# Patient Record
Sex: FEMALE | Race: WHITE | NOT HISPANIC OR LATINO | Employment: OTHER | URBAN - METROPOLITAN AREA
[De-identification: names, ages, dates, MRNs, and addresses within clinical notes are randomized per-mention and may not be internally consistent; named-entity substitution may affect disease eponyms.]

---

## 2017-01-02 ENCOUNTER — PREP FOR SURGERY (OUTPATIENT)
Dept: ORTHOPEDIC SURGERY | Facility: CLINIC | Age: 82
End: 2017-01-02

## 2017-01-02 ENCOUNTER — HOSPITAL ENCOUNTER (INPATIENT)
Facility: HOSPITAL | Age: 82
LOS: 4 days | Discharge: NURSING, REHAB OR LONG-TERM CARE | End: 2017-01-06
Attending: INTERNAL MEDICINE | Admitting: INTERNAL MEDICINE

## 2017-01-02 ENCOUNTER — APPOINTMENT (OUTPATIENT)
Dept: GENERAL RADIOLOGY | Facility: HOSPITAL | Age: 82
End: 2017-01-02

## 2017-01-02 ENCOUNTER — APPOINTMENT (OUTPATIENT)
Dept: GENERAL RADIOLOGY | Facility: HOSPITAL | Age: 82
End: 2017-01-02
Attending: EMERGENCY MEDICINE

## 2017-01-02 DIAGNOSIS — E11.9 TYPE 2 DIABETES MELLITUS WITHOUT COMPLICATION, WITHOUT LONG-TERM CURRENT USE OF INSULIN (HCC): ICD-10-CM

## 2017-01-02 DIAGNOSIS — S72.001A CLOSED FRACTURE OF NECK OF RIGHT FEMUR, INITIAL ENCOUNTER (HCC): ICD-10-CM

## 2017-01-02 DIAGNOSIS — S72.001A CLOSED FRACTURE OF NECK OF RIGHT FEMUR, INITIAL ENCOUNTER (HCC): Primary | ICD-10-CM

## 2017-01-02 DIAGNOSIS — S72.001A CLOSED RIGHT HIP FRACTURE, INITIAL ENCOUNTER (HCC): Primary | ICD-10-CM

## 2017-01-02 LAB
ABO GROUP BLD: NORMAL
ALBUMIN SERPL-MCNC: 3.9 G/DL (ref 3.5–5.2)
ALBUMIN/GLOB SERPL: 1.2 G/DL
ALP SERPL-CCNC: 60 U/L (ref 40–129)
ALT SERPL W P-5'-P-CCNC: 8 U/L (ref 5–33)
ANION GAP SERPL CALCULATED.3IONS-SCNC: 14.4 MMOL/L
AST SERPL-CCNC: 11 U/L (ref 5–32)
BASOPHILS # BLD AUTO: 0.02 10*3/MM3 (ref 0–0.2)
BASOPHILS NFR BLD AUTO: 0.3 % (ref 0–2)
BILIRUB SERPL-MCNC: 1 MG/DL (ref 0.2–1.2)
BILIRUB UR QL STRIP: NEGATIVE
BLD GP AB SCN SERPL QL: NEGATIVE
BUN BLD-MCNC: 14 MG/DL (ref 8–23)
BUN/CREAT SERPL: 20.3 (ref 7–25)
CALCIUM SPEC-SCNC: 9.8 MG/DL (ref 8.8–10.5)
CHLORIDE SERPL-SCNC: 92 MMOL/L (ref 98–107)
CLARITY UR: CLEAR
CO2 SERPL-SCNC: 23.6 MMOL/L (ref 22–29)
COLOR UR: YELLOW
CREAT BLD-MCNC: 0.69 MG/DL (ref 0.57–1)
DEPRECATED RDW RBC AUTO: 39.2 FL (ref 37–54)
EOSINOPHIL # BLD AUTO: 0.02 10*3/MM3 (ref 0.1–0.3)
EOSINOPHIL NFR BLD AUTO: 0.3 % (ref 0–4)
ERYTHROCYTE [DISTWIDTH] IN BLOOD BY AUTOMATED COUNT: 11.7 % (ref 11.5–14.5)
GFR SERPL CREATININE-BSD FRML MDRD: 82 ML/MIN/1.73
GLOBULIN UR ELPH-MCNC: 3.2 GM/DL
GLUCOSE BLD-MCNC: 114 MG/DL (ref 65–99)
GLUCOSE BLDC GLUCOMTR-MCNC: 122 MG/DL (ref 70–130)
GLUCOSE UR STRIP-MCNC: NEGATIVE MG/DL
HCT VFR BLD AUTO: 32.6 % (ref 37–47)
HGB BLD-MCNC: 11.2 G/DL (ref 12–16)
HGB UR QL STRIP.AUTO: NEGATIVE
IMM GRANULOCYTES # BLD: 0.02 10*3/MM3 (ref 0–0.03)
IMM GRANULOCYTES NFR BLD: 0.3 % (ref 0–0.5)
KETONES UR QL STRIP: NEGATIVE
LEUKOCYTE ESTERASE UR QL STRIP.AUTO: NEGATIVE
LYMPHOCYTES # BLD AUTO: 1.97 10*3/MM3 (ref 0.6–4.8)
LYMPHOCYTES NFR BLD AUTO: 25.3 % (ref 20–45)
MCH RBC QN AUTO: 31.3 PG (ref 27–31)
MCHC RBC AUTO-ENTMCNC: 34.4 G/DL (ref 31–37)
MCV RBC AUTO: 91.1 FL (ref 81–99)
MONOCYTES # BLD AUTO: 0.58 10*3/MM3 (ref 0–1)
MONOCYTES NFR BLD AUTO: 7.4 % (ref 3–8)
NEUTROPHILS # BLD AUTO: 5.18 10*3/MM3 (ref 1.5–8.3)
NEUTROPHILS NFR BLD AUTO: 66.4 % (ref 45–70)
NITRITE UR QL STRIP: NEGATIVE
NRBC BLD MANUAL-RTO: 0 /100 WBC (ref 0–0)
PH UR STRIP.AUTO: 6 [PH] (ref 4.5–8)
PLATELET # BLD AUTO: 322 10*3/MM3 (ref 140–500)
PMV BLD AUTO: 9.3 FL (ref 7.4–10.4)
POTASSIUM BLD-SCNC: 3.8 MMOL/L (ref 3.5–5.2)
PROT SERPL-MCNC: 7.1 G/DL (ref 6–8.5)
PROT UR QL STRIP: NEGATIVE
RBC # BLD AUTO: 3.58 10*6/MM3 (ref 4.2–5.4)
RH BLD: POSITIVE
SODIUM BLD-SCNC: 130 MMOL/L (ref 136–145)
SP GR UR STRIP: 1.01 (ref 1–1.03)
UROBILINOGEN UR QL STRIP: NORMAL
WBC NRBC COR # BLD: 7.79 10*3/MM3 (ref 4.8–10.8)

## 2017-01-02 PROCEDURE — 99284 EMERGENCY DEPT VISIT MOD MDM: CPT

## 2017-01-02 PROCEDURE — 25010000002 HYDROMORPHONE PER 4 MG: Performed by: ORTHOPAEDIC SURGERY

## 2017-01-02 PROCEDURE — 86850 RBC ANTIBODY SCREEN: CPT

## 2017-01-02 PROCEDURE — 93010 ELECTROCARDIOGRAM REPORT: CPT | Performed by: INTERNAL MEDICINE

## 2017-01-02 PROCEDURE — 99222 1ST HOSP IP/OBS MODERATE 55: CPT | Performed by: INTERNAL MEDICINE

## 2017-01-02 PROCEDURE — 27236 TREAT THIGH FRACTURE: CPT | Performed by: ORTHOPAEDIC SURGERY

## 2017-01-02 PROCEDURE — 25010000002 HYDROMORPHONE PER 4 MG: Performed by: INTERNAL MEDICINE

## 2017-01-02 PROCEDURE — 99285 EMERGENCY DEPT VISIT HI MDM: CPT | Performed by: EMERGENCY MEDICINE

## 2017-01-02 PROCEDURE — 80053 COMPREHEN METABOLIC PANEL: CPT | Performed by: INTERNAL MEDICINE

## 2017-01-02 PROCEDURE — 86900 BLOOD TYPING SEROLOGIC ABO: CPT

## 2017-01-02 PROCEDURE — 85025 COMPLETE CBC W/AUTO DIFF WBC: CPT | Performed by: INTERNAL MEDICINE

## 2017-01-02 PROCEDURE — 94799 UNLISTED PULMONARY SVC/PX: CPT

## 2017-01-02 PROCEDURE — 86920 COMPATIBILITY TEST SPIN: CPT

## 2017-01-02 PROCEDURE — 93005 ELECTROCARDIOGRAM TRACING: CPT | Performed by: INTERNAL MEDICINE

## 2017-01-02 PROCEDURE — 73502 X-RAY EXAM HIP UNI 2-3 VIEWS: CPT

## 2017-01-02 PROCEDURE — 71010 HC CHEST PA OR AP: CPT

## 2017-01-02 PROCEDURE — 86901 BLOOD TYPING SEROLOGIC RH(D): CPT

## 2017-01-02 PROCEDURE — 82962 GLUCOSE BLOOD TEST: CPT

## 2017-01-02 PROCEDURE — 81003 URINALYSIS AUTO W/O SCOPE: CPT | Performed by: INTERNAL MEDICINE

## 2017-01-02 RX ORDER — CELECOXIB 100 MG/1
400 CAPSULE ORAL ONCE
Status: CANCELLED | OUTPATIENT
Start: 2017-01-02 | End: 2017-01-02

## 2017-01-02 RX ORDER — BISACODYL 10 MG
10 SUPPOSITORY, RECTAL RECTAL DAILY
Status: DISCONTINUED | OUTPATIENT
Start: 2017-01-02 | End: 2017-01-06 | Stop reason: HOSPADM

## 2017-01-02 RX ORDER — ONDANSETRON 2 MG/ML
4 INJECTION INTRAMUSCULAR; INTRAVENOUS EVERY 6 HOURS PRN
Status: DISCONTINUED | OUTPATIENT
Start: 2017-01-02 | End: 2017-01-06 | Stop reason: HOSPADM

## 2017-01-02 RX ORDER — ACETAMINOPHEN 10 MG/ML
1000 INJECTION, SOLUTION INTRAVENOUS ONCE
Status: CANCELLED | OUTPATIENT
Start: 2017-01-03

## 2017-01-02 RX ORDER — OXYCODONE HCL 10 MG/1
10 TABLET, FILM COATED, EXTENDED RELEASE ORAL ONCE
Status: CANCELLED | OUTPATIENT
Start: 2017-01-02 | End: 2017-01-02

## 2017-01-02 RX ORDER — SODIUM CHLORIDE 450 MG/100ML
50 INJECTION, SOLUTION INTRAVENOUS CONTINUOUS
Status: DISCONTINUED | OUTPATIENT
Start: 2017-01-02 | End: 2017-01-06 | Stop reason: HOSPADM

## 2017-01-02 RX ORDER — NICOTINE POLACRILEX 4 MG
15 LOZENGE BUCCAL AS NEEDED
Status: DISCONTINUED | OUTPATIENT
Start: 2017-01-02 | End: 2017-01-06 | Stop reason: HOSPADM

## 2017-01-02 RX ORDER — HYDROCODONE BITARTRATE AND ACETAMINOPHEN 5; 325 MG/1; MG/1
1 TABLET ORAL ONCE
Status: COMPLETED | OUTPATIENT
Start: 2017-01-02 | End: 2017-01-02

## 2017-01-02 RX ORDER — DEXTROSE MONOHYDRATE 25 G/50ML
25 INJECTION, SOLUTION INTRAVENOUS AS NEEDED
Status: DISCONTINUED | OUTPATIENT
Start: 2017-01-02 | End: 2017-01-06 | Stop reason: HOSPADM

## 2017-01-02 RX ORDER — ACETAMINOPHEN 325 MG/1
650 TABLET ORAL EVERY 6 HOURS PRN
Status: DISCONTINUED | OUTPATIENT
Start: 2017-01-02 | End: 2017-01-03

## 2017-01-02 RX ORDER — PANTOPRAZOLE SODIUM 40 MG/1
40 TABLET, DELAYED RELEASE ORAL
Status: DISCONTINUED | OUTPATIENT
Start: 2017-01-02 | End: 2017-01-06 | Stop reason: HOSPADM

## 2017-01-02 RX ORDER — LATANOPROST 50 UG/ML
1 SOLUTION/ DROPS OPHTHALMIC NIGHTLY
Status: DISCONTINUED | OUTPATIENT
Start: 2017-01-02 | End: 2017-01-06 | Stop reason: HOSPADM

## 2017-01-02 RX ORDER — MELOXICAM 7.5 MG/1
15 TABLET ORAL DAILY
Status: DISCONTINUED | OUTPATIENT
Start: 2017-01-02 | End: 2017-01-03

## 2017-01-02 RX ORDER — SODIUM CHLORIDE 450 MG/100ML
INJECTION, SOLUTION INTRAVENOUS
Status: DISPENSED
Start: 2017-01-02 | End: 2017-01-03

## 2017-01-02 RX ORDER — DOCUSATE SODIUM 100 MG/1
100 CAPSULE, LIQUID FILLED ORAL 2 TIMES DAILY
Status: DISCONTINUED | OUTPATIENT
Start: 2017-01-02 | End: 2017-01-06 | Stop reason: HOSPADM

## 2017-01-02 RX ORDER — PREGABALIN 25 MG/1
75 CAPSULE ORAL ONCE
Status: CANCELLED | OUTPATIENT
Start: 2017-01-02 | End: 2017-01-02

## 2017-01-02 RX ORDER — ALLOPURINOL 100 MG/1
100 TABLET ORAL DAILY
Status: DISCONTINUED | OUTPATIENT
Start: 2017-01-02 | End: 2017-01-06 | Stop reason: HOSPADM

## 2017-01-02 RX ORDER — PRAVASTATIN SODIUM 20 MG
40 TABLET ORAL NIGHTLY
Status: DISCONTINUED | OUTPATIENT
Start: 2017-01-02 | End: 2017-01-06 | Stop reason: HOSPADM

## 2017-01-02 RX ORDER — LEVOTHYROXINE SODIUM 0.05 MG/1
50 TABLET ORAL EVERY MORNING
Status: DISCONTINUED | OUTPATIENT
Start: 2017-01-02 | End: 2017-01-06 | Stop reason: HOSPADM

## 2017-01-02 RX ADMIN — ACETAMINOPHEN 650 MG: 325 TABLET, FILM COATED ORAL at 23:29

## 2017-01-02 RX ADMIN — DOCUSATE SODIUM 100 MG: 100 CAPSULE, LIQUID FILLED ORAL at 17:29

## 2017-01-02 RX ADMIN — HYDROMORPHONE HYDROCHLORIDE 0.5 MG: 1 INJECTION, SOLUTION INTRAMUSCULAR; INTRAVENOUS; SUBCUTANEOUS at 11:45

## 2017-01-02 RX ADMIN — METOPROLOL TARTRATE 25 MG: 25 TABLET ORAL at 15:10

## 2017-01-02 RX ADMIN — SODIUM CHLORIDE 50 ML/HR: 4.5 INJECTION, SOLUTION INTRAVENOUS at 14:04

## 2017-01-02 RX ADMIN — LEVOTHYROXINE SODIUM 50 MCG: 50 TABLET ORAL at 15:09

## 2017-01-02 RX ADMIN — HYDROMORPHONE HYDROCHLORIDE 1 MG: 1 INJECTION, SOLUTION INTRAMUSCULAR; INTRAVENOUS; SUBCUTANEOUS at 14:48

## 2017-01-02 RX ADMIN — HYDROMORPHONE HYDROCHLORIDE 0.5 MG: 1 INJECTION, SOLUTION INTRAMUSCULAR; INTRAVENOUS; SUBCUTANEOUS at 11:57

## 2017-01-02 RX ADMIN — HYDROMORPHONE HYDROCHLORIDE 1 MG: 1 INJECTION, SOLUTION INTRAMUSCULAR; INTRAVENOUS; SUBCUTANEOUS at 23:29

## 2017-01-02 RX ADMIN — METFORMIN HYDROCHLORIDE 500 MG: 500 TABLET ORAL at 15:10

## 2017-01-02 RX ADMIN — LATANOPROST 1 DROP: 50 SOLUTION OPHTHALMIC at 20:48

## 2017-01-02 RX ADMIN — BISACODYL 10 MG: 10 SUPPOSITORY RECTAL at 14:51

## 2017-01-02 RX ADMIN — MELOXICAM 15 MG: 7.5 TABLET ORAL at 15:09

## 2017-01-02 RX ADMIN — PANTOPRAZOLE SODIUM 40 MG: 40 TABLET, DELAYED RELEASE ORAL at 15:11

## 2017-01-02 RX ADMIN — HYDROCODONE BITARTRATE AND ACETAMINOPHEN 1 TABLET: 5; 325 TABLET ORAL at 08:10

## 2017-01-02 RX ADMIN — ALLOPURINOL 100 MG: 100 TABLET ORAL at 15:09

## 2017-01-02 RX ADMIN — PRAVASTATIN SODIUM 40 MG: 20 TABLET ORAL at 20:48

## 2017-01-02 RX ADMIN — HYDROMORPHONE HYDROCHLORIDE 1 MG: 1 INJECTION, SOLUTION INTRAMUSCULAR; INTRAVENOUS; SUBCUTANEOUS at 20:47

## 2017-01-02 RX ADMIN — DOCUSATE SODIUM 100 MG: 100 CAPSULE, LIQUID FILLED ORAL at 15:00

## 2017-01-02 RX ADMIN — HYDROMORPHONE HYDROCHLORIDE 1 MG: 1 INJECTION, SOLUTION INTRAMUSCULAR; INTRAVENOUS; SUBCUTANEOUS at 17:29

## 2017-01-02 NOTE — IP AVS SNAPSHOT
INTER-FACILITY TRANSFER   AFTER VISIT SUMMARY             Ashli León            Summary of Your Hospitalization        About Your Hospitalization     You were admitted on:  January 2, 2017 You last received care in the:  [facility] MED SURG      Reason for Hospitalization     Your primary diagnosis was:  Not on File    Your diagnoses also included:  Broken Leg, Femoral Neck Fracture      Care Providers     Provider Service Role Specialty    Micheal Stephens MD Internal Medicine Attending Provider Internal Medicine    Mirza Whittington MD -- Attending Provider Orthopedic Surgery    Mirza Whittington MD -- Surgeon  Orthopedic Surgery       Your Allergies  Date Reviewed: 1/3/2017    Allergen Reactions    Neomycin Swelling         Statins Myalgia       Medications    Based on the information you provided to us as well as any changes during this visit, the following is your updated medication list.  This is subject to change based on the care your receive at the receiving facility.  You should receive a new list once you are discharged.      If you have any questions or concerns, contact your primary care physician's office.             Your Medications      START taking these medications     aspirin 325 MG EC tablet   Take 1 tablet by mouth Every 12 (Twelve) Hours.   Last time this was given:  1/6/2017  9:01 AM   Next Dose Due:  1/6/17 2100           bisacodyl 5 MG EC tablet   Take 2 tablets by mouth Daily As Needed for constipation.   Commonly known as:  DULCOLAX           bisacodyl 10 MG suppository   Insert 1 suppository into the rectum Daily As Needed for constipation.   Last time this was given:  1/5/2017  8:30 AM   Commonly known as:  DULCOLAX            MG capsule   Take 100 mg by mouth 2 (Two) Times a Day.   Last time this was given:  1/6/2017  9:01 AM   Next Dose Due:  1/6/17 1800           ferrous sulfate 324 (65 FE) MG tablet delayed-release EC tablet   Take 1 tablet by mouth 2  (Two) Times a Day With Meals.   Last time this was given:  1/6/2017  9:01 AM   Next Dose Due:  1/6/17 1800           magnesium hydroxide 400 MG/5ML suspension   Take 30 mL by mouth Daily As Needed for constipation.   Last time this was given:  1/5/2017  1:21 PM   Commonly known as:  MILK OF MAGNESIA           pregabalin 75 MG capsule   Take 1 capsule by mouth Every 12 (Twelve) Hours.   Last time this was given:  1/6/2017  9:01 AM   Commonly known as:  LYRICA           sennosides-docusate sodium 8.6-50 MG tablet   Take 2 tablets by mouth 2 (Two) Times a Day.   Last time this was given:  1/5/2017  8:30 AM   Commonly known as:  SENOKOT-S   Next Dose Due:  1/6/17 1800           witch hazel-glycerin pad   Apply  topically As Needed for irritation.   Commonly known as:  TUCKS             CONTINUE taking these medications     alendronate 5 MG tablet   5 mg Every Morning Before Breakfast.   Commonly known as:  FOSAMAX   Next Dose Due:  1/7/17 0900           allopurinol 100 MG tablet   Take 100 mg by mouth daily.   Last time this was given:  1/6/2017  9:01 AM   Commonly known as:  ZYLOPRIM   Next Dose Due:  1/7/17 0900           HYDROcodone-acetaminophen 5-325 MG per tablet   Take 1-2 tablets by mouth Every 4 (Four) Hours As Needed for moderate pain (4-6) for up to 40 doses.   Last time this was given:  1/2/2017  8:10 AM   Commonly known as:  NORCO           latanoprost 0.005 % ophthalmic solution   Administer 1 drop to both eyes Every Night.   Last time this was given:  1/5/2017 10:04 PM   Commonly known as:  XALATAN   Next Dose Due:  1/6/17 2100           levothyroxine 50 MCG tablet   Take 50 mcg by mouth daily.   Last time this was given:  1/6/2017  6:47 AM   Commonly known as:  SYNTHROID, LEVOTHROID   Next Dose Due:  1/7/17 0600           metFORMIN 500 MG tablet   Take 500 mg by mouth daily with breakfast. Pt has only been taking once a day, however the prescription on bottle says take twice daily with meals.   Last time  this was given:  1/6/2017  9:01 AM   Commonly known as:  GLUCOPHAGE   Next Dose Due:  1/7/17 0900           metoprolol tartrate 25 MG tablet   Take 25 mg by mouth daily.   Last time this was given:  1/6/2017  9:01 AM   Commonly known as:  LOPRESSOR   Next Dose Due:  1/7/17 0900           omeprazole 40 MG capsule   Take 40 mg by mouth 2 (two) times a day.   Commonly known as:  priLOSEC   Next Dose Due:  1/6/17 1800           pravastatin 40 MG tablet   40 mg Daily.   Last time this was given:  1/5/2017 10:03 PM   Commonly known as:  PRAVACHOL   Next Dose Due:  1/6/17 2100           traMADol 50 MG tablet   Take 1 tablet by mouth Every 4 (Four) Hours As Needed for moderate pain (4-6).   Commonly known as:  ULTRAM             STOP taking these medications     meloxicam 15 MG tablet   Commonly known as:  MOBIC           oxyCODONE-acetaminophen 7.5-325 MG per tablet   Commonly known as:  PERCOCET                Where to Get Your Medications      These medications were sent to [id number] - [place] [facility] [address]  [phone]  - [phone]   [address] [facility] [place] [address]     Phone:  [phone]     aspirin 325 MG EC tablet         You can get these medications from any pharmacy     Bring a paper prescription for each of these medications     HYDROcodone-acetaminophen 5-325 MG per tablet    pregabalin 75 MG capsule         Information about where to get these medications is not yet available     ! Ask your nurse or doctor about these medications     bisacodyl 10 MG suppository    bisacodyl 5 MG EC tablet     MG capsule    ferrous sulfate 324 (65 FE) MG tablet delayed-release EC tablet    magnesium hydroxide 400 MG/5ML suspension    sennosides-docusate sodium 8.6-50 MG tablet    witch hazel-glycerin [facility]          Pharmacy Instructions:      [facility]                     Pharmacy Instructions:      [facility]               Additional Instructions    Information is  subject to change based on the care your receive at the receiving facility.  If you have any questions or concerns, contact your primary care physician's office.          Activity Instructions     Activity as Tolerated           Measure Blood Pressure                 Other Instructions     Check Blood Glucose - Fingerstick           Discharge Dressing / Wound Instructions       As per Dr. Whittington       Discharge Instructions       Review prior to discharge              Follow-ups for After Discharge        Follow-up Information     Follow up with Arnaud Monson MD .    Specialty:  Internal Medicine    Contact information:    [address]  [facility] [address] [phone]          Follow up with [facility] .    Specialties:  Skilled Nursing Facility, Intermediate Care Facility    Contact information:    [address] [place] [address] [phone]      Referrals and Follow-ups to Schedule     Follow-Up    As directed    Review prior to discharge   Follow Up Details:  1 week after discharge from facility       Schedule Discharge Follow Up    As directed    Follow Up:  3 Weeks   Special Instructions:  with truman             Scheduled Appointments     Feb 17, 2017  9:30 AM EST   Injection with Adrian Swenson MD   [facility] MEDICAL [facility] (--)    [address] [place] [address] [address]  [facility] [address]   [phone]                         Patient Signature:  ____________________________________________________________    Date:  ____________________________________________________________        More Information      Diabetes Mellitus and Food  It is important for you to manage your blood sugar (glucose) level. Your blood glucose level can be greatly affected by what you eat. Eating healthier foods in the appropriate amounts throughout the day at about the same time each day will help you control your blood glucose level. It can also help slow or prevent  worsening of your diabetes mellitus. Healthy eating may even help you improve the level of your blood pressure and reach or maintain a healthy weight.   General recommendations for healthful eating and cooking habits include:  · Eating meals and snacks regularly. Avoid going long periods of time without eating to lose weight.  · Eating a diet that consists mainly of plant-based foods, such as fruits, vegetables, nuts, legumes, and whole grains.  · Using low-heat cooking methods, such as baking, instead of high-heat cooking methods, such as deep frying.  Work with your dietitian to make sure you understand how to use the Nutrition Facts information on food labels.  HOW CAN FOOD AFFECT ME?  Carbohydrates  Carbohydrates affect your blood glucose level more than any other type of food. Your dietitian will help you determine how many carbohydrates to eat at each meal and teach you how to count carbohydrates. Counting carbohydrates is important to keep your blood glucose at a healthy level, especially if you are using insulin or taking certain medicines for diabetes mellitus.  Alcohol  Alcohol can cause sudden decreases in blood glucose (hypoglycemia), especially if you use insulin or take certain medicines for diabetes mellitus. Hypoglycemia can be a life-threatening condition. Symptoms of hypoglycemia (sleepiness, dizziness, and disorientation) are similar to symptoms of having too much alcohol.   If your health care provider has given you approval to drink alcohol, do so in moderation and use the following guidelines:  · Women should not have more than one drink per day, and men should not have more than two drinks per day. One drink is equal to:    12 oz of beer.    5 oz of wine.    1½ oz of hard liquor.  · Do not drink on an empty stomach.  · Keep yourself hydrated. Have water, diet soda, or unsweetened iced tea.  · Regular soda, juice, and other mixers might contain a lot of carbohydrates and should be  counted.  WHAT FOODS ARE NOT RECOMMENDED?  As you make food choices, it is important to remember that all foods are not the same. Some foods have fewer nutrients per serving than other foods, even though they might have the same number of calories or carbohydrates. It is difficult to get your body what it needs when you eat foods with fewer nutrients. Examples of foods that you should avoid that are high in calories and carbohydrates but low in nutrients include:  · Trans fats (most processed foods list trans fats on the Nutrition Facts label).  · Regular soda.  · Juice.  · Candy.  · Sweets, such as cake, pie, doughnuts, and cookies.  · Fried foods.  WHAT FOODS CAN I EAT?  Eat nutrient-rich foods, which will nourish your body and keep you healthy. The food you should eat also will depend on several factors, including:  · The calories you need.  · The medicines you take.  · Your weight.  · Your blood glucose level.  · Your blood pressure level.  · Your cholesterol level.  You should eat a variety of foods, including:  · Protein.    Lean cuts of meat.    Proteins low in saturated fats, such as fish, egg whites, and beans. Avoid processed meats.  · Fruits and vegetables.    Fruits and vegetables that may help control blood glucose levels, such as apples, mangoes, and yams.  · Dairy products.    Choose fat-free or low-fat dairy products, such as milk, yogurt, and cheese.  · Grains, bread, pasta, and rice.    Choose whole grain products, such as multigrain bread, whole oats, and brown rice. These foods may help control blood pressure.  · Fats.    Foods containing healthful fats, such as nuts, avocado, olive oil, canola oil, and fish.  DOES EVERYONE WITH DIABETES MELLITUS HAVE THE SAME MEAL PLAN?  Because every person with diabetes mellitus is different, there is not one meal plan that works for everyone. It is very important that you meet with a dietitian who will help you create a meal plan that is just right for you.      This information is not intended to replace advice given to you by your health care provider. Make sure you discuss any questions you have with your health care provider.     Document Released: 09/14/2006 Document Revised: 01/08/2016 Document Reviewed: 11/14/2014  [employer] Interactive Patient Education ©2016 [employer]          Blood Glucose Monitoring, Adult  Monitoring your blood glucose (also know as blood sugar) helps you to manage your diabetes. It also helps you and your health care provider monitor your diabetes and determine how well your treatment plan is working.  WHY SHOULD YOU MONITOR YOUR BLOOD GLUCOSE?  · It can help you understand how food, exercise, and medicine affect your blood glucose.  · It allows you to know what your blood glucose is at any given moment. You can quickly tell if you are having low blood glucose (hypoglycemia) or high blood glucose (hyperglycemia).  · It can help you and your health care provider know how to adjust your medicines.  · It can help you understand how to manage an illness or adjust medicine for exercise.  WHEN SHOULD YOU TEST?  Your health care provider will help you decide how often you should check your blood glucose. This may depend on the type of diabetes you have, your diabetes control, or the types of medicines you are taking. Be sure to write down all of your blood glucose readings so that this information can be reviewed with your health care provider. See below for examples of testing times that your health care provider may suggest.  Type 1 Diabetes  · Test at least 2 times per day if your diabetes is well controlled, if you are using an insulin pump, or if you perform multiple daily injections.  · If your diabetes is not well controlled or if you are sick, you may need to test more often.  · It is a good idea to also test:    Before every insulin injection.    Before and after exercise.    Between meals and 2 hours after a meal.    Occasionally  between 2:00 a.m. and 3:00 a.m.  Type 2 Diabetes  · If you are taking insulin, test at least 2 times per day. However, it is best to test before every insulin injection.  · If you take medicines by mouth (orally), test 2 times a day.  · If you are on a controlled diet, test once a day.  · If your diabetes is not well controlled or if you are sick, you may need to monitor more often.  HOW TO MONITOR YOUR BLOOD GLUCOSE  Supplies Needed  · Blood glucose meter.  · Test strips for your meter. Each meter has its own strips. You must use the strips that go with your own meter.  · A pricking needle (lancet).  · A device that holds the lancet (lancing device).  · A journal or log book to write down your results.  Procedure  · Wash your hands with soap and water. Alcohol is not preferred.  · Prick the side of your finger (not the tip) with the lancet.  · Gently milk the finger until a small drop of blood appears.  · Follow the instructions that come with your meter for inserting the test strip, applying blood to the strip, and using your blood glucose meter.  Other Areas to Get Blood for Testing  Some meters allow you to use other areas of your body (other than your finger) to test your blood. These areas are called alternative sites. The most common alternative sites are:  · The forearm.  · The thigh.  · The back area of the lower leg.  · The palm of the hand.  The blood flow in these areas is slower. Therefore, the blood glucose values you get may be delayed, and the numbers are different from what you would get from your fingers. Do not use alternative sites if you think you are having hypoglycemia. Your reading will not be accurate. Always use a finger if you are having hypoglycemia. Also, if you cannot feel your lows (hypoglycemia unawareness), always use your fingers for your blood glucose checks.  ADDITIONAL TIPS FOR GLUCOSE MONITORING  · Do not reuse lancets.  · Always carry your supplies with you.  · All blood  "glucose meters have a 24-hour \"hotline\" number to call if you have questions or need help.  · Adjust (calibrate) your blood glucose meter with a control solution after finishing a few boxes of strips.  BLOOD GLUCOSE RECORD KEEPING  It is a good idea to keep a daily record or log of your blood glucose readings. Most glucose meters, if not all, keep your glucose records stored in the meter. Some meters come with the ability to download your records to your home computer. Keeping a record of your blood glucose readings is especially helpful if you are wanting to look for patterns. Make notes to go along with the blood glucose readings because you might forget what happened at that exact time. Keeping good records helps you and your health care provider to work together to achieve good diabetes management.      This information is not intended to replace advice given to you by your health care provider. Make sure you discuss any questions you have with your health care provider.     Document Released: 12/20/2004 Document Revised: 01/08/2016 Document Reviewed: 05/12/2014  [employer] Interactive Patient Education ©2016 [employer].          Heart-Healthy Eating Plan  Heart-healthy meal planning includes:  · Limiting unhealthy fats.  · Increasing healthy fats.  · Making other small dietary changes.  You may need to talk with your doctor or a diet specialist (dietitian) to create an eating plan that is right for you.  WHAT TYPES OF FAT SHOULD I CHOOSE?  · Choose healthy fats. These include olive oil and canola oil, flaxseeds, walnuts, almonds, and seeds.  · Eat more omega-3 fats. These include salmon, mackerel, sardines, tuna, flaxseed oil, and ground flaxseeds. Try to eat fish at least twice each week.  · Limit saturated fats.    Saturated fats are often found in animal products, such as meats, butter, and cream.    Plant sources of saturated fats include palm oil, palm kernel oil, and coconut oil.  · Avoid foods with " "partially hydrogenated oils in them. These include stick margarine, some tub margarines, cookies, crackers, and other baked goods. These contain trans fats.  WHAT GENERAL GUIDELINES DO I NEED TO FOLLOW?  · Check food labels carefully. Identify foods with trans fats or high amounts of saturated fat.  · Fill one half of your plate with vegetables and green salads. Eat 4-5 servings of vegetables per day. A serving of vegetables is:    1 cup of raw leafy vegetables.    ½ cup of raw or cooked cut-up vegetables.    ½ cup of vegetable juice.  · Fill one fourth of your plate with whole grains. Look for the word \"whole\" as the first word in the ingredient list.  · Fill one fourth of your plate with lean protein foods.  · Eat 4-5 servings of fruit per day. A serving of fruit is:    One medium whole fruit.    ¼ cup of dried fruit.    ½ cup of fresh, frozen, or canned fruit.    ½ cup of 100% fruit juice.  · Eat more foods that contain soluble fiber. These include apples, broccoli, carrots, beans, peas, and barley. Try to get 20-30 g of fiber per day.  · Eat more home-cooked food. Eat less restaurant, buffet, and fast food.  · Limit or avoid alcohol.  · Limit foods high in starch and sugar.  · Avoid fried foods.  · Avoid frying your food. Try baking, boiling, grilling, or broiling it instead. You can also reduce fat by:    Removing the skin from poultry.    Removing all visible fats from meats.    Skimming the fat off of stews, soups, and gravies before serving them.    Steaming vegetables in water or broth.  · Lose weight if you are overweight.  · Eat 4-5 servings of nuts, legumes, and seeds per week:    One serving of dried beans or legumes equals ½ cup after being cooked.    One serving of nuts equals 1½ ounces.    One serving of seeds equals ½ ounce or one tablespoon.  · You may need to keep track of how much salt or sodium you eat. This is especially true if you have high blood pressure. Talk with your doctor or dietitian " to get more information.  WHAT FOODS CAN I EAT?  Grains  Breads, including Bengali, white, jeremiah, wheat, raisin, rye, oatmeal, and Italian. Tortillas that are neither fried nor made with lard or trans fat. Low-fat rolls, including hotdog and hamburger buns and English muffins. Biscuits. Muffins. Waffles. Pancakes. Light popcorn. Whole-grain cereals. Flatbread. Aimee toast. Pretzels. Breadsticks. Rusks. Low-fat snacks. Low-fat crackers, including oyster, saltine, matzo, sushil, animal, and rye. Rice and pasta, including brown rice and pastas that are made with whole wheat.   Vegetables  All vegetables.   Fruits  All fruits, but limit coconut.  Meats and Other Protein Sources  Lean, well-trimmed beef, veal, pork, and lamb. Chicken and turkey without skin. All fish and shellfish. Wild duck, rabbit, pheasant, and venison. Egg whites or low-cholesterol egg substitutes. Dried beans, peas, lentils, and tofu. Seeds and most nuts.  Dairy  Low-fat or nonfat cheeses, including ricotta, string, and mozzarella. Skim or 1% milk that is liquid, powdered, or evaporated. Buttermilk that is made with low-fat milk. Nonfat or low-fat yogurt.  Beverages  Mineral water. Diet carbonated beverages.  Sweets and Desserts  Sherbets and fruit ices. Honey, jam, marmalade, jelly, and syrups. Meringues and gelatins. Pure sugar candy, such as hard candy, jelly beans, gumdrops, mints, marshmallows, and small amounts of dark chocolate. Remigio food cake.  Eat all sweets and desserts in moderation.  Fats and Oils  Nonhydrogenated (trans-free) margarines. Vegetable oils, including soybean, sesame, sunflower, olive, peanut, safflower, corn, canola, and cottonseed. Salad dressings or mayonnaise made with a vegetable oil. Limit added fats and oils that you use for cooking, baking, salads, and as spreads.  Other  Cocoa powder. Coffee and tea. All seasonings and condiments.  The items listed above may not be a complete list of recommended foods or beverages.  Contact your dietitian for more options.  WHAT FOODS ARE NOT RECOMMENDED?  Grains  Breads that are made with saturated or trans fats, oils, or whole milk. Croissants. Butter rolls. Cheese breads. Sweet rolls. Donuts. Buttered popcorn. Chow mein noodles. High-fat crackers, such as cheese or butter crackers.  Meats and Other Protein Sources  Fatty meats, such as hotdogs, short ribs, sausage, spareribs, pelaez, rib eye roast or steak, and mutton. High-fat deli meats, such as salami and bologna. Caviar. Domestic duck and goose. Organ meats, such as kidney, liver, sweetbreads, and heart.  Dairy  Cream, sour cream, cream cheese, and creamed cottage cheese. Whole-milk cheeses, including blue (fish), [place], Brie, Mg, American, Havarti, Swiss, cheddar, Camembert, and Hamilton. Whole or 2% milk that is liquid, evaporated, or condensed. Whole buttermilk. Cream sauce or high-fat cheese sauce. Yogurt that is made from whole milk.  Beverages  Regular sodas and juice drinks with added sugar.  Sweets and Desserts  Frosting. Pudding. Cookies. Cakes other than onofre food cake. Candy that has milk chocolate or white chocolate, hydrogenated fat, butter, coconut, or unknown ingredients. Buttered syrups. Full-fat ice cream or ice cream drinks.  Fats and Oils  Gravy that has suet, meat fat, or shortening. Cocoa butter, hydrogenated oils, palm oil, coconut oil, palm kernel oil. These can often be found in baked products, candy, fried foods, nondairy creamers, and whipped toppings. Solid fats and shortenings, including pelaez fat, salt pork, lard, and butter. Nondairy cream substitutes, such as coffee creamers and sour cream substitutes. Salad dressings that are made of unknown oils, cheese, or sour cream.  The items listed above may not be a complete list of foods and beverages to avoid. Contact your dietitian for more information.     This information is not intended to replace advice given to you by your health care provider.  Make sure you discuss any questions you have with your health care provider.     Document Released: 06/18/2013 Document Revised: 01/08/2016 Document Reviewed: 06/11/2015  [employer] Interactive Patient Education ©2016 [employer]

## 2017-01-02 NOTE — ED PROVIDER NOTES
Subjective   History of Present Illness  History of Present Illness    Chief complaint: Hip pain    Location: Right hip    Quality/Severity:  Moderate, sharp    Timing/Onset/Duration: Acute onset while walking around the bed yesterday at 5:30 PM    Modifying Factors: It hurts to move and walk on it, feels better to remain still    Associated Symptoms: There is no numbness, tingling, or weakness.  There are no wounds.    Narrative: This 81-year-old white female was walking around the bed yesterday at 5:30 PM and felt a pop in her right hip.  She slumped over into the bed.  The patient has history of hip surgery in the remote past with a rajesh in her finger.  She does not recall who her orthopedist was.  Her surgery was done at [facility].    PCP:  Arnaud Monson      Review of Systems   Musculoskeletal:        Right hip pain   Neurological: Negative for weakness and numbness.        Medication List      ASK your doctor about these medications          alendronate 5 MG tablet   Commonly known as:  FOSAMAX       allopurinol 100 MG tablet   Commonly known as:  ZYLOPRIM       HYDROcodone-acetaminophen 5-325 MG per tablet   Commonly known as:  NORCO   Take 1-2 tablets by mouth Every 4 (Four) Hours As Needed for moderate pain   (4-6) for up to 30 doses.       latanoprost 0.005 % ophthalmic solution   Commonly known as:  XALATAN       levothyroxine 50 MCG tablet   Commonly known as:  SYNTHROID, LEVOTHROID       meloxicam 15 MG tablet   Commonly known as:  MOBIC   1 PO Daily with food.       metFORMIN 500 MG tablet   Commonly known as:  GLUCOPHAGE       metoprolol tartrate 25 MG tablet   Commonly known as:  LOPRESSOR       omeprazole 40 MG capsule   Commonly known as:  priLOSEC       oxyCODONE-acetaminophen 7.5-325 MG per tablet   Commonly known as:  PERCOCET   1-2 Oral Q4H PRN severe pain       pravastatin 40 MG tablet   Commonly known as:  PRAVACHOL       traMADol 50 MG tablet   Commonly known as:   ULTRAM   Take 1 tablet by mouth Every 4 (Four) Hours As Needed for moderate pain   (4-6).           Past Medical History   Diagnosis Date   • Acid reflux    • AP (abdominal pain)    • Back pain    • Chest pain    • Colitis    • Diabetes mellitus type 2, noninsulin dependent    • Diarrhea    • Disease of thyroid gland    • Diverticular disease    • Glaucoma    • Gout      - right big toe   • High blood pressure    • High cholesterol    • Hyponatremia    • Knee pain    • Macular degeneration    • Mitral regurgitation 06/2016     Mild to moderate per 2-D echocardiogram   • Primary osteoarthritis of both knees 4/1/2016   • Renal lesion      on mri   • Rheumatoid arthritis        Allergies   Allergen Reactions   • Neomycin Swelling   • Statins Myalgia       Past Surgical History   Procedure Laterality Date   • Appendectomy  1951   • Femur surgery Right      had fracture and has rajesh    • Hip surgery Right 1999     hx fracture   • Cataract extraction Bilateral    • Hemorrhoidectomy     • Total knee arthroplasty Right 6/22/2016     Procedure: RT TOTAL KNEE ARTHROPLASTY WITH FINN NAVIGATION;  Surgeon: Adrian Swenson MD;  Location: [facility] OR;  Service:        Family History   Problem Relation Age of Onset   • Heart attack Father        Social History     Social History   • Marital status:      Spouse name: N/A   • Number of children: N/A   • Years of education: N/A     Social History Main Topics   • Smoking status: Never Smoker   • Smokeless tobacco: Never Used   • Alcohol use 1.8 oz/week     3 Shots of liquor per week      Comment: social use 1 weekly   • Drug use: No   • Sexual activity: Defer     Other Topics Concern   • None     Social History Narrative   • None           Objective   Physical Exam   Constitutional: She appears well-developed and well-nourished. No distress.   ED Triage Vitals:  Temp: 97.7 °F (36.5 °C) (01/02/17 0745)  Heart Rate: 95 (01/02/17 0745)  Resp: 18 (01/02/17 0745)  BP: 161/80  (01/02/17 0745)  SpO2: 96 % (01/02/17 0745)  Temp src: Oral (01/02/17 0745)  Heart Rate Source: Monitor (01/02/17 0745)  Patient Position: Lying (01/02/17 0745)  BP Location: Right arm (01/02/17 0745)  FiO2 (%): n/a    The patient's vitals were reviewed by me.  Unless otherwise noted they are within normal limits.  The patient is hypertensive with a blood pressure 161/80.  She has a history of hypertension and is in a moderate amount of pain.     Musculoskeletal:   The right hip is tender upon palpation.  The patient has pain when she attempts to raise the right leg off the bed.  The capillary refill is less than 2 seconds.  The sensation is intact.  There is a decreased range of motion secondary to pain.  There is no joint laxity noted.  There is a 2+ dorsalis pedis and posterior tibial pulse, there are no wounds, abrasions, or lacerations.  The right leg is not shortened or externally rotated.   Nursing note and vitals reviewed.      Procedures         ED Course  ED Course        8:57 AM, 01/02/17:  I spoke with Dr. Whittington, on call for orthopedics, he will consult on the patient and wants the hospitalist to admit.    8:57 AM, 01/02/17:  I spoke with the patient regarding her diagnosis.  She is in agreement with Dr. Whittington taking care of her hip.  All of her questions were answered the patient will be admitted in stable condition.    8:57 AM, 01/02/17:  I spoke with Dr. Stephens and he will admit the patient.          MDM  No orders to display     Labs Reviewed - No data to display  Xr Ribs Right With Pa Chest    Result Date: 12/20/2016  Narrative: INDICATION: Fall x 1 day. Right rib Pain. Prior right rib fractures.  FINDINGS: PA view the chest and oblique views of the right ribs compared to 07/24/2014.  The heart is enlarged, unchanged. Chronic interstitial opacities in both lungs. No new opacity. Old left-sided an old right-sided rib fractures are identified.      Impression: No acute cardiopulmonary findings. No acute  rib fractures.  This report was finalized on 12/20/2016 11:27 AM by Dr. Gaetano Quintero MD.      Xr Hip With Or Without Pelvis 2 - 3 View Right    Result Date: 12/20/2016  Narrative: INDICATION: Fall. Right hip pain.  FINDINGS: AP view the pelvis, and AP view the right hip compared to 11/29/2016 there is no acute fracture or dislocation. There is mild arthrosis of both hips. Sacroiliac joints and pubic symphysis are within normal limits. An intramedullary nail in the right femur is unchanged. There is been prior right total knee arthroplasty.      Impression: No acute findings.  This report was finalized on 12/20/2016 11:29 AM by Dr. Gaetano Quintero MD.        Final diagnoses:   None         ED Medications:  Medications   HYDROcodone-acetaminophen (NORCO) 5-325 MG per tablet 1 tablet (not administered)       New Medications:     Medication List      ASK your doctor about these medications          alendronate 5 MG tablet   Commonly known as:  FOSAMAX       allopurinol 100 MG tablet   Commonly known as:  ZYLOPRIM       HYDROcodone-acetaminophen 5-325 MG per tablet   Commonly known as:  NORCO   Take 1-2 tablets by mouth Every 4 (Four) Hours As Needed for moderate pain   (4-6) for up to 30 doses.       latanoprost 0.005 % ophthalmic solution   Commonly known as:  XALATAN       levothyroxine 50 MCG tablet   Commonly known as:  SYNTHROID, LEVOTHROID       meloxicam 15 MG tablet   Commonly known as:  MOBIC   1 PO Daily with food.       metFORMIN 500 MG tablet   Commonly known as:  GLUCOPHAGE       metoprolol tartrate 25 MG tablet   Commonly known as:  LOPRESSOR       omeprazole 40 MG capsule   Commonly known as:  priLOSEC       oxyCODONE-acetaminophen 7.5-325 MG per tablet   Commonly known as:  PERCOCET   1-2 Oral Q4H PRN severe pain       pravastatin 40 MG tablet   Commonly known as:  PRAVACHOL       traMADol 50 MG tablet   Commonly known as:  ULTRAM   Take 1 tablet by mouth Every 4 (Four) Hours As Needed for moderate pain    (4-6).           Stopped Medications:     Medication List      ASK your doctor about these medications          alendronate 5 MG tablet   Commonly known as:  FOSAMAX       allopurinol 100 MG tablet   Commonly known as:  ZYLOPRIM       HYDROcodone-acetaminophen 5-325 MG per tablet   Commonly known as:  NORCO   Take 1-2 tablets by mouth Every 4 (Four) Hours As Needed for moderate pain   (4-6) for up to 30 doses.       latanoprost 0.005 % ophthalmic solution   Commonly known as:  XALATAN       levothyroxine 50 MCG tablet   Commonly known as:  SYNTHROID, LEVOTHROID       meloxicam 15 MG tablet   Commonly known as:  MOBIC   1 PO Daily with food.       metFORMIN 500 MG tablet   Commonly known as:  GLUCOPHAGE       metoprolol tartrate 25 MG tablet   Commonly known as:  LOPRESSOR       omeprazole 40 MG capsule   Commonly known as:  priLOSEC       oxyCODONE-acetaminophen 7.5-325 MG per tablet   Commonly known as:  PERCOCET   1-2 Oral Q4H PRN severe pain       pravastatin 40 MG tablet   Commonly known as:  PRAVACHOL       traMADol 50 MG tablet   Commonly known as:  ULTRAM   Take 1 tablet by mouth Every 4 (Four) Hours As Needed for moderate pain   (4-6).             Final diagnoses:   Closed right hip fracture, initial encounter            Luis Cervantes MD  01/02/17 [id number]

## 2017-01-02 NOTE — NURSING NOTE
CM obtained permission to speak with son/daughter-in-law at bedside.  Patient lives alone.  States she was just evaluated by [facility] for PT/OT services when she broke hip.  Patient states she has a walker/cane at home.  Uses [facility] for Pharmacy.  Denies trouble paying for medications.   Would like to go to rehab after surgery tomorrow.  Advised we will check her insurance and see what PT/OT recommendations are after surgery.  Will continue to follow.

## 2017-01-02 NOTE — H&P
HOSPITALIST SERVICES     @ [facility]                Hospitalist Team    ADMISSION HISTORY AND PHYSICAL    PATIENT:      Patient Care Team:  Arnaud Monson MD as PCP - General  Arnaud Monson MD as PCP - Family Medicine    CHIEF COMPLAINT:     Rt Hip pain since yesterday afternoon    HISTORY OF PRESENT ILLNESS:      Ms. Ashli León is an 81-year-old  female who has hx of Rt Hip fracture several years ago which was treated with a rajesh. Her surgery was done at [facility].  She was walking around the bed yesterday at 5:30 PM and felt a pop in her right hip. She slumped over into the bed. The patient has history of hip surgery in the remote past with a rajesh in her finger. She does not recall who her orthopedist was. Patient denies any sx of headache, chest pain, shortness of breath, abdominal pain or n/v, dysuria or recent hx of blood in stool.            Past Medical History   Diagnosis Date   • Acid reflux    • AP (abdominal pain)    • Back pain    • Chest pain    • Colitis    • Diabetes mellitus type 2, noninsulin dependent    • Diarrhea    • Disease of thyroid gland    • Diverticular disease    • Glaucoma    • Gout      - right big toe   • High blood pressure    • High cholesterol    • Hyponatremia    • Knee pain    • Macular degeneration    • Mitral regurgitation 06/2016     Mild to moderate per 2-D echocardiogram   • Primary osteoarthritis of both knees 4/1/2016   • Renal lesion      on mri   • Rheumatoid arthritis      Past Surgical History   Procedure Laterality Date   • Appendectomy  1951   • Femur surgery Right      had fracture and has rajesh    • Hip surgery Right 1999     hx fracture   • Cataract extraction Bilateral    • Hemorrhoidectomy     • Total knee arthroplasty Right 6/22/2016     Procedure: RT TOTAL KNEE ARTHROPLASTY WITH FINN NAVIGATION;  Surgeon: Adrian Swenson MD;  Location: [facility];  Service:      Family History    Problem Relation Age of Onset   • Heart attack Father      Social History   Substance Use Topics   • Smoking status: Never Smoker   • Smokeless tobacco: Never Used   • Alcohol use 1.8 oz/week     3 Shots of liquor per week      Comment: social use 1 weekly     Prescriptions Prior to Admission   Medication Sig Dispense Refill Last Dose   • alendronate (FOSAMAX) 5 MG tablet 5 mg Every Morning Before Breakfast.   1/1/2017 at 0730   • allopurinol (ZYLOPRIM) 100 MG tablet Take 100 mg by mouth daily.   1/1/2017 at 0730   • HYDROcodone-acetaminophen (NORCO) 5-325 MG per tablet Take 1-2 tablets by mouth Every 4 (Four) Hours As Needed for moderate pain (4-6) for up to 30 doses. 30 tablet 0 1/1/2017 at 2000   • latanoprost (XALATAN) 0.005 % ophthalmic solution Administer 1 drop to both eyes Every Night.   1/1/2017 at 2000   • levothyroxine (SYNTHROID, LEVOTHROID) 50 MCG tablet Take 50 mcg by mouth daily.   1/1/2017 at 0730   • meloxicam (MOBIC) 15 MG tablet 1 PO Daily with food. 90 tablet 3 1/1/2017 at 0730   • metFORMIN (GLUCOPHAGE) 500 MG tablet Take 500 mg by mouth daily with breakfast. Pt has only been taking once a day, however the prescription on bottle says take twice daily with meals.   1/1/2017 at 0730   • metoprolol tartrate (LOPRESSOR) 25 MG tablet Take 25 mg by mouth daily.   1/1/2017 at 0730   • omeprazole (PriLOSEC) 40 MG capsule Take 40 mg by mouth 2 (two) times a day.   1/1/2017 at 2000   • oxyCODONE-acetaminophen (PERCOCET) 7.5-325 MG per tablet  1-2 Oral Q4H PRN severe pain 100 tablet 0 1/1/2017 at Unknown time   • pravastatin (PRAVACHOL) 40 MG tablet 40 mg Daily.   1/1/2017 at 0730   • traMADol (ULTRAM) 50 MG tablet Take 1 tablet by mouth Every 4 (Four) Hours As Needed for moderate pain (4-6). 60 tablet 0 1/1/2017 at Unknown time     Allergies:  Neomycin and Statins    REVIEW OF SYSTEMS:  Please see the above history of present illness for pertinent positives and negatives.  The remainder of the patient's  "systems have been reviewed and are negative.     Vital Signs  Temp:  [97.7 °F (36.5 °C)] 97.7 °F (36.5 °C)  Heart Rate:  [89-95] 89  Resp:  [18] 18  BP: (158-161)/(80-93) 158/93    Flowsheet Rows         First Filed Value    Admission Height  62\" (157.5 cm) Documented at 01/02/2017 0745    Admission Weight  130 lb (59 kg) Documented at 01/02/2017 0745           Physical Exam:    PHYSICAL EXAMINATION:    VITAL SIGNS: As per Nurse's notes    GENERAL APPEARANCE: The patient is a well developed, well nourished,  male, in acute distress due to pain in the right hip but without complaints of shortness of breath, dyspnea. Lips and nail beds are pink.    HEENT: Normocephalic, atraumatic. PERRL. The sclerae anicteric and conjunctivae pink and moist. The oral mucosa, hard and soft palate, tongue and posterior pharynx were normal.     NECK: Supple and symmetric. No masses. No thyromegaly. No tenderness. Trachea central. No carotid bruits. No evidence of JVD.    LYMPH NODES: No palpable lymphadenopathy.    SKIN: Warm, dry and intact. No rash or lesions or wounds or patechiae.    CHEST: Normal AP diameter and normal contour without any kyphoscoliosis.    LUNGS: Clear to auscultation bilaterally. No wheezes/rales/crackles/rubs.    CARDIOVASCULAR: RRR. S1, S2 normal without murmur/gallop/rub. No S3, S4.     ABDOMEN: Soft, non-tender, non-distended. No masses. No rebound/guarding. No hepatosplenomegaly.     RECTAL: Not done for today's examination.     EXTREMITIES:  Rt Hip pain secondary to fracture. No evidence of cyanosis, clubbing, or edema.     MUSCULOSKELETAL: Rt Hip pain secondary to fracture.     PSYCHIATRY: Responds appropriately to questions. No evidence of acute anxiety, depression, panic attacks or hallucinations.    NEUROLOGIC: Examination is grossly intact globally with no focal deficits. Cranial nerves II through XII are grossly intact.         Results Review:    I reviewed the patient's new clinical " results.  Lab Results (most recent)     None          Imaging Results (most recent)     Procedure Component Value Units Date/Time    XR Hip With or Without Pelvis 2 - 3 View Right [[id number]] Resulted:  01/02/17 0850     Updated:  01/02/17 0850        not reviewed    ECG/EMG Results (most recent)     None        reviewed    Assessment/Plan           ASSESSMENT AND PLAN:       SUMMARY:    ?   PROPHYLAXIS:   -Oxygen Saturation: As per Nurses' notes.   -DVT Prophylaxis: On SCDs   -Gastritis Prophylaxis: On Pantoprazole  -Constipation Prophylaxis: colace 100 mg po BID   -Immunizations: N/A  -Intake and Output Orders: As per order sheet   -Arce Catheter: Not indicated at this time  -Smoking/ Nicotine issues: N/A      PAIN MANAGEMENT:  -Pain Management: Inj. Dilaudid   -Miscellaneous Medications: Tylenol 650 mg 1 po q4-6 hours for headache or temp >100 degrees     ACTIVITIES:  -Bathroom Privileges: May use bathroom   -Activity: Encouraged to sit up in side chair for 2-4 hours BID   -PT/OT: As per Dr. Whittington      NUTRITION AND FLUIDS:  -Diet/ Nutrition: Regular, cardiac, consistent carbohydrate diet for now; NPO after midnight    -Fluid Status/Electrolytes: Saline Lock for now       SOCIAL ISSUES:   -MRSA SCREEN: As per institutional protocol   -Behavioral/ Agitation Issues: NONE   -Social Issues: Lives at home with her family.   -Occupational Issues: Patient is Retired    -Code Status: DNR on admission   -Disposition: TBD     THERAPEUTIC:   ALLERGIES: Neomycins and Statins   ANTIBIOTICS: as per Dr. Whittington   INSULIN THERAPY: Low dose insulin coverage as ordered   CHEST PAIN: N/A    NEBULIZER TREATMENT: N/A    ANXIETY: N/A    DEPRESSION: N/A    INSOMNIA: N/A             PRIMARY DIAGNOSES:    1) Closed Rt Hip fracture; I do not have access to X-Rays. Patient will be seen by Dr. Whittington today and will be taken to OR tomorrow.    2) HTN: On Metoprolol    3) Hyperlipidemia: On Pravastatin    4) Type II DM: patient has been on  Metformin    5) GERD: On Pantoprazole    6) Hypothyroidism: On replacement    7) DJD spine: Hx noted. Patient is on Meloxicam    8) Hx of Rheumatoid Arthritis: Hx noted    9) Mitral Regurgitation: Hx noted    10) DVT Prophylaxix: On SCDs  ?     SECONDARY DIAGNOSES:  ?     Hx of Colitis, Glaucoma, Gout, Hyponatremia, Macular degeneration        SURGICAL DIAGNOSES:        S/P Appendectomy, S/P Bilateral cataract Extraction, S/P Rt Femur surgery for fracture, has rods put in, S/P Hemorrhoidectomy, S/P Rt Hip surgery 1999 due to hip fracture, S/P Rt Total Knee Arthroplasty              PLAN:    Labs and diagnostic tests reviewed: Gluc 114, Na 130, K 3.8, Creat 0.69, AG 14.4, WBC 7.79, Hb 11.2, Plt 322, 66.4N, 25.3L    Diagnostic tests reviewed:     EKG (1/2) does not show any acute changes  SINUS RHYTHM  FIRST DEGREE AV BLOCK  LATE PRECORDIAL R/S TRANSITION  PROBABLE LEFT VENTRICULAR HYPERTROPHY  PROBABLE INFERIOR INFARCT, AGE INDETERMINATE  Electronically Signed by:  Date and Time of Study: 2017-01-02 11:08:30    CXR (1/2)  No acute changes    Rt Hip X-Rays (1/2)  Fracture of Rt Femoral Neck    Patient is clinically and hemodynamically stable    Will be seen by Dr. Whittington (Ortho) later today for possible surgery tomorrow morning.     Any new recommendations: As per Dr. Whittington    New Labs ordered: CBC and CMP as these are not done yet    New diagnostic tests ordered: Will check CXR and EKG    Any changes in medications: All of patient's oral meds will be held as she is NPO    To continue current management and supportive care    Pain management issues: Inj. Dilaudid     Discharge planning issues: Patient should be able to go home when ready for discharge    Will follow patient closely    Nothing new to add for right now        RECOMMENDATIONS:           PRE-OP DIAGNOSIS: Closed Fracture Rt Hip        SURGICAL PROCEDURE PLANNED: Possible internal fixation as per Dr. Whittington     SURGEON: Dr. Whittington        MEDICAL CLEARANCE:   Based on the information provided to me and based on my history and physical examination findings and based on the laboratory and diagnostic work-up done, I hereby clear this patient medically for: Possible internal fixation of the Rt femoral neck fracture                  I discussed the patients findings and my recommendations with patient and patient is agreeable to current treatment and management plan.     Micheal Stephens MD  01/02/17  11:09 AM          Micheal Stephens M.D., [facility]  Internal Medicine/ Hospitalist        Time:       EMR Dragon/Transcription disclaimer:      Much of this encounter note is an electronic transcription/translation of spoken language to printed text. The electronic translation of spoken language may permit erroneous, or at times, nonsensical words or phrases to be inadvertently transcribed; Although I have reviewed the note for such errors, some may still exist.

## 2017-01-02 NOTE — IP AVS SNAPSHOT
AFTER VISIT SUMMARY             Ashli León           About your hospitalization     You were admitted on:  January 2, 2017 You last received care in the:  [facility] SURG       Procedures & Surgeries      Procedure(s) (LRB):  HIP HEMIARTHROPLASTY with IM rajesh and 2 screw hardware removal antibiotic cement (Right)     1/2/2017 - 1/3/2017     Surgeon(s):  Mirza Whittington MD  -------------------      Medications    If you or your caregiver advised us that you are currently taking a medication and that medication is marked below as “Resume”, this simply indicates that we have reviewed those medications to make sure our new therapy recommendations do not interfere.  If you have concerns about medications other than those new ones which we are prescribing today, please consult the physician who prescribed them (or your primary physician).  Our review of your home medications is not meant to indicate that we are directing their use.             Your Medications      START taking these medications     aspirin 325 MG EC tablet   Take 1 tablet by mouth Every 12 (Twelve) Hours.   Last time this was given:  1/6/2017  9:01 AM   Next Dose Due:  1/6/17 2100           bisacodyl 5 MG EC tablet   Take 2 tablets by mouth Daily As Needed for constipation.   Commonly known as:  DULCOLAX           bisacodyl 10 MG suppository   Insert 1 suppository into the rectum Daily As Needed for constipation.   Last time this was given:  1/5/2017  8:30 AM   Commonly known as:  DULCOLAX            MG capsule   Take 100 mg by mouth 2 (Two) Times a Day.   Last time this was given:  1/6/2017  9:01 AM   Next Dose Due:  1/6/17 1800           ferrous sulfate 324 (65 FE) MG tablet delayed-release EC tablet   Take 1 tablet by mouth 2 (Two) Times a Day With Meals.   Last time this was given:  1/6/2017  9:01 AM   Next Dose Due:  1/6/17 1800           magnesium hydroxide 400 MG/5ML suspension   Take 30 mL by mouth Daily As Needed  for constipation.   Last time this was given:  1/5/2017  1:21 PM   Commonly known as:  MILK OF MAGNESIA           pregabalin 75 MG capsule   Take 1 capsule by mouth Every 12 (Twelve) Hours.   Last time this was given:  1/6/2017  9:01 AM   Commonly known as:  LYRICA           sennosides-docusate sodium 8.6-50 MG tablet   Take 2 tablets by mouth 2 (Two) Times a Day.   Last time this was given:  1/5/2017  8:30 AM   Commonly known as:  SENOKOT-S   Next Dose Due:  1/6/17 1800           witch hazel-glycerin pad   Apply  topically As Needed for irritation.   Commonly known as:  TUCKS             CONTINUE taking these medications     alendronate 5 MG tablet   5 mg Every Morning Before Breakfast.   Commonly known as:  FOSAMAX   Next Dose Due:  1/7/17 0900           allopurinol 100 MG tablet   Take 100 mg by mouth daily.   Last time this was given:  1/6/2017  9:01 AM   Commonly known as:  ZYLOPRIM   Next Dose Due:  1/7/17 0900           HYDROcodone-acetaminophen 5-325 MG per tablet   Take 1-2 tablets by mouth Every 4 (Four) Hours As Needed for moderate pain (4-6) for up to 40 doses.   Last time this was given:  1/2/2017  8:10 AM   Commonly known as:  NORCO           latanoprost 0.005 % ophthalmic solution   Administer 1 drop to both eyes Every Night.   Last time this was given:  1/5/2017 10:04 PM   Commonly known as:  XALATAN   Next Dose Due:  1/6/17 2100           levothyroxine 50 MCG tablet   Take 50 mcg by mouth daily.   Last time this was given:  1/6/2017  6:47 AM   Commonly known as:  SYNTHROID, LEVOTHROID   Next Dose Due:  1/7/17 0600           metFORMIN 500 MG tablet   Take 500 mg by mouth daily with breakfast. Pt has only been taking once a day, however the prescription on bottle says take twice daily with meals.   Last time this was given:  1/6/2017  9:01 AM   Commonly known as:  GLUCOPHAGE   Next Dose Due:  1/7/17 0900           metoprolol tartrate 25 MG tablet   Take 25 mg by mouth daily.   Last time this was  given:  1/6/2017  9:01 AM   Commonly known as:  LOPRESSOR   Next Dose Due:  1/7/17 0900           omeprazole 40 MG capsule   Take 40 mg by mouth 2 (two) times a day.   Commonly known as:  priLOSEC   Next Dose Due:  1/6/17 1800           pravastatin 40 MG tablet   40 mg Daily.   Last time this was given:  1/5/2017 10:03 PM   Commonly known as:  PRAVACHOL   Next Dose Due:  1/6/17 2100           traMADol 50 MG tablet   Take 1 tablet by mouth Every 4 (Four) Hours As Needed for moderate pain (4-6).   Commonly known as:  ULTRAM             STOP taking these medications     meloxicam 15 MG tablet   Commonly known as:  MOBIC           oxyCODONE-acetaminophen 7.5-325 MG per tablet   Commonly known as:  PERCOCET                Where to Get Your Medications      These medications were sent to [id number] - [place] - [address] - [phone] [facility] [phone]   [address] [facility] [address] [facility] [place] [address]     Phone:  [phone]     aspirin 325 MG EC tablet         You can get these medications from any pharmacy     Bring a paper prescription for each of these medications     HYDROcodone-acetaminophen 5-325 MG per tablet    pregabalin 75 MG capsule         Information about where to get these medications is not yet available     ! Ask your nurse or doctor about these medications     bisacodyl 10 MG suppository    bisacodyl 5 MG EC tablet     MG capsule    ferrous sulfate 324 (65 FE) MG tablet delayed-release EC tablet    magnesium hydroxide 400 MG/5ML suspension    sennosides-docusate sodium 8.6-50 MG tablet    witch hazel-glycerin [facility]          Pharmacy Instructions:      [facility]                       Your Medications      Your Medication List           Morning Noon Evening Bedtime As Needed    alendronate 5 MG tablet   5 mg Every Morning Before Breakfast.   Commonly known as:  FOSAMAX                                   allopurinol 100 MG tablet   Take 100 mg by mouth daily.   Commonly  known as:  ZYLOPRIM                                   aspirin 325 MG EC tablet   Take 1 tablet by mouth Every 12 (Twelve) Hours.                                      * bisacodyl 5 MG EC tablet   Take 2 tablets by mouth Daily As Needed for constipation.   Commonly known as:  DULCOLAX                                   * bisacodyl 10 MG suppository   Insert 1 suppository into the rectum Daily As Needed for constipation.   Commonly known as:  DULCOLAX                                    MG capsule   Take 100 mg by mouth 2 (Two) Times a Day.                                      ferrous sulfate 324 (65 FE) MG tablet delayed-release EC tablet   Take 1 tablet by mouth 2 (Two) Times a Day With Meals.                                   HYDROcodone-acetaminophen 5-325 MG per tablet   Take 1-2 tablets by mouth Every 4 (Four) Hours As Needed for moderate pain (4-6) for up to 40 doses.   Commonly known as:  NORCO                                   latanoprost 0.005 % ophthalmic solution   Administer 1 drop to both eyes Every Night.   Commonly known as:  XALATAN                                   levothyroxine 50 MCG tablet   Take 50 mcg by mouth daily.   Commonly known as:  SYNTHROID, LEVOTHROID                                   magnesium hydroxide 400 MG/5ML suspension   Take 30 mL by mouth Daily As Needed for constipation.   Commonly known as:  MILK OF MAGNESIA                                   metFORMIN 500 MG tablet   Take 500 mg by mouth daily with breakfast. Pt has only been taking once a day, however the prescription on bottle says take twice daily with meals.   Commonly known as:  GLUCOPHAGE                                   metoprolol tartrate 25 MG tablet   Take 25 mg by mouth daily.   Commonly known as:  LOPRESSOR                                   omeprazole 40 MG capsule   Take 40 mg by mouth 2 (two) times a day.   Commonly known as:  priLOSEC                                      pravastatin 40 MG tablet   40 mg  Daily.   Commonly known as:  PRAVACHOL                                   pregabalin 75 MG capsule   Take 1 capsule by mouth Every 12 (Twelve) Hours.   Commonly known as:  LYRICA                                      sennosides-docusate sodium 8.6-50 MG tablet   Take 2 tablets by mouth 2 (Two) Times a Day.   Commonly known as:  SENOKOT-S                                      traMADol 50 MG tablet   Take 1 tablet by mouth Every 4 (Four) Hours As Needed for moderate pain (4-6).   Commonly known as:  ULTRAM                                   witch hazel-glycerin pad   Apply  topically As Needed for irritation.   Commonly known as:  TUCKS                                   * Notice:  This list has 2 medication(s) that are the same as other medications prescribed for you. Read the directions carefully, and ask your doctor or other care provider to review them with you.             Pharmacy Instructions:      [facility]               Instructions for After Discharge        Activity Instructions     Activity as Tolerated           Measure Blood Pressure                 Other Instructions     Check Blood Glucose - Fingerstick           Discharge Dressing / Wound Instructions       As per Dr. Whittington       Discharge Instructions       Review prior to discharge             Discharge References/Attachments     DIABETES MELLITUS AND FOOD (ENGLISH)    BLOOD GLUCOSE MONITORING, ADULT (ENGLISH)    CARDIAC DIET, EASY-TO-READ (ENGLISH)       Follow-ups for After Discharge        Follow-up Information     Follow up with Arnaud Monson MD .    Specialty:  Internal Medicine    Contact information:    [address] [address] [phone]          Follow up with [facility] .    Specialties:  Skilled Nursing Facility, Intermediate Care Facility    Contact information:    [address] [place] [address] [phone]      Referrals and Follow-ups to Schedule     Follow-Up    As directed    Review  prior to discharge   Follow Up Details:  1 week after discharge from facility       Schedule Discharge Follow Up    As directed    Follow Up:  3 Weeks   Special Instructions:  with truman             Scheduled Appointments     Feb 17, 2017  9:30 AM EST   Injection with Adrian Swenson MD   [facility] (--)    [address] [place] [address]   [phone]              MyChart Signup     Our records indicate that you have declined [facility] MyChart signup. If you would like to sign up for [facility], please email [email or web address] or call [phone] to obtain an activation code.         Summary of Your Hospitalization        Reason for Hospitalization     Your primary diagnosis was:  Not on File    Your diagnoses also included:  Broken Leg, Femoral Neck Fracture      Care Providers     Provider Service Role Specialty    Micheal Stephens MD Internal Medicine Attending Provider Internal Medicine    Mirza Whittington MD -- Attending Provider Orthopedic Surgery    Mirza Whittington MD -- Surgeon  Orthopedic Surgery       Your Allergies  Date Reviewed: 1/3/2017    Allergen Reactions    Neomycin Swelling         Statins Myalgia      Patient Belongings Returned     Document Return of Belongings Flowsheet     Were the patient bedside belongings sent home?   Yes   Belongings Retrieved from Security & Sent Home   N/A    Belongings Sent to Safe   --   Medications Retrieved from Pharmacy & Sent Home   No (Comment)              More Information      Diabetes Mellitus and Food  It is important for you to manage your blood sugar (glucose) level. Your blood glucose level can be greatly affected by what you eat. Eating healthier foods in the appropriate amounts throughout the day at about the same time each day will help you control your blood glucose level. It can also help slow or prevent worsening of your diabetes mellitus. Healthy eating may even help you  improve the level of your blood pressure and reach or maintain a healthy weight.   General recommendations for healthful eating and cooking habits include:  · Eating meals and snacks regularly. Avoid going long periods of time without eating to lose weight.  · Eating a diet that consists mainly of plant-based foods, such as fruits, vegetables, nuts, legumes, and whole grains.  · Using low-heat cooking methods, such as baking, instead of high-heat cooking methods, such as deep frying.  Work with your dietitian to make sure you understand how to use the Nutrition Facts information on food labels.  HOW CAN FOOD AFFECT ME?  Carbohydrates  Carbohydrates affect your blood glucose level more than any other type of food. Your dietitian will help you determine how many carbohydrates to eat at each meal and teach you how to count carbohydrates. Counting carbohydrates is important to keep your blood glucose at a healthy level, especially if you are using insulin or taking certain medicines for diabetes mellitus.  Alcohol  Alcohol can cause sudden decreases in blood glucose (hypoglycemia), especially if you use insulin or take certain medicines for diabetes mellitus. Hypoglycemia can be a life-threatening condition. Symptoms of hypoglycemia (sleepiness, dizziness, and disorientation) are similar to symptoms of having too much alcohol.   If your health care provider has given you approval to drink alcohol, do so in moderation and use the following guidelines:  · Women should not have more than one drink per day, and men should not have more than two drinks per day. One drink is equal to:    12 oz of beer.    5 oz of wine.    1½ oz of hard liquor.  · Do not drink on an empty stomach.  · Keep yourself hydrated. Have water, diet soda, or unsweetened iced tea.  · Regular soda, juice, and other mixers might contain a lot of carbohydrates and should be counted.  WHAT FOODS ARE NOT RECOMMENDED?  As you make food choices, it is  important to remember that all foods are not the same. Some foods have fewer nutrients per serving than other foods, even though they might have the same number of calories or carbohydrates. It is difficult to get your body what it needs when you eat foods with fewer nutrients. Examples of foods that you should avoid that are high in calories and carbohydrates but low in nutrients include:  · Trans fats (most processed foods list trans fats on the Nutrition Facts label).  · Regular soda.  · Juice.  · Candy.  · Sweets, such as cake, pie, doughnuts, and cookies.  · Fried foods.  WHAT FOODS CAN I EAT?  Eat nutrient-rich foods, which will nourish your body and keep you healthy. The food you should eat also will depend on several factors, including:  · The calories you need.  · The medicines you take.  · Your weight.  · Your blood glucose level.  · Your blood pressure level.  · Your cholesterol level.  You should eat a variety of foods, including:  · Protein.    Lean cuts of meat.    Proteins low in saturated fats, such as fish, egg whites, and beans. Avoid processed meats.  · Fruits and vegetables.    Fruits and vegetables that may help control blood glucose levels, such as apples, mangoes, and yams.  · Dairy products.    Choose fat-free or low-fat dairy products, such as milk, yogurt, and cheese.  · Grains, bread, pasta, and rice.    Choose whole grain products, such as multigrain bread, whole oats, and brown rice. These foods may help control blood pressure.  · Fats.    Foods containing healthful fats, such as nuts, avocado, olive oil, canola oil, and fish.  DOES EVERYONE WITH DIABETES MELLITUS HAVE THE SAME MEAL PLAN?  Because every person with diabetes mellitus is different, there is not one meal plan that works for everyone. It is very important that you meet with a dietitian who will help you create a meal plan that is just right for you.     This information is not intended to replace advice given to you by your  health care provider. Make sure you discuss any questions you have with your health care provider.     Document Released: 09/14/2006 Document Revised: 01/08/2016 Document Reviewed: 11/14/2014  [employer] Interactive Patient Education ©2016 [employer].          Blood Glucose Monitoring, Adult  Monitoring your blood glucose (also know as blood sugar) helps you to manage your diabetes. It also helps you and your health care provider monitor your diabetes and determine how well your treatment plan is working.  WHY SHOULD YOU MONITOR YOUR BLOOD GLUCOSE?  · It can help you understand how food, exercise, and medicine affect your blood glucose.  · It allows you to know what your blood glucose is at any given moment. You can quickly tell if you are having low blood glucose (hypoglycemia) or high blood glucose (hyperglycemia).  · It can help you and your health care provider know how to adjust your medicines.  · It can help you understand how to manage an illness or adjust medicine for exercise.  WHEN SHOULD YOU TEST?  Your health care provider will help you decide how often you should check your blood glucose. This may depend on the type of diabetes you have, your diabetes control, or the types of medicines you are taking. Be sure to write down all of your blood glucose readings so that this information can be reviewed with your health care provider. See below for examples of testing times that your health care provider may suggest.  Type 1 Diabetes  · Test at least 2 times per day if your diabetes is well controlled, if you are using an insulin pump, or if you perform multiple daily injections.  · If your diabetes is not well controlled or if you are sick, you may need to test more often.  · It is a good idea to also test:    Before every insulin injection.    Before and after exercise.    Between meals and 2 hours after a meal.    Occasionally between 2:00 a.m. and 3:00 a.m.  Type 2 Diabetes  · If you are taking insulin,  "test at least 2 times per day. However, it is best to test before every insulin injection.  · If you take medicines by mouth (orally), test 2 times a day.  · If you are on a controlled diet, test once a day.  · If your diabetes is not well controlled or if you are sick, you may need to monitor more often.  HOW TO MONITOR YOUR BLOOD GLUCOSE  Supplies Needed  · Blood glucose meter.  · Test strips for your meter. Each meter has its own strips. You must use the strips that go with your own meter.  · A pricking needle (lancet).  · A device that holds the lancet (lancing device).  · A journal or log book to write down your results.  Procedure  · Wash your hands with soap and water. Alcohol is not preferred.  · Prick the side of your finger (not the tip) with the lancet.  · Gently milk the finger until a small drop of blood appears.  · Follow the instructions that come with your meter for inserting the test strip, applying blood to the strip, and using your blood glucose meter.  Other Areas to Get Blood for Testing  Some meters allow you to use other areas of your body (other than your finger) to test your blood. These areas are called alternative sites. The most common alternative sites are:  · The forearm.  · The thigh.  · The back area of the lower leg.  · The palm of the hand.  The blood flow in these areas is slower. Therefore, the blood glucose values you get may be delayed, and the numbers are different from what you would get from your fingers. Do not use alternative sites if you think you are having hypoglycemia. Your reading will not be accurate. Always use a finger if you are having hypoglycemia. Also, if you cannot feel your lows (hypoglycemia unawareness), always use your fingers for your blood glucose checks.  ADDITIONAL TIPS FOR GLUCOSE MONITORING  · Do not reuse lancets.  · Always carry your supplies with you.  · All blood glucose meters have a 24-hour \"hotline\" number to call if you have questions or need " help.  · Adjust (calibrate) your blood glucose meter with a control solution after finishing a few boxes of strips.  BLOOD GLUCOSE RECORD KEEPING  It is a good idea to keep a daily record or log of your blood glucose readings. Most glucose meters, if not all, keep your glucose records stored in the meter. Some meters come with the ability to download your records to your home computer. Keeping a record of your blood glucose readings is especially helpful if you are wanting to look for patterns. Make notes to go along with the blood glucose readings because you might forget what happened at that exact time. Keeping good records helps you and your health care provider to work together to achieve good diabetes management.      This information is not intended to replace advice given to you by your health care provider. Make sure you discuss any questions you have with your health care provider.     Document Released: 12/20/2004 Document Revised: 01/08/2016 Document Reviewed: 05/12/2014  [employer] Interactive Patient Education ©2016 [employer].          Heart-Healthy Eating Plan  Heart-healthy meal planning includes:  · Limiting unhealthy fats.  · Increasing healthy fats.  · Making other small dietary changes.  You may need to talk with your doctor or a diet specialist (dietitian) to create an eating plan that is right for you.  WHAT TYPES OF FAT SHOULD I CHOOSE?  · Choose healthy fats. These include olive oil and canola oil, flaxseeds, walnuts, almonds, and seeds.  · Eat more omega-3 fats. These include salmon, mackerel, sardines, tuna, flaxseed oil, and ground flaxseeds. Try to eat fish at least twice each week.  · Limit saturated fats.    Saturated fats are often found in animal products, such as meats, butter, and cream.    Plant sources of saturated fats include palm oil, palm kernel oil, and coconut oil.  · Avoid foods with partially hydrogenated oils in them. These include stick margarine, some tub margarines,  "cookies, crackers, and other baked goods. These contain trans fats.  WHAT GENERAL GUIDELINES DO I NEED TO FOLLOW?  · Check food labels carefully. Identify foods with trans fats or high amounts of saturated fat.  · Fill one half of your plate with vegetables and green salads. Eat 4-5 servings of vegetables per day. A serving of vegetables is:    1 cup of raw leafy vegetables.    ½ cup of raw or cooked cut-up vegetables.    ½ cup of vegetable juice.  · Fill one fourth of your plate with whole grains. Look for the word \"whole\" as the first word in the ingredient list.  · Fill one fourth of your plate with lean protein foods.  · Eat 4-5 servings of fruit per day. A serving of fruit is:    One medium whole fruit.    ¼ cup of dried fruit.    ½ cup of fresh, frozen, or canned fruit.    ½ cup of 100% fruit juice.  · Eat more foods that contain soluble fiber. These include apples, broccoli, carrots, beans, peas, and barley. Try to get 20-30 g of fiber per day.  · Eat more home-cooked food. Eat less restaurant, buffet, and fast food.  · Limit or avoid alcohol.  · Limit foods high in starch and sugar.  · Avoid fried foods.  · Avoid frying your food. Try baking, boiling, grilling, or broiling it instead. You can also reduce fat by:    Removing the skin from poultry.    Removing all visible fats from meats.    Skimming the fat off of stews, soups, and gravies before serving them.    Steaming vegetables in water or broth.  · Lose weight if you are overweight.  · Eat 4-5 servings of nuts, legumes, and seeds per week:    One serving of dried beans or legumes equals ½ cup after being cooked.    One serving of nuts equals 1½ ounces.    One serving of seeds equals ½ ounce or one tablespoon.  · You may need to keep track of how much salt or sodium you eat. This is especially true if you have high blood pressure. Talk with your doctor or dietitian to get more information.  WHAT FOODS CAN I EAT?  Grains  Breads, including Cameroonian, white, " jeremiah, wheat, raisin, rye, oatmeal, and Italian. Tortillas that are neither fried nor made with lard or trans fat. Low-fat rolls, including hotdog and hamburger buns and English muffins. Biscuits. Muffins. Waffles. Pancakes. Light popcorn. Whole-grain cereals. Flatbread. [place] toast. Pretzels. Breadsticks. Rusks. Low-fat snacks. Low-fat crackers, including oyster, saltine, matzo, sushil, animal, and rye. Rice and pasta, including brown rice and pastas that are made with whole wheat.   Vegetables  All vegetables.   Fruits  All fruits, but limit coconut.  Meats and Other Protein Sources  Lean, well-trimmed beef, veal, pork, and lamb. Chicken and turkey without skin. All fish and shellfish. Wild duck, rabbit, pheasant, and venison. Egg whites or low-cholesterol egg substitutes. Dried beans, peas, lentils, and tofu. Seeds and most nuts.  Dairy  Low-fat or nonfat cheeses, including ricotta, string, and mozzarella. Skim or 1% milk that is liquid, powdered, or evaporated. Buttermilk that is made with low-fat milk. Nonfat or low-fat yogurt.  Beverages  Mineral water. Diet carbonated beverages.  Sweets and Desserts  Sherbets and fruit ices. Honey, jam, marmalade, jelly, and syrups. Meringues and gelatins. Pure sugar candy, such as hard candy, jelly beans, gumdrops, mints, marshmallows, and small amounts of dark chocolate. Remigio food cake.  Eat all sweets and desserts in moderation.  Fats and Oils  Nonhydrogenated (trans-free) margarines. Vegetable oils, including soybean, sesame, sunflower, olive, peanut, safflower, corn, canola, and cottonseed. Salad dressings or mayonnaise made with a vegetable oil. Limit added fats and oils that you use for cooking, baking, salads, and as spreads.  Other  Cocoa powder. Coffee and tea. All seasonings and condiments.  The items listed above may not be a complete list of recommended foods or beverages. Contact your dietitian for more options.  WHAT FOODS ARE NOT RECOMMENDED?  Grains  Breads  that are made with saturated or trans fats, oils, or whole milk. Croissants. Butter rolls. Cheese breads. Sweet rolls. Donuts. Buttered popcorn. Chow mein noodles. High-fat crackers, such as cheese or butter crackers.  Meats and Other Protein Sources  Fatty meats, such as hotdogs, short ribs, sausage, spareribs, pelaez, rib eye roast or steak, and mutton. High-fat deli meats, such as salami and bologna. Caviar. Domestic duck and goose. Organ meats, such as kidney, liver, sweetbreads, and heart.  Dairy  Cream, sour cream, cream cheese, and creamed cottage cheese. Whole-milk cheeses, including blue (fish), [place] Brian, Brie, Mg, American, [place]i, Swiss, cheddar, Camembert, and [place]. Whole or 2% milk that is liquid, evaporated, or condensed. Whole buttermilk. Cream sauce or high-fat cheese sauce. Yogurt that is made from whole milk.  Beverages  Regular sodas and juice drinks with added sugar.  Sweets and Desserts  Frosting. Pudding. Cookies. Cakes other than onofre food cake. Candy that has milk chocolate or white chocolate, hydrogenated fat, butter, coconut, or unknown ingredients. Buttered syrups. Full-fat ice cream or ice cream drinks.  Fats and Oils  Gravy that has suet, meat fat, or shortening. Cocoa butter, hydrogenated oils, palm oil, coconut oil, palm kernel oil. These can often be found in baked products, candy, fried foods, nondairy creamers, and whipped toppings. Solid fats and shortenings, including pelaez fat, salt pork, lard, and butter. Nondairy cream substitutes, such as coffee creamers and sour cream substitutes. Salad dressings that are made of unknown oils, cheese, or sour cream.  The items listed above may not be a complete list of foods and beverages to avoid. Contact your dietitian for more information.     This information is not intended to replace advice given to you by your health care provider. Make sure you discuss any questions you have with your health care provider.     Document  Released: 06/18/2013 Document Revised: 01/08/2016 Document Reviewed: 06/11/2015  [employer] Patient Education ©2016 [employer]            SYMPTOMS OF A STROKE    Call 911 or have someone take you to the Emergency Department if you have any of the following:    · Sudden numbness or weakness of your face, arm or leg especially on one side of the body  · Sudden confusion, diffiiculty speaking or trouble understanding   · Changes in your vision or loss of sight in one eye  · Sudden severe headache with no known cause  · sudden dizziness, trouble walking, loss of balance or coordination    It is important to seek emergency care right away if you have further stroke symptoms. If you get emergency help quickly, the powerful clot-dissolving medicines can reduce the disabilities caused by a stroke.     For more information:    American Stroke Association  1-520-8-STROKE  www.strokeassociation.org           IF YOU SMOKE OR USE TOBACCO PLEASE READ THE FOLLOWING:    Why is smoking bad for me?  Smoking increases the risk of heart disease, lung disease, vascular disease, stroke, and cancer.     If you smoke, STOP!    If you would like more information on quitting smoking, please visit the [employer] website: [email or web address]   This link will provide additional resources including the QUIT line and the Beat the Pack support groups.     For more information:    American Cancer Society  ([phone] 171-7606    American Heart Association  1-134.130.9732               YOU ARE THE MOST IMPORTANT FACTOR IN YOUR RECOVERY.     Follow all instructions carefully.     I have reviewed my discharge instructions with my nurse, including the following information, if applicable:     Information about my illness and diagnosis   Follow up appointments (including lab draws)   Wound Care   Equipment Needs   Medications (new and continuing) along with side effects   Preventative information  such as vaccines and smoking cessations   Diet   Pain   I know when to contact my Doctor's office or seek emergency care      I want my nurse to describe the side effects of my medications: YES NO   If the answer is no, I understand the side effects of my medications: YES NO   My nurse described the side effects of my medications in a way that I could understand: YES NO   I have taken my personal belongings and my own medications with me at discharge: YES NO            I have received this information and my questions have been answered. I have discussed any concerns I see with this plan with the nurse or physician. I understand these instructions.    Signature of Patient or Responsible Person: _____________________________________    Date: _________________  Time: __________________    Signature of Healthcare Provider: _______________________________________  Date: _________________  Time: __________________

## 2017-01-02 NOTE — PLAN OF CARE
Problem: Patient Care Overview (Adult)  Goal: Plan of Care Review  Outcome: Ongoing (interventions implemented as appropriate)    01/02/17 1539   Coping/Psychosocial Response Interventions   Plan Of Care Reviewed With patient;hardik   Patient Care Overview   Progress progress toward functional goals as expected   Outcome Evaluation   Outcome Summary/Follow up Plan acheiving pain control, medicated for c/o constipation, pending surgery 1/3       Goal: Adult Individualization and Mutuality  Outcome: Ongoing (interventions implemented as appropriate)  Goal: Discharge Needs Assessment  Outcome: Ongoing (interventions implemented as appropriate)    Problem: Pain, Acute (Adult)  Goal: Identify Related Risk Factors and Signs and Symptoms  Outcome: Outcome(s) achieved Date Met:  01/02/17  Goal: Acceptable Pain Control/Comfort Level  Outcome: Ongoing (interventions implemented as appropriate)    Problem: Constipation (Adult)  Goal: Identify Related Risk Factors and Signs and Symptoms  Outcome: Outcome(s) achieved Date Met:  01/02/17  Goal: Effective Bowel Elimination  Outcome: Outcome(s) achieved Date Met:  01/02/17  Goal: Comfort  Outcome: Ongoing (interventions implemented as appropriate)

## 2017-01-03 ENCOUNTER — APPOINTMENT (OUTPATIENT)
Dept: GENERAL RADIOLOGY | Facility: HOSPITAL | Age: 82
End: 2017-01-03

## 2017-01-03 ENCOUNTER — ANESTHESIA EVENT (OUTPATIENT)
Dept: PERIOP | Facility: HOSPITAL | Age: 82
End: 2017-01-03

## 2017-01-03 ENCOUNTER — PREP FOR SURGERY (OUTPATIENT)
Dept: ORTHOPEDIC SURGERY | Facility: CLINIC | Age: 82
End: 2017-01-03

## 2017-01-03 ENCOUNTER — ANESTHESIA (OUTPATIENT)
Dept: PERIOP | Facility: HOSPITAL | Age: 82
End: 2017-01-03

## 2017-01-03 DIAGNOSIS — S72.001A CLOSED FRACTURE OF NECK OF RIGHT FEMUR, INITIAL ENCOUNTER (HCC): Primary | ICD-10-CM

## 2017-01-03 PROBLEM — S72.009A FEMORAL NECK FRACTURE (HCC): Status: ACTIVE | Noted: 2017-01-03

## 2017-01-03 LAB
ALBUMIN SERPL-MCNC: 3.7 G/DL (ref 3.5–5.2)
ALBUMIN/GLOB SERPL: 1 G/DL
ALP SERPL-CCNC: 59 U/L (ref 40–129)
ALT SERPL W P-5'-P-CCNC: 9 U/L (ref 5–33)
ANION GAP SERPL CALCULATED.3IONS-SCNC: 15 MMOL/L
AST SERPL-CCNC: 12 U/L (ref 5–32)
BASOPHILS # BLD AUTO: 0.02 10*3/MM3 (ref 0–0.2)
BASOPHILS NFR BLD AUTO: 0.3 % (ref 0–2)
BILIRUB SERPL-MCNC: 0.8 MG/DL (ref 0.2–1.2)
BUN BLD-MCNC: 15 MG/DL (ref 8–23)
BUN/CREAT SERPL: 25 (ref 7–25)
CALCIUM SPEC-SCNC: 9.8 MG/DL (ref 8.8–10.5)
CHLORIDE SERPL-SCNC: 91 MMOL/L (ref 98–107)
CO2 SERPL-SCNC: 24 MMOL/L (ref 22–29)
CREAT BLD-MCNC: 0.6 MG/DL (ref 0.57–1)
DEPRECATED RDW RBC AUTO: 41 FL (ref 37–54)
EOSINOPHIL # BLD AUTO: 0.12 10*3/MM3 (ref 0.1–0.3)
EOSINOPHIL NFR BLD AUTO: 1.5 % (ref 0–4)
ERYTHROCYTE [DISTWIDTH] IN BLOOD BY AUTOMATED COUNT: 11.8 % (ref 11.5–14.5)
GFR SERPL CREATININE-BSD FRML MDRD: 96 ML/MIN/1.73
GLOBULIN UR ELPH-MCNC: 3.8 GM/DL
GLUCOSE BLD-MCNC: 114 MG/DL (ref 65–99)
GLUCOSE BLDC GLUCOMTR-MCNC: 105 MG/DL (ref 70–130)
GLUCOSE BLDC GLUCOMTR-MCNC: 119 MG/DL (ref 70–130)
GLUCOSE BLDC GLUCOMTR-MCNC: 171 MG/DL (ref 70–130)
HCT VFR BLD AUTO: 33.9 % (ref 37–47)
HGB BLD-MCNC: 11.2 G/DL (ref 12–16)
IMM GRANULOCYTES # BLD: 0.04 10*3/MM3 (ref 0–0.03)
IMM GRANULOCYTES NFR BLD: 0.5 % (ref 0–0.5)
LYMPHOCYTES # BLD AUTO: 1.79 10*3/MM3 (ref 0.6–4.8)
LYMPHOCYTES NFR BLD AUTO: 23 % (ref 20–45)
MCH RBC QN AUTO: 31.1 PG (ref 27–31)
MCHC RBC AUTO-ENTMCNC: 33 G/DL (ref 31–37)
MCV RBC AUTO: 94.2 FL (ref 81–99)
MONOCYTES # BLD AUTO: 0.7 10*3/MM3 (ref 0–1)
MONOCYTES NFR BLD AUTO: 9 % (ref 3–8)
NEUTROPHILS # BLD AUTO: 5.12 10*3/MM3 (ref 1.5–8.3)
NEUTROPHILS NFR BLD AUTO: 65.7 % (ref 45–70)
NRBC BLD MANUAL-RTO: 0 /100 WBC (ref 0–0)
PLATELET # BLD AUTO: 329 10*3/MM3 (ref 140–500)
PMV BLD AUTO: 10.2 FL (ref 7.4–10.4)
POTASSIUM BLD-SCNC: 4.5 MMOL/L (ref 3.5–5.2)
PROT SERPL-MCNC: 7.5 G/DL (ref 6–8.5)
RBC # BLD AUTO: 3.6 10*6/MM3 (ref 4.2–5.4)
SODIUM BLD-SCNC: 130 MMOL/L (ref 136–145)
WBC NRBC COR # BLD: 7.79 10*3/MM3 (ref 4.8–10.8)

## 2017-01-03 PROCEDURE — C1713 ANCHOR/SCREW BN/BN,TIS/BN: HCPCS | Performed by: ORTHOPAEDIC SURGERY

## 2017-01-03 PROCEDURE — 25010000002 ALBUMIN HUMAN 5% PER 50 ML: Performed by: NURSE ANESTHETIST, CERTIFIED REGISTERED

## 2017-01-03 PROCEDURE — 25010000003 CEFAZOLIN PER 500 MG

## 2017-01-03 PROCEDURE — C1776 JOINT DEVICE (IMPLANTABLE): HCPCS | Performed by: ORTHOPAEDIC SURGERY

## 2017-01-03 PROCEDURE — 25010000002 MIDAZOLAM PER 1 MG: Performed by: ANESTHESIOLOGY

## 2017-01-03 PROCEDURE — 0SP904Z REMOVAL OF INTERNAL FIXATION DEVICE FROM RIGHT HIP JOINT, OPEN APPROACH: ICD-10-PCS | Performed by: ORTHOPAEDIC SURGERY

## 2017-01-03 PROCEDURE — 80053 COMPREHEN METABOLIC PANEL: CPT | Performed by: INTERNAL MEDICINE

## 2017-01-03 PROCEDURE — 82962 GLUCOSE BLOOD TEST: CPT

## 2017-01-03 PROCEDURE — 94799 UNLISTED PULMONARY SVC/PX: CPT

## 2017-01-03 PROCEDURE — 25010000002 MIDAZOLAM PER 1 MG

## 2017-01-03 PROCEDURE — 99223 1ST HOSP IP/OBS HIGH 75: CPT | Performed by: ORTHOPAEDIC SURGERY

## 2017-01-03 PROCEDURE — 25010000002 ONDANSETRON PER 1 MG: Performed by: ANESTHESIOLOGY

## 2017-01-03 PROCEDURE — 85025 COMPLETE CBC W/AUTO DIFF WBC: CPT | Performed by: INTERNAL MEDICINE

## 2017-01-03 PROCEDURE — 25010000003 CEFAZOLIN PER 500 MG: Performed by: ORTHOPAEDIC SURGERY

## 2017-01-03 PROCEDURE — 25010000002 VANCOMYCIN PER 500 MG: Performed by: ORTHOPAEDIC SURGERY

## 2017-01-03 PROCEDURE — 0SRR0J9 REPLACEMENT OF RIGHT HIP JOINT, FEMORAL SURFACE WITH SYNTHETIC SUBSTITUTE, CEMENTED, OPEN APPROACH: ICD-10-PCS | Performed by: ORTHOPAEDIC SURGERY

## 2017-01-03 PROCEDURE — 25010000002 PROPOFOL 10 MG/ML EMULSION: Performed by: NURSE ANESTHETIST, CERTIFIED REGISTERED

## 2017-01-03 PROCEDURE — 25010000002 HYDROMORPHONE PER 4 MG: Performed by: ORTHOPAEDIC SURGERY

## 2017-01-03 PROCEDURE — 25010000002 VANCOMYCIN PER 500 MG

## 2017-01-03 PROCEDURE — P9041 ALBUMIN (HUMAN),5%, 50ML: HCPCS | Performed by: NURSE ANESTHETIST, CERTIFIED REGISTERED

## 2017-01-03 PROCEDURE — 73501 X-RAY EXAM HIP UNI 1 VIEW: CPT

## 2017-01-03 PROCEDURE — 99232 SBSQ HOSP IP/OBS MODERATE 35: CPT | Performed by: INTERNAL MEDICINE

## 2017-01-03 PROCEDURE — 25010000002 FENTANYL CITRATE (PF) 100 MCG/2ML SOLUTION

## 2017-01-03 DEVICE — IMPLANTABLE DEVICE: Type: IMPLANTABLE DEVICE | Site: HIP | Status: FUNCTIONAL

## 2017-01-03 DEVICE — PRT HIP UNI/BIPOL STRYKER: Type: IMPLANTABLE DEVICE | Site: HIP | Status: FUNCTIONAL

## 2017-01-03 DEVICE — SMARTSET GMV HIGH PERFORMANCE GENTAMICIN MEDIUM VISCOSITY BONE CEMENT 40G
Type: IMPLANTABLE DEVICE | Site: HIP | Status: FUNCTIONAL
Brand: SMARTSET

## 2017-01-03 DEVICE — BONE CANC CRUSHED FZD 15CC: Type: IMPLANTABLE DEVICE | Site: HIP | Status: FUNCTIONAL

## 2017-01-03 RX ORDER — CELECOXIB 100 MG/1
400 CAPSULE ORAL ONCE
Status: COMPLETED | OUTPATIENT
Start: 2017-01-03 | End: 2017-01-03

## 2017-01-03 RX ORDER — PREGABALIN 75 MG/1
CAPSULE ORAL
Status: COMPLETED
Start: 2017-01-03 | End: 2017-01-03

## 2017-01-03 RX ORDER — ACETAMINOPHEN 10 MG/ML
1000 INJECTION, SOLUTION INTRAVENOUS ONCE
Status: CANCELLED | OUTPATIENT
Start: 2017-01-03 | End: 2017-01-03

## 2017-01-03 RX ORDER — ACETAMINOPHEN 10 MG/ML
1000 INJECTION, SOLUTION INTRAVENOUS ONCE
Status: DISCONTINUED | OUTPATIENT
Start: 2017-01-03 | End: 2017-01-03 | Stop reason: HOSPADM

## 2017-01-03 RX ORDER — MAGNESIUM HYDROXIDE 1200 MG/15ML
LIQUID ORAL AS NEEDED
Status: DISCONTINUED | OUTPATIENT
Start: 2017-01-03 | End: 2017-01-03 | Stop reason: HOSPADM

## 2017-01-03 RX ORDER — ONDANSETRON 2 MG/ML
4 INJECTION INTRAMUSCULAR; INTRAVENOUS ONCE AS NEEDED
Status: COMPLETED | OUTPATIENT
Start: 2017-01-03 | End: 2017-01-03

## 2017-01-03 RX ORDER — SODIUM CHLORIDE 0.9 % (FLUSH) 0.9 %
1-10 SYRINGE (ML) INJECTION AS NEEDED
Status: DISCONTINUED | OUTPATIENT
Start: 2017-01-03 | End: 2017-01-03 | Stop reason: HOSPADM

## 2017-01-03 RX ORDER — FENTANYL CITRATE 50 UG/ML
INJECTION, SOLUTION INTRAMUSCULAR; INTRAVENOUS
Status: COMPLETED
Start: 2017-01-03 | End: 2017-01-03

## 2017-01-03 RX ORDER — ACETAMINOPHEN 10 MG/ML
1000 INJECTION, SOLUTION INTRAVENOUS ONCE
Status: COMPLETED | OUTPATIENT
Start: 2017-01-03 | End: 2017-01-03

## 2017-01-03 RX ORDER — OXYCODONE HCL 10 MG/1
TABLET, FILM COATED, EXTENDED RELEASE ORAL
Status: COMPLETED
Start: 2017-01-03 | End: 2017-01-03

## 2017-01-03 RX ORDER — PREGABALIN 75 MG/1
75 CAPSULE ORAL EVERY 12 HOURS SCHEDULED
Status: DISCONTINUED | OUTPATIENT
Start: 2017-01-03 | End: 2017-01-06 | Stop reason: HOSPADM

## 2017-01-03 RX ORDER — BISACODYL 10 MG
10 SUPPOSITORY, RECTAL RECTAL DAILY PRN
Status: DISCONTINUED | OUTPATIENT
Start: 2017-01-03 | End: 2017-01-06 | Stop reason: HOSPADM

## 2017-01-03 RX ORDER — PROPOFOL 10 MG/ML
VIAL (ML) INTRAVENOUS AS NEEDED
Status: DISCONTINUED | OUTPATIENT
Start: 2017-01-03 | End: 2017-01-03 | Stop reason: SURG

## 2017-01-03 RX ORDER — ACETAMINOPHEN 325 MG/1
650 TABLET ORAL EVERY 4 HOURS PRN
Status: DISCONTINUED | OUTPATIENT
Start: 2017-01-03 | End: 2017-01-06 | Stop reason: HOSPADM

## 2017-01-03 RX ORDER — ALBUMIN, HUMAN INJ 5% 5 %
SOLUTION INTRAVENOUS CONTINUOUS PRN
Status: DISCONTINUED | OUTPATIENT
Start: 2017-01-03 | End: 2017-01-03 | Stop reason: SURG

## 2017-01-03 RX ORDER — SENNA AND DOCUSATE SODIUM 50; 8.6 MG/1; MG/1
2 TABLET, FILM COATED ORAL 2 TIMES DAILY
Status: DISCONTINUED | OUTPATIENT
Start: 2017-01-03 | End: 2017-01-06 | Stop reason: HOSPADM

## 2017-01-03 RX ORDER — CELECOXIB 100 MG/1
CAPSULE ORAL
Status: COMPLETED
Start: 2017-01-03 | End: 2017-01-03

## 2017-01-03 RX ORDER — OXYCODONE HCL 10 MG/1
10 TABLET, FILM COATED, EXTENDED RELEASE ORAL ONCE
Status: COMPLETED | OUTPATIENT
Start: 2017-01-03 | End: 2017-01-03

## 2017-01-03 RX ORDER — SODIUM CHLORIDE 9 MG/ML
INJECTION, SOLUTION INTRAVENOUS AS NEEDED
Status: DISCONTINUED | OUTPATIENT
Start: 2017-01-03 | End: 2017-01-03 | Stop reason: HOSPADM

## 2017-01-03 RX ORDER — MIDAZOLAM HYDROCHLORIDE 1 MG/ML
2 INJECTION INTRAMUSCULAR; INTRAVENOUS
Status: DISCONTINUED | OUTPATIENT
Start: 2017-01-03 | End: 2017-01-03 | Stop reason: HOSPADM

## 2017-01-03 RX ORDER — MIDAZOLAM HYDROCHLORIDE 1 MG/ML
INJECTION INTRAMUSCULAR; INTRAVENOUS
Status: COMPLETED
Start: 2017-01-03 | End: 2017-01-03

## 2017-01-03 RX ORDER — LIDOCAINE HYDROCHLORIDE 20 MG/ML
INJECTION, SOLUTION INFILTRATION; PERINEURAL AS NEEDED
Status: DISCONTINUED | OUTPATIENT
Start: 2017-01-03 | End: 2017-01-03 | Stop reason: SURG

## 2017-01-03 RX ORDER — ACETAMINOPHEN 500 MG
1000 TABLET ORAL EVERY 6 HOURS
Status: DISCONTINUED | OUTPATIENT
Start: 2017-01-03 | End: 2017-01-06 | Stop reason: HOSPADM

## 2017-01-03 RX ORDER — MIDAZOLAM HYDROCHLORIDE 1 MG/ML
1 INJECTION INTRAMUSCULAR; INTRAVENOUS
Status: DISCONTINUED | OUTPATIENT
Start: 2017-01-03 | End: 2017-01-03 | Stop reason: HOSPADM

## 2017-01-03 RX ORDER — OXYCODONE HYDROCHLORIDE 5 MG/1
5 TABLET ORAL EVERY 4 HOURS PRN
Status: DISCONTINUED | OUTPATIENT
Start: 2017-01-03 | End: 2017-01-06 | Stop reason: HOSPADM

## 2017-01-03 RX ORDER — ROPIVACAINE HYDROCHLORIDE 5 MG/ML
INJECTION, SOLUTION EPIDURAL; INFILTRATION; PERINEURAL
Status: DISPENSED
Start: 2017-01-03 | End: 2017-01-03

## 2017-01-03 RX ORDER — SODIUM CHLORIDE, SODIUM LACTATE, POTASSIUM CHLORIDE, CALCIUM CHLORIDE 600; 310; 30; 20 MG/100ML; MG/100ML; MG/100ML; MG/100ML
50 INJECTION, SOLUTION INTRAVENOUS CONTINUOUS
Status: DISCONTINUED | OUTPATIENT
Start: 2017-01-03 | End: 2017-01-05

## 2017-01-03 RX ORDER — HYDROMORPHONE HCL 110MG/55ML
0.25 PATIENT CONTROLLED ANALGESIA SYRINGE INTRAVENOUS
Status: DISCONTINUED | OUTPATIENT
Start: 2017-01-03 | End: 2017-01-03 | Stop reason: HOSPADM

## 2017-01-03 RX ORDER — PREGABALIN 75 MG/1
75 CAPSULE ORAL ONCE
Status: COMPLETED | OUTPATIENT
Start: 2017-01-03 | End: 2017-01-03

## 2017-01-03 RX ORDER — ASPIRIN 325 MG
325 TABLET, DELAYED RELEASE (ENTERIC COATED) ORAL EVERY 12 HOURS SCHEDULED
Status: DISCONTINUED | OUTPATIENT
Start: 2017-01-04 | End: 2017-01-06 | Stop reason: HOSPADM

## 2017-01-03 RX ORDER — SODIUM CHLORIDE, SODIUM LACTATE, POTASSIUM CHLORIDE, CALCIUM CHLORIDE 600; 310; 30; 20 MG/100ML; MG/100ML; MG/100ML; MG/100ML
INJECTION, SOLUTION INTRAVENOUS CONTINUOUS PRN
Status: DISCONTINUED | OUTPATIENT
Start: 2017-01-03 | End: 2017-01-03 | Stop reason: SURG

## 2017-01-03 RX ORDER — ONDANSETRON 2 MG/ML
4 INJECTION INTRAMUSCULAR; INTRAVENOUS ONCE AS NEEDED
Status: DISCONTINUED | OUTPATIENT
Start: 2017-01-03 | End: 2017-01-03 | Stop reason: HOSPADM

## 2017-01-03 RX ADMIN — EPHEDRINE SULFATE 5 MG: 50 INJECTION, SOLUTION INTRAMUSCULAR; INTRAVENOUS; SUBCUTANEOUS at 10:26

## 2017-01-03 RX ADMIN — EPHEDRINE SULFATE 5 MG: 50 INJECTION, SOLUTION INTRAMUSCULAR; INTRAVENOUS; SUBCUTANEOUS at 11:20

## 2017-01-03 RX ADMIN — ALBUMIN HUMAN: 0.05 INJECTION, SOLUTION INTRAVENOUS at 10:42

## 2017-01-03 RX ADMIN — OXYCODONE HCL 10 MG: 10 TABLET, FILM COATED, EXTENDED RELEASE ORAL at 07:31

## 2017-01-03 RX ADMIN — OXYCODONE HYDROCHLORIDE 5 MG: 5 TABLET ORAL at 22:44

## 2017-01-03 RX ADMIN — ACETAMINOPHEN 1000 MG: 500 TABLET, COATED ORAL at 20:02

## 2017-01-03 RX ADMIN — CELECOXIB 400 MG: 100 CAPSULE ORAL at 07:32

## 2017-01-03 RX ADMIN — VANCOMYCIN HYDROCHLORIDE 1000 MG: 1 INJECTION, POWDER, LYOPHILIZED, FOR SOLUTION INTRAVENOUS at 07:38

## 2017-01-03 RX ADMIN — SODIUM CHLORIDE, POTASSIUM CHLORIDE, SODIUM LACTATE AND CALCIUM CHLORIDE: 600; 310; 30; 20 INJECTION, SOLUTION INTRAVENOUS at 10:04

## 2017-01-03 RX ADMIN — MIDAZOLAM HYDROCHLORIDE 0.5 MG: 1 INJECTION, SOLUTION INTRAMUSCULAR; INTRAVENOUS at 09:50

## 2017-01-03 RX ADMIN — PRAVASTATIN SODIUM 40 MG: 20 TABLET ORAL at 20:02

## 2017-01-03 RX ADMIN — TRANEXAMIC ACID 1000 MG: 100 INJECTION, SOLUTION INTRAVENOUS at 10:25

## 2017-01-03 RX ADMIN — ACETAMINOPHEN 1000 MG: 10 INJECTION, SOLUTION INTRAVENOUS at 07:38

## 2017-01-03 RX ADMIN — FENTANYL CITRATE 25 MCG: 50 INJECTION, SOLUTION INTRAMUSCULAR; INTRAVENOUS at 10:42

## 2017-01-03 RX ADMIN — EPHEDRINE SULFATE 5 MG: 50 INJECTION, SOLUTION INTRAMUSCULAR; INTRAVENOUS; SUBCUTANEOUS at 10:20

## 2017-01-03 RX ADMIN — EPHEDRINE SULFATE 5 MG: 50 INJECTION, SOLUTION INTRAMUSCULAR; INTRAVENOUS; SUBCUTANEOUS at 10:51

## 2017-01-03 RX ADMIN — PREGABALIN 75 MG: 75 CAPSULE ORAL at 07:31

## 2017-01-03 RX ADMIN — TRANEXAMIC ACID 1000 MG: 100 INJECTION, SOLUTION INTRAVENOUS at 11:55

## 2017-01-03 RX ADMIN — METOPROLOL TARTRATE 25 MG: 25 TABLET ORAL at 15:41

## 2017-01-03 RX ADMIN — CEFAZOLIN SODIUM 2 G: 2 SOLUTION INTRAVENOUS at 10:16

## 2017-01-03 RX ADMIN — FENTANYL CITRATE 25 MCG: 50 INJECTION, SOLUTION INTRAMUSCULAR; INTRAVENOUS at 11:31

## 2017-01-03 RX ADMIN — MIDAZOLAM HYDROCHLORIDE 0.5 MG: 1 INJECTION, SOLUTION INTRAMUSCULAR; INTRAVENOUS at 09:57

## 2017-01-03 RX ADMIN — PREGABALIN 75 MG: 75 CAPSULE ORAL at 20:02

## 2017-01-03 RX ADMIN — EPHEDRINE SULFATE 5 MG: 50 INJECTION, SOLUTION INTRAMUSCULAR; INTRAVENOUS; SUBCUTANEOUS at 11:41

## 2017-01-03 RX ADMIN — SODIUM CHLORIDE, POTASSIUM CHLORIDE, SODIUM LACTATE AND CALCIUM CHLORIDE 50 ML/HR: 600; 310; 30; 20 INJECTION, SOLUTION INTRAVENOUS at 15:41

## 2017-01-03 RX ADMIN — EPHEDRINE SULFATE 10 MG: 50 INJECTION, SOLUTION INTRAMUSCULAR; INTRAVENOUS; SUBCUTANEOUS at 10:16

## 2017-01-03 RX ADMIN — SODIUM CHLORIDE 1000 MG: 900 INJECTION, SOLUTION INTRAVENOUS at 22:44

## 2017-01-03 RX ADMIN — LIDOCAINE HYDROCHLORIDE 100 MG: 20 INJECTION, SOLUTION INFILTRATION; PERINEURAL at 10:07

## 2017-01-03 RX ADMIN — EPHEDRINE SULFATE 5 MG: 50 INJECTION, SOLUTION INTRAMUSCULAR; INTRAVENOUS; SUBCUTANEOUS at 11:38

## 2017-01-03 RX ADMIN — OXYCODONE HYDROCHLORIDE 5 MG: 5 TABLET ORAL at 18:38

## 2017-01-03 RX ADMIN — FENTANYL CITRATE 25 MCG: 50 INJECTION, SOLUTION INTRAMUSCULAR; INTRAVENOUS at 10:48

## 2017-01-03 RX ADMIN — CEFAZOLIN SODIUM 2 G: 2 SOLUTION INTRAVENOUS at 18:27

## 2017-01-03 RX ADMIN — FENTANYL CITRATE 25 MCG: 50 INJECTION, SOLUTION INTRAMUSCULAR; INTRAVENOUS at 11:04

## 2017-01-03 RX ADMIN — PROPOFOL 100 MG: 10 INJECTION, EMULSION INTRAVENOUS at 10:08

## 2017-01-03 RX ADMIN — ONDANSETRON 4 MG: 2 INJECTION, SOLUTION INTRAMUSCULAR; INTRAVENOUS at 09:47

## 2017-01-03 RX ADMIN — LATANOPROST 1 DROP: 50 SOLUTION OPHTHALMIC at 20:04

## 2017-01-03 RX ADMIN — HYDROMORPHONE HYDROCHLORIDE 1 MG: 1 INJECTION, SOLUTION INTRAMUSCULAR; INTRAVENOUS; SUBCUTANEOUS at 03:41

## 2017-01-03 RX ADMIN — FENTANYL CITRATE 25 MCG: 50 INJECTION, SOLUTION INTRAMUSCULAR; INTRAVENOUS at 09:47

## 2017-01-03 RX ADMIN — EPHEDRINE SULFATE 5 MG: 50 INJECTION, SOLUTION INTRAMUSCULAR; INTRAVENOUS; SUBCUTANEOUS at 10:53

## 2017-01-03 RX ADMIN — OXYCODONE HYDROCHLORIDE 10 MG: 10 TABLET, FILM COATED, EXTENDED RELEASE ORAL at 07:31

## 2017-01-03 RX ADMIN — HYDROMORPHONE HYDROCHLORIDE 1 MG: 1 INJECTION, SOLUTION INTRAMUSCULAR; INTRAVENOUS; SUBCUTANEOUS at 05:53

## 2017-01-03 RX ADMIN — ALBUMIN HUMAN: 0.05 INJECTION, SOLUTION INTRAVENOUS at 11:38

## 2017-01-03 NOTE — ANESTHESIA PROCEDURE NOTES
Airway  Urgency: elective    Date/Time: 1/3/2017 10:10 AM  End Time:1/3/2017 10:11 AM  Airway not difficult    General Information and Staff    Patient location during procedure: OR  CRNA: JOCELIN KENYON    Indications and Patient Condition    Preoxygenated: yes  MILS maintained throughout  Mask difficulty assessment: 0 - not attempted    Final Airway Details  Final airway type: supraglottic airway      Successful airway: unique  Size 3    Number of attempts at approach: 1    Additional Comments  BEBS, +ETCO2, VSS. TEETH AND GUMS AS PRE-OP.

## 2017-01-03 NOTE — OP NOTE
Date of Operation:  01/03/2017    PREOPERATIVE DIAGNOSIS: Right femoral neck fracture.     POSTOPERATIVE DIAGNOSIS:  Right femoral neck fracture.     PROCEDURE PERFORMED: Removal of previously placed intramedullary femoral nail with right endoprosthetic hip replacement using a [place] #3 Reliance cemented stem with a 13 mm distal centralizer with a standard neck and a 45 endoprosthetic head, again using antibiotic cement.     SURGEON: Mirza Whittington MD     ASSISTANT:  Wali Beard     ANESTHESIA: General with femoral nerve block.     ESTIMATED BLOOD LOSS: 200 mL     DRAINS: None.     COMPLICATIONS: None.     DESCRIPTION OF PROCEDURE: The patient was brought to the operating room and after satisfactory anesthesia was obtained, the patient was placed in the right lateral decubitus position. A timeout was completed, identifying the patient and the procedure correctly. The right patient was given preoperatively 2 g of Kefzol, 1 g of vancomycin, 1 g of IV Tylenol, and 1 g of tranexamic acid. The right hip was then prepped and after the prep dried for 3 minutes, it was draped in a sterile field in the usual manner. A timeout was again completed, identifying the patient and the procedure correctly. Attention was first directed towards removal of the previously placed Mancuso Recon nail. The 2 distal screws were removed through a small incision going through the old incision and via blunt and sharp dissection. The tensor fascia blake was divided and the screws were easily identified and then removed. Once the screws were removed, the holes were packed with cancellous allograft material.  Once this was completed, attention was directed proximally going through the old posterolateral incision, extended from the greater trochanter distally for about 6 cm, and just slightly posteriorly for about 3 cm.  Via blunt and sharp dissection, the gluteus micah and tensor fascia blake was incised the entire length of the incision  exposing the greater trochanter. Using careful dissection, the vastus lateralis was elevated anteriorly off of the greater trochanter along with the gluteus medius and then the greater trochanter insertion was extended distally to expose the tip of the greater trochanter and the nail, which was easily identified and then removed. Once the nail was removed, attention was directed towards the endoprosthetic hip replacement. The neck was then cut at the appropriate angle and with the leg in the hip bag and then preparation of the canal was carried out again in the standard fashion.  First, the  was used and then the channel finder and then reaming was begun to accommodate a #3 stem.  The lateralizer was first used and then broaching was begun first with the 0 broach and then up to a 3 broach. With the 3 broach in place, the hip was noted to be stable with equal limb length. All trial components were then removed.  While 2 packs of antibiotic cement had been prepared, Exparel solution, which was 20 mL of Exparel, 60 mL of 1/4% plain Marcaine, and 60 mL of normal saline was mixed, and 40 mL was injected around the acetabulum. The canal was brushed and then distal centralizing plug was inserted. The canal was packed dry.  Once the cement was ready, the canal was filled and pressurized using the gun. The #3 Reliant stem with a 13 mm centralizer was then inserted into the canal after complete seating and removal of the excess cement and after the cement had hardened, the endoprosthetic head was placed over the stem after cleaning of the neck. The remainder of the Exparel solution was injected deep into superficial tissues. The hip was reduced and noted to be stable throughout the arc of motion. The greater trochanter soft tissue was reapproximated with interrupted #2 Vicryl through the greater trochanter. The vastus lateralis and gluteus medius were closed with interrupted #2 Vicryl, the tensor fascia blake and  gluteus micah with interrupted #2 Vicryl, the subcutaneous with 0 Vicryl and 2-0 Vicryl, and the skin was closed with a Dermabond dressing. The distal incision was closed similarly.  The vastus lateralis incision was closed with interrupted #2 Vicryl, subcutaneous with 2-0 Vicryl, and the skin with a Dermabond dressing. Sterile dressing was applied to all wounds and the patient was then awakened and taken to recovery, having tolerated the procedure well.         JAILENE Guzman  D:  01/03/2017 12:15:28   T:  01/03/2017 12:53:07   Job ID:  [id number]   Document ID:  [id number]  cc:

## 2017-01-03 NOTE — PLAN OF CARE
Problem: Patient Care Overview (Adult)  Goal: Plan of Care Review  Outcome: Ongoing (interventions implemented as appropriate)    01/03/17 0723   Coping/Psychosocial Response Interventions   Plan Of Care Reviewed With patient   Patient Care Overview   Progress no change   Outcome Evaluation   Outcome Summary/Follow up Plan vss, waiting for procedure

## 2017-01-03 NOTE — ANESTHESIA PREPROCEDURE EVALUATION
Anesthesia Evaluation     Patient summary reviewed and Nursing notes reviewed    No history of anesthetic complications (sister with a hard time waking up)   Airway   Mallampati: III  TM distance: <3 FB  possible difficult intubation  Dental - normal exam     Pulmonary - normal exam   (-) sleep apnea, not a smoker    ROS comment: Recent sinus infection-resolved  Cardiovascular   Exercise tolerance: good (4-7 METS)  (+) hypertension well controlled, valvular problems/murmurs MR,     Patient on routine beta blocker and Beta blocker given within 24 hours of surgery  Rhythm: regular  Rate: abnormal  ROS comment: Hypercholesterolemia  EKG 6/2016  BPM: 59  Conduction: LAFB  ST Segments: ST segments normal  QRS axis: normal  Clinical impression: abnormal ECG  Comments: T-wave inversions in lead III, aVF, V1 through V6  6/2016  [facility] [id number] 81 y.o. Female  Interpretation Summary   · Myocardial perfusion imaging indicates a normal myocardial perfusion study with no evidence of ischemia.  · Left ventricular ejection fraction is normal (Calculated EF = 63%).  · Impressions are consistent with a low risk study.    SINUS RHYTHM  FIRST DEGREE AV BLOCK  LATE PRECORDIAL R/S TRANSITION  PROBABLE LEFT VENTRICULAR HYPERTROPHY  PROBABLE INFERIOR INFARCT, AGE INDETERMINATE  Electronically Signed by:  Date and Time of Study: 2017-01-02 11:08:30  No change from previous per Cardiology.  PE comment: bradycardic    Neuro/Psych  (+) dizziness/light headedness (recent with sinus infection, led to fall),    GI/Hepatic/Renal/Endo    (+)  GERD, chronic renal disease (renal lesion on mri), diabetes mellitus type 2 well controlled, hypothyroidism,     ROS Comment: History of hyponatremia on history, sodium 130 this am, had been 135 to 140 over last 2 years on labs available to me.  History of colitis  History of gout right great toe    Musculoskeletal     (+) back pain,   Abdominal  - normal exam    Abdomen: soft.   Substance  History      OB/GYN          Other   (+) arthritis (generalized)                    Anesthesia Plan    ASA 3     general and regional   (Seen by hospitalist, felt to be low risk for surgery.)  intravenous induction   Anesthetic plan and risks discussed with patient and child.  Use of blood products discussed with patient  Consented to blood products.

## 2017-01-03 NOTE — CONSULTS
Orthopedic Consult      Patient: Ashli León    Date of Admission: 1/2/2017  7:54 AM    YOB: 1935    Medical Record Number: [id number]    Attending Physician: Micheal Stephens MD  Consulting Physician:  Pk      Chief Complaints: Closed right hip fracture, initial encounter [S72.001A]  Closed right hip fracture, initial encounter [S72.001A]      History of Present Illness: 81 y.o. female admitted to [facility] to services of Micheal Stephens MD with a complaint of right hip pain after falling at home on the morning of her admission.  Patient brought to the emergency room x-rays demonstrated displaced right femoral neck fracture.  Patient states she was in the hospital several weeks before complaint of hip pain but that evaluation was negative.  She is being admitted this time to undergo open right hemiarthroplasty for displaced femoral neck fracture.  Patient recently i.e. in June 2016 underwent a right total knee arthroplasty.  No other associated injuries with the fall.  Patient does state that she pivoted she fell it pop in the hip and she fell indicating probably the fracture occurred prior to the fall.    Allergies:   Allergies   Allergen Reactions   • Neomycin Swelling   • Statins Myalgia       Medications:   Home Medications:  No current facility-administered medications on file prior to encounter.      Current Outpatient Prescriptions on File Prior to Encounter   Medication Sig   • alendronate (FOSAMAX) 5 MG tablet 5 mg Every Morning Before Breakfast.   • allopurinol (ZYLOPRIM) 100 MG tablet Take 100 mg by mouth daily.   • HYDROcodone-acetaminophen (NORCO) 5-325 MG per tablet Take 1-2 tablets by mouth Every 4 (Four) Hours As Needed for moderate pain (4-6) for up to 30 doses.   • latanoprost (XALATAN) 0.005 % ophthalmic solution Administer 1 drop to both eyes Every Night.   • levothyroxine (SYNTHROID, LEVOTHROID) 50 MCG tablet Take 50 mcg by mouth daily.   • meloxicam (MOBIC) 15 MG  tablet 1 PO Daily with food.   • metFORMIN (GLUCOPHAGE) 500 MG tablet Take 500 mg by mouth daily with breakfast. Pt has only been taking once a day, however the prescription on bottle says take twice daily with meals.   • metoprolol tartrate (LOPRESSOR) 25 MG tablet Take 25 mg by mouth daily.   • omeprazole (PriLOSEC) 40 MG capsule Take 40 mg by mouth 2 (two) times a day.   • oxyCODONE-acetaminophen (PERCOCET) 7.5-325 MG per tablet  1-2 Oral Q4H PRN severe pain   • pravastatin (PRAVACHOL) 40 MG tablet 40 mg Daily.   • traMADol (ULTRAM) 50 MG tablet Take 1 tablet by mouth Every 4 (Four) Hours As Needed for moderate pain (4-6).     Current Medications:  Scheduled Meds:  allopurinol 100 mg Oral Daily   bisacodyl 10 mg Rectal Daily   docusate sodium 100 mg Oral BID   insulin aspart 0-7 Units Subcutaneous 4x Daily AC & at Bedtime   latanoprost 1 drop Both Eyes Nightly   levothyroxine 50 mcg Oral QAM   meloxicam 15 mg Oral Daily   metFORMIN 500 mg Oral Daily With Breakfast   metoprolol tartrate 25 mg Oral Daily   pantoprazole 40 mg Oral Q AM   pravastatin 40 mg Oral Nightly     Continuous Infusions:  sodium chloride 50 mL/hr Last Rate: 50 mL/hr (01/02/17 1404)     PRN Meds:.•  acetaminophen  •  dextrose  •  dextrose  •  glucagon (human recombinant)  •  HYDROmorphone  •  ondansetron  •  pneumococcal polysaccharide 23-valent    Past Medical History   Diagnosis Date   • Acid reflux    • AP (abdominal pain)    • Back pain    • Chest pain    • Colitis    • Diabetes mellitus type 2, noninsulin dependent    • Diarrhea    • Disease of thyroid gland    • Diverticular disease    • Glaucoma    • Gout      - right big toe   • High blood pressure    • High cholesterol    • Hyponatremia    • Knee pain    • Macular degeneration    • Mitral regurgitation 06/2016     Mild to moderate per 2-D echocardiogram   • Primary osteoarthritis of both knees 4/1/2016   • Renal lesion      on mri   • Rheumatoid arthritis         Past Surgical History    Procedure Laterality Date   • Appendectomy  1951   • Femur surgery Right      had fracture and has rajesh    • Hip surgery Right 1999     hx fracture   • Cataract extraction Bilateral    • Hemorrhoidectomy     • Total knee arthroplasty Right 6/22/2016     Procedure: RT TOTAL KNEE ARTHROPLASTY WITH FINN NAVIGATION;  Surgeon: Adrian Swenson MD;  Location: [facility] OR;  Service:         Social History     Occupational History   • Not on file.     Social History Main Topics   • Smoking status: Never Smoker   • Smokeless tobacco: Never Used   • Alcohol use 1.8 oz/week     3 Shots of liquor per week      Comment: social use 1 weekly   • Drug use: No   • Sexual activity: Defer    Social History     Social History Narrative   • No narrative on file        Family History   Problem Relation Age of Onset   • Heart attack Father          Review of Systems:   HEENT: Patient denies any headaches, vision changes, change in hearing, or tinnitus, Patient denies any rhinorrhea,epistaxis, sinus pain, mouth or dental problems, sore throat or hoarseness, or dysphagia  Pulmonary: Patient denies any cough, congestion, SOA, or wheezing  Cardiovascular: Patient denies any chest pain, dyspnea, palpitations, weakness, intolerance of exercise, varicosities, swelling of extremities, known murmur  Gastrointestinal:  Patient denies nausea, vomiting, diarrhea, constipation, loss  of appetite, change in appetite, dysphagia, gas, heartburn, melena, change in bowel habits, use of laxatives or other drugs to alter the function of the gastrointestinal tract.  Genital/Urinary: Patient denies dysuria, change in color of urine, change in frequency of urination, pain with urgency, incontinence, retention, or nocturia.  Musculoskeletal: Patient denies increased warmth; redness; or swelling of joints; limitation of function; deformity; crepitation: pain in a joint or an extremity, the neck, or the back, especially with movement.  Neurological: Patient  denies dizziness, tremor, ataxia, difficulty in speaking, change in speech, paresthesia, loss of sensation, seizures, syncope, changes in memory.  Endocrine system: Patient denies tremors, palpitations, intolerance of heat or cold, polyuria, polydipsia, polyphagia, diaphoresis, exophthalmos, or goiter.  Psychological: Patient denies thoughts/plans or harming self or other; depression,  insomnia, night terrors, dora, memory loss, disorientation.  Skin: Patient denies any bruising, rashes, discoloration, pruritus, wounds, ulcers, decubiti, changes in the hair or nails  Hematopoietic: Patient denies history of spontaneous or excessive bleeding, epistaxis, hematuria, melena, fatigue, enlarged or tender lymph nodes, pallor, history of anemia.    Physical Exam: 81 y.o. female  General Appearance:    Alert, cooperative, in no acute distress                 Vitals:    01/02/17 1500 01/02/17 1930 01/02/17 2117 01/02/17 2323   BP: 134/80 123/74  (!) 182/78  Comment: Patient complained of pain in right hip upon obtaining   BP Location: Left arm Left arm  Left arm   Patient Position: Lying Lying  Lying   Pulse: 84 71  77   Resp: 18 18  20   Temp: 97.8 °F (36.6 °C) 97.3 °F (36.3 °C)  96.9 °F (36.1 °C)   TempSrc: Oral Oral  Oral   SpO2: 93% 96% 96% 99%   Weight:       Height:            Head:    Normocephalic, without obvious abnormality, atraumatic   Eyes:            Lids and lashes normal, conjunctivae and sclerae normal, no   icterus, no pallor, corneas clear, PERRLA   Ears:    Ears appear intact with no abnormalities noted   Throat:   No oral lesions, no thrush, oral mucosa moist   Neck:   No adenopathy, supple, trachea midline, no thyromegaly, no   carotid bruit, no JVD   Back:     No kyphosis present, no scoliosis present, no skin lesions,      erythema or scars, no tenderness to percussion or                   palpation,   range of motion normal   Lungs:     Clear to auscultation,respirations regular, even and                   unlabored    Heart:    Regular rhythm and normal rate, normal S1 and S2, no            murmur, no gallop, no rub, no click   Chest Wall:    No abnormalities observed   Abdomen:     Normal bowel sounds, no masses, no organomegaly, soft        non-tender, non-distended, no guarding, no rebound                tenderness   Rectal:     Deferred   Extremities:   right lower extremity shows no motor deficit with good distal pulses.  There is slight shortening of the leg.  Skin is cool to touch.  There is no abrasions.     Pulses:   Pulses palpable and equal bilaterally   Skin:   No bleeding, bruising or rash   Lymph nodes:   No palpable adenopathy   Neurologic:   Cranial nerves 2 - 12 grossly intact, sensation intact, DTR       present and equal bilaterally           Diagnostic Tests: X-rays confirm a right femoral neck fracture.  X-rays also confirm the right total knee.  Patient had intramedullary femoral nail inserted she states back in 1999 that does need to be removed prior to the hemiarthroplasty.  [unfilled]      [unfilled]    Assessment:  Patient Active Problem List   Diagnosis   • History of hip surgery   • Primary osteoarthritis of both knees   • Arthritis of right knee   • Aftercare following right knee joint replacement surgery   • Status post total right knee replacement   • History of knee replacement, total   • History of total knee arthroplasty   • Chronic pain of left knee   • Arthritis of left knee   • Closed right hip fracture           Plan:  The patient voiced understanding of the risks, benefits, and alternative forms of treatment that were discussed.  Discussed with the patient that intramedullary nail need to be removed prior to the hemiarthroplasty.  I discussed the risk of surgery which include but not limited to infection and the need for multiple procedures including implant removal to eradicate infection particularly diabetic and she is, foot drop, vascular injury, dislocation, the need  for revision surgery and persistent pain and discomfort.  Also discussed that she be protective weightbearing for approximate 6 weeks following the surgery to allow for the screw holes in the distal femur to heal before she for weightbearing.  Discussed the rehabilitation 3 months to a year and she understands plan to proceed with surgery pending prep evaluation.          Date: 1/3/2017    Mirza Whittington MD

## 2017-01-03 NOTE — PLAN OF CARE
Problem: Perioperative Period (Adult)  Goal: Signs and Symptoms of Listed Potential Problems Will be Absent or Manageable (Perioperative Period)  Outcome: Ongoing (interventions implemented as appropriate)    01/03/17 0780   Perioperative Period   Problems Assessed (Perioperative Period) all   Problems Present (Perioperative Period) pain

## 2017-01-03 NOTE — ANESTHESIA POSTPROCEDURE EVALUATION
Patient: Ashli León    Procedure Summary     Date Anesthesia Start Anesthesia Stop Room / Location    01/03/17 1004 1233 [facility] / [facility]       Procedure Diagnosis Surgeon Provider    HIP HEMIARTHROPLASTY with IM rajesh and 2 screw hardware removal antibiotic cement (Right Hip) Closed fracture of neck of right femur, initial encounter  (Closed fracture of neck of right femur, initial encounter [S72.001A]) MD Zofia Jerez CRNA          Anesthesia Type: general, regional  Last vitals  BP      Temp      Pulse     Resp      SpO2        Post Anesthesia Care and Evaluation    Patient location during evaluation: bedside  Patient participation: complete - patient participated  Level of consciousness: awake and alert  Pain management: adequate  Airway patency: patent  Anesthetic complications: no  Cardiovascular status: acceptable  Respiratory status: acceptable  Hydration status: acceptable

## 2017-01-03 NOTE — PROGRESS NOTES
"    HOSPITALIST SERVICES  @ [facility] [place]            HOSPITALIST TEAM   PROGRESS NOTE      Patient Care Team:  Arnaud Monson MD as PCP - General  Arnaud Monson MD as PCP - Family Medicine        Chief Complaint:        Rt Hip pain since a day prior to admission      Subjective      Ms. Ashli León is an 81-year-old  female who has hx of Rt Hip fracture several years ago which was treated with a rajesh. Her surgery was done at [facility].  She was walking around the bed yesterday at 5:30 PM and felt a pop in her right hip. X-Rays in ER showed Closed Rt Femoral Neck Fracture. S/P Removal of previously placed intramedullary femoral nail with right endoprosthetic hip replacement done by Dr. Gomez today.  ?      Interval History and ROS:     Patient States Hip pain is under control  Patient Complaints: No new complaints  Patient Denies:  Any sx of chest pain, shortness of breath, abdominal pain or n/v  History taken from: Patient      Objective    Vital Signs  Temp:  [96.9 °F (36.1 °C)-98.1 °F (36.7 °C)] 97.6 °F (36.4 °C)  Heart Rate:  [] 93  Resp:  [12-20] 16  BP: (102-182)/(56-80) 102/62    Flowsheet Rows         First Filed Value    Admission Height  62\" (157.5 cm) Documented at 01/02/2017 0745    Admission Weight  130 lb (59 kg) Documented at 01/02/2017 0745              Physical Exam:      PHYSICAL EXAMINATION:    VITAL SIGNS: As per Nurse's notes    GENERAL APPEARANCE: The patient is a well developed, well nourished,  male, in no acute distress.     HEENT: Normocephalic, atraumatic. PERRL. The sclerae anicteric and conjunctivae pink and moist.     NECK: Supple and symmetric. No masses. No thyromegaly. No carotid bruits. No evidence of JVD.    SKIN: Warm, dry and intact. No rash or lesions or wounds or patechiae.    LUNGS: Clear to auscultation bilaterally. No wheezes/rales/crackles/rubs.    CARDIOVASCULAR: RRR. S1, S2 normal without " murmur/gallop/rub. No S3, S4.     ABDOMEN: Soft, non-tender, non-distended. No masses. No rebound/guarding. No hepatosplenomegaly. Normal bowel sounds.     EXTREMITIES:  S/P Removal of previously placed intramedullary femoral nail with right endoprosthetic hip replacement done by Dr. Gomez today. No evidence of cyanosis or clubbing, but edema of Rt Hip area .     MUSCULOSKELETAL: S/P Removal of previously placed intramedullary femoral nail with right endoprosthetic hip replacement done by Dr. Gomez today    PSYCHIATRY: Responds appropriately to questions. No evidence of acute anxiety, depression, panic attacks or hallucinations.    MENTAL STATUS EXAMINATION: Affect and insight appropriate. Speech and behavior are normal. Patient is alert and oriented x3. Thought Process WNL.     NEUROLOGIC: Examination is grossly intact globally with no focal deficits.          Results Review:     I reviewed the patient's new clinical results.    Lab Results (last 24 hours)     Procedure Component Value Units Date/Time    POC Glucose Fingerstick [[id number]]  (Normal) Collected:  01/02/17 2034    Specimen:  Blood Updated:  01/02/17 2042     Glucose 122 mg/dL     Narrative:       Meter: [id number] : [id number] Cherie Watts    CBC & Differential [[id number]] Collected:  01/03/17 0317    Specimen:  Blood Updated:  01/03/17 0426    Narrative:       The following orders were created for panel order CBC & Differential.  Procedure                               Abnormality         Status                     ---------                               -----------         ------                     CBC Auto Differential[[id number]]         Abnormal            Final result                 Please view results for these tests on the individual orders.    CBC Auto Differential [[id number]]  (Abnormal) Collected:  01/03/17 0317    Specimen:  Blood Updated:  01/03/17 0426     WBC 7.79 10*3/mm3      RBC 3.60 (L) 10*6/mm3      Hemoglobin 11.2 (L) g/dL       Hematocrit 33.9 (L) %      MCV 94.2 fL      MCH 31.1 (H) pg      MCHC 33.0 g/dL      RDW 11.8 %      RDW-SD 41.0 fl      MPV 10.2 fL      Platelets 329 10*3/mm3      Neutrophil % 65.7 %      Lymphocyte % 23.0 %      Monocyte % 9.0 (H) %      Eosinophil % 1.5 %      Basophil % 0.3 %      Immature Grans % 0.5 %      Neutrophils, Absolute 5.12 10*3/mm3      Lymphocytes, Absolute 1.79 10*3/mm3      Monocytes, Absolute 0.70 10*3/mm3      Eosinophils, Absolute 0.12 10*3/mm3      Basophils, Absolute 0.02 10*3/mm3      Immature Grans, Absolute 0.04 (H) 10*3/mm3      nRBC 0.0 /100 WBC     Comprehensive Metabolic Panel [[id number]]  (Abnormal) Collected:  01/03/17 0636    Specimen:  Blood Updated:  01/03/17 0736     Glucose 114 (H) mg/dL      BUN 15 mg/dL      Creatinine 0.60 mg/dL      Sodium 130 (L) mmol/L      Potassium 4.5 mmol/L      Chloride 91 (L) mmol/L      CO2 24.0 mmol/L      Calcium 9.8 mg/dL      Total Protein 7.5 g/dL      Albumin 3.70 g/dL      ALT (SGPT) 9 U/L      AST (SGOT) 12 U/L      Alkaline Phosphatase 59 U/L      Total Bilirubin 0.8 mg/dL      eGFR Non African Amer 96 mL/min/1.73      Globulin 3.8 gm/dL      A/G Ratio 1.0 g/dL      BUN/Creatinine Ratio 25.0      Anion Gap 15.0 mmol/L     Narrative:       The MDRD GFR formula is only valid for adults with stable renal function between ages 18 and 70.    POC Glucose Fingerstick [[id number]]  (Normal) Collected:  01/03/17 0730    Specimen:  Blood Updated:  01/03/17 0741     Glucose 105 mg/dL     Narrative:       Meter: [id number] : [id number] Mike Collier    POC Glucose Fingerstick [[id number]]  (Normal) Collected:  01/03/17 1630    Specimen:  Blood Updated:  01/03/17 1636     Glucose 119 mg/dL     Narrative:       Meter: [id number] : [id number] Merary Arreguin          Imaging Results (last 24 hours)     Procedure Component Value Units Date/Time    XR Hip With or Without Pelvis 2 - 3 View Right [[id number]] Collected:  01/03/17 0749     Updated:  01/03/17  0832    Narrative:       AP PELVIS AND AP VIEW OF THE RIGHT FEMUR AND LATERAL VIEW OF THE RIGHT  KNEE 01/02/2017 AT 0820 HOURS     HISTORY: Fell this morning. Right hip pain. History of hip surgery in  1999.     COMPARISON: AP pelvis and AP view of the right femur 12/20/2016.     FINDINGS: There is a displaced subcapital fracture of the right femoral  neck with mild superior angulation of the fracture apex. No femoral head  dislocation is seen. An intramedullary rajesh is present within the femoral  shaft with distal interlocking screws. Right total knee replacement  changes are present. The knee joint appears appropriately aligned.     There is mild degenerative narrowing of bilateral hip joint spaces.  Osteopenic changes are present. Multiple round calcifications is the  pelvis on the right thought to represent phleboliths.       Impression:          1. Displaced subcapital fracture of the right femoral neck without  femoral head dislocation.     2. Preliminary report was provided by provided by Dr. Umaña on  01/02/2017 at 0850 hours.     This report was finalized on 1/3/2017 8:30 AM by Dr. Sylvia Zapien MD.       XR Chest 1 View [[id number]] Collected:  01/03/17 1132     Updated:  01/03/17 1141    Narrative:       EXAM: AP PORTABLE CHEST     DATE: 01/02/2017      HISTORY: Preoperative evaluation for right hip fracture. Hypertension on  medication.     COMPARISON: Right rib detail series 12/20/2016. PA chest 12/20/2016.     FINDINGS: Chronic-appearing interstitial changes in both lungs appear  stable. Multiple old bilateral rib fractures. Degenerative changes at  both shoulders. Suspected old distal left clavicle fracture. Multilevel  degenerative endplate changes in the thoracic and lumbar spine with  osteopenia. Cholecystectomy. Heart size within normal limits. Pulmonary  vascular distribution within normal limits.       Impression:       1. Chronic interstitial changes in both lungs. No acute chest  findings.  2. Old bilateral rib fractures and old left clavicle fracture.     This report was finalized on 1/3/2017 11:38 AM by Dr. Sylvia Zapien MD.       XR Hip 1 View Without Pelvis Right (Surgery Only) [[id number]] Collected:  01/03/17 1309     Updated:  01/03/17 1312    Narrative:       EXAM: Single AP right hip     DATE 01/03/2017 at 1303     HISTORY:: Postop right hip     COMPARISON: Right hip radiographs 01/02/2017     FINDINGS:: Surgical changes of total right hip replacement are present.  The prosthesis appears appropriately seated. No periprosthetic fracture  is seen.       Impression:       1. Satisfactory postoperative appearance status post right hip  replacement.     This report was finalized on 1/3/2017 1:10 PM by Dr. Sylvia Zapien MD.             Xray reviewed personally by physician.      ECG not reviewed personally by physician  ECG/EMG Results (most recent)     Procedure Component Value Units Date/Time    ECG 12 Lead [[id number]] Collected:  01/02/17 1108     Updated:  01/03/17 0940    Narrative:       RR Interval= 690 ms  CT Interval= 228 ms  QRSD Interval= 98 ms  QT Interval= 384 ms  QTc Interval= 462 ms  Heart Rate= 87 ms  P Axis= 5 deg  QRS Axis= -58 deg  T Wave Axis= 76 deg  I: 40 Axis= -53 deg  T: 40 Axis= -66 deg  ST Axis= 115 deg  SINUS RHYTHM  FIRST DEGREE AV BLOCK  LATE PRECORDIAL R/S TRANSITION  PROBABLE LEFT VENTRICULAR HYPERTROPHY  PROBABLE INFERIOR INFARCT, AGE INDETERMINATE  NO SIGNIFICANT CHANGE FROM PREVIOUS ECG  Electronically Signed by:  Wali Ojeda ([facility]) 03-Jan-2017 09:38:33  Date and Time of Study: 2017-01-02 11:08:30          Medication Review:   I have reviewed the patient's current medication list    Current Facility-Administered Medications:   •  acetaminophen (TYLENOL) tablet 1,000 mg, 1,000 mg, Oral, Q6H, Mirza Whittington MD, 1,000 mg at 01/03/17 1404  •  acetaminophen (TYLENOL) tablet 650 mg, 650 mg, Oral, Q4H PRN, Mirza Whittington MD  •  allopurinol (ZYLOPRIM) tablet 100 mg,  100 mg, Oral, Daily, Micheal Stephens MD, 100 mg at 01/02/17 1509  •  [START ON 1/4/2017] aspirin EC tablet 325 mg, 325 mg, Oral, Q12H, Mirza Whittington MD  •  bisacodyl (DULCOLAX) EC tablet 10 mg, 10 mg, Oral, Daily PRN, Mirza Whittington MD  •  bisacodyl (DULCOLAX) suppository 10 mg, 10 mg, Rectal, Daily, Mirza Whittington MD, 10 mg at 01/02/17 1451  •  bisacodyl (DULCOLAX) suppository 10 mg, 10 mg, Rectal, Daily PRN, Mirza Whittington MD  •  ceFAZolin (ANCEF) IVPB (duplex) 2 g, 2 g, Intravenous, Q8H, Mirza Whittington MD  •  dextrose (D50W) solution 25 g, 25 g, Intravenous, PRN, Micheal Stephens MD  •  dextrose (GLUTOSE) oral gel 15 g, 15 g, Oral, PRN, Micheal Stephens MD  •  docusate sodium (COLACE) capsule 100 mg, 100 mg, Oral, BID, Micheal Stephens MD, 100 mg at 01/02/17 1729  •  glucagon (GLUCAGEN) injection 1 mg, 1 mg, Subcutaneous, Once PRN, Micheal Stephens MD  •  HYDROmorphone (DILAUDID) injection 1 mg, 1 mg, Intravenous, Q2H PRN, Mirza Whittington MD, 1 mg at 01/03/17 0553  •  insulin aspart (novoLOG) injection 0-7 Units, 0-7 Units, Subcutaneous, 4x Daily AC & at Bedtime, Micheal Stephens MD, 0 Units at 01/02/17 1732  •  lactated ringers infusion, 50 mL/hr, Intravenous, Continuous, Mirza Whittington MD, Last Rate: 50 mL/hr at 01/03/17 1541, 50 mL/hr at 01/03/17 1541  •  latanoprost (XALATAN) 0.005 % ophthalmic solution 1 drop, 1 drop, Both Eyes, Nightly, Micheal Stephens MD, 1 drop at 01/02/17 2048  •  levothyroxine (SYNTHROID, LEVOTHROID) tablet 50 mcg, 50 mcg, Oral, QAM, Micheal Stephens MD, 50 mcg at 01/02/17 1509  •  magnesium hydroxide (MILK OF MAGNESIA) 400 MG/5ML suspension 30 mL, 30 mL, Oral, Daily PRN, Mirza Whittington MD  •  metFORMIN (GLUCOPHAGE) tablet 500 mg, 500 mg, Oral, Daily With Breakfast, Micheal Stephens MD, 500 mg at 01/02/17 1510  •  metoprolol tartrate (LOPRESSOR) tablet 25 mg, 25 mg, Oral, Daily, Micheal Stephens MD, 25 mg at 01/03/17 1541  •  ondansetron (ZOFRAN) injection 4 mg, 4 mg, Intravenous, Q6H PRN, Micheal MOODY  MD Kat  •  oxyCODONE (ROXICODONE) immediate release tablet 5 mg, 5 mg, Oral, Q4H PRN, Mirza Whittington MD  •  pantoprazole (PROTONIX) EC tablet 40 mg, 40 mg, Oral, Q AM, Micheal Stephens MD, 40 mg at 01/02/17 1511  •  pneumococcal polysaccharide 23-valent (PNEUMOVAX-23) vaccine 0.5 mL, 0.5 mL, Intramuscular, During Hospitalization, Mirza Whittington MD  •  pravastatin (PRAVACHOL) tablet 40 mg, 40 mg, Oral, Nightly, Micheal Stephens MD, 40 mg at 01/02/17 2048  •  pregabalin (LYRICA) capsule 75 mg, 75 mg, Oral, Q12H, Mirza Whittington MD  •  ropivacaine (NAROPIN) 0.5 % injection  - ADS Override Pull, , , ,   •  sennosides-docusate sodium (SENOKOT-S) 8.6-50 MG tablet 2 tablet, 2 tablet, Oral, BID, Mirza Whittington MD  •  sodium chloride 0.45 % infusion, 50 mL/hr, Intravenous, Continuous, Mirza Whittington MD, Last Rate: 50 mL/hr at 01/02/17 1404, 50 mL/hr at 01/02/17 1404  •  Tranexamic Acid 1,000 mg in sodium chloride 0.9 % 100 mL, 1,000 mg, Intravenous, Once **FOLLOWED BY** [COMPLETED] Tranexamic Acid 1,000 mg in sodium chloride 0.9 % 100 mL, 1,000 mg, Intravenous, Once, Mirza Whittington MD, 1,000 mg at 01/03/17 1155  •  vancomycin (VANCOCIN) IVPB 1000 mg in 250 mL NS, 1,000 mg, Intravenous, Once, Mirza Whtitington MD      Assessment/Plan       ASSESSMENT AND PLAN:               SUMMARY:    ?   PROPHYLAXIS:   -DVT Prophylaxis: As per Dr. Whittington  -Arce Catheter: Not indicated at this time    NUTRITION AND FLUIDS:  -Diet/ Nutrition: Regular, cardiacl, consistent carbohydrate diet   Advance diet as tolerated  -Fluid Status/Electrolytes: Lactated Ringer 50 ml/hr and 0.45NS 50 ml/hr      SOCIAL ISSUES:    -Behavioral/ Agitation Issues: NONE   -Social Issues: Lives at home with her family.       THERAPEUTIC:    -ANTIBIOTICS: as per Dr. Whittington   -PAIN MANAGEMENT: Inj Dilaudid and Roxicodone              PRIMARY DIAGNOSES:    1) S/P Removal of previously placed intramedullary femoral nail with right endoprosthetic hip replacement done by Dr. Gomez  today  ?   2) Closed Rt Femoral Neck Fracture      2) HTN: On Metoprolol     3) Hyperlipidemia: On Pravastatin     4) Type II DM: patient has been on Metformin     5) GERD: On Pantoprazole     6) Hypothyroidism: On replacement     7) DJD spine: Hx noted. Patient is on Meloxicam     8) Hx of Rheumatoid Arthritis: Hx noted     9) Mitral Regurgitation: Hx noted     10) DVT Prophylaxix: On SCDs            SECONDARY DIAGNOSES:         Hx of Colitis, Glaucoma, Gout, Hyponatremia, Macular degeneration           SURGICAL DIAGNOSES:           S/P Appendectomy, S/P Bilateral cataract Extraction, S/P Rt Femur surgery for fracture, has rods put in, S/P Hemorrhoidectomy, S/P Rt Hip surgery 1999 due to hip fracture, S/P Rt Total Knee Arthroplasty             PLAN:    -Labs and diagnostic tests reviewed: Gluc 119, Na 130, K 4.5, AG 15.0, Creat 0.60, WBC 7.79, Hb 11.2, Plt 329    -Diagnostic tests reviewed:     XRay Rt Hip  IMPRESSION:  1. Satisfactory postoperative appearance status post right hip  replacement.      This report was finalized on 1/3/2017 1:10 PM by Dr. Sylvia Zapien MD.        -Was seen by Dr. Whittington, Orthopedic consultant this morning  -Any new recommendations: As per Dr. Whittington  -New Labs ordered: As per Dr. Whittington  -New diagnostic tests ordered: N/A  -Any changes in medications: N/A  -Patient is clinically and hemodynamically stable  -To continue current management and supportive care  -Will follow patient closely  -Nothing new to add for right now  -Discharge planning issues: As per Dr. Whittington      Plan for disposition:As per Dr. Pk Stephens MD  01/03/17  4:58 PM            Micheal Stephens M.D., [facility]  Internal Medicine/ Hospitalist          Time:       EMR Dragon/Transcription disclaimer:      Much of this encounter note is an electronic transcription/translation of spoken language to printed text. The electronic translation of spoken language may permit erroneous, or at times, nonsensical words or  phrases to be inadvertently transcribed; Although I have reviewed the note for such errors, some may still exist.

## 2017-01-03 NOTE — PLAN OF CARE
Problem: Patient Care Overview (Adult)  Goal: Adult Individualization and Mutuality    01/03/17 1737   Individualization   Patient Specific Preferences Patient goes by middle name - max Hughes

## 2017-01-03 NOTE — PLAN OF CARE
Problem: Patient Care Overview (Adult)  Goal: Plan of Care Review  Outcome: Ongoing (interventions implemented as appropriate)    Problem: Perioperative Period (Adult)  Goal: Signs and Symptoms of Listed Potential Problems Will be Absent or Manageable (Perioperative Period)  Outcome: Ongoing (interventions implemented as appropriate)

## 2017-01-03 NOTE — PLAN OF CARE
Problem: Patient Care Overview (Adult)  Goal: Adult Individualization and Mutuality  Outcome: Ongoing (interventions implemented as appropriate)    01/03/17 0723   Individualization   Patient Specific Preferences none

## 2017-01-03 NOTE — ANESTHESIA PROCEDURE NOTES
Peripheral Block    Start time: 1/3/2017 9:51 AM  Stop time: 1/3/2017 9:54 AM  Reason for block: procedure for pain and at surgeon's request  Performed by  Anesthesiologist: GIACOMO LEARY  Preanesthetic Checklist  Completed: patient identified, site marked, surgical consent, pre-op evaluation, timeout performed, IV checked, risks and benefits discussed and monitors and equipment checked  Peripheral Block Prep:  Sterile barriers:gloves, mask and cap  Prep: ChloraPrep  Patient monitoring: blood pressure monitoring, continuous pulse oximetry and EKG  Peripheral Procedure  Sedation:yes  Guidance:ultrasound guided  Images:still images not obtained  Laterality:rightBlock Type:femoral  Injection Technique:single-shotNeedle Type:echogenic  Needle Gauge:21 G  ULTRASOUND INTERPRETATION.  Using ultrasound guidance a 21 G gauge needle was placed in close proximity to the femoral nerve, at which point, under ultrasound guidance anesthetic was injected in the area of the nerve and spread of the anesthesia was seen on ultrasound in close proximity thereto.  There were no abnormalities seen on ultrasound; a digital image was taken; and the patient tolerated the procedure with no complications.   Medications  Preservative Free Saline:10ml  Local Injected:ropivacaine 0.5% without epinephrine Local Amount Injected:10mL  Post Assessment  Patient Tolerance:comfortable throughout block  Complications:no

## 2017-01-03 NOTE — ANESTHESIA PROCEDURE NOTES
Peripheral Block    Patient location during procedure: pre-op  Start time: 1/3/2017 9:54 AM  Stop time: 1/3/2017 9:55 AM  Reason for block: procedure for pain  Performed by  Anesthesiologist: GIACOMO LEARY  Preanesthetic Checklist  Completed: patient identified, site marked, surgical consent, pre-op evaluation, timeout performed, IV checked, risks and benefits discussed and monitors and equipment checked  Peripheral Block Prep:  Sterile barriers:cap, gloves and mask  Prep: ChloraPrep  Patient monitoring: blood pressure monitoring, continuous pulse oximetry and EKG  Peripheral Procedure  Sedation:yes  Guidance:landmark technique  Laterality:rightBlock Type:lateral femoral  Injection Technique:single-shotNeedle Type:echogenic  Needle Gauge:21 G    Medications  Preservative Free Saline:5ml  Local Injected:ropivacaine 0.5% without epinephrine Local Amount Injected:5mL  Post Assessment  Patient Tolerance:comfortable throughout block  Complications:no

## 2017-01-03 NOTE — SIGNIFICANT NOTE
01/03/17 1507   Rehab Treatment   Discipline physical therapist   Rehab Evaluation   Evaluation Not Performed patient/family declined evaluation  (pt very lethargic, family states she has not been awake since returning from surgery.  pt opens eyes briefly but does not engage in conversation or remain alert.  noted CO2 alarm malfunction on patient machine--notified RN and RT supervisor-both in room )

## 2017-01-03 NOTE — PLAN OF CARE
Problem: Patient Care Overview (Adult)  Goal: Plan of Care Review  Outcome: Ongoing (interventions implemented as appropriate)    01/03/17 0013   Coping/Psychosocial Response Interventions   Plan Of Care Reviewed With patient   Patient Care Overview   Progress no change   Outcome Evaluation   Outcome Summary/Follow up Plan pt requiring IV pain meds q2-3 hours. Keeping ice to hip, and giving tylenol prn.        Goal: Adult Individualization and Mutuality  Outcome: Ongoing (interventions implemented as appropriate)  Goal: Discharge Needs Assessment  Outcome: Ongoing (interventions implemented as appropriate)    01/02/17 1334 01/02/17 1539   Discharge Needs Assessment   Concerns To Be Addressed --  no discharge needs identified   Readmission Within The Last 30 Days no previous admission in last 30 days --          Problem: Pain, Acute (Adult)  Goal: Acceptable Pain Control/Comfort Level  Outcome: Ongoing (interventions implemented as appropriate)    01/03/17 0013   Pain, Acute (Adult)   Acceptable Pain Control/Comfort Level making progress toward outcome

## 2017-01-04 LAB
ANISOCYTOSIS BLD QL: NORMAL
APTT PPP: 36.3 SECONDS (ref 24.3–38.1)
DEPRECATED RDW RBC AUTO: 41.3 FL (ref 37–54)
EOSINOPHIL # BLD MANUAL: 0.2 10*3/MM3 (ref 0.1–0.3)
EOSINOPHIL NFR BLD MANUAL: 4 % (ref 0–4)
ERYTHROCYTE [DISTWIDTH] IN BLOOD BY AUTOMATED COUNT: 11.9 % (ref 11.5–14.5)
GLUCOSE BLDC GLUCOMTR-MCNC: 103 MG/DL (ref 70–130)
GLUCOSE BLDC GLUCOMTR-MCNC: 105 MG/DL (ref 70–130)
GLUCOSE BLDC GLUCOMTR-MCNC: 117 MG/DL (ref 70–130)
GLUCOSE BLDC GLUCOMTR-MCNC: 136 MG/DL (ref 70–130)
GLUCOSE BLDC GLUCOMTR-MCNC: 160 MG/DL (ref 70–130)
HCT VFR BLD AUTO: 22.6 % (ref 37–47)
HGB BLD-MCNC: 7.4 G/DL (ref 12–16)
HYPOCHROMIA BLD QL: NORMAL
LYMPHOCYTES # BLD MANUAL: 0.98 10*3/MM3 (ref 0.6–4.8)
LYMPHOCYTES NFR BLD MANUAL: 20 % (ref 20–45)
LYMPHOCYTES NFR BLD MANUAL: 6 % (ref 3–8)
MCH RBC QN AUTO: 31.4 PG (ref 27–31)
MCHC RBC AUTO-ENTMCNC: 32.7 G/DL (ref 31–37)
MCV RBC AUTO: 95.8 FL (ref 81–99)
MONOCYTES # BLD AUTO: 0.29 10*3/MM3 (ref 0–1)
NEUTROPHILS # BLD AUTO: 3.42 10*3/MM3 (ref 1.5–8.3)
NEUTROPHILS NFR BLD MANUAL: 66 % (ref 45–70)
NEUTS BAND NFR BLD MANUAL: 4 % (ref 0–5)
PLAT MORPH BLD: NORMAL
PLATELET # BLD AUTO: 229 10*3/MM3 (ref 140–500)
PMV BLD AUTO: 9.7 FL (ref 7.4–10.4)
RBC # BLD AUTO: 2.36 10*6/MM3 (ref 4.2–5.4)
SCAN SLIDE: NORMAL
WBC MORPH BLD: NORMAL
WBC NRBC COR # BLD: 4.89 10*3/MM3 (ref 4.8–10.8)

## 2017-01-04 PROCEDURE — 97110 THERAPEUTIC EXERCISES: CPT

## 2017-01-04 PROCEDURE — 25010000003 CEFAZOLIN PER 500 MG: Performed by: ORTHOPAEDIC SURGERY

## 2017-01-04 PROCEDURE — 97162 PT EVAL MOD COMPLEX 30 MIN: CPT

## 2017-01-04 PROCEDURE — 85730 THROMBOPLASTIN TIME PARTIAL: CPT | Performed by: ORTHOPAEDIC SURGERY

## 2017-01-04 PROCEDURE — 82962 GLUCOSE BLOOD TEST: CPT

## 2017-01-04 PROCEDURE — 94799 UNLISTED PULMONARY SVC/PX: CPT

## 2017-01-04 PROCEDURE — 97116 GAIT TRAINING THERAPY: CPT

## 2017-01-04 PROCEDURE — 85025 COMPLETE CBC W/AUTO DIFF WBC: CPT | Performed by: ORTHOPAEDIC SURGERY

## 2017-01-04 PROCEDURE — 99232 SBSQ HOSP IP/OBS MODERATE 35: CPT | Performed by: INTERNAL MEDICINE

## 2017-01-04 PROCEDURE — 25010000002 HYDROMORPHONE PER 4 MG: Performed by: ORTHOPAEDIC SURGERY

## 2017-01-04 PROCEDURE — 97166 OT EVAL MOD COMPLEX 45 MIN: CPT

## 2017-01-04 PROCEDURE — 85007 BL SMEAR W/DIFF WBC COUNT: CPT | Performed by: ORTHOPAEDIC SURGERY

## 2017-01-04 RX ORDER — FERROUS SULFATE TAB EC 324 MG (65 MG FE EQUIVALENT) 324 (65 FE) MG
325 TABLET DELAYED RESPONSE ORAL 2 TIMES DAILY WITH MEALS
Status: DISCONTINUED | OUTPATIENT
Start: 2017-01-04 | End: 2017-01-06 | Stop reason: HOSPADM

## 2017-01-04 RX ADMIN — DOCUSATE SODIUM AND SENNOSIDES 2 TABLET: 8.6; 5 TABLET, FILM COATED ORAL at 09:15

## 2017-01-04 RX ADMIN — PREGABALIN 75 MG: 75 CAPSULE ORAL at 09:25

## 2017-01-04 RX ADMIN — ACETAMINOPHEN 1000 MG: 500 TABLET, COATED ORAL at 09:44

## 2017-01-04 RX ADMIN — DOCUSATE SODIUM AND SENNOSIDES 2 TABLET: 8.6; 5 TABLET, FILM COATED ORAL at 18:01

## 2017-01-04 RX ADMIN — SODIUM CHLORIDE, POTASSIUM CHLORIDE, SODIUM LACTATE AND CALCIUM CHLORIDE 50 ML/HR: 600; 310; 30; 20 INJECTION, SOLUTION INTRAVENOUS at 03:43

## 2017-01-04 RX ADMIN — LATANOPROST 1 DROP: 50 SOLUTION OPHTHALMIC at 20:17

## 2017-01-04 RX ADMIN — ACETAMINOPHEN 1000 MG: 500 TABLET, COATED ORAL at 02:49

## 2017-01-04 RX ADMIN — OXYCODONE HYDROCHLORIDE 5 MG: 5 TABLET ORAL at 13:43

## 2017-01-04 RX ADMIN — PANTOPRAZOLE SODIUM 40 MG: 40 TABLET, DELAYED RELEASE ORAL at 06:12

## 2017-01-04 RX ADMIN — OXYCODONE HYDROCHLORIDE 5 MG: 5 TABLET ORAL at 02:49

## 2017-01-04 RX ADMIN — HYDROMORPHONE HYDROCHLORIDE 1 MG: 1 INJECTION, SOLUTION INTRAMUSCULAR; INTRAVENOUS; SUBCUTANEOUS at 06:12

## 2017-01-04 RX ADMIN — DOCUSATE SODIUM 100 MG: 100 CAPSULE, LIQUID FILLED ORAL at 18:00

## 2017-01-04 RX ADMIN — ASPIRIN 325 MG: 325 TABLET, DELAYED RELEASE ORAL at 20:19

## 2017-01-04 RX ADMIN — OXYCODONE HYDROCHLORIDE 5 MG: 5 TABLET ORAL at 20:20

## 2017-01-04 RX ADMIN — CEFAZOLIN SODIUM 2 G: 2 SOLUTION INTRAVENOUS at 02:50

## 2017-01-04 RX ADMIN — ACETAMINOPHEN 1000 MG: 500 TABLET, COATED ORAL at 20:20

## 2017-01-04 RX ADMIN — ALLOPURINOL 100 MG: 100 TABLET ORAL at 09:15

## 2017-01-04 RX ADMIN — PREGABALIN 75 MG: 75 CAPSULE ORAL at 20:19

## 2017-01-04 RX ADMIN — ASPIRIN 325 MG: 325 TABLET, DELAYED RELEASE ORAL at 09:16

## 2017-01-04 RX ADMIN — LEVOTHYROXINE SODIUM 50 MCG: 50 TABLET ORAL at 06:12

## 2017-01-04 RX ADMIN — METOPROLOL TARTRATE 25 MG: 25 TABLET ORAL at 09:14

## 2017-01-04 RX ADMIN — OXYCODONE HYDROCHLORIDE 5 MG: 5 TABLET ORAL at 09:25

## 2017-01-04 RX ADMIN — METFORMIN HYDROCHLORIDE 500 MG: 500 TABLET ORAL at 09:15

## 2017-01-04 RX ADMIN — ACETAMINOPHEN 1000 MG: 500 TABLET, COATED ORAL at 14:18

## 2017-01-04 RX ADMIN — ACETAMINOPHEN 1000 MG: 500 TABLET, COATED ORAL at 09:15

## 2017-01-04 RX ADMIN — FERROUS SULFATE TAB EC 324 MG (65 MG FE EQUIVALENT) 325 MG: 324 (65 FE) TABLET DELAYED RESPONSE at 20:19

## 2017-01-04 RX ADMIN — PRAVASTATIN SODIUM 40 MG: 20 TABLET ORAL at 20:19

## 2017-01-04 RX ADMIN — DOCUSATE SODIUM 100 MG: 100 CAPSULE, LIQUID FILLED ORAL at 09:15

## 2017-01-04 NOTE — PROGRESS NOTES
"    HOSPITALIST SERVICES  @ [facility]            HOSPITALIST TEAM   PROGRESS NOTE      Patient Care Team:  Arnaud Monson MD as PCP - General  Arnaud Monson MD as PCP - Family Medicine        Chief Complaint:        Rt Hip pain since a day prior to admission due to twisting injury at home      Subjective      Ms. Ashli León is an 81-year-old  female who has hx of Rt Hip fracture several years ago which was treated with a rajesh. Her surgery was done at [facility].  She was walking around the bed yesterday at 5:30 PM and felt a pop in her right hip. X-Rays in ER showed Closed Rt Femoral Neck Fracture. S/P Removal of previously placed intramedullary femoral nail with right endoprosthetic hip replacement done by Dr. Gomez.      Interval History and ROS:     Patient States Patient is doing well  Patient Complaints: Pain is under control  Patient Denies:  Any sx of chest pain, shortness of breath, abdominal pain or n/v  History taken from: Patient      Objective    Vital Signs  Temp:  [97 °F (36.1 °C)-97.8 °F (36.6 °C)] 97.8 °F (36.6 °C)  Heart Rate:  [72-84] 84  Resp:  [14-16] 16  BP: ()/(45-60) 108/60    Flowsheet Rows         First Filed Value    Admission Height  62\" (157.5 cm) Documented at 01/02/2017 0745    Admission Weight  130 lb (59 kg) Documented at 01/02/2017 0745              Physical Exam:      PHYSICAL EXAMINATION:    VITAL SIGNS: As per Nurse's notes    GENERAL APPEARANCE: The patient is a well developed, well nourished,  male, in no acute distress.      HEENT: Normocephalic, atraumatic. PERRL. The sclerae anicteric and conjunctivae pink and moist.      NECK: Supple and symmetric. No masses. No thyromegaly. No carotid bruits. No evidence of JVD.     SKIN: Warm, dry and intact. No rash or lesions or wounds or patechiae.     LUNGS: Clear to auscultation bilaterally. No wheezes/rales/crackles/rubs.     CARDIOVASCULAR: RRR. S1, " S2 normal without murmur/gallop/rub. No S3, S4.      ABDOMEN: Soft, non-tender, non-distended. No masses. No rebound/guarding. No hepatosplenomegaly. Normal bowel sounds.      EXTREMITIES:  S/P Removal of previously placed intramedullary femoral nail with right endoprosthetic hip replacement done by Dr. Gomez. No evidence of cyanosis or clubbing, but edema of Rt Hip area .      MUSCULOSKELETAL: S/P Removal of previously placed intramedullary femoral nail with right endoprosthetic hip replacement done by Dr. Gomez     PSYCHIATRY: Responds appropriately to questions. No evidence of acute anxiety, depression, panic attacks or hallucinations.     MENTAL STATUS EXAMINATION: Affect and insight appropriate. Speech and behavior are normal. Patient is alert and oriented x3. Thought Process WNL.      NEUROLOGIC: Examination is grossly intact globally with no focal deficits.      Results Review:     I reviewed the patient's new clinical results.    Lab Results (last 24 hours)     Procedure Component Value Units Date/Time    POC Glucose Fingerstick [[id number]]  (Abnormal) Collected:  01/03/17 2024    Specimen:  Blood Updated:  01/03/17 2031     Glucose 171 (H) mg/dL     Narrative:       Meter: [id number] : [id number] Juli Cedillo (Pate)    CBC & Differential [[id number]] Collected:  01/04/17 0415    Specimen:  Blood Updated:  01/04/17 0629    Narrative:       The following orders were created for panel order CBC & Differential.  Procedure                               Abnormality         Status                     ---------                               -----------         ------                     Manual Differential[[id number]]                               Final result               Scan Slide[[id number]]                                        Final result               CBC Auto Differential[[id number]]         Abnormal            Final result                 Please view results for these tests on the individual orders.     CBC Auto Differential [[id number]]  (Abnormal) Collected:  01/04/17 0415    Specimen:  Blood Updated:  01/04/17 0629     WBC 4.89 10*3/mm3      RBC 2.36 (L) 10*6/mm3      Hemoglobin 7.4 (C) g/dL      Hematocrit 22.6 (C) %      MCV 95.8 fL      MCH 31.4 (H) pg      MCHC 32.7 g/dL      RDW 11.9 %      RDW-SD 41.3 fl      MPV 9.7 fL      Platelets 229 10*3/mm3     Scan Slide [[id number]] Collected:  01/04/17 0415    Specimen:  Blood Updated:  01/04/17 0629     Scan Slide --       See Manual Differential Results       Manual Differential [[id number]] Collected:  01/04/17 0415    Specimen:  Blood Updated:  01/04/17 0629     Neutrophil % 66.0 %      Lymphocyte % 20.0 %      Monocyte % 6.0 %      Eosinophil % 4.0 %      Bands %  4.0 %      Neutrophils Absolute 3.42 10*3/mm3      Lymphocytes Absolute 0.98 10*3/mm3      Monocytes Absolute 0.29 10*3/mm3      Eosinophils Absolute 0.20 10*3/mm3      Anisocytosis Slight/1+      Hypochromia Slight/1+      WBC Morphology Normal      Platelet Morphology Normal     aPTT [[id number]]  (Normal) Collected:  01/04/17 0415    Specimen:  Blood Updated:  01/04/17 0704     PTT 36.3 seconds     Narrative:       PTT = The equivalent PTT values for the therapeutic range of heparin levels at 0.1 to 0.7 U/ml are 53 to 110 seconds.    POC Glucose Fingerstick [[id number]]  (Normal) Collected:  01/04/17 0728    Specimen:  Blood Updated:  01/04/17 0734     Glucose 103 mg/dL     Narrative:       Meter: [id number] : [id number] Green James    POC Glucose Fingerstick [[id number]]  (Abnormal) Collected:  01/04/17 1143    Specimen:  Blood Updated:  01/04/17 1200     Glucose 136 (H) mg/dL     Narrative:       Meter: [id number] : [id number] Green James    POC Glucose Fingerstick [[id number]]  (Normal) Collected:  01/04/17 1552    Specimen:  Blood Updated:  01/04/17 1558     Glucose 117 mg/dL     Narrative:       Meter: [id number] : [id number] Westley James    POC Glucose Fingerstick [[id number]]  (Normal)  Collected:  01/04/17 1724    Specimen:  Blood Updated:  01/04/17 1732     Glucose 105 mg/dL     Narrative:       Meter: [id number] : [id number] Mayela Carey RN          Imaging Results (last 24 hours)     ** No results found for the last 24 hours. **          Xray not reviewed personally by physician.      ECG not reviewed personally by physician  ECG/EMG Results (most recent)     Procedure Component Value Units Date/Time    ECG 12 Lead [[id number]] Collected:  01/02/17 1108     Updated:  01/03/17 0940    Narrative:       RR Interval= 690 ms  NC Interval= 228 ms  QRSD Interval= 98 ms  QT Interval= 384 ms  QTc Interval= 462 ms  Heart Rate= 87 ms  P Axis= 5 deg  QRS Axis= -58 deg  T Wave Axis= 76 deg  I: 40 Axis= -53 deg  T: 40 Axis= -66 deg  ST Axis= 115 deg  SINUS RHYTHM  FIRST DEGREE AV BLOCK  LATE PRECORDIAL R/S TRANSITION  PROBABLE LEFT VENTRICULAR HYPERTROPHY  PROBABLE INFERIOR INFARCT, AGE INDETERMINATE  NO SIGNIFICANT CHANGE FROM PREVIOUS ECG  Electronically Signed by:  Wali Ojeda ([facility]) 03-Jan-2017 09:38:33  Date and Time of Study: 2017-01-02 11:08:30          Medication Review:   I have reviewed the patient's current medication list    Current Facility-Administered Medications:   •  acetaminophen (TYLENOL) tablet 1,000 mg, 1,000 mg, Oral, Q6H, Mirza Whittington MD, 1,000 mg at 01/04/17 1418  •  acetaminophen (TYLENOL) tablet 650 mg, 650 mg, Oral, Q4H PRN, Mirza Whittington MD, 1,000 mg at 01/04/17 0915  •  allopurinol (ZYLOPRIM) tablet 100 mg, 100 mg, Oral, Daily, Micheal Stephens MD, 100 mg at 01/04/17 0915  •  aspirin EC tablet 325 mg, 325 mg, Oral, Q12H, Mirza Whittington MD, 325 mg at 01/04/17 0916  •  bisacodyl (DULCOLAX) EC tablet 10 mg, 10 mg, Oral, Daily PRN, Mirza Whittington MD  •  bisacodyl (DULCOLAX) suppository 10 mg, 10 mg, Rectal, Daily, Mirza Whittington MD, 10 mg at 01/02/17 1451  •  bisacodyl (DULCOLAX) suppository 10 mg, 10 mg, Rectal, Daily PRN, Mirza Whittington MD  •  dextrose (D50W) solution 25 g, 25 g,  Intravenous, PRN, Micheal Stephens MD  •  dextrose (GLUTOSE) oral gel 15 g, 15 g, Oral, PRN, Micheal Stephens MD  •  docusate sodium (COLACE) capsule 100 mg, 100 mg, Oral, BID, Micheal Stephens MD, 100 mg at 01/04/17 1800  •  glucagon (GLUCAGEN) injection 1 mg, 1 mg, Subcutaneous, Once PRN, Micheal Stephens MD  •  HYDROmorphone (DILAUDID) injection 1 mg, 1 mg, Intravenous, Q2H PRN, Mirza Whittington MD, 1 mg at 01/04/17 0612  •  insulin aspart (novoLOG) injection 0-7 Units, 0-7 Units, Subcutaneous, 4x Daily AC & at Bedtime, Micheal Stephens MD, 0 Units at 01/02/17 1732  •  lactated ringers infusion, 50 mL/hr, Intravenous, Continuous, Mirza Whittington MD, Last Rate: 50 mL/hr at 01/04/17 0343, 50 mL/hr at 01/04/17 0343  •  latanoprost (XALATAN) 0.005 % ophthalmic solution 1 drop, 1 drop, Both Eyes, Nightly, Micheal Stephens MD, 1 drop at 01/03/17 2004  •  levothyroxine (SYNTHROID, LEVOTHROID) tablet 50 mcg, 50 mcg, Oral, QAM, Micheal Stephens MD, 50 mcg at 01/04/17 0612  •  magnesium hydroxide (MILK OF MAGNESIA) 400 MG/5ML suspension 30 mL, 30 mL, Oral, Daily PRN, Mirza Whittington MD  •  metFORMIN (GLUCOPHAGE) tablet 500 mg, 500 mg, Oral, Daily With Breakfast, Micheal Stephens MD, 500 mg at 01/04/17 0915  •  metoprolol tartrate (LOPRESSOR) tablet 25 mg, 25 mg, Oral, Daily, Micheal Stephens MD, 25 mg at 01/04/17 0914  •  ondansetron (ZOFRAN) injection 4 mg, 4 mg, Intravenous, Q6H PRN, Micheal Stephens MD  •  oxyCODONE (ROXICODONE) immediate release tablet 5 mg, 5 mg, Oral, Q4H PRN, Mirza Whittington MD, 5 mg at 01/04/17 1343  •  pantoprazole (PROTONIX) EC tablet 40 mg, 40 mg, Oral, Q AM, Micheal Stephens MD, 40 mg at 01/04/17 0612  •  pneumococcal polysaccharide 23-valent (PNEUMOVAX-23) vaccine 0.5 mL, 0.5 mL, Intramuscular, During Hospitalization, Mirza Whittington MD  •  pravastatin (PRAVACHOL) tablet 40 mg, 40 mg, Oral, Nightly, Micheal Stephens MD, 40 mg at 01/03/17 2002  •  pregabalin (LYRICA) capsule 75 mg, 75 mg, Oral, Q12H, Mirza Whittington,  MD, 75 mg at 01/04/17 0987  •  sennosides-docusate sodium (SENOKOT-S) 8.6-50 MG tablet 2 tablet, 2 tablet, Oral, BID, Mirza Whittington MD, 2 tablet at 01/04/17 1801  •  sodium chloride 0.45 % infusion, 50 mL/hr, Intravenous, Continuous, Mirza Whittington MD, Last Rate: 50 mL/hr at 01/02/17 1404, 50 mL/hr at 01/02/17 1404  •  Tranexamic Acid 1,000 mg in sodium chloride 0.9 % 100 mL, 1,000 mg, Intravenous, Once **FOLLOWED BY** [COMPLETED] Tranexamic Acid 1,000 mg in sodium chloride 0.9 % 100 mL, 1,000 mg, Intravenous, Once, Mirza Whittington MD, 1,000 mg at 01/03/17 1155      Assessment/Plan       ASSESSMENT AND PLAN:       SUMMARY:                 SUMMARY:         PROPHYLAXIS:   -DVT Prophylaxis: As per Dr. Whittington  -Arce Catheter: Not indicated at this time     NUTRITION AND FLUIDS:  -Diet/ Nutrition: Regular, cardiacl, consistent carbohydrate diet    Advance diet as tolerated  -Fluid Status/Electrolytes: Lactated Ringer 50 ml/hr and 0.45NS 50 ml/hr      SOCIAL ISSUES:    -Behavioral/ Agitation Issues: NONE    -Social Issues: Lives at home with her family.        THERAPEUTIC:    -ANTIBIOTICS: as per Dr. Whittington    -PAIN MANAGEMENT: Inj Dilaudid and Roxicodone                    PRIMARY DIAGNOSES:     1) S/P Removal of previously placed intramedullary femoral nail with right endoprosthetic hip replacement done by Dr. Gomez     2) Anemia with last Hb 7.4 but patient is completely asymptomatic. Will start patient on oral iron      3) Closed Rt Femoral Neck Fracture       4) HTN: On Metoprolol      5) Hyperlipidemia: On Pravastatin      6) Type II DM: patient has been on Metformin      7) GERD: On Pantoprazole      8) Hypothyroidism: On replacement      9) DJD spine: Hx noted. Patient is on Meloxicam      10) Hx of Rheumatoid Arthritis: Hx noted      11) Mitral Regurgitation: Hx noted      12) DVT Prophylaxix: On SCDs               SECONDARY DIAGNOSES:          Hx of Colitis, Glaucoma, Gout, Hyponatremia, Macular  degeneration              SURGICAL DIAGNOSES:              S/P Appendectomy, S/P Bilateral cataract Extraction, S/P Rt Femur surgery for fracture, has rods put in, S/P Hemorrhoidectomy, S/P Rt Hip surgery 1999 due to hip fracture, S/P Rt Total Knee Arthroplasty                       PLAN:    -Labs and diagnostic tests reviewed: Blood sugar 105, Last Hb 7.4    -Diagnostic tests reviewed:    X-Ray Rt Hip   IMPRESSION:  1. Satisfactory postoperative appearance status post right hip  replacement.      This report was finalized on 1/3/2017 1:10 PM by Dr. Sylvia Zapien MD.      -Any new recommendations: As per Dr. Whittington    -New Labs ordered: N/A    -New diagnostic tests ordered: N/A    -Any changes in medications: Will start patient on oral iron    -Discharge planning issues: Patient should be able to go back home once ready for discharge from [facility]    -Placement issues: As per Dr. Whittington. Patient has been accepted at [facility]    -Patient is clinically and hemodynamically stable    -To continue current management and supportive care    -Will follow patient closely    -Nothing new to add for right now      Plan for disposition:Patient will be discaharged to [facility] when ready for discharge    Micheal Stephens MD  01/04/17  6:37 PM            Micheal Stephens M.D., FACP  Internal Medicine/ Hospitalist          Time:       EMR Dragon/Transcription disclaimer:      Much of this encounter note is an electronic transcription/translation of spoken language to printed text. The electronic translation of spoken language may permit erroneous, or at times, nonsensical words or phrases to be inadvertently transcribed; Although I have reviewed the note for such errors, some may still exist.

## 2017-01-04 NOTE — PLAN OF CARE
Problem: Patient Care Overview (Adult)  Goal: Plan of Care Review    01/04/17 1151   Outcome Evaluation   Outcome Summary/Follow up Plan OT evaluation completed. Pt very anxious with transfers and required mod assist of 2 for stand pivot to the toilet/chair. Pt max assist for LB ADLs. OT to follow.

## 2017-01-04 NOTE — PLAN OF CARE
Problem: Patient Care Overview (Adult)  Goal: Plan of Care Review  Outcome: Ongoing (interventions implemented as appropriate)    01/04/17 1525   Coping/Psychosocial Response Interventions   Plan Of Care Reviewed With patient   Patient Care Overview   Progress improving   Outcome Evaluation   Outcome Summary/Follow up Plan PT : Pt seen for therapeutic exercises, needing assist. Pt education in WB status and hip precautions. Remembered 2/3 precautions. Bed mobility with mod asst x 2 persons. Transfers sit to stand with min/mod asst. Gait training with std walker mod asst TTWB Rle, Less swiveling with LLE. Pt showing improvement. Still anxious and rapid breathing during treatment. Verbal cues needed for breathing, but pt reponds adequately. Plan: cont functional mobility and ther ex and review of WB status and hip prec

## 2017-01-04 NOTE — PLAN OF CARE
Problem: Patient Care Overview (Adult)  Goal: Plan of Care Review  Outcome: Ongoing (interventions implemented as appropriate)    01/04/17 1257   Coping/Psychosocial Response Interventions   Plan Of Care Reviewed With patient   Outcome Evaluation   Outcome Summary/Follow up Plan PT: Physical therapy evaluation completed. Pt needing mod asstx2 for bed mob and stand pivot transfers to bedside commode and chair. Initiating gait, but only small steps becoming swivels.. Able to keep TTWB on RLE., mod A x2 with stdwx. PT has strength/rom/functional mobility deficits and will need pt ed on hip prec and wt bearing status. Skilled PT intervention services recommended. Will see BID while in hospital.

## 2017-01-04 NOTE — PLAN OF CARE
Problem: Patient Care Overview (Adult)  Goal: Discharge Needs Assessment  Outcome: Ongoing (interventions implemented as appropriate)    01/02/17 1334 01/02/17 1539 01/04/17 1030   Discharge Needs Assessment   Concerns To Be Addressed --  no discharge needs identified --    Readmission Within The Last 30 Days no previous admission in last 30 days --  --    Discharge Planning Comments --  --  --    Current Health   Anticipated Changes Related to Illness inability to care for self --  --    Living Environment   Transportation Available car --  --    Self-Care   Equipment Currently Used at Home --  --  walker, standard;cane, quad  ([facility], ramp,  shoe horn, reacher)     01/04/17 1414   Discharge Needs Assessment   Concerns To Be Addressed --    Readmission Within The Last 30 Days --    Discharge Planning Comments Spoke with physical therapy. They are recommending short-term rehab vs. placement. Patient is to be 6wks partial weight bearing. Patient would like to try [facility] if they have room, if not then would like to try [facility]. CM contacted Leslye at [facility]. They will review for admission. Will continue to follow.    Current Health   Anticipated Changes Related to Illness --    Living Environment   Transportation Available --    Self-Care   Equipment Currently Used at Home --

## 2017-01-04 NOTE — PROGRESS NOTES
Orthopedic Progress Note   Chief Complaint:  Status post right endoprosthetic hip replacement with removal of previously placed IM rajesh    Subjective     Interval History: patient is postop day 1 is doing well.  She is afebrile hemodynamically stable.  Hemoglobin is 7.4.pain well-controlled with oral medication          Objective     Vital Signs  Temp:  [97 °F (36.1 °C)-98.1 °F (36.7 °C)] 97 °F (36.1 °C)  Heart Rate:  [] 76  Resp:  [11-16] 15  BP: ()/(45-80) 101/45  Body mass index is 23.78 kg/(m^2).    Intake/Output Summary (Last 24 hours) at 01/04/17 0633  Last data filed at 01/04/17 0343   Gross per 24 hour   Intake 1421.67 ml   Output    650 ml   Net 771.67 ml     I/O this shift:  In: 601.7 [I.V.:601.7]  Out: 400 [Stool:400]       Physical Exam:   General: patient awake, alert and cooperative   Cardiovascular: regular rhythm and rate   Pulm: clear to auscultation bilaterally   Abdomen: Benign.  Soft bowel sounds   Extremities:right leg is good distal pulses no motor deficit.  Wound is dry.   Neurologic: Normal mood and behavior     Results Review:     I reviewed the patient's new clinical results.      WBC No results found for: WBCS   HGB HEMOGLOBIN   Date Value Ref Range Status   01/04/2017 7.4 (C) 12.0 - 16.0 g/dL Final   01/03/2017 11.2 (L) 12.0 - 16.0 g/dL Final   01/02/2017 11.2 (L) 12.0 - 16.0 g/dL Final      HCT HEMATOCRIT   Date Value Ref Range Status   01/04/2017 22.6 (C) 37.0 - 47.0 % Final   01/03/2017 33.9 (L) 37.0 - 47.0 % Final   01/02/2017 32.6 (L) 37.0 - 47.0 % Final      Platlets No results found for: LABPLAT     PT/INR:  No results found for: PROTIME/No results found for: INR    Sodium SODIUM   Date Value Ref Range Status   01/03/2017 130 (L) 136 - 145 mmol/L Final   01/02/2017 130 (L) 136 - 145 mmol/L Final      Potassium POTASSIUM   Date Value Ref Range Status   01/03/2017 4.5 3.5 - 5.2 mmol/L Final   01/02/2017 3.8 3.5 - 5.2 mmol/L Final      Chloride CHLORIDE   Date Value Ref  Range Status   01/03/2017 91 (L) 98 - 107 mmol/L Final   01/02/2017 92 (L) 98 - 107 mmol/L Final      Bicarbonate No results found for: PLASMABICARB   BUN BUN   Date Value Ref Range Status   01/03/2017 15 8 - 23 mg/dL Final   01/02/2017 14 8 - 23 mg/dL Final      Creatinine CREATININE   Date Value Ref Range Status   01/03/2017 0.60 0.57 - 1.00 mg/dL Final   01/02/2017 0.69 0.57 - 1.00 mg/dL Final      Calcium CALCIUM   Date Value Ref Range Status   01/03/2017 9.8 8.8 - 10.5 mg/dL Final   01/02/2017 9.8 8.8 - 10.5 mg/dL Final      Magnesium  AST  ALT  Bilirubin, Total  AlkPhos  Albumin    Amylase  Lipase    Radiology: No results found for: MG  No components found for: AST.*  No components found for: ALT.*  No components found for: BILIRUBIN, TOTAL.*    No components found for: ALKPHOS.*  No components found for: ALBUMIN.*      No components found for: AMYLASE.*  No components found for: LIPASE.*            Imaging Results (most recent)     Procedure Component Value Units Date/Time    XR Hip With or Without Pelvis 2 - 3 View Right [[id number]] Collected:  01/03/17 0749     Updated:  01/03/17 0832    Narrative:       AP PELVIS AND AP VIEW OF THE RIGHT FEMUR AND LATERAL VIEW OF THE RIGHT  KNEE 01/02/2017 AT 0820 HOURS     HISTORY: Fell this morning. Right hip pain. History of hip surgery in  1999.     COMPARISON: AP pelvis and AP view of the right femur 12/20/2016.     FINDINGS: There is a displaced subcapital fracture of the right femoral  neck with mild superior angulation of the fracture apex. No femoral head  dislocation is seen. An intramedullary rajesh is present within the femoral  shaft with distal interlocking screws. Right total knee replacement  changes are present. The knee joint appears appropriately aligned.     There is mild degenerative narrowing of bilateral hip joint spaces.  Osteopenic changes are present. Multiple round calcifications is the  pelvis on the right thought to represent phleboliths.        Impression:          1. Displaced subcapital fracture of the right femoral neck without  femoral head dislocation.     2. Preliminary report was provided by provided by Dr. Umaña on  01/02/2017 at 0850 hours.     This report was finalized on 1/3/2017 8:30 AM by Dr. Sylvia Zapien MD.       XR Chest 1 View [[id number]] Collected:  01/03/17 1132     Updated:  01/03/17 1141    Narrative:       EXAM: AP PORTABLE CHEST     DATE: 01/02/2017      HISTORY: Preoperative evaluation for right hip fracture. Hypertension on  medication.     COMPARISON: Right rib detail series 12/20/2016. PA chest 12/20/2016.     FINDINGS: Chronic-appearing interstitial changes in both lungs appear  stable. Multiple old bilateral rib fractures. Degenerative changes at  both shoulders. Suspected old distal left clavicle fracture. Multilevel  degenerative endplate changes in the thoracic and lumbar spine with  osteopenia. Cholecystectomy. Heart size within normal limits. Pulmonary  vascular distribution within normal limits.       Impression:       1. Chronic interstitial changes in both lungs. No acute chest findings.  2. Old bilateral rib fractures and old left clavicle fracture.     This report was finalized on 1/3/2017 11:38 AM by Dr. Sylvia Zapien MD.       XR Hip 1 View Without Pelvis Right (Surgery Only) [[id number]] Collected:  01/03/17 1309     Updated:  01/03/17 1312    Narrative:       EXAM: Single AP right hip     DATE 01/03/2017 at 1303     HISTORY:: Postop right hip     COMPARISON: Right hip radiographs 01/02/2017     FINDINGS:: Surgical changes of total right hip replacement are present.  The prosthesis appears appropriately seated. No periprosthetic fracture  is seen.       Impression:       1. Satisfactory postoperative appearance status post right hip  replacement.     This report was finalized on 1/3/2017 1:10 PM by Dr. Sylvia Zapien MD.                  lactated ringers 50 mL/hr Last Rate: 50 mL/hr (01/04/17 0343)   sodium  chloride 50 mL/hr Last Rate: 50 mL/hr (01/02/17 1404)         Assessment/Plan     Patient Active Problem List   Diagnosis Code   • History of hip surgery Z98.890   • Primary osteoarthritis of both knees M17.0   • Arthritis of right knee M19.90   • Aftercare following right knee joint replacement surgery Z47.1, Z96.651   • Status post total right knee replacement Z96.651   • History of knee replacement, total Z96.659   • History of total knee arthroplasty Z96.659   • Chronic pain of left knee M25.562, G89.29   • Arthritis of left knee M19.90   • Closed right hip fracture S72.001A   • Femoral neck fracture S72.009A        She is hemodynamically stable.patient would like to defer a blood transfusion at this time.begin PT OT.  To rehabilitation in the next 48-72 hours.      Mirza Whittington MD  01/04/17  6:33 AM

## 2017-01-04 NOTE — PLAN OF CARE
Problem: Inpatient Occupational Therapy  Goal: Transfer Training Goal 1 STG- OT    01/04/17 1153   Transfer Training OT STG   Transfer Training OT STG, Date Established 01/04/17   Transfer Training OT STG, Time to Achieve 3 days   Transfer Training OT STG, Activity Type sit to stand/stand to sit   Transfer Training OT STG, [place] Level minimum assist (75% patient effort);2 person assist required   Transfer Training OT STG, Assist Device walker, standard  (for improved independence with ADL tasks)       Goal: Transfer Training Goal 1 LTG- OT    01/04/17 1153   Transfer Training OT LTG   Transfer Training OT LTG, Date Established 01/04/17   Transfer Training OT LTG, Time to Achieve 4 days   Transfer Training OT LTG, Activity Type sit to stand/stand to sit;toilet   Transfer Training OT LTG, [place] Level minimum assist (75% patient effort)   Transfer Training OT LTG, Assist Device walker, standard       Goal: Patient Education Goal STG- OT    01/04/17 1153   Patient Education OT STG   Patient Education OT STG, Date Established 01/04/17   Patient Education OT STG, Time to Achieve 3 days   Patient Education OT STG, Education Type adaptive equipment mgmt   Patient Education OT STG, Education Understanding demonstrates adequately       Goal: LB Dressing Goal STG- OT    01/04/17 1153   LB Dressing OT STG   LB Dressing Goal OT STG, Date Established 01/04/17   LB Dressing Goal OT STG, Time to Achieve 3 days   LB Dressing Goal OT STG, [place] Level moderate assist (50% patient effort)   LB Dressing Goal OT STG, Adaptive Equipment reacher;sock-aid;shoe horn, long handled

## 2017-01-04 NOTE — PROGRESS NOTES
Acute Care - Occupational Therapy Initial Evaluation   [facility]     Patient Name: Ashli León  : 1935  MRN: [id number]  Today's Date: 2017  Onset of Illness/Injury or Date of Surgery Date: 17  Date of Referral to OT: 17  Referring Physician: Dr. Whittington    Admit Date: 2017       ICD-10-CM ICD-9-CM   1. Closed right hip fracture, initial encounter S72.001A 820.8   2. Closed fracture of neck of right femur, initial encounter S72.001A 820.8     Patient Active Problem List   Diagnosis   • History of hip surgery   • Primary osteoarthritis of both knees   • Arthritis of right knee   • Aftercare following right knee joint replacement surgery   • Status post total right knee replacement   • History of knee replacement, total   • History of total knee arthroplasty   • Chronic pain of left knee   • Arthritis of left knee   • Closed right hip fracture   • Femoral neck fracture     Past Medical History   Diagnosis Date   • Acid reflux    • AP (abdominal pain)    • Back pain    • Chest pain    • Colitis    • Diabetes mellitus type 2, noninsulin dependent    • Diarrhea    • Disease of thyroid gland    • Diverticular disease    • Glaucoma    • Gout      - right big toe   • High blood pressure    • High cholesterol    • Hyponatremia    • Knee pain    • Macular degeneration    • Mitral regurgitation 2016     Mild to moderate per 2-D echocardiogram   • Primary osteoarthritis of both knees 2016   • Renal lesion      on mri   • Rheumatoid arthritis      Past Surgical History   Procedure Laterality Date   • Appendectomy     • Femur surgery Right      had fracture and has rajesh    • Hip surgery Right      hx fracture   • Cataract extraction Bilateral    • Hemorrhoidectomy     • Total knee arthroplasty Right 2016     Procedure: RT TOTAL KNEE ARTHROPLASTY WITH FINN NAVIGATION;  Surgeon: Adrian Swenson MD;  Location: [facility] OR;  Service:           OT ASSESSMENT FLOWSHEET (last 72  hours)      OT Evaluation       01/04/17 1030 01/04/17 0904 01/03/17 1507 01/03/17 0717 01/02/17 1334    Rehab Evaluation    Document Type evaluation  -EN        Subjective Information agree to therapy;complains of;pain  -EN        Evaluation Not Performed   patient/family declined evaluation   pt very lethargic, family states she has not been awake since returning from surgery.  pt opens eyes briefly but does not engage in conversation or remain alert.  noted CO2 alarm malfunction on patient machine--notified RN and RT supervisor-both in room   -JW      Patient Effort, Rehab Treatment good  -EN        Symptoms Noted Comment pt anxious when transferring and given cues for adpative breathing  -EN        General Information    Patient Profile Review yes  -EN        Onset of Illness/Injury or Date of Surgery Date 01/03/17  -EN        Referring Physician Dr. Whittington  -EN        General Observations Pt supine in bed, IV in place.  -EN        Pertinent History Of Current Problem Pt fell 12/19/2016 in her bathroom. Pt reported she had an X-Ray and was negative so she returned home. Pt reports 2 days ago she was turning and felt a pop in her hip and fell resulting in right hip fracture. Pt is s/p R JUN and is toe touch weight bearing with hip precautions.  -EN        Precautions/Limitations fall precautions;non-weight bearing status;hip precautions- right  -EN        Prior Level of Function independent:;ADL's;all household mobility  -EN        Equipment Currently Used at Home walker, standard;cane, quad   BSC, ramp, LH shoe horn, reacher  -EN   cane, straight;walker, standard  -CP cane, straight;walker, standard  -MW    Plans/Goals Discussed With patient;agreed upon  -EN        Risks Reviewed patient:;increased discomfort  -EN        Benefits Reviewed patient:;improve function;increase independence;increase strength;increase balance;increase knowledge  -EN        Barriers to Rehab --   TTWB status and hx of falls  -EN         Living Environment    Lives With alone  -EN   alone  -CP alone  -MW    Living Arrangements house  -EN    house  -MW    Home Accessibility no concerns   ramp  -EN   no concerns  -CP bed and bath on same level;ramps present at home  -KZ    Stair Railings at Home     outside, present at both sides  -KZ    Type of Financial/Environmental Concern     none  -KZ    Transportation Available     car  -KZ    Clinical Impression    Date of Referral to OT 01/04/17  -EN        Functional Level At Time Of Evaluation Pt required mod assist of 2 for sit to stand and for pivoting to chair and toilet. Max assist for LB ADLs  -EN        Patient/Family Goals Statement Pt would like to go to STR  -EN        Criteria for Skilled Therapeutic Interventions Met yes;treatment indicated  -EN        Rehab Potential good, to achieve stated therapy goals  -EN        Therapy Frequency 3-5 times/wk  -EN        Predicted Duration of Therapy Intervention (days/wks) will continue to assess  -EN        Anticipated Equipment Needs At Discharge bathing equipment;dressing equipment  -EN        Anticipated Discharge Disposition extended care facility;skilled nursing facility   may need extended facility until WB status changes  -EN        Functional Level Prior    Ambulation 1-->assistive equipment   has been using walker since initial fall on 12/19/16  -EN   0-->independent  -CP 0-->independent  -KZ    Transferring 1-->assistive equipment  -EN   0-->independent  -CP 0-->independent  -KZ    Toileting 1-->assistive equipment  -EN   0-->independent  -CP 0-->independent  -KZ    Bathing 0-->independent  -EN   0-->independent  -CP 0-->independent  -KZ    Dressing 0-->independent  -EN   0-->independent  -CP 0-->independent  -KZ    Eating 0-->independent  -EN   0-->independent  -CP 0-->independent  -KZ    Communication 0-->understands/communicates without difficulty  -EN   0-->understands/communicates without difficulty  -CP 0-->understands/communicates without  difficulty  -KZ    Swallowing    0-->swallows foods/liquids without difficulty  -CP 0-->swallows foods/liquids without difficulty  -KZ    Pain Assessment    Pain Assessment 0-10  -EN        Pain Score 7  -EN        Cognitive Assessment/Intervention    Current Cognitive/Communication Assessment functional  -EN        Orientation Status oriented x 4  -EN        Follows Commands/Answers Questions 100% of the time;able to follow single-step instructions;needs cueing  -EN        Personal Safety --   very anxious, cues for breathing and hand placement  -EN        Personal Safety Interventions gait belt;nonskid shoes/slippers when out of bed  -EN        ROM (Range of Motion)    General ROM Detail UE ROM WFL  -EN        MMT (Manual Muscle Testing)    General MMT Assessment Detail UE strength WFL  -EN        Muscle Tone Assessment    Muscle Tone Assessment  Bilateral Upper Extremities;Bilateral Lower Extremities  -CC       RLE Muscle Tone Assessment  associated movements noted  -CC       Bilateral Lower Extremities Muscle Tone Assessment  moderately decreased tone  -CC       Bed Mobility, Assessment/Treatment    Bed Mob, Supine to Sit, [place] moderate assist (50% patient effort);verbal cues required  -EN        Bed Mobility, Comment cues needed for sequencing and hand placement  -EN        Transfer Assessment/Treatment    Transfers, Bed-Chair [place] moderate assist (50% patient effort);2 person assist required  -EN        Transfers, Chair-Bed [place] moderate assist (50% patient effort);2 person assist required  -EN        Transfers, Bed-Chair-Bed, Assist Device standard walker  -EN        Transfers, Sit-Stand [place] moderate assist (50% patient effort);2 person assist required  -EN        Transfers, Stand-Sit [place] moderate assist (50% patient effort);2 person assist required  -EN        Transfers, Sit-Stand-Sit, Assist Device standard walker  -EN        Toilet Transfer, [place] moderate  assist (50% patient effort);2 person assist required  -EN        Toilet Transfer, Assistive Device standard walker  -EN        Transfer, Comment cues for hand placement  -EN        Lower Body Bathing Assessment/Training    LB Bathing Assess/Train, [place] Level maximum assist (25% patient effort)  -EN        Lower Body Dressing Assessment/Training    LB Dressing Assess/Train, [place] maximum assist (25% patient effort)  -EN        General Therapy Interventions    Planned Therapy Interventions adaptive equipment training;ADL retraining;transfer training  -EN        Positioning and Restraints    Pre-Treatment Position in bed  -EN        Post Treatment Position chair  -EN        In Chair call light within reach;reclined;encouraged to call for assist  -EN          01/02/17 1330                Muscle Tone Assessment    Muscle Tone Assessment RLE  -KZ        RLE Muscle Tone Assessment moderately decreased tone  -KZ          User Key  (r) = Recorded By, (t) = Taken By, (c) = Cosigned By    Initials Name Effective Dates    EN Niyah Demarco OTR 06/22/16 -     CC Celine Santiago, MOI 06/16/16 -     CP Amira Mckeon, MOI 06/16/16 -     MOIZ Mars, SIDDHARTHA 12/01/15 -     MALGORZATA Ulrich RN 06/16/16 -     MW Saravanan Escobedo, RN 12/11/15 -            Occupational Therapy Education     Title: PT OT SLP Therapies (Done)     Topic: Occupational Therapy (Done)     Point: ADL training (Done)    Description: Instruct learner(s) on proper safety adaptation and remediation techniques during self care or transfers.   Instruct in proper use of assistive devices.    Learning Progress Summary    Learner Readiness Method Response Comment Documented by Status   Patient Acceptance E VU Educated on bed mobility and safety with transfers. EN 01/04/17 1030 Done                      User Key     Initials Effective Dates Name Provider Type Discipline    EN 06/22/16 -  Niyah Demarco, OTR Occupational  Therapist OT                  OT Recommendation and Plan  Anticipated Equipment Needs At Discharge: bathing equipment, dressing equipment  Anticipated Discharge Disposition: extended care facility, skilled nursing facility (may need extended facility until WB status changes)  Planned Therapy Interventions: adaptive equipment training, ADL retraining, transfer training  Therapy Frequency: 3-5 times/wk  Plan of Care Review  Plan Of Care Reviewed With: patient  Progress: improving  Outcome Summary/Follow up Plan: OT evaluation completed. Pt very anxious with transfers and required mod assist of 2 for stand pivot to the toilet/chair. Pt max assist for LB ADLs. OT to follow.          OT Goals       01/04/17 1153          Transfer Training OT STG    Transfer Training OT STG, Date Established 01/04/17  -EN      Transfer Training OT STG, Time to Achieve 3 days  -EN      Transfer Training OT STG, Activity Type sit to stand/stand to sit  -EN      Transfer Training OT STG, [place] Level minimum assist (75% patient effort);2 person assist required  -EN      Transfer Training OT STG, Assist Device walker, standard   for improved independence with ADL tasks  -EN      Transfer Training OT LTG    Transfer Training OT LTG, Date Established 01/04/17  -EN      Transfer Training OT LTG, Time to Achieve 4 days  -EN      Transfer Training OT LTG, Activity Type sit to stand/stand to sit;toilet  -EN      Transfer Training OT LTG, [place] Level minimum assist (75% patient effort)  -EN      Transfer Training OT LTG, Assist Device walker, standard  -EN      Patient Education OT STG    Patient Education OT STG, Date Established 01/04/17  -EN      Patient Education OT STG, Time to Achieve 3 days  -EN      Patient Education OT STG, Education Type adaptive equipment mgmt  -EN      Patient Education OT STG, Education Understanding demonstrates adequately  -EN      LB Dressing OT STG    LB Dressing Goal OT STG, Date Established 01/04/17   -EN      LB Dressing Goal OT STG, Time to Achieve 3 days  -EN      LB Dressing Goal OT STG, [place] Level moderate assist (50% patient effort)  -EN      LB Dressing Goal OT STG, Adaptive Equipment reacher;sock-aid;shoe horn, long handled  -EN        User Key  (r) = Recorded By, (t) = Taken By, (c) = Cosigned By    Initials Name Provider Type    SCOTT Grey Occupational Therapist                Outcome Measures       01/04/17 1100 01/04/17 1030       How much help from another is currently needed...    Putting on and taking off regular lower body clothing?  2  -EN     Bathing (including washing, rinsing, and drying)  2  -EN     Toileting (which includes using toilet bed pan or urinal)  2  -EN     Putting on and taking off regular upper body clothing  4  -EN     Taking care of personal grooming (such as brushing teeth)  4  -EN     Eating meals  4  -EN     Score  18  -EN     Functional Assessment    Outcome Measure Options AM-PAC 6 Clicks Daily Activity (OT)  -EN        User Key  (r) = Recorded By, (t) = Taken By, (c) = Cosigned By    Initials Name Provider Type    SCOTT Grey Occupational Therapist          Time Calculation:   OT Start Time: 1030  OT Stop Time: 1100  OT Time Calculation (min): 30 min    Therapy Charges for Today     Code Description Service Date Service Provider Modifiers Qty    [id number]  OT EVAL 2 1/4/2017 SCOTT Berrios GO 1               SCOTT Stein  1/4/2017

## 2017-01-04 NOTE — PROGRESS NOTES
Acute Care - Physical Therapy Initial Evaluation   Roxanna Phillip     Patient Name: Ashli León  : 1935  MRN: [id number]  Today's Date: 2017   Onset of Illness/Injury or Date of Surgery Date: 17  Date of Referral to PT: 17  Referring Physician: Dr. Whittington      Admit Date: 2017     Visit Dx:    ICD-10-CM ICD-9-CM   1. Closed right hip fracture, initial encounter S72.001A 820.8   2. Closed fracture of neck of right femur, initial encounter S72.001A 820.8     Patient Active Problem List   Diagnosis   • History of hip surgery   • Primary osteoarthritis of both knees   • Arthritis of right knee   • Aftercare following right knee joint replacement surgery   • Status post total right knee replacement   • History of knee replacement, total   • History of total knee arthroplasty   • Chronic pain of left knee   • Arthritis of left knee   • Closed right hip fracture   • Femoral neck fracture     Past Medical History   Diagnosis Date   • Acid reflux    • AP (abdominal pain)    • Back pain    • Chest pain    • Colitis    • Diabetes mellitus type 2, noninsulin dependent    • Diarrhea    • Disease of thyroid gland    • Diverticular disease    • Glaucoma    • Gout      - right big toe   • High blood pressure    • High cholesterol    • Hyponatremia    • Knee pain    • Macular degeneration    • Mitral regurgitation 2016     Mild to moderate per 2-D echocardiogram   • Primary osteoarthritis of both knees 2016   • Renal lesion      on mri   • Rheumatoid arthritis      Past Surgical History   Procedure Laterality Date   • Appendectomy     • Femur surgery Right      had fracture and has rajesh    • Hip surgery Right      hx fracture   • Cataract extraction Bilateral    • Hemorrhoidectomy     • Total knee arthroplasty Right 2016     Procedure: RT TOTAL KNEE ARTHROPLASTY WITH FINN NAVIGATION;  Surgeon: Adrian Swenson MD;  Location: [facility] MAIN OR;  Service:           PT ASSESSMENT (last 72  hours)      PT Evaluation       01/04/17 1030 01/04/17 1015    Rehab Evaluation    Document Type evaluation  -EN evaluation  -    Subjective Information agree to therapy;complains of;pain  -EN agree to therapy;complains of;pain  -CH    Patient Effort, Rehab Treatment good  -EN good  -CH    Symptoms Noted Comment pt anxious when transferring and given cues for adpative breathing  -EN Pt with increased resp rate and anxiousness during mobility, verbal cues needed to breath slower.  -    General Information    Patient Profile Review yes  -EN yes  -CH    Onset of Illness/Injury or Date of Surgery Date 01/03/17  -EN 01/03/17  -    Referring Physician Dr. Whittingtno  -EN Pk  -    General Observations Pt supine in bed, IV in place.  -EN In bed, oxy nasal canula,, abduction foam pillow in  -    Pertinent History Of Current Problem Pt fell 12/19/2016 in her bathroom. Pt reported she had an X-Ray and was negative so she returned home. Pt reports 2 days ago she was turning and felt a pop in her hip and fell resulting in right hip fracture. Pt is s/p R UJN and is toe touch weight bearing with hip precautions.  -EN Fell 12/19/17 in bathroom reported sinus infection made her dizzy, reports XR were fine/neg. then on 1/2/17 turning while walking around bed and heard pop to R hip resulting in hip fx. S/P 1/3/17 R endoprostetic hip replacement with removal of previously IM rajesh placed several yrs ago at [facility]  -    Precautions/Limitations fall precautions;non-weight bearing status;hip precautions- right  -EN hip precautions- right;fall precautions   Toe touch wt bearing status  -    Prior Level of Function independent:;ADL's;all household mobility  -EN independent:;all household mobility;ADL's  -    Equipment Currently Used at Home walker, standard;cane, quad   BSC, ramp,  shoe horn, reacher  -EN walker, rolling;cane, quad;shower chair;commode   rollator used at home mostly, has reacher and long shoe horn  -     Plans/Goals Discussed With patient;agreed upon  -EN patient  -CH    Risks Reviewed patient:;increased discomfort  -EN patient:;increased discomfort;lines disloged;LOB  -CH    Benefits Reviewed patient:;improve function;increase independence;increase strength;increase balance;increase knowledge  -EN patient:;improve function;increase independence;increase strength;increase balance;decrease pain;increase knowledge  -CH    Barriers to Rehab --   TTWB status and hx of falls  -EN     Living Environment    Lives With alone  -EN alone  -CH    Living Arrangements house  -EN house  -CH    Home Accessibility no concerns   ramp  -EN bed and bath on same level;ramps present at home   has ramp to enter home with 2 HR  -CH    Stair Railings at Home  outside, present at both sides  -    Clinical Impression    Date of Referral to PT  01/03/17  -    PT Diagnosis  impaired functional mobiliyt and strength  -    Functional Level At Time Of Evaluation  mod assistance needed for all mobility  -CH    Patient/Family Goals Statement  would like to go to rehab  -    Criteria for Skilled Therapeutic Interventions Met  yes  -CH    Pathology/Pathophysiology Noted (Describe Specifically for Each System)  musculoskeletal  -    Impairments Found (describe specific impairments)  gait, locomotion, and balance  -    Functional Limitations in Following Categories (Describe Specific Limitations)  self-care  -    Rehab Potential  good, to achieve stated therapy goals  -CH    Vital Signs    Pre SpO2 (%)  90  -CH    O2 Delivery Pre Treatment  supplemental O2  -CH    Intra SpO2 (%)  92  -CH    O2 Delivery Intra Treatment  supplemental O2  -CH    Post SpO2 (%)  93  -CH    O2 Delivery Post Treatment  supplemental O2  -CH    Pre Patient Position  Supine  -CH    Intra Patient Position  Standing  -CH    Post Patient Position  Sitting  -CH    Pain Assessment    Pain Assessment 0-10  -EN 0-10  -CH    Pain Score 7  -EN 6  -CH    Post Pain Score  5   -    Pain Type  Acute pain;Surgical pain  -    Pain Location  Hip  -    Pain Orientation  Right  -    Pain Intervention(s)  Medication (See MAR);Repositioned  -    Response to Interventions  tolerated  -    Vision Assessment/Intervention    Visual Impairment  WFL  -    Cognitive Assessment/Intervention    Current Cognitive/Communication Assessment functional  -EN functional  -    Orientation Status oriented x 4  -EN oriented x 4  -    Follows Commands/Answers Questions 100% of the time;able to follow single-step instructions;needs cueing  -% of the time  -    Personal Safety --   very anxious, cues for breathing and hand placement  -EN --   will need review of hip precautions  -    Personal Safety Interventions gait belt;nonskid shoes/slippers when out of bed  -EN gait belt;muscle strengthening facilitated;nonskid shoes/slippers when out of bed  -    ROM (Range of Motion)    General ROM  lower extremity range of motion deficits identified  -    General ROM Detail UE ROM WFL  -EN --   LLE WFL, R hip/knee limited, s/p hip sx, prev 6/2016 RTKR  -    MMT (Manual Muscle Testing)    General MMT Assessment  lower extremity strength deficits identified  -    General MMT Assessment Detail UE strength WFL  -EN LLE WFL, R hip limit 2-/5  -    Bed Mobility, Assessment/Treatment    Bed Mobility, Assistive Device  head of bed elevated;bed rails  -    Bed Mob, Supine to Sit, [place] moderate assist (50% patient effort);verbal cues required  -EN moderate assist (50% patient effort);2 person assist required;verbal cues required  -    Bed Mobility, Comment cues needed for sequencing and hand placement  -EN verbal cues to maintain hip prec and use of body mechanics  -    Transfer Assessment/Treatment    Transfers, Bed-Chair [place] moderate assist (50% patient effort);2 person assist required  -EN moderate assist (50% patient effort);2 person assist required;verbal cues required    stand pivot, pt swiveled, able to maintain TTWB status  -CH    Transfers, Chair-Bed [place] moderate assist (50% patient effort);2 person assist required  -EN moderate assist (50% patient effort);2 person assist required;verbal cues required  -CH    Transfers, Bed-Chair-Bed, Assist Device standard walker  -EN standard walker  -CH    Transfers, Sit-Stand [place] moderate assist (50% patient effort);2 person assist required  -EN moderate assist (50% patient effort);2 person assist required;verbal cues required  -CH    Transfers, Stand-Sit [place] moderate assist (50% patient effort);2 person assist required  -EN minimum assist (75% patient effort);moderate assist (50% patient effort);2 person assist required;verbal cues required  -CH    Transfers, Sit-Stand-Sit, Assist Device standard walker  -EN     Toilet Transfer, [place] moderate assist (50% patient effort);2 person assist required  -EN moderate assist (50% patient effort);verbal cues required  -CH    Toilet Transfer, Assistive Device standard walker  -EN bedside commode without drop arms;standard walker  -CH    Transfer, Comment cues for hand placement  -EN     Gait Assessment/Treatment    Gait, [place] Level  moderate assist (50% patient effort);2 person assist required  -CH    Gait, Assistive Device  standard walker  -CH    Gait, Distance (Feet)  3  -CH    Gait, Gait Deviations  sarah decreased;forward flexed posture;step length decreased   pt only able to talke small steps, then began to swivel  -    Gait, Maintain Weight Bearing Status  able to maintain weight bearing status   TTWB  -    Gait, Safety Issues  balance decreased during turns;sequencing ability decreased;step length decreased;weight-shifting ability decreased  -    Gait, Impairments  strength decreased;impaired balance;coordination impaired  -    Positioning and Restraints    Pre-Treatment Position in bed  -EN in bed  -CH    Post Treatment Position chair  -EN  chair  -    In Chair call light within reach;reclined;encouraged to call for assist  -EN notified nsg;reclined;call light within reach;encouraged to call for assist;legs elevated;pillow between legs   abduction pillow between legs  -CH      01/04/17 0904 01/03/17 1507    Rehab Evaluation    Evaluation Not Performed  patient/family declined evaluation   pt very lethargic, family states she has not been awake since returning from surgery.  pt opens eyes briefly but does not engage in conversation or remain alert.  noted CO2 alarm malfunction on patient machine--notified RN and RT supervisor-both in room   -JW    Muscle Tone Assessment    Muscle Tone Assessment Bilateral Upper Extremities;Bilateral Lower Extremities  -     RLE Muscle Tone Assessment associated movements noted  -     Bilateral Lower Extremities Muscle Tone Assessment moderately decreased tone  -CC       01/03/17 0717 01/02/17 1334    General Information    Equipment Currently Used at Home cane, straight;walker, standard  -CP cane, straight;walker, standard  -MW    Living Environment    Lives With alone  -CP alone  -MW    Living Arrangements  house  -MW    Home Accessibility no concerns  -CP bed and bath on same level;ramps present at home  -KZ    Stair Railings at Home  outside, present at both sides  -KZ    Type of Financial/Environmental Concern  none  -KZ    Transportation Available  car  -KZ      01/02/17 1330       Muscle Tone Assessment    Muscle Tone Assessment RLE  -KZ     RLE Muscle Tone Assessment moderately decreased tone  -KZ       User Key  (r) = Recorded By, (t) = Taken By, (c) = Cosigned By    Initials Name Provider Type    MARIUSZ Demarco, OTR Occupational Therapist    CC Celine Santiago, RN Registered Nurse    CHERRIE Tapia, PT Physical Therapist    CP Amira Mckeon, MOI Registered Nurse    MOIZ Mars, PT Physical Therapist    KSTORM Ulrich, MOI Registered Nurse    MW Saravanan Escobedo, MOI Case  Manager          Physical Therapy Education     Title: PT OT SLP Therapies (Active)     Topic: Physical Therapy (Active)     Point: Mobility training (Done)    Learning Progress Summary    Learner Readiness Method Response Comment Documented by Status   Patient Acceptance E,D VU,NR Instructed in Hip precautions and WB statuse during mobility.  01/04/17 1255 Done               Point: Body mechanics (Done)    Learning Progress Summary    Learner Readiness Method Response Comment Documented by Status   Patient Acceptance E,D VU,NR Instructed in Hip precautions and WB statuse during mobility.  01/04/17 1255 Done               Point: Precautions (Done)    Learning Progress Summary    Learner Readiness Method Response Comment Documented by Status   Patient Acceptance E,D VU,NR Instructed in Hip precautions and WB statuse during mobility.  01/04/17 1255 Done                      User Key     Initials Effective Dates Name Provider Type Discipline     12/01/15 -  Nessa Tapia PT Physical Therapist PT                PT Recommendation and Plan  Anticipated Discharge Disposition: skilled nursing facility, extended care facility (to be determined)  Planned Therapy Interventions: bed mobility training, transfer training, gait training, strengthening, balance training  PT Frequency: 2 times/day  Plan of Care Review  Plan Of Care Reviewed With: patient  Progress: improving  Outcome Summary/Follow up Plan: PT: Physical therapy evaluation completed. Pt needing mod asstx2 for bed mob and stand pivot transfers to bedside commode and chair. Initiating gait, but only small steps becoming swivels.. Able to keep TTWB on RLE., mod A x2 with stdwx. PT has strength/rom/functional mobility deficits and will need pt ed on hip prec and wt bearing status. Skilled PT intervention services recommended. Will see BID while in hospital.          IP PT Goals       01/04/17 1305          Bed Mobility PT STG    Bed Mobility PT STG, Date  Established 01/04/17  -CH      Bed Mobility PT STG, Time to Achieve 4 days  -CH      Bed Mobility PT STG, Activity Type supine to sit/sit to supine  -CH      Bed Mobility PT STG, [place] Level minimum assist (75% patient effort)  -CH      Transfer Training PT STG    Transfer Training PT STG, Date Established 01/04/17  -CH      Transfer Training PT STG, Time to Achieve 4 days  -CH      Transfer Training PT STG, Activity Type sit to stand/stand to sit;bed to chair /chair to bed  -CH      Transfer Training PT STG, [place] Level minimum assist (75% patient effort)  -CH      Transfer Training PT STG, Assist Device walker, standard  -CH      Gait Training PT STG    Gait Training Goal PT STG, Date Established 01/04/17  -CH      Gait Training Goal PT STG, Time to Achieve 4 days  -CH      Gait Training Goal PT STG, [place] Level moderate assist (50% patient effort)  -CH      Gait Training Goal PT STG, Assist Device walker, standard  -CH      Gait Training Goal PT STG, Distance to Achieve 25  -CH      Patient Education PT STG    Patient Education PT STG, Date Established 01/04/17  -CH      Patient Education PT STG, Time to Achieve 4 days  -CH      Patient Education PT STG, Education Type precaution per surgeon;written program   TTWB precaution RLE  -CH      Patient Education PT STG, Education Understanding demonstrate adequately;verbalize understanding  -CH        User Key  (r) = Recorded By, (t) = Taken By, (c) = Cosigned By    Initials Name Provider Type    CH Nessa Tapia, PT Physical Therapist                Outcome Measures       01/04/17 1100 01/04/17 1030 01/04/17 1015    How much help from another person do you currently need...    Turning from your back to your side while in flat bed without using bedrails?   2  -CH    Moving from lying on back to sitting on the side of a flat bed without bedrails?   2  -CH    Moving to and from a bed to a chair (including a wheelchair)?   2  -CH    Standing up from a  chair using your arms (e.g., wheelchair, bedside chair)?   3  -CH    Climbing 3-5 steps with a railing?   1  -CH    To walk in hospital room?   2  -CH    AM-PAC 6 Clicks Score   12  -CH    How much help from another is currently needed...    Putting on and taking off regular lower body clothing?  2  -EN     Bathing (including washing, rinsing, and drying)  2  -EN     Toileting (which includes using toilet bed pan or urinal)  2  -EN     Putting on and taking off regular upper body clothing  4  -EN     Taking care of personal grooming (such as brushing teeth)  4  -EN     Eating meals  4  -EN     Score  18  -EN     Functional Assessment    Outcome Measure Options AM-PAC 6 Clicks Daily Activity (OT)  -EN  AM-PAC 6 Clicks Basic Mobility (PT)  -      User Key  (r) = Recorded By, (t) = Taken By, (c) = Cosigned By    Initials Name Provider Type    EN Niyah Demarco, OTR Occupational Therapist     Nessa Tapia, PT Physical Therapist           Time Calculation:         PT Charges       01/04/17 1309          Time Calculation    Start Time 1015  -      Stop Time 1050  -      Time Calculation (min) 35 min  -      PT Received On 01/04/17  -      PT - Next Appointment 01/04/17  -        User Key  (r) = Recorded By, (t) = Taken By, (c) = Cosigned By    Initials Name Provider Type     Nessa Tapia, SIDDHARTHA Physical Therapist          Therapy Charges for Today     Code Description Service Date Service Provider Modifiers Qty    16968661573 HC PT EVAL MOD COMPLEXITY 2 1/4/2017 Nessa Tapia, PT GP 1          PT G-Codes  Outcome Measure Options: AM-PAC 6 Clicks Daily Activity (OT)      Nessa Tapia, PT  1/4/2017

## 2017-01-04 NOTE — PROGRESS NOTES
Patient: Ashli León  Procedure(s) with comments:  HIP HEMIARTHROPLASTY with IM rajesh and 2 screw hardware removal antibiotic cement - SPECIAL EQUIPMENT:[employer] extraction instruments and anna hip  ASSISTANT NEEDED:yes  EXPAREL 20 ML PLACED ON STERILE FIELD FOR SURGEON INJECTION (FOR INFILTRATION INTO THE SURGICAL SITE)  Anesthesia type: [unfilled]    Patient location: [facility] Floor  Last vitals:   Vitals:    01/04/17 0914   BP: 108/60   Pulse: 84   Resp:    Temp:    SpO2:      Level of consciousness: awake, alert and oriented    Post-anesthesia pain: adequate analgesia  Airway patency: patent  Respiratory: unassisted  Cardiovascular: stable and blood pressure at baseline  Hydration: euvolemic    Anesthetic complications: no

## 2017-01-04 NOTE — PLAN OF CARE
Problem: Patient Care Overview (Adult)  Goal: Plan of Care Review  Outcome: Ongoing (interventions implemented as appropriate)    01/04/17 0120   Coping/Psychosocial Response Interventions   Plan Of Care Reviewed With patient   Patient Care Overview   Progress improving   Outcome Evaluation   Outcome Summary/Follow up Plan patient comfortable on round the clock dosing of tylenol and oxycodone       Goal: Adult Individualization and Mutuality  Outcome: Ongoing (interventions implemented as appropriate)    Problem: Pain, Acute (Adult)  Goal: Acceptable Pain Control/Comfort Level  Outcome: Ongoing (interventions implemented as appropriate)    01/04/17 0120   Pain, Acute (Adult)   Acceptable Pain Control/Comfort Level making progress toward outcome

## 2017-01-04 NOTE — PLAN OF CARE
Problem: Inpatient Physical Therapy  Goal: Bed Mobility Goal STG- PT  Outcome: Ongoing (interventions implemented as appropriate)  Goal: Transfer Training Goal 1 STG- PT  Outcome: Ongoing (interventions implemented as appropriate)    01/04/17 1305   Transfer Training PT STG   Transfer Training PT STG, Date Established 01/04/17   Transfer Training PT STG, Time to Achieve 4 days   Transfer Training PT STG, Activity Type sit to stand/stand to sit;bed to chair /chair to bed   Transfer Training PT STG, [place] Level minimum assist (75% patient effort)   Transfer Training PT STG, Assist Device walker, standard       Goal: Gait Training Goal STG- PT  Outcome: Ongoing (interventions implemented as appropriate)    01/04/17 1305   Gait Training PT STG   Gait Training Goal PT STG, Date Established 01/04/17   Gait Training Goal PT STG, Time to Achieve 4 days   Gait Training Goal PT STG, [place] Level moderate assist (50% patient effort)   Gait Training Goal PT STG, Assist Device walker, standard   Gait Training Goal PT STG, Distance to Achieve 25       Goal: Patient Education Goal STG- PT  Outcome: Ongoing (interventions implemented as appropriate)    01/04/17 1305   Patient Education PT STG   Patient Education PT STG, Date Established 01/04/17   Patient Education PT STG, Time to Achieve 4 days   Patient Education PT STG, Education Type precaution per surgeon;written program  (TTWB precaution RLE)   Patient Education PT STG, Education Understanding demonstrate adequately;verbalize understanding

## 2017-01-04 NOTE — PROGRESS NOTES
Acute Care - Physical Therapy Treatment Note  REAL Coleman     Patient Name: Ashli León  : 1935  MRN: [id number]  Today's Date: 2017  Onset of Illness/Injury or Date of Surgery Date: 17  Date of Referral to PT: 17  Referring Physician: Dr. Whittington    Admit Date: 2017    Visit Dx:    ICD-10-CM ICD-9-CM   1. Closed right hip fracture, initial encounter S72.001A 820.8   2. Closed fracture of neck of right femur, initial encounter S72.001A 820.8     Patient Active Problem List   Diagnosis   • History of hip surgery   • Primary osteoarthritis of both knees   • Arthritis of right knee   • Aftercare following right knee joint replacement surgery   • Status post total right knee replacement   • History of knee replacement, total   • History of total knee arthroplasty   • Chronic pain of left knee   • Arthritis of left knee   • Closed right hip fracture   • Femoral neck fracture               Adult Rehabilitation Note       17 1320          Rehab Assessment/Intervention    Discipline physical therapist  -      Document Type therapy note (daily note)  -      Subjective Information agree to therapy;complains of;pain  -CH      Patient Effort, Rehab Treatment good  -CH      Symptoms Noted Comment anxiousness  -CH      Precautions/Limitations fall precautions;hip precautions- right   oxy removed by nursing, RLE TTWB  -CH      Recorded by [CH] Nessa Tapia, PT      Pain Assessment    Pain Assessment 0-10  -CH      Pain Score 4  -CH      Post Pain Score 6  -CH      Pain Type Acute pain;Surgical pain  -CH      Pain Location Hip  -      Pain Orientation Right  -CH      Pain Intervention(s) Medication (See MAR);Repositioned  -      Response to Interventions tolerated  -CH      Recorded by [CH] Nessa Tapia, PT      Bed Mobility, Assessment/Treatment    Bed Mob, Sit to Supine, [place] moderate assist (50% patient effort);2 person assist required  -CH      Bed Mobility, Comment  verbal/tactile cues fo maintain hip prec  -CH      Recorded by [CH] Nessa Tapia PT      Transfer Assessment/Treatment    Transfers, Sit-Stand [place] minimum assist (75% patient effort);2 person assist required  -CH      Transfers, Stand-Sit [place] minimum assist (75% patient effort)  -      Transfers, Sit-Stand-Sit, Assist Device standard walker  -      Transfer, Comment verb cues for hand placement and WB status  -CH      Recorded by [CH] Nessa Tapia PT      Gait Assessment/Treatment    Gait, [place] Level minimum assist (75% patient effort);2 person assist required  -      Gait, Assistive Device standard walker  -      Gait, Distance (Feet) 4  -CH      Gait, Gait Deviations forward flexed posture;step length decreased  -      Gait, Maintain Weight Bearing Status able to maintain weight bearing status  -      Gait, Safety Issues balance decreased during turns;sequencing ability decreased;step length decreased;weight-shifting ability decreased  -      Gait, Impairments strength decreased;impaired balance  -      Gait, Comment did not swivel al much this afternoon, improved stepping   -CH      Recorded by [CH] Nessa Tapia PT      Therapy Exercises    Bilateral Lower Extremities 10 reps;AAROM:;supine;ankle pumps/circles;heel slides;hip abduction/adduction;glut sets  -CH      Recorded by [CH] Nessa Tapia PT      Positioning and Restraints    Pre-Treatment Position sitting in chair/recliner  -      Post Treatment Position bed  -      In Bed supine;call light within reach;encouraged to call for assist;pillow between legs   abduction pillow between legs  -CH      Recorded by [] Nessa Tapia PT        User Key  (r) = Recorded By, (t) = Taken By, (c) = Cosigned By    Initials Name Effective Dates     Nessa Tapia PT 12/01/15 -                 IP PT Goals       01/04/17 1305          Bed Mobility PT STG    Bed Mobility PT STG, Date Established 01/04/17  -      Bed Mobility  PT STG, Time to Achieve 4 days  -CH      Bed Mobility PT STG, Activity Type supine to sit/sit to supine  -      Bed Mobility PT STG, [place] Level minimum assist (75% patient effort)  -      Transfer Training PT STG    Transfer Training PT STG, Date Established 01/04/17  -CH      Transfer Training PT STG, Time to Achieve 4 days  -CH      Transfer Training PT STG, Activity Type sit to stand/stand to sit;bed to chair /chair to bed  -CH      Transfer Training PT STG, [place] Level minimum assist (75% patient effort)  -CH      Transfer Training PT STG, Assist Device walker, standard  -CH      Gait Training PT STG    Gait Training Goal PT STG, Date Established 01/04/17  -      Gait Training Goal PT STG, Time to Achieve 4 days  -CH      Gait Training Goal PT STG, [place] Level moderate assist (50% patient effort)  -CH      Gait Training Goal PT STG, Assist Device walker, standard  -CH      Gait Training Goal PT STG, Distance to Achieve 25  -CH      Patient Education PT STG    Patient Education PT STG, Date Established 01/04/17  -      Patient Education PT STG, Time to Achieve 4 days  -CH      Patient Education PT STG, Education Type precaution per surgeon;written program   TTWB precaution RLE  -CH      Patient Education PT STG, Education Understanding demonstrate adequately;verbalize understanding  -        User Key  (r) = Recorded By, (t) = Taken By, (c) = Cosigned By    Initials Name Provider Type     Nessa Tapia, PT Physical Therapist          Physical Therapy Education     Title: PT OT SLP Therapies (Done)     Topic: Physical Therapy (Done)     Point: Mobility training (Done)    Learning Progress Summary    Learner Readiness Method Response Comment Documented by Status   Patient Acceptance E,FARRAH,GREGORY MCKEON,NR Educated on func mobility, WB status, hip precautions. Remembered 2/3 precautions. Reviewed prior and after treatment session  01/04/17 1523 Done    Acceptance GREGORY AWAN NR Instructed in Hip  precautions and WB statuse during mobility.  01/04/17 1255 Done               Point: Home exercise program (Done)    Learning Progress Summary    Learner Readiness Method Response Comment Documented by Status   Patient Acceptance E,TB,D VU,NR Educated on func mobility, WB status, hip precautions. Remembered 2/3 precautions. Reviewed prior and after treatment session  01/04/17 1523 Done               Point: Body mechanics (Done)    Learning Progress Summary    Learner Readiness Method Response Comment Documented by Status   Patient Acceptance E,TB,D VU,NR Educated on func mobility, WB status, hip precautions. Remembered 2/3 precautions. Reviewed prior and after treatment session  01/04/17 1523 Done    Acceptance E,D VU,NR Instructed in Hip precautions and WB statuse during mobility.  01/04/17 1255 Done               Point: Precautions (Done)    Learning Progress Summary    Learner Readiness Method Response Comment Documented by Status   Patient Acceptance E,TB,D VU,NR Educated on func mobility, WB status, hip precautions. Remembered 2/3 precautions. Reviewed prior and after treatment session  01/04/17 1523 Done    Acceptance E,D VU,NR Instructed in Hip precautions and WB statuse during mobility.  01/04/17 1255 Done                      User Key     Initials Effective Dates Name Provider Type Discipline     12/01/15 -  Nessa Tapia, PT Physical Therapist PT                    PT Recommendation and Plan  Anticipated Discharge Disposition: skilled nursing facility, extended care facility (to be determined)  Planned Therapy Interventions: bed mobility training, transfer training, gait training, strengthening, balance training  PT Frequency: 2 times/day  Plan of Care Review  Plan Of Care Reviewed With: patient  Progress: improving  Outcome Summary/Follow up Plan: PT : Pt seen for therapeutic exercises, needing assist. Pt education in WB status and hip precautions. Remembered 2/3 precautions. Bed mobility  with mod asst x 2 persons. Transfers sit to stand with min/mod asst. Gait training with std walker mod asst  TTWB Rle, Less swiveling with LLE. Pt showing improvement. Still anxious and rapid breathing during treatment. Verbal cues needed for breathing, but pt reponds adequately. Plan: cont functional mobility and ther ex and review of WB status and hip prec          Outcome Measures       01/04/17 1100 01/04/17 1030 01/04/17 1015    How much help from another person do you currently need...    Turning from your back to your side while in flat bed without using bedrails?   2  -CH    Moving from lying on back to sitting on the side of a flat bed without bedrails?   2  -CH    Moving to and from a bed to a chair (including a wheelchair)?   2  -CH    Standing up from a chair using your arms (e.g., wheelchair, bedside chair)?   3  -CH    Climbing 3-5 steps with a railing?   1  -CH    To walk in hospital room?   2  -CH    AM-PAC 6 Clicks Score   12  -CH    How much help from another is currently needed...    Putting on and taking off regular lower body clothing?  2  -EN     Bathing (including washing, rinsing, and drying)  2  -EN     Toileting (which includes using toilet bed pan or urinal)  2  -EN     Putting on and taking off regular upper body clothing  4  -EN     Taking care of personal grooming (such as brushing teeth)  4  -EN     Eating meals  4  -EN     Score  18  -EN     Functional Assessment    Outcome Measure Options AM-PAC 6 Clicks Daily Activity (OT)  -EN  AM-PAC 6 Clicks Basic Mobility (PT)  -      User Key  (r) = Recorded By, (t) = Taken By, (c) = Cosigned By    Initials Name Provider Type    MARIUSZ Demarco, OTR Occupational Therapist    CH Nessa Tapia, PT Physical Therapist           Time Calculation:         PT Charges       01/04/17 1529 01/04/17 1309       Time Calculation    Start Time 1320  -CH 1015  -CH     Stop Time 1400  -CH 1050  -CH     Time Calculation (min) 40 min  -CH 35 min  -CH      PT Received On 01/04/17  - 01/04/17  -CHERRIE     PT - Next Appointment 01/05/17  - 01/04/17  -       User Key  (r) = Recorded By, (t) = Taken By, (c) = Cosigned By    Initials Name Provider Type     Nessa Tapia PT Physical Therapist          Therapy Charges for Today     Code Description Service Date Service Provider Modifiers Qty    58937458890 HC PT EVAL MOD COMPLEXITY 2 1/4/2017 eNssa Tapia, PT GP 1    95547213059 HC PT THER PROC EA 15 MIN 1/4/2017 Nessa Tapia, PT GP 2    55948529084 HC GAIT TRAINING EA 15 MIN 1/4/2017 Nessa Tapia, PT GP 1    51609342443 HC PT THER SUPP EA 15 MIN 1/4/2017 Nessa Tapia, PT GP 1          PT G-Codes  Outcome Measure Options: AM-PAC 6 Clicks Daily Activity (OT)    Nessa Tapia PT  1/4/2017

## 2017-01-04 NOTE — PLAN OF CARE
Problem: Patient Care Overview (Adult)  Goal: Discharge Needs Assessment  Outcome: Ongoing (interventions implemented as appropriate)    01/02/17 1334 01/02/17 1539 01/04/17 1030   Discharge Needs Assessment   Concerns To Be Addressed --  no discharge needs identified --    Readmission Within The Last 30 Days no previous admission in last 30 days --  --    Discharge Planning Comments --  --  --    Current Health   Anticipated Changes Related to Illness inability to care for self --  --    Living Environment   Transportation Available car --  --    Self-Care   Equipment Currently Used at Home --  --  walker, standard;cane, quad  ([facility], ramp,  shoe horn, kaylee)     01/04/17 1719   Discharge Needs Assessment   Concerns To Be Addressed --    Readmission Within The Last 30 Days --    Discharge Planning Comments Received call from Michelle at [facility]. Insurance has been approved for patient. She can transfer when medically ready. Advised family, patient sleeping.    Current Health   Anticipated Changes Related to Illness --    Living Environment   Transportation Available --    Self-Care   Equipment Currently Used at Home --

## 2017-01-05 ENCOUNTER — APPOINTMENT (OUTPATIENT)
Dept: GENERAL RADIOLOGY | Facility: HOSPITAL | Age: 82
End: 2017-01-05

## 2017-01-05 LAB
ANION GAP SERPL CALCULATED.3IONS-SCNC: 11.6 MMOL/L
BASOPHILS # BLD AUTO: 0.03 10*3/MM3 (ref 0–0.2)
BASOPHILS NFR BLD AUTO: 0.5 % (ref 0–2)
BUN BLD-MCNC: 13 MG/DL (ref 8–23)
BUN/CREAT SERPL: 19.7 (ref 7–25)
CALCIUM SPEC-SCNC: 8.8 MG/DL (ref 8.8–10.5)
CHLORIDE SERPL-SCNC: 99 MMOL/L (ref 98–107)
CO2 SERPL-SCNC: 25.4 MMOL/L (ref 22–29)
CREAT BLD-MCNC: 0.66 MG/DL (ref 0.57–1)
DEPRECATED RDW RBC AUTO: 41.4 FL (ref 37–54)
EOSINOPHIL # BLD AUTO: 0.37 10*3/MM3 (ref 0.1–0.3)
EOSINOPHIL NFR BLD AUTO: 5.8 % (ref 0–4)
ERYTHROCYTE [DISTWIDTH] IN BLOOD BY AUTOMATED COUNT: 11.9 % (ref 11.5–14.5)
GFR SERPL CREATININE-BSD FRML MDRD: 86 ML/MIN/1.73
GLUCOSE BLD-MCNC: 107 MG/DL (ref 65–99)
GLUCOSE BLDC GLUCOMTR-MCNC: 104 MG/DL (ref 70–130)
GLUCOSE BLDC GLUCOMTR-MCNC: 105 MG/DL (ref 70–130)
GLUCOSE BLDC GLUCOMTR-MCNC: 124 MG/DL (ref 70–130)
GLUCOSE BLDC GLUCOMTR-MCNC: 136 MG/DL (ref 70–130)
HBA1C MFR BLD: 5.17 % (ref 4.8–5.6)
HCT VFR BLD AUTO: 24.5 % (ref 37–47)
HGB BLD-MCNC: 7.9 G/DL (ref 12–16)
IMM GRANULOCYTES # BLD: 0.03 10*3/MM3 (ref 0–0.03)
IMM GRANULOCYTES NFR BLD: 0.5 % (ref 0–0.5)
LYMPHOCYTES # BLD AUTO: 1.17 10*3/MM3 (ref 0.6–4.8)
LYMPHOCYTES NFR BLD AUTO: 18.5 % (ref 20–45)
MCH RBC QN AUTO: 31 PG (ref 27–31)
MCHC RBC AUTO-ENTMCNC: 32.2 G/DL (ref 31–37)
MCV RBC AUTO: 96.1 FL (ref 81–99)
MONOCYTES # BLD AUTO: 0.64 10*3/MM3 (ref 0–1)
MONOCYTES NFR BLD AUTO: 10.1 % (ref 3–8)
NEUTROPHILS # BLD AUTO: 4.09 10*3/MM3 (ref 1.5–8.3)
NEUTROPHILS NFR BLD AUTO: 64.6 % (ref 45–70)
NRBC BLD MANUAL-RTO: 0 /100 WBC (ref 0–0)
PLATELET # BLD AUTO: 246 10*3/MM3 (ref 140–500)
PMV BLD AUTO: 9.7 FL (ref 7.4–10.4)
POTASSIUM BLD-SCNC: 4.1 MMOL/L (ref 3.5–5.2)
RBC # BLD AUTO: 2.55 10*6/MM3 (ref 4.2–5.4)
SODIUM BLD-SCNC: 136 MMOL/L (ref 136–145)
WBC NRBC COR # BLD: 6.33 10*3/MM3 (ref 4.8–10.8)

## 2017-01-05 PROCEDURE — 97110 THERAPEUTIC EXERCISES: CPT

## 2017-01-05 PROCEDURE — 25010000002 PNEUMOCOCCAL VAC POLYVALENT PER 0.5 ML: Performed by: ORTHOPAEDIC SURGERY

## 2017-01-05 PROCEDURE — 73502 X-RAY EXAM HIP UNI 2-3 VIEWS: CPT

## 2017-01-05 PROCEDURE — 97116 GAIT TRAINING THERAPY: CPT

## 2017-01-05 PROCEDURE — 80048 BASIC METABOLIC PNL TOTAL CA: CPT | Performed by: NURSE PRACTITIONER

## 2017-01-05 PROCEDURE — 90732 PPSV23 VACC 2 YRS+ SUBQ/IM: CPT | Performed by: ORTHOPAEDIC SURGERY

## 2017-01-05 PROCEDURE — 82962 GLUCOSE BLOOD TEST: CPT

## 2017-01-05 PROCEDURE — 99239 HOSP IP/OBS DSCHRG MGMT >30: CPT | Performed by: NURSE PRACTITIONER

## 2017-01-05 PROCEDURE — 85025 COMPLETE CBC W/AUTO DIFF WBC: CPT | Performed by: ORTHOPAEDIC SURGERY

## 2017-01-05 PROCEDURE — 97535 SELF CARE MNGMENT TRAINING: CPT

## 2017-01-05 PROCEDURE — 83036 HEMOGLOBIN GLYCOSYLATED A1C: CPT | Performed by: NURSE PRACTITIONER

## 2017-01-05 PROCEDURE — G0009 ADMIN PNEUMOCOCCAL VACCINE: HCPCS | Performed by: ORTHOPAEDIC SURGERY

## 2017-01-05 RX ADMIN — PREGABALIN 75 MG: 75 CAPSULE ORAL at 22:35

## 2017-01-05 RX ADMIN — METOPROLOL TARTRATE 25 MG: 25 TABLET ORAL at 08:29

## 2017-01-05 RX ADMIN — DOCUSATE SODIUM 100 MG: 100 CAPSULE, LIQUID FILLED ORAL at 08:30

## 2017-01-05 RX ADMIN — MAGNESIUM HYDROXIDE 30 ML: 400 SUSPENSION ORAL at 13:21

## 2017-01-05 RX ADMIN — ASPIRIN 325 MG: 325 TABLET, DELAYED RELEASE ORAL at 08:30

## 2017-01-05 RX ADMIN — BISACODYL 10 MG: 10 SUPPOSITORY RECTAL at 08:30

## 2017-01-05 RX ADMIN — ACETAMINOPHEN 1000 MG: 500 TABLET, COATED ORAL at 08:30

## 2017-01-05 RX ADMIN — PRAVASTATIN SODIUM 40 MG: 20 TABLET ORAL at 22:03

## 2017-01-05 RX ADMIN — METFORMIN HYDROCHLORIDE 500 MG: 500 TABLET ORAL at 08:30

## 2017-01-05 RX ADMIN — PREGABALIN 75 MG: 75 CAPSULE ORAL at 08:30

## 2017-01-05 RX ADMIN — OXYCODONE HYDROCHLORIDE 5 MG: 5 TABLET ORAL at 04:56

## 2017-01-05 RX ADMIN — OXYCODONE HYDROCHLORIDE 5 MG: 5 TABLET ORAL at 18:04

## 2017-01-05 RX ADMIN — ALLOPURINOL 100 MG: 100 TABLET ORAL at 08:30

## 2017-01-05 RX ADMIN — LEVOTHYROXINE SODIUM 50 MCG: 50 TABLET ORAL at 06:14

## 2017-01-05 RX ADMIN — FERROUS SULFATE TAB EC 324 MG (65 MG FE EQUIVALENT) 325 MG: 324 (65 FE) TABLET DELAYED RESPONSE at 18:04

## 2017-01-05 RX ADMIN — LATANOPROST 1 DROP: 50 SOLUTION OPHTHALMIC at 22:04

## 2017-01-05 RX ADMIN — DOCUSATE SODIUM AND SENNOSIDES 2 TABLET: 8.6; 5 TABLET, FILM COATED ORAL at 08:30

## 2017-01-05 RX ADMIN — ACETAMINOPHEN 1000 MG: 500 TABLET, COATED ORAL at 13:55

## 2017-01-05 RX ADMIN — ASPIRIN 325 MG: 325 TABLET, DELAYED RELEASE ORAL at 22:03

## 2017-01-05 RX ADMIN — PANTOPRAZOLE SODIUM 40 MG: 40 TABLET, DELAYED RELEASE ORAL at 06:14

## 2017-01-05 RX ADMIN — ACETAMINOPHEN 1000 MG: 500 TABLET, COATED ORAL at 01:55

## 2017-01-05 RX ADMIN — PNEUMOCOCCAL VACCINE POLYVALENT 0.5 ML
25; 25; 25; 25; 25; 25; 25; 25; 25; 25; 25; 25; 25; 25; 25; 25; 25; 25; 25; 25; 25; 25; 25 INJECTION, SOLUTION INTRAMUSCULAR; SUBCUTANEOUS at 13:55

## 2017-01-05 RX ADMIN — OXYCODONE HYDROCHLORIDE 5 MG: 5 TABLET ORAL at 22:03

## 2017-01-05 RX ADMIN — OXYCODONE HYDROCHLORIDE 5 MG: 5 TABLET ORAL at 09:12

## 2017-01-05 RX ADMIN — ACETAMINOPHEN 1000 MG: 500 TABLET, COATED ORAL at 22:03

## 2017-01-05 RX ADMIN — FERROUS SULFATE TAB EC 324 MG (65 MG FE EQUIVALENT) 325 MG: 324 (65 FE) TABLET DELAYED RESPONSE at 08:30

## 2017-01-05 RX ADMIN — OXYCODONE HYDROCHLORIDE 5 MG: 5 TABLET ORAL at 00:16

## 2017-01-05 RX ADMIN — OXYCODONE HYDROCHLORIDE 5 MG: 5 TABLET ORAL at 13:21

## 2017-01-05 NOTE — PROGRESS NOTES
"SERVICE: [facility] HOSPITALIST    CONSULTANTS: Ortho    CHIEF COMPLAINT: F/U Right JUN    SUBJECTIVE: Patient reports her right hip was twisted this am and hurt \"just like it did when it came out before\". She denies cough/soa/chest pain/n/v/d/f/c/abdominal pain or other new concerns.     OBJECTIVE:    Visit Vitals   • /70 (BP Location: Left arm, Patient Position: Lying)   • Pulse 88   • Temp 97.6 °F (36.4 °C) (Oral)   • Resp 17   • Ht 62\" (157.5 cm)   • Wt 130 lb (59 kg)   • SpO2 98%   • BMI 23.78 kg/m2     MEDS/LABS REVIEWED AND ORDERED    acetaminophen 1,000 mg Oral Q6H   allopurinol 100 mg Oral Daily   aspirin 325 mg Oral Q12H   bisacodyl 10 mg Rectal Daily   docusate sodium 100 mg Oral BID   ferrous sulfate 325 mg Oral BID With Meals   insulin aspart 0-7 Units Subcutaneous 4x Daily AC & at Bedtime   latanoprost 1 drop Both Eyes Nightly   levothyroxine 50 mcg Oral QAM   metFORMIN 500 mg Oral Daily With Breakfast   metoprolol tartrate 25 mg Oral Daily   pantoprazole 40 mg Oral Q AM   pravastatin 40 mg Oral Nightly   pregabalin 75 mg Oral Q12H   sennosides-docusate sodium 2 tablet Oral BID   tranexamic acid 1,000 mg Intravenous Once     Physical Exam   Constitutional: She is oriented to person, place, and time. She appears well-developed and well-nourished.   HENT:   Head: Normocephalic and atraumatic.   Eyes: EOM are normal. Pupils are equal, round, and reactive to light.   Cardiovascular: Normal rate and regular rhythm.  Exam reveals no gallop and no friction rub.    No murmur heard.  Pulmonary/Chest: Effort normal and breath sounds normal. No respiratory distress. She has no wheezes. She has no rales.   Abdominal: Soft. Bowel sounds are normal. She exhibits no distension. There is no tenderness.   Musculoskeletal: She exhibits no edema.   No obvious deformity noted   Neurological: She is alert and oriented to person, place, and time.   Skin: Skin is warm and dry. No erythema.   Pale, " right hip, right thigh dressings clean, dry and intact.   Psychiatric: She has a normal mood and affect. Her behavior is normal. Judgment and thought content normal.   Vitals reviewed.      LAB/DIAGNOSTICS:    Lab Results (last 24 hours)     Procedure Component Value Units Date/Time    POC Glucose Fingerstick [[id number]]  (Normal) Collected:  01/04/17 1552    Specimen:  Blood Updated:  01/04/17 1558     Glucose 117 mg/dL     Narrative:       Meter: [id number] : [id number] Westley Ramirez    POC Glucose Fingerstick [[id number]]  (Normal) Collected:  01/04/17 1724    Specimen:  Blood Updated:  01/04/17 1732     Glucose 105 mg/dL     Narrative:       Meter: [id number] : [id number] Mayela Carey RN    POC Glucose Fingerstick [[id number]]  (Abnormal) Collected:  01/04/17 2010    Specimen:  Blood Updated:  01/04/17 2020     Glucose 160 (H) mg/dL     Narrative:       Meter: [id number] : [id number] Cherie Watts    CBC & Differential [[id number]] Collected:  01/05/17 0550    Specimen:  Blood Updated:  01/05/17 0615    Narrative:       The following orders were created for panel order CBC & Differential.  Procedure                               Abnormality         Status                     ---------                               -----------         ------                     CBC Auto Differential[[id number]]         Abnormal            Final result                 Please view results for these tests on the individual orders.    CBC Auto Differential [[id number]]  (Abnormal) Collected:  01/05/17 0550    Specimen:  Blood Updated:  01/05/17 0615     WBC 6.33 10*3/mm3      RBC 2.55 (L) 10*6/mm3      Hemoglobin 7.9 (L) g/dL      Hematocrit 24.5 (L) %      MCV 96.1 fL      MCH 31.0 pg      MCHC 32.2 g/dL      RDW 11.9 %      RDW-SD 41.4 fl      MPV 9.7 fL      Platelets 246 10*3/mm3      Neutrophil % 64.6 %      Lymphocyte % 18.5 (L) %      Monocyte % 10.1 (H) %      Eosinophil % 5.8 (H) %      Basophil % 0.5 %      Immature Grans %  0.5 %      Neutrophils, Absolute 4.09 10*3/mm3      Lymphocytes, Absolute 1.17 10*3/mm3      Monocytes, Absolute 0.64 10*3/mm3      Eosinophils, Absolute 0.37 (H) 10*3/mm3      Basophils, Absolute 0.03 10*3/mm3      Immature Grans, Absolute 0.03 10*3/mm3      nRBC 0.0 /100 WBC     POC Glucose Fingerstick [[id number]]  (Normal) Collected:  01/05/17 0744    Specimen:  Blood Updated:  01/05/17 0758     Glucose 105 mg/dL     Narrative:       Meter: [id number] : [id number] Fidzup    Basic Metabolic Panel [[id number]]  (Abnormal) Collected:  01/05/17 1150    Specimen:  Blood Updated:  01/05/17 1207     Glucose 107 (H) mg/dL      BUN 13 mg/dL      Creatinine 0.66 mg/dL      Sodium 136 mmol/L      Potassium 4.1 mmol/L      Chloride 99 mmol/L      CO2 25.4 mmol/L      Calcium 8.8 mg/dL      eGFR Non African Amer 86 mL/min/1.73      BUN/Creatinine Ratio 19.7      Anion Gap 11.6 mmol/L     Narrative:       The MDRD GFR formula is only valid for adults with stable renal function between ages 18 and 70.    POC Glucose Fingerstick [[id number]]  (Normal) Collected:  01/05/17 1155    Specimen:  Blood Updated:  01/05/17 1208     Glucose 124 mg/dL     Narrative:       Meter: [id number] : [id number] Sydni Oriana    Hemoglobin A1c [[id number]]  (Normal) Collected:  01/05/17 0550    Specimen:  Blood Updated:  01/05/17 1208     Hemoglobin A1C 5.17 %     Narrative:       Hemoglobin A1C Ranges:    Increased Risk for Diabetes  5.7% to 6.4%  Diabetes                     >= 6.5%  Diabetic Goal                < 7.0%        ASSESSMENT/PLAN:  1. S/P Removal of previously placed intramedullary femoral nail with right endoprosthetic hip replacement secondary to right femoral neck fracture:    Pain/PT/OT/DVT prophy with  mg bid per Dr. Whittington      2. Acute post operative anemia  Hgb down from 11.2 to 7.9, patient refused blood transfusion and was started on iron orally while here  No previous iron studies are noted  Continue for now,  but recommend f/u with Arnaud Monson MD or SNF provider within 1 week      3. HTN: at goal On Metoprolol tartrate 25 mg daily      4. Hyperlipidemia: no acute issues on home Pravastatin 40 mg      5. Type II DM: A1C 5.17%, repeat with Arnaud Monson MD as likely inaccurate with anemia  glucose at goal on home metformin 500 mg daily       6. GERD: no acute issues on PPI, resume home omeprazole 40 mg bid      7. Hypothyroidism: no acute issues on home levothyroxine 50 mcg      8. 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 H/O of Rheumatoid Arthritis: no acute issues     10. H/O mild-moderate mitral regurgitation: no acute issues here     11. Hyponatremia: Resolved, normal

## 2017-01-05 NOTE — PLAN OF CARE
Problem: Patient Care Overview (Adult)  Goal: Plan of Care Review  Outcome: Ongoing (interventions implemented as appropriate)    01/05/17 1226   Coping/Psychosocial Response Interventions   Plan Of Care Reviewed With patient   Patient Care Overview   Progress improving   Outcome Evaluation   Outcome Summary/Follow up Plan PT: pt showed good improvement this morning with mobility. Bed mob to edge of bed mod A to just legs, pt able to manage upper trunk /body to sit.but needing verbal cues. Transfer to bedside commode with [place] of 1person, with stdwx TTWB RLE, Gait w/ stdwx for 12 ft, min asst for sequencing, and maintaining TTWB, 2nd person assist for chair following. Ther ex for LE strengthening with asst. Pt education re: hip prec (remembers 2/3 ), 2 written handouts given, and WB status .Needs verbal cues to decrease breathing rate due to anxiousness. Pain 6/10 pre and post treatment. Plan: cont ther ex, func mobility training and pt education

## 2017-01-05 NOTE — PLAN OF CARE
Problem: Patient Care Overview (Adult)  Goal: Plan of Care Review  Outcome: Ongoing (interventions implemented as appropriate)    01/05/17 1351   Coping/Psychosocial Response Interventions   Plan Of Care Reviewed With patient   Patient Care Overview   Progress improving   Outcome Evaluation   Outcome Summary/Follow up Plan PT: pt able to recite hip precautions but does not always follow them. Pt requires assist of two for mobility but is doing well maintaining TTWB. LE HEP x 10 reps.

## 2017-01-05 NOTE — PROGRESS NOTES
Acute Care - Physical Therapy Treatment Note   Roxanna Phillip     Patient Name: Ashli León  : 1935  MRN: [id number]  Today's Date: 2017  Onset of Illness/Injury or Date of Surgery Date: 17  Date of Referral to PT: 17  Referring Physician: Dr. Whittington    Admit Date: 2017    Visit Dx:    ICD-10-CM ICD-9-CM   1. Closed right hip fracture, initial encounter S72.001A 820.8   2. Closed fracture of neck of right femur, initial encounter S72.001A 820.8     Patient Active Problem List   Diagnosis   • History of hip surgery   • Primary osteoarthritis of both knees   • Arthritis of right knee   • Aftercare following right knee joint replacement surgery   • Status post total right knee replacement   • History of knee replacement, total   • History of total knee arthroplasty   • Chronic pain of left knee   • Arthritis of left knee   • Closed right hip fracture   • Femoral neck fracture               Adult Rehabilitation Note       17 1050 17 1320       Rehab Assessment/Intervention    Discipline physical therapist  - physical therapist  -     Document Type therapy note (daily note)  - therapy note (daily note)  -     Subjective Information agree to therapy;complains of;pain   stated increased pain during turning for bed pan, meds given  - agree to therapy;complains of;pain  -CH     Patient Effort, Rehab Treatment good  -CH good  -     Symptoms Noted Comment  anxiousness  -     Precautions/Limitations fall precautions;hip precautions- right   TTWB RLE  - fall precautions;hip precautions- right   oxy removed by nursing, RLE TTWB  -CH     Recorded by [CH] Nessa Tapia, PT [CH] Nessa Tapia, PT     Pain Assessment    Pain Assessment 0-10  -CH 0-10  -CH     Pain Score 6  -CH 4  -CH     Post Pain Score 6  -CH 6  -CH     Pain Type Acute pain;Surgical pain  -CH Acute pain;Surgical pain  -CH     Pain Location Hip  -CH Hip  -CH     Pain Orientation Right  -CH Right  -CH     Pain  Intervention(s) Medication (See MAR);Repositioned;Ambulation/increased activity  - Medication (See MAR);Repositioned  -CH     Response to Interventions tolerated ex and transfer/gait as expected  - tolerated  -CH     Recorded by [CH] Nessa Tapia PT [CH] Nessa Tapia PT     Bed Mobility, Assessment/Treatment    Bed Mob, Supine to Sit, [place] moderate assist (50% patient effort);verbal cues required   modA for RLE to EOB, pt able to manage upperbody  -CH      Bed Mob, Sit to Supine, [place]  moderate assist (50% patient effort);2 person assist required  -CH     Bed Mobility, Comment improved bed mobility, especially with upper trunk  -CH verbal/tactile cues fo maintain hip prec  -CH     Recorded by [CH] Nessa Tapia PT [CH] Nessa Tapia PT     Transfer Assessment/Treatment    Transfers, Sit-Stand [place] minimum assist (75% patient effort);verbal cues required  -CH minimum assist (75% patient effort);2 person assist required  -CH     Transfers, Stand-Sit [place] minimum assist (75% patient effort);verbal cues required   verbal cues to avoid too much trunk/hip flex when sitting  - minimum assist (75% patient effort)  -     Transfers, Sit-Stand-Sit, Assist Device standard walker  - standard walker  -     Toilet Transfer, Baldwin contact guard assist  -      Toilet Transfer, Assistive Device bedside commode without drop arms;standard walker  -      Transfer, Comment pt improved in transfers needing 1 asst  - verb cues for hand placement and WB status  -CH     Recorded by [CH] Nessa Tapia, PT [CH] Nessa Tapia PT     Gait Assessment/Treatment    Gait, [place] Level minimum assist (75% patient effort);verbal cues required;1 person + 1 person to manage equipment   asst of 1 for chair following, asst for sequencingand WBprec  - minimum assist (75% patient effort);2 person assist required  -     Gait, Assistive Device standard walker  - standard walker  -CH      Gait, Distance (Feet) 12  - 4  -CH     Gait, Gait Deviations sarah decreased;forward flexed posture;step length decreased  - forward flexed posture;step length decreased  -     Gait, Maintain Weight Bearing Status able to maintain weight bearing status  - able to maintain weight bearing status  -     Gait, Safety Issues sequencing ability decreased;step length decreased  - balance decreased during turns;sequencing ability decreased;step length decreased;weight-shifting ability decreased  -     Gait, Impairments strength decreased;impaired balance   fatigued easily  - strength decreased;impaired balance  -     Gait, Comment improved steps without swiveling  - did not swivel al much this afternoon, improved stepping   -     Recorded by [] Nessa Tapia PT [CH] Nessa Tapia PT     Therapy Exercises    Bilateral Lower Extremities 15 reps;supine;ankle pumps/circles;glut sets;heel slides;hip abduction/adduction;SAQ  -CH 10 reps;AAROM:;supine;ankle pumps/circles;heel slides;hip abduction/adduction;glut sets  -     Recorded by [] Nessa Tapia PT [CH] Nessa Tapia PT     Positioning and Restraints    Pre-Treatment Position in bed  - sitting in chair/recliner  -     Post Treatment Position chair  - bed  -     In Bed  supine;call light within reach;encouraged to call for assist;pillow between legs   abduction pillow between legs  -     In Chair reclined;call light within reach;encouraged to call for assist;notified nsg;ABD pillow;legs elevated  -      Recorded by [CH] Nessa Tapia PT [] Nessa Tapia PT       User Key  (r) = Recorded By, (t) = Taken By, (c) = Cosigned By    Initials Name Effective Dates     Nessa Tapia PT 12/01/15 -                 IP PT Goals       01/04/17 1305          Bed Mobility PT STG    Bed Mobility PT STG, Date Established 01/04/17  -      Bed Mobility PT STG, Time to Achieve 4 days  -      Bed Mobility PT STG, Activity Type supine to sit/sit to  supine  -CH      Bed Mobility PT STG, [place] Level minimum assist (75% patient effort)  -      Transfer Training PT STG    Transfer Training PT STG, Date Established 01/04/17  -      Transfer Training PT STG, Time to Achieve 4 days  -CH      Transfer Training PT STG, Activity Type sit to stand/stand to sit;bed to chair /chair to bed  -CH      Transfer Training PT STG, [place] Level minimum assist (75% patient effort)  -CH      Transfer Training PT STG, Assist Device walker, standard  -CH      Gait Training PT STG    Gait Training Goal PT STG, Date Established 01/04/17  -      Gait Training Goal PT STG, Time to Achieve 4 days  -CH      Gait Training Goal PT STG, [place] Level moderate assist (50% patient effort)  -      Gait Training Goal PT STG, Assist Device walker, standard  -      Gait Training Goal PT STG, Distance to Achieve 25  -CH      Patient Education PT STG    Patient Education PT STG, Date Established 01/04/17  -      Patient Education PT STG, Time to Achieve 4 days  -CH      Patient Education PT STG, Education Type precaution per surgeon;written program   TTWB precaution RLE  -      Patient Education PT STG, Education Understanding demonstrate adequately;verbalize understanding  -        User Key  (r) = Recorded By, (t) = Taken By, (c) = Cosigned By    Initials Name Provider Type     Nessa Tapia, PT Physical Therapist          Physical Therapy Education     Title: PT OT SLP Therapies (Done)     Topic: Physical Therapy (Done)     Point: Mobility training (Done)    Learning Progress Summary    Learner Readiness Method Response Comment Documented by Status   Patient Acceptance GREGORY AWAN DU Pt re instructed on WB status/TTWB rle during transfers and gait. Taught supine exercises AAROM 15 reps  01/05/17 1225 Done    Acceptance FARRAH AWAN D VU,NR Educated on func mobility, WB status, hip precautions. Remembered 2/3 precautions. Reviewed prior and after treatment session  01/04/17  "1523 Done    Acceptance E,D VU,NR Instructed in Hip precautions and WB statuse during mobility.  01/04/17 1255 Done               Point: Home exercise program (Done)    Learning Progress Summary    Learner Readiness Method Response Comment Documented by Status   Patient Acceptance E,D VU,DU Pt re instructed on WB status/TTWB rle during transfers and gait. Taught supine exercises AAROM 15 reps  01/05/17 1225 Done    Acceptance E,TB,D VU,NR Educated on func mobility, WB status, hip precautions. Remembered 2/3 precautions. Reviewed prior and after treatment session  01/04/17 1523 Done               Point: Body mechanics (Done)    Learning Progress Summary    Learner Readiness Method Response Comment Documented by Status   Patient Acceptance E,D VU,DU Pt re instructed on WB status/TTWB rle during transfers and gait. Taught supine exercises AAROM 15 reps  01/05/17 1225 Done    Acceptance E,TB,D VU,NR Educated on func mobility, WB status, hip precautions. Remembered 2/3 precautions. Reviewed prior and after treatment session  01/04/17 1523 Done    Acceptance E,D VU,NR Instructed in Hip precautions and WB statuse during mobility.  01/04/17 1255 Done               Point: Precautions (Done)    Learning Progress Summary    Learner Readiness Method Response Comment Documented by Status   Patient Acceptance E,D,H VU,NR 2 written handouts given re: hip precautions. Went over with pt. Pt able to remember 2/3. Forgot  \"do not cross legs\" precautions  01/05/17 1223 Done    Acceptance E,TB,D VU,NR Educated on func mobility, WB status, hip precautions. Remembered 2/3 precautions. Reviewed prior and after treatment session  01/04/17 1523 Done    Acceptance E,D VU,NR Instructed in Hip precautions and WB statuse during mobility.  01/04/17 1255 Done                      User Key     Initials Effective Dates Name Provider Type Discipline     12/01/15 -  Nessa Tapia, PT Physical Therapist PT                    PT " Recommendation and Plan  Anticipated Discharge Disposition: skilled nursing facility, extended care facility (to be determined)  Planned Therapy Interventions: bed mobility training, transfer training, gait training, strengthening, balance training  PT Frequency: 2 times/day  Plan of Care Review  Plan Of Care Reviewed With: patient  Progress: improving  Outcome Summary/Follow up Plan: PT: pt showed good improvement this morning with mobility. Bed mob to edge of bed mod A to just legs, pt able to manage upper trunk /body to sit.but needing verbal cues. Transfer to bedside commode with Esperanza of 1person, with stdwx TTWB RLE, Gait w/ stdwx  for 12 ft, min asst for sequencing, and maintaining TTWB, 2nd person assist for chair following. Ther ex for LE strengthening with asst. Pt education re: hip prec (remembers 2/3 ), 2 written handouts given, and WB status .Needs verbal cues to decrease breathing rate due to anxiousness. Pain 6/10 pre and post treatment.  Plan: cont ther ex, func mobility training and pt education          Outcome Measures       01/05/17 1050 01/04/17 1100 01/04/17 1030    How much help from another person do you currently need...    Turning from your back to your side while in flat bed without using bedrails? 2  -CH      Moving from lying on back to sitting on the side of a flat bed without bedrails? 3  -CH      Moving to and from a bed to a chair (including a wheelchair)? 3  -CH      Standing up from a chair using your arms (e.g., wheelchair, bedside chair)? 3  -CH      Climbing 3-5 steps with a railing? 1  -CH      To walk in hospital room? 2  -CH      AM-PAC 6 Clicks Score 14  -CH      How much help from another is currently needed...    Putting on and taking off regular lower body clothing?   2  -EN    Bathing (including washing, rinsing, and drying)   2  -EN    Toileting (which includes using toilet bed pan or urinal)   2  -EN    Putting on and taking off regular upper body clothing   4  -EN     Taking care of personal grooming (such as brushing teeth)   4  -EN    Eating meals   4  -EN    Score   18  -EN    Functional Assessment    Outcome Measure Options AM-PAC 6 Clicks Basic Mobility (PT)  - AM-PAC 6 Clicks Daily Activity (OT)  -EN       01/04/17 1015          How much help from another person do you currently need...    Turning from your back to your side while in flat bed without using bedrails? 2  -CH      Moving from lying on back to sitting on the side of a flat bed without bedrails? 2  -CH      Moving to and from a bed to a chair (including a wheelchair)? 2  -CH      Standing up from a chair using your arms (e.g., wheelchair, bedside chair)? 3  -CH      Climbing 3-5 steps with a railing? 1  -CH      To walk in hospital room? 2  -CH      AM-PAC 6 Clicks Score 12  -CH      Functional Assessment    Outcome Measure Options AM-PAC 6 Clicks Basic Mobility (PT)  -        User Key  (r) = Recorded By, (t) = Taken By, (c) = Cosigned By    Initials Name Provider Type    EN Niyah Demarco, OTR Occupational Therapist     Nessa Tapia PT Physical Therapist           Time Calculation:         PT Charges       01/05/17 1236          Time Calculation    Start Time 1050  -      Stop Time 1130  -      Time Calculation (min) 40 min  -      PT Received On 01/05/17  -      PT - Next Appointment 01/05/17  -        User Key  (r) = Recorded By, (t) = Taken By, (c) = Cosigned By    Initials Name Provider Type     Nessa Tapia PT Physical Therapist          Therapy Charges for Today     Code Description Service Date Service Provider Modifiers Qty    [id number]  PT EVAL MOD COMPLEXITY 2 1/4/2017 Nessa Tapia, PT GP 1    61722534281 HC PT THER PROC EA 15 MIN 1/4/2017 Nesas Tapia PT GP 2    48516195594 HC GAIT TRAINING EA 15 MIN 1/4/2017 Nessa Tapia PT GP 1    81646113534 HC PT THER SUPP EA 15 MIN 1/4/2017 Nessa Tapia PT GP 1    86633304468 HC GAIT TRAINING EA 15 MIN 1/5/2017 Nessa Tapia, PT GP  1    [id number]  PT THER PROC EA 15 MIN 1/5/2017 Nessa Tapia, PT GP 2          PT G-Codes  Outcome Measure Options: AM-PAC 6 Clicks Basic Mobility (PT)    Nessa Tapia, PT  1/5/2017

## 2017-01-05 NOTE — DISCHARGE SUMMARY
Ashli VALENTE [facility]  1935  [id number]    Hospitalists Discharge Summary    Date of Admission: 1/2/2017  Date of Discharge:  1/6/2017    Primary Discharge Diagnoses:   1. S/P Removal of previously placed intramedullary femoral nail with right endoprosthetic hip replacement secondary to right femoral neck fracture  Secondary Discharge Diagnoses:    2. Acute post operative anemia    3. HTN    4. Hyperlipidemia    5. Type II DM    6. GERD    7. Hypothyroidism    8. DJD spine    9. H/O of Rheumatoid Arthritis  10. H/O mild-moderate mitral regurgitation    PCP  Patient Care Team:  Arnaud Monson MD as PCP - General  Arnaud Monson MD as PCP - Family Medicine    Consults:   Consults     No orders found from 12/4/2016 to 1/3/2017.        Operations and Procedures Performed:  Procedure(s):  HIP HEMIARTHROPLASTY with IM rajesh and 2 screw hardware removal antibiotic cement     Xr Ribs Right With Pa Chest    Result Date: 12/20/2016  Narrative: INDICATION: Fall x 1 day. Right rib Pain. Prior right rib fractures.  FINDINGS: PA view the chest and oblique views of the right ribs compared to 07/24/2014.  The heart is enlarged, unchanged. Chronic interstitial opacities in both lungs. No new opacity. Old left-sided an old right-sided rib fractures are identified.      Impression: No acute cardiopulmonary findings. No acute rib fractures.  This report was finalized on 12/20/2016 11:27 AM by Dr. Gaetano Quintero MD.      Xr Hip 1 View Without Pelvis Right (surgery Only)    Result Date: 1/3/2017  Narrative: EXAM: Single AP right hip  DATE 01/03/2017 at 1303  HISTORY:: Postop right hip  COMPARISON: Right hip radiographs 01/02/2017  FINDINGS:: Surgical changes of total right hip replacement are present. The prosthesis appears appropriately seated. No periprosthetic fracture is seen.      Impression: 1. Satisfactory postoperative appearance status post right hip replacement.  This report was finalized on 1/3/2017 1:10 PM by   Sylvia Zapien MD.      Xr Chest 1 View    Result Date: 1/3/2017  Narrative: EXAM: AP PORTABLE CHEST  DATE: 01/02/2017  HISTORY: Preoperative evaluation for right hip fracture. Hypertension on medication.  COMPARISON: Right rib detail series 12/20/2016. PA chest 12/20/2016.  FINDINGS: Chronic-appearing interstitial changes in both lungs appear stable. Multiple old bilateral rib fractures. Degenerative changes at both shoulders. Suspected old distal left clavicle fracture. Multilevel degenerative endplate changes in the thoracic and lumbar spine with osteopenia. Cholecystectomy. Heart size within normal limits. Pulmonary vascular distribution within normal limits.      Impression: 1. Chronic interstitial changes in both lungs. No acute chest findings. 2. Old bilateral rib fractures and old left clavicle fracture.  This report was finalized on 1/3/2017 11:38 AM by Dr. Sylvia Zapien MD.      Xr Hip With Or Without Pelvis 2 - 3 View Right    Result Date: 1/5/2017  Narrative: AP PORTABLE RIGHT HIP, 01/05/2017:  HISTORY: Postop right hip arthroplasty following right femoral neck fracture, 01/03/2017. Hip pain today.  TECHNIQUE: Single AP radiograph of the right hip was obtained portably.  FINDINGS: Recently placed proximal femoral endoprosthesis appears well positioned. No visible acute fracture or dislocation.  Bony changes within the visualized proximal femoral shaft likely related to removal of intramedullary rajesh prior to arthroplasty surgery.      Impression: 1. No acute osseous abnormality. 2. Well-positioned right proximal femoral endoprosthesis.  This report was finalized on 1/5/2017 12:28 PM by Dr. Italo Hazel MD.      Xr Hip With Or Without Pelvis 2 - 3 View Right    Result Date: 1/3/2017  Narrative: AP PELVIS AND AP VIEW OF THE RIGHT FEMUR AND LATERAL VIEW OF THE RIGHT KNEE 01/02/2017 AT 0820 HOURS  HISTORY: Fell this morning. Right hip pain. History of hip surgery in 1999.  COMPARISON: AP pelvis and AP  "view of the right femur 12/20/2016.  FINDINGS: There is a displaced subcapital fracture of the right femoral neck with mild superior angulation of the fracture apex. No femoral head dislocation is seen. An intramedullary rajesh is present within the femoral shaft with distal interlocking screws. Right total knee replacement changes are present. The knee joint appears appropriately aligned.  There is mild degenerative narrowing of bilateral hip joint spaces. Osteopenic changes are present. Multiple round calcifications is the pelvis on the right thought to represent phleboliths.      Impression:  1. Displaced subcapital fracture of the right femoral neck without femoral head dislocation.  2. Preliminary report was provided by provided by Dr. Umaña on 01/02/2017 at 0850 hours.  This report was finalized on 1/3/2017 8:30 AM by Dr. Sylvia Zapien MD.      Xr Hip With Or Without Pelvis 2 - 3 View Right    Result Date: 12/20/2016  Narrative: INDICATION: Fall. Right hip pain.  FINDINGS: AP view the pelvis, and AP view the right hip compared to 11/29/2016 there is no acute fracture or dislocation. There is mild arthrosis of both hips. Sacroiliac joints and pubic symphysis are within normal limits. An intramedullary nail in the right femur is unchanged. There is been prior right total knee arthroplasty.      Impression: No acute findings.  This report was finalized on 12/20/2016 11:29 AM by Dr. Gaetano Quintero MD.      Allergies:  is allergic to neomycin and statins.    Billy  Hydrocodone 5/325 mg last rx 12/2016 per report 1/2/17    Discharge Medications:            History of Present Illness: Taken from original [facility] on admit:   \"Ms. Ashli León is an 81-year-old  female who has hx of Rt Hip fracture several years ago which was treated with a rajesh. Her surgery was done at [facility].  She was walking around the bed yesterday at 5:30 PM and felt a pop in her right hip. She slumped over " "into the bed. The patient has history of hip surgery in the remote past with a rajesh in her finger. She does not recall who her orthopedist was. Patient denies any sx of headache, chest pain, shortness of breath, abdominal pain or n/v, dysuria or recent hx of blood in stool.\"    Hospital Course  1. S/P Removal of previously placed intramedullary femoral nail with right endoprosthetic hip replacement secondary to right femoral neck fracture:    Pain/PT/OT/DVT prophy with  mg bid per Dr. Whittington     2. Acute post operative anemia  Hgb down from 11.2 to 7.9, patient refused blood transfusion and was started on iron orally while here  No previous iron studies are noted  Continue for now, but recommend f/u with Arnaud Monson MD or SNF provider within 1 week      3. HTN: at goal On Metoprolol tartrate 25 mg daily      4. Hyperlipidemia: no acute issues on home Pravastatin 40 mg      5. Type II DM: A1C____  glucose at goal on home metformin 500 mg daily       6. GERD: no acute issues on PPI, resume home omeprazole 40 mg bid      7. Hypothyroidism: no acute issues on home levothyroxine 50 mcg      8. 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 H/O of Rheumatoid Arthritis: no acute issues    10. H/O mild-moderate mitral regurgitation: no acute issues here    11. Hyponatremia: last , repeat BMP _______      Last Lab Results:   Lab Results (most recent)     Procedure Component Value Units Date/Time    CBC & Differential [[id number]] Collected:  01/02/17 1116    Specimen:  Blood Updated:  01/02/17 1120    Narrative:       The following orders were created for panel order CBC & Differential.  Procedure                               Abnormality         Status                     ---------                               -----------         ------                     CBC Auto Differential[[id number]]         Abnormal            Final result                 Please view results for these tests on the individual orders.    CBC Auto " Differential [[id number]]  (Abnormal) Collected:  01/02/17 1116    Specimen:  Blood Updated:  01/02/17 1120     WBC 7.79 10*3/mm3      RBC 3.58 (L) 10*6/mm3      Hemoglobin 11.2 (L) g/dL      Hematocrit 32.6 (L) %      MCV 91.1 fL      MCH 31.3 (H) pg      MCHC 34.4 g/dL      RDW 11.7 %      RDW-SD 39.2 fl      MPV 9.3 fL      Platelets 322 10*3/mm3      Neutrophil % 66.4 %      Lymphocyte % 25.3 %      Monocyte % 7.4 %      Eosinophil % 0.3 %      Basophil % 0.3 %      Immature Grans % 0.3 %      Neutrophils, Absolute 5.18 10*3/mm3      Lymphocytes, Absolute 1.97 10*3/mm3      Monocytes, Absolute 0.58 10*3/mm3      Eosinophils, Absolute 0.02 (L) 10*3/mm3      Basophils, Absolute 0.02 10*3/mm3      Immature Grans, Absolute 0.02 10*3/mm3      nRBC 0.0 /100 WBC     Comprehensive Metabolic Panel [[id number]]  (Abnormal) Collected:  01/02/17 1116    Specimen:  Blood Updated:  01/02/17 1137     Glucose 114 (H) mg/dL      BUN 14 mg/dL      Creatinine 0.69 mg/dL      Sodium 130 (L) mmol/L      Potassium 3.8 mmol/L      Chloride 92 (L) mmol/L      CO2 23.6 mmol/L      Calcium 9.8 mg/dL      Total Protein 7.1 g/dL      Albumin 3.90 g/dL      ALT (SGPT) 8 U/L      AST (SGOT) 11 U/L      Alkaline Phosphatase 60 U/L      Total Bilirubin 1.0 mg/dL      eGFR Non African Amer 82 mL/min/1.73      Globulin 3.2 gm/dL      A/G Ratio 1.2 g/dL      BUN/Creatinine Ratio 20.3      Anion Gap 14.4 mmol/L     Narrative:       The MDRD GFR formula is only valid for adults with stable renal function between ages 18 and 70.    Urinalysis With / Culture If Indicated [[id number]]  (Normal) Collected:  01/02/17 4367    Specimen:  Urine from Urine, Clean Catch Updated:  01/02/17 1507     Color, UA Yellow      Appearance, UA Clear      pH, UA 6.0      Specific Gravity, UA 1.010      Glucose, UA Negative      Ketones, UA Negative      Bilirubin, UA Negative      Blood, UA Negative      Protein, UA Negative      Leuk Esterase, UA Negative      Nitrite, UA  Negative      Urobilinogen, UA 0.2 E.U./dL     Narrative:       Urine microscopic not indicated.    POC Glucose Fingerstick [[id number]]  (Normal) Collected:  01/02/17 2034    Specimen:  Blood Updated:  01/02/17 2042     Glucose 122 mg/dL     Narrative:       Meter: [id number] : [id number] Cherie Watts    CBC & Differential [[id number]] Collected:  01/03/17 0317    Specimen:  Blood Updated:  01/03/17 0426    Narrative:       The following orders were created for panel order CBC & Differential.  Procedure                               Abnormality         Status                     ---------                               -----------         ------                     CBC Auto Differential[[id number]]         Abnormal            Final result                 Please view results for these tests on the individual orders.    CBC Auto Differential [[id number]]  (Abnormal) Collected:  01/03/17 0317    Specimen:  Blood Updated:  01/03/17 0426     WBC 7.79 10*3/mm3      RBC 3.60 (L) 10*6/mm3      Hemoglobin 11.2 (L) g/dL      Hematocrit 33.9 (L) %      MCV 94.2 fL      MCH 31.1 (H) pg      MCHC 33.0 g/dL      RDW 11.8 %      RDW-SD 41.0 fl      MPV 10.2 fL      Platelets 329 10*3/mm3      Neutrophil % 65.7 %      Lymphocyte % 23.0 %      Monocyte % 9.0 (H) %      Eosinophil % 1.5 %      Basophil % 0.3 %      Immature Grans % 0.5 %      Neutrophils, Absolute 5.12 10*3/mm3      Lymphocytes, Absolute 1.79 10*3/mm3      Monocytes, Absolute 0.70 10*3/mm3      Eosinophils, Absolute 0.12 10*3/mm3      Basophils, Absolute 0.02 10*3/mm3      Immature Grans, Absolute 0.04 (H) 10*3/mm3      nRBC 0.0 /100 WBC     Comprehensive Metabolic Panel [[id number]]  (Abnormal) Collected:  01/03/17 0636    Specimen:  Blood Updated:  01/03/17 0736     Glucose 114 (H) mg/dL      BUN 15 mg/dL      Creatinine 0.60 mg/dL      Sodium 130 (L) mmol/L      Potassium 4.5 mmol/L      Chloride 91 (L) mmol/L      CO2 24.0 mmol/L      Calcium 9.8 mg/dL      Total Protein  7.5 g/dL      Albumin 3.70 g/dL      ALT (SGPT) 9 U/L      AST (SGOT) 12 U/L      Alkaline Phosphatase 59 U/L      Total Bilirubin 0.8 mg/dL      eGFR Non African Amer 96 mL/min/1.73      Globulin 3.8 gm/dL      A/G Ratio 1.0 g/dL      BUN/Creatinine Ratio 25.0      Anion Gap 15.0 mmol/L     Narrative:       The MDRD GFR formula is only valid for adults with stable renal function between ages 18 and 70.    POC Glucose Fingerstick [[id number]]  (Normal) Collected:  01/03/17 0730    Specimen:  Blood Updated:  01/03/17 0741     Glucose 105 mg/dL     Narrative:       Meter: [id number] : [id number] Mike Collier    POC Glucose Fingerstick [[id number]]  (Normal) Collected:  01/03/17 1630    Specimen:  Blood Updated:  01/03/17 1636     Glucose 119 mg/dL     Narrative:       Meter: [id number] : [id number] Merary Arreguin    POC Glucose Fingerstick [[id number]]  (Abnormal) Collected:  01/03/17 2024    Specimen:  Blood Updated:  01/03/17 2031     Glucose 171 (H) mg/dL     Narrative:       Meter: [id number] : [id number] Juli Cedillo (Pate)    CBC & Differential [[id number]] Collected:  01/04/17 0415    Specimen:  Blood Updated:  01/04/17 0629    Narrative:       The following orders were created for panel order CBC & Differential.  Procedure                               Abnormality         Status                     ---------                               -----------         ------                     Manual Differential[[id number]]                               Final result               Scan Slide[[id number]]                                        Final result               CBC Auto Differential[[id number]]         Abnormal            Final result                 Please view results for these tests on the individual orders.    CBC Auto Differential [[id number]]  (Abnormal) Collected:  01/04/17 0415    Specimen:  Blood Updated:  01/04/17 0629     WBC 4.89 10*3/mm3      RBC 2.36 (L) 10*6/mm3      Hemoglobin 7.4 (C) g/dL       Hematocrit 22.6 (C) %      MCV 95.8 fL      MCH 31.4 (H) pg      MCHC 32.7 g/dL      RDW 11.9 %      RDW-SD 41.3 fl      MPV 9.7 fL      Platelets 229 10*3/mm3     Scan Slide [[id number]] Collected:  01/04/17 0415    Specimen:  Blood Updated:  01/04/17 0629     Scan Slide --       See Manual Differential Results       Manual Differential [[id number]] Collected:  01/04/17 0415    Specimen:  Blood Updated:  01/04/17 0629     Neutrophil % 66.0 %      Lymphocyte % 20.0 %      Monocyte % 6.0 %      Eosinophil % 4.0 %      Bands %  4.0 %      Neutrophils Absolute 3.42 10*3/mm3      Lymphocytes Absolute 0.98 10*3/mm3      Monocytes Absolute 0.29 10*3/mm3      Eosinophils Absolute 0.20 10*3/mm3      Anisocytosis Slight/1+      Hypochromia Slight/1+      WBC Morphology Normal      Platelet Morphology Normal     aPTT [[id number]]  (Normal) Collected:  01/04/17 0415    Specimen:  Blood Updated:  01/04/17 0704     PTT 36.3 seconds     Narrative:       PTT = The equivalent PTT values for the therapeutic range of heparin levels at 0.1 to 0.7 U/ml are 53 to 110 seconds.    POC Glucose Fingerstick [[id number]]  (Normal) Collected:  01/04/17 0728    Specimen:  Blood Updated:  01/04/17 0734     Glucose 103 mg/dL     Narrative:       Meter: [id number] : [id number] [employer]    POC Glucose Fingerstick [[id number]]  (Abnormal) Collected:  01/04/17 1143    Specimen:  Blood Updated:  01/04/17 1200     Glucose 136 (H) mg/dL     Narrative:       Meter: [id number] : [id number] Green James    POC Glucose Fingerstick [[id number]]  (Normal) Collected:  01/04/17 1552    Specimen:  Blood Updated:  01/04/17 1558     Glucose 117 mg/dL     Narrative:       Meter: [id number] : [id number] Green James    POC Glucose Fingerstick [[id number]]  (Normal) Collected:  01/04/17 1724    Specimen:  Blood Updated:  01/04/17 1732     Glucose 105 mg/dL     Narrative:       Meter: [id number] : [id number] Mayela Carey RN    POC Glucose Fingerstick  [[id number]]  (Abnormal) Collected:  01/04/17 2010    Specimen:  Blood Updated:  01/04/17 2020     Glucose 160 (H) mg/dL     Narrative:       Meter: [id number] : [id number] Cherie Watts    CBC & Differential [[id number]] Collected:  01/05/17 0550    Specimen:  Blood Updated:  01/05/17 0615    Narrative:       The following orders were created for panel order CBC & Differential.  Procedure                               Abnormality         Status                     ---------                               -----------         ------                     CBC Auto Differential[[id number]]         Abnormal            Final result                 Please view results for these tests on the individual orders.    CBC Auto Differential [[id number]]  (Abnormal) Collected:  01/05/17 0550    Specimen:  Blood Updated:  01/05/17 0615     WBC 6.33 10*3/mm3      RBC 2.55 (L) 10*6/mm3      Hemoglobin 7.9 (L) g/dL      Hematocrit 24.5 (L) %      MCV 96.1 fL      MCH 31.0 pg      MCHC 32.2 g/dL      RDW 11.9 %      RDW-SD 41.4 fl      MPV 9.7 fL      Platelets 246 10*3/mm3      Neutrophil % 64.6 %      Lymphocyte % 18.5 (L) %      Monocyte % 10.1 (H) %      Eosinophil % 5.8 (H) %      Basophil % 0.5 %      Immature Grans % 0.5 %      Neutrophils, Absolute 4.09 10*3/mm3      Lymphocytes, Absolute 1.17 10*3/mm3      Monocytes, Absolute 0.64 10*3/mm3      Eosinophils, Absolute 0.37 (H) 10*3/mm3      Basophils, Absolute 0.03 10*3/mm3      Immature Grans, Absolute 0.03 10*3/mm3      nRBC 0.0 /100 WBC     POC Glucose Fingerstick [[id number]]  (Normal) Collected:  01/05/17 0744    Specimen:  Blood Updated:  01/05/17 0758     Glucose 105 mg/dL     Narrative:       Meter: [id number] : [id number] Sydni Gastelum        Imaging Results (most recent)     Procedure Component Value Units Date/Time    XR Hip With or Without Pelvis 2 - 3 View Right [[id number]] Collected:  01/03/17 0749     Updated:  01/03/17 0832    Narrative:       AP PELVIS AND AP VIEW OF  THE RIGHT FEMUR AND LATERAL VIEW OF THE RIGHT  KNEE 01/02/2017 AT 0820 HOURS     HISTORY: Fell this morning. Right hip pain. History of hip surgery in  1999.     COMPARISON: AP pelvis and AP view of the right femur 12/20/2016.     FINDINGS: There is a displaced subcapital fracture of the right femoral  neck with mild superior angulation of the fracture apex. No femoral head  dislocation is seen. An intramedullary rajseh is present within the femoral  shaft with distal interlocking screws. Right total knee replacement  changes are present. The knee joint appears appropriately aligned.     There is mild degenerative narrowing of bilateral hip joint spaces.  Osteopenic changes are present. Multiple round calcifications is the  pelvis on the right thought to represent phleboliths.       Impression:          1. Displaced subcapital fracture of the right femoral neck without  femoral head dislocation.     2. Preliminary report was provided by provided by Dr. Uamña on  01/02/2017 at 0850 hours.     This report was finalized on 1/3/2017 8:30 AM by Dr. Sylvia Zapien MD.       XR Chest 1 View [[id number]] Collected:  01/03/17 1132     Updated:  01/03/17 1141    Narrative:       EXAM: AP PORTABLE CHEST     DATE: 01/02/2017      HISTORY: Preoperative evaluation for right hip fracture. Hypertension on  medication.     COMPARISON: Right rib detail series 12/20/2016. PA chest 12/20/2016.     FINDINGS: Chronic-appearing interstitial changes in both lungs appear  stable. Multiple old bilateral rib fractures. Degenerative changes at  both shoulders. Suspected old distal left clavicle fracture. Multilevel  degenerative endplate changes in the thoracic and lumbar spine with  osteopenia. Cholecystectomy. Heart size within normal limits. Pulmonary  vascular distribution within normal limits.       Impression:       1. Chronic interstitial changes in both lungs. No acute chest findings.  2. Old bilateral rib fractures and old left  clavicle fracture.     This report was finalized on 1/3/2017 11:38 AM by Dr. Sylvia Zapien MD.       XR Hip 1 View Without Pelvis Right (Surgery Only) [[id number]] Collected:  01/03/17 1309     Updated:  01/03/17 1312    Narrative:       EXAM: Single AP right hip     DATE 01/03/2017 at 1303     HISTORY:: Postop right hip     COMPARISON: Right hip radiographs 01/02/2017     FINDINGS:: Surgical changes of total right hip replacement are present.  The prosthesis appears appropriately seated. No periprosthetic fracture  is seen.       Impression:       1. Satisfactory postoperative appearance status post right hip  replacement.     This report was finalized on 1/3/2017 1:10 PM by Dr. Sylvia Zapien MD.           PROCEDURES  Procedure(s):  HIP HEMIARTHROPLASTY with IM rajesh and 2 screw hardware removal antibiotic cement    Condition on Discharge:  Stable    Physical Exam at Discharge  Vital Signs  Temp:  [97.3 °F (36.3 °C)-98.3 °F (36.8 °C)] 97.9 °F (36.6 °C)  Heart Rate:  [78-86] 82  Resp:  [18] 18  BP: (101-111)/(53-59) 111/58    Physical Exam:  Physical Exam   Constitutional: Patient appears well-developed and well-nourished and in no acute distress   HEENT:   Head: Normocephalic and atraumatic.   Eyes:  Pupils are equal, round, and reactive to light. EOM are intact. Sclera are anicteric and non-injected.  Mouth and Throat: Patient has moist mucous membranes. Oropharynx is clear of any erythema or exudate.     Neck: Neck supple. No JVD present. No thyromegaly present. No lymphadenopathy present.  Cardiovascular: Regular rate, regular rhythm, S1 normal and S2 normal.  Exam reveals no gallop and no friction rub.  No murmur heard.  Pulmonary/Chest: Lungs are clear to auscultation bilaterally. No respiratory distress. No wheezes. No rhonchi. No rales.   Abdominal: Soft. Bowel sounds are normal. No distension and no mass. There is no hepatosplenomegaly. There is no tenderness.   Musculoskeletal: Normal Muscle  tone  Extremities: No edema. Pulses are palpable in all 4 extremities.  Neurological: Patient is alert and oriented to person, place, and time. Cranial nerves II-XII are grossly intact with no focal deficits.  Skin: Skin is warm. No rash noted. Nails show no clubbing.  No cyanosis or erythema.    Discharge Disposition  [facility]    Visiting Nurse:    As per facility     Home PT/OT:  As per facility     Home Safety Evaluation:  As per facility     DME  As per facility, PT/OT    Discharge Diet:         Dietary Orders            Start     Ordered    01/03/17 1348  Diet Regular; Consistent Carbohydrate, Cardiac  Diet Effective Now     Question Answer Comment   Diet Texture / Consistency Regular    Common Modifiers Consistent Carbohydrate    Common Modifiers Cardiac        01/03/17 1347        Activity at Discharge:  As per Dr. Whittington    Pre-discharge education  Diabetic, Cardiac, Wound Care, medications, follow up    Follow-up Appointments  Future Appointments  Date Time Provider Department Center   2/17/2017 9:30 AM Adrian Swenson MD [facility] None     Referrals and Follow-ups to Schedule     Follow-Up    As directed    Review prior to discharge   Follow Up Details:  1 week after discharge from facility       Schedule Discharge Follow Up    As directed    Follow Up:  3 Weeks   Special Instructions:  with truman               Test Results Pending at Discharge: NONE   TANIA Allen  01/06/17  11:49 AM    Time: Discharge over 30 min (if over 30 minutes give explanation as to why it took greater than 30 minutes)  Secondary to:  Review of labs/diagnostics/plan of care  Follow up arrangements  Medication reconciliation  D/W patient

## 2017-01-05 NOTE — PLAN OF CARE
Problem: Patient Care Overview (Adult)  Goal: Plan of Care Review  Outcome: Ongoing (interventions implemented as appropriate)    01/05/17 0300   Coping/Psychosocial Response Interventions   Plan Of Care Reviewed With patient   Patient Care Overview   Progress improving         Problem: Pain, Acute (Adult)  Goal: Acceptable Pain Control/Comfort Level  Outcome: Ongoing (interventions implemented as appropriate)    01/05/17 0300   Pain, Acute (Adult)   Acceptable Pain Control/Comfort Level making progress toward outcome         Problem: Constipation (Adult)  Goal: Comfort  Outcome: Ongoing (interventions implemented as appropriate)    01/05/17 0300   Constipation (Adult)   Comfort making progress toward outcome         Problem: Perioperative Period (Adult)  Goal: Signs and Symptoms of Listed Potential Problems Will be Absent or Manageable (Perioperative Period)  Outcome: Ongoing (interventions implemented as appropriate)    01/05/17 0300   Perioperative Period   Problems Assessed (Perioperative Period) all   Problems Present (Perioperative Period) pain

## 2017-01-05 NOTE — PLAN OF CARE
Problem: Patient Care Overview (Adult)  Goal: Plan of Care Review  Outcome: Ongoing (interventions implemented as appropriate)    01/05/17 1312   Coping/Psychosocial Response Interventions   Plan Of Care Reviewed With patient   Patient Care Overview   Progress improving   Outcome Evaluation   Outcome Summary/Follow up Plan OT: pt improved with functional transfers and mobility. She requires verbal cues to recall and adhere to hip precautions.          Problem: Inpatient Occupational Therapy  Goal: Transfer Training Goal 1 STG- OT  Outcome: Ongoing (interventions implemented as appropriate)    01/05/17 1312   Transfer Training OT STG   Transfer Training OT STG, Date Goal Reviewed 01/05/17   Transfer Training OT STG, Outcome goal partially met  (performed with min assist, but required VC's for precautions)

## 2017-01-05 NOTE — PLAN OF CARE
Problem: Patient Care Overview (Adult)  Goal: Plan of Care Review  Outcome: Ongoing (interventions implemented as appropriate)  Goal: Discharge Needs Assessment  Outcome: Ongoing (interventions implemented as appropriate)    Problem: Pain, Acute (Adult)  Goal: Acceptable Pain Control/Comfort Level  Outcome: Ongoing (interventions implemented as appropriate)    Problem: Constipation (Adult)  Goal: Comfort  Outcome: Ongoing (interventions implemented as appropriate)    Problem: Perioperative Period (Adult)  Goal: Signs and Symptoms of Listed Potential Problems Will be Absent or Manageable (Perioperative Period)  Outcome: Outcome(s) achieved Date Met:  01/05/17

## 2017-01-05 NOTE — PROGRESS NOTES
Acute Care - Physical Therapy Treatment Note  [facility]     Patient Name: Ashli León  : 1935  MRN: [id number]  Today's Date: 2017  Onset of Illness/Injury or Date of Surgery Date: 17  Date of Referral to PT: 17  Referring Physician: Dr. Whittington    Admit Date: 2017    Visit Dx:    ICD-10-CM ICD-9-CM   1. Closed right hip fracture, initial encounter S72.001A 820.8   2. Closed fracture of neck of right femur, initial encounter S72.001A 820.8     Patient Active Problem List   Diagnosis   • History of hip surgery   • Primary osteoarthritis of both knees   • Arthritis of right knee   • Aftercare following right knee joint replacement surgery   • Status post total right knee replacement   • History of knee replacement, total   • History of total knee arthroplasty   • Chronic pain of left knee   • Arthritis of left knee   • Closed right hip fracture   • Femoral neck fracture               Adult Rehabilitation Note       17 1310 17 1130 17 1050    Rehab Assessment/Intervention    Discipline physical therapy assistant  -KM occupational therapist  -SD physical therapist  -CH    Document Type therapy note (daily note)  -KM therapy note (daily note)  -SD therapy note (daily note)  -    Subjective Information agree to therapy   states she needs to go to the bathroom  -KM agree to therapy;complains of;pain  -SD agree to therapy;complains of;pain   stated increased pain during turning for bed pan, meds given  -CH    Patient Effort, Rehab Treatment  good  -SD good  -CH    Precautions/Limitations fall precautions;hip precautions- right  -KM fall precautions;hip precautions- right;non-weight bearing status   partial weight bear status for Right LE  -SD fall precautions;hip precautions- right   TTWB RLE  -CH    Specific Treatment Considerations pt requires cues to adhere to hip precautions  -KM pt required cues to adhere recall and adhere to hip precautions.  -SD     Recorded by  [KM] Pat Salamanca PTA [SD] Duane Rossi, OTR [CH] Nessa Tapia, PT    Pain Assessment    Pain Assessment 0-10  -KM 0-10  -SD 0-10  -CH    Pain Score 6  -KM 6  -SD 6  -CH    Post Pain Score  6  -SD 6  -CH    Pain Type Surgical pain;Acute pain  -KM Acute pain;Surgical pain  -SD Acute pain;Surgical pain  -CH    Pain Location Hip  -KM Hip  -SD Hip  -CH    Pain Orientation Right  -KM Right  -SD Right  -CH    Pain Intervention(s) Medication (See MAR);Repositioned  -KM Repositioned  -SD Medication (See MAR);Repositioned;Ambulation/increased activity  -CH    Response to Interventions   tolerated ex and transfer/gait as expected  -CH    Recorded by [KM] Pat Salamanca PTA [SD] Duane Rossi, OTR [CH] Nessa Tapia, PT    Cognitive Assessment/Intervention    Follows Commands/Answers Questions  needs cueing;needs repetition   for hip precautions  -SD     Personal Safety  mild impairment   needs cues for hip precautions.  recalled 2/3   -SD     Personal Safety Interventions gait belt;nonskid shoes/slippers when out of bed  -KM gait belt;fall prevention program maintained;nonskid shoes/slippers when out of bed;supervised activity  -SD     Recorded by [KM] Pat Salamanca PTA [SD] Duane Rossi, OTR     Bed Mobility, Assessment/Treatment    Bed Mob, Supine to Sit, [place]   moderate assist (50% patient effort);verbal cues required   modA for RLE to EOB, pt able to manage upperbody  -CH    Bed Mob, Sit to Supine, [place] verbal cues required;minimum assist (75% patient effort);moderate assist (50% patient effort);2 person assist required  -KM      Bed Mobility, Comment cues to not twist in bed  -KM  improved bed mobility, especially with upper trunk  -CH    Recorded by [KM] Pat Salamanca PTA  [CH] Nessa Tapia, PT    Transfer Assessment/Treatment    Transfers, Sit-Stand [place] minimum assist (75% patient effort);2 person assist required;verbal cues required  -KM minimum assist (75% patient effort);verbal  cues required  -SD minimum assist (75% patient effort);verbal cues required  -CH    Transfers, Stand-Sit [place] minimum assist (75% patient effort);2 person assist required;verbal cues required  -KM minimum assist (75% patient effort);verbal cues required  -SD minimum assist (75% patient effort);verbal cues required   verbal cues to avoid too much trunk/hip flex when sitting  -CH    Transfers, Sit-Stand-Sit, Assist Device standard walker  -KM standard walker  -SD standard walker  -CH    Toilet Transfer, [place] minimum assist (75% patient effort);2 person assist required;verbal cues required  -KM contact guard assist;verbal cues required   to raised bsc  -SD contact guard assist  -CH    Toilet Transfer, Assistive Device bedside commode without drop arms  -KM bedside commode without drop arms  -SD bedside commode without drop arms;standard walker  -CH    Transfer, Impairments impaired balance  -KM impaired balance  -SD     Transfer, Comment pt stood and transferred TTWB/NWB to bedside commode. then stood for cleanup and hopped/ambulated backwards to bed.  -KM  pt improved in transfers needing 1 asst  -CH    Recorded by [KM] Pat Salamanca, PTA [SD] Duane Rossi, OTR [CH] Nessa Tapia, PT    Gait Assessment/Treatment    Gait, [place] Level   minimum assist (75% patient effort);verbal cues required;1 person + 1 person to manage equipment   asst of 1 for chair following, asst for sequencingand WBprec  -    Gait, Assistive Device   standard walker  -    Gait, Distance (Feet)   12  -    Gait, Gait Deviations   sarah decreased;forward flexed posture;step length decreased  -    Gait, Maintain Weight Bearing Status   able to maintain weight bearing status  -    Gait, Safety Issues   sequencing ability decreased;step length decreased  -    Gait, Impairments   strength decreased;impaired balance   fatigued easily  -    Gait, Comment   improved steps without swiveling  -CH    Recorded by    [CH] Nessa Tapia, PT    Functional Mobility    Functional Mobility- Ind. Level  minimum assist (75% patient effort)  -SD     Functional Mobility- Device  standard walker  -SD     Functional Mobility-Distance (Feet)  12  -SD     Functional Mobility-Maintain WBing  able to maintain weight bearing status  -SD     Functional Mobility- Safety Issues  other (see comments)   Pt anxious during mobility due to weight bear status  -SD     Recorded by  [SD] Duane Rossi, OTR     Toileting Assessment/Training    Toileting Assess/Train, Assistive Device  bedside commode  -SD     Toileting Assess/Train, Position  sitting  -SD     Toileting Assess/Train, Indepen Level  moderate assist (50% patient effort)   for hygiene  -SD     Recorded by  [SD] Duane Rossi, OTR     Therapy Exercises    Right Lower Extremity 10 reps;ankle pumps/circles;hip abduction/adduction;quad sets;SAQ  -KM      Bilateral Lower Extremities   15 reps;supine;ankle pumps/circles;glut sets;heel slides;hip abduction/adduction;SAQ  -CH    Recorded by [KM] Pat Salamanca PTA  [CH] Nessa Tapia, PT    Positioning and Restraints    Pre-Treatment Position sitting in chair/recliner  -KM in bed   sitting at EOB with staff present  -SD in bed  -    Post Treatment Position bed  -KM chair  -SD chair  -CH    In Bed notified nsg;supine;call light within reach;encouraged to call for assist;side rails up x2;ABD pillow  -KM      In Chair  reclined;call light within reach;encouraged to call for assist;pillow between legs  -SD reclined;call light within reach;encouraged to call for assist;notified nsg;ABD pillow;legs elevated  -CH    Recorded by [KM] Pat Salamanca, PTA [SD] Duane Rossi, OTR [CH] Nessa Tapia, PT      01/04/17 1320          Rehab Assessment/Intervention    Discipline physical therapist  -CH      Document Type therapy note (daily note)  -CH      Subjective Information agree to therapy;complains of;pain  -CH      Patient Effort, Rehab Treatment good   -      Symptoms Noted Comment anxiousness  -      Precautions/Limitations fall precautions;hip precautions- right   oxy removed by nursing, RLE TTWB  -CH      Recorded by [CH] Nessa Tapia, PT      Pain Assessment    Pain Assessment 0-10  -      Pain Score 4  -CH      Post Pain Score 6  -CH      Pain Type Acute pain;Surgical pain  -      Pain Location Hip  -CH      Pain Orientation Right  -CH      Pain Intervention(s) Medication (See MAR);Repositioned  -CH      Response to Interventions tolerated  -CH      Recorded by [CH] Nessa Tapia, PT      Bed Mobility, Assessment/Treatment    Bed Mob, Sit to Supine, [place] moderate assist (50% patient effort);2 person assist required  -      Bed Mobility, Comment verbal/tactile cues fo maintain hip prec  -CH      Recorded by [CH] Nessa Tapia, PT      Transfer Assessment/Treatment    Transfers, Sit-Stand [place] minimum assist (75% patient effort);2 person assist required  -CH      Transfers, Stand-Sit [place] minimum assist (75% patient effort)  -      Transfers, Sit-Stand-Sit, Assist Device standard walker  -      Transfer, Comment verb cues for hand placement and WB status  -CH      Recorded by [CH] Nessa Tapia, PT      Gait Assessment/Treatment    Gait, [place] Level minimum assist (75% patient effort);2 person assist required  -      Gait, Assistive Device standard walker  -      Gait, Distance (Feet) 4  -CH      Gait, Gait Deviations forward flexed posture;step length decreased  -      Gait, Maintain Weight Bearing Status able to maintain weight bearing status  -      Gait, Safety Issues balance decreased during turns;sequencing ability decreased;step length decreased;weight-shifting ability decreased  -      Gait, Impairments strength decreased;impaired balance  -      Gait, Comment did not swivel al much this afternoon, improved stepping   -      Recorded by [CH] Nessa Tapia, PT      Therapy Exercises    Bilateral  Lower Extremities 10 reps;AAROM:;supine;ankle pumps/circles;heel slides;hip abduction/adduction;glut sets  -CH      Recorded by [CH] Nessa Tapia, PT      Positioning and Restraints    Pre-Treatment Position sitting in chair/recliner  -CH      Post Treatment Position bed  -CH      In Bed supine;call light within reach;encouraged to call for assist;pillow between legs   abduction pillow between legs  -CH      Recorded by [CH] Nessa Tapia, PT        User Key  (r) = Recorded By, (t) = Taken By, (c) = Cosigned By    Initials Name Effective Dates    KM Pat LANE Cheikh, PTA 08/11/15 -     SD Duane Rossi, OTR 06/22/16 -     CH Nessa Tapia, PT 12/01/15 -                 IP PT Goals       01/04/17 1305          Bed Mobility PT STG    Bed Mobility PT STG, Date Established 01/04/17  -      Bed Mobility PT STG, Time to Achieve 4 days  -CH      Bed Mobility PT STG, Activity Type supine to sit/sit to supine  -      Bed Mobility PT STG, [place] Level minimum assist (75% patient effort)  -      Transfer Training PT STG    Transfer Training PT STG, Date Established 01/04/17  -      Transfer Training PT STG, Time to Achieve 4 days  -CH      Transfer Training PT STG, Activity Type sit to stand/stand to sit;bed to chair /chair to bed  -      Transfer Training PT STG, [place] Level minimum assist (75% patient effort)  -      Transfer Training PT STG, Assist Device walker, standard  -CH      Gait Training PT STG    Gait Training Goal PT STG, Date Established 01/04/17  -      Gait Training Goal PT STG, Time to Achieve 4 days  -CH      Gait Training Goal PT STG, [place] Level moderate assist (50% patient effort)  -      Gait Training Goal PT STG, Assist Device walker, standard  -      Gait Training Goal PT STG, Distance to Achieve 25  -CH      Patient Education PT STG    Patient Education PT STG, Date Established 01/04/17  -      Patient Education PT STG, Time to Achieve 4 days  -CH      Patient  Education PT STG, Education Type precaution per surgeon;written program   TTWB precaution RLE  -CH      Patient Education PT STG, Education Understanding demonstrate adequately;verbalize understanding  -CH        User Key  (r) = Recorded By, (t) = Taken By, (c) = Cosigned By    Initials Name Provider Type    CH Nessa Tapia, PT Physical Therapist          Physical Therapy Education     Title: PT OT SLP Therapies (Active)     Topic: Physical Therapy (Done)     Point: Mobility training (Done)    Learning Progress Summary    Learner Readiness Method Response Comment Documented by Status   Patient Acceptance E,TB VU reviewed precautions and pt able to verbalize but not always follow KM 01/05/17 1350 Done    Acceptance E,D VU,DU Pt re instructed on WB status/TTWB rle during transfers and gait. Taught supine exercises AAROM 15 reps  01/05/17 1225 Done    Acceptance E,TB,D VU,NR Educated on func mobility, WB status, hip precautions. Remembered 2/3 precautions. Reviewed prior and after treatment session  01/04/17 1523 Done    Acceptance EMPERATRIZD VU,NR Instructed in Hip precautions and WB statuse during mobility.  01/04/17 1255 Done               Point: Home exercise program (Done)    Learning Progress Summary    Learner Readiness Method Response Comment Documented by Status   Patient Acceptance E,TB VU reviewed precautions and pt able to verbalize but not always follow KM 01/05/17 1350 Done    Acceptance E,D VU,DU Pt re instructed on WB status/TTWB rle during transfers and gait. Taught supine exercises AAROM 15 reps  01/05/17 1225 Done    Acceptance E,TB,D VU,NR Educated on func mobility, WB status, hip precautions. Remembered 2/3 precautions. Reviewed prior and after treatment session  01/04/17 1523 Done               Point: Body mechanics (Done)    Learning Progress Summary    Learner Readiness Method Response Comment Documented by Status   Patient Acceptance E,TB VU reviewed precautions and pt able to verbalize but  "not always follow  01/05/17 1350 Done    Acceptance E,D VU,DU Pt re instructed on WB status/TTWB rle during transfers and gait. Taught supine exercises AAROM 15 reps  01/05/17 1225 Done    Acceptance E,TB,D VU,NR Educated on func mobility, WB status, hip precautions. Remembered 2/3 precautions. Reviewed prior and after treatment session  01/04/17 1523 Done    Acceptance E,D VU,NR Instructed in Hip precautions and WB statuse during mobility.  01/04/17 1255 Done               Point: Precautions (Done)    Learning Progress Summary    Learner Readiness Method Response Comment Documented by Status   Patient Acceptance E,TB VU reviewed precautions and pt able to verbalize but not always follow  01/05/17 1350 Done    Acceptance E,D,H VU,NR 2 written handouts given re: hip precautions. Went over with pt. Pt able to remember 2/3. Forgot  \"do not cross legs\" precautions  01/05/17 1223 Done    Acceptance E,TB,D MIKE,NR Educated on func mobility, WB status, hip precautions. Remembered 2/3 precautions. Reviewed prior and after treatment session  01/04/17 1523 Done    Acceptance E,D VU,NR Instructed in Hip precautions and WB statuse during mobility.  01/04/17 1255 Done                      User Key     Initials Effective Dates Name Provider Type Discipline     08/11/15 -  Pat Salamanca, PTA Physical Therapy Assistant PT     12/01/15 -  Nessa Tapia, PT Physical Therapist PT              Upon arrival, pt on phone- as soon as she hung up pt states she needs to go to the bathroom really bad.  Asked pt if she had called for assistance and she replied phone call was more important than going to the bathroom.  As I was removing abd pillow- pt questioning why I need to remove pillow.  Explained to her not safe to transfer with abd pillow in place.  Pt stated she has been impacted 2 x before and she has always attempted to fix it herself.(pt planning on trying to do this)  Lengthy discussion with pt and also nurse in to " discuss with pt why she should not do this.  Nurse giving her meds for this and we  discussed hip precautions with pt.  Pt stated three times during session that she is not thinking right today.  Discussed continuing to eat and drink and mobilize will assist with stomach issues.  Pt states she is nervous about falling.  States this time however she felt hip go before she fell(heard a loud pop and then fell).  Pt states she feels like she needs to use the bathroom a lot.  Encouraged pt to use BSC with nursing rather than bedpan to see if this helps.  Reviewed hip precautions again with pt.      PT Recommendation and Plan  Anticipated Discharge Disposition: skilled nursing facility, extended care facility (to be determined)  Planned Therapy Interventions: bed mobility training, transfer training, gait training, strengthening, balance training  PT Frequency: 2 times/day  Plan of Care Review  Plan Of Care Reviewed With: patient  Progress: improving  Outcome Summary/Follow up Plan: PT: pt able to recite hip precautions but does not always follow them.  Pt requires assist of two for mobility but is doing well maintaining TTWB.  LE HEP x 10 reps.          Outcome Measures       01/05/17 1310 01/05/17 1100 01/05/17 1050    How much help from another person do you currently need...    Turning from your back to your side while in flat bed without using bedrails? 2  -KM  2  -CH    Moving from lying on back to sitting on the side of a flat bed without bedrails? 3  -KM  3  -CH    Moving to and from a bed to a chair (including a wheelchair)? 2  -KM  3  -CH    Standing up from a chair using your arms (e.g., wheelchair, bedside chair)? 3  -KM  3  -CH    Climbing 3-5 steps with a railing? 1  -KM  1  -CH    To walk in hospital room? 2  -KM  2  -CH    AM-PAC 6 Clicks Score 13  -KM  14  -CH    How much help from another is currently needed...    Putting on and taking off regular lower body clothing?  2   due to hip precautions  -SD      Bathing (including washing, rinsing, and drying)  2  -SD     Toileting (which includes using toilet bed pan or urinal)  2  -SD     Putting on and taking off regular upper body clothing  4  -SD     Taking care of personal grooming (such as brushing teeth)  4  -SD     Eating meals  4  -SD     Score  18  -SD     Functional Assessment    Outcome Measure Options AM-PAC 6 Clicks Basic Mobility (PT)  -KM  AM-PAC 6 Clicks Basic Mobility (PT)  -CH      01/04/17 1100 01/04/17 1030 01/04/17 1015    How much help from another person do you currently need...    Turning from your back to your side while in flat bed without using bedrails?   2  -CH    Moving from lying on back to sitting on the side of a flat bed without bedrails?   2  -CH    Moving to and from a bed to a chair (including a wheelchair)?   2  -CH    Standing up from a chair using your arms (e.g., wheelchair, bedside chair)?   3  -CH    Climbing 3-5 steps with a railing?   1  -CH    To walk in hospital room?   2  -CH    AM-PAC 6 Clicks Score   12  -CH    How much help from another is currently needed...    Putting on and taking off regular lower body clothing?  2  -EN     Bathing (including washing, rinsing, and drying)  2  -EN     Toileting (which includes using toilet bed pan or urinal)  2  -EN     Putting on and taking off regular upper body clothing  4  -EN     Taking care of personal grooming (such as brushing teeth)  4  -EN     Eating meals  4  -EN     Score  18  -EN     Functional Assessment    Outcome Measure Options AM-PAC 6 Clicks Daily Activity (OT)  -EN  AM-[facility] 6 Clicks Basic Mobility (PT)  -      User Key  (r) = Recorded By, (t) = Taken By, (c) = Cosigned By    Initials Name Provider Type     Pat Salamanca, PTA Physical Therapy Assistant    EN Niyah Demarco, OTR Occupational Therapist    RACHELLE Rossi, OTR Occupational Therapist    CH Nessa Tapia, PT Physical Therapist           Time Calculation:         PT Charges       01/05/17 9797  01/05/17 1236       Time Calculation    Start Time 1310  -KM 1050  -     Stop Time 1333  -KM 1130  -CH     Time Calculation (min) 23 min  -KM 40 min  -CH     PT Received On  01/05/17  -     PT - Next Appointment 01/06/17  - 01/05/17  -       User Key  (r) = Recorded By, (t) = Taken By, (c) = Cosigned By    Initials Name Provider Type     Pat Salamanca PTA Physical Therapy Assistant     Nessa Tapia, PT Physical Therapist          Therapy Charges for Today     Code Description Service Date Service Provider Modifiers Qty    49152393630 HC PT THER PROC EA 15 MIN 1/5/2017 Pat Salamanca PTA GP 1    83234553911 HC PT THER SUPP EA 15 MIN 1/5/2017 Pat Salamanca PTA GP 1          PT G-Codes  Outcome Measure Options: AM-PAC 6 Clicks Basic Mobility (PT)    Pat Salamanca PTA  1/5/2017

## 2017-01-05 NOTE — PROGRESS NOTES
Acute Care - Occupational Therapy Treatment Note  REAL Coleman     Patient Name: Ashli León  : 1935  MRN: [id number]  Today's Date: 2017  Onset of Illness/Injury or Date of Surgery Date: 17  Date of Referral to OT: 17  Referring Physician: Dr. Whittington      Admit Date: 2017    Visit Dx:     ICD-10-CM ICD-9-CM   1. Closed right hip fracture, initial encounter S72.001A 820.8   2. Closed fracture of neck of right femur, initial encounter S72.001A 820.8     Patient Active Problem List   Diagnosis   • History of hip surgery   • Primary osteoarthritis of both knees   • Arthritis of right knee   • Aftercare following right knee joint replacement surgery   • Status post total right knee replacement   • History of knee replacement, total   • History of total knee arthroplasty   • Chronic pain of left knee   • Arthritis of left knee   • Closed right hip fracture   • Femoral neck fracture             Adult Rehabilitation Note       17 1130      Rehab Assessment/Intervention    Discipline occupational therapist  -SD      Document Type therapy note (daily note)  -SD      Subjective Information agree to therapy;complains of;pain  -SD      Patient Effort, Rehab Treatment good  -SD      Symptoms Noted Comment       Precautions/Limitations fall precautions;hip precautions- right;non-weight bearing status   partial weight bear status for Right LE  -SD      Specific Treatment Considerations pt required cues to adhere recall and adhere to hip precautions.  -SD      Recorded by [SD] SCOTT Lorenzana      Pain Assessment    Pain Assessment 0-10  -SD      Pain Score 6  -SD      Post Pain Score 6  -SD      Pain Type Acute pain;Surgical pain  -SD      Pain Location Hip  -SD      Pain Orientation Right  -SD      Pain Intervention(s) Repositioned  -SD      Response to Interventions       Recorded by [SD] SCOTT Lorenzana      Cognitive Assessment/Intervention    Follows Commands/Answers Questions  needs cueing;needs repetition   for hip precautions  -SD      Personal Safety mild impairment   needs cues for hip precautions.  recalled 2/3   -SD      Personal Safety Interventions gait belt;fall prevention program maintained;nonskid shoes/slippers when out of bed;supervised activity  -SD      Recorded by [SD] SCOTT Lorenzana      Transfer Assessment/Treatment    Transfers, Sit-Stand [place] minimum assist (75% patient effort);verbal cues required  -SD      Transfers, Stand-Sit [place] minimum assist (75% patient effort);verbal cues required  -SD      Transfers, Sit-Stand-Sit, Assist Device standard walker  -SD      Toilet Transfer, [place] contact guard assist;verbal cues required   to raised bsc  -SD      Toilet Transfer, Assistive Device bedside commode without drop arms  -SD      Transfer, Impairments impaired balance  -SD      Transfer, Comment       Recorded by [SD] SCOTT Lorenzana      Functional Mobility    Functional Mobility- Ind. Level minimum assist (75% patient effort)  -SD      Functional Mobility- Device standard walker  -SD      Functional Mobility-Distance (Feet) 12  -SD      Functional Mobility-Maintain WBing able to maintain weight bearing status  -SD      Functional Mobility- Safety Issues other (see comments)   Pt anxious during mobility due to weight bear status  -SD      Recorded by [SD] Duane Rossi OTR      Toileting Assessment/Training    Toileting Assess/Train, Assistive Device bedside commode  -SD      Toileting Assess/Train, Position sitting  -SD      Toileting Assess/Train, Indepen Level moderate assist (50% patient effort)   for hygiene  -SD      Recorded by [SD] Duane Rossi OTR      Positioning and Restraints    Pre-Treatment Position in bed   sitting at EOB with staff present  -SD      Post Treatment Position chair  -SD      In Bed       In Chair reclined;call light within reach;encouraged to call for assist;pillow between legs  -SD       Recorded by [SD] Duane Rossi, OTR        User Key  (r) = Recorded By, (t) = Taken By, (c) = Cosigned By    Initials Name Effective Dates    SD Duane Rossi, OTR 06/22/16 -     CH Nessa Tapia, PT 12/01/15 -                 OT Goals       01/05/17 1312 01/04/17 1153       Transfer Training OT STG    Transfer Training OT STG, Date Established  01/04/17  -EN     Transfer Training OT STG, Time to Achieve  3 days  -EN     Transfer Training OT STG, Activity Type  sit to stand/stand to sit  -EN     Transfer Training OT STG, [place] Level  minimum assist (75% patient effort);2 person assist required  -EN     Transfer Training OT STG, Assist Device  walker, standard   for improved independence with ADL tasks  -EN     Transfer Training OT STG, Date Goal Reviewed 01/05/17  -SD      Transfer Training OT STG, Outcome goal partially met   performed with min assist, but required VC's for precautions  -SD      Transfer Training OT LTG    Transfer Training OT LTG, Date Established  01/04/17  -EN     Transfer Training OT LTG, Time to Achieve  4 days  -EN     Transfer Training OT LTG, Activity Type  sit to stand/stand to sit;toilet  -EN     Transfer Training OT LTG, [place] Level  minimum assist (75% patient effort)  -EN     Transfer Training OT LTG, Assist Device  walker, standard  -EN     Patient Education OT STG    Patient Education OT STG, Date Established  01/04/17  -EN     Patient Education OT STG, Time to Achieve  3 days  -EN     Patient Education OT STG, Education Type  adaptive equipment mgmt  -EN     Patient Education OT STG, Education Understanding  demonstrates adequately  -EN     LB Dressing OT STG    LB Dressing Goal OT STG, Date Established  01/04/17  -EN     LB Dressing Goal OT STG, Time to Achieve  3 days  -EN     LB Dressing Goal OT STG, [place] Level  moderate assist (50% patient effort)  -EN     LB Dressing Goal OT STG, Adaptive Equipment  reacher;sock-aid;shoe horn, long handled  -EN        User Key  (r) = Recorded By, (t) = Taken By, (c) = Cosigned By    Initials Name Provider Type    EN SCOTT Berrios Occupational Therapist    SCOTT Colbert Occupational Therapist          Occupational Therapy Education     Title: PT OT SLP Therapies (Active)     Topic: Occupational Therapy (Active)     Point: ADL training (Active)    Description: Instruct learner(s) on proper safety adaptation and remediation techniques during self care or transfers.   Instruct in proper use of assistive devices.    Learning Progress Summary    Learner Readiness Method Response Comment Documented by Status   Patient Acceptance E,TB,D,H NR education with hip precations.  pt was able to recall 2/3 hip precautions after demonstration, review of written handouts and verbal cues.  will continue with education SD 01/05/17 1308 Active    Acceptance E VU Educated on bed mobility and safety with transfers. EN 01/04/17 1030 Done                      User Key     Initials Effective Dates Name Provider Type Discipline    EN 06/22/16 -  SCOTT Berrios Occupational Therapist OT    SD 06/22/16 -  SCOTT Lorenzana Occupational Therapist OT                  OT Recommendation and Plan  Anticipated Equipment Needs At Discharge: bathing equipment, dressing equipment  Anticipated Discharge Disposition: extended care facility, skilled nursing facility (may need extended facility until WB status changes)  Planned Therapy Interventions: adaptive equipment training, ADL retraining, transfer training  Therapy Frequency: 3-5 times/wk  Plan of Care Review  Plan Of Care Reviewed With: patient  Progress: improving  Outcome Summary/Follow up Plan: OT:  pt improved with functional transfers and mobility.  She requires verbal cues to recall and adhere to hip precautions.          Outcome Measures       01/05/17 1100 01/05/17 1050 01/04/17 1100    How much help from another person do you currently need...    Turning from your  back to your side while in flat bed without using bedrails?  2  -CH     Moving from lying on back to sitting on the side of a flat bed without bedrails?  3  -CH     Moving to and from a bed to a chair (including a wheelchair)?  3  -CH     Standing up from a chair using your arms (e.g., wheelchair, bedside chair)?  3  -CH     Climbing 3-5 steps with a railing?  1  -CH     To walk in hospital room?  2  -CH     AM-PAC 6 Clicks Score  14  -CH     How much help from another is currently needed...    Putting on and taking off regular lower body clothing? 2   due to hip precautions  -SD      Bathing (including washing, rinsing, and drying) 2  -SD      Toileting (which includes using toilet bed pan or urinal) 2  -SD      Putting on and taking off regular upper body clothing 4  -SD      Taking care of personal grooming (such as brushing teeth) 4  -SD      Eating meals 4  -SD      Score 18  -SD      Functional Assessment    Outcome Measure Options  AM-PAC 6 Clicks Basic Mobility (PT)  -CH AM-[facility] 6 Clicks Daily Activity (OT)  -EN      01/04/17 1030 01/04/17 1015       How much help from another person do you currently need...    Turning from your back to your side while in flat bed without using bedrails?  2  -CH     Moving from lying on back to sitting on the side of a flat bed without bedrails?  2  -CH     Moving to and from a bed to a chair (including a wheelchair)?  2  -CH     Standing up from a chair using your arms (e.g., wheelchair, bedside chair)?  3  -CH     Climbing 3-5 steps with a railing?  1  -CH     To walk in hospital room?  2  -CH     AM-PAC 6 Clicks Score  12  -CH     How much help from another is currently needed...    Putting on and taking off regular lower body clothing? 2  -EN      Bathing (including washing, rinsing, and drying) 2  -EN      Toileting (which includes using toilet bed pan or urinal) 2  -EN      Putting on and taking off regular upper body clothing 4  -EN      Taking care of personal  grooming (such as brushing teeth) 4  -EN      Eating meals 4  -EN      Score 18  -EN      Functional Assessment    Outcome Measure Options  AM-PAC 6 Clicks Basic Mobility (PT)  -CH       User Key  (r) = Recorded By, (t) = Taken By, (c) = Cosigned By    Initials Name Provider Type    EN Niyah Demarco, OTR Occupational Therapist    RACHELLE Rossi OTR Occupational Therapist    CHERRIE Tapia, PT Physical Therapist           Time Calculation:         Time Calculation- OT       01/05/17 1317          Time Calculation- OT    OT Start Time 1106  -SD      OT Stop Time 1135  -SD      OT Time Calculation (min) 29 min  -SD        User Key  (r) = Recorded By, (t) = Taken By, (c) = Cosigned By    Initials Name Provider Type    RACHELLE Rossi OTDI Occupational Therapist           Therapy Charges for Today     Code Description Service Date Service Provider Modifiers Qty    [id number] HC OT SELF CARE/MGMT/TRAIN EA 15 MIN 1/5/2017 SCOTT Lorenzana GO 2               SCOTT Hardin  1/5/2017

## 2017-01-05 NOTE — PROGRESS NOTES
Orthopedic Progress Note   Chief Complaint:  Status post right endoprosthetic hip replacement    Subjective     Interval History: Patient is 2 days status post above stated procedure with removal of previously placed intramedullary rajesh.  She is afebrile this morning.  Hemoglobin is pending.  Her pain is well-controlled medication          Objective     Vital Signs  Temp:  [97.8 °F (36.6 °C)-99.2 °F (37.3 °C)] 98.7 °F (37.1 °C)  Heart Rate:  [84-92] 92  Resp:  [16-18] 16  BP: (103-110)/(51-60) 103/51  Body mass index is 23.78 kg/(m^2).    Intake/Output Summary (Last 24 hours) at 01/05/17 0620  Last data filed at 01/04/17 2300   Gross per 24 hour   Intake    480 ml   Output   1600 ml   Net  -1120 ml     I/O this shift:  In: -   Out: 900 [Urine:900]       Physical Exam:   General: patient awake, alert and cooperative   Cardiovascular: regular rhythm and rate   Pulm: clear to auscultation bilaterally   Abdomen: Benign.  Soft bowel sounds   Extremities: Wound is benign.  No motor deficit good distal pulses.     Neurologic: Normal mood and behavior     Results Review:     I reviewed the patient's new clinical results.      WBC No results found for: WBCS   HGB HEMOGLOBIN   Date Value Ref Range Status   01/05/2017 7.9 (L) 12.0 - 16.0 g/dL Final   01/04/2017 7.4 (C) 12.0 - 16.0 g/dL Final   01/03/2017 11.2 (L) 12.0 - 16.0 g/dL Final   01/02/2017 11.2 (L) 12.0 - 16.0 g/dL Final      HCT HEMATOCRIT   Date Value Ref Range Status   01/05/2017 24.5 (L) 37.0 - 47.0 % Final   01/04/2017 22.6 (C) 37.0 - 47.0 % Final   01/03/2017 33.9 (L) 37.0 - 47.0 % Final   01/02/2017 32.6 (L) 37.0 - 47.0 % Final      Platlets No results found for: LABPLAT     PT/INR:  No results found for: PROTIME/No results found for: INR    Sodium SODIUM   Date Value Ref Range Status   01/03/2017 130 (L) 136 - 145 mmol/L Final   01/02/2017 130 (L) 136 - 145 mmol/L Final      Potassium POTASSIUM   Date Value Ref Range Status   01/03/2017 4.5 3.5 - 5.2 mmol/L  Final   01/02/2017 3.8 3.5 - 5.2 mmol/L Final      Chloride CHLORIDE   Date Value Ref Range Status   01/03/2017 91 (L) 98 - 107 mmol/L Final   01/02/2017 92 (L) 98 - 107 mmol/L Final      Bicarbonate No results found for: PLASMABICARB   BUN BUN   Date Value Ref Range Status   01/03/2017 15 8 - 23 mg/dL Final   01/02/2017 14 8 - 23 mg/dL Final      Creatinine CREATININE   Date Value Ref Range Status   01/03/2017 0.60 0.57 - 1.00 mg/dL Final   01/02/2017 0.69 0.57 - 1.00 mg/dL Final      Calcium CALCIUM   Date Value Ref Range Status   01/03/2017 9.8 8.8 - 10.5 mg/dL Final   01/02/2017 9.8 8.8 - 10.5 mg/dL Final      Magnesium  AST  ALT  Bilirubin, Total  AlkPhos  Albumin    Amylase  Lipase    Radiology: No results found for: MG  No components found for: AST.*  No components found for: ALT.*  No components found for: BILIRUBIN, TOTAL.*    No components found for: ALKPHOS.*  No components found for: ALBUMIN.*      No components found for: AMYLASE.*  No components found for: LIPASE.*            Imaging Results (most recent)     Procedure Component Value Units Date/Time    XR Hip With or Without Pelvis 2 - 3 View Right [[id number]] Collected:  01/03/17 0749     Updated:  01/03/17 0832    Narrative:       AP PELVIS AND AP VIEW OF THE RIGHT FEMUR AND LATERAL VIEW OF THE RIGHT  KNEE 01/02/2017 AT 0820 HOURS     HISTORY: Fell this morning. Right hip pain. History of hip surgery in  1999.     COMPARISON: AP pelvis and AP view of the right femur 12/20/2016.     FINDINGS: There is a displaced subcapital fracture of the right femoral  neck with mild superior angulation of the fracture apex. No femoral head  dislocation is seen. An intramedullary rajesh is present within the femoral  shaft with distal interlocking screws. Right total knee replacement  changes are present. The knee joint appears appropriately aligned.     There is mild degenerative narrowing of bilateral hip joint spaces.  Osteopenic changes are present. Multiple round  calcifications is the  pelvis on the right thought to represent phleboliths.       Impression:          1. Displaced subcapital fracture of the right femoral neck without  femoral head dislocation.     2. Preliminary report was provided by provided by Dr. Umaña on  01/02/2017 at 0850 hours.     This report was finalized on 1/3/2017 8:30 AM by Dr. Sylvia Zapien MD.       XR Chest 1 View [[id number]] Collected:  01/03/17 1132     Updated:  01/03/17 1141    Narrative:       EXAM: AP PORTABLE CHEST     DATE: 01/02/2017      HISTORY: Preoperative evaluation for right hip fracture. Hypertension on  medication.     COMPARISON: Right rib detail series 12/20/2016. PA chest 12/20/2016.     FINDINGS: Chronic-appearing interstitial changes in both lungs appear  stable. Multiple old bilateral rib fractures. Degenerative changes at  both shoulders. Suspected old distal left clavicle fracture. Multilevel  degenerative endplate changes in the thoracic and lumbar spine with  osteopenia. Cholecystectomy. Heart size within normal limits. Pulmonary  vascular distribution within normal limits.       Impression:       1. Chronic interstitial changes in both lungs. No acute chest findings.  2. Old bilateral rib fractures and old left clavicle fracture.     This report was finalized on 1/3/2017 11:38 AM by Dr. Sylvia Zapien MD.       XR Hip 1 View Without Pelvis Right (Surgery Only) [[id number]] Collected:  01/03/17 1309     Updated:  01/03/17 1312    Narrative:       EXAM: Single AP right hip     DATE 01/03/2017 at 1303     HISTORY:: Postop right hip     COMPARISON: Right hip radiographs 01/02/2017     FINDINGS:: Surgical changes of total right hip replacement are present.  The prosthesis appears appropriately seated. No periprosthetic fracture  is seen.       Impression:       1. Satisfactory postoperative appearance status post right hip  replacement.     This report was finalized on 1/3/2017 1:10 PM by Dr. Sylvia Zapien MD.                   lactated ringers 50 mL/hr Last Rate: 50 mL/hr (01/04/17 0343)   sodium chloride 50 mL/hr Last Rate: 50 mL/hr (01/02/17 1404)         Assessment/Plan     Patient Active Problem List   Diagnosis Code   • History of hip surgery Z98.890   • Primary osteoarthritis of both knees M17.0   • Arthritis of right knee M19.90   • Aftercare following right knee joint replacement surgery Z47.1, Z96.651   • Status post total right knee replacement Z96.651   • History of knee replacement, total Z96.659   • History of total knee arthroplasty Z96.659   • Chronic pain of left knee M25.562, G89.29   • Arthritis of left knee M19.90   • Closed right hip fracture S72.001A   • Femoral neck fracture S72.009A       Continue PT OT.  Patient was transferred to outside rehabilitation facility in the next 24-48 hours.  We'll see me office in 2-3 weeks for follow-up.      Mirza Whittington MD  01/05/17  6:20 AM

## 2017-01-06 VITALS
OXYGEN SATURATION: 97 % | DIASTOLIC BLOOD PRESSURE: 58 MMHG | WEIGHT: 130 LBS | SYSTOLIC BLOOD PRESSURE: 111 MMHG | RESPIRATION RATE: 18 BRPM | TEMPERATURE: 97.9 F | BODY MASS INDEX: 23.92 KG/M2 | HEIGHT: 62 IN | HEART RATE: 82 BPM

## 2017-01-06 LAB
ABO + RH BLD: NORMAL
ALBUMIN SERPL-MCNC: 3.2 G/DL (ref 3.5–5.2)
ALBUMIN/GLOB SERPL: 1 G/DL
ALP SERPL-CCNC: 100 U/L (ref 40–129)
ALT SERPL W P-5'-P-CCNC: 25 U/L (ref 5–33)
ANION GAP SERPL CALCULATED.3IONS-SCNC: 13.4 MMOL/L
AST SERPL-CCNC: 28 U/L (ref 5–32)
BASOPHILS # BLD AUTO: 0.03 10*3/MM3 (ref 0–0.2)
BASOPHILS NFR BLD AUTO: 0.5 % (ref 0–2)
BH BB BLOOD EXPIRATION DATE: NORMAL
BH BB BLOOD TYPE BARCODE: 600
BH BB DISPENSE STATUS: NORMAL
BH BB PRODUCT CODE: NORMAL
BH BB UNIT NUMBER: NORMAL
BILIRUB SERPL-MCNC: 0.6 MG/DL (ref 0.2–1.2)
BUN BLD-MCNC: 16 MG/DL (ref 8–23)
BUN/CREAT SERPL: 23.2 (ref 7–25)
CALCIUM SPEC-SCNC: 9 MG/DL (ref 8.8–10.5)
CHLORIDE SERPL-SCNC: 100 MMOL/L (ref 98–107)
CO2 SERPL-SCNC: 26.6 MMOL/L (ref 22–29)
CREAT BLD-MCNC: 0.69 MG/DL (ref 0.57–1)
CROSSMATCH INTERPRETATION: NORMAL
DEPRECATED RDW RBC AUTO: 41.1 FL (ref 37–54)
EOSINOPHIL # BLD AUTO: 0.34 10*3/MM3 (ref 0.1–0.3)
EOSINOPHIL NFR BLD AUTO: 5.8 % (ref 0–4)
ERYTHROCYTE [DISTWIDTH] IN BLOOD BY AUTOMATED COUNT: 11.9 % (ref 11.5–14.5)
GFR SERPL CREATININE-BSD FRML MDRD: 82 ML/MIN/1.73
GLOBULIN UR ELPH-MCNC: 3.2 GM/DL
GLUCOSE BLD-MCNC: 98 MG/DL (ref 65–99)
GLUCOSE BLDC GLUCOMTR-MCNC: 103 MG/DL (ref 70–130)
HCT VFR BLD AUTO: 22.8 % (ref 37–47)
HGB BLD-MCNC: 7.3 G/DL (ref 12–16)
IMM GRANULOCYTES # BLD: 0.03 10*3/MM3 (ref 0–0.03)
IMM GRANULOCYTES NFR BLD: 0.5 % (ref 0–0.5)
LYMPHOCYTES # BLD AUTO: 1.17 10*3/MM3 (ref 0.6–4.8)
LYMPHOCYTES NFR BLD AUTO: 20.1 % (ref 20–45)
MCH RBC QN AUTO: 30.7 PG (ref 27–31)
MCHC RBC AUTO-ENTMCNC: 32 G/DL (ref 31–37)
MCV RBC AUTO: 95.8 FL (ref 81–99)
MONOCYTES # BLD AUTO: 0.48 10*3/MM3 (ref 0–1)
MONOCYTES NFR BLD AUTO: 8.2 % (ref 3–8)
NEUTROPHILS # BLD AUTO: 3.78 10*3/MM3 (ref 1.5–8.3)
NEUTROPHILS NFR BLD AUTO: 64.9 % (ref 45–70)
NRBC BLD MANUAL-RTO: 0 /100 WBC (ref 0–0)
PLATELET # BLD AUTO: 317 10*3/MM3 (ref 140–500)
PMV BLD AUTO: 9.9 FL (ref 7.4–10.4)
POTASSIUM BLD-SCNC: 4.7 MMOL/L (ref 3.5–5.2)
PROT SERPL-MCNC: 6.4 G/DL (ref 6–8.5)
RBC # BLD AUTO: 2.38 10*6/MM3 (ref 4.2–5.4)
SODIUM BLD-SCNC: 140 MMOL/L (ref 136–145)
UNIT  ABO: NORMAL
UNIT  RH: NORMAL
WBC NRBC COR # BLD: 5.83 10*3/MM3 (ref 4.8–10.8)

## 2017-01-06 PROCEDURE — 80053 COMPREHEN METABOLIC PANEL: CPT | Performed by: NURSE PRACTITIONER

## 2017-01-06 PROCEDURE — 99232 SBSQ HOSP IP/OBS MODERATE 35: CPT | Performed by: NURSE PRACTITIONER

## 2017-01-06 PROCEDURE — 82962 GLUCOSE BLOOD TEST: CPT

## 2017-01-06 PROCEDURE — 85025 COMPLETE CBC W/AUTO DIFF WBC: CPT | Performed by: ORTHOPAEDIC SURGERY

## 2017-01-06 RX ORDER — HYDROCODONE BITARTRATE AND ACETAMINOPHEN 5; 325 MG/1; MG/1
1-2 TABLET ORAL EVERY 4 HOURS PRN
Qty: 30 TABLET | Refills: 0 | Status: SHIPPED | OUTPATIENT
Start: 2017-01-06 | End: 2017-01-25 | Stop reason: SDUPTHER

## 2017-01-06 RX ORDER — FERROUS SULFATE TAB EC 324 MG (65 MG FE EQUIVALENT) 324 (65 FE) MG
325 TABLET DELAYED RESPONSE ORAL 2 TIMES DAILY WITH MEALS
Qty: 30 TABLET
Start: 2017-01-06 | End: 2017-11-28

## 2017-01-06 RX ORDER — BISACODYL 10 MG
10 SUPPOSITORY, RECTAL RECTAL DAILY PRN
Refills: 0
Start: 2017-01-06 | End: 2017-11-28

## 2017-01-06 RX ORDER — PREGABALIN 75 MG/1
75 CAPSULE ORAL EVERY 12 HOURS SCHEDULED
Qty: 60 CAPSULE | Refills: 0 | Status: SHIPPED | OUTPATIENT
Start: 2017-01-06 | End: 2017-11-28

## 2017-01-06 RX ORDER — SENNA AND DOCUSATE SODIUM 50; 8.6 MG/1; MG/1
2 TABLET, FILM COATED ORAL 2 TIMES DAILY
Start: 2017-01-06 | End: 2017-11-28

## 2017-01-06 RX ORDER — PSEUDOEPHEDRINE HCL 30 MG
100 TABLET ORAL 2 TIMES DAILY
Refills: 0
Start: 2017-01-06 | End: 2017-11-28

## 2017-01-06 RX ADMIN — DOCUSATE SODIUM 100 MG: 100 CAPSULE, LIQUID FILLED ORAL at 09:01

## 2017-01-06 RX ADMIN — ACETAMINOPHEN 1000 MG: 500 TABLET, COATED ORAL at 04:00

## 2017-01-06 RX ADMIN — OXYCODONE HYDROCHLORIDE 5 MG: 5 TABLET ORAL at 04:01

## 2017-01-06 RX ADMIN — LEVOTHYROXINE SODIUM 50 MCG: 50 TABLET ORAL at 06:47

## 2017-01-06 RX ADMIN — METFORMIN HYDROCHLORIDE 500 MG: 500 TABLET ORAL at 09:01

## 2017-01-06 RX ADMIN — OXYCODONE HYDROCHLORIDE 5 MG: 5 TABLET ORAL at 09:01

## 2017-01-06 RX ADMIN — ALLOPURINOL 100 MG: 100 TABLET ORAL at 09:01

## 2017-01-06 RX ADMIN — FERROUS SULFATE TAB EC 324 MG (65 MG FE EQUIVALENT) 325 MG: 324 (65 FE) TABLET DELAYED RESPONSE at 09:01

## 2017-01-06 RX ADMIN — PANTOPRAZOLE SODIUM 40 MG: 40 TABLET, DELAYED RELEASE ORAL at 06:47

## 2017-01-06 RX ADMIN — METOPROLOL TARTRATE 25 MG: 25 TABLET ORAL at 09:01

## 2017-01-06 RX ADMIN — PREGABALIN 75 MG: 75 CAPSULE ORAL at 09:01

## 2017-01-06 RX ADMIN — ASPIRIN 325 MG: 325 TABLET, DELAYED RELEASE ORAL at 09:01

## 2017-01-06 RX ADMIN — ACETAMINOPHEN 1000 MG: 500 TABLET, COATED ORAL at 09:03

## 2017-01-06 NOTE — PLAN OF CARE
Problem: Inpatient Physical Therapy  Goal: Bed Mobility Goal STG- PT  Outcome: Unable to achieve outcome(s) by discharge Date Met:  01/06/17 01/04/17 1305 01/06/17 1318   Bed Mobility PT STG   Bed Mobility PT STG, Date Established 01/04/17 --    Bed Mobility PT STG, Time to Achieve 4 days --    Bed Mobility PT STG, Activity Type supine to sit/sit to supine --    Bed Mobility PT STG, [place] Level minimum assist (75% patient effort) --    Bed Mobility PT STG, Date Goal Reviewed --  01/06/17   Bed Mobility PT STG, Outcome --  goal not met   Bed Mobility PT STG, Reason Goal Not Met --  discharged from facility       Goal: Transfer Training Goal 1 STG- PT  Outcome: Unable to achieve outcome(s) by discharge Date Met:  01/06/17 01/04/17 1305 01/06/17 1318   Transfer Training PT STG   Transfer Training PT STG, Date Established 01/04/17 --    Transfer Training PT STG, Time to Achieve 4 days --    Transfer Training PT STG, Activity Type sit to stand/stand to sit;bed to chair /chair to bed --    Transfer Training PT STG, [place] Level minimum assist (75% patient effort) --    Transfer Training PT STG, Assist Device walker, standard --    Transfer Training PT STG, Date Goal Reviewed --  01/06/17   Transfer Training PT STG, Outcome --  goal not met   Transfer Training PT STG, Reason Goal Not Met --  discharged from facility       Goal: Gait Training Goal STG- PT  Outcome: Unable to achieve outcome(s) by discharge Date Met:  01/06/17 01/04/17 1305 01/06/17 1318   Gait Training PT STG   Gait Training Goal PT STG, Date Established 01/04/17 --    Gait Training Goal PT STG, Time to Achieve 4 days --    Gait Training Goal PT STG, [place] Level moderate assist (50% patient effort) --    Gait Training Goal PT STG, Assist Device walker, standard --    Gait Training Goal PT STG, Distance to Achieve 25 --    Gait Training Goal PT STG, Date Goal Reviewed --  01/06/17   Gait Training Goal PT STG, Outcome --  goal not  met   Gait Training Goal PT STG, Reason Goal Not Met --  discharged from facility       Goal: Patient Education Goal STG- PT  Outcome: Unable to achieve outcome(s) by discharge Date Met:  01/06/17 01/04/17 1305 01/06/17 1318   Patient Education PT STG   Patient Education PT STG, Date Established 01/04/17 --    Patient Education PT STG, Time to Achieve 4 days --    Patient Education PT STG, Education Type precaution per surgeon;written program  (TTWB precaution RLE) --    Patient Education PT STG, Education Understanding demonstrate adequately;verbalize understanding --    Patient Education PT STG, Date Goal Reviewed --  01/06/17   Patient Education PT STG Outcome --  goal not met   Patient Education PT STG, Reason Goal Not Met --  discharged from facility

## 2017-01-06 NOTE — THERAPY DISCHARGE NOTE
Acute Care - Physical Therapy Discharge Summary   Roxanna Phillip       Patient Name: Ashli León  : 1935  MRN: [id number]    Today's Date: 2017  Onset of Illness/Injury or Date of Surgery Date: 17    Date of Referral to PT: 17  Referring Physician: Dr. Whittington      Admit Date: 2017      PT Recommendation and Plan    Visit Dx:    ICD-10-CM ICD-9-CM   1. Closed right hip fracture, initial encounter S72.001A 820.8   2. Closed fracture of neck of right femur, initial encounter S72.001A 820.8   3. Type 2 diabetes mellitus without complication, without long-term current use of insulin E11.9 250.00                       IP PT Goals       17 1318 17 1305       Bed Mobility PT STG    Bed Mobility PT STG, Date Established  17  -     Bed Mobility PT STG, Time to Achieve  4 days  -CH     Bed Mobility PT STG, Activity Type  supine to sit/sit to supine  -     Bed Mobility PT STG, [place] Level  minimum assist (75% patient effort)  -     Bed Mobility PT STG, Date Goal Reviewed 17  -      Bed Mobility PT STG, Outcome goal not met  -      Bed Mobility PT STG, Reason Goal Not Met discharged from facility  -      Transfer Training PT STG    Transfer Training PT STG, Date Established  17  -     Transfer Training PT STG, Time to Achieve  4 days  -     Transfer Training PT STG, Activity Type  sit to stand/stand to sit;bed to chair /chair to bed  -     Transfer Training PT STG, [place] Level  minimum assist (75% patient effort)  -     Transfer Training PT STG, Assist Device  walker, standard  -     Transfer Training PT STG, Date Goal Reviewed 17  -      Transfer Training PT STG, Outcome goal not met  -      Transfer Training PT STG, Reason Goal Not Met discharged from facility  -      Gait Training PT STG    Gait Training Goal PT STG, Date Established  17  -     Gait Training Goal PT STG, Time to Achieve  4 days  -CH     Gait  Training Goal PT STG, [place] Level  moderate assist (50% patient effort)  -     Gait Training Goal PT STG, Assist Device  walker, standard  -     Gait Training Goal PT STG, Distance to Achieve  25  -CH     Gait Training Goal PT STG, Date Goal Reviewed 01/06/17  -      Gait Training Goal PT STG, Outcome goal not met  -      Gait Training Goal PT STG, Reason Goal Not Met discharged from facility  -      Patient Education PT STG    Patient Education PT STG, Date Established  01/04/17  -     Patient Education PT STG, Time to Achieve  4 days  -     Patient Education PT STG, Education Type  precaution per surgeon;written program   TTWB precaution RLE  -     Patient Education PT STG, Education Understanding  demonstrate adequately;verbalize understanding  -     Patient Education PT STG, Date Goal Reviewed 01/06/17  -      Patient Education PT STG Outcome goal not met  -      Patient Education PT STG, Reason Goal Not Met discharged from facility  -        User Key  (r) = Recorded By, (t) = Taken By, (c) = Cosigned By    Initials Name Provider Type     Nessa Tapia, PT Physical Therapist     Emilee Mars, PT Physical Therapist              PT Discharge Summary  Reason for Discharge: Discharge from facility  Outcomes Achieved: Unable to make functional progress toward goals at this time  Discharge Destination: SNF    Patient seen for physical therapy following hip fracture repair.  Patient requires assist x2 for all functional mobility and transfers at time of discharge with cues required to maintain weight bearing status and hip precautions.  Patient discharged to SNF for further therapy and care.    Emilee Mars, PT   1/6/2017

## 2017-01-06 NOTE — DISCHARGE SUMMARY
Ashli VALENTE [place]  1935  [id number]    Hospitalists Discharge Summary    Date of Admission: 1/2/2017  Date of Discharge:  1/6/2017    Primary Discharge Diagnoses:   1. S/P Removal of previously placed intramedullary femoral nail with right endoprosthetic hip replacement secondary to right femoral neck fracture  Secondary Discharge Diagnoses:    2. Acute post operative anemia    3. HTN    4. Hyperlipidemia    5. Type II DM    6. GERD    7. Hypothyroidism    8. DJD spine    9. H/O of Rheumatoid Arthritis  10. H/O mild-moderate mitral regurgitation    PCP  Patient Care Team:  Arnaud Monson MD as PCP - General  Arnaud Monson MD as PCP - Family Medicine    Consults:   Consults     No orders found from 12/4/2016 to 1/3/2017.        Operations and Procedures Performed:  Procedure(s):  HIP HEMIARTHROPLASTY with IM rajesh and 2 screw hardware removal antibiotic cement     Xr Ribs Right With Pa Chest    Result Date: 12/20/2016  Narrative: INDICATION: Fall x 1 day. Right rib Pain. Prior right rib fractures.  FINDINGS: PA view the chest and oblique views of the right ribs compared to 07/24/2014.  The heart is enlarged, unchanged. Chronic interstitial opacities in both lungs. No new opacity. Old left-sided an old right-sided rib fractures are identified.      Impression: No acute cardiopulmonary findings. No acute rib fractures.  This report was finalized on 12/20/2016 11:27 AM by Dr. Gaetano Quintero MD.      Xr Hip 1 View Without Pelvis Right (surgery Only)    Result Date: 1/3/2017  Narrative: EXAM: Single AP right hip  DATE 01/03/2017 at 1303  HISTORY:: Postop right hip  COMPARISON: Right hip radiographs 01/02/2017  FINDINGS:: Surgical changes of total right hip replacement are present. The prosthesis appears appropriately seated. No periprosthetic fracture is seen.      Impression: 1. Satisfactory postoperative appearance status post right hip replacement.  This report was finalized on 1/3/2017 1:10 PM by   Sylvia Zapien MD.      Xr Chest 1 View    Result Date: 1/3/2017  Narrative: EXAM: AP PORTABLE CHEST  DATE: 01/02/2017  HISTORY: Preoperative evaluation for right hip fracture. Hypertension on medication.  COMPARISON: Right rib detail series 12/20/2016. PA chest 12/20/2016.  FINDINGS: Chronic-appearing interstitial changes in both lungs appear stable. Multiple old bilateral rib fractures. Degenerative changes at both shoulders. Suspected old distal left clavicle fracture. Multilevel degenerative endplate changes in the thoracic and lumbar spine with osteopenia. Cholecystectomy. Heart size within normal limits. Pulmonary vascular distribution within normal limits.      Impression: 1. Chronic interstitial changes in both lungs. No acute chest findings. 2. Old bilateral rib fractures and old left clavicle fracture.  This report was finalized on 1/3/2017 11:38 AM by Dr. Sylvia Zapien MD.      Xr Hip With Or Without Pelvis 2 - 3 View Right    Result Date: 1/5/2017  Narrative: AP PORTABLE RIGHT HIP, 01/05/2017:  HISTORY: Postop right hip arthroplasty following right femoral neck fracture, 01/03/2017. Hip pain today.  TECHNIQUE: Single AP radiograph of the right hip was obtained portably.  FINDINGS: Recently placed proximal femoral endoprosthesis appears well positioned. No visible acute fracture or dislocation.  Bony changes within the visualized proximal femoral shaft likely related to removal of intramedullary rajesh prior to arthroplasty surgery.      Impression: 1. No acute osseous abnormality. 2. Well-positioned right proximal femoral endoprosthesis.  This report was finalized on 1/5/2017 12:28 PM by Dr. Italo Hazel MD.      Xr Hip With Or Without Pelvis 2 - 3 View Right    Result Date: 1/3/2017  Narrative: AP PELVIS AND AP VIEW OF THE RIGHT FEMUR AND LATERAL VIEW OF THE RIGHT KNEE 01/02/2017 AT 0820 HOURS  HISTORY: Fell this morning. Right hip pain. History of hip surgery in 1999.  COMPARISON: AP pelvis and AP  view of the right femur 12/20/2016.  FINDINGS: There is a displaced subcapital fracture of the right femoral neck with mild superior angulation of the fracture apex. No femoral head dislocation is seen. An intramedullary rajesh is present within the femoral shaft with distal interlocking screws. Right total knee replacement changes are present. The knee joint appears appropriately aligned.  There is mild degenerative narrowing of bilateral hip joint spaces. Osteopenic changes are present. Multiple round calcifications is the pelvis on the right thought to represent phleboliths.      Impression:  1. Displaced subcapital fracture of the right femoral neck without femoral head dislocation.  2. Preliminary report was provided by provided by Dr. Umaña on 01/02/2017 at 0850 hours.  This report was finalized on 1/3/2017 8:30 AM by Dr. Sylvia Zapien MD.      Xr Hip With Or Without Pelvis 2 - 3 View Right    Result Date: 12/20/2016  Narrative: INDICATION: Fall. Right hip pain.  FINDINGS: AP view the pelvis, and AP view the right hip compared to 11/29/2016 there is no acute fracture or dislocation. There is mild arthrosis of both hips. Sacroiliac joints and pubic symphysis are within normal limits. An intramedullary nail in the right femur is unchanged. There is been prior right total knee arthroplasty.      Impression: No acute findings.  This report was finalized on 12/20/2016 11:29 AM by Dr. Gaetano Quintero MD.      Allergies:  is allergic to neomycin and statins.    Billy  12/20/16 hydrocodone per report of 1/2/17    Discharge Medications:   Ashli León   Home Medication Instructions DONAVON:[id number]    Printed on:01/06/17 0859   Medication Information                      alendronate (FOSAMAX) 5 MG tablet  5 mg Every Morning Before Breakfast.             allopurinol (ZYLOPRIM) 100 MG tablet  Take 100 mg by mouth daily.             aspirin  MG EC tablet  Take 1 tablet by mouth Every 12 (Twelve) Hours.        "      bisacodyl (DULCOLAX) 10 MG suppository  Insert 1 suppository into the rectum Daily As Needed for constipation.             bisacodyl (DULCOLAX) 5 MG EC tablet  Take 2 tablets by mouth Daily As Needed for constipation.             docusate sodium 100 MG capsule  Take 100 mg by mouth 2 (Two) Times a Day.             ferrous sulfate 324 (65 FE) MG tablet delayed-release EC tablet  Take 1 tablet by mouth 2 (Two) Times a Day With Meals.             HYDROcodone-acetaminophen (NORCO) 5-325 MG per tablet  Take 1-2 tablets by mouth Every 4 (Four) Hours As Needed for moderate pain (4-6) for up to 40 doses.             latanoprost (XALATAN) 0.005 % ophthalmic solution  Administer 1 drop to both eyes Every Night.             levothyroxine (SYNTHROID, LEVOTHROID) 50 MCG tablet  Take 50 mcg by mouth daily.             magnesium hydroxide (MILK OF MAGNESIA) 400 MG/5ML suspension  Take 30 mL by mouth Daily As Needed for constipation.             metFORMIN (GLUCOPHAGE) 500 MG tablet  Take 500 mg by mouth daily with breakfast. Pt has only been taking once a day, however the prescription on bottle says take twice daily with meals.             metoprolol tartrate (LOPRESSOR) 25 MG tablet  Take 25 mg by mouth daily.             omeprazole (PriLOSEC) 40 MG capsule  Take 40 mg by mouth 2 (two) times a day.             pravastatin (PRAVACHOL) 40 MG tablet  40 mg Daily.             pregabalin (LYRICA) 75 MG capsule  Take 1 capsule by mouth Every 12 (Twelve) Hours.             sennosides-docusate sodium (SENOKOT-S) 8.6-50 MG tablet  Take 2 tablets by mouth 2 (Two) Times a Day.             traMADol (ULTRAM) 50 MG tablet  Take 1 tablet by mouth Every 4 (Four) Hours As Needed for moderate pain (4-6).             witch hazel-glycerin (TUCKS) pad  Apply  topically As Needed for irritation.               History of Present Illness: Taken from original HPI on admit:  \"Ms. Ashli León is an 81-year-old  female who has hx of Rt Hip " "fracture several years ago which was treated with a rajesh. Her surgery was done at [facility].  She was walking around the bed yesterday at 5:30 PM and felt a pop in her right hip. She slumped over into the bed. The patient has history of hip surgery in the remote past with a rajesh in her finger. She does not recall who her orthopedist was. Patient denies any sx of headache, chest pain, shortness of breath, abdominal pain or n/v, dysuria or recent hx of blood in stool.\"     Hospital Course  1. S/P Removal of previously placed intramedullary femoral nail with right endoprosthetic hip replacement secondary to right femoral neck fracture:    Pain/PT/OT/DVT prophy with  mg bid per Dr. Whittington  F/U Dr. Whittington 3 weeks  F/U Arnaud Monson MD 1 week after discharge from rehab      2. Acute post operative anemia  Hgb down from 11.2 on admit to 7.3, patient refused blood transfusion and was started on iron orally while here. She reports no dizziness or symptoms with this.  Continue for now, but recommend f/u with Arnaud Monson MD or SNF provider within 1 week after discharge from SNF, monitor periodically while in rehab      3. HTN: at goal On Metoprolol tartrate 25 mg daily      4. Hyperlipidemia: no acute issues on home Pravastatin 40 mg      5. Type II DM: A1C 5.17%, repeat with Arnaud Monson MD as likely inaccurate with anemia  glucose at goal on home metformin 500 mg daily        6. GERD: no acute issues on PPI, resume home omeprazole 40 mg bid      7. Hypothyroidism: no acute issues on home levothyroxine 50 mcg      8. 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 H/O of Rheumatoid Arthritis: no acute issues      10. H/O mild-moderate mitral regurgitation: no acute issues here      11. Hyponatremia: Resolved, normal    Last Lab Results:   Lab Results (most recent)     Procedure Component Value Units Date/Time    CBC & Differential [[id number]] Collected:  01/02/17 1116    Specimen:  " Blood Updated:  01/02/17 1120    Narrative:       The following orders were created for panel order CBC & Differential.  Procedure                               Abnormality         Status                     ---------                               -----------         ------                     CBC Auto Differential[[id number]]         Abnormal            Final result                 Please view results for these tests on the individual orders.    CBC Auto Differential [[id number]]  (Abnormal) Collected:  01/02/17 1116    Specimen:  Blood Updated:  01/02/17 1120     WBC 7.79 10*3/mm3      RBC 3.58 (L) 10*6/mm3      Hemoglobin 11.2 (L) g/dL      Hematocrit 32.6 (L) %      MCV 91.1 fL      MCH 31.3 (H) pg      MCHC 34.4 g/dL      RDW 11.7 %      RDW-SD 39.2 fl      MPV 9.3 fL      Platelets 322 10*3/mm3      Neutrophil % 66.4 %      Lymphocyte % 25.3 %      Monocyte % 7.4 %      Eosinophil % 0.3 %      Basophil % 0.3 %      Immature Grans % 0.3 %      Neutrophils, Absolute 5.18 10*3/mm3      Lymphocytes, Absolute 1.97 10*3/mm3      Monocytes, Absolute 0.58 10*3/mm3      Eosinophils, Absolute 0.02 (L) 10*3/mm3      Basophils, Absolute 0.02 10*3/mm3      Immature Grans, Absolute 0.02 10*3/mm3      nRBC 0.0 /100 WBC     Comprehensive Metabolic Panel [[id number]]  (Abnormal) Collected:  01/02/17 1116    Specimen:  Blood Updated:  01/02/17 1137     Glucose 114 (H) mg/dL      BUN 14 mg/dL      Creatinine 0.69 mg/dL      Sodium 130 (L) mmol/L      Potassium 3.8 mmol/L      Chloride 92 (L) mmol/L      CO2 23.6 mmol/L      Calcium 9.8 mg/dL      Total Protein 7.1 g/dL      Albumin 3.90 g/dL      ALT (SGPT) 8 U/L      AST (SGOT) 11 U/L      Alkaline Phosphatase 60 U/L      Total Bilirubin 1.0 mg/dL      eGFR Non African Amer 82 mL/min/1.73      Globulin 3.2 gm/dL      A/G Ratio 1.2 g/dL      BUN/Creatinine Ratio 20.3      Anion Gap 14.4 mmol/L     Narrative:       The MDRD GFR formula is only valid for adults with stable renal  function between ages 18 and 70.    Urinalysis With / Culture If Indicated [[id number]]  (Normal) Collected:  01/02/17 1458    Specimen:  Urine from Urine, Clean Catch Updated:  01/02/17 1507     Color, UA Yellow      Appearance, UA Clear      pH, UA 6.0      Specific Gravity, UA 1.010      Glucose, UA Negative      Ketones, UA Negative      Bilirubin, UA Negative      Blood, UA Negative      Protein, UA Negative      Leuk Esterase, UA Negative      Nitrite, UA Negative      Urobilinogen, UA 0.2 E.U./dL     Narrative:       Urine microscopic not indicated.    POC Glucose Fingerstick [[id number]]  (Normal) Collected:  01/02/17 2034    Specimen:  Blood Updated:  01/02/17 2042     Glucose 122 mg/dL     Narrative:       Meter: [id number] : [id number] Cherie Watts    CBC & Differential [[id number]] Collected:  01/03/17 0317    Specimen:  Blood Updated:  01/03/17 0426    Narrative:       The following orders were created for panel order CBC & Differential.  Procedure                               Abnormality         Status                     ---------                               -----------         ------                     CBC Auto Differential[[id number]]         Abnormal            Final result                 Please view results for these tests on the individual orders.    CBC Auto Differential [[id number]]  (Abnormal) Collected:  01/03/17 0317    Specimen:  Blood Updated:  01/03/17 0426     WBC 7.79 10*3/mm3      RBC 3.60 (L) 10*6/mm3      Hemoglobin 11.2 (L) g/dL      Hematocrit 33.9 (L) %      MCV 94.2 fL      MCH 31.1 (H) pg      MCHC 33.0 g/dL      RDW 11.8 %      RDW-SD 41.0 fl      MPV 10.2 fL      Platelets 329 10*3/mm3      Neutrophil % 65.7 %      Lymphocyte % 23.0 %      Monocyte % 9.0 (H) %      Eosinophil % 1.5 %      Basophil % 0.3 %      Immature Grans % 0.5 %      Neutrophils, Absolute 5.12 10*3/mm3      Lymphocytes, Absolute 1.79 10*3/mm3      Monocytes, Absolute 0.70 10*3/mm3      Eosinophils, Absolute  0.12 10*3/mm3      Basophils, Absolute 0.02 10*3/mm3      Immature Grans, Absolute 0.04 (H) 10*3/mm3      nRBC 0.0 /100 WBC     Comprehensive Metabolic Panel [[id number]]  (Abnormal) Collected:  01/03/17 0636    Specimen:  Blood Updated:  01/03/17 0736     Glucose 114 (H) mg/dL      BUN 15 mg/dL      Creatinine 0.60 mg/dL      Sodium 130 (L) mmol/L      Potassium 4.5 mmol/L      Chloride 91 (L) mmol/L      CO2 24.0 mmol/L      Calcium 9.8 mg/dL      Total Protein 7.5 g/dL      Albumin 3.70 g/dL      ALT (SGPT) 9 U/L      AST (SGOT) 12 U/L      Alkaline Phosphatase 59 U/L      Total Bilirubin 0.8 mg/dL      eGFR Non African Amer 96 mL/min/1.73      Globulin 3.8 gm/dL      A/G Ratio 1.0 g/dL      BUN/Creatinine Ratio 25.0      Anion Gap 15.0 mmol/L     Narrative:       The MDRD GFR formula is only valid for adults with stable renal function between ages 18 and 70.    POC Glucose Fingerstick [[id number]]  (Normal) Collected:  01/03/17 0730    Specimen:  Blood Updated:  01/03/17 0741     Glucose 105 mg/dL     Narrative:       Meter: [id number] : [id number] Mike Collier    POC Glucose Fingerstick [[id number]]  (Normal) Collected:  01/03/17 1630    Specimen:  Blood Updated:  01/03/17 1636     Glucose 119 mg/dL     Narrative:       Meter: [id number] : [id number] Merary Arreguin    POC Glucose Fingerstick [[id number]]  (Abnormal) Collected:  01/03/17 2024    Specimen:  Blood Updated:  01/03/17 2031     Glucose 171 (H) mg/dL     Narrative:       Meter: [id number] : [id number] Juli Cedillo (Pate)    CBC & Differential [[id number]] Collected:  01/04/17 0415    Specimen:  Blood Updated:  01/04/17 0629    Narrative:       The following orders were created for panel order CBC & Differential.  Procedure                               Abnormality         Status                     ---------                               -----------         ------                     Manual Differential[[id number]]                                Final result               Scan Slide[[id number]]                                        Final result               CBC Auto Differential[[id number]]         Abnormal            Final result                 Please view results for these tests on the individual orders.    CBC Auto Differential [[id number]]  (Abnormal) Collected:  01/04/17 0415    Specimen:  Blood Updated:  01/04/17 0629     WBC 4.89 10*3/mm3      RBC 2.36 (L) 10*6/mm3      Hemoglobin 7.4 (C) g/dL      Hematocrit 22.6 (C) %      MCV 95.8 fL      MCH 31.4 (H) pg      MCHC 32.7 g/dL      RDW 11.9 %      RDW-SD 41.3 fl      MPV 9.7 fL      Platelets 229 10*3/mm3     Scan Slide [[id number]] Collected:  01/04/17 0415    Specimen:  Blood Updated:  01/04/17 0629     Scan Slide --       See Manual Differential Results       Manual Differential [[id number]] Collected:  01/04/17 0415    Specimen:  Blood Updated:  01/04/17 0629     Neutrophil % 66.0 %      Lymphocyte % 20.0 %      Monocyte % 6.0 %      Eosinophil % 4.0 %      Bands %  4.0 %      Neutrophils Absolute 3.42 10*3/mm3      Lymphocytes Absolute 0.98 10*3/mm3      Monocytes Absolute 0.29 10*3/mm3      Eosinophils Absolute 0.20 10*3/mm3      Anisocytosis Slight/1+      Hypochromia Slight/1+      WBC Morphology Normal      Platelet Morphology Normal     aPTT [[id number]]  (Normal) Collected:  01/04/17 0415    Specimen:  Blood Updated:  01/04/17 0704     PTT 36.3 seconds     Narrative:       PTT = The equivalent PTT values for the therapeutic range of heparin levels at 0.1 to 0.7 U/ml are 53 to 110 seconds.    POC Glucose Fingerstick [[id number]]  (Normal) Collected:  01/04/17 0728    Specimen:  Blood Updated:  01/04/17 0734     Glucose 103 mg/dL     Narrative:       Meter: [id number] : [id number] Ramirez    POC Glucose Fingerstick [[id number]]  (Abnormal) Collected:  01/04/17 1143    Specimen:  Blood Updated:  01/04/17 1200     Glucose 136 (H) mg/dL     Narrative:       Meter: [id number] : [id number] Christy  James    POC Glucose Fingerstick [[id number]]  (Normal) Collected:  01/04/17 1552    Specimen:  Blood Updated:  01/04/17 1558     Glucose 117 mg/dL     Narrative:       Meter: [id number] : [id number] Westley Byrdis    POC Glucose Fingerstick [[id number]]  (Normal) Collected:  01/04/17 1724    Specimen:  Blood Updated:  01/04/17 1732     Glucose 105 mg/dL     Narrative:       Meter: [id number] : [id number] Mayela Carey RN    POC Glucose Fingerstick [[id number]]  (Abnormal) Collected:  01/04/17 2010    Specimen:  Blood Updated:  01/04/17 2020     Glucose 160 (H) mg/dL     Narrative:       Meter: [id number] : [id number] Cherie Watts    CBC & Differential [[id number]] Collected:  01/05/17 0550    Specimen:  Blood Updated:  01/05/17 0615    Narrative:       The following orders were created for panel order CBC & Differential.  Procedure                               Abnormality         Status                     ---------                               -----------         ------                     CBC Auto Differential[[id number]]         Abnormal            Final result                 Please view results for these tests on the individual orders.    CBC Auto Differential [[id number]]  (Abnormal) Collected:  01/05/17 0550    Specimen:  Blood Updated:  01/05/17 0615     WBC 6.33 10*3/mm3      RBC 2.55 (L) 10*6/mm3      Hemoglobin 7.9 (L) g/dL      Hematocrit 24.5 (L) %      MCV 96.1 fL      MCH 31.0 pg      MCHC 32.2 g/dL      RDW 11.9 %      RDW-SD 41.4 fl      MPV 9.7 fL      Platelets 246 10*3/mm3      Neutrophil % 64.6 %      Lymphocyte % 18.5 (L) %      Monocyte % 10.1 (H) %      Eosinophil % 5.8 (H) %      Basophil % 0.5 %      Immature Grans % 0.5 %      Neutrophils, Absolute 4.09 10*3/mm3      Lymphocytes, Absolute 1.17 10*3/mm3      Monocytes, Absolute 0.64 10*3/mm3      Eosinophils, Absolute 0.37 (H) 10*3/mm3      Basophils, Absolute 0.03 10*3/mm3      Immature Grans, Absolute 0.03 10*3/mm3      nRBC 0.0 /100 WBC      POC Glucose Fingerstick [[id number]]  (Normal) Collected:  01/05/17 0744    Specimen:  Blood Updated:  01/05/17 0758     Glucose 105 mg/dL     Narrative:       Meter: [id number] : [id number] Sydni Gastelum    Basic Metabolic Panel [[id number]]  (Abnormal) Collected:  01/05/17 1150    Specimen:  Blood Updated:  01/05/17 1207     Glucose 107 (H) mg/dL      BUN 13 mg/dL      Creatinine 0.66 mg/dL      Sodium 136 mmol/L      Potassium 4.1 mmol/L      Chloride 99 mmol/L      CO2 25.4 mmol/L      Calcium 8.8 mg/dL      eGFR Non African Amer 86 mL/min/1.73      BUN/Creatinine Ratio 19.7      Anion Gap 11.6 mmol/L     Narrative:       The MDRD GFR formula is only valid for adults with stable renal function between ages 18 and 70.    POC Glucose Fingerstick [[id number]]  (Normal) Collected:  01/05/17 1155    Specimen:  Blood Updated:  01/05/17 1208     Glucose 124 mg/dL     Narrative:       Meter: [id number] : [id number] Sydni Gastelum    Hemoglobin A1c [[id number]]  (Normal) Collected:  01/05/17 0550    Specimen:  Blood Updated:  01/05/17 1208     Hemoglobin A1C 5.17 %     Narrative:       Hemoglobin A1C Ranges:    Increased Risk for Diabetes  5.7% to 6.4%  Diabetes                     >= 6.5%  Diabetic Goal                < 7.0%    POC Glucose Fingerstick [[id number]]  (Normal) Collected:  01/05/17 1636    Specimen:  Blood Updated:  01/05/17 1654     Glucose 104 mg/dL     Narrative:       Meter: [id number] : [id number] Sydni Gastelum    POC Glucose Fingerstick [[id number]]  (Abnormal) Collected:  01/05/17 2037    Specimen:  Blood Updated:  01/05/17 2043     Glucose 136 (H) mg/dL     Narrative:       Meter: [id number] : [id number] Andi Castrejon CNA    CBC & Differential [[id number]] Collected:  01/06/17 0409    Specimen:  Blood Updated:  01/06/17 0524    Narrative:       The following orders were created for panel order CBC & Differential.  Procedure                               Abnormality         Status                      ---------                               -----------         ------                     CBC Auto Differential[[id number]]         Abnormal            Final result                 Please view results for these tests on the individual orders.    CBC Auto Differential [[id number]]  (Abnormal) Collected:  01/06/17 0409    Specimen:  Blood Updated:  01/06/17 0524     WBC 5.83 10*3/mm3      RBC 2.38 (L) 10*6/mm3      Hemoglobin 7.3 (C) g/dL      Hematocrit 22.8 (C) %      MCV 95.8 fL      MCH 30.7 pg      MCHC 32.0 g/dL      RDW 11.9 %      RDW-SD 41.1 fl      MPV 9.9 fL      Platelets 317 10*3/mm3      Neutrophil % 64.9 %      Lymphocyte % 20.1 %      Monocyte % 8.2 (H) %      Eosinophil % 5.8 (H) %      Basophil % 0.5 %      Immature Grans % 0.5 %      Neutrophils, Absolute 3.78 10*3/mm3      Lymphocytes, Absolute 1.17 10*3/mm3      Monocytes, Absolute 0.48 10*3/mm3      Eosinophils, Absolute 0.34 (H) 10*3/mm3      Basophils, Absolute 0.03 10*3/mm3      Immature Grans, Absolute 0.03 10*3/mm3      nRBC 0.0 /100 WBC     Comprehensive Metabolic Panel [[id number]]  (Abnormal) Collected:  01/06/17 0409    Specimen:  Blood Updated:  01/06/17 0547     Glucose 98 mg/dL      BUN 16 mg/dL      Creatinine 0.69 mg/dL      Sodium 140 mmol/L      Potassium 4.7 mmol/L      Chloride 100 mmol/L      CO2 26.6 mmol/L      Calcium 9.0 mg/dL      Total Protein 6.4 g/dL      Albumin 3.20 (L) g/dL      ALT (SGPT) 25 U/L      AST (SGOT) 28 U/L      Alkaline Phosphatase 100 U/L      Total Bilirubin 0.6 mg/dL      eGFR Non African Amer 82 mL/min/1.73      Globulin 3.2 gm/dL      A/G Ratio 1.0 g/dL      BUN/Creatinine Ratio 23.2      Anion Gap 13.4 mmol/L     Narrative:       The MDRD GFR formula is only valid for adults with stable renal function between ages 18 and 70.        Imaging Results (most recent)     Procedure Component Value Units Date/Time    XR Hip With or Without Pelvis 2 - 3 View Right [[id number]] Collected:  01/03/17 0749      Updated:  01/03/17 0832    Narrative:       AP PELVIS AND AP VIEW OF THE RIGHT FEMUR AND LATERAL VIEW OF THE RIGHT  KNEE 01/02/2017 AT 0820 HOURS     HISTORY: Fell this morning. Right hip pain. History of hip surgery in  1999.     COMPARISON: AP pelvis and AP view of the right femur 12/20/2016.     FINDINGS: There is a displaced subcapital fracture of the right femoral  neck with mild superior angulation of the fracture apex. No femoral head  dislocation is seen. An intramedullary rajesh is present within the femoral  shaft with distal interlocking screws. Right total knee replacement  changes are present. The knee joint appears appropriately aligned.     There is mild degenerative narrowing of bilateral hip joint spaces.  Osteopenic changes are present. Multiple round calcifications is the  pelvis on the right thought to represent phleboliths.       Impression:          1. Displaced subcapital fracture of the right femoral neck without  femoral head dislocation.     2. Preliminary report was provided by provided by Dr. Umaña on  01/02/2017 at 0850 hours.     This report was finalized on 1/3/2017 8:30 AM by Dr. Sylvia Zapien MD.       XR Chest 1 View [[id number]] Collected:  01/03/17 1132     Updated:  01/03/17 1141    Narrative:       EXAM: AP PORTABLE CHEST     DATE: 01/02/2017      HISTORY: Preoperative evaluation for right hip fracture. Hypertension on  medication.     COMPARISON: Right rib detail series 12/20/2016. PA chest 12/20/2016.     FINDINGS: Chronic-appearing interstitial changes in both lungs appear  stable. Multiple old bilateral rib fractures. Degenerative changes at  both shoulders. Suspected old distal left clavicle fracture. Multilevel  degenerative endplate changes in the thoracic and lumbar spine with  osteopenia. Cholecystectomy. Heart size within normal limits. Pulmonary  vascular distribution within normal limits.       Impression:       1. Chronic interstitial changes in both lungs. No  acute chest findings.  2. Old bilateral rib fractures and old left clavicle fracture.     This report was finalized on 1/3/2017 11:38 AM by Dr. Sylvia Zapien MD.       XR Hip 1 View Without Pelvis Right (Surgery Only) [[id number]] Collected:  01/03/17 1309     Updated:  01/03/17 1312    Narrative:       EXAM: Single AP right hip     DATE 01/03/2017 at 1303     HISTORY:: Postop right hip     COMPARISON: Right hip radiographs 01/02/2017     FINDINGS:: Surgical changes of total right hip replacement are present.  The prosthesis appears appropriately seated. No periprosthetic fracture  is seen.       Impression:       1. Satisfactory postoperative appearance status post right hip  replacement.     This report was finalized on 1/3/2017 1:10 PM by Dr. Sylvia Zapien MD.       XR Hip With or Without Pelvis 2 - 3 View Right [[id number]] Collected:  01/05/17 1225     Updated:  01/05/17 1230    Narrative:       AP PORTABLE RIGHT HIP, 01/05/2017:     HISTORY:  Postop right hip arthroplasty following right femoral neck fracture,  01/03/2017. Hip pain today.     TECHNIQUE:  Single AP radiograph of the right hip was obtained portably.     FINDINGS:  Recently placed proximal femoral endoprosthesis appears well positioned.  No visible acute fracture or dislocation.     Bony changes within the visualized proximal femoral shaft likely related  to removal of intramedullary rajesh prior to arthroplasty surgery.       Impression:       1. No acute osseous abnormality.  2. Well-positioned right proximal femoral endoprosthesis.     This report was finalized on 1/5/2017 12:28 PM by Dr. Italo Hazel MD.           PROCEDURES  Procedure(s):  HIP HEMIARTHROPLASTY with IM rajesh and 2 screw hardware removal antibiotic cement    Condition on Discharge:  Stable    Physical Exam at Discharge  Vital Signs  Temp:  [97.3 °F (36.3 °C)-98.3 °F (36.8 °C)] 97.9 °F (36.6 °C)  Heart Rate:  [78-86] 82  Resp:  [18] 18  BP: (101-111)/(53-59) 111/58    Physical  Exam:  Physical Exam   Constitutional: Patient appears well-developed and well-nourished and in no acute distress, pale   HEENT:   Head: Normocephalic and atraumatic.   Eyes:  Pupils are equal, round, and reactive to light. EOM are intact. Sclera are anicteric and non-injected.  Mouth and Throat: Patient has moist mucous membranes. Oropharynx is clear of any erythema or exudate.     Neck: Neck supple. No JVD present. No thyromegaly present. No lymphadenopathy present.  Cardiovascular: Regular rate, regular rhythm, S1 normal and S2 normal.  Exam reveals no gallop and no friction rub.  No murmur heard.  Pulmonary/Chest: Lungs are clear to auscultation bilaterally. No respiratory distress. No wheezes. No rhonchi. No rales.   Abdominal: Soft. Bowel sounds are normal. No distension and no mass. There is no hepatosplenomegaly. There is no tenderness.   Musculoskeletal: Normal Muscle tone  Extremities: No edema. Pulses are palpable in all 4 extremities.  Neurological: Patient is alert and oriented to person, place, and time. Cranial nerves II-XII are grossly intact with no focal deficits.  Skin: Right hip and right thigh incision dressings clean, dry and intact. Skin is warm. No rash noted. Nails show no clubbing.  No cyanosis or erythema.    Discharge Disposition  [facility]    Visiting Nurse:    As per facility     Home PT/OT:  As per facility     Home Safety Evaluation:  As per facility     DME  As per facility     Discharge Diet:           Dietary Orders            Start     Ordered    01/03/17 1348  Diet Regular; Consistent Carbohydrate, Cardiac  Diet Effective Now     Question Answer Comment   Diet Texture / Consistency Regular    Common Modifiers Consistent Carbohydrate    Common Modifiers Cardiac        01/03/17 1347        Activity at Discharge:  As per Dr. Whittington    Pre-discharge education  Cardiac, Wound Care, medications, follow up    Follow-up Appointments  Future Appointments  Date Time Provider  Department Center   2/17/2017 9:30 AM Adrian Swenson MD [facility] None     Referrals and Follow-ups to Schedule     Follow-Up    As directed    Review prior to discharge   Follow Up Details:  1 week after discharge from facility       Schedule Discharge Follow Up    As directed    Follow Up:  3 Weeks   Special Instructions:  with truman               Test Results Pending at Discharge: NONE      Rosalba Murillo, TANIA  01/06/17  8:59 AM    Time: Discharge over 30 min (if over 30 minutes give explanation as to why it took greater than 30 minutes)  Coordination of transport  Follow up arrangements  Medication reconciliation  D/W patient and case management

## 2017-01-06 NOTE — PROGRESS NOTES
Orthopedic Progress Note   Chief Complaint:  Status post right endoprosthetic hip replacement    Subjective     Interval History: Patient is afebrile hemodynamically stable.  Hemoglobin is 7.3.  Pain well-controlled oral medication.  Patient is being transferred to nursing facility today.          Objective     Vital Signs  Temp:  [97.3 °F (36.3 °C)-98.3 °F (36.8 °C)] 97.9 °F (36.6 °C)  Heart Rate:  [78-86] 82  Resp:  [18] 18  BP: (101-111)/(53-59) 111/58  Body mass index is 23.78 kg/(m^2).    Intake/Output Summary (Last 24 hours) at 01/06/17 0631  Last data filed at 01/06/17 0100   Gross per 24 hour   Intake    720 ml   Output    400 ml   Net    320 ml     I/O this shift:  In: 360 [P.O.:360]  Out: -        Physical Exam:   General: patient awake, alert and cooperative   Cardiovascular: regular rhythm and rate   Pulm: clear to auscultation bilaterally   Abdomen: Benign.  Soft bowel sounds   Extremities: Right lower extremity shows good distal pulses no motor deficit wound is benign.   Neurologic: Normal mood and behavior     Results Review:     I reviewed the patient's new clinical results.      WBC No results found for: WBCS   HGB HEMOGLOBIN   Date Value Ref Range Status   01/06/2017 7.3 (C) 12.0 - 16.0 g/dL Final   01/05/2017 7.9 (L) 12.0 - 16.0 g/dL Final   01/04/2017 7.4 (C) 12.0 - 16.0 g/dL Final      HCT HEMATOCRIT   Date Value Ref Range Status   01/06/2017 22.8 (C) 37.0 - 47.0 % Final   01/05/2017 24.5 (L) 37.0 - 47.0 % Final   01/04/2017 22.6 (C) 37.0 - 47.0 % Final      Platlets No results found for: LABPLAT     PT/INR:  No results found for: PROTIME/No results found for: INR    Sodium SODIUM   Date Value Ref Range Status   01/06/2017 140 136 - 145 mmol/L Final   01/05/2017 136 136 - 145 mmol/L Final   01/03/2017 130 (L) 136 - 145 mmol/L Final      Potassium POTASSIUM   Date Value Ref Range Status   01/06/2017 4.7 3.5 - 5.2 mmol/L Final   01/05/2017 4.1 3.5 - 5.2 mmol/L Final   01/03/2017 4.5 3.5 - 5.2  mmol/L Final      Chloride CHLORIDE   Date Value Ref Range Status   01/06/2017 100 98 - 107 mmol/L Final   01/05/2017 99 98 - 107 mmol/L Final   01/03/2017 91 (L) 98 - 107 mmol/L Final      Bicarbonate No results found for: PLASMABICARB   BUN BUN   Date Value Ref Range Status   01/06/2017 16 8 - 23 mg/dL Final   01/05/2017 13 8 - 23 mg/dL Final   01/03/2017 15 8 - 23 mg/dL Final      Creatinine CREATININE   Date Value Ref Range Status   01/06/2017 0.69 0.57 - 1.00 mg/dL Final   01/05/2017 0.66 0.57 - 1.00 mg/dL Final   01/03/2017 0.60 0.57 - 1.00 mg/dL Final      Calcium CALCIUM   Date Value Ref Range Status   01/06/2017 9.0 8.8 - 10.5 mg/dL Final   01/05/2017 8.8 8.8 - 10.5 mg/dL Final   01/03/2017 9.8 8.8 - 10.5 mg/dL Final      Magnesium  AST  ALT  Bilirubin, Total  AlkPhos  Albumin    Amylase  Lipase    Radiology: No results found for: MG  No components found for: AST.*  No components found for: ALT.*  No components found for: BILIRUBIN, TOTAL.*    No components found for: ALKPHOS.*  No components found for: ALBUMIN.*      No components found for: AMYLASE.*  No components found for: LIPASE.*            Imaging Results (most recent)     Procedure Component Value Units Date/Time    XR Hip With or Without Pelvis 2 - 3 View Right [[id number]] Collected:  01/03/17 0749     Updated:  01/03/17 0832    Narrative:       AP PELVIS AND AP VIEW OF THE RIGHT FEMUR AND LATERAL VIEW OF THE RIGHT  KNEE 01/02/2017 AT 0820 HOURS     HISTORY: Fell this morning. Right hip pain. History of hip surgery in  1999.     COMPARISON: AP pelvis and AP view of the right femur 12/20/2016.     FINDINGS: There is a displaced subcapital fracture of the right femoral  neck with mild superior angulation of the fracture apex. No femoral head  dislocation is seen. An intramedullary rajesh is present within the femoral  shaft with distal interlocking screws. Right total knee replacement  changes are present. The knee joint appears appropriately aligned.      There is mild degenerative narrowing of bilateral hip joint spaces.  Osteopenic changes are present. Multiple round calcifications is the  pelvis on the right thought to represent phleboliths.       Impression:          1. Displaced subcapital fracture of the right femoral neck without  femoral head dislocation.     2. Preliminary report was provided by provided by Dr. Umaña on  01/02/2017 at 0850 hours.     This report was finalized on 1/3/2017 8:30 AM by Dr. Sylvia Zapien MD.       XR Chest 1 View [[id number]] Collected:  01/03/17 1132     Updated:  01/03/17 1141    Narrative:       EXAM: AP PORTABLE CHEST     DATE: 01/02/2017      HISTORY: Preoperative evaluation for right hip fracture. Hypertension on  medication.     COMPARISON: Right rib detail series 12/20/2016. PA chest 12/20/2016.     FINDINGS: Chronic-appearing interstitial changes in both lungs appear  stable. Multiple old bilateral rib fractures. Degenerative changes at  both shoulders. Suspected old distal left clavicle fracture. Multilevel  degenerative endplate changes in the thoracic and lumbar spine with  osteopenia. Cholecystectomy. Heart size within normal limits. Pulmonary  vascular distribution within normal limits.       Impression:       1. Chronic interstitial changes in both lungs. No acute chest findings.  2. Old bilateral rib fractures and old left clavicle fracture.     This report was finalized on 1/3/2017 11:38 AM by Dr. Sylvia Zapien MD.       XR Hip 1 View Without Pelvis Right (Surgery Only) [[id number]] Collected:  01/03/17 1309     Updated:  01/03/17 1312    Narrative:       EXAM: Single AP right hip     DATE 01/03/2017 at 1303     HISTORY:: Postop right hip     COMPARISON: Right hip radiographs 01/02/2017     FINDINGS:: Surgical changes of total right hip replacement are present.  The prosthesis appears appropriately seated. No periprosthetic fracture  is seen.       Impression:       1. Satisfactory postoperative appearance  status post right hip  replacement.     This report was finalized on 1/3/2017 1:10 PM by Dr. Sylvia Zapien MD.       XR Hip With or Without Pelvis 2 - 3 View Right [[id number]] Collected:  01/05/17 1225     Updated:  01/05/17 1230    Narrative:       AP PORTABLE RIGHT HIP, 01/05/2017:     HISTORY:  Postop right hip arthroplasty following right femoral neck fracture,  01/03/2017. Hip pain today.     TECHNIQUE:  Single AP radiograph of the right hip was obtained portably.     FINDINGS:  Recently placed proximal femoral endoprosthesis appears well positioned.  No visible acute fracture or dislocation.     Bony changes within the visualized proximal femoral shaft likely related  to removal of intramedullary rajesh prior to arthroplasty surgery.       Impression:       1. No acute osseous abnormality.  2. Well-positioned right proximal femoral endoprosthesis.     This report was finalized on 1/5/2017 12:28 PM by Dr. Italo Hazel MD.                  sodium chloride 50 mL/hr Last Rate: 50 mL/hr (01/02/17 1404)         Assessment/Plan     Patient Active Problem List   Diagnosis Code   • History of hip surgery Z98.890   • Primary osteoarthritis of both knees M17.0   • Arthritis of right knee M19.90   • Aftercare following right knee joint replacement surgery Z47.1, Z96.651   • Status post total right knee replacement Z96.651   • History of knee replacement, total Z96.659   • History of total knee arthroplasty Z96.659   • Chronic pain of left knee M25.562, G89.29   • Arthritis of left knee M19.90   • Closed right hip fracture S72.001A   • Femoral neck fracture S72.009A       Transfer to nursing home where she'll continue toe-touch weightbearing on the right leg.  Continue aspirin 325 twice a day.  Was discharged with prescription for hydrocodone 5 mg for pain.  Return to office in 3 weeks with an x-ray of the right femur hip to knee done one day prior to office visit.      Mirza Whittington MD  01/06/17  6:31 AM

## 2017-01-06 NOTE — NURSING NOTE
spoke with patient at bedside. she is aware of anticipated discharge today. updated important message from medicare. spoke with Leslye @ [facility] r/t anticipated discharge today. patient will be transported by ambulance. will continue to follow.

## 2017-01-06 NOTE — PLAN OF CARE
Problem: Patient Care Overview (Adult)  Goal: Plan of Care Review  Outcome: Ongoing (interventions implemented as appropriate)    01/06/17 0126   Coping/Psychosocial Response Interventions   Plan Of Care Reviewed With patient   Patient Care Overview   Progress improving         Problem: Pain, Acute (Adult)  Goal: Acceptable Pain Control/Comfort Level  Outcome: Ongoing (interventions implemented as appropriate)    01/06/17 0126   Pain, Acute (Adult)   Acceptable Pain Control/Comfort Level making progress toward outcome

## 2017-01-25 ENCOUNTER — OFFICE VISIT (OUTPATIENT)
Dept: ORTHOPEDIC SURGERY | Facility: CLINIC | Age: 82
End: 2017-01-25

## 2017-01-25 DIAGNOSIS — Z96.649 S/P HIP HEMIARTHROPLASTY: ICD-10-CM

## 2017-01-25 DIAGNOSIS — Z96.651 HISTORY OF TOTAL KNEE ARTHROPLASTY, RIGHT: Primary | ICD-10-CM

## 2017-01-25 PROCEDURE — 99024 POSTOP FOLLOW-UP VISIT: CPT | Performed by: ORTHOPAEDIC SURGERY

## 2017-01-25 RX ORDER — HYDROCODONE BITARTRATE AND ACETAMINOPHEN 5; 325 MG/1; MG/1
1-2 TABLET ORAL EVERY 4 HOURS PRN
Qty: 30 TABLET | Refills: 0 | Status: SHIPPED | OUTPATIENT
Start: 2017-01-25 | End: 2017-02-13 | Stop reason: SDUPTHER

## 2017-01-25 NOTE — PROGRESS NOTES
Subjective: Status post right hemiarthroplasty     Patient ID: Ashli León is a 81 y.o. female.    Chief Complaint:    History of Present Illness patient is 3 weeks status post hardware removal from her right femur followed by a right hemiarthroplasty.  She is at a nursing facility [facility] doing well.  She is nonweightbearing as she had screws removed from the distal femur the need to heal before she can full weight-bear.  She is having minimal discomfort she states at this time.       Social History     Occupational History   • Not on file.     Social History Main Topics   • Smoking status: Never Smoker   • Smokeless tobacco: Never Used   • Alcohol use 1.8 oz/week     3 Shots of liquor per week      Comment: social use 1 weekly   • Drug use: No   • Sexual activity: Defer      Review of Systems   Constitutional: Negative for chills, diaphoresis, fever and unexpected weight change.   HENT: Negative for hearing loss, nosebleeds, sore throat and tinnitus.    Eyes: Negative for pain and visual disturbance.   Respiratory: Negative for cough, shortness of breath and wheezing.    Cardiovascular: Negative for chest pain and palpitations.   Gastrointestinal: Negative for abdominal pain, diarrhea, nausea and vomiting.   Endocrine: Negative for cold intolerance, heat intolerance and polydipsia.   Genitourinary: Negative for difficulty urinating, dysuria and hematuria.   Musculoskeletal: Negative for arthralgias, joint swelling and myalgias.   Skin: Negative for rash and wound.   Allergic/Immunologic: Negative for environmental allergies.   Neurological: Negative for dizziness, syncope and numbness.   Hematological: Does not bruise/bleed easily.   Psychiatric/Behavioral: Negative for dysphoric mood and sleep disturbance. The patient is not nervous/anxious.    All other systems reviewed and are negative.        Past Medical History   Diagnosis Date   • Acid reflux    • AP (abdominal pain)    • Back pain    •  Chest pain    • Colitis    • Diabetes mellitus type 2, noninsulin dependent    • Diarrhea    • Disease of thyroid gland    • Diverticular disease    • Glaucoma    • Gout      - right big toe   • High blood pressure    • High cholesterol    • Hyponatremia    • Knee pain    • Macular degeneration    • Mitral regurgitation 06/2016     Mild to moderate per 2-D echocardiogram   • Primary osteoarthritis of both knees 4/1/2016   • Renal lesion      on mri   • Rheumatoid arthritis      Past Surgical History   Procedure Laterality Date   • Appendectomy  1951   • Femur surgery Right      had fracture and has rajesh    • Hip surgery Right 1999     hx fracture   • Cataract extraction Bilateral    • Hemorrhoidectomy     • Total knee arthroplasty Right 6/22/2016     Procedure: RT TOTAL KNEE ARTHROPLASTY WITH FINN NAVIGATION;  Surgeon: Adrian Swenson MD;  Location: [facility] MAIN OR;  Service:    • Hip hemiarthroplasty Right 1/3/2017     Procedure: HIP HEMIARTHROPLASTY with IM rajesh and 2 screw hardware removal antibiotic cement;  Surgeon: Mirza Whittington MD;  Location:  [facility] OR;  Service:      Family History   Problem Relation Age of Onset   • Heart attack Father          Objective:  There were no vitals filed for this visit.  There were no vitals filed for this visit.  There is no height or weight on file to calculate BMI.       Ortho Exam  x-rays from the nursing onset with the patient of the right hip and the right femur show excellent position of the implant with healing of the distal screws from the femur.  No motor deficit good distal pulses.    Assessment:       1. History of total knee arthroplasty, right    2. S/P hip hemiarthroplasty          Plan:      she needs remain nonweightbearing for the next 3 weeks.  Return at that time.  He still in the nursing home I want an AP and lateral of the knee to evaluate the distal screw holes if she is at home we can x-ray the knee here.      Work Status:    JUDY query  complete.    Orders:  No orders of the defined types were placed in this encounter.      Medications:  No orders of the defined types were placed in this encounter.      Followup:  Return in about 3 weeks (around 2/15/2017).          Dragon transcription disclaimer     Much of this encounter note is an electronic transcription/translation of spoken language to printed text. The electronic translation of spoken language may permit erroneous, or at times, nonsensical words or phrases to be inadvertently transcribed. Although I have reviewed the note for such errors, some may still exist.

## 2017-01-25 NOTE — MR AVS SNAPSHOT
Ashli León   1/25/2017 2:30 PM   Office Visit    Dept Phone:  [phone]   Encounter #:  [id number]    Provider:  Mirza Whittington MD   Department:  [facility] ORTHOPEDICS                Your Full Care Plan              Your Updated Medication List          This list is accurate as of: 1/25/17  2:22 PM.  Always use your most recent med list.                alendronate 5 MG tablet   Commonly known as:  FOSAMAX       allopurinol 100 MG tablet   Commonly known as:  ZYLOPRIM       aspirin 325 MG EC tablet   Take 1 tablet by mouth Every 12 (Twelve) Hours.       * bisacodyl 5 MG EC tablet   Commonly known as:  DULCOLAX   Take 2 tablets by mouth Daily As Needed for constipation.       * bisacodyl 10 MG suppository   Commonly known as:  DULCOLAX   Insert 1 suppository into the rectum Daily As Needed for constipation.        MG capsule   Take 100 mg by mouth 2 (Two) Times a Day.       ferrous sulfate 324 (65 FE) MG tablet delayed-release EC tablet   Take 1 tablet by mouth 2 (Two) Times a Day With Meals.       HYDROcodone-acetaminophen 5-325 MG per tablet   Commonly known as:  NORCO   Take 1-2 tablets by mouth Every 4 (Four) Hours As Needed for moderate pain (4-6) for up to 40 doses.       latanoprost 0.005 % ophthalmic solution   Commonly known as:  XALATAN       levothyroxine 50 MCG tablet   Commonly known as:  SYNTHROID, LEVOTHROID       magnesium hydroxide 400 MG/5ML suspension   Commonly known as:  MILK OF MAGNESIA   Take 30 mL by mouth Daily As Needed for constipation.       metFORMIN 500 MG tablet   Commonly known as:  GLUCOPHAGE       metoprolol tartrate 25 MG tablet   Commonly known as:  LOPRESSOR       omeprazole 40 MG capsule   Commonly known as:  priLOSEC       pravastatin 40 MG tablet   Commonly known as:  PRAVACHOL       pregabalin 75 MG capsule   Commonly known as:  LYRICA   Take 1 capsule by mouth Every 12 (Twelve) Hours.       sennosides-docusate sodium  8.6-50 MG tablet   Commonly known as:  SENOKOT-S   Take 2 tablets by mouth 2 (Two) Times a Day.       traMADol 50 MG tablet   Commonly known as:  ULTRAM   Take 1 tablet by mouth Every 4 (Four) Hours As Needed for moderate pain (4-6).       witch hazel-glycerin pad   Commonly known as:  TUCKS   Apply  topically As Needed for irritation.       * Notice:  This list has 2 medication(s) that are the same as other medications prescribed for you. Read the directions carefully, and ask your doctor or other care provider to review them with you.            You Were Diagnosed With        Codes Comments    History of total knee arthroplasty, right    -  Primary ICD-10-CM: Z96.651  ICD-9-CM: V43.65     S/P hip hemiarthroplasty     ICD-10-CM: Z96.649  ICD-9-CM: V43.64       Instructions     None    Patient Instructions History      Upcoming Appointments     Visit Type Date Time Department    POST-OP 1/25/2017  2:30 PM [facility]    POST-OP 2/15/2017  2:45 PM [facility]    INJECTION 2/20/2017  9:40 AM [facility]      [employer] Signup     Our records indicate that you have declined [employer] signup. If you would like to sign up for "[employer] please email [email or web address] or call [phone] to obtain an activation code.             Other Info from Your Visit           Your Appointments     Jan 25, 2017  2:30 PM EST   Post-Op with Mirza Whittington MD   [facility]TH[facility] (--)    [address]  [place] [address]   [phone]            Feb 15, 2017  2:45 PM EST   Post-Op with Mirza Whittington MD   [facility]TH[facility] (--)    [address]  [place] [address]   [phone]            Feb 20, 2017  9:40 AM EST   Injection with Adrian Swenson MD   [facility] (--)    [address] [address] [phone]              Allergies      Neomycin  Swelling    Statins Intolerance Myalgia      Reason for Visit     Right Hip - Follow-up, Pain           Vital Signs     Smoking Status                   Never Smoker           Problems and Diagnoses Noted     History of total knee replacement    S/P hip hemiarthroplasty

## 2017-01-25 NOTE — LETTER
January 25, 2017     Arnaud Monson MD  [address]  [facility] [id number]    Patient: Ashli León   YOB: 1935   Date of Visit: 1/25/2017       Dear Dr. Ermias MD:    Thank you for referring Ashli León to me for evaluation. Below are the relevant portions of my assessment and plan of care.    If you have questions, please do not hesitate to call me. I look forward to following Ashli along with you.         Sincerely,        Mirza Whittington MD        CC: [facility]  Mirza Whittington MD  1/25/2017  2:22 PM  Signed  Subjective: Status post right hemiarthroplasty     Patient ID: Ashli León is a 81 y.o. female.    Chief Complaint:    History of Present Illness patient is 3 weeks status post hardware removal from her right femur followed by a right hemiarthroplasty.  She is at a nursing facility at [facility] doing well.  She is nonweightbearing as she had screws removed from the distal femur the need to heal before she can full weight-bear.  She is having minimal discomfort she states at this time.       Social History     Occupational History   • Not on file.     Social History Main Topics   • Smoking status: Never Smoker   • Smokeless tobacco: Never Used   • Alcohol use 1.8 oz/week     3 Shots of liquor per week      Comment: social use 1 weekly   • Drug use: No   • Sexual activity: Defer      Review of Systems   Constitutional: Negative for chills, diaphoresis, fever and unexpected weight change.   HENT: Negative for hearing loss, nosebleeds, sore throat and tinnitus.    Eyes: Negative for pain and visual disturbance.   Respiratory: Negative for cough, shortness of breath and wheezing.    Cardiovascular: Negative for chest pain and palpitations.   Gastrointestinal: Negative for abdominal pain, diarrhea, nausea and vomiting.   Endocrine: Negative for cold intolerance, heat intolerance and polydipsia.   Genitourinary: Negative for difficulty urinating, dysuria  and hematuria.   Musculoskeletal: Negative for arthralgias, joint swelling and myalgias.   Skin: Negative for rash and wound.   Allergic/Immunologic: Negative for environmental allergies.   Neurological: Negative for dizziness, syncope and numbness.   Hematological: Does not bruise/bleed easily.   Psychiatric/Behavioral: Negative for dysphoric mood and sleep disturbance. The patient is not nervous/anxious.    All other systems reviewed and are negative.        Past Medical History   Diagnosis Date   • Acid reflux    • AP (abdominal pain)    • Back pain    • Chest pain    • Colitis    • Diabetes mellitus type 2, noninsulin dependent    • Diarrhea    • Disease of thyroid gland    • Diverticular disease    • Glaucoma    • Gout      - right big toe   • High blood pressure    • High cholesterol    • Hyponatremia    • Knee pain    • Macular degeneration    • Mitral regurgitation 06/2016     Mild to moderate per 2-D echocardiogram   • Primary osteoarthritis of both knees 4/1/2016   • Renal lesion      on mri   • Rheumatoid arthritis      Past Surgical History   Procedure Laterality Date   • Appendectomy  1951   • Femur surgery Right      had fracture and has rajesh    • Hip surgery Right 1999     hx fracture   • Cataract extraction Bilateral    • Hemorrhoidectomy     • Total knee arthroplasty Right 6/22/2016     Procedure: RT TOTAL KNEE ARTHROPLASTY WITH FINN NAVIGATION;  Surgeon: Adrian Swenson MD;  Location: [facility] OR;  Service:    • Hip hemiarthroplasty Right 1/3/2017     Procedure: HIP HEMIARTHROPLASTY with IM rajesh and 2 screw hardware removal antibiotic cement;  Surgeon: Mirza Whittington MD;  Location: [facility] OR;  Service:      Family History   Problem Relation Age of Onset   • Heart attack Father          Objective:  There were no vitals filed for this visit.  There were no vitals filed for this visit.  There is no height or weight on file to calculate BMI.       Ortho Exam  x-rays from the nursing onset with the  patient of the right hip and the right femur show excellent position of the implant with healing of the distal screws from the femur.  No motor deficit good distal pulses.    Assessment:       1. History of total knee arthroplasty, right    2. S/P hip hemiarthroplasty          Plan:      she needs remain nonweightbearing for the next 3 weeks.  Return at that time.  He still in the nursing home I want an AP and lateral of the knee to evaluate the distal screw holes if she is at home we can x-ray the knee here.      Work Status:    JUDY query complete.    Orders:  No orders of the defined types were placed in this encounter.      Medications:  No orders of the defined types were placed in this encounter.      Followup:  Return in about 3 weeks (around 2/15/2017).          Dragon transcription disclaimer     Much of this encounter note is an electronic transcription/translation of spoken language to printed text. The electronic translation of spoken language may permit erroneous, or at times, nonsensical words or phrases to be inadvertently transcribed. Although I have reviewed the note for such errors, some may still exist.

## 2017-02-13 ENCOUNTER — OFFICE VISIT (OUTPATIENT)
Dept: ORTHOPEDIC SURGERY | Facility: CLINIC | Age: 82
End: 2017-02-13

## 2017-02-13 VITALS — WEIGHT: 130 LBS | BODY MASS INDEX: 23.78 KG/M2

## 2017-02-13 DIAGNOSIS — Z96.651 HISTORY OF TOTAL KNEE ARTHROPLASTY, RIGHT: ICD-10-CM

## 2017-02-13 DIAGNOSIS — R52 PAIN: Primary | ICD-10-CM

## 2017-02-13 DIAGNOSIS — Z96.649 S/P HIP HEMIARTHROPLASTY: ICD-10-CM

## 2017-02-13 PROCEDURE — 73560 X-RAY EXAM OF KNEE 1 OR 2: CPT | Performed by: ORTHOPAEDIC SURGERY

## 2017-02-13 PROCEDURE — 99024 POSTOP FOLLOW-UP VISIT: CPT | Performed by: ORTHOPAEDIC SURGERY

## 2017-02-13 RX ORDER — HYDROCODONE BITARTRATE AND ACETAMINOPHEN 5; 325 MG/1; MG/1
1-2 TABLET ORAL EVERY 4 HOURS PRN
Qty: 30 TABLET | Refills: 0 | Status: SHIPPED | OUTPATIENT
Start: 2017-02-13 | End: 2017-06-21 | Stop reason: SDUPTHER

## 2017-02-13 NOTE — PROGRESS NOTES
Subjective: Status post right hemiarthroplasty     Patient ID: Ashli León is a 81 y.o. female.    Chief Complaint:    History of Present Illness patient is 6 weeks out the above-stated procedure.  At that time she had undergo removal of intramedullary rajesh removal of screws before implantation.  She is now 6 weeks out doing well.       Social History     Occupational History   • Not on file.     Social History Main Topics   • Smoking status: Never Smoker   • Smokeless tobacco: Never Used   • Alcohol use 1.8 oz/week     3 Shots of liquor per week      Comment: social use 1 weekly   • Drug use: No   • Sexual activity: Defer      Review of Systems   Constitutional: Negative for chills, diaphoresis, fever and unexpected weight change.   HENT: Negative for hearing loss, nosebleeds, sore throat and tinnitus.    Eyes: Negative for pain and visual disturbance.   Respiratory: Negative for cough, shortness of breath and wheezing.    Cardiovascular: Negative for chest pain and palpitations.   Gastrointestinal: Negative for abdominal pain, diarrhea, nausea and vomiting.   Endocrine: Negative for cold intolerance, heat intolerance and polydipsia.   Genitourinary: Negative for difficulty urinating, dysuria and hematuria.   Musculoskeletal: Negative for arthralgias, joint swelling and myalgias.   Skin: Negative for rash and wound.   Allergic/Immunologic: Negative for environmental allergies.   Neurological: Negative for dizziness, syncope and numbness.   Hematological: Does not bruise/bleed easily.   Psychiatric/Behavioral: Negative for dysphoric mood and sleep disturbance. The patient is not nervous/anxious.    All other systems reviewed and are negative.        Past Medical History   Diagnosis Date   • Acid reflux    • AP (abdominal pain)    • Back pain    • Chest pain    • Colitis    • Diabetes mellitus type 2, noninsulin dependent    • Diarrhea    • Disease of thyroid gland    • Diverticular disease    • Glaucoma    •  Gout      - right big toe   • High blood pressure    • High cholesterol    • Hyponatremia    • Knee pain    • Macular degeneration    • Mitral regurgitation 06/2016     Mild to moderate per 2-D echocardiogram   • Primary osteoarthritis of both knees 4/1/2016   • Renal lesion      on mri   • Rheumatoid arthritis      Past Surgical History   Procedure Laterality Date   • Appendectomy  1951   • Femur surgery Right      had fracture and has rajesh    • Hip surgery Right 1999     hx fracture   • Cataract extraction Bilateral    • Hemorrhoidectomy     • Total knee arthroplasty Right 6/22/2016     Procedure: RT TOTAL KNEE ARTHROPLASTY WITH FINN NAVIGATION;  Surgeon: Adrian Swenson MD;  Location: [facility] MAIN OR;  Service:    • Hip hemiarthroplasty Right 1/3/2017     Procedure: HIP HEMIARTHROPLASTY with IM rajesh and 2 screw hardware removal antibiotic cement;  Surgeon: Mirza Whittington MD;  Location:  [facility] OR;  Service:      Family History   Problem Relation Age of Onset   • Heart attack Father          Objective:  There were no vitals filed for this visit.  Last 3 weights    02/13/17  1324   Weight: 130 lb (59 kg)     Body mass index is 23.78 kg/(m^2).       Ortho Exam  AP lateral of the knee done show the screw holes distally but they appear to be filling in.  She has excellent range of motion of the knee.  Some soreness in the hip as expected.  No motor deficit good distal pulses.    Assessment:       1. Pain    2. History of total knee arthroplasty, right    3. S/P hip hemiarthroplasty          Plan:        She may begin weightbearing as tolerated and that was emphasized as tolerated to the patient and her son on that left leg return to see me in 6 weeks with an x-ray of her hip    Work Status:    JUDY query complete.    Orders:  Orders Placed This Encounter   Procedures   • XR Knee 1 or 2 View Right       Medications:  New Medications Ordered This Visit   Medications   • HYDROcodone-acetaminophen (NORCO) 5-325 MG per  tablet     Sig: Take 1-2 tablets by mouth Every 4 (Four) Hours As Needed for moderate pain (4-6) for up to 40 doses.     Dispense:  30 tablet     Refill:  0       Followup:  Return in about 6 weeks (around 3/27/2017).          Dragon transcription disclaimer     Much of this encounter note is an electronic transcription/translation of spoken language to printed text. The electronic translation of spoken language may permit erroneous, or at times, nonsensical words or phrases to be inadvertently transcribed. Although I have reviewed the note for such errors, some may still exist.

## 2017-02-16 ENCOUNTER — LAB REQUISITION (OUTPATIENT)
Dept: LAB | Facility: HOSPITAL | Age: 82
End: 2017-02-16

## 2017-02-16 DIAGNOSIS — K57.90 DIVERTICULOSIS OF INTESTINE WITHOUT PERFORATION OR ABSCESS WITHOUT BLEEDING: ICD-10-CM

## 2017-02-16 DIAGNOSIS — M10.9 GOUT: ICD-10-CM

## 2017-02-16 DIAGNOSIS — I10 ESSENTIAL (PRIMARY) HYPERTENSION: ICD-10-CM

## 2017-02-16 DIAGNOSIS — N28.9 DISORDER OF KIDNEY AND URETER: ICD-10-CM

## 2017-02-16 DIAGNOSIS — M06.9 RHEUMATOID ARTHRITIS (HCC): ICD-10-CM

## 2017-02-16 DIAGNOSIS — E11.9 TYPE 2 DIABETES MELLITUS WITHOUT COMPLICATIONS (HCC): ICD-10-CM

## 2017-02-16 LAB
25(OH)D3 SERPL-MCNC: 29.2 NG/ML
ALBUMIN SERPL-MCNC: 4.4 G/DL (ref 3.5–5.2)
ALBUMIN/GLOB SERPL: 1.4 G/DL
ALP SERPL-CCNC: 51 U/L (ref 40–129)
ALT SERPL W P-5'-P-CCNC: 14 U/L (ref 5–33)
ANION GAP SERPL CALCULATED.3IONS-SCNC: 13.7 MMOL/L
AST SERPL-CCNC: 17 U/L (ref 5–32)
BASOPHILS # BLD AUTO: 0.04 10*3/MM3 (ref 0–0.2)
BASOPHILS NFR BLD AUTO: 0.5 % (ref 0–2)
BILIRUB SERPL-MCNC: 0.7 MG/DL (ref 0.2–1.2)
BUN BLD-MCNC: 18 MG/DL (ref 8–23)
BUN/CREAT SERPL: 18.2 (ref 7–25)
CALCIUM SPEC-SCNC: 10.1 MG/DL (ref 8.8–10.5)
CHLORIDE SERPL-SCNC: 99 MMOL/L (ref 98–107)
CO2 SERPL-SCNC: 26.3 MMOL/L (ref 22–29)
CREAT BLD-MCNC: 0.99 MG/DL (ref 0.57–1)
DEPRECATED RDW RBC AUTO: 43.4 FL (ref 37–54)
EOSINOPHIL # BLD AUTO: 0.33 10*3/MM3 (ref 0.1–0.3)
EOSINOPHIL NFR BLD AUTO: 4 % (ref 0–4)
ERYTHROCYTE [DISTWIDTH] IN BLOOD BY AUTOMATED COUNT: 13 % (ref 11.5–14.5)
GFR SERPL CREATININE-BSD FRML MDRD: 54 ML/MIN/1.73
GLOBULIN UR ELPH-MCNC: 3.2 GM/DL
GLUCOSE BLD-MCNC: 111 MG/DL (ref 65–99)
HBA1C MFR BLD: 4.9 % (ref 4.8–5.6)
HCT VFR BLD AUTO: 32 % (ref 37–47)
HGB BLD-MCNC: 10.3 G/DL (ref 12–16)
IMM GRANULOCYTES # BLD: 0.02 10*3/MM3 (ref 0–0.03)
IMM GRANULOCYTES NFR BLD: 0.2 % (ref 0–0.5)
LYMPHOCYTES # BLD AUTO: 2.31 10*3/MM3 (ref 0.6–4.8)
LYMPHOCYTES NFR BLD AUTO: 27.8 % (ref 20–45)
MCH RBC QN AUTO: 29.3 PG (ref 27–31)
MCHC RBC AUTO-ENTMCNC: 32.2 G/DL (ref 31–37)
MCV RBC AUTO: 91.2 FL (ref 81–99)
MONOCYTES # BLD AUTO: 0.5 10*3/MM3 (ref 0–1)
MONOCYTES NFR BLD AUTO: 6 % (ref 3–8)
NEUTROPHILS # BLD AUTO: 5.1 10*3/MM3 (ref 1.5–8.3)
NEUTROPHILS NFR BLD AUTO: 61.5 % (ref 45–70)
NRBC BLD MANUAL-RTO: 0 /100 WBC (ref 0–0)
PLATELET # BLD AUTO: 333 10*3/MM3 (ref 140–500)
PMV BLD AUTO: 10.5 FL (ref 7.4–10.4)
POTASSIUM BLD-SCNC: 5 MMOL/L (ref 3.5–5.2)
PROT SERPL-MCNC: 7.6 G/DL (ref 6–8.5)
RBC # BLD AUTO: 3.51 10*6/MM3 (ref 4.2–5.4)
SODIUM BLD-SCNC: 139 MMOL/L (ref 136–145)
TSH SERPL DL<=0.05 MIU/L-ACNC: 1.36 MIU/ML (ref 0.27–4.2)
VIT B12 BLD-MCNC: 296 PG/ML (ref 211–946)
WBC NRBC COR # BLD: 8.3 10*3/MM3 (ref 4.8–10.8)

## 2017-02-16 PROCEDURE — 82306 VITAMIN D 25 HYDROXY: CPT | Performed by: INTERNAL MEDICINE

## 2017-02-16 PROCEDURE — 83036 HEMOGLOBIN GLYCOSYLATED A1C: CPT | Performed by: INTERNAL MEDICINE

## 2017-02-16 PROCEDURE — 84443 ASSAY THYROID STIM HORMONE: CPT | Performed by: INTERNAL MEDICINE

## 2017-02-16 PROCEDURE — 80053 COMPREHEN METABOLIC PANEL: CPT | Performed by: INTERNAL MEDICINE

## 2017-02-16 PROCEDURE — 82607 VITAMIN B-12: CPT | Performed by: INTERNAL MEDICINE

## 2017-02-16 PROCEDURE — 85025 COMPLETE CBC W/AUTO DIFF WBC: CPT | Performed by: INTERNAL MEDICINE

## 2017-02-17 ENCOUNTER — CLINICAL SUPPORT (OUTPATIENT)
Dept: ORTHOPEDIC SURGERY | Facility: CLINIC | Age: 82
End: 2017-02-17

## 2017-02-17 VITALS — TEMPERATURE: 97.9 F | HEIGHT: 63 IN | WEIGHT: 130 LBS | BODY MASS INDEX: 23.04 KG/M2

## 2017-02-17 DIAGNOSIS — M17.12 ARTHRITIS OF LEFT KNEE: ICD-10-CM

## 2017-02-17 DIAGNOSIS — G89.29 CHRONIC PAIN OF LEFT KNEE: Primary | ICD-10-CM

## 2017-02-17 DIAGNOSIS — M25.562 CHRONIC PAIN OF LEFT KNEE: Primary | ICD-10-CM

## 2017-02-17 PROCEDURE — 20610 DRAIN/INJ JOINT/BURSA W/O US: CPT | Performed by: ORTHOPAEDIC SURGERY

## 2017-02-17 RX ORDER — METOPROLOL TARTRATE 100 MG/1
25 TABLET ORAL DAILY
COMMUNITY
End: 2017-11-28

## 2017-02-17 RX ORDER — AMOXICILLIN AND CLAVULANATE POTASSIUM 875; 125 MG/1; MG/1
TABLET, FILM COATED ORAL
COMMUNITY
Start: 2016-12-07 | End: 2017-07-26

## 2017-02-17 RX ORDER — PREDNISONE 10 MG/1
TABLET ORAL
COMMUNITY
Start: 2016-12-07 | End: 2017-11-28

## 2017-02-17 RX ORDER — FERROUS SULFATE 325(65) MG
TABLET ORAL
COMMUNITY
Start: 2017-02-02 | End: 2017-11-28

## 2017-02-17 RX ORDER — ALENDRONATE SODIUM 5 MG
5 TABLET ORAL
COMMUNITY
End: 2017-11-28

## 2017-02-17 RX ORDER — OMEPRAZOLE 40 MG/1
40 CAPSULE, DELAYED RELEASE ORAL
COMMUNITY
End: 2017-11-28

## 2017-02-17 RX ORDER — LIDOCAINE HYDROCHLORIDE 10 MG/ML
2 INJECTION, SOLUTION INFILTRATION; PERINEURAL
Status: COMPLETED | OUTPATIENT
Start: 2017-02-17 | End: 2017-02-17

## 2017-02-17 RX ORDER — LEVOTHYROXINE SODIUM 0.05 MG/1
50 TABLET ORAL
COMMUNITY
End: 2017-11-28

## 2017-02-17 RX ORDER — BUPIVACAINE HYDROCHLORIDE 5 MG/ML
2 INJECTION, SOLUTION PERINEURAL
Status: COMPLETED | OUTPATIENT
Start: 2017-02-17 | End: 2017-02-17

## 2017-02-17 RX ORDER — TRAZODONE HYDROCHLORIDE 50 MG/1
TABLET ORAL
COMMUNITY
Start: 2016-11-18 | End: 2017-11-28

## 2017-02-17 RX ORDER — METHYLPREDNISOLONE ACETATE 80 MG/ML
80 INJECTION, SUSPENSION INTRA-ARTICULAR; INTRALESIONAL; INTRAMUSCULAR; SOFT TISSUE
Status: COMPLETED | OUTPATIENT
Start: 2017-02-17 | End: 2017-02-17

## 2017-02-17 RX ORDER — ROSUVASTATIN CALCIUM 20 MG/1
20 TABLET, COATED ORAL
COMMUNITY
End: 2017-11-28

## 2017-02-17 RX ORDER — KETOCONAZOLE 20 MG/G
CREAM TOPICAL
COMMUNITY
Start: 2016-11-30 | End: 2017-11-28

## 2017-02-17 RX ADMIN — LIDOCAINE HYDROCHLORIDE 2 ML: 10 INJECTION, SOLUTION INFILTRATION; PERINEURAL at 09:56

## 2017-02-17 RX ADMIN — METHYLPREDNISOLONE ACETATE 80 MG: 80 INJECTION, SUSPENSION INTRA-ARTICULAR; INTRALESIONAL; INTRAMUSCULAR; SOFT TISSUE at 09:56

## 2017-02-17 RX ADMIN — BUPIVACAINE HYDROCHLORIDE 2 ML: 5 INJECTION, SOLUTION PERINEURAL at 09:56

## 2017-02-17 NOTE — PROGRESS NOTES
"Knee Joint Injection      Patient: Ashli León  YOB: 1935    Chief Complaints: Knee pain left    Subjective:  This problem is not new to this examiner.     History of Present Illness: Pt gets intermittent  injections with good relief. Is here for repeat injection. Understands options. The pain is a generalized joint line tenderness.  It has been progressive in nature but remains intermittent.  Worsened by prolonged standing or walking and squatting activities. Has had improvement in the past with ice/heat, rest, and injections. TIMING:  The pain is described as CHRONIC.  LOCATION: medial joint line tenderness. AGGRAVATING FACTORS:  Is worsened by prolonged standing, sitting, walking and squatting activities.  ASSOCIATED SYMPTOMS:  swelling, tenderness, and aching.      Allergies:   Allergies   Allergen Reactions   • Neomycin Swelling   • Other Swelling     Any medications with \"Mycin\"   • Statins Myalgia       Medications:   Home Medications:  Current Outpatient Prescriptions on File Prior to Visit   Medication Sig   • alendronate (FOSAMAX) 5 MG tablet 5 mg Every Morning Before Breakfast.   • allopurinol (ZYLOPRIM) 100 MG tablet Take 100 mg by mouth daily.   • aspirin  MG EC tablet Take 1 tablet by mouth Every 12 (Twelve) Hours.   • bisacodyl (DULCOLAX) 10 MG suppository Insert 1 suppository into the rectum Daily As Needed for constipation.   • bisacodyl (DULCOLAX) 5 MG EC tablet Take 2 tablets by mouth Daily As Needed for constipation.   • docusate sodium 100 MG capsule Take 100 mg by mouth 2 (Two) Times a Day.   • ferrous sulfate 324 (65 FE) MG tablet delayed-release EC tablet Take 1 tablet by mouth 2 (Two) Times a Day With Meals.   • HYDROcodone-acetaminophen (NORCO) 5-325 MG per tablet Take 1-2 tablets by mouth Every 4 (Four) Hours As Needed for moderate pain (4-6) for up to 40 doses.   • latanoprost (XALATAN) 0.005 % ophthalmic solution Administer 1 drop to both eyes Every Night.   • " levothyroxine (SYNTHROID, LEVOTHROID) 50 MCG tablet Take 50 mcg by mouth daily.   • magnesium hydroxide (MILK OF MAGNESIA) 400 MG/5ML suspension Take 30 mL by mouth Daily As Needed for constipation.   • metFORMIN (GLUCOPHAGE) 500 MG tablet Take 500 mg by mouth daily with breakfast. Pt has only been taking once a day, however the prescription on bottle says take twice daily with meals.   • metoprolol tartrate (LOPRESSOR) 25 MG tablet Take 25 mg by mouth daily.   • omeprazole (PriLOSEC) 40 MG capsule Take 40 mg by mouth 2 (two) times a day.   • pravastatin (PRAVACHOL) 40 MG tablet 40 mg Daily.   • pregabalin (LYRICA) 75 MG capsule Take 1 capsule by mouth Every 12 (Twelve) Hours.   • sennosides-docusate sodium (SENOKOT-S) 8.6-50 MG tablet Take 2 tablets by mouth 2 (Two) Times a Day.   • traMADol (ULTRAM) 50 MG tablet Take 1 tablet by mouth Every 4 (Four) Hours As Needed for moderate pain (4-6).   • witch hazel-glycerin (TUCKS) pad Apply  topically As Needed for irritation.     No current facility-administered medications on file prior to visit.      Current Medications:  Scheduled Meds:  Continuous Infusions:  No current facility-administered medications for this visit.   PRN Meds:.    I have reviewed the patient's medical history in detail and updated the computerized patient record.  Review and summarization of old records include:    Past Medical History   Diagnosis Date   • Acid reflux    • AP (abdominal pain)    • Back pain    • Chest pain    • Colitis    • Diabetes mellitus type 2, noninsulin dependent    • Diarrhea    • Disease of thyroid gland    • Diverticular disease    • Glaucoma    • Gout      - right big toe   • High blood pressure    • High cholesterol    • Hyponatremia    • Knee pain    • Macular degeneration    • Mitral regurgitation 06/2016     Mild to moderate per 2-D echocardiogram   • Primary osteoarthritis of both knees 4/1/2016   • Renal lesion      on mri   • Rheumatoid arthritis         Past  "Surgical History   Procedure Laterality Date   • Appendectomy  1951   • Femur surgery Right      had fracture and has rajesh    • Hip surgery Right 1999     hx fracture   • Cataract extraction Bilateral    • Hemorrhoidectomy     • Total knee arthroplasty Right 6/22/2016     Procedure: RT TOTAL KNEE ARTHROPLASTY WITH FINN NAVIGATION;  Surgeon: Adrian Swenson MD;  Location: [facility] MAIN OR;  Service:    • Hip hemiarthroplasty Right 1/3/2017     Procedure: HIP HEMIARTHROPLASTY with IM rajesh and 2 screw hardware removal antibiotic cement;  Surgeon: Mirza Whittington MD;  Location: [facility] OR;  Service:         Social History     Occupational History   • Not on file.     Social History Main Topics   • Smoking status: Never Smoker   • Smokeless tobacco: Never Used   • Alcohol use 1.8 oz/week     3 Shots of liquor per week      Comment: social use 1 weekly   • Drug use: No   • Sexual activity: Defer    Social History     Social History Narrative        Family History   Problem Relation Age of Onset   • Heart attack Father        ROS: 14 point review of systems was performed and was negative except for documented findings in HPI and today's encounter.     Allergies:   Allergies   Allergen Reactions   • Neomycin Swelling   • Other Swelling     Any medications with \"Mycin\"   • Statins Myalgia     Constitutional:  Denies fever, shaking or chills   Eyes:  Denies change in visual acuity   HENT:  Denies nasal congestion or sore throat   Respiratory:  Denies cough or shortness of breath   Cardiovascular:  Denies chest pain or severe LE edema   GI:  Denies abdominal pain, nausea, vomiting, bloody stools or diarrhea   Musculoskeletal:  Numbness, tingling, or loss of motor function only as noted above in history of present illness.  : Denies painful urination or hematuria  Integument:  Denies rash, lesion or ulceration   Neurologic:  Denies headache or focal weakness  Endocrine:  Denies lymphadenopathy  Psych:  Denies confusion or change " in mental status   Hem:  Denies active bleeding    Physical Exam:  Body mass index is 23.03 kg/(m^2).  Vitals:    02/17/17 0949   Temp: 97.9 °F (36.6 °C)     Vital Signs:  reviewed  Constitutional: Awake alert and oriented x3, well developed, no acute distress, non-toxic appearance.  EYES: symmetric, sclera clear  ENT:  Normocephalic, Atraumatic.   Respiratory:  No respiratory distress, No wheezing  CV: pulse regular, no palpitations or pallor.  GI:  Abdomen soft, non-tender.   Vascular:  Intact distal pulses, No cyanosis, no signs or symptoms of DVT.  Neurologic: Sensation grossly intact to the involved extremity, No focal deficits noted.   Neck: No tenderness, Supple.  Integument: warm, dry, no ulcerations.   Psychiatric:  Oriented, no pathological affect.  Musculoskeletal:    Affected knee(s):  Painful gait with a subtle limp, positive for synovitis, swelling, joint effusion with crepitation.  Lachman negative  Posterior drawer negative  Meredith's negative  Patellofemoral grind +  Sensation grossly intact to light touch throughout the lower extremity  Skin is intact  Distal pulses are palpable  No signs or symptoms of DVT          Procedure:  Large Joint Arthrocentesis  Date/Time: 2/17/2017 9:56 AM  Consent given by: patient  Site marked: site marked  Timeout: Immediately prior to procedure a time out was called to verify the correct patient, procedure, equipment, support staff and site/side marked as required   Supporting Documentation  Indications: pain and joint swelling   Procedure Details  Location: knee - L knee  Preparation: Patient was prepped and draped in the usual sterile fashion  Needle size: 22 G  Approach: anterolateral  Medications administered: 2 mL bupivacaine; 2 mL lidocaine 1 %; 80 mg methylPREDNISolone acetate 80 MG/ML  Patient tolerance: patient tolerated the procedure well with no immediate complications          Assessment. Severe osteoarthritis left knee(s). Had right hip bipolar and rajesh  removal New Years by dr laughlin.      Plan: Is to proceed with injection  Follow up as indicated.  Ice, elevate, and rest as needed.  Additional interventions include: Cortisone Injection. See procedure note.    2/17/2017

## 2017-03-27 ENCOUNTER — OFFICE VISIT (OUTPATIENT)
Dept: ORTHOPEDIC SURGERY | Facility: CLINIC | Age: 82
End: 2017-03-27

## 2017-03-27 VITALS — HEIGHT: 63 IN | BODY MASS INDEX: 23.57 KG/M2 | WEIGHT: 133 LBS

## 2017-03-27 DIAGNOSIS — IMO0002 BURSITIS/TENDONITIS, SHOULDER: ICD-10-CM

## 2017-03-27 DIAGNOSIS — R52 PAIN: Primary | ICD-10-CM

## 2017-03-27 DIAGNOSIS — M19.011 PRIMARY OSTEOARTHRITIS OF RIGHT SHOULDER: ICD-10-CM

## 2017-03-27 DIAGNOSIS — Z96.651 HISTORY OF TOTAL KNEE ARTHROPLASTY, RIGHT: ICD-10-CM

## 2017-03-27 DIAGNOSIS — Z96.649 S/P HIP HEMIARTHROPLASTY: ICD-10-CM

## 2017-03-27 PROCEDURE — 72170 X-RAY EXAM OF PELVIS: CPT | Performed by: ORTHOPAEDIC SURGERY

## 2017-03-27 PROCEDURE — 73030 X-RAY EXAM OF SHOULDER: CPT | Performed by: ORTHOPAEDIC SURGERY

## 2017-03-27 PROCEDURE — 99214 OFFICE O/P EST MOD 30 MIN: CPT | Performed by: ORTHOPAEDIC SURGERY

## 2017-03-27 PROCEDURE — 20610 DRAIN/INJ JOINT/BURSA W/O US: CPT | Performed by: ORTHOPAEDIC SURGERY

## 2017-03-27 RX ORDER — BETAMETHASONE SODIUM PHOSPHATE AND BETAMETHASONE ACETATE 3; 3 MG/ML; MG/ML
6 INJECTION, SUSPENSION INTRA-ARTICULAR; INTRALESIONAL; INTRAMUSCULAR; SOFT TISSUE
Status: COMPLETED | OUTPATIENT
Start: 2017-03-27 | End: 2017-03-27

## 2017-03-27 RX ORDER — LIDOCAINE HYDROCHLORIDE 10 MG/ML
4 INJECTION, SOLUTION INFILTRATION; PERINEURAL
Status: COMPLETED | OUTPATIENT
Start: 2017-03-27 | End: 2017-03-27

## 2017-03-27 RX ADMIN — LIDOCAINE HYDROCHLORIDE 4 ML: 10 INJECTION, SOLUTION INFILTRATION; PERINEURAL at 13:26

## 2017-03-27 RX ADMIN — BETAMETHASONE SODIUM PHOSPHATE AND BETAMETHASONE ACETATE 6 MG: 3; 3 INJECTION, SUSPENSION INTRA-ARTICULAR; INTRALESIONAL; INTRAMUSCULAR; SOFT TISSUE at 13:26

## 2017-03-27 NOTE — PROGRESS NOTES
Subjective: Status post right endoprosthetic hip replacement, right shoulder pain     Patient ID: Ashli León is a 81 y.o. female.    Chief Complaint:    History of Present Illness patient is nearly 3 months out from the hip surgery is doing fairly well ambulating with a walker.  This still some thigh pain but that is slowly improving.  Again she had removal of an intramedullary rajesh before the endoprosthetic hip replacement.  The bigger problem today remains the right shoulder which she states became acutely symptomatic when she is in the nursing home ambulating nonweightbearing using a walker begun to have shoulder pain that is only intensified over the past 2-1/2-3 months.  Denies any prior history of shoulder pain.       Social History     Occupational History   • Not on file.     Social History Main Topics   • Smoking status: Never Smoker   • Smokeless tobacco: Never Used   • Alcohol use 1.8 oz/week     3 Shots of liquor per week      Comment: social use 1 weekly   • Drug use: No   • Sexual activity: Defer      Review of Systems   Constitutional: Negative for chills, diaphoresis, fever and unexpected weight change.   HENT: Negative for hearing loss, nosebleeds, sore throat and tinnitus.    Eyes: Negative for pain and visual disturbance.   Respiratory: Negative for cough, shortness of breath and wheezing.    Cardiovascular: Negative for chest pain and palpitations.   Gastrointestinal: Negative for abdominal pain, diarrhea, nausea and vomiting.   Endocrine: Negative for cold intolerance, heat intolerance and polydipsia.   Genitourinary: Negative for difficulty urinating, dysuria and hematuria.   Musculoskeletal: Negative for arthralgias, joint swelling and myalgias.   Skin: Negative for rash and wound.   Allergic/Immunologic: Negative for environmental allergies.   Neurological: Negative for dizziness, syncope and numbness.   Hematological: Does not bruise/bleed easily.   Psychiatric/Behavioral: Negative for  dysphoric mood and sleep disturbance. The patient is not nervous/anxious.    All other systems reviewed and are negative.        Past Medical History:   Diagnosis Date   • Acid reflux    • AP (abdominal pain)    • Back pain    • Chest pain    • Colitis    • Diabetes mellitus type 2, noninsulin dependent    • Diarrhea    • Disease of thyroid gland    • Diverticular disease    • Glaucoma    • Gout     - right big toe   • High blood pressure    • High cholesterol    • Hyponatremia    • Knee pain    • Macular degeneration    • Mitral regurgitation 06/2016    Mild to moderate per 2-D echocardiogram   • Primary osteoarthritis of both knees 4/1/2016   • Renal lesion     on mri   • Rheumatoid arthritis      Past Surgical History:   Procedure Laterality Date   • APPENDECTOMY  1951   • CATARACT EXTRACTION Bilateral    • FEMUR SURGERY Right     had fracture and has rajesh    • HEMORRHOIDECTOMY     • HIP HEMIARTHROPLASTY Right 1/3/2017    Procedure: HIP HEMIARTHROPLASTY with IM rajesh and 2 screw hardware removal antibiotic cement;  Surgeon: Mirza Whittington MD;  Location: [facility] OR;  Service:    • HIP SURGERY Right 1999    hx fracture   • TOTAL KNEE ARTHROPLASTY Right 6/22/2016    Procedure: RT TOTAL KNEE ARTHROPLASTY WITH FINN NAVIGATION;  Surgeon: Adrian Swenson MD;  Location: [facility] OR;  Service:      Family History   Problem Relation Age of Onset   • Heart attack Father          Objective:  There were no vitals filed for this visit.  Last 3 weights    03/27/17  1308   Weight: 133 lb (60.3 kg)     Body mass index is 23.56 kg/(m^2).       Ortho Exam  AP of the pelvis shows perfect position of the implant with no acute or chronic changes.  No prior changes when compared to previous x-rays.  X-ray of the right shoulder does show some glenohumeral arthritis.  I don't see significant elevation of the humeral head.  Exam of the right leg shows no pain with passive internal/external rotation of the hip.  His no motor deficit good  distal pulses.  No sensory loss.  Wound is completely benign.  With regard to the right upper extremity.  There is no motor loss good distal pulses.  No sensory loss.  She has marked pain and weakness with abduction and extension against resistance.  Marked pain with internal rotation with a positive lift off.  Positive [place]'s test.  No pain with flexion of the elbow against resistance.  Equivocal [place]'s test.  There is no swelling to the upper extremity there is no bruising.  Neck exam is completely benign.  Movement of the neck does not cause any shoulder pain.    Assessment:       1. Pain    2. History of total knee arthroplasty, right    3. S/P hip hemiarthroplasty    4. Primary osteoarthritis of right shoulder    5. Bursitis/tendonitis, shoulder          Plan:  Continue weightbearing as tolerated and exercises to the hip as previously prescribed.  With regard to the shoulder Ning inject the shoulder.  Or cc lidocaine 1 cc Celestone as she is diabetic.  Postinjection instructions given to the patient particularly regarding blood sugar levels.  Return to see me in 3-4 weeks.  Based on the results of the response to the shoulder injection she may need an MRI to determine if the rotator cuff is torn.      Large Joint Arthrocentesis  Date/Time: 3/27/2017 1:26 PM  Consent given by: patient  Site marked: site marked  Timeout: Immediately prior to procedure a time out was called to verify the correct patient, procedure, equipment, support staff and site/side marked as required   Supporting Documentation  Indications: pain   Procedure Details  Location: shoulder - R subacromial bursa  Preparation: Patient was prepped and draped in the usual sterile fashion  Needle size: 22 G  Medications administered: 4 mL lidocaine 1 %; 6 mg betamethasone acetate-betamethasone sodium phosphate 6 (3-3) MG/ML  Patient tolerance: patient tolerated the procedure well with no immediate complications          Work Status:    [facility]  query complete.    Orders:  Orders Placed This Encounter   Procedures   • Large Joint Arthrocentesis   • XR Pelvis 1 or 2 View   • XR Shoulder 2+ View Right       Medications:  No orders of the defined types were placed in this encounter.      Followup:  Return in about 4 weeks (around 4/24/2017).          Dragon transcription disclaimer     Much of this encounter note is an electronic transcription/translation of spoken language to printed text. The electronic translation of spoken language may permit erroneous, or at times, nonsensical words or phrases to be inadvertently transcribed. Although I have reviewed the note for such errors, some may still exist.

## 2017-04-24 ENCOUNTER — OFFICE VISIT (OUTPATIENT)
Dept: ORTHOPEDIC SURGERY | Facility: CLINIC | Age: 82
End: 2017-04-24

## 2017-04-24 VITALS — BODY MASS INDEX: 21.62 KG/M2 | WEIGHT: 122 LBS | HEIGHT: 63 IN

## 2017-04-24 DIAGNOSIS — M70.61 TROCHANTERIC BURSITIS OF RIGHT HIP: ICD-10-CM

## 2017-04-24 DIAGNOSIS — R52 PAIN: Primary | ICD-10-CM

## 2017-04-24 DIAGNOSIS — Z96.649 S/P HIP HEMIARTHROPLASTY: ICD-10-CM

## 2017-04-24 DIAGNOSIS — Z96.651 HISTORY OF TOTAL KNEE ARTHROPLASTY, RIGHT: ICD-10-CM

## 2017-04-24 PROCEDURE — 99214 OFFICE O/P EST MOD 30 MIN: CPT | Performed by: ORTHOPAEDIC SURGERY

## 2017-04-24 PROCEDURE — 20610 DRAIN/INJ JOINT/BURSA W/O US: CPT | Performed by: ORTHOPAEDIC SURGERY

## 2017-04-24 RX ORDER — BETAMETHASONE SODIUM PHOSPHATE AND BETAMETHASONE ACETATE 3; 3 MG/ML; MG/ML
6 INJECTION, SUSPENSION INTRA-ARTICULAR; INTRALESIONAL; INTRAMUSCULAR; SOFT TISSUE
Status: COMPLETED | OUTPATIENT
Start: 2017-04-24 | End: 2017-04-24

## 2017-04-24 RX ORDER — LIDOCAINE HYDROCHLORIDE 10 MG/ML
4 INJECTION, SOLUTION INFILTRATION; PERINEURAL
Status: COMPLETED | OUTPATIENT
Start: 2017-04-24 | End: 2017-04-24

## 2017-04-24 RX ADMIN — BETAMETHASONE SODIUM PHOSPHATE AND BETAMETHASONE ACETATE 6 MG: 3; 3 INJECTION, SUSPENSION INTRA-ARTICULAR; INTRALESIONAL; INTRAMUSCULAR; SOFT TISSUE at 15:17

## 2017-04-24 RX ADMIN — LIDOCAINE HYDROCHLORIDE 4 ML: 10 INJECTION, SOLUTION INFILTRATION; PERINEURAL at 15:17

## 2017-04-24 NOTE — PROGRESS NOTES
Subjective: Status post right endoprosthetic hip replacement     Patient ID: Ashli León is a 81 y.o. female.    Chief Complaint:    History of Present Illness patient is over 3 months out from the hip surgery the standpoint of the hip surgery she's doing well she is having no groin pain that she's having persistent right lateral trochanteric discomfort.  Required to use a cane to ambulate.  It does to some extent any further activities of daily living.  She has pain when she lies on that right hip and with ambulation describe any pain anywhere from 6-8 out of 10.       Social History     Occupational History   • Not on file.     Social History Main Topics   • Smoking status: Never Smoker   • Smokeless tobacco: Never Used   • Alcohol use 1.8 oz/week     3 Shots of liquor per week      Comment: social use 1 weekly   • Drug use: No   • Sexual activity: Defer      Review of Systems   Constitutional: Negative for chills, diaphoresis, fever and unexpected weight change.   HENT: Negative for hearing loss, nosebleeds, sore throat and tinnitus.    Eyes: Negative for pain and visual disturbance.   Respiratory: Negative for cough, shortness of breath and wheezing.    Cardiovascular: Negative for chest pain and palpitations.   Gastrointestinal: Negative for abdominal pain, diarrhea, nausea and vomiting.   Endocrine: Negative for cold intolerance, heat intolerance and polydipsia.   Genitourinary: Negative for difficulty urinating, dysuria and hematuria.   Musculoskeletal: Positive for arthralgias and myalgias. Negative for joint swelling.   Skin: Negative for rash and wound.   Allergic/Immunologic: Negative for environmental allergies.   Neurological: Negative for dizziness, syncope and numbness.   Hematological: Does not bruise/bleed easily.   Psychiatric/Behavioral: Negative for dysphoric mood and sleep disturbance. The patient is not nervous/anxious.          Past Medical History:   Diagnosis Date   • Acid reflux    •  AP (abdominal pain)    • Back pain    • Chest pain    • Colitis    • Diabetes mellitus type 2, noninsulin dependent    • Diarrhea    • Disease of thyroid gland    • Diverticular disease    • Glaucoma    • Gout     - right big toe   • High blood pressure    • High cholesterol    • Hyponatremia    • Knee pain    • Macular degeneration    • Mitral regurgitation 06/2016    Mild to moderate per 2-D echocardiogram   • Primary osteoarthritis of both knees 4/1/2016   • Renal lesion     on mri   • Rheumatoid arthritis      Past Surgical History:   Procedure Laterality Date   • APPENDECTOMY  1951   • CATARACT EXTRACTION Bilateral    • FEMUR SURGERY Right     had fracture and has rajesh    • HEMORRHOIDECTOMY     • HIP HEMIARTHROPLASTY Right 1/3/2017    Procedure: HIP HEMIARTHROPLASTY with IM rajesh and 2 screw hardware removal antibiotic cement;  Surgeon: Mirza Whittington MD;  Location:  [facility] OR;  Service:    • HIP SURGERY Right 1999    hx fracture   • TOTAL KNEE ARTHROPLASTY Right 6/22/2016    Procedure: RT TOTAL KNEE ARTHROPLASTY WITH FINN NAVIGATION;  Surgeon: Adrian Swenson MD;  Location: [facility] MAIN OR;  Service:      Family History   Problem Relation Age of Onset   • Heart attack Father          Objective:  There were no vitals filed for this visit.  Last 3 weights    04/24/17  1506   Weight: 122 lb (55.3 kg)     Body mass index is 21.61 kg/(m^2).       Ortho Exam  she is alert and oriented ×3 on exam.  Exam of the right lower extremity shows no swelling to the thigh to the calf.  It is nontender to palpation.  There is no motor deficit good distal pulses.  No sensory loss.  There is no erythema to the incision area.  Hip abductors are probably 3 over 5.  Her quads are 3 over 5 on muscle testing.  His point tenderness over the right greater trochanter with abduction of the hip against resistance causing moderate pain.  She is taking 1 adult aspirin a day.  The knee has 0-125° of motion with no instability and no pain.  His  no effusion to the knee.  There is no limb length discrepancy that might account for trochanteric pain.    Assessment:       1. Pain    2. History of total knee arthroplasty, right    3. S/P hip hemiarthroplasty    4. Trochanteric bursitis of right hip          Plan: I believe her tenderness is related to trochanteric bursitis of that was injected after sterile prepping 4 cc lidocaine 1 cc Celestone as she is diabetic.  Postinjection instructions given to the patient.  Return to see me in for  Large Joint Arthrocentesis  Date/Time: 4/24/2017 3:17 PM  Consent given by: patient  Site marked: site marked  Timeout: Immediately prior to procedure a time out was called to verify the correct patient, procedure, equipment, support staff and site/side marked as required   Supporting Documentation  Indications: pain   Procedure Details  Location: hip - R greater trochanteric bursa  Preparation: Patient was prepped and draped in the usual sterile fashion  Needle size: 22 G  Medications administered: 4 mL lidocaine 1 %; 6 mg betamethasone acetate-betamethasone sodium phosphate 6 (3-3) MG/ML  Patient tolerance: patient tolerated the procedure well with no immediate complications                  Work Status:    [facility] query complete.    Orders:  Orders Placed This Encounter   Procedures   • Large Joint Arthrocentesis       Medications:  No orders of the defined types were placed in this encounter.      Followup:  Return in about 4 weeks (around 5/22/2017).          Dragon transcription disclaimer     Much of this encounter note is an electronic transcription/translation of spoken language to printed text. The electronic translation of spoken language may permit erroneous, or at times, nonsensical words or phrases to be inadvertently transcribed. Although I have reviewed the note for such errors, some may still exist.

## 2017-05-22 ENCOUNTER — OFFICE VISIT (OUTPATIENT)
Dept: ORTHOPEDIC SURGERY | Facility: CLINIC | Age: 82
End: 2017-05-22

## 2017-05-22 DIAGNOSIS — Z96.651 HISTORY OF TOTAL KNEE ARTHROPLASTY, RIGHT: Primary | ICD-10-CM

## 2017-05-22 DIAGNOSIS — M70.61 TROCHANTERIC BURSITIS OF RIGHT HIP: ICD-10-CM

## 2017-05-22 DIAGNOSIS — Z96.649 S/P HIP HEMIARTHROPLASTY: ICD-10-CM

## 2017-05-22 PROCEDURE — 99212 OFFICE O/P EST SF 10 MIN: CPT | Performed by: ORTHOPAEDIC SURGERY

## 2017-05-22 RX ORDER — ZOLPIDEM TARTRATE 10 MG/1
10 TABLET ORAL NIGHTLY
Status: ON HOLD | COMMUNITY
Start: 2017-05-01 | End: 2017-12-06

## 2017-06-21 ENCOUNTER — OFFICE VISIT (OUTPATIENT)
Dept: ORTHOPEDIC SURGERY | Facility: CLINIC | Age: 82
End: 2017-06-21

## 2017-06-21 DIAGNOSIS — S70.01XA CONTUSION OF RIGHT HIP, INITIAL ENCOUNTER: ICD-10-CM

## 2017-06-21 DIAGNOSIS — Z96.649 S/P HIP HEMIARTHROPLASTY: ICD-10-CM

## 2017-06-21 DIAGNOSIS — Z96.651 HISTORY OF TOTAL KNEE ARTHROPLASTY, RIGHT: ICD-10-CM

## 2017-06-21 DIAGNOSIS — R52 PAIN: Primary | ICD-10-CM

## 2017-06-21 PROCEDURE — 73502 X-RAY EXAM HIP UNI 2-3 VIEWS: CPT | Performed by: ORTHOPAEDIC SURGERY

## 2017-06-21 PROCEDURE — 99214 OFFICE O/P EST MOD 30 MIN: CPT | Performed by: ORTHOPAEDIC SURGERY

## 2017-06-21 RX ORDER — HYDROCODONE BITARTRATE AND ACETAMINOPHEN 5; 325 MG/1; MG/1
1-2 TABLET ORAL EVERY 4 HOURS PRN
Qty: 30 TABLET | Refills: 0 | Status: SHIPPED | OUTPATIENT
Start: 2017-06-21 | End: 2017-07-26 | Stop reason: SDUPTHER

## 2017-06-21 NOTE — PROGRESS NOTES
Subjective: Status post right endoprosthetic hip replacement, hip contusion     Patient ID: Ashli León is a 82 y.o. female.    Chief Complaint:    History of Present Illness 82-year-old female is over 5 months out right endoprosthetic hip replacement is doing fairly well ambulating for the most part independently without a cane or crutch until she fell 2 days ago in her yard landing on the right hip.  Since that time she's had increasing discomfort in the hip requiring a cane for ambulation.  She's having some discomfort on a daily basis and with activities of daily living.  Did not go to the emergency room the pain is not that severe but there is localized tenderness over the right greater trochanter.       Social History     Occupational History   • Not on file.     Social History Main Topics   • Smoking status: Never Smoker   • Smokeless tobacco: Never Used   • Alcohol use 1.8 oz/week     3 Shots of liquor per week      Comment: social use 1 weekly   • Drug use: No   • Sexual activity: Defer      Review of Systems   Constitutional: Negative for chills, diaphoresis, fever and unexpected weight change.   HENT: Negative for hearing loss, nosebleeds, sore throat and tinnitus.    Eyes: Negative for pain and visual disturbance.   Respiratory: Negative for cough, shortness of breath and wheezing.    Cardiovascular: Negative for chest pain and palpitations.   Gastrointestinal: Negative for abdominal pain, diarrhea, nausea and vomiting.   Endocrine: Negative for cold intolerance, heat intolerance and polydipsia.   Genitourinary: Negative for difficulty urinating, dysuria and hematuria.   Musculoskeletal: Positive for myalgias. Negative for arthralgias and joint swelling.   Skin: Negative for rash and wound.   Allergic/Immunologic: Negative for environmental allergies.   Neurological: Negative for dizziness, syncope and numbness.   Hematological: Does not bruise/bleed easily.   Psychiatric/Behavioral: Negative for  dysphoric mood and sleep disturbance. The patient is not nervous/anxious.    All other systems reviewed and are negative.        Past Medical History:   Diagnosis Date   • Acid reflux    • AP (abdominal pain)    • Back pain    • Chest pain    • Colitis    • Diabetes mellitus type 2, noninsulin dependent    • Diarrhea    • Disease of thyroid gland    • Diverticular disease    • Glaucoma    • Gout     - right big toe   • High blood pressure    • High cholesterol    • Hyponatremia    • Knee pain    • Macular degeneration    • Mitral regurgitation 06/2016    Mild to moderate per 2-D echocardiogram   • Primary osteoarthritis of both knees 4/1/2016   • Renal lesion     on mri   • Rheumatoid arthritis      Past Surgical History:   Procedure Laterality Date   • APPENDECTOMY  1951   • CATARACT EXTRACTION Bilateral    • FEMUR SURGERY Right     had fracture and has rajesh    • HEMORRHOIDECTOMY     • HIP HEMIARTHROPLASTY Right 1/3/2017    Procedure: HIP HEMIARTHROPLASTY with IM rajesh and 2 screw hardware removal antibiotic cement;  Surgeon: Mirza Whittington MD;  Location: [facility] OR;  Service:    • HIP SURGERY Right 1999    hx fracture   • TOTAL KNEE ARTHROPLASTY Right 6/22/2016    Procedure: RT TOTAL KNEE ARTHROPLASTY WITH FINN NAVIGATION;  Surgeon: Adrian Swenson MD;  Location: [facility] OR;  Service:      Family History   Problem Relation Age of Onset   • Heart attack Father          Objective:  There were no vitals filed for this visit.  There were no vitals filed for this visit.  There is no height or weight on file to calculate BMI.       Ortho Exam  an AP of the pelvis done to evaluate her chief complain shows no acute or chronic changes.  The well implanted endoprosthetic hip replacement still in good position with no changes noted.  Compared to previous x-rays there is no change.  On exam she is alert and oriented ×3.  Right leg shows no motor deficit good distal pulses.  His no sensory loss.  Calf is nontender.  The skin  is cool to touch.  There is no bruising over the greater trochanter but there is tenderness to palpation over the greater trochanter.  Hip abduction is 5 over 5.  Her knee has full range of motion with no effusion.  Her calf is nontender with a negative Homans.  His no thigh pain associated with her complaint today.  She does take Mobic without any GI side effects.    Assessment:       1. Pain    2. History of total knee arthroplasty, right    3. S/P hip hemiarthroplasty    4. Contusion of right hip, initial encounter          Plan:      reviewed the x-rays and physical findings with the patient again she is relatively pain-free 4 she fell now there is some increasing soreness secondary to the contusion.  She is already taking Mobic so I instructed on moist heat.  We'll give her prescription for pain medication.  Return to see me in 6 weeks.      Work Status:    [employer] query complete.    Orders:  Orders Placed This Encounter   Procedures   • XR Hip With or Without Pelvis 2 - 3 View Right       Medications:  New Medications Ordered This Visit   Medications   • HYDROcodone-acetaminophen (NORCO) 5-325 MG per tablet     Sig: Take 1-2 tablets by mouth Every 4 (Four) Hours As Needed for Moderate Pain (4-6) for up to 40 doses.     Dispense:  30 tablet     Refill:  0       Followup:  Return in about 6 weeks (around 8/2/2017).          Dragon transcription disclaimer     Much of this encounter note is an electronic transcription/translation of spoken language to printed text. The electronic translation of spoken language may permit erroneous, or at times, nonsensical words or phrases to be inadvertently transcribed. Although I have reviewed the note for such errors, some may still exist.

## 2017-07-26 ENCOUNTER — OFFICE VISIT (OUTPATIENT)
Dept: ORTHOPEDIC SURGERY | Facility: CLINIC | Age: 82
End: 2017-07-26

## 2017-07-26 DIAGNOSIS — Z96.649 S/P HIP HEMIARTHROPLASTY: ICD-10-CM

## 2017-07-26 DIAGNOSIS — G89.29 CHRONIC PAIN OF LEFT KNEE: Primary | ICD-10-CM

## 2017-07-26 DIAGNOSIS — M25.562 CHRONIC PAIN OF LEFT KNEE: Primary | ICD-10-CM

## 2017-07-26 DIAGNOSIS — Z96.651 HISTORY OF TOTAL KNEE ARTHROPLASTY, RIGHT: ICD-10-CM

## 2017-07-26 DIAGNOSIS — R52 PAIN: ICD-10-CM

## 2017-07-26 PROCEDURE — 99214 OFFICE O/P EST MOD 30 MIN: CPT | Performed by: ORTHOPAEDIC SURGERY

## 2017-07-26 RX ORDER — HYDROCODONE BITARTRATE AND ACETAMINOPHEN 5; 325 MG/1; MG/1
1-2 TABLET ORAL EVERY 4 HOURS PRN
Qty: 30 TABLET | Refills: 0 | Status: SHIPPED | OUTPATIENT
Start: 2017-07-26 | End: 2017-09-07 | Stop reason: SDUPTHER

## 2017-07-26 NOTE — PROGRESS NOTES
Subjective: Status post right hemiarthroplasty trochanteric bursitis, status post right total knee with pain, osteoarthritis left knee     Patient ID: Ashli León is a 82 y.o. female.    Chief Complaint:    History of Present Illness patient returns with complaints of right trochanteric bursitis, left osteoarthritic knee pain and right total knee pain.  She's had recurrent trochanteric bursitis pain in the right hip.  She states the right knee is been painful since surgery she relates today above this in the first visit she's really complain of severe right knee pain.  Left knee failed to respond to treatment to date of nonsteroidal anti-inflammatories cortisone injection viscus supplement injections.       Social History     Occupational History   • Not on file.     Social History Main Topics   • Smoking status: Never Smoker   • Smokeless tobacco: Never Used   • Alcohol use 1.8 oz/week     3 Shots of liquor per week      Comment: social use 1 weekly   • Drug use: No   • Sexual activity: Defer      Review of Systems   Constitutional: Negative for chills, diaphoresis, fever and unexpected weight change.   HENT: Negative for hearing loss, nosebleeds, sore throat and tinnitus.    Eyes: Negative for pain and visual disturbance.   Respiratory: Negative for cough, shortness of breath and wheezing.    Cardiovascular: Negative for chest pain and palpitations.   Gastrointestinal: Negative for abdominal pain, diarrhea, nausea and vomiting.   Endocrine: Negative for cold intolerance, heat intolerance and polydipsia.   Genitourinary: Negative for difficulty urinating, dysuria and hematuria.   Musculoskeletal: Positive for arthralgias. Negative for joint swelling and myalgias.   Skin: Negative for rash and wound.   Allergic/Immunologic: Negative for environmental allergies.   Neurological: Negative for dizziness, syncope and numbness.   Hematological: Does not bruise/bleed easily.   Psychiatric/Behavioral: Negative for  dysphoric mood and sleep disturbance. The patient is not nervous/anxious.          Past Medical History:   Diagnosis Date   • Acid reflux    • AP (abdominal pain)    • Back pain    • Chest pain    • Colitis    • Diabetes mellitus type 2, noninsulin dependent    • Diarrhea    • Disease of thyroid gland    • Diverticular disease    • Glaucoma    • Gout     - right big toe   • High blood pressure    • High cholesterol    • Hyponatremia    • Knee pain    • Macular degeneration    • Mitral regurgitation 06/2016    Mild to moderate per 2-D echocardiogram   • Primary osteoarthritis of both knees 4/1/2016   • Renal lesion     on mri   • Rheumatoid arthritis      Past Surgical History:   Procedure Laterality Date   • APPENDECTOMY  1951   • CATARACT EXTRACTION Bilateral    • FEMUR SURGERY Right     had fracture and has rajesh    • HEMORRHOIDECTOMY     • HIP HEMIARTHROPLASTY Right 1/3/2017    Procedure: HIP HEMIARTHROPLASTY with IM rajesh and 2 screw hardware removal antibiotic cement;  Surgeon: Mirza Whittington MD;  Location: [facility] OR;  Service:    • HIP SURGERY Right 1999    hx fracture   • TOTAL KNEE ARTHROPLASTY Right 6/22/2016    Procedure: RT TOTAL KNEE ARTHROPLASTY WITH FINN NAVIGATION;  Surgeon: Adrian Swenson MD;  Location: [facility] OR;  Service:      Family History   Problem Relation Age of Onset   • Heart attack Father          Objective:  There were no vitals filed for this visit.  There were no vitals filed for this visit.  There is no height or weight on file to calculate BMI.       Ortho Exam  she is alert and oriented ×3.  Her right hip pain remains tender over the greater trochanter indicating chronic trochanteric bursitis.  She's had injections helped for short period of time and the pain is returned.  The right knee shows no motor deficit good distal pulses no sensory loss no effusion to the knee no instability.  Right calf is negative Homans sign.  Quad function is +4 out of 5 secondary to pain.  Left knee  shows moderate crepitus with range of motion 0-125° there is no instability of the known effusion swelling erythema the skin is cool to touch.  Calf is nontender negative Homans.  Left quad function is 4 out of 5 secondary to pain.    Assessment:       1. Chronic pain of left knee    2. Pain    3. History of total knee arthroplasty, right    4. S/P hip hemiarthroplasty          Plan:      is far as the hip is concerned offered physical therapy but she like to defer for the time being.  This point I'm not sure what can be done for the knee some of the pain may be coming from the hip is a possibility that discussed this with her.  With regard to the left knee she wants to proceed with total knee replacement wants to wait until the fall so she wants me to do so should come back in September for scheduling.      Work Status:    [employer] query complete.    Orders:  No orders of the defined types were placed in this encounter.      Medications:  New Medications Ordered This Visit   Medications   • HYDROcodone-acetaminophen (NORCO) 5-325 MG per tablet     Sig: Take 1-2 tablets by mouth Every 4 (Four) Hours As Needed for Moderate Pain (4-6) for up to 40 doses.     Dispense:  30 tablet     Refill:  0       Followup:  Return in about 6 weeks (around 9/6/2017).          Dragon transcription disclaimer     Much of this encounter note is an electronic transcription/translation of spoken language to printed text. The electronic translation of spoken language may permit erroneous, or at times, nonsensical words or phrases to be inadvertently transcribed. Although I have reviewed the note for such errors, some may still exist.

## 2017-09-07 ENCOUNTER — OFFICE VISIT (OUTPATIENT)
Dept: ORTHOPEDIC SURGERY | Facility: CLINIC | Age: 82
End: 2017-09-07

## 2017-09-07 DIAGNOSIS — M70.61 TROCHANTERIC BURSITIS OF RIGHT HIP: ICD-10-CM

## 2017-09-07 DIAGNOSIS — R52 PAIN: Primary | ICD-10-CM

## 2017-09-07 DIAGNOSIS — S22.000A COMPRESSION FRACTURE OF THORACIC VERTEBRA, CLOSED, INITIAL ENCOUNTER (HCC): ICD-10-CM

## 2017-09-07 DIAGNOSIS — M25.562 CHRONIC PAIN OF LEFT KNEE: ICD-10-CM

## 2017-09-07 DIAGNOSIS — Z96.651 HISTORY OF TOTAL KNEE ARTHROPLASTY, RIGHT: ICD-10-CM

## 2017-09-07 DIAGNOSIS — Z96.649 S/P HIP HEMIARTHROPLASTY: ICD-10-CM

## 2017-09-07 DIAGNOSIS — G89.29 CHRONIC PAIN OF LEFT KNEE: ICD-10-CM

## 2017-09-07 PROCEDURE — 72070 X-RAY EXAM THORAC SPINE 2VWS: CPT | Performed by: ORTHOPAEDIC SURGERY

## 2017-09-07 PROCEDURE — 99214 OFFICE O/P EST MOD 30 MIN: CPT | Performed by: ORTHOPAEDIC SURGERY

## 2017-09-07 RX ORDER — DIAZEPAM 2 MG/1
2 TABLET ORAL 2 TIMES DAILY PRN
Qty: 3 TABLET | Refills: 0 | Status: SHIPPED | OUTPATIENT
Start: 2017-09-07 | End: 2017-10-30 | Stop reason: SDUPTHER

## 2017-09-07 RX ORDER — FLUTICASONE PROPIONATE 0.05 MG/G
OINTMENT TOPICAL
COMMUNITY
Start: 2017-09-05 | End: 2017-11-28

## 2017-09-07 RX ORDER — HYDROCODONE BITARTRATE AND ACETAMINOPHEN 5; 325 MG/1; MG/1
1-2 TABLET ORAL EVERY 4 HOURS PRN
Qty: 30 TABLET | Refills: 0 | Status: SHIPPED | OUTPATIENT
Start: 2017-09-07 | End: 2017-09-18 | Stop reason: SDUPTHER

## 2017-09-07 NOTE — PROGRESS NOTES
Subjective: Right hip pain, bilateral knee pain, thoracic pain     Patient ID: Ashli León is a 82 y.o. female.    Chief Complaint:    History of Present Illness patient is seen today in follow-up to the right hip and both knees but also yesterday fell now complaining of mid back pain.  With regard to the hip is having recurrent trochanteric bursitis type pain and again discomfort in both knees.  The pain in the right hip is also the thigh area she states with ambulation but not significant pain at rest.  Since she fell yesterday she is now having midthoracic pain.  Patient was originally seen today to schedule left total knee arthroplasty but she needs to before because of these other complaints recently developed.  She describes the pain is quite disabling interfering with all activities of daily living extremity pain at worse is 8 or 9 out of 10.       Social History     Occupational History   • Not on file.     Social History Main Topics   • Smoking status: Never Smoker   • Smokeless tobacco: Never Used   • Alcohol use 1.8 oz/week     3 Shots of liquor per week      Comment: social use 1 weekly   • Drug use: No   • Sexual activity: Defer      Review of Systems   Constitutional: Negative for chills, diaphoresis, fever and unexpected weight change.   HENT: Negative for hearing loss, nosebleeds, sore throat and tinnitus.    Eyes: Negative for pain and visual disturbance.   Respiratory: Negative for cough, shortness of breath and wheezing.    Cardiovascular: Negative for chest pain and palpitations.   Gastrointestinal: Negative for abdominal pain, diarrhea, nausea and vomiting.   Endocrine: Negative for cold intolerance, heat intolerance and polydipsia.   Genitourinary: Negative for difficulty urinating, dysuria and hematuria.   Musculoskeletal: Positive for myalgias. Negative for arthralgias and joint swelling.   Skin: Negative for rash and wound.   Allergic/Immunologic: Negative for environmental allergies.    Neurological: Negative for dizziness, syncope and numbness.   Hematological: Does not bruise/bleed easily.   Psychiatric/Behavioral: Negative for dysphoric mood and sleep disturbance. The patient is not nervous/anxious.    All other systems reviewed and are negative.        Past Medical History:   Diagnosis Date   • Acid reflux    • AP (abdominal pain)    • Back pain    • Chest pain    • Colitis    • Diabetes mellitus type 2, noninsulin dependent    • Diarrhea    • Disease of thyroid gland    • Diverticular disease    • Glaucoma    • Gout     - right big toe   • High blood pressure    • High cholesterol    • Hyponatremia    • Knee pain    • Macular degeneration    • Mitral regurgitation 06/2016    Mild to moderate per 2-D echocardiogram   • Primary osteoarthritis of both knees 4/1/2016   • Renal lesion     on mri   • Rheumatoid arthritis      Past Surgical History:   Procedure Laterality Date   • APPENDECTOMY  1951   • CATARACT EXTRACTION Bilateral    • FEMUR SURGERY Right     had fracture and has rajesh    • HEMORRHOIDECTOMY     • HIP HEMIARTHROPLASTY Right 1/3/2017    Procedure: HIP HEMIARTHROPLASTY with IM rajesh and 2 screw hardware removal antibiotic cement;  Surgeon: Mirza Whittington MD;  Location: [facility] OR;  Service:    • HIP SURGERY Right 1999    hx fracture   • TOTAL KNEE ARTHROPLASTY Right 6/22/2016    Procedure: RT TOTAL KNEE ARTHROPLASTY WITH FINN NAVIGATION;  Surgeon: Adrian Swenson MD;  Location: [facility] MAIN OR;  Service:      Family History   Problem Relation Age of Onset   • Heart attack Father          Objective:  There were no vitals filed for this visit.  There were no vitals filed for this visit.  There is no height or weight on file to calculate BMI.       Ortho Exam  AP and lateral thoracic spine showed appear to be a compression fracture looks to be about T 1110 at the age of the fracture is undetermined tubal.  No prior x-rays are comparison.  Exam of the right leg shows no motor deficit good  distal pulses.  There is tenderness over the right greater trochanter.  She has 0-125° of motion of the knee with no instability.  His no sensory loss.  Quad function 5 over 5 in the calf is nontender negative Homans.  There is no long track signs.  Left knee shows marked pain and crepitus with range of motion which is 0 125° but there is no instability.  Thoracic spine has tenderness in the midthoracic area around T8 910 or 11.  There is no radicular component to her pain.  His no bruising noted.  There is pain with flexion and extension of the thoracic spine.    Assessment:       1. Pain    2. Chronic pain of left knee    3. History of total knee arthroplasty, right    4. S/P hip hemiarthroplasty    5. Trochanteric bursitis of right hip    6. Compression fracture of thoracic vertebra, closed, initial encounter          Plan:  Reviewed the x-rays of the thoracic spine with the patient.  We'll proceed with an MRI to rule out acute compression fracture.  With regard to the right hip and thigh pain will get a CT scan to evaluate for loose component.  All of these are negative we'll get involved in physical therapy for treatment operative discomfort but first want to proceed with these tests to rule out any acute problems.          Work Status:    JUDY query complete.    Orders:  Orders Placed This Encounter   Procedures   • XR spine thoracic 2 vw   • CT hip right wo contrast   • MRI Thoracic Spine Without Contrast       Medications:  New Medications Ordered This Visit   Medications   • fluticasone (CUTIVATE) 0.005 % ointment   • HYDROcodone-acetaminophen (NORCO) 5-325 MG per tablet     Sig: Take 1-2 tablets by mouth Every 4 (Four) Hours As Needed for Moderate Pain  for up to 40 doses.     Dispense:  30 tablet     Refill:  0   • diazePAM (VALIUM) 2 MG tablet     Sig: Take 1 tablet by mouth 2 (Two) Times a Day As Needed for Anxiety.     Dispense:  3 tablet     Refill:  0       Followup:  Return in about 2 weeks  (around 9/21/2017).          Dragon transcription disclaimer     Much of this encounter note is an electronic transcription/translation of spoken language to printed text. The electronic translation of spoken language may permit erroneous, or at times, nonsensical words or phrases to be inadvertently transcribed. Although I have reviewed the note for such errors, some may still exist.

## 2017-09-14 ENCOUNTER — HOSPITAL ENCOUNTER (OUTPATIENT)
Dept: MRI IMAGING | Facility: HOSPITAL | Age: 82
Discharge: HOME | End: 2017-09-14
Attending: ORTHOPAEDIC SURGERY

## 2017-09-14 ENCOUNTER — HOSPITAL ENCOUNTER (OUTPATIENT)
Dept: CT IMAGING | Facility: HOSPITAL | Age: 82
Discharge: HOME | End: 2017-09-14
Attending: ORTHOPAEDIC SURGERY | Admitting: ORTHOPAEDIC SURGERY

## 2017-09-14 DIAGNOSIS — R52 PAIN: ICD-10-CM

## 2017-09-14 DIAGNOSIS — S22.000A COMPRESSION FRACTURE OF THORACIC VERTEBRA, CLOSED, INITIAL ENCOUNTER (HCC): ICD-10-CM

## 2017-09-14 DIAGNOSIS — Z96.649 S/P HIP HEMIARTHROPLASTY: ICD-10-CM

## 2017-09-14 PROCEDURE — 72146 MRI CHEST SPINE W/O DYE: CPT

## 2017-09-14 PROCEDURE — 73700 CT LOWER EXTREMITY W/O DYE: CPT

## 2017-09-18 ENCOUNTER — OFFICE VISIT (OUTPATIENT)
Dept: ORTHOPEDIC SURGERY | Facility: CLINIC | Age: 82
End: 2017-09-18

## 2017-09-18 DIAGNOSIS — M25.562 CHRONIC PAIN OF LEFT KNEE: ICD-10-CM

## 2017-09-18 DIAGNOSIS — Z96.649 S/P HIP HEMIARTHROPLASTY: ICD-10-CM

## 2017-09-18 DIAGNOSIS — Z96.651 HISTORY OF TOTAL KNEE ARTHROPLASTY, RIGHT: ICD-10-CM

## 2017-09-18 DIAGNOSIS — R52 PAIN: Primary | ICD-10-CM

## 2017-09-18 DIAGNOSIS — G89.29 CHRONIC PAIN OF LEFT KNEE: ICD-10-CM

## 2017-09-18 DIAGNOSIS — S22.000A COMPRESSION FRACTURE OF THORACIC VERTEBRA, CLOSED, INITIAL ENCOUNTER (HCC): ICD-10-CM

## 2017-09-18 PROCEDURE — 99213 OFFICE O/P EST LOW 20 MIN: CPT | Performed by: ORTHOPAEDIC SURGERY

## 2017-09-18 RX ORDER — HYDROCODONE BITARTRATE AND ACETAMINOPHEN 5; 325 MG/1; MG/1
1-2 TABLET ORAL EVERY 4 HOURS PRN
Qty: 30 TABLET | Refills: 0 | Status: SHIPPED | OUTPATIENT
Start: 2017-09-18 | End: 2017-10-30 | Stop reason: SDUPTHER

## 2017-09-18 NOTE — PROGRESS NOTES
Subjective: Status post right endoprosthetic hip replacement, T6 compression fracture     Patient ID: Ashli León is a 82 y.o. female.    Chief Complaint:    History of Present Illness patient returns with results of the CT scan of the hip and the MRI of her back.  I reviewed both first CT of the hip is within normal limits and the MRI of the thoracic spine shows a 25% compression fracture of T6.  The back is given her the bigger problem today.       Social History     Occupational History   • Not on file.     Social History Main Topics   • Smoking status: Never Smoker   • Smokeless tobacco: Never Used   • Alcohol use 1.8 oz/week     3 Shots of liquor per week      Comment: social use 1 weekly   • Drug use: No   • Sexual activity: Defer      Review of Systems   Constitutional: Negative for chills, diaphoresis, fever and unexpected weight change.   HENT: Negative for hearing loss, nosebleeds, sore throat and tinnitus.    Eyes: Negative for pain and visual disturbance.   Respiratory: Negative for cough, shortness of breath and wheezing.    Cardiovascular: Negative for chest pain and palpitations.   Gastrointestinal: Negative for abdominal pain, diarrhea, nausea and vomiting.   Endocrine: Negative for cold intolerance, heat intolerance and polydipsia.   Genitourinary: Negative for difficulty urinating, dysuria and hematuria.   Musculoskeletal: Positive for arthralgias. Negative for joint swelling and myalgias.   Skin: Negative for rash and wound.   Allergic/Immunologic: Negative for environmental allergies.   Neurological: Negative for dizziness, syncope and numbness.   Hematological: Does not bruise/bleed easily.   Psychiatric/Behavioral: Negative for dysphoric mood and sleep disturbance. The patient is not nervous/anxious.          Past Medical History:   Diagnosis Date   • Acid reflux    • AP (abdominal pain)    • Back pain    • Chest pain    • Colitis    • Diabetes mellitus type 2, noninsulin dependent    •  Diarrhea    • Disease of thyroid gland    • Diverticular disease    • Glaucoma    • Gout     - right big toe   • High blood pressure    • High cholesterol    • Hyponatremia    • Knee pain    • Macular degeneration    • Mitral regurgitation 06/2016    Mild to moderate per 2-D echocardiogram   • Primary osteoarthritis of both knees 4/1/2016   • Renal lesion     on mri   • Rheumatoid arthritis      Past Surgical History:   Procedure Laterality Date   • APPENDECTOMY  1951   • CATARACT EXTRACTION Bilateral    • FEMUR SURGERY Right     had fracture and has rajesh    • HEMORRHOIDECTOMY     • HIP HEMIARTHROPLASTY Right 1/3/2017    Procedure: HIP HEMIARTHROPLASTY with IM rajesh and 2 screw hardware removal antibiotic cement;  Surgeon: Mirza Whittington MD;  Location:  [facility] OR;  Service:    • HIP SURGERY Right 1999    hx fracture   • TOTAL KNEE ARTHROPLASTY Right 6/22/2016    Procedure: RT TOTAL KNEE ARTHROPLASTY WITH FINN NAVIGATION;  Surgeon: Adrian Swenson MD;  Location: [facility] MAIN OR;  Service:      Family History   Problem Relation Age of Onset   • Heart attack Father          Objective:  There were no vitals filed for this visit.  There were no vitals filed for this visit.  There is no height or weight on file to calculate BMI.       Ortho Exam  she is alert and oriented ×3.  There is no motor deficit in lower extremities.  No sensory loss.  Calves are nontender.  There is no radiculopathy.  There is thoracic tenderness to palpation in the mid thoracic area and she has pain with flexion extension of her back but again there are no long track signs or any evidence of nerve impingement or cord impingement.  I reviewed the findings with the patient and her son.  With regard to the hip is still having a headache thigh pain but again the CT scan does not confirm any malalignment loosening or other abnormality involving the hip itself.    Assessment:       1. Pain    2. Chronic pain of left knee    3. History of total knee  arthroplasty, right    4. S/P hip hemiarthroplasty    5. Compression fracture of thoracic vertebra, closed, initial encounter          Plan:        Again review the results of the CT scan and MRI with the patient and his son.  We'll get her in to see Dr. Martin VACA for treatment and evaluation of the thoracic spine.  Return to see me regarding the hip in 6 weeks.    Work Status:    JUDY query complete.    Orders:  No orders of the defined types were placed in this encounter.      Medications:  New Medications Ordered This Visit   Medications   • HYDROcodone-acetaminophen (NORCO) 5-325 MG per tablet     Sig: Take 1-2 tablets by mouth Every 4 (Four) Hours As Needed for Moderate Pain  for up to 40 doses.     Dispense:  30 tablet     Refill:  0       Followup:  Return in about 6 weeks (around 10/30/2017).          Dragon transcription disclaimer     Much of this encounter note is an electronic transcription/translation of spoken language to printed text. The electronic translation of spoken language may permit erroneous, or at times, nonsensical words or phrases to be inadvertently transcribed. Although I have reviewed the note for such errors, some may still exist.

## 2017-10-30 ENCOUNTER — OFFICE VISIT (OUTPATIENT)
Dept: ORTHOPEDIC SURGERY | Facility: CLINIC | Age: 82
End: 2017-10-30

## 2017-10-30 DIAGNOSIS — Z96.651 HISTORY OF TOTAL KNEE ARTHROPLASTY, RIGHT: ICD-10-CM

## 2017-10-30 DIAGNOSIS — R52 PAIN: Primary | ICD-10-CM

## 2017-10-30 DIAGNOSIS — S76.011A TEAR OF RIGHT GLUTEUS MINIMUS TENDON, INITIAL ENCOUNTER: ICD-10-CM

## 2017-10-30 DIAGNOSIS — Z96.649 S/P HIP HEMIARTHROPLASTY: ICD-10-CM

## 2017-10-30 PROCEDURE — 99213 OFFICE O/P EST LOW 20 MIN: CPT | Performed by: ORTHOPAEDIC SURGERY

## 2017-10-30 RX ORDER — HYDROCODONE BITARTRATE AND ACETAMINOPHEN 5; 325 MG/1; MG/1
1-2 TABLET ORAL EVERY 4 HOURS PRN
Qty: 30 TABLET | Refills: 0 | Status: SHIPPED | OUTPATIENT
Start: 2017-10-30 | End: 2017-12-06 | Stop reason: HOSPADM

## 2017-10-30 RX ORDER — DIAZEPAM 2 MG/1
2 TABLET ORAL 2 TIMES DAILY PRN
Qty: 3 TABLET | Refills: 0 | Status: SHIPPED | OUTPATIENT
Start: 2017-10-30 | End: 2017-11-28

## 2017-10-30 NOTE — PROGRESS NOTES
Subjective: Right hip pain     Patient ID: Ashli León is a 82 y.o. female.    Chief Complaint:    History of Present Illness patient returns having been seen by Dr. Mai is under his treatment for her compression fracture thoracic spine with the right hip resistance to given her significant pain discomfort.  Again the pain remains lateral.  No groin pain but all lateral pain with ambulation.  Requires a walker to assist with ambulation.       Social History     Occupational History   • Not on file.     Social History Main Topics   • Smoking status: Never Smoker   • Smokeless tobacco: Never Used   • Alcohol use 1.8 oz/week     3 Shots of liquor per week      Comment: social use 1 weekly   • Drug use: No   • Sexual activity: Defer      Review of Systems   Constitutional: Negative for chills, diaphoresis, fever and unexpected weight change.   HENT: Negative for hearing loss, nosebleeds, sore throat and tinnitus.    Eyes: Negative for pain and visual disturbance.   Respiratory: Negative for cough, shortness of breath and wheezing.    Cardiovascular: Negative for chest pain and palpitations.   Gastrointestinal: Negative for abdominal pain, diarrhea, nausea and vomiting.   Endocrine: Negative for cold intolerance, heat intolerance and polydipsia.   Genitourinary: Negative for difficulty urinating, dysuria and hematuria.   Musculoskeletal: Positive for arthralgias. Negative for joint swelling and myalgias.   Skin: Negative for rash and wound.   Allergic/Immunologic: Negative for environmental allergies.   Neurological: Negative for dizziness, syncope and numbness.   Hematological: Does not bruise/bleed easily.   Psychiatric/Behavioral: Negative for dysphoric mood and sleep disturbance. The patient is not nervous/anxious.          Past Medical History:   Diagnosis Date   • Acid reflux    • AP (abdominal pain)    • Back pain    • Chest pain    • Colitis    • Diabetes mellitus type 2, noninsulin dependent    •  Diarrhea    • Disease of thyroid gland    • Diverticular disease    • Glaucoma    • Gout     - right big toe   • High blood pressure    • High cholesterol    • Hyponatremia    • Knee pain    • Macular degeneration    • Mitral regurgitation 06/2016    Mild to moderate per 2-D echocardiogram   • Primary osteoarthritis of both knees 4/1/2016   • Renal lesion     on mri   • Rheumatoid arthritis      Past Surgical History:   Procedure Laterality Date   • APPENDECTOMY  1951   • CATARACT EXTRACTION Bilateral    • FEMUR SURGERY Right     had fracture and has rajesh    • HEMORRHOIDECTOMY     • HIP HEMIARTHROPLASTY Right 1/3/2017    Procedure: HIP HEMIARTHROPLASTY with IM rajesh and 2 screw hardware removal antibiotic cement;  Surgeon: Mirza Whittington MD;  Location:  [facility] OR;  Service:    • HIP SURGERY Right 1999    hx fracture   • TOTAL KNEE ARTHROPLASTY Right 6/22/2016    Procedure: RT TOTAL KNEE ARTHROPLASTY WITH FINN NAVIGATION;  Surgeon: Adrian Swenson MD;  Location: [facility] MAIN OR;  Service:      Family History   Problem Relation Age of Onset   • Heart attack Father          Objective:  There were no vitals filed for this visit.  There were no vitals filed for this visit.  There is no height or weight on file to calculate BMI.       Ortho Exam  she is alert and oriented ×3.  There is no motor deficit of the right lower extremity good distal pulses Is nontender with a negative Homans.  The skin is cool to touch.  Quad function is 5 over 5 the hip abductors are +3 out of 5 secondary to pain.  There is no pain with passive internal/external rotation of the hip and there is no groin pain.  There is no long track signs.  His no swelling noted to the upper or lower leg indicating vascular compromise.    Assessment:       1. Pain    2. History of total knee arthroplasty, right    3. S/P hip hemiarthroplasty    4. Tear of right gluteus minimus tendon, initial encounter          Plan:  We'll proceed with an MRI of the hip to rule  out gluteal tear return after completion.          Work Status:    JUDY query complete.    Orders:  Orders Placed This Encounter   Procedures   • MRI Hip Right Without Contrast       Medications:  New Medications Ordered This Visit   Medications   • HYDROcodone-acetaminophen (NORCO) 5-325 MG per tablet     Sig: Take 1-2 tablets by mouth Every 4 (Four) Hours As Needed for Moderate Pain  for up to 40 doses.     Dispense:  30 tablet     Refill:  0   • diazePAM (VALIUM) 2 MG tablet     Sig: Take 1 tablet by mouth 2 (Two) Times a Day As Needed for Anxiety.     Dispense:  3 tablet     Refill:  0       Followup:  Return in about 2 weeks (around 11/13/2017).          Dragon transcription disclaimer     Much of this encounter note is an electronic transcription/translation of spoken language to printed text. The electronic translation of spoken language may permit erroneous, or at times, nonsensical words or phrases to be inadvertently transcribed. Although I have reviewed the note for such errors, some may still exist.

## 2017-11-01 ENCOUNTER — HOSPITAL ENCOUNTER (OUTPATIENT)
Dept: MRI IMAGING | Facility: HOSPITAL | Age: 82
Discharge: HOME | End: 2017-11-01
Attending: ORTHOPAEDIC SURGERY | Admitting: ORTHOPAEDIC SURGERY

## 2017-11-01 DIAGNOSIS — R52 PAIN: ICD-10-CM

## 2017-11-01 DIAGNOSIS — Z96.649 S/P HIP HEMIARTHROPLASTY: ICD-10-CM

## 2017-11-01 DIAGNOSIS — Z96.651 HISTORY OF TOTAL KNEE ARTHROPLASTY, RIGHT: ICD-10-CM

## 2017-11-01 DIAGNOSIS — S76.011A TEAR OF RIGHT GLUTEUS MINIMUS TENDON, INITIAL ENCOUNTER: ICD-10-CM

## 2017-11-01 PROCEDURE — 73721 MRI JNT OF LWR EXTRE W/O DYE: CPT

## 2017-11-13 ENCOUNTER — OFFICE VISIT (OUTPATIENT)
Dept: ORTHOPEDIC SURGERY | Facility: CLINIC | Age: 82
End: 2017-11-13

## 2017-11-13 DIAGNOSIS — Z96.649 S/P HIP HEMIARTHROPLASTY: Primary | ICD-10-CM

## 2017-11-13 DIAGNOSIS — S76.011A TEAR OF RIGHT GLUTEUS MINIMUS TENDON, INITIAL ENCOUNTER: ICD-10-CM

## 2017-11-13 PROCEDURE — 99213 OFFICE O/P EST LOW 20 MIN: CPT | Performed by: ORTHOPAEDIC SURGERY

## 2017-11-13 NOTE — PROGRESS NOTES
Subjective: Right hip pain     Patient ID: Ashli León is a 82 y.o. female.    Chief Complaint:    History of Present Illness patient returns with results of the MRI which I personally reviewed which shows rupture of the gluteus minimus and medius tendons of that right hip.  Again this pain started after he had a fall in September of this year which resulted in the thoracic fracture currently being treated by Dr. Mandel.  She still having significant pain discomfort with ambulation.       Social History     Occupational History   • Not on file.     Social History Main Topics   • Smoking status: Never Smoker   • Smokeless tobacco: Never Used   • Alcohol use 1.8 oz/week     3 Shots of liquor per week      Comment: social use 1 weekly   • Drug use: No   • Sexual activity: Defer      Review of Systems   Constitutional: Negative for chills, diaphoresis, fever and unexpected weight change.   HENT: Negative for hearing loss, nosebleeds, sore throat and tinnitus.    Eyes: Negative for pain and visual disturbance.   Respiratory: Negative for cough, shortness of breath and wheezing.    Cardiovascular: Negative for chest pain and palpitations.   Gastrointestinal: Negative for abdominal pain, diarrhea, nausea and vomiting.   Endocrine: Negative for cold intolerance, heat intolerance and polydipsia.   Genitourinary: Negative for difficulty urinating, dysuria and hematuria.   Musculoskeletal: Positive for arthralgias. Negative for joint swelling and myalgias.   Skin: Negative for rash and wound.   Allergic/Immunologic: Negative for environmental allergies.   Neurological: Negative for dizziness, syncope and numbness.   Hematological: Does not bruise/bleed easily.   Psychiatric/Behavioral: Negative for dysphoric mood and sleep disturbance. The patient is not nervous/anxious.    All other systems reviewed and are negative.        Past Medical History:   Diagnosis Date   • Acid reflux    • AP (abdominal pain)    • Back pain    •  Chest pain    • Colitis    • Diabetes mellitus type 2, noninsulin dependent    • Diarrhea    • Disease of thyroid gland    • Diverticular disease    • Glaucoma    • Gout     - right big toe   • High blood pressure    • High cholesterol    • Hyponatremia    • Knee pain    • Macular degeneration    • Mitral regurgitation 06/2016    Mild to moderate per 2-D echocardiogram   • Primary osteoarthritis of both knees 4/1/2016   • Renal lesion     on mri   • Rheumatoid arthritis      Past Surgical History:   Procedure Laterality Date   • APPENDECTOMY  1951   • CATARACT EXTRACTION Bilateral    • FEMUR SURGERY Right     had fracture and has rajesh    • HEMORRHOIDECTOMY     • HIP HEMIARTHROPLASTY Right 1/3/2017    Procedure: HIP HEMIARTHROPLASTY with IM rajesh and 2 screw hardware removal antibiotic cement;  Surgeon: Mirza Whittington MD;  Location:  [facility] OR;  Service:    • HIP SURGERY Right 1999    hx fracture   • TOTAL KNEE ARTHROPLASTY Right 6/22/2016    Procedure: RT TOTAL KNEE ARTHROPLASTY WITH FINN NAVIGATION;  Surgeon: Adrian Swenson MD;  Location: [facility] MAIN OR;  Service:      Family History   Problem Relation Age of Onset   • Heart attack Father          Objective:  There were no vitals filed for this visit.  There were no vitals filed for this visit.  There is no height or weight on file to calculate BMI.       Ortho Exam  she is alert and oriented ×3.  The right leg shows no motor deficit good distal pulses.  His no sensory loss.  There is no pain with passive range of motion of the hip as far as internal/external rotation and flexion and extension.  Hip abduction is +2-1/2-3 out of 5 and is marked pain with hip abduction.  No pain with abduction.  Skin is cool to touch and her calf is nontender negative Homans.  She has been taking anti-inflammatories with no GI side effects but no improvement decrease in her symptoms.    Assessment:       1. S/P hip hemiarthroplasty    2. Tear of right gluteus minimus tendon, initial  encounter          Plan:      reviewed the MRI findings with the patient and her son.  These rupture probably curvature fell September when she landed on the right side resulting in the pain and also the thoracic fracture.  My recommendation is for surgical repair of the hip abductors and his I explained to them it is best to do it sooner rather than later before dealing with significant muscle atrophy and retraction.  I told her she will need to wear hip abduction brace and at this time she is hesitant to proceed with surgery where she has were brace which is currently in a thoracic brace for her fracture.  She has an appointment with Dr. Mandel later this week and she will call back to schedule the surgery after discussion with him as to whether she can come out of the brace before the first of the year.      Work Status:    JUDY query complete.    Orders:  No orders of the defined types were placed in this encounter.      Medications:  No orders of the defined types were placed in this encounter.      Followup:  Return if symptoms worsen or fail to improve.          Dragon transcription disclaimer     Much of this encounter note is an electronic transcription/translation of spoken language to printed text. The electronic translation of spoken language may permit erroneous, or at times, nonsensical words or phrases to be inadvertently transcribed. Although I have reviewed the note for such errors, some may still exist.

## 2017-11-16 ENCOUNTER — APPOINTMENT (OUTPATIENT)
Dept: GENERAL RADIOLOGY | Facility: HOSPITAL | Age: 82
End: 2017-11-16
Attending: ORTHOPAEDIC SURGERY

## 2017-11-16 ENCOUNTER — HOSPITAL ENCOUNTER (OUTPATIENT)
Dept: GENERAL RADIOLOGY | Facility: HOSPITAL | Age: 82
Discharge: HOME | End: 2017-11-16
Attending: ORTHOPAEDIC SURGERY | Admitting: ORTHOPAEDIC SURGERY

## 2017-11-16 ENCOUNTER — TRANSCRIBE ORDERS (OUTPATIENT)
Dept: ADMINISTRATIVE | Facility: HOSPITAL | Age: 82
End: 2017-11-16

## 2017-11-16 DIAGNOSIS — M54.6 THORACIC SPINE PAIN: Primary | ICD-10-CM

## 2017-11-16 DIAGNOSIS — M54.6 THORACIC SPINE PAIN: ICD-10-CM

## 2017-11-16 PROCEDURE — 72070 X-RAY EXAM THORAC SPINE 2VWS: CPT

## 2017-11-21 ENCOUNTER — PREP FOR SURGERY (OUTPATIENT)
Dept: OTHER | Facility: HOSPITAL | Age: 82
End: 2017-11-21

## 2017-11-21 DIAGNOSIS — S76.011A TEAR OF RIGHT GLUTEUS MINIMUS TENDON, INITIAL ENCOUNTER: Primary | ICD-10-CM

## 2017-11-21 RX ORDER — PREGABALIN 75 MG/1
150 CAPSULE ORAL ONCE
Status: CANCELLED | OUTPATIENT
Start: 2017-12-01

## 2017-11-21 RX ORDER — OXYCODONE HCL 10 MG/1
10 TABLET, FILM COATED, EXTENDED RELEASE ORAL ONCE
Status: CANCELLED | OUTPATIENT
Start: 2017-12-01

## 2017-11-27 ENCOUNTER — PREP FOR SURGERY (OUTPATIENT)
Dept: OTHER | Facility: HOSPITAL | Age: 82
End: 2017-11-27

## 2017-11-27 DIAGNOSIS — I10 ESSENTIAL HYPERTENSION: Primary | ICD-10-CM

## 2017-11-28 ENCOUNTER — APPOINTMENT (OUTPATIENT)
Dept: PREADMISSION TESTING | Facility: HOSPITAL | Age: 82
End: 2017-11-28

## 2017-11-28 ENCOUNTER — HOSPITAL ENCOUNTER (OUTPATIENT)
Dept: GENERAL RADIOLOGY | Facility: HOSPITAL | Age: 82
Discharge: HOME | End: 2017-11-28
Admitting: ORTHOPAEDIC SURGERY

## 2017-11-28 VITALS
BODY MASS INDEX: 23.04 KG/M2 | WEIGHT: 130 LBS | OXYGEN SATURATION: 97 % | DIASTOLIC BLOOD PRESSURE: 66 MMHG | RESPIRATION RATE: 16 BRPM | HEART RATE: 56 BPM | SYSTOLIC BLOOD PRESSURE: 140 MMHG | HEIGHT: 63 IN

## 2017-11-28 DIAGNOSIS — I10 ESSENTIAL HYPERTENSION: ICD-10-CM

## 2017-11-28 LAB
ABO GROUP BLD: NORMAL
ANION GAP SERPL CALCULATED.3IONS-SCNC: 11.5 MMOL/L
BASOPHILS # BLD AUTO: 0.04 10*3/MM3 (ref 0–0.2)
BASOPHILS NFR BLD AUTO: 0.6 % (ref 0–2)
BILIRUB UR QL STRIP: NEGATIVE
BLD GP AB SCN SERPL QL: NEGATIVE
BUN BLD-MCNC: 16 MG/DL (ref 8–23)
BUN/CREAT SERPL: 19.8 (ref 7–25)
CALCIUM SPEC-SCNC: 9.9 MG/DL (ref 8.8–10.5)
CHLORIDE SERPL-SCNC: 100 MMOL/L (ref 98–107)
CLARITY UR: CLEAR
CO2 SERPL-SCNC: 28.5 MMOL/L (ref 22–29)
COLOR UR: YELLOW
CREAT BLD-MCNC: 0.81 MG/DL (ref 0.57–1)
DEPRECATED RDW RBC AUTO: 43 FL (ref 37–54)
EOSINOPHIL # BLD AUTO: 0.08 10*3/MM3 (ref 0.1–0.3)
EOSINOPHIL NFR BLD AUTO: 1.1 % (ref 0–4)
ERYTHROCYTE [DISTWIDTH] IN BLOOD BY AUTOMATED COUNT: 12.4 % (ref 11.5–14.5)
GFR SERPL CREATININE-BSD FRML MDRD: 68 ML/MIN/1.73
GLUCOSE BLD-MCNC: 109 MG/DL (ref 65–99)
GLUCOSE UR STRIP-MCNC: NEGATIVE MG/DL
HCT VFR BLD AUTO: 34.1 % (ref 37–47)
HGB BLD-MCNC: 11.3 G/DL (ref 12–16)
HGB UR QL STRIP.AUTO: NEGATIVE
IMM GRANULOCYTES # BLD: 0.04 10*3/MM3 (ref 0–0.03)
IMM GRANULOCYTES NFR BLD: 0.6 % (ref 0–0.5)
INR PPP: 0.92 (ref 0.9–1.1)
KETONES UR QL STRIP: NEGATIVE
LEUKOCYTE ESTERASE UR QL STRIP.AUTO: NEGATIVE
LYMPHOCYTES # BLD AUTO: 2.12 10*3/MM3 (ref 0.6–4.8)
LYMPHOCYTES NFR BLD AUTO: 29.4 % (ref 20–45)
MCH RBC QN AUTO: 31.3 PG (ref 27–31)
MCHC RBC AUTO-ENTMCNC: 33.1 G/DL (ref 31–37)
MCV RBC AUTO: 94.5 FL (ref 81–99)
MONOCYTES # BLD AUTO: 0.44 10*3/MM3 (ref 0–1)
MONOCYTES NFR BLD AUTO: 6.1 % (ref 3–8)
NEUTROPHILS # BLD AUTO: 4.48 10*3/MM3 (ref 1.5–8.3)
NEUTROPHILS NFR BLD AUTO: 62.2 % (ref 45–70)
NITRITE UR QL STRIP: NEGATIVE
NRBC BLD MANUAL-RTO: 0 /100 WBC (ref 0–0)
PH UR STRIP.AUTO: 5.5 [PH] (ref 4.5–8)
PLATELET # BLD AUTO: 292 10*3/MM3 (ref 140–500)
PMV BLD AUTO: 9.5 FL (ref 7.4–10.4)
POTASSIUM BLD-SCNC: 4.3 MMOL/L (ref 3.5–5.2)
PROT UR QL STRIP: NEGATIVE
PROTHROMBIN TIME: 12.3 SECONDS (ref 12.1–15)
RBC # BLD AUTO: 3.61 10*6/MM3 (ref 4.2–5.4)
RH BLD: POSITIVE
SODIUM BLD-SCNC: 140 MMOL/L (ref 136–145)
SP GR UR STRIP: <=1.005 (ref 1–1.03)
UROBILINOGEN UR QL STRIP: NORMAL
WBC NRBC COR # BLD: 7.2 10*3/MM3 (ref 4.8–10.8)

## 2017-11-28 PROCEDURE — 71020 HC CHEST PA AND LATERAL: CPT

## 2017-11-28 PROCEDURE — 86900 BLOOD TYPING SEROLOGIC ABO: CPT | Performed by: ORTHOPAEDIC SURGERY

## 2017-11-28 PROCEDURE — 80048 BASIC METABOLIC PNL TOTAL CA: CPT | Performed by: ORTHOPAEDIC SURGERY

## 2017-11-28 PROCEDURE — 87081 CULTURE SCREEN ONLY: CPT | Performed by: ORTHOPAEDIC SURGERY

## 2017-11-28 PROCEDURE — 36415 COLL VENOUS BLD VENIPUNCTURE: CPT

## 2017-11-28 PROCEDURE — 81003 URINALYSIS AUTO W/O SCOPE: CPT | Performed by: ORTHOPAEDIC SURGERY

## 2017-11-28 PROCEDURE — 86850 RBC ANTIBODY SCREEN: CPT | Performed by: ORTHOPAEDIC SURGERY

## 2017-11-28 PROCEDURE — 93005 ELECTROCARDIOGRAM TRACING: CPT

## 2017-11-28 PROCEDURE — 93010 ELECTROCARDIOGRAM REPORT: CPT | Performed by: INTERNAL MEDICINE

## 2017-11-28 PROCEDURE — 85610 PROTHROMBIN TIME: CPT | Performed by: ORTHOPAEDIC SURGERY

## 2017-11-28 PROCEDURE — 86901 BLOOD TYPING SEROLOGIC RH(D): CPT | Performed by: ORTHOPAEDIC SURGERY

## 2017-11-28 PROCEDURE — 85025 COMPLETE CBC W/AUTO DIFF WBC: CPT | Performed by: ORTHOPAEDIC SURGERY

## 2017-11-28 RX ORDER — MELATONIN
2000 DAILY
COMMUNITY
End: 2020-10-10

## 2017-11-30 ENCOUNTER — TELEPHONE (OUTPATIENT)
Dept: CARDIOLOGY | Facility: CLINIC | Age: 82
End: 2017-11-30

## 2017-11-30 ENCOUNTER — OFFICE VISIT (OUTPATIENT)
Dept: ORTHOPEDIC SURGERY | Facility: CLINIC | Age: 82
End: 2017-11-30

## 2017-11-30 ENCOUNTER — ANESTHESIA EVENT (OUTPATIENT)
Dept: PERIOP | Facility: HOSPITAL | Age: 82
End: 2017-11-30

## 2017-11-30 VITALS — HEIGHT: 63 IN | BODY MASS INDEX: 22.86 KG/M2 | WEIGHT: 129 LBS

## 2017-11-30 DIAGNOSIS — R52 PAIN: ICD-10-CM

## 2017-11-30 DIAGNOSIS — S76.011A TEAR OF RIGHT GLUTEUS MINIMUS TENDON, INITIAL ENCOUNTER: ICD-10-CM

## 2017-11-30 DIAGNOSIS — Z96.649 S/P HIP HEMIARTHROPLASTY: Primary | ICD-10-CM

## 2017-11-30 LAB — MRSA SPEC QL CULT: NORMAL

## 2017-11-30 PROCEDURE — S0260 H&P FOR SURGERY: HCPCS | Performed by: ORTHOPAEDIC SURGERY

## 2017-11-30 RX ORDER — ESCITALOPRAM OXALATE 10 MG/1
TABLET ORAL
Status: ON HOLD | COMMUNITY
Start: 2017-11-29 | End: 2020-10-10

## 2017-11-30 NOTE — TELEPHONE ENCOUNTER
11/30/17  11:36 AM  Ashli León  1935    Ms. Lóen calls back. She denies chest pain, dyspnea, or lightheadedness. She reports feeling well and that her BP has been controlled.    Elen DE PAZ RN

## 2017-11-30 NOTE — PROGRESS NOTES
Subjective: Right hip pain     Patient ID: Ashli León is a 82 y.o. female.    Chief Complaint:    History of Present Illness patient seen today for H&P to undergo repair of a ruptured right gluteal tendon tomorrow.  Anesthesia however request cardiology clearance that she has an abnormal EKG.  The EKG which was recently done is unchanged from her previous one that required cardiology clearance however they may want an update from cardiology before we can proceed with the surgery tomorrow.       Social History     Occupational History   • Not on file.     Social History Main Topics   • Smoking status: Never Smoker   • Smokeless tobacco: Never Used   • Alcohol use 1.8 oz/week     3 Shots of liquor per week      Comment: social use 1 weekly   • Drug use: No   • Sexual activity: Defer      Review of Systems   Constitutional: Negative for chills, diaphoresis, fever and unexpected weight change.   HENT: Negative for hearing loss, nosebleeds, sore throat and tinnitus.    Eyes: Negative for pain and visual disturbance.   Respiratory: Negative for cough, shortness of breath and wheezing.    Cardiovascular: Negative for chest pain and palpitations.   Gastrointestinal: Negative for abdominal pain, diarrhea, nausea and vomiting.   Endocrine: Negative for cold intolerance, heat intolerance and polydipsia.   Genitourinary: Negative for difficulty urinating, dysuria and hematuria.   Musculoskeletal: Positive for arthralgias and myalgias. Negative for joint swelling.   Skin: Negative for rash and wound.   Allergic/Immunologic: Negative for environmental allergies.   Neurological: Negative for dizziness, syncope and numbness.   Hematological: Does not bruise/bleed easily.   Psychiatric/Behavioral: Negative for dysphoric mood and sleep disturbance. The patient is not nervous/anxious.          Past Medical History:   Diagnosis Date   • Acid reflux    • Back pain    • Colitis     history of   • Diarrhea    • Disease of thyroid  gland    • Diverticular disease    • Glaucoma    • Gout     - right big toe   • High blood pressure    • High cholesterol    • History of vertebral compression fracture     multiple   • Hyponatremia    • Knee pain    • Macular degeneration    • Mitral regurgitation 06/2016    Mild to moderate per 2-D echocardiogram   • Primary osteoarthritis of both knees 4/1/2016   • Renal lesion     on mri   • Rheumatoid arthritis      Past Surgical History:   Procedure Laterality Date   • APPENDECTOMY  1951   • CATARACT EXTRACTION Bilateral    • FEMUR SURGERY Right     had fracture and has rajesh    • HEMORRHOIDECTOMY     • HIP HEMIARTHROPLASTY Right 1/3/2017    Procedure: HIP HEMIARTHROPLASTY with IM rajesh and 2 screw hardware removal antibiotic cement;  Surgeon: Mirza Whittington MD;  Location:  [facility];  Service:    • HIP SURGERY Right 1999    hx fracture   • TOTAL KNEE ARTHROPLASTY Right 6/22/2016    Procedure: RT TOTAL KNEE ARTHROPLASTY WITH FINN NAVIGATION;  Surgeon: Adrian Swenson MD;  Location: [facility] MAIN OR;  Service:      Family History   Problem Relation Age of Onset   • Heart attack Father          Objective:  There were no vitals filed for this visit.  Last 3 weights    11/30/17  0830   Weight: 129 lb (58.5 kg)     Body mass index is 22.85 kg/(m^2).       Ortho Exam  H&P completed    Assessment:       1. S/P hip hemiarthroplasty    2. Tear of right gluteus minimus tendon, initial encounter    3. Pain          Plan:      we will contact cardiology today to see if they can get that clearance hospital without seeing her otherwise amounted to postpone the surgery until they can evaluate her and give a new clearance discussed with the patient and the son and they understand.      Work Status:    JUDY query complete.    Orders:  No orders of the defined types were placed in this encounter.      Medications:  New Medications Ordered This Visit   Medications   • escitalopram (LEXAPRO) 10 MG tablet       Followup:  Return in  about 2 weeks (around 12/14/2017).          Dragon transcription disclaimer     Much of this encounter note is an electronic transcription/translation of spoken language to printed text. The electronic translation of spoken language may permit erroneous, or at times, nonsensical words or phrases to be inadvertently transcribed. Although I have reviewed the note for such errors, some may still exist.

## 2017-11-30 NOTE — TELEPHONE ENCOUNTER
Kori from Dr. Gomez office called, informing me that pt is scheduled for hip tendon repair tomorrow and Dr. Gomez would like to know if Cheryl Robles NP can review the EKG and clear pt.  Cheryl Robles NP is out of the office this week.  But, at patients last appt Dr. Agrawal staffed with her.  Dr. Agrawal reviewed patient's records as said as long as she is not having chest pain or any other problems she is cleared.  I called and informed Kori.  I left a message for pt to verify if she is having any problems.  Bridget

## 2017-11-30 NOTE — TELEPHONE ENCOUNTER
From a cardiac standpoint patient is cleared for surgery.    Dr. Agrawal, is there anything else you would like to add?  Bridget

## 2017-12-01 ENCOUNTER — ANESTHESIA (OUTPATIENT)
Dept: PERIOP | Facility: HOSPITAL | Age: 82
End: 2017-12-01

## 2017-12-01 ENCOUNTER — HOSPITAL ENCOUNTER (INPATIENT)
Facility: HOSPITAL | Age: 82
LOS: 3 days | Discharge: NURSING, REHAB OR LONG-TERM CARE | End: 2017-12-06
Attending: ORTHOPAEDIC SURGERY | Admitting: ORTHOPAEDIC SURGERY

## 2017-12-01 DIAGNOSIS — S76.011A TEAR OF RIGHT GLUTEUS MINIMUS TENDON, INITIAL ENCOUNTER: ICD-10-CM

## 2017-12-01 PROCEDURE — 0LQJ0ZZ REPAIR RIGHT HIP TENDON, OPEN APPROACH: ICD-10-PCS | Performed by: ORTHOPAEDIC SURGERY

## 2017-12-01 PROCEDURE — G0378 HOSPITAL OBSERVATION PER HR: HCPCS

## 2017-12-01 PROCEDURE — G8978 MOBILITY CURRENT STATUS: HCPCS

## 2017-12-01 PROCEDURE — 94799 UNLISTED PULMONARY SVC/PX: CPT

## 2017-12-01 PROCEDURE — 97165 OT EVAL LOW COMPLEX 30 MIN: CPT

## 2017-12-01 PROCEDURE — 25010000002 MIDAZOLAM PER 1 MG: Performed by: NURSE ANESTHETIST, CERTIFIED REGISTERED

## 2017-12-01 PROCEDURE — C1713 ANCHOR/SCREW BN/BN,TIS/BN: HCPCS | Performed by: ORTHOPAEDIC SURGERY

## 2017-12-01 PROCEDURE — 27299 UNLISTED PX PELVIS/HIP JOINT: CPT | Performed by: ORTHOPAEDIC SURGERY

## 2017-12-01 PROCEDURE — 25010000002 SUCCINYLCHOLINE PER 20 MG: Performed by: NURSE ANESTHETIST, CERTIFIED REGISTERED

## 2017-12-01 PROCEDURE — 25010000003 CEFAZOLIN PER 500 MG: Performed by: ORTHOPAEDIC SURGERY

## 2017-12-01 PROCEDURE — G8987 SELF CARE CURRENT STATUS: HCPCS

## 2017-12-01 PROCEDURE — 25010000002 PROPOFOL 10 MG/ML EMULSION: Performed by: NURSE ANESTHETIST, CERTIFIED REGISTERED

## 2017-12-01 PROCEDURE — 25010000002 HYDROMORPHONE PER 4 MG: Performed by: NURSE ANESTHETIST, CERTIFIED REGISTERED

## 2017-12-01 PROCEDURE — 25010000002 VANCOMYCIN PER 500 MG: Performed by: ORTHOPAEDIC SURGERY

## 2017-12-01 PROCEDURE — 25010000002 ONDANSETRON PER 1 MG: Performed by: NURSE ANESTHETIST, CERTIFIED REGISTERED

## 2017-12-01 PROCEDURE — 97161 PT EVAL LOW COMPLEX 20 MIN: CPT

## 2017-12-01 PROCEDURE — G8988 SELF CARE GOAL STATUS: HCPCS

## 2017-12-01 PROCEDURE — 99218 PR INITIAL OBSERVATION CARE/DAY 30 MINUTES: CPT | Performed by: INTERNAL MEDICINE

## 2017-12-01 PROCEDURE — G8979 MOBILITY GOAL STATUS: HCPCS

## 2017-12-01 DEVICE — SUT/ANCH BIOCOMP SWIVELOCK/C 4.75X19.1MM: Type: IMPLANTABLE DEVICE | Site: HIP | Status: FUNCTIONAL

## 2017-12-01 RX ORDER — ASPIRIN 325 MG
325 TABLET, DELAYED RELEASE (ENTERIC COATED) ORAL DAILY
Status: DISCONTINUED | OUTPATIENT
Start: 2017-12-02 | End: 2017-12-06 | Stop reason: HOSPADM

## 2017-12-01 RX ORDER — SODIUM CHLORIDE 0.9 % (FLUSH) 0.9 %
1-10 SYRINGE (ML) INJECTION AS NEEDED
Status: DISCONTINUED | OUTPATIENT
Start: 2017-12-01 | End: 2017-12-01 | Stop reason: HOSPADM

## 2017-12-01 RX ORDER — KETAMINE HYDROCHLORIDE 100 MG/ML
INJECTION INTRAMUSCULAR; INTRAVENOUS AS NEEDED
Status: DISCONTINUED | OUTPATIENT
Start: 2017-12-01 | End: 2017-12-01 | Stop reason: SURG

## 2017-12-01 RX ORDER — LIDOCAINE HYDROCHLORIDE 20 MG/ML
INJECTION, SOLUTION INFILTRATION; PERINEURAL AS NEEDED
Status: DISCONTINUED | OUTPATIENT
Start: 2017-12-01 | End: 2017-12-01 | Stop reason: SURG

## 2017-12-01 RX ORDER — PREGABALIN 75 MG/1
150 CAPSULE ORAL ONCE
Status: COMPLETED | OUTPATIENT
Start: 2017-12-01 | End: 2017-12-01

## 2017-12-01 RX ORDER — GLYCOPYRROLATE 0.2 MG/ML
INJECTION INTRAMUSCULAR; INTRAVENOUS AS NEEDED
Status: DISCONTINUED | OUTPATIENT
Start: 2017-12-01 | End: 2017-12-01 | Stop reason: SURG

## 2017-12-01 RX ORDER — SODIUM CHLORIDE, SODIUM LACTATE, POTASSIUM CHLORIDE, CALCIUM CHLORIDE 600; 310; 30; 20 MG/100ML; MG/100ML; MG/100ML; MG/100ML
50 INJECTION, SOLUTION INTRAVENOUS CONTINUOUS
Status: DISCONTINUED | OUTPATIENT
Start: 2017-12-01 | End: 2017-12-05

## 2017-12-01 RX ORDER — MIDAZOLAM HYDROCHLORIDE 1 MG/ML
2 INJECTION INTRAMUSCULAR; INTRAVENOUS
Status: DISCONTINUED | OUTPATIENT
Start: 2017-12-01 | End: 2017-12-01 | Stop reason: HOSPADM

## 2017-12-01 RX ORDER — FENTANYL CITRATE 50 UG/ML
25 INJECTION, SOLUTION INTRAMUSCULAR; INTRAVENOUS AS NEEDED
Status: DISCONTINUED | OUTPATIENT
Start: 2017-12-01 | End: 2017-12-01 | Stop reason: HOSPADM

## 2017-12-01 RX ORDER — SODIUM CHLORIDE 0.9 % (FLUSH) 0.9 %
3 SYRINGE (ML) INJECTION AS NEEDED
Status: DISCONTINUED | OUTPATIENT
Start: 2017-12-01 | End: 2017-12-01 | Stop reason: HOSPADM

## 2017-12-01 RX ORDER — LATANOPROST 50 UG/ML
1 SOLUTION/ DROPS OPHTHALMIC NIGHTLY
Status: DISCONTINUED | OUTPATIENT
Start: 2017-12-01 | End: 2017-12-06 | Stop reason: HOSPADM

## 2017-12-01 RX ORDER — ZOLPIDEM TARTRATE 5 MG/1
10 TABLET ORAL NIGHTLY
Status: DISCONTINUED | OUTPATIENT
Start: 2017-12-01 | End: 2017-12-06 | Stop reason: HOSPADM

## 2017-12-01 RX ORDER — LIDOCAINE HYDROCHLORIDE 10 MG/ML
0.5 INJECTION, SOLUTION INFILTRATION; PERINEURAL ONCE AS NEEDED
Status: COMPLETED | OUTPATIENT
Start: 2017-12-01 | End: 2017-12-01

## 2017-12-01 RX ORDER — HYDROCODONE BITARTRATE AND ACETAMINOPHEN 10; 325 MG/1; MG/1
1 TABLET ORAL EVERY 4 HOURS PRN
Status: DISCONTINUED | OUTPATIENT
Start: 2017-12-01 | End: 2017-12-02

## 2017-12-01 RX ORDER — SUCCINYLCHOLINE CHLORIDE 20 MG/ML
INJECTION INTRAMUSCULAR; INTRAVENOUS AS NEEDED
Status: DISCONTINUED | OUTPATIENT
Start: 2017-12-01 | End: 2017-12-01 | Stop reason: SURG

## 2017-12-01 RX ORDER — MAGNESIUM HYDROXIDE 1200 MG/15ML
LIQUID ORAL AS NEEDED
Status: DISCONTINUED | OUTPATIENT
Start: 2017-12-01 | End: 2017-12-01 | Stop reason: HOSPADM

## 2017-12-01 RX ORDER — LEVOTHYROXINE SODIUM 0.05 MG/1
50 TABLET ORAL DAILY
Status: DISCONTINUED | OUTPATIENT
Start: 2017-12-01 | End: 2017-12-06 | Stop reason: HOSPADM

## 2017-12-01 RX ORDER — ONDANSETRON 2 MG/ML
4 INJECTION INTRAMUSCULAR; INTRAVENOUS ONCE AS NEEDED
Status: DISCONTINUED | OUTPATIENT
Start: 2017-12-01 | End: 2017-12-01 | Stop reason: HOSPADM

## 2017-12-01 RX ORDER — ALLOPURINOL 100 MG/1
100 TABLET ORAL DAILY
Status: DISCONTINUED | OUTPATIENT
Start: 2017-12-01 | End: 2017-12-06 | Stop reason: HOSPADM

## 2017-12-01 RX ORDER — ATORVASTATIN CALCIUM 10 MG/1
10 TABLET, FILM COATED ORAL DAILY
Status: DISCONTINUED | OUTPATIENT
Start: 2017-12-01 | End: 2017-12-06 | Stop reason: HOSPADM

## 2017-12-01 RX ORDER — SODIUM CHLORIDE, SODIUM LACTATE, POTASSIUM CHLORIDE, CALCIUM CHLORIDE 600; 310; 30; 20 MG/100ML; MG/100ML; MG/100ML; MG/100ML
1000 INJECTION, SOLUTION INTRAVENOUS CONTINUOUS PRN
Status: DISCONTINUED | OUTPATIENT
Start: 2017-12-01 | End: 2017-12-01 | Stop reason: HOSPADM

## 2017-12-01 RX ORDER — ROCURONIUM BROMIDE 10 MG/ML
INJECTION, SOLUTION INTRAVENOUS AS NEEDED
Status: DISCONTINUED | OUTPATIENT
Start: 2017-12-01 | End: 2017-12-01 | Stop reason: SURG

## 2017-12-01 RX ORDER — PROPOFOL 10 MG/ML
VIAL (ML) INTRAVENOUS AS NEEDED
Status: DISCONTINUED | OUTPATIENT
Start: 2017-12-01 | End: 2017-12-01 | Stop reason: SURG

## 2017-12-01 RX ORDER — ESCITALOPRAM OXALATE 10 MG/1
10 TABLET ORAL DAILY
Status: DISCONTINUED | OUTPATIENT
Start: 2017-12-01 | End: 2017-12-06 | Stop reason: HOSPADM

## 2017-12-01 RX ORDER — SODIUM CHLORIDE 9 MG/ML
20 INJECTION, SOLUTION INTRAVENOUS CONTINUOUS
Status: DISCONTINUED | OUTPATIENT
Start: 2017-12-01 | End: 2017-12-05

## 2017-12-01 RX ORDER — HYDROMORPHONE HCL 110MG/55ML
0.25 PATIENT CONTROLLED ANALGESIA SYRINGE INTRAVENOUS AS NEEDED
Status: DISCONTINUED | OUTPATIENT
Start: 2017-12-01 | End: 2017-12-01 | Stop reason: HOSPADM

## 2017-12-01 RX ORDER — MIDAZOLAM HYDROCHLORIDE 1 MG/ML
0.5 INJECTION INTRAMUSCULAR; INTRAVENOUS
Status: DISCONTINUED | OUTPATIENT
Start: 2017-12-01 | End: 2017-12-01 | Stop reason: HOSPADM

## 2017-12-01 RX ORDER — ONDANSETRON 2 MG/ML
4 INJECTION INTRAMUSCULAR; INTRAVENOUS ONCE AS NEEDED
Status: COMPLETED | OUTPATIENT
Start: 2017-12-01 | End: 2017-12-01

## 2017-12-01 RX ORDER — PANTOPRAZOLE SODIUM 40 MG/1
40 TABLET, DELAYED RELEASE ORAL EVERY MORNING
Status: DISCONTINUED | OUTPATIENT
Start: 2017-12-01 | End: 2017-12-06 | Stop reason: HOSPADM

## 2017-12-01 RX ORDER — OXYCODONE HCL 10 MG/1
10 TABLET, FILM COATED, EXTENDED RELEASE ORAL ONCE
Status: COMPLETED | OUTPATIENT
Start: 2017-12-01 | End: 2017-12-01

## 2017-12-01 RX ORDER — SODIUM CHLORIDE 9 MG/ML
40 INJECTION, SOLUTION INTRAVENOUS AS NEEDED
Status: DISCONTINUED | OUTPATIENT
Start: 2017-12-01 | End: 2017-12-01 | Stop reason: HOSPADM

## 2017-12-01 RX ORDER — MELATONIN
2000 DAILY
Status: DISCONTINUED | OUTPATIENT
Start: 2017-12-01 | End: 2017-12-06 | Stop reason: HOSPADM

## 2017-12-01 RX ORDER — FAMOTIDINE 10 MG/ML
20 INJECTION, SOLUTION INTRAVENOUS
Status: DISCONTINUED | OUTPATIENT
Start: 2017-12-01 | End: 2017-12-01 | Stop reason: HOSPADM

## 2017-12-01 RX ADMIN — LIDOCAINE HYDROCHLORIDE 0.5 ML: 10 INJECTION, SOLUTION EPIDURAL; INFILTRATION; INTRACAUDAL; PERINEURAL at 06:47

## 2017-12-01 RX ADMIN — ROCURONIUM BROMIDE 5 MG: 10 INJECTION INTRAVENOUS at 08:26

## 2017-12-01 RX ADMIN — KETAMINE HYDROCHLORIDE 5 MG: 100 INJECTION INTRAMUSCULAR; INTRAVENOUS at 08:28

## 2017-12-01 RX ADMIN — SODIUM CHLORIDE 1000 MG: 900 INJECTION, SOLUTION INTRAVENOUS at 06:56

## 2017-12-01 RX ADMIN — HYDROCODONE BITARTRATE AND ACETAMINOPHEN 1 TABLET: 10; 325 TABLET ORAL at 21:11

## 2017-12-01 RX ADMIN — ESCITALOPRAM OXALATE 10 MG: 10 TABLET, FILM COATED ORAL at 12:55

## 2017-12-01 RX ADMIN — ZOLPIDEM TARTRATE 10 MG: 5 TABLET, FILM COATED ORAL at 21:11

## 2017-12-01 RX ADMIN — KETAMINE HYDROCHLORIDE 10 MG: 100 INJECTION INTRAMUSCULAR; INTRAVENOUS at 08:50

## 2017-12-01 RX ADMIN — ALLOPURINOL 100 MG: 100 TABLET ORAL at 12:55

## 2017-12-01 RX ADMIN — PROPOFOL 20 MG: 10 INJECTION, EMULSION INTRAVENOUS at 09:20

## 2017-12-01 RX ADMIN — LIDOCAINE HYDROCHLORIDE 100 MG: 20 INJECTION, SOLUTION INFILTRATION; PERINEURAL at 08:26

## 2017-12-01 RX ADMIN — FAMOTIDINE 20 MG: 10 INJECTION, SOLUTION INTRAVENOUS at 08:01

## 2017-12-01 RX ADMIN — LEVOTHYROXINE SODIUM 50 MCG: 50 TABLET ORAL at 12:55

## 2017-12-01 RX ADMIN — PROPOFOL 30 MG: 10 INJECTION, EMULSION INTRAVENOUS at 08:54

## 2017-12-01 RX ADMIN — HYDROCODONE BITARTRATE AND ACETAMINOPHEN 1 TABLET: 10; 325 TABLET ORAL at 16:58

## 2017-12-01 RX ADMIN — MIDAZOLAM HYDROCHLORIDE 0.5 MG: 1 INJECTION, SOLUTION INTRAMUSCULAR; INTRAVENOUS at 08:01

## 2017-12-01 RX ADMIN — CEFAZOLIN SODIUM 2 G: 2 SOLUTION INTRAVENOUS at 16:49

## 2017-12-01 RX ADMIN — KETAMINE HYDROCHLORIDE 10 MG: 100 INJECTION INTRAMUSCULAR; INTRAVENOUS at 08:54

## 2017-12-01 RX ADMIN — ATORVASTATIN CALCIUM 10 MG: 10 TABLET, FILM COATED ORAL at 12:55

## 2017-12-01 RX ADMIN — KETAMINE HYDROCHLORIDE 10 MG: 100 INJECTION INTRAMUSCULAR; INTRAVENOUS at 08:24

## 2017-12-01 RX ADMIN — OXYCODONE HYDROCHLORIDE 10 MG: 10 TABLET, FILM COATED, EXTENDED RELEASE ORAL at 06:47

## 2017-12-01 RX ADMIN — VANCOMYCIN HYDROCHLORIDE 1000 MG: 1 INJECTION, POWDER, LYOPHILIZED, FOR SOLUTION INTRAVENOUS at 18:33

## 2017-12-01 RX ADMIN — PANTOPRAZOLE SODIUM 40 MG: 40 TABLET, DELAYED RELEASE ORAL at 12:55

## 2017-12-01 RX ADMIN — PROPOFOL 80 MG: 10 INJECTION, EMULSION INTRAVENOUS at 08:26

## 2017-12-01 RX ADMIN — HYDROMORPHONE HYDROCHLORIDE 0.25 MG: 2 INJECTION, SOLUTION INTRAMUSCULAR; INTRAVENOUS; SUBCUTANEOUS at 10:12

## 2017-12-01 RX ADMIN — SODIUM CHLORIDE, POTASSIUM CHLORIDE, SODIUM LACTATE AND CALCIUM CHLORIDE 1000 ML: 600; 310; 30; 20 INJECTION, SOLUTION INTRAVENOUS at 06:47

## 2017-12-01 RX ADMIN — HYDROCODONE BITARTRATE AND ACETAMINOPHEN 1 TABLET: 10; 325 TABLET ORAL at 12:58

## 2017-12-01 RX ADMIN — PREGABALIN 150 MG: 75 CAPSULE ORAL at 06:47

## 2017-12-01 RX ADMIN — ONDANSETRON 4 MG: 2 INJECTION, SOLUTION INTRAMUSCULAR; INTRAVENOUS at 08:00

## 2017-12-01 RX ADMIN — GLYCOPYRROLATE 0.1 MG: 0.2 INJECTION INTRAMUSCULAR; INTRAVENOUS at 08:23

## 2017-12-01 RX ADMIN — VITAMIN D, TAB 1000IU (100/BT) 2000 UNITS: 25 TAB at 12:55

## 2017-12-01 RX ADMIN — MIDAZOLAM HYDROCHLORIDE 0.5 MG: 1 INJECTION, SOLUTION INTRAMUSCULAR; INTRAVENOUS at 08:17

## 2017-12-01 RX ADMIN — CEFAZOLIN SODIUM 2 G: 2 SOLUTION INTRAVENOUS at 08:28

## 2017-12-01 RX ADMIN — LIDOCAINE HYDROCHLORIDE 80 MG: 20 INJECTION, SOLUTION INFILTRATION; PERINEURAL at 09:14

## 2017-12-01 RX ADMIN — SUCCINYLCHOLINE CHLORIDE 100 MG: 20 INJECTION, SOLUTION INTRAMUSCULAR; INTRAVENOUS at 08:26

## 2017-12-01 NOTE — INTERVAL H&P NOTE
H&P updated. The patient was examined and vital Temp 97.9 °F (36.6 °C) (Oral)   Wt 123 lb 6.4 oz (56 kg)  BMI 21.86 kg/m2

## 2017-12-01 NOTE — PLAN OF CARE
Problem: Inpatient Occupational Therapy  Goal: Transfer Training Goal 1 STG- OT    12/01/17 1456   Transfer Training OT STG   Transfer Training OT STG, Date Established 12/01/17   Transfer Training OT STG, Time to Achieve 3 days   Transfer Training OT STG, Activity Type sit to stand/stand to sit;toilet   Transfer Training OT STG, [place] Level supervision required   Transfer Training OT STG, Assist Device commode, bedside;walker, rolling       Goal: LB Dressing Goal STG- OT    12/01/17 1456   LB Dressing OT STG   LB Dressing Goal OT STG, Date Established 12/01/17   LB Dressing Goal OT STG, Time to Achieve 5 days   LB Dressing Goal OT STG, [place] Level supervision required   LB Dressing Goal OT STG, Adaptive Equipment reacher;sock-aid;shoe horn, long handled

## 2017-12-01 NOTE — PLAN OF CARE
Problem: Patient Care Overview (Adult)  Goal: Plan of Care Review  Outcome: Ongoing (interventions implemented as appropriate)    12/01/17 0637   Coping/Psychosocial Response Interventions   Plan Of Care Reviewed With patient   Patient Care Overview   Progress no change   Outcome Evaluation   Outcome Summary/Follow up Plan vss, waiting for procedure

## 2017-12-01 NOTE — PLAN OF CARE
Problem: Patient Care Overview (Adult)  Goal: Discharge Needs Assessment  Outcome: Ongoing (interventions implemented as appropriate)    12/01/17 1258 12/01/17 1308   Discharge Needs Assessment   Discharge Planning Comments --  Patient in agreement with speaking to  at bedside. She lives alone in a ramp accessible one level home. She has been independent with ADLs. She says that her son will be able to stay with her if needed upon discharge. She also has a neighbor that would be able to assist if needed. She has a rollator walker, rolling walker, quad cane and bedside commode. She does not have home oxygen, home health services, nebulizer or CPAP/BIPAP services. If patient requires home health services she would like [facility]. She fills scripts at [facility] and has no issues getting or paying for her medicines. She has a Living Will. Patient says the plan is home and she is anticipating tomorrow.  will continue to follow and assist as needed.    Living Environment   Transportation Available car;family or friend will provide --    Self-Care   Equipment Currently Used at Home cane, quad;walker, rolling;rollator;commode --

## 2017-12-01 NOTE — CONSULTS
"Adult Nutrition  Assessment/PES    Patient Name:  Ashli León  YOB: 1935  MRN: [id number]  Admit Date:  12/1/2017    Assessment Date:  12/1/2017    Comments:  Needs softer foods.  Recommend:  Consider Ensure supplement up to 2 times daily            Reason for Assessment       12/01/17 1819    Reason for Assessment    Reason For Assessment/Visit nurse/nurse practitioner consult    Identified At Risk By Screening Criteria MST SCORE 2+    Other diagnosis status post repair of gluteus tendon tear    Factors Affecting Nutrition --   states difficulty swallowing and thinks because of the tube being down during surgery, normally not a problem, wants softer foods, appetite down for some time past problems with hip and knee              Nutrition/Diet History       12/01/17 1820    Nutrition/Diet History    Patient Reported Diet/Restrictions/Preferences regular    Typical Food/Fluid Intake receives frozen meals (5 per week), drinks 1 Ensure daily at home and likes all flavors            Anthropometrics       12/01/17 1821    Anthropometrics    Height 160 cm (62.99\")    RD Documented Weight on Admission 56 kg (123 lb 6 oz)    Anthropometrics (Special Considerations)    RD Calculated BMI (kg/m2) 21.86    Ideal Body Weight (IBW)    Ideal Body Weight (IBW), Female 53.1    Usual Body Weight (UBW)    Usual Body Weight --   unsure of recent specfic wt, per history wt was 133.6# on 6-13-16    Body Mass Index (BMI)    BMI Grade 19.1 - 24.9 - normal            Labs/Tests/Procedures/Meds       12/01/17 1823    Labs/Tests/Procedures/Meds    Labs/Tests Review Reviewed    Medication Review Reviewed, pertinent              Estimated/Assessed Needs       12/01/17 1823    Calculation Measurements    Weight Used For Calculations 56 kg (123 lb 6 oz)    Height Used for Calculations 1.6 m (5' 2.99\")    Estimated/Assessed Energy Needs    Energy Need Method [place]    Age 82    RMR ([place] Equation) 993.63 "    Activity Factors ([place])  Confined to bed  1.2   -1.3    Total estimated needs ([place]) 1,193-1,292    Estimated/Assessed Protein Needs    Weight Used for Protein Calculation 56 kg (123 lb 6 oz)    Protein (gm/kg) 1.2    1.2 Gm Protein (gm) 67.16    Estimated/Assessed Fluid Needs    Fluid Need Method RDA method   1,193-1,292            Nutrition Prescription Ordered       12/01/17 1825    Nutrition Prescription PO    Current PO Diet Regular            Evaluation of Received Nutrient/Fluid Intake       12/01/17 1825    Evaluation of Received Nutrient/Fluid Intake    Nutrition Delivered Fluid Evaluation   insufficient data    PO Evaluation    % PO Intake insufficient data            Problem/Interventions:        Problem 1       12/01/17 1825    Nutrition Diagnoses Problem 1    Problem 1 Swallowing Difficulty    Etiology (related to) Factors Affecting Nutrition    Signs/Symptoms (evidenced by) Report/Observation                    Intervention Goal       12/01/17 1826    Intervention Goal    PO Establish PO;PO intake (%)    PO Intake % 50 %   or greater of meals and any supplements ordered            Nutrition Intervention       12/01/17 1826    Nutrition Intervention    RD/Tech Action Advise alternate selection;Advise available snack;Interview for preference;Encourage intake;Follow Tx progress;Menu adjusted            Nutrition Prescription       12/01/17 1826    Nutrition Prescription PO    PO Prescription Begin/change supplement   consider Ensure supplement up to 2 times daily    Other Orders    Other --   needs soft foods            Education/Evaluation       12/01/17 1827    Education    Education Other (comment)   no discharge needs known at present.     Monitor/Evaluation    Monitor I&O;PO intake;Pertinent labs;Weight;Skin status        Electronically signed by:  Genevieve Horowitz RD  12/01/17 6:28 PM

## 2017-12-01 NOTE — PLAN OF CARE
Problem: Patient Care Overview (Adult)  Goal: Plan of Care Review  Outcome: Ongoing (interventions implemented as appropriate)    12/01/17 1627   Coping/Psychosocial Response Interventions   Plan Of Care Reviewed With patient   Patient Care Overview   Progress improving   Outcome Evaluation   Outcome Summary/Follow up Plan VSS. Patient tolerating regular diet. Adduction brace in place. Patient to get Ancef and Vanc postoperatively. PT/OT ordered. Hip dressing intact with moist drainage. Will continue to monitor.       Goal: Adult Individualization and Mutuality  Outcome: Ongoing (interventions implemented as appropriate)    12/01/17 1627   Individualization   Patient Specific Goals Pain management   Patient Specific Interventions Patient given PO pain meds as needed       Goal: Discharge Needs Assessment  Outcome: Ongoing (interventions implemented as appropriate)    12/01/17 1627   Discharge Needs Assessment   Concerns To Be Addressed denies needs/concerns at this time         Problem: Perioperative Period (Adult)  Goal: Signs and Symptoms of Listed Potential Problems Will be Absent or Manageable (Perioperative Period)  Outcome: Ongoing (interventions implemented as appropriate)    12/01/17 1627   Perioperative Period   Problems Assessed (Perioperative Period) all   Problems Present (Perioperative Period) pain         Problem: Fall Risk (Adult)  Goal: Identify Related Risk Factors and Signs and Symptoms  Outcome: Outcome(s) achieved Date Met:  12/01/17 12/01/17 1627   Fall Risk   Fall Risk: Related Risk Factors environment unfamiliar;age-related changes;gait/mobility problems;history of falls   Fall Risk: Signs and Symptoms presence of risk factors       Goal: Absence of Falls  Outcome: Ongoing (interventions implemented as appropriate)    12/01/17 1627   Fall Risk (Adult)   Absence of Falls making progress toward outcome         Problem: Pressure Ulcer Risk (Rell Scale) (Adult,Obstetrics,Pediatric)  Goal:  Identify Related Risk Factors and Signs and Symptoms  Outcome: Outcome(s) achieved Date Met:  12/01/17 12/01/17 1627   Pressure Ulcer Risk (Rell Scale)   Related Risk Factors (Pressure Ulcer Risk (Rell Scale)) mobility impaired;age extremes;length of surgery;medication;nutritional deficiencies       Goal: Skin Integrity  Outcome: Ongoing (interventions implemented as appropriate)    12/01/17 1627   Pressure Ulcer Risk (Rell Scale) (Adult,Obstetrics,Pediatric)   Skin Integrity making progress toward outcome         Problem: Mobility, Physical Impaired (Adult)  Goal: Identify Related Risk Factors and Signs and Symptoms  Outcome: Outcome(s) achieved Date Met:  12/01/17 12/01/17 1627   Mobility, Physical Impaired   Physical Mobility, Impaired: Related Risk Factors surgery/procedure;injury;pain/discomfort   Signs and Symptoms (Physical Mobility Impaired) limited ability to perform gross/fine motor skills;decreased balance       Goal: Enhanced Mobility Skills  Outcome: Ongoing (interventions implemented as appropriate)    12/01/17 1627   Mobility, Physical Impaired (Adult)   Enhanced Mobility Skills making progress toward outcome       Goal: Enhanced Functionality Ability  Outcome: Ongoing (interventions implemented as appropriate)    12/01/17 1627   Mobility, Physical Impaired (Adult)   Enhanced Functionality Ability making progress toward outcome

## 2017-12-01 NOTE — PLAN OF CARE
Problem: Patient Care Overview (Adult)  Goal: Plan of Care Review  Outcome: Ongoing (interventions implemented as appropriate)    12/01/17 0904   Coping/Psychosocial Response Interventions   Plan Of Care Reviewed With patient   Patient Care Overview   Progress improving   Outcome Evaluation   Outcome Summary/Follow up Plan denies pain or nausea

## 2017-12-01 NOTE — ANESTHESIA POSTPROCEDURE EVALUATION
Patient: Ashli León    Procedure Summary     Date Anesthesia Start Anesthesia Stop Room / Location    12/01/17 0821 0935 [facility] / [facility]       Procedure Diagnosis Surgeon Provider    GLUTEAL TENDON REPAIR-minimus and medius (Right Hip) Tear of right gluteus minimus tendon, initial encounter  (Tear of right gluteus minimus tendon, initial encounter [S76.011A]) MD Christina Jerez CRNA          Anesthesia Type: general  Last vitals  BP   116/71 (12/01/17 0936)   Temp   97.5 °F (36.4 °C) (12/01/17 0933)   Pulse   66 (12/01/17 0933)   Resp   14 (12/01/17 0933)     SpO2   100 % (12/01/17 0936)     Post Anesthesia Care and Evaluation    Patient location during evaluation: PACU  Patient participation: complete - patient participated  Level of consciousness: awake  Pain score: 5  Pain management: satisfactory to patient  Airway patency: patent  Anesthetic complications: No anesthetic complications  PONV Status: none  Cardiovascular status: acceptable  Respiratory status: acceptable  Hydration status: acceptable

## 2017-12-01 NOTE — ANESTHESIA PREPROCEDURE EVALUATION
Anesthesia Evaluation     Patient summary reviewed and Nursing notes reviewed   no history of anesthetic complications:  NPO Solid Status: > 8 hours  NPO Liquid Status: > 2 hours     Airway   Mallampati: III  TM distance: >3 FB  Neck ROM: full  possible difficult intubation  Dental - normal exam     Pulmonary - negative pulmonary ROS and normal exam    breath sounds clear to auscultation  Cardiovascular - normal exam    ECG reviewed  Patient on routine beta blocker and Beta blocker given within 24 hours of surgery  Rhythm: regular  Rate: normal    (+) hypertension well controlled, valvular problems/murmurs (mild MR) MR, hyperlipidemia    ROS comment: ·SINUS RHYTHM  LEFT ANTERIOR FASCICULAR BLOCK  CONSIDER LEFT VENTRICULAR HYPERTROPHY  ANTERIOR Q WAVES, POSSIBLY DUE TO LVH  compared to the previous ECG the rate is now slower      2016: Myocardial perfusion imaging indicates a normal myocardial perfusion study with no evidence of ischemia.  · Left ventricular ejection fraction is normal (Calculated EF = 63%).2016:    Neuro/Psych- negative ROS    ROS Comment: Macular degeneration  GI/Hepatic/Renal/Endo    (+)  GERD well controlled, hypothyroidism,     Musculoskeletal     (+) back pain, chronic pain,       ROS comment: History of vertebral compression fracture  Abdominal  - normal exam   Substance History   (+) alcohol use (occ),      OB/GYN          Other   (+) arthritis (RA, Gout)                                     Anesthesia Plan    ASA 3     general   (GA nvs SAB discussed. Pt requests GA due to ongoing back issues.)  intravenous induction   Anesthetic plan and risks discussed with patient.  Use of blood products discussed with patient  Consented to blood products.

## 2017-12-01 NOTE — PLAN OF CARE
Problem: Inpatient Physical Therapy  Goal: Bed Mobility Goal STG- PT    12/01/17 1427   Bed Mobility PT STG   Bed Mobility PT STG, Date Established 12/01/17   Bed Mobility PT STG, Time to Achieve 3 days   Bed Mobility PT STG, Activity Type supine to sit/sit to supine   Bed Mobility PT STG, [place] Level supervision required       Goal: Transfer Training Goal 1 STG- PT    12/01/17 1427   Transfer Training PT STG   Transfer Training PT STG, Date Established 12/01/17   Transfer Training PT STG, Time to Achieve 3 days   Transfer Training PT STG, Activity Type sit to stand/stand to sit   Transfer Training PT STG, [place] Level supervision required   Transfer Training PT STG, Assist Device walker, rolling       Goal: Gait Training Goal STG- PT    12/01/17 1427   Gait Training PT STG   Gait Training Goal PT STG, Date Established 12/01/17   Gait Training Goal PT STG, Time to Achieve 3 days   Gait Training Goal PT STG, [place] Level supervision required   Gait Training Goal PT STG, Assist Device walker, rolling   Gait Training Goal PT STG, Distance to Achieve 100

## 2017-12-01 NOTE — PLAN OF CARE
Problem: Patient Care Overview (Adult)  Goal: Adult Individualization and Mutuality  Outcome: Ongoing (interventions implemented as appropriate)    12/01/17 0650   Individualization   Patient Specific Preferences goes by Ellen

## 2017-12-01 NOTE — THERAPY EVALUATION
Acute Care - Occupational Therapy Initial Evaluation   Roxanna Phillip     Patient Name: Ashli León  : 1935  MRN: [id number]  Today's Date: 2017  Onset of Illness/Injury or Date of Surgery Date: 17  Date of Referral to OT: 17  Referring Physician: Dr. Whittington    Admit Date: 2017       ICD-10-CM ICD-9-CM   1. Tear of right gluteus minimus tendon, initial encounter S76.011A 843.8     Patient Active Problem List   Diagnosis   • History of hip surgery   • Primary osteoarthritis of both knees   • Arthritis of right knee   • Aftercare following right knee joint replacement surgery   • Status post total right knee replacement   • History of knee replacement, total   • History of total knee arthroplasty   • Chronic pain of left knee   • Arthritis of left knee   • Closed right hip fracture   • Femoral neck fracture   • Tear of right gluteus minimus tendon     Past Medical History:   Diagnosis Date   • Acid reflux    • Back pain    • Colitis     history of   • Diarrhea    • Disease of thyroid gland    • Diverticular disease    • Glaucoma    • Gout     - right big toe   • High blood pressure    • High cholesterol    • History of vertebral compression fracture     multiple   • Hyponatremia    • Knee pain    • Macular degeneration    • Mitral regurgitation 2016    Mild to moderate per 2-D echocardiogram   • Primary osteoarthritis of both knees 2016   • Renal lesion     on mri   • Rheumatoid arthritis      Past Surgical History:   Procedure Laterality Date   • APPENDECTOMY     • CATARACT EXTRACTION Bilateral    • FEMUR SURGERY Right     had fracture and has rajesh    • HEMORRHOIDECTOMY     • HIP HEMIARTHROPLASTY Right 1/3/2017    Procedure: HIP HEMIARTHROPLASTY with IM rajesh and 2 screw hardware removal antibiotic cement;  Surgeon: Mirza Whittington MD;  Location: [facility] OR;  Service:    • HIP SURGERY Right 1999    hx fracture   • TOTAL KNEE ARTHROPLASTY Right 2016    Procedure: RT TOTAL KNEE  ARTHROPLASTY WITH FINN NAVIGATION;  Surgeon: Adrian Swenson MD;  Location: [facility] OR;  Service:           OT ASSESSMENT FLOWSHEET (last 72 hours)      OT Evaluation       12/01/17 1339 12/01/17 1338 12/01/17 1258 12/01/17 1204 12/01/17 0634       Document Type evaluation  -EN    Subjective Information agree to therapy;complains of;pain  -EN    Patient Effort, Rehab Treatment good  -EN    Symptoms Noted During/After Treatment increased pain  -EN       Patient Profile Review yes  -EN    Onset of Illness/Injury or Date of Surgery Date 12/01/17  -EN    Referring Physician Dr. Whittington  -EN    General Observations Patient supine in bed, ABD brace in place.    -EN    Pertinent History Of Current Problem Patient underwent JUN in January of 2017. She fell x 3 months ago sustaining a rupture in glut med and min. Patient now s/p repair. Patient to have ABD on at all times when up. Patient reports she uses rollator at baseline for short distance mobility. She is independent with ADL's and mobility with device.   -EN    Precautions/Limitations brace on when up;fall precautions  -EN    Prior Level of Function independent:;all household mobility;ADL's  -EN    Equipment Currently Used at Home shower chair   uses rollator, has quad cane, rolling walker, BSC  -EN    Plans/Goals Discussed With patient;agreed upon  -EN    Risks Reviewed patient:;LOB;increased discomfort  -EN    Benefits Reviewed patient:;improve function;increase independence  -EN    Barriers to Rehab        Lives With alone  -EN    Living Arrangements house  -EN    Home Accessibility ramps present at home  -EN    Stair Railings at Home     Type of Financial/Environmental Concern     Transportation Available     Living Environment Comment Patient stated son staying with her upon discharge.  -EN       Date of Referral to OT 12/01/17  -EN    OT Diagnosis decreased ADL independence  -EN    Prognosis good  -EN    Functional Level At Time Of Evaluation Travis  performed supine to sit with HOB elevated, bed rails, and supervision.  Patient performed functional transfers and mobility with CGA with rolling walker.  Patient required assist for LB ADLs due to pain with bending.  Issued Long handled adaptive equipment and demonstrated use.  -EN    Patient/Family Goals Statement To return home with son to assist as needed.  -EN    Criteria for Skilled Therapeutic Interventions Met yes;treatment indicated  -EN    Rehab Potential good, to achieve stated therapy goals  -EN    Therapy Frequency 3 times/wk  -EN    Predicted Duration of Therapy Intervention (days/wks) 5 days  -EN    Anticipated Equipment Needs At Discharge --   issued LH shoe horn, sock aid, reacher  -EN    Anticipated Discharge Disposition home with assist;home with home health  -EN       Ambulation 1-->assistive equipment  -EN    Transferring 1-->assistive equipment  -EN    Toileting 1-->assistive equipment  -EN    Bathing 1-->assistive equipment  -EN    Dressing 0-->independent  -EN    Eating 0-->independent  -EN    Communication     Swallowing        Pain Assessment 0-10  -EN    Pain Score 5  -EN    Post Pain Score 6  -EN    Pain Type Acute pain;Surgical pain  -EN    Pain Location Hip  -EN    Pain Orientation Right  -EN    Pain Intervention(s) Medication (See MAR);Repositioned  -EN    Response to Interventions tolerated  -EN       Current Cognitive/Communication Assessment functional  -EN    Orientation Status oriented x 4  -EN    Follows Commands/Answers Questions able to follow single-step instructions  -EN    Personal Safety impulsive  -EN    Personal Safety Interventions fall prevention program maintained;gait belt;nonskid shoes/slippers when out of bed  -EN       General ROM Detail B UE AROM WFL  -EN       General MMT Assessment Detail B UE strength WFL  -EN       Muscle Tone Assessment     RLE Muscle Tone Assessment        Bed Mobility, Assistive Device bed rails;head of bed elevated  -EN    Bed Mob, Supine  to Sit, [place] supervision required  -EN    Bed Mob, Sit to Supine, [place] contact guard assist  -EN       Transfers, Sit-Stand [place] contact guard assist;verbal cues required  -EN    Transfers, Stand-Sit [place] contact guard assist;verbal cues required  -EN    Transfers, Sit-Stand-Sit, Assist Device rolling walker  -EN    Toilet Transfer, [place] contact guard assist;verbal cues required  -EN    Toilet Transfer, Assistive Device elevated toilet seat;rolling walker  -EN    Transfer, Comment verbal cues for hand placement  -EN       Functional Mobility- Ind. Level contact guard assist;verbal cues required  -EN    Functional Mobility- Device rolling walker  -EN       LB Bathing Assess/Train, Comment Mod A for LB ADLs due to pain.  I  -EN       LB Dressing Assess/Train, Comment MOD A, issued reacher, sock aid and long handled shoe horn for improved independence  -EN       Toileting Assess/Train, Indepen Level supervision required  -EN       Sensory Impairment        Planned Therapy Interventions ADL retraining;adaptive equipment training;transfer training  -EN       Pre-Treatment Position in bed  -EN    Post Treatment Position bed  -EN    In Bed notified nsg;call light within reach;encouraged to call for assist;with nsg   CNA in room  -EN      User Key  (r) = Recorded By, (t) = Taken By, (c) = Cosigned By    Initials Name Effective Dates    EN Niyah Demarco, OTR 06/22/16 -     BP Cherry Lawson, PT 12/01/15 -     CP Amira Mckeon, MOI 06/16/16 -     AW Shivani Schreiber, RN 06/16/16 -     CEFERINO Martinez, RN 12/11/15 -            Occupational Therapy Education     Title: PT OT SLP Therapies (Active)     Topic: Occupational Therapy (Done)     Point: ADL training (Done)    Description: Instruct learner(s) on proper safety adaptation and remediation techniques during self care or transfers.   Instruct in proper use of assistive devices.    Learning Progress Summary    Learner  Readiness Method Response Comment Documented by Status   Patient Acceptance E VU Patient educated on safety during functional transfer, adaptive equipment and adl retraining. EN 12/01/17 1339 Done                      User Key     Initials Effective Dates Name Provider Type Discipline    EN 06/22/16 -  SCOTT Berrios Occupational Therapist OT                  OT Recommendation and Plan  Anticipated Equipment Needs At Discharge:  (issued LH shoe horn, sock aid, reacher)  Anticipated Discharge Disposition: home with assist, home with home health  Planned Therapy Interventions: ADL retraining, adaptive equipment training, transfer training  Therapy Frequency: 3 times/wk  Plan of Care Review  Outcome Summary/Follow up Plan: OT evaluation completed. Patient performed functional transfers with CGA, rolling walker and verbal cues for proper hand placement.  Patient performed supine to sit with supervison and use of handrails with HOB elevated.  Patient required assist with LB ADLs due to pain with bending.  Patient issued reacher, sock aid and long handled shoe horn and given demonstration on use for improved ADL independence.  Patient would benefit from continued OT inpatient services to address ADL retraining.  Will continue to follow.          OT Goals       12/01/17 1456          Transfer Training OT STG    Transfer Training OT STG, Date Established 12/01/17  -EN      Transfer Training OT STG, Time to Achieve 3 days  -EN      Transfer Training OT STG, Activity Type sit to stand/stand to sit;toilet  -EN      Transfer Training OT STG, [place] Level supervision required  -EN      Transfer Training OT STG, Assist Device commode, bedside;walker, rolling  -EN      LB Dressing OT STG    LB Dressing Goal OT STG, Date Established 12/01/17  -EN      LB Dressing Goal OT STG, Time to Achieve 5 days  -EN      LB Dressing Goal OT STG, [place] Level supervision required  -EN      LB Dressing Goal OT STG, Adaptive  Equipment reacher;sock-aid;shoe horn, long handled  -EN        User Key  (r) = Recorded By, (t) = Taken By, (c) = Cosigned By    Initials Name Provider Type    SCOTT Grey Occupational Therapist                Outcome Measures       12/01/17 1339 12/01/17 1338       How much help from another person do you currently need...    Turning from your back to your side while in flat bed without using bedrails?  3  -BP     Moving from lying on back to sitting on the side of a flat bed without bedrails?  3  -BP     Moving to and from a bed to a chair (including a wheelchair)?  3  -BP     Standing up from a chair using your arms (e.g., wheelchair, bedside chair)?  3  -BP     Climbing 3-5 steps with a railing?  3  -BP     To walk in hospital room?  3  -BP     AM-PAC 6 Clicks Score  18  -BP     How much help from another is currently needed...    Putting on and taking off regular lower body clothing? 2  -EN      Bathing (including washing, rinsing, and drying) 2  -EN      Toileting (which includes using toilet bed pan or urinal) 3  -EN      Putting on and taking off regular upper body clothing 4  -EN      Taking care of personal grooming (such as brushing teeth) 4  -EN      Eating meals 4  -EN      Score 19  -EN      Functional Assessment    Outcome Measure Options AM-PAC 6 Clicks Daily Activity (OT)  -EN AM-PAC 6 Clicks Basic Mobility (PT)  -BP       User Key  (r) = Recorded By, (t) = Taken By, (c) = Cosigned By    Initials Name Provider Type    SCOTT Grey Occupational Therapist    BP Cherry Lawson PT Physical Therapist          Time Calculation:   OT Start Time: 1338  OT Stop Time: 1406  OT Time Calculation (min): 28 min    Therapy Charges for Today     Code Description Service Date Service Provider Modifiers Qty    [id number]  OT SELFCARE CURRENT 12/1/2017 SCOTT Berrios CK 1    58840169305 HC OT SELFCARE PROJECTED 12/1/2017 SCOTT Berrios CI 1     [id number]  OT EVAL LOW COMPLEXITY 2 12/1/2017 Niyah Demarco, OTR GO 1          OT G-codes  OT Professional Judgement Used?: Yes  OT Functional Scales Options: AM-PAC 6 Clicks Daily Activity (OT)  Score: 19  Functional Limitation: Self care  Self Care Current Status (): At least 40 percent but less than 60 percent impaired, limited or restricted  Self Care Goal Status (): At least 1 percent but less than 20 percent impaired, limited or restricted    Niyah Demarco OTR  12/1/2017

## 2017-12-01 NOTE — OP NOTE
Preoperative Diagnosis: Status post right endoprosthetic hip replacement with subsequent rupture gluteus minimus and medius    Postoperative Diagnosis: Same    Procedure Performed:  Open repair of right gluteus minimus and medius    Surgeon: Pk      Assistant:  Mariano Beard    Anesthesia: Gen.      Anesthetist: Lucrecia Harris    Drains: None    Complications: None    EBL; 50 cc    Indications for procedure: 82-year-old female underwent a right endoprosthetic hip replacement in January of this year did well to she fell approximately 3 months ago at which time an addition sustaining fractures to her thoracic spine rupture gluteus minimus and medius verified with MRI.  She is brought to the operating room for surgical repair          Operative Not     Patient brought to the operating room and after satisfactory anesthesia was obtained patient placed in right lateral decubitus position.  Timeouts completed then upon the patient procedure correctly.  The right leg and hip was then prepped and after the prep drive for 3 minutes was draped in a sterile from the usual manner.  Timeout was once again completed.  Go to the old lateral incision via blunt and sharp dissection the gluteus micah and tensor fascia blake.  Fascia was incised exposing the greater trochanter with rupture of the gluteus medius and minimus visualized.  There was some atrophy noted of both the tendon and the muscle.  The tendons were sharply debrided as was the radial trochanter surface was roughened with a rongeur..  His ankle treated using 4.75 Arthrex bioabsorbable anchors each double armed with #2 Vicryl.  The 3 anchors were placed in the greater trochanter and then the 12 sutures were passed through the tendons of the gluteus minimus and medius and then tied suturing the tendons back down to the greater trochanter.  There further reinforced with a pants over vest fashion with additional #2 Vicryl and #2 FiberWire.  After secure repair was  completed range of motion showed stable repair at that site.  The wound was then infiltrated with approximately 80 cc of the Exparel solution.  The wound is then irrigated with Betadine and normal saline.  1 g of vancomycin powder was placed in the deep and superficial x-rays of the wound.  The gluteus micah and tensor fascia blake was closed with a running is #1 strata fix suture.  Subcutaneous closure was completed with 0 strata fix and 3-0 strata fix.  A perineno Dermabond dressing was applied.  Sterile dressings and applied the patient placed in a hip abduction brace.  She is then awakened taken to recovery having tolerated procedure well.  All counts were correct

## 2017-12-01 NOTE — NURSING NOTE
Discharge Planning Assessment  REAL Coleman     Patient Name: Ashli León  MRN: [id number]  Today's Date: 12/1/2017    Admit Date: 12/1/2017          Discharge Needs Assessment       12/01/17 1258    Living Environment    Lives With alone    Living Arrangements house    Home Accessibility no concerns    Type of Financial/Environmental Concern none    Transportation Available car;family or friend will provide    Living Environment    Provides Primary Care For no one    Quality Of Family Relationships supportive    Able to Return to Prior Living Arrangements yes    Discharge Needs Assessment    Equipment Currently Used at Home cane, quad;walker, rolling;rollator;commode            Discharge Plan       12/01/17 1301    Case Management/Social Work Plan    Plan Plan is home.    Patient/Family In Agreement With Plan unable to assess    Additional Comments Patient in agreement with speaking to  at bedside.  She lives alone in a ramp accessible one level home.  She has been independent with ADLs.  She says that her son will be able to stay with her if needed upon discharge.  She also has a neighbor that would be able to assist if needed.  She has a rollator walker, rolling walker, quad cane and bedside commode.  She does not have home oxygen, home health services, nebulizer or CPAP/BIPAP services.  If patient requires home health services she would like [facility].  She fills scripts at [facility] and has no issues getting or paying for her medicines.  She has a Living Will.  Patient says the plan is home and she is anticipating tomorrow.   will continue to follow and assist as needed.         Discharge Placement     No information found                Demographic Summary       12/01/17 1257    Referral Information    Admission Type observation    Arrived From home or self-care    Contact Information    Permission Granted to Share Information With             Functional  Status     None            Psychosocial     None            Abuse/Neglect     None            Legal     None            Substance Abuse     None            Patient Forms     None          Mine Martinez RN

## 2017-12-01 NOTE — PLAN OF CARE
Problem: Patient Care Overview (Adult)  Goal: Adult Individualization and Mutuality  Outcome: Ongoing (interventions implemented as appropriate)    12/01/17 0638   Individualization   Patient Specific Preferences none

## 2017-12-01 NOTE — PLAN OF CARE
Problem: Patient Care Overview (Adult)  Goal: Plan of Care Review    12/01/17 5087   Outcome Evaluation   Outcome Summary/Follow up Plan OT evaluation completed. Patient performed functional transfers with CGA, rolling walker and verbal cues for proper hand placement. Patient performed supine to sit with supervison and use of handrails with HOB elevated. Patient required assist with LB ADLs due to pain with bending. Patient issued reacher, sock aid and long handled shoe horn and given demonstration on use for improved ADL independence. Patient would benefit from continued OT inpatient services to address ADL retraining. Will continue to follow.

## 2017-12-01 NOTE — PLAN OF CARE
Problem: Perioperative Period (Adult)  Goal: Signs and Symptoms of Listed Potential Problems Will be Absent or Manageable (Perioperative Period)  Outcome: Ongoing (interventions implemented as appropriate)    12/01/17 0638   Perioperative Period   Problems Assessed (Perioperative Period) all   Problems Present (Perioperative Period) pain

## 2017-12-01 NOTE — PLAN OF CARE
Problem: Patient Care Overview (Adult)  Goal: Plan of Care Review    12/01/17 1423   Coping/Psychosocial Response Interventions   Plan Of Care Reviewed With patient   Outcome Evaluation   Outcome Summary/Follow up Plan PT Eval Complete: Patient performs supine to sit transfer with supervision, sit to supine transfer with CGA, sit to/from stand transfes with CGA, and gait x 60 feet with CGA and use of RW. Paitent requires verbal cues for sequencing and safety during mobility. Patient would benefit from inptient skilled PT services to address deficits in functional mobility and maximize functional potential/independence prior to return home.

## 2017-12-01 NOTE — THERAPY EVALUATION
Acute Care - Physical Therapy Initial Evaluation   Roxanna Phillip     Patient Name: Ashli León  : 1935  MRN: [id number]  Today's Date: 2017   Onset of Illness/Injury or Date of Surgery Date: 17  Date of Referral to PT: 17  Referring Physician: Dr. Whittington      Admit Date: 2017     Visit Dx:    ICD-10-CM ICD-9-CM   1. Tear of right gluteus minimus tendon, initial encounter S76.011A 843.8     Patient Active Problem List   Diagnosis   • History of hip surgery   • Primary osteoarthritis of both knees   • Arthritis of right knee   • Aftercare following right knee joint replacement surgery   • Status post total right knee replacement   • History of knee replacement, total   • History of total knee arthroplasty   • Chronic pain of left knee   • Arthritis of left knee   • Closed right hip fracture   • Femoral neck fracture   • Tear of right gluteus minimus tendon     Past Medical History:   Diagnosis Date   • Acid reflux    • Back pain    • Colitis     history of   • Diarrhea    • Disease of thyroid gland    • Diverticular disease    • Glaucoma    • Gout     - right big toe   • High blood pressure    • High cholesterol    • History of vertebral compression fracture     multiple   • Hyponatremia    • Knee pain    • Macular degeneration    • Mitral regurgitation 2016    Mild to moderate per 2-D echocardiogram   • Primary osteoarthritis of both knees 2016   • Renal lesion     on mri   • Rheumatoid arthritis      Past Surgical History:   Procedure Laterality Date   • APPENDECTOMY     • CATARACT EXTRACTION Bilateral    • FEMUR SURGERY Right     had fracture and has rajesh    • HEMORRHOIDECTOMY     • HIP HEMIARTHROPLASTY Right 1/3/2017    Procedure: HIP HEMIARTHROPLASTY with IM rajesh and 2 screw hardware removal antibiotic cement;  Surgeon: Mirza Whittington MD;  Location: [facility] OR;  Service:    • HIP SURGERY Right 1999    hx fracture   • TOTAL KNEE ARTHROPLASTY Right 2016    Procedure: RT  TOTAL KNEE ARTHROPLASTY WITH FINN NAVIGATION;  Surgeon: Adrian Swenson MD;  Location: [facility] MAIN OR;  Service:           PT ASSESSMENT (last 72 hours)      PT Evaluation       12/01/17 1338 12/01/17 1258       Document Type evaluation  -BP    Subjective Information agree to therapy;complains of;pain  -BP    Patient Effort, Rehab Treatment good  -BP    Symptoms Noted During/After Treatment increased pain  -BP       Patient Profile Review yes  -BP    Onset of Illness/Injury or Date of Surgery Date 12/01/17  -BP    Referring Physician Dr. Whittington  -BP    General Observations Patient supine in bed with HOB elevated. 2L O2/NC. ABD brace in place.   -BP    Pertinent History Of Current Problem Patient underwent JUN in January of 2017. She fell x 3 months ago sustaining a rupture in glut med and min. Patient now s/p repair. Patient to have ABD on at all times when up. Patient reports she uses rollator at baseline for short distance mobility. She is independent with ADL's and mobility with device.   -BP    Precautions/Limitations brace on when up;fall precautions  -BP    Prior Level of Function --   see pertinent history of current problem  -BP    Equipment Currently Used at Home cane, quad;walker, rolling;rollator   BSC  -BP    Plans/Goals Discussed With patient;agreed upon  -BP    Risks Reviewed patient:;LOB;increased discomfort  -BP    Benefits Reviewed patient:;improve function;increase independence;increase strength  -BP    Barriers to Rehab none identified  -BP       Lives With alone  -BP    Living Arrangements house  -BP    Home Accessibility ramps present at home;bed and bath on same level  -BP    Type of Financial/Environmental Concern     Transportation Available     Living Environment Comment Patient states son and neighbor are available to assist or stay with patient as needed.   -BP       Date of Referral to PT 12/01/17  -BP    PT Diagnosis Decreased functional mobility  -BP    Criteria for Skilled  Therapeutic Interventions Met yes;treatment indicated  -BP    Rehab Potential good, to achieve stated therapy goals  -BP    Predicted Duration of Therapy Intervention (days/wks) 2 days  -BP       Pain Assessment 0-10  -BP    Pain Score 5  -BP    Pain Type Acute pain;Surgical pain  -BP    Pain Location Hip  -BP    Pain Orientation Right  -BP    Pain Intervention(s) Medication (See MAR);Repositioned;Cold applied   pre medicated for treatment  -BP    Response to Interventions tolerated  -BP       Current Cognitive/Communication Assessment functional  -BP    Orientation Status oriented x 4  -BP    Follows Commands/Answers Questions able to follow single-step instructions  -BP    Personal Safety impulsive  -BP    Personal Safety Interventions fall prevention program maintained  -BP       General ROM Detail L LE AROM WFL. R knee and ankle AROM WFL. R hip AROM deferred  -BP       General MMT Assessment Detail L LE gross MMT 5/5. R LE MMT deferred  -BP       Bed Mobility, Assistive Device bed rails;head of bed elevated  -BP    Bed Mob, Supine to Sit, [place] supervision required  -BP    Bed Mob, Sit to Supine, [place] contact guard assist  -BP       Transfers, Sit-Stand [place] contact guard assist;verbal cues required  -BP    Transfers, Stand-Sit [place] contact guard assist;verbal cues required  -BP    Transfers, Sit-Stand-Sit, Assist Device rolling walker  -BP    Toilet Transfer, [place] contact guard assist;verbal cues required  -BP    Toilet Transfer, Assistive Device elevated toilet seat;rolling walker  -BP    Transfer, Comment verbal cues for hand placement required   -BP       Gait, [place] Level contact guard assist;verbal cues required  -BP    Gait, Assistive Device rolling walker  -BP    Gait, Distance (Feet) 60  -BP    Gait, Gait Pattern Analysis swing-through gait  -BP    Gait, Gait Deviations sarah decreased;forward flexed posture;right:;antalgic;decreased heel strike;left:;step  length decreased  -BP    Gait, Safety Issues step length decreased  -BP    Gait, Impairments pain  -BP    Gait, Comment Patient requires verbal cues for safety with AD. Patient with decreased heel strike on R, does not acheive foot flat due to pain   -BP       Sensory Impairment --   denies numbness and tingling in B LE's   -BP       Pre-Treatment Position in bed  -BP    Post Treatment Position bed  -BP    In Bed notified nsg;supine;call light within reach;encouraged to call for assist;with other staff   with CNA  -BP            User Key  (r) = Recorded By, (t) = Taken By, (c) = Cosigned By    Initials Name Provider Type     Cherry Lawson, PT Physical Therapist    AUDIE Mckeon, RN Registered Nurse    MARIA DE JESUS Schreiber RN Registered Nurse    CEFERINO Martinez, MOI Case Manager          Physical Therapy Education     Title: PT OT SLP Therapies (Active)     Topic: Physical Therapy (Active)     Point: Mobility training (Done)    Learning Progress Summary    Learner Readiness Method Response Comment Documented by Status   Patient Acceptance [facility] 12/01/17 1425 Done               Point: Precautions (Done)    Learning Progress Summary    Learner Readiness Method Response Comment Documented by Status   Patient Acceptance [facility] 12/01/17 1425 Done                      User Key     Initials Effective Dates Name Provider Type [facility] 12/01/15 -  Cherry Lawson, PT Physical Therapist PT                PT Recommendation and Plan  Anticipated Equipment Needs At Discharge:  (none)  Anticipated Discharge Disposition: home with home health  Planned Therapy Interventions: bed mobility training, gait training, home exercise program, transfer training, strengthening, patient/family education  PT Frequency: daily  Plan of Care Review  Plan Of Care Reviewed With: patient  Outcome Summary/Follow up Plan: PT Eval Complete: Patient performs supine to sit transfer with supervision, sit to supine transfer with  CGA, sit to/from stand transfes with CGA, and gait x 60 feet with CGA and use of RW. Paitent requires verbal cues for sequencing and safety during mobility. Patient would benefit from inptient skilled PT services to address deficits in functional mobility and maximize functional potential/independence prior to return home.           IP PT Goals       12/01/17 1427          Bed Mobility PT STG    Bed Mobility PT STG, Date Established 12/01/17  -BP      Bed Mobility PT STG, Time to Achieve 3 days  -BP      Bed Mobility PT STG, Activity Type supine to sit/sit to supine  -BP      Bed Mobility PT STG, [place] Level supervision required  -BP      Transfer Training PT STG    Transfer Training PT STG, Date Established 12/01/17  -BP      Transfer Training PT STG, Time to Achieve 3 days  -BP      Transfer Training PT STG, Activity Type sit to stand/stand to sit  -BP      Transfer Training PT STG, [place] Level supervision required  -BP      Transfer Training PT STG, Assist Device walker, rolling  -BP      Gait Training PT STG    Gait Training Goal PT STG, Date Established 12/01/17  -BP      Gait Training Goal PT STG, Time to Achieve 3 days  -BP      Gait Training Goal PT STG, [place] Level supervision required  -BP      Gait Training Goal PT STG, Assist Device walker, rolling  -BP      Gait Training Goal PT STG, Distance to Achieve 100  -BP        User Key  (r) = Recorded By, (t) = Taken By, (c) = Cosigned By    Initials Name Provider Type    BP Cherry Lawson, PT Physical Therapist                Outcome Measures       12/01/17 1338          How much help from another person do you currently need...    Turning from your back to your side while in flat bed without using bedrails? 3  -BP      Moving from lying on back to sitting on the side of a flat bed without bedrails? 3  -BP      Moving to and from a bed to a chair (including a wheelchair)? 3  -BP      Standing up from a chair using your arms (e.g.,  wheelchair, bedside chair)? 3  -BP      Climbing 3-5 steps with a railing? 3  -BP      To walk in hospital room? 3  -BP      AM-PAC 6 Clicks Score 18  -BP      Functional Assessment    Outcome Measure Options AM-PAC 6 Clicks Basic Mobility (PT)  -BP        User Key  (r) = Recorded By, (t) = Taken By, (c) = Cosigned By    Initials Name Provider Type    BP Cherry Lawson PT Physical Therapist           Time Calculation:         PT Charges       12/01/17 1428          Time Calculation    Start Time 1338  -BP      Stop Time 1401  -BP      Time Calculation (min) 23 min  -BP      PT Received On 12/01/17  -BP      PT - Next Appointment 12/02/17  -BP        User Key  (r) = Recorded By, (t) = Taken By, (c) = Cosigned By    Initials Name Provider Type    BP Cherry Lawson, PT Physical Therapist          Therapy Charges for Today     Code Description Service Date Service Provider Modifiers Qty    68293372028 HC PT EVAL LOW COMPLEXITY 2 12/1/2017 Cherry Lawson, PT GP 1    42625625682 HC PT MOBILITY CURRENT 12/1/2017 Cherry Lawson, PT GP, CJ 1    [id number] HC PT MOBILITY PROJECTED 12/1/2017 Cherry Lawson, PT GP, CI 1          PT G-Codes  PT Professional Judgement Used?: Yes  Outcome Measure Options: AM-PAC 6 Clicks Basic Mobility (PT)  Functional Limitation: Mobility: Walking and moving around  Mobility: Walking and Moving Around Current Status (): At least 20 percent but less than 40 percent impaired, limited or restricted  Mobility: Walking and Moving Around Goal Status (): At least 1 percent but less than 20 percent impaired, limited or restricted      Cherry Lawson, PT  12/1/2017

## 2017-12-01 NOTE — PLAN OF CARE
Problem: Perioperative Period (Adult)  Goal: Signs and Symptoms of Listed Potential Problems Will be Absent or Manageable (Perioperative Period)  Outcome: Ongoing (interventions implemented as appropriate)    12/01/17 0942   Perioperative Period   Problems Assessed (Perioperative Period) all   Problems Present (Perioperative Period) pain;physiologic stress response

## 2017-12-01 NOTE — ANESTHESIA PROCEDURE NOTES
Airway  Urgency: elective    Airway not difficult    General Information and Staff    Patient location during procedure: OR    Indications and Patient Condition  Indications for airway management: airway protection    Preoxygenated: yes  MILS maintained throughout  Mask difficulty assessment: 1 - vent by mask    Final Airway Details  Final airway type: endotracheal airway      Successful airway: ETT  Cuffed: yes   Successful intubation technique: direct laryngoscopy  Facilitating devices/methods: intubating stylet  Endotracheal tube insertion site: oral  Blade: Smith  Blade size: #2  ETT size: 7.0 mm  Cormack-Lehane Classification: grade I - full view of glottis (W PRESSURE)  Placement verified by: chest auscultation and capnometry   Measured from: lips  ETT to lips (cm): 21  Number of attempts at approach: 1

## 2017-12-02 ENCOUNTER — APPOINTMENT (OUTPATIENT)
Dept: ULTRASOUND IMAGING | Facility: HOSPITAL | Age: 82
End: 2017-12-02

## 2017-12-02 LAB
ANION GAP SERPL CALCULATED.3IONS-SCNC: 7 MMOL/L
BASOPHILS # BLD AUTO: 0.02 10*3/MM3 (ref 0–0.2)
BASOPHILS NFR BLD AUTO: 0.3 % (ref 0–2)
BUN BLD-MCNC: 14 MG/DL (ref 8–23)
BUN/CREAT SERPL: 19.7 (ref 7–25)
CALCIUM SPEC-SCNC: 8.9 MG/DL (ref 8.8–10.5)
CHLORIDE SERPL-SCNC: 97 MMOL/L (ref 98–107)
CO2 SERPL-SCNC: 30 MMOL/L (ref 22–29)
CREAT BLD-MCNC: 0.71 MG/DL (ref 0.57–1)
DEPRECATED RDW RBC AUTO: 43.7 FL (ref 37–54)
EOSINOPHIL # BLD AUTO: 0.13 10*3/MM3 (ref 0.1–0.3)
EOSINOPHIL NFR BLD AUTO: 1.9 % (ref 0–4)
ERYTHROCYTE [DISTWIDTH] IN BLOOD BY AUTOMATED COUNT: 12.5 % (ref 11.5–14.5)
GFR SERPL CREATININE-BSD FRML MDRD: 79 ML/MIN/1.73
GLUCOSE BLD-MCNC: 104 MG/DL (ref 65–99)
HCT VFR BLD AUTO: 29.1 % (ref 37–47)
HCT VFR BLD AUTO: 30.7 % (ref 37–47)
HGB BLD-MCNC: 10.1 G/DL (ref 12–16)
HGB BLD-MCNC: 9.3 G/DL (ref 12–16)
IMM GRANULOCYTES # BLD: 0.01 10*3/MM3 (ref 0–0.03)
IMM GRANULOCYTES NFR BLD: 0.1 % (ref 0–0.5)
LYMPHOCYTES # BLD AUTO: 2.16 10*3/MM3 (ref 0.6–4.8)
LYMPHOCYTES NFR BLD AUTO: 31.9 % (ref 20–45)
MCH RBC QN AUTO: 30.8 PG (ref 27–31)
MCHC RBC AUTO-ENTMCNC: 32 G/DL (ref 31–37)
MCV RBC AUTO: 96.4 FL (ref 81–99)
MONOCYTES # BLD AUTO: 0.59 10*3/MM3 (ref 0–1)
MONOCYTES NFR BLD AUTO: 8.7 % (ref 3–8)
NEUTROPHILS # BLD AUTO: 3.87 10*3/MM3 (ref 1.5–8.3)
NEUTROPHILS NFR BLD AUTO: 57.1 % (ref 45–70)
NRBC BLD MANUAL-RTO: 0 /100 WBC (ref 0–0)
PLATELET # BLD AUTO: 218 10*3/MM3 (ref 140–500)
PMV BLD AUTO: 9.4 FL (ref 7.4–10.4)
POTASSIUM BLD-SCNC: 4 MMOL/L (ref 3.5–5.2)
RBC # BLD AUTO: 3.02 10*6/MM3 (ref 4.2–5.4)
SODIUM BLD-SCNC: 134 MMOL/L (ref 136–145)
WBC NRBC COR # BLD: 6.78 10*3/MM3 (ref 4.8–10.8)

## 2017-12-02 PROCEDURE — 85025 COMPLETE CBC W/AUTO DIFF WBC: CPT | Performed by: INTERNAL MEDICINE

## 2017-12-02 PROCEDURE — 25010000003 CEFAZOLIN PER 500 MG: Performed by: ORTHOPAEDIC SURGERY

## 2017-12-02 PROCEDURE — 76856 US EXAM PELVIC COMPLETE: CPT

## 2017-12-02 PROCEDURE — 76882 US LMTD JT/FCL EVL NVASC XTR: CPT

## 2017-12-02 PROCEDURE — 99225 PR SBSQ OBSERVATION CARE/DAY 25 MINUTES: CPT | Performed by: HOSPITALIST

## 2017-12-02 PROCEDURE — G0378 HOSPITAL OBSERVATION PER HR: HCPCS

## 2017-12-02 PROCEDURE — 85018 HEMOGLOBIN: CPT | Performed by: HOSPITALIST

## 2017-12-02 PROCEDURE — 80048 BASIC METABOLIC PNL TOTAL CA: CPT | Performed by: INTERNAL MEDICINE

## 2017-12-02 PROCEDURE — 85014 HEMATOCRIT: CPT | Performed by: HOSPITALIST

## 2017-12-02 PROCEDURE — 97110 THERAPEUTIC EXERCISES: CPT

## 2017-12-02 PROCEDURE — 99024 POSTOP FOLLOW-UP VISIT: CPT | Performed by: ORTHOPAEDIC SURGERY

## 2017-12-02 PROCEDURE — 97116 GAIT TRAINING THERAPY: CPT

## 2017-12-02 RX ORDER — OXYCODONE AND ACETAMINOPHEN 7.5; 325 MG/1; MG/1
1 TABLET ORAL EVERY 4 HOURS PRN
Status: DISCONTINUED | OUTPATIENT
Start: 2017-12-02 | End: 2017-12-06 | Stop reason: HOSPADM

## 2017-12-02 RX ADMIN — HYDROCODONE BITARTRATE AND ACETAMINOPHEN 1 TABLET: 10; 325 TABLET ORAL at 11:48

## 2017-12-02 RX ADMIN — ZOLPIDEM TARTRATE 10 MG: 5 TABLET, FILM COATED ORAL at 20:54

## 2017-12-02 RX ADMIN — ATORVASTATIN CALCIUM 10 MG: 10 TABLET, FILM COATED ORAL at 08:35

## 2017-12-02 RX ADMIN — HYDROCODONE BITARTRATE AND ACETAMINOPHEN 1 TABLET: 10; 325 TABLET ORAL at 01:43

## 2017-12-02 RX ADMIN — VITAMIN D, TAB 1000IU (100/BT) 2000 UNITS: 25 TAB at 08:35

## 2017-12-02 RX ADMIN — HYDROCODONE BITARTRATE AND ACETAMINOPHEN 1 TABLET: 10; 325 TABLET ORAL at 06:51

## 2017-12-02 RX ADMIN — LEVOTHYROXINE SODIUM 50 MCG: 50 TABLET ORAL at 08:35

## 2017-12-02 RX ADMIN — CEFAZOLIN SODIUM 2 G: 2 SOLUTION INTRAVENOUS at 01:05

## 2017-12-02 RX ADMIN — PANTOPRAZOLE SODIUM 40 MG: 40 TABLET, DELAYED RELEASE ORAL at 06:48

## 2017-12-02 RX ADMIN — ASPIRIN 325 MG: 325 TABLET, DELAYED RELEASE ORAL at 08:35

## 2017-12-02 RX ADMIN — OXYCODONE HYDROCHLORIDE AND ACETAMINOPHEN 1 TABLET: 7.5; 325 TABLET ORAL at 19:59

## 2017-12-02 RX ADMIN — OXYCODONE HYDROCHLORIDE AND ACETAMINOPHEN 1 TABLET: 7.5; 325 TABLET ORAL at 15:05

## 2017-12-02 RX ADMIN — CEFAZOLIN SODIUM 2 G: 2 SOLUTION INTRAVENOUS at 06:48

## 2017-12-02 RX ADMIN — ALLOPURINOL 100 MG: 100 TABLET ORAL at 08:35

## 2017-12-02 RX ADMIN — ESCITALOPRAM OXALATE 10 MG: 10 TABLET, FILM COATED ORAL at 08:35

## 2017-12-02 RX ADMIN — METOPROLOL TARTRATE 25 MG: 25 TABLET ORAL at 08:35

## 2017-12-02 NOTE — PLAN OF CARE
Problem: Patient Care Overview (Adult)  Goal: Plan of Care Review  Outcome: Ongoing (interventions implemented as appropriate)    12/02/17 1111   Coping/Psychosocial Response Interventions   Plan Of Care Reviewed With patient   Patient Care Overview   Progress progress toward functional goals as expected   Outcome Evaluation   Outcome Summary/Follow up Plan PT: Pt reports increased pain today and states she cannot put as much weight on her RLE today. Brace on with mobility. pt ambulates with rolling walker with brace on x 42 feet. Encouraged her to ambulate with staff again this pm.

## 2017-12-02 NOTE — PLAN OF CARE
Problem: Fall Risk (Adult)  Goal: Absence of Falls  Outcome: Ongoing (interventions implemented as appropriate)    12/01/17 7002   Fall Risk (Adult)   Absence of Falls making progress toward outcome

## 2017-12-02 NOTE — PLAN OF CARE
Problem: Patient Care Overview (Adult)  Goal: Plan of Care Review  Outcome: Ongoing (interventions implemented as appropriate)    12/02/17 7882   Coping/Psychosocial Response Interventions   Plan Of Care Reviewed With patient   Patient Care Overview   Progress improving   Outcome Evaluation   Outcome Summary/Follow up Plan Patients pain better controlled with Percocet 7.5mg. Ice applied to right incision. Brace on.        Goal: Discharge Needs Assessment  Outcome: Ongoing (interventions implemented as appropriate)    Problem: Pressure Ulcer Risk (Rell Scale) (Adult,Obstetrics,Pediatric)  Goal: Skin Integrity  Outcome: Ongoing (interventions implemented as appropriate)    Problem: Mobility, Physical Impaired (Adult)  Goal: Enhanced Mobility Skills  Outcome: Ongoing (interventions implemented as appropriate)  Goal: Enhanced Functionality Ability  Outcome: Ongoing (interventions implemented as appropriate)

## 2017-12-02 NOTE — NURSING NOTE
Patient complained of increased pain. Upon assessment of the incision it was noted to have increased hard swelling. Dr. Whittington notified immediately. Norco d/c and percocet 7.5 mg q 4 hours ordered and ultrsound of the area ordered. Ultrasound called and they are aware of the order.

## 2017-12-02 NOTE — CONSULTS
"Hospitalist Team     Patient Care Team:  Arnaud Monson MD as PCP - General  Arnaud Monson MD as PCP - Family Medicine     Medicine consulted for HTN management     HPI: 83 y/o female Status post right endoprosthetic hip replacement with subsequent rupture gluteus minimus and medius now s/p repair with Dr. Whittington. Per initial preop H/P:  \"82-year-old female underwent a right endoprosthetic hip replacement in November of this year for a fracture did well until she fell in September resulting in compression fracture vertebrae but also developed significant lateral hip pain.  The pain persisted and MRI confirmed a rupture the gluteal tendons is admitted this time to undergo repair of said rupture gluteal tendons.  No atrophy is noted on MRI.  Discussed with the patient the risk and benefits of surgery which include persistent pain and discomfort and the need for further surgery.  Rerupture the tendon.  Vascular injury, nerve injury, infection and need for multiple procedures including implant removal potentially to eradicate infection, and persistent pain and discomfort in the left.  She understands and wishes has been seen and cleared by primary care and cardiologist\"    PMH reviewed: below: HTN controlled, no other complaints, on stable meds for HTN/HLD/GERD. Otherwise doing well post op. Only has buttock pain post op and has already been OOB walking with assistance in the gao without dizziness or orthostasis or CP/SOA.. No f/c/s/n/v/d.      REVIEW OF SYSTEMS: Some 14-point review of systems is negative except what is mentioned in the HPI relative to the current complaint.      PAST MEDICAL HISTORY:    Medical History         Past Medical History:   Diagnosis Date   • Acid reflux     • Back pain     • Colitis       history of   • Diarrhea     • Disease of thyroid gland     • Diverticular disease     • Glaucoma     • Gout       - right big toe   • High blood pressure     • High cholesterol     • History " of vertebral compression fracture       multiple   • Hyponatremia     • Knee pain     • Macular degeneration     • Mitral regurgitation 06/2016     Mild to moderate per 2-D echocardiogram   • Primary osteoarthritis of both knees 4/1/2016   • Renal lesion       on mri   • Rheumatoid arthritis              ALLERGIES:         Allergies   Allergen Reactions   • Neomycin Swelling   • Statins Myalgia       Not all statins            PAST SURGICAL HISTORY:    Surgical History          Past Surgical History:   Procedure Laterality Date   • APPENDECTOMY   1951   • CATARACT EXTRACTION Bilateral     • FEMUR SURGERY Right       had fracture and has rajesh    • HEMORRHOIDECTOMY       • HIP HEMIARTHROPLASTY Right 1/3/2017     Procedure: HIP HEMIARTHROPLASTY with IM rajesh and 2 screw hardware removal antibiotic cement;  Surgeon: Mirza Whittington MD;  Location: [facility] OR;  Service:    • HIP SURGERY Right 1999     hx fracture   • TOTAL KNEE ARTHROPLASTY Right 6/22/2016     Procedure: RT TOTAL KNEE ARTHROPLASTY WITH FINN NAVIGATION;  Surgeon: Adrian Swenson MD;  Location: [facility] OR;  Service:                 FAMILY HISTORY:         Family History   Problem Relation Age of Onset   • Heart attack Father              SOCIAL HISTORY:    Social History    Social History            Social History   • Marital status:        Spouse name: N/A   • Number of children: N/A   • Years of education: N/A          Occupational History   • Not on file.              Social History Main Topics    • Smoking status: Never Smoker    • Smokeless tobacco: Never Used    • Alcohol use 1.8 oz/week       3 Shots of liquor per week         Comment: social use 1 weekly    • Drug use: No    • Sexual activity: Defer            Other Topics Concern   • Not on file      Social History Narrative            CURRENT MEDICATIONS:      Current Facility-Administered Medications:   •  allopurinol (ZYLOPRIM) tablet 100 mg, 100 mg, Oral, Daily, Mirza Whittington MD, 100 mg  at 12/01/17 1255  •  [START ON 12/2/2017] aspirin EC tablet 325 mg, 325 mg, Oral, Daily, Mirza Whittington MD  •  atorvastatin (LIPITOR) tablet 10 mg, 10 mg, Oral, Daily, Mirza Whittington MD, 10 mg at 12/01/17 1255  •  ceFAZolin (ANCEF) IVPB (duplex) 2 g, 2 g, Intravenous, Q8H, Mirza Whittington MD, Stopped at 12/01/17 1720  •  cholecalciferol (VITAMIN D3) tablet 2,000 Units, 2,000 Units, Oral, Daily, Mirza Whittington MD, 2,000 Units at 12/01/17 1255  •  escitalopram (LEXAPRO) tablet 10 mg, 10 mg, Oral, Daily, Mirza Whittington MD, 10 mg at 12/01/17 1255  •  HYDROcodone-acetaminophen (NORCO)  MG per tablet 1 tablet, 1 tablet, Oral, Q4H PRN, Mirza Whittington MD, 1 tablet at 12/01/17 1658  •  lactated ringers infusion, 50 mL/hr, Intravenous, Continuous, Christina Harris CRNA, Stopped at 12/01/17 1256  •  latanoprost (XALATAN) 0.005 % ophthalmic solution 1 drop, 1 drop, Both Eyes, Nightly, Mirza Whittington MD  •  levothyroxine (SYNTHROID, LEVOTHROID) tablet 50 mcg, 50 mcg, Oral, Daily, Mirza Whittington MD, 50 mcg at 12/01/17 1255  •  metoprolol tartrate (LOPRESSOR) tablet 25 mg, 25 mg, Oral, Daily, Mirza Whittington MD  •  pantoprazole (PROTONIX) EC tablet 40 mg, 40 mg, Oral, QAM, Mirza Whittington MD, 40 mg at 12/01/17 1255  •  sodium chloride 0.9 % infusion, 20 mL/hr, Intravenous, Continuous, Mirza Whittington MD  •  zolpidem (AMBIEN) tablet 10 mg, 10 mg, Oral, Nightly, Mirza Whittington MD        DIAGNOSTIC DATA:      I reviewed the patient's new clinical results.         Lab Results (last 24 hours)      ** No results found for the last 24 hours. **                 Imaging Results (last 24 hours)      ** No results found for the last 24 hours. **             Xray reviewed personally by physician.        ECG reviewed personally by physician      ECG/EMG Results (most recent)      None                PHYSICAL EXAMINATION:  VITAL SIGNS: Temp:  [97.4 °F (36.3 °C)-98.7 °F (37.1 °C)] 98.7 °F (37.1 °C)  Heart Rate:  [58-85] 68  Resp:  [14-20] 20  BP:  "(81146)/(50-77) 113/59   Flowsheet Rows           First Filed Value     Admission Height   63\" (160 cm) Documented at 12/01/2017 1040     Admission Weight   123 lb 6.4 oz (56 kg) Documented at 12/01/2017 0638         GENERAL: Alert and oriented x3, afebrile. Vital signs stable, appeared comfortable, nondistressed, and appears stated age. Generalized weakness. Responds to questions appropriately.   HEENT: Normocephalic, atraumatic. Pupils equal, round and reactive to light. Extraocular movements intact bilaterally. Trachea midline.  Mucous membranes are moist.   NECK: Supple. No JVD. Trachea midline, no LAD  CHEST: Clear to auscultation bilaterally anteriorly; no rale, rhonchi, or wheezes  HEART: Regular rate and rhythm. No rubs, murmurs or gallops.   ABDOMEN: Soft, nontender, nondistended. Bowel sounds are otherwise positive.   EXTREMITIES: No clubbing, cyanosis, or edema. Moves all extremities  SKIN: Warm and dry. No ecchymoses, petechiae or rashes.   MUSCULOSKELETAL: No bony abnormalities.  Buttocks exam deffered to ortho postop  No crepitus. No joint swelling or erythema.   NEUROLOGIC: Cranial nerves II-XII intact bilaterally. No focal neurologic deficits. Mini-Mental status exam was deferred.   LYMPHATICS: No lymphadenopathy.      ASSESSMENT AND PLAN:   HTN/HLD: both stable; BP controlled here  systolic presently 113/59. Pt on no antihypertensives at home that are listed other than metop. Continues periopertively for risk reduction of periop events along with statin.  Gout: allopurinol, stable  GERD: PPI cont  Hypothyroidism: home levothyroxine  Depression: cont SSRI, stable     S/p gluteus revision: per ortho along with pain managemnt and rehab recommendations and abx, on Vanc so follow creatinine daily..     No DVT proph curently, add SCDs and ambulation, defer lovenox use to ortho in post op setting.        Kyle Serrato MD  12/01/17  7:39 PM        Time: 20 min                        "

## 2017-12-02 NOTE — PROGRESS NOTES
Orthopedic Progress Note   Chief Complaint:  Status post repair of right hip abductors    Subjective     Interval History: Patient postop day 1 doing well afebrile hemodynamically stable.  Pain was well-controlled oral medication.          Objective     Vital Signs  Temp:  [97.1 °F (36.2 °C)-98.7 °F (37.1 °C)] 97.1 °F (36.2 °C)  Heart Rate:  [52-85] 67  Resp:  [14-20] 18  BP: ()/(50-77) 106/66  Body mass index is 21.86 kg/(m^2).    Intake/Output Summary (Last 24 hours) at 12/02/17 0944  Last data filed at 12/01/17 2323   Gross per 24 hour   Intake              690 ml   Output             1000 ml   Net             -310 ml             Physical Exam:   General: patient awake, alert and cooperative   Cardiovascular: regular rhythm and rate   Pulm: clear to auscultation bilaterally   Abdomen: Benign.  Soft bowel sounds   Extremities: Right leg is good distal pulses no motor deficit.  Calf is nontender   Neurologic: Normal mood and behavior     Results Review:     I reviewed the patient's new clinical results.      WBC No results found for: WBCS   HGB Hemoglobin   Date Value Ref Range Status   12/02/2017 9.3 (L) 12.0 - 16.0 g/dL Final      HCT Hematocrit   Date Value Ref Range Status   12/02/2017 29.1 (L) 37.0 - 47.0 % Final      Platlets No results found for: LABPLAT     PT/INR:  No results found for: PROTIME/No results found for: INR    Sodium Sodium   Date Value Ref Range Status   12/02/2017 134 (L) 136 - 145 mmol/L Final      Potassium Potassium   Date Value Ref Range Status   12/02/2017 4.0 3.5 - 5.2 mmol/L Final      Chloride Chloride   Date Value Ref Range Status   12/02/2017 97 (L) 98 - 107 mmol/L Final      Bicarbonate No results found for: PLASMABICARB   BUN BUN   Date Value Ref Range Status   12/02/2017 14 8 - 23 mg/dL Final      Creatinine Creatinine   Date Value Ref Range Status   12/02/2017 0.71 0.57 - 1.00 mg/dL Final      Calcium Calcium   Date Value Ref Range Status   12/02/2017 8.9 8.8 - 10.5 mg/dL  Final      Magnesium  AST  ALT  Bilirubin, Total  AlkPhos  Albumin    Amylase  Lipase    Radiology: No results found for: MG  No components found for: AST.*  No components found for: ALT.*  No components found for: BILIRUBIN, TOTAL.*    No components found for: ALKPHOS.*  No components found for: ALBUMIN.*      No components found for: AMYLASE.*  No components found for: LIPASE.*            Imaging Results (most recent)     None               lactated ringers 50 mL/hr Last Rate: Stopped (12/01/17 1256)   sodium chloride 20 mL/hr          Assessment/Plan     Patient Active Problem List   Diagnosis Code   • History of hip surgery Z98.890   • Primary osteoarthritis of both knees M17.0   • Arthritis of right knee M17.11   • Aftercare following right knee joint replacement surgery Z47.1, Z96.651   • Status post total right knee replacement Z96.651   • History of knee replacement, total Z96.659   • History of total knee arthroplasty Z96.659   • Chronic pain of left knee M25.562, G89.29   • Arthritis of left knee M17.12   • Closed right hip fracture S72.001A   • Femoral neck fracture S72.009A   • Tear of right gluteus minimus tendon S76.011A         Continue PT OT today home possibly tomorrow    Mirza Whittington MD  12/02/17  9:44 AM

## 2017-12-02 NOTE — PROGRESS NOTES
"Hospitalist Team      Patient Care Team:  Arnaud Monson MD as PCP - General  Arnaud Monson MD as PCP - Family Medicine        Chief Complaint:  right endoprosthetic hip replacement with subsequent rupture gluteus minimus and medius now s/p repair with Dr. Whittington    Subjective    Interval History and ROS:     Patient States Severe pain right hip this afternoon  Patient Complaints: It has worsened as the day has progressed.  The nurse states the area has increased in size significantly.  We are very worried about a hematoma.  Dr. Gomez was called and he has ordered an ultrasound of the incision site.    Patient Denies:  Any trauma to the area since surgery.  She did get up and walk a little with PT today  History taken from: patient      Objective    Vital Signs  Temp:  [97.1 °F (36.2 °C)-98.7 °F (37.1 °C)] 97.1 °F (36.2 °C)  Heart Rate:  [52-70] 67  Resp:  [18-20] 18  BP: (106-125)/(53-66) 106/66  Oxygen Therapy  SpO2: 94 %  Pulse Oximetry Type: Continuous  O2 Device: room air  Flow (L/min): 1  EtCO2 (mm Hg) (Respiratory Monitoring): 36}  Flowsheet Rows         First Filed Value    Admission Height  63\" (160 cm) Documented at 12/01/2017 1040    Admission Weight  123 lb 6.4 oz (56 kg) Documented at 12/01/2017 0638            Physical Exam:  Physical Exam   Constitutional: Patient appears well-developed and well-nourished and in  acute distress from pain in area of incision and area is swollen acutely  HEENT:   Head: Normocephalic and atraumatic.   Eyes:  Pupils are equal, round, and reactive to light. EOM are intact. Sclera are anicteric and non-injected.  Mouth and Throat: Patient has moist mucous membranes. Oropharynx is clear of any erythema or exudate.     Neck: Neck supple. No JVD present. No thyromegaly present. No lymphadenopathy present.  Cardiovascular: Regular rate, regular rhythm, S1 normal and S2 normal.  Exam reveals no gallop and no friction rub.  No murmur heard.  Pulmonary/Chest: Lungs " are clear to auscultation bilaterally. No respiratory distress. No wheezes. No rhonchi. No rales.   Abdominal: Soft. Bowel sounds are normal. No distension and no mass. There is no hepatosplenomegaly. There is no tenderness.   Musculoskeletal: Normal Muscle tone  Extremities: No edema. Pulses are palpable in all 4 extremities.  Neurological: Patient is alert and oriented to person, place, and time. Cranial nerves II-XII are grossly intact with no focal deficits.  Skin: Skin is warm. No rash noted. Nails show no clubbing.  No cyanosis or erythema.         Results Review:     I reviewed the patient's new clinical results.    Lab Results (last 24 hours)     Procedure Component Value Units Date/Time    CBC & Differential [[id number]] Collected:  12/02/17 0409    Specimen:  Blood Updated:  12/02/17 0430    Narrative:       The following orders were created for panel order CBC & Differential.  Procedure                               Abnormality         Status                     ---------                               -----------         ------                     CBC Auto Differential[[id number]]        Abnormal            Final result                 Please view results for these tests on the individual orders.    CBC Auto Differential [[id number]]  (Abnormal) Collected:  12/02/17 0409    Specimen:  Blood Updated:  12/02/17 0430     WBC 6.78 10*3/mm3      RBC 3.02 (L) 10*6/mm3      Hemoglobin 9.3 (L) g/dL      Hematocrit 29.1 (L) %      MCV 96.4 fL      MCH 30.8 pg      MCHC 32.0 g/dL      RDW 12.5 %      RDW-SD 43.7 fl      MPV 9.4 fL      Platelets 218 10*3/mm3      Neutrophil % 57.1 %      Lymphocyte % 31.9 %      Monocyte % 8.7 (H) %      Eosinophil % 1.9 %      Basophil % 0.3 %      Immature Grans % 0.1 %      Neutrophils, Absolute 3.87 10*3/mm3      Lymphocytes, Absolute 2.16 10*3/mm3      Monocytes, Absolute 0.59 10*3/mm3      Eosinophils, Absolute 0.13 10*3/mm3      Basophils, Absolute 0.02 10*3/mm3      Immature  Grans, Absolute 0.01 10*3/mm3      nRBC 0.0 /100 WBC     Basic Metabolic Panel [[id number]]  (Abnormal) Collected:  12/02/17 0409    Specimen:  Blood Updated:  12/02/17 0445     Glucose 104 (H) mg/dL      BUN 14 mg/dL      Creatinine 0.71 mg/dL      Sodium 134 (L) mmol/L      Potassium 4.0 mmol/L      Chloride 97 (L) mmol/L      CO2 30.0 (H) mmol/L      Calcium 8.9 mg/dL      eGFR Non African Amer 79 mL/min/1.73      BUN/Creatinine Ratio 19.7     Anion Gap 7.0 mmol/L     Narrative:       The MDRD GFR formula is only valid for adults with stable renal function between ages 18 and 70.          Imaging Results (last 24 hours)     ** No results found for the last 24 hours. **          Xray not reviewed personally by physician.      ECG not reviewed personally by physician  ECG/EMG Results (most recent)     None          Medication Review:   I have reviewed the patient's current medication list    Current Facility-Administered Medications:   •  allopurinol (ZYLOPRIM) tablet 100 mg, 100 mg, Oral, Daily, Mirza Whittington MD, 100 mg at 12/02/17 0835  •  aspirin EC tablet 325 mg, 325 mg, Oral, Daily, Mirza Whittington MD, 325 mg at 12/02/17 0835  •  atorvastatin (LIPITOR) tablet 10 mg, 10 mg, Oral, Daily, Mirza Whittington MD, 10 mg at 12/02/17 0835  •  cholecalciferol (VITAMIN D3) tablet 2,000 Units, 2,000 Units, Oral, Daily, Mirza Whittington MD, 2,000 Units at 12/02/17 0835  •  escitalopram (LEXAPRO) tablet 10 mg, 10 mg, Oral, Daily, Mirza Whittington MD, 10 mg at 12/02/17 0835  •  HYDROcodone-acetaminophen (NORCO)  MG per tablet 1 tablet, 1 tablet, Oral, Q4H PRN, Mirza Whittington MD, 1 tablet at 12/02/17 1148  •  lactated ringers infusion, 50 mL/hr, Intravenous, Continuous, Christina Harris, CRNA, Stopped at 12/01/17 1256  •  latanoprost (XALATAN) 0.005 % ophthalmic solution 1 drop, 1 drop, Both Eyes, Nightly, Mirza Whittington MD  •  levothyroxine (SYNTHROID, LEVOTHROID) tablet 50 mcg, 50 mcg, Oral, Daily, Mirza Whittington MD, 50 mcg at  12/02/17 0835  •  metoprolol tartrate (LOPRESSOR) tablet 25 mg, 25 mg, Oral, Daily, Mirza Whittington MD, 25 mg at 12/02/17 0835  •  pantoprazole (PROTONIX) EC tablet 40 mg, 40 mg, Oral, QAM, Mirza Whittington MD, 40 mg at 12/02/17 0648  •  sodium chloride 0.9 % infusion, 20 mL/hr, Intravenous, Continuous, Mirza Whittington MD  •  zolpidem (AMBIEN) tablet 10 mg, 10 mg, Oral, Nightly, Mirza Whittington MD, 10 mg at 12/01/17 2111      Assessment/Plan      repair of right gluteal minimus and medius tendons per orthopedic surgery    Essential Hypertension/  106/66  HLD/ no issues  GOUT/ no issues  GERD/ no issues  Hypothyroidism/ no issues  Depression/ no issues          Plan for disposition:ultrasound per PT of incision area.  No issues with the medical problems.  I will order a stat h and h.  Discussed case with nurse.      Kalyn Young DO  12/02/17  2:33 PM      Time: 25 min

## 2017-12-02 NOTE — NURSING NOTE
Called results of ultrasound to Dr. Whittington. He said to continue icing the incision site and keep brace on. He will see the patient tomorrow with a possible bedside drainage procedure.

## 2017-12-02 NOTE — THERAPY TREATMENT NOTE
Acute Care - Physical Therapy Treatment Note  [facility]     Patient Name: Ashli León  : 1935  MRN: [id number]  Today's Date: 2017  Onset of Illness/Injury or Date of Surgery Date: 17  Date of Referral to PT: 17  Referring Physician: Dr. Whittington    Admit Date: 2017    Visit Dx:    ICD-10-CM ICD-9-CM   1. Tear of right gluteus minimus tendon, initial encounter S76.011A 843.8     Patient Active Problem List   Diagnosis   • History of hip surgery   • Primary osteoarthritis of both knees   • Arthritis of right knee   • Aftercare following right knee joint replacement surgery   • Status post total right knee replacement   • History of knee replacement, total   • History of total knee arthroplasty   • Chronic pain of left knee   • Arthritis of left knee   • Closed right hip fracture   • Femoral neck fracture   • Tear of right gluteus minimus tendon               Adult Rehabilitation Note       17 0925          Rehab Assessment/Intervention    Discipline physical therapy assistant  -KM      Document Type therapy note (daily note)  -KM      Subjective Information agree to therapy;no complaints;pain   requests to go to the bathroom  -KM      Patient Effort, Rehab Treatment good  -KM      Symptoms Noted During/After Treatment increased pain  -KM      Precautions/Limitations fall precautions;brace on when up  -KM      Specific Treatment Considerations pt reports pain worse today and she is not able to put as much weight on LE  -KM      Recorded by [KM] Pat Salamanca PTA      Vital Signs    Pre SpO2 (%) 96  -KM      O2 Delivery Pre Treatment room air  -KM      Recorded by [KM] Pat Salamanca PTA      Pain Assessment    Pain Assessment 0-10  -KM      Pain Score 7  -KM      Post Pain Score --   10 with ambulation, 5 at rest  -KM      Pain Type Acute pain;Surgical pain  -KM      Pain Location Hip  -KM      Pain Orientation Right  -KM      Pain Intervention(s) Medication (See  MAR);Repositioned   nurse notified  -KM      Response to Interventions --   tolerated  -KM      Recorded by [KM] Pat Salamanca PTA      Cognitive Assessment/Intervention    Personal Safety Interventions gait belt;nonskid shoes/slippers when out of bed  -KM      Recorded by [KM] Pat Salamanca PTA      Bed Mobility, Assessment/Treatment    Bed Mobility, Assistive Device bed rails   bed flat  -KM      Bed Mob, Supine to Sit, [place] supervision required;verbal cues required  -KM      Recorded by [KM] Pat Salamanca PTA      Transfer Assessment/Treatment    Transfers, Sit-Stand [place] contact guard assist;verbal cues required  -KM      Transfers, Stand-Sit [place] verbal cues required;contact guard assist  -KM      Transfers, Sit-Stand-Sit, Assist Device rolling walker  -KM      Toilet Transfer, [place] contact guard assist;verbal cues required  -KM      Toilet Transfer, Assistive Device elevated toilet seat;rolling walker  -KM      Transfer, Comment verbal cues for handplacement- some assist with clothes due to brace  -KM      Recorded by [KM] Pat Salamanca PTA      Gait Assessment/Treatment    Gait, [place] Level contact guard assist;verbal cues required  -KM      Gait, Assistive Device rolling walker  -KM      Gait, Distance (Feet) 10   seated rest then 43 ft  -KM      Gait, Comment pt reports increased pain with ambulation today- limited weight on right LE- brace on;  encouraged pt to ambulate with staff.  discussed with RN and PCA pt's progress  -KM      Recorded by [KM] Pat Salamanca PTA      Therapy Exercises    Bilateral Lower Extremities 5 reps;10 reps;ankle pumps/circles;quad sets  -KM      Recorded by [KM] Pat Salamanca PTA      Positioning and Restraints    Pre-Treatment Position in bed  -KM      Post Treatment Position chair  -KM      In Chair notified nsg;call light within reach;reclined;legs elevated   brace on  -KM      Recorded by [KM] Pat Salamanca PTA        User Estrada   (r) = Recorded By, (t) = Taken By, (c) = Cosigned By    Initials Name Effective Dates    KM Pat Salamanca, PTA 08/11/15 -                 IP PT Goals       12/01/17 1427          Bed Mobility PT STG    Bed Mobility PT STG, Date Established 12/01/17  -BP      Bed Mobility PT STG, Time to Achieve 3 days  -BP      Bed Mobility PT STG, Activity Type supine to sit/sit to supine  -BP      Bed Mobility PT STG, [place] Level supervision required  -BP      Transfer Training PT STG    Transfer Training PT STG, Date Established 12/01/17  -BP      Transfer Training PT STG, Time to Achieve 3 days  -BP      Transfer Training PT STG, Activity Type sit to stand/stand to sit  -BP      Transfer Training PT STG, [place] Level supervision required  -BP      Transfer Training PT STG, Assist Device walker, rolling  -BP      Gait Training PT STG    Gait Training Goal PT STG, Date Established 12/01/17  -BP      Gait Training Goal PT STG, Time to Achieve 3 days  -BP      Gait Training Goal PT STG, [place] Level supervision required  -BP      Gait Training Goal PT STG, Assist Device walker, rolling  -BP      Gait Training Goal PT STG, Distance to Achieve 100  -BP        User Key  (r) = Recorded By, (t) = Taken By, (c) = Cosigned By    Initials Name Provider Type    BP Cherry Lawson, PT Physical Therapist          Physical Therapy Education     Title: PT OT SLP Therapies (Done)     Topic: Physical Therapy (Done)     Point: Mobility training (Done)    Learning Progress Summary    Learner Readiness Method Response Comment Documented by Status   Patient Acceptance E,TB,D VU  KM 12/02/17 1111 Done    Acceptance E   BP 12/01/17 1425 Done               Point: Home exercise program (Done)    Learning Progress Summary    Learner Readiness Method Response Comment Documented by Status   Patient Acceptance E,TB,D VU  KM 12/02/17 1111 Done               Point: Precautions (Done)    Learning Progress Summary    Learner Readiness Method  Response Comment Documented by Status   Patient Acceptance E,TB,D VU  KM 12/02/17 1111 Done    Acceptance E [facility] 12/01/17 1425 Done                      User Key     Initials Effective Dates Name Provider Type Discipline     08/11/15 -  Pat Salamanca, PTA Physical Therapy Assistant PT     12/01/15 -  Cherry Lawson, PT Physical Therapist PT                    PT Recommendation and Plan  Anticipated Equipment Needs At Discharge:  (none)  Anticipated Discharge Disposition: home with home health  Planned Therapy Interventions: bed mobility training, gait training, home exercise program, transfer training, strengthening, patient/family education  PT Frequency: daily  Plan of Care Review  Plan Of Care Reviewed With: patient  Progress: progress toward functional goals as expected  Outcome Summary/Follow up Plan: PT: Pt reports increased pain today and states she cannot put as much weight on her RLE today.  Brace on with mobility.  pt ambulates with rolling walker  with brace on x  42 feet.  Encouraged her to ambulate with staff again this pm.          Outcome Measures       12/02/17 0926 12/01/17 1339 12/01/17 1338    How much help from another person do you currently need...    Turning from your back to your side while in flat bed without using bedrails? 3  -KM  3  -BP    Moving from lying on back to sitting on the side of a flat bed without bedrails? 3  -KM  3  -BP    Moving to and from a bed to a chair (including a wheelchair)? 3  -KM  3  -BP    Standing up from a chair using your arms (e.g., wheelchair, bedside chair)? 3  -KM  3  -BP    Climbing 3-5 steps with a railing? 2  -KM  3  -BP    To walk in hospital room? 3  -KM  3  -BP    AM-PAC 6 Clicks Score 17  -KM  18  -BP    How much help from another is currently needed...    Putting on and taking off regular lower body clothing?  2  -EN     Bathing (including washing, rinsing, and drying)  2  -EN     Toileting (which includes using toilet bed pan or urinal)  3   -EN     Putting on and taking off regular upper body clothing  4  -EN     Taking care of personal grooming (such as brushing teeth)  4  -EN     Eating meals  4  -EN     Score  19  -EN     Functional Assessment    Outcome Measure Options AM-PAC 6 Clicks Basic Mobility (PT)  -KM AM-PAC 6 Clicks Daily Activity (OT)  -EN AM-PAC 6 Clicks Basic Mobility (PT)  -BP      User Key  (r) = Recorded By, (t) = Taken By, (c) = Cosigned By    Initials Name Provider Type    ANETA Salamanca PTA Physical Therapy Assistant    EN Niyah Demarco, OTR Occupational Therapist    BP Cherry Lawson, PT Physical Therapist           Time Calculation:         PT Charges       12/02/17 1114          Time Calculation    Start Time 0926  -KM      Stop Time 0958  -KM      Time Calculation (min) 32 min  -KM      PT - Next Appointment 12/03/17  -KM        User Key  (r) = Recorded By, (t) = Taken By, (c) = Cosigned By    Initials Name Provider Type    ANETA Salamanca PTA Physical Therapy Assistant          Therapy Charges for Today     Code Description Service Date Service Provider Modifiers Qty    63123479362 HC GAIT TRAINING EA 15 MIN 12/2/2017 Pat Salamanca PTA GP 1    96484607315 HC PT THER PROC EA 15 MIN 12/2/2017 Pat Salamanca PTA GP 1          PT G-Codes  PT Professional Judgement Used?: Yes  Outcome Measure Options: AM-PAC 6 Clicks Basic Mobility (PT)  Functional Limitation: Mobility: Walking and moving around  Mobility: Walking and Moving Around Current Status (): At least 20 percent but less than 40 percent impaired, limited or restricted  Mobility: Walking and Moving Around Goal Status (): At least 1 percent but less than 20 percent impaired, limited or restricted    Pat Salamanca PTA  12/2/2017

## 2017-12-02 NOTE — PROGRESS NOTES
"Hospitalist Team    Patient Care Team:  Arnaud Monson MD as PCP - General  Arnaud Monson MD as PCP - Family Medicine    Medicine consulted for HTN management    HPI: 81 y/o female Status post right endoprosthetic hip replacement with subsequent rupture gluteus minimus and medius now s/p repair with Dr. Whittington. Per initial preop H/P:  \"82-year-old female underwent a right endoprosthetic hip replacement in November of this year for a fracture did well until she fell in September resulting in compression fracture vertebrae but also developed significant lateral hip pain.  The pain persisted and MRI confirmed a rupture the gluteal tendons is admitted this time to undergo repair of said rupture gluteal tendons.  No atrophy is noted on MRI.  Discussed with the patient the risk and benefits of surgery which include persistent pain and discomfort and the need for further surgery.  Rerupture the tendon.  Vascular injury, nerve injury, infection and need for multiple procedures including implant removal potentially to eradicate infection, and persistent pain and discomfort in the left.  She understands and wishes has been seen and cleared by primary care and cardiologist\"  PMH reviewed: below: HTN controlled, no other complaints, on stable meds for HTN/HLD/GERD. Otherwise doing well post op. Only has buttock pain post op and has already been OOB walking with assistance in the gao without dizziness or orthostasis or CP/SOA.. No f/c/s/n/v/d.     REVIEW OF SYSTEMS: Some 14-point review of systems is negative except what is mentioned in the HPI relative to the current complaint.     PAST MEDICAL HISTORY:   Past Medical History:   Diagnosis Date   • Acid reflux    • Back pain    • Colitis     history of   • Diarrhea    • Disease of thyroid gland    • Diverticular disease    • Glaucoma    • Gout     - right big toe   • High blood pressure    • High cholesterol    • History of vertebral compression fracture     " multiple   • Hyponatremia    • Knee pain    • Macular degeneration    • Mitral regurgitation 06/2016    Mild to moderate per 2-D echocardiogram   • Primary osteoarthritis of both knees 4/1/2016   • Renal lesion     on mri   • Rheumatoid arthritis        ALLERGIES:   Allergies   Allergen Reactions   • Neomycin Swelling   • Statins Myalgia     Not all statins         PAST SURGICAL HISTORY:   Past Surgical History:   Procedure Laterality Date   • APPENDECTOMY  1951   • CATARACT EXTRACTION Bilateral    • FEMUR SURGERY Right     had fracture and has rajesh    • HEMORRHOIDECTOMY     • HIP HEMIARTHROPLASTY Right 1/3/2017    Procedure: HIP HEMIARTHROPLASTY with IM rajesh and 2 screw hardware removal antibiotic cement;  Surgeon: Mirza Whittington MD;  Location: [facility] OR;  Service:    • HIP SURGERY Right 1999    hx fracture   • TOTAL KNEE ARTHROPLASTY Right 6/22/2016    Procedure: RT TOTAL KNEE ARTHROPLASTY WITH FINN NAVIGATION;  Surgeon: Adrian Swenson MD;  Location: [facility] MAIN OR;  Service:           FAMILY HISTORY:   Family History   Problem Relation Age of Onset   • Heart attack Father          SOCIAL HISTORY:   Social History     Social History   • Marital status:      Spouse name: N/A   • Number of children: N/A   • Years of education: N/A     Occupational History   • Not on file.     Social History Main Topics   • Smoking status: Never Smoker   • Smokeless tobacco: Never Used   • Alcohol use 1.8 oz/week     3 Shots of liquor per week      Comment: social use 1 weekly   • Drug use: No   • Sexual activity: Defer     Other Topics Concern   • Not on file     Social History Narrative       CURRENT MEDICATIONS:     Current Facility-Administered Medications:   •  allopurinol (ZYLOPRIM) tablet 100 mg, 100 mg, Oral, Daily, Mirza Whittington MD, 100 mg at 12/01/17 1255  •  [START ON 12/2/2017] aspirin EC tablet 325 mg, 325 mg, Oral, Daily, Mirza Whittington MD  •  atorvastatin (LIPITOR) tablet 10 mg, 10 mg, Oral, Daily, Mirza Whittington  "MD, 10 mg at 12/01/17 1255  •  ceFAZolin (ANCEF) IVPB (duplex) 2 g, 2 g, Intravenous, Q8H, Mirza Whittington MD, Stopped at 12/01/17 1720  •  cholecalciferol (VITAMIN D3) tablet 2,000 Units, 2,000 Units, Oral, Daily, Mirza Whittington MD, 2,000 Units at 12/01/17 1255  •  escitalopram (LEXAPRO) tablet 10 mg, 10 mg, Oral, Daily, Mirza Whittington MD, 10 mg at 12/01/17 1255  •  HYDROcodone-acetaminophen (NORCO)  MG per tablet 1 tablet, 1 tablet, Oral, Q4H PRN, Mirza Whittington MD, 1 tablet at 12/01/17 1658  •  lactated ringers infusion, 50 mL/hr, Intravenous, Continuous, Christina Harris CRNA, Stopped at 12/01/17 1256  •  latanoprost (XALATAN) 0.005 % ophthalmic solution 1 drop, 1 drop, Both Eyes, Nightly, Mirza Whittington MD  •  levothyroxine (SYNTHROID, LEVOTHROID) tablet 50 mcg, 50 mcg, Oral, Daily, Mirza Whittington MD, 50 mcg at 12/01/17 1255  •  metoprolol tartrate (LOPRESSOR) tablet 25 mg, 25 mg, Oral, Daily, Mirza Whittington MD  •  pantoprazole (PROTONIX) EC tablet 40 mg, 40 mg, Oral, QAM, Mirza Whittington MD, 40 mg at 12/01/17 1255  •  sodium chloride 0.9 % infusion, 20 mL/hr, Intravenous, Continuous, Mirza Whittington MD  •  zolpidem (AMBIEN) tablet 10 mg, 10 mg, Oral, Nightly, Mirza Whittington MD      DIAGNOSTIC DATA:     I reviewed the patient's new clinical results.    Lab Results (last 24 hours)     ** No results found for the last 24 hours. **          Imaging Results (last 24 hours)     ** No results found for the last 24 hours. **          Xray reviewed personally by physician.      ECG reviewed personally by physician  ECG/EMG Results (most recent)     None            PHYSICAL EXAMINATION:  VITAL SIGNS: Temp:  [97.4 °F (36.3 °C)-98.7 °F (37.1 °C)] 98.7 °F (37.1 °C)  Heart Rate:  [58-85] 68  Resp:  [14-20] 20  BP: ()/(50-77) 113/59   Flowsheet Rows         First Filed Value    Admission Height  63\" (160 cm) Documented at 12/01/2017 1040    Admission Weight  123 lb 6.4 oz (56 kg) Documented at 12/01/2017 0638    "     GENERAL: Alert and oriented x3, afebrile. Vital signs stable, appeared comfortable, nondistressed, and appears stated age. Generalized weakness. Responds to questions appropriately.   HEENT: Normocephalic, atraumatic. Pupils equal, round and reactive to light. Extraocular movements intact bilaterally. Trachea midline.  Mucous membranes are moist.   NECK: Supple. No JVD. Trachea midline, no LAD  CHEST: Clear to auscultation bilaterally anteriorly; no rale, rhonchi, or wheezes  HEART: Regular rate and rhythm. No rubs, murmurs or gallops.   ABDOMEN: Soft, nontender, nondistended. Bowel sounds are otherwise positive.   EXTREMITIES: No clubbing, cyanosis, or edema. Moves all extremities  SKIN: Warm and dry. No ecchymoses, petechiae or rashes.   MUSCULOSKELETAL: No bony abnormalities.  Buttocks exam deffered to ortho postop  No crepitus. No joint swelling or erythema.   NEUROLOGIC: Cranial nerves II-XII intact bilaterally. No focal neurologic deficits. Mini-Mental status exam was deferred.   LYMPHATICS: No lymphadenopathy.     ASSESSMENT AND PLAN:   HTN/HLD: both stable; BP controlled here  systolic presently 113/59. Pt on no antihypertensives at home that are listed other than metop. Continues periopertively for risk reduction of periop events along with statin.  Gout: allopurinol, stable  GERD: PPI cont  Hypothyroidism: home levothyroxine  Depression: cont SSRI, stable    S/p gluteus revision: per ortho along with pain managemnt and rehab recommendations and abx, on Vanc so follow creatinine daily..    No DVT proph curently, add SCDs and ambulation, defer lovenox use to ortho in post op setting.      Kyle Serrato MD  12/01/17  7:39 PM      Time: 20 min

## 2017-12-03 LAB
ANION GAP SERPL CALCULATED.3IONS-SCNC: 7.5 MMOL/L
BUN BLD-MCNC: 14 MG/DL (ref 8–23)
BUN/CREAT SERPL: 20.9 (ref 7–25)
CALCIUM SPEC-SCNC: 9.2 MG/DL (ref 8.8–10.5)
CHLORIDE SERPL-SCNC: 98 MMOL/L (ref 98–107)
CO2 SERPL-SCNC: 29.5 MMOL/L (ref 22–29)
CREAT BLD-MCNC: 0.67 MG/DL (ref 0.57–1)
GFR SERPL CREATININE-BSD FRML MDRD: 84 ML/MIN/1.73
GLUCOSE BLD-MCNC: 122 MG/DL (ref 65–99)
POTASSIUM BLD-SCNC: 4.1 MMOL/L (ref 3.5–5.2)
SODIUM BLD-SCNC: 135 MMOL/L (ref 136–145)

## 2017-12-03 PROCEDURE — 99024 POSTOP FOLLOW-UP VISIT: CPT | Performed by: ORTHOPAEDIC SURGERY

## 2017-12-03 PROCEDURE — 80048 BASIC METABOLIC PNL TOTAL CA: CPT | Performed by: INTERNAL MEDICINE

## 2017-12-03 PROCEDURE — 94799 UNLISTED PULMONARY SVC/PX: CPT

## 2017-12-03 PROCEDURE — 99231 SBSQ HOSP IP/OBS SF/LOW 25: CPT | Performed by: HOSPITALIST

## 2017-12-03 RX ORDER — DOCUSATE SODIUM 100 MG/1
100 CAPSULE, LIQUID FILLED ORAL 2 TIMES DAILY
Status: DISCONTINUED | OUTPATIENT
Start: 2017-12-03 | End: 2017-12-06 | Stop reason: HOSPADM

## 2017-12-03 RX ADMIN — OXYCODONE HYDROCHLORIDE AND ACETAMINOPHEN 1 TABLET: 7.5; 325 TABLET ORAL at 18:47

## 2017-12-03 RX ADMIN — OXYCODONE HYDROCHLORIDE AND ACETAMINOPHEN 1 TABLET: 7.5; 325 TABLET ORAL at 00:53

## 2017-12-03 RX ADMIN — ESCITALOPRAM OXALATE 10 MG: 10 TABLET, FILM COATED ORAL at 10:05

## 2017-12-03 RX ADMIN — DOCUSATE SODIUM 100 MG: 100 CAPSULE, LIQUID FILLED ORAL at 17:48

## 2017-12-03 RX ADMIN — PANTOPRAZOLE SODIUM 40 MG: 40 TABLET, DELAYED RELEASE ORAL at 06:30

## 2017-12-03 RX ADMIN — OXYCODONE HYDROCHLORIDE AND ACETAMINOPHEN 1 TABLET: 7.5; 325 TABLET ORAL at 10:03

## 2017-12-03 RX ADMIN — ALLOPURINOL 100 MG: 100 TABLET ORAL at 10:04

## 2017-12-03 RX ADMIN — LEVOTHYROXINE SODIUM 50 MCG: 50 TABLET ORAL at 10:03

## 2017-12-03 RX ADMIN — VITAMIN D, TAB 1000IU (100/BT) 2000 UNITS: 25 TAB at 10:04

## 2017-12-03 RX ADMIN — OXYCODONE HYDROCHLORIDE AND ACETAMINOPHEN 1 TABLET: 7.5; 325 TABLET ORAL at 14:11

## 2017-12-03 RX ADMIN — ATORVASTATIN CALCIUM 10 MG: 10 TABLET, FILM COATED ORAL at 10:05

## 2017-12-03 RX ADMIN — OXYCODONE HYDROCHLORIDE AND ACETAMINOPHEN 1 TABLET: 7.5; 325 TABLET ORAL at 06:24

## 2017-12-03 RX ADMIN — METOPROLOL TARTRATE 25 MG: 25 TABLET ORAL at 10:04

## 2017-12-03 RX ADMIN — ASPIRIN 325 MG: 325 TABLET, DELAYED RELEASE ORAL at 10:03

## 2017-12-03 RX ADMIN — OXYCODONE HYDROCHLORIDE AND ACETAMINOPHEN 1 TABLET: 7.5; 325 TABLET ORAL at 22:25

## 2017-12-03 RX ADMIN — LATANOPROST 1 DROP: 50 SOLUTION OPHTHALMIC at 20:55

## 2017-12-03 RX ADMIN — ZOLPIDEM TARTRATE 10 MG: 5 TABLET, FILM COATED ORAL at 20:54

## 2017-12-03 NOTE — PROGRESS NOTES
Orthopedic Progress Note   Chief Complaint:  Status post repair of right ruptured gluteal tendons     Subjective     Interval History: Patient is postop day 2.  She developed a subcutaneous hematoma but appears to be slightly smaller than it was initially yesterday.  Tentative aspiration failed to result in any blood.  Still having moderate pain and significant difficulty getting in and out of bed.          Objective     Vital Signs  Temp:  [97.1 °F (36.2 °C)-97.8 °F (36.6 °C)] 97.1 °F (36.2 °C)  Heart Rate:  [64-74] 69  Resp:  [16-18] 18  BP: (107-149)/(50-77) 149/77  Body mass index is 21.86 kg/(m^2).    Intake/Output Summary (Last 24 hours) at 12/03/17 1002  Last data filed at 12/02/17 1733   Gross per 24 hour   Intake              240 ml   Output              550 ml   Net             -310 ml             Physical Exam:   General: patient awake, alert and cooperative   Cardiovascular: regular rhythm and rate   Pulm: clear to auscultation bilaterally   Abdomen: Benign.  Soft bowel sounds   Extremities: Right hip wound is completely benign.  There is no erythema.  The area of swelling marked yesterday.  Smaller this morning and attempted aspiration was unsuccessful.   Neurologic: Normal mood and behavior     Results Review:     I reviewed the patient's new clinical results.      WBC No results found for: WBCS   HGB Hemoglobin   Date Value Ref Range Status   12/02/2017 10.1 (L) 12.0 - 16.0 g/dL Final   12/02/2017 9.3 (L) 12.0 - 16.0 g/dL Final      HCT Hematocrit   Date Value Ref Range Status   12/02/2017 30.7 (L) 37.0 - 47.0 % Final   12/02/2017 29.1 (L) 37.0 - 47.0 % Final      Platlets No results found for: LABPLAT     PT/INR:  No results found for: PROTIME/No results found for: INR    Sodium Sodium   Date Value Ref Range Status   12/03/2017 135 (L) 136 - 145 mmol/L Final   12/02/2017 134 (L) 136 - 145 mmol/L Final      Potassium Potassium   Date Value Ref Range Status   12/03/2017 4.1 3.5 - 5.2 mmol/L Final    12/02/2017 4.0 3.5 - 5.2 mmol/L Final      Chloride Chloride   Date Value Ref Range Status   12/03/2017 98 98 - 107 mmol/L Final   12/02/2017 97 (L) 98 - 107 mmol/L Final      Bicarbonate No results found for: PLASMABICARB   BUN BUN   Date Value Ref Range Status   12/03/2017 14 8 - 23 mg/dL Final   12/02/2017 14 8 - 23 mg/dL Final      Creatinine Creatinine   Date Value Ref Range Status   12/03/2017 0.67 0.57 - 1.00 mg/dL Final   12/02/2017 0.71 0.57 - 1.00 mg/dL Final      Calcium Calcium   Date Value Ref Range Status   12/03/2017 9.2 8.8 - 10.5 mg/dL Final   12/02/2017 8.9 8.8 - 10.5 mg/dL Final      Magnesium  AST  ALT  Bilirubin, Total  AlkPhos  Albumin    Amylase  Lipase    Radiology: No results found for: MG  No components found for: AST.*  No components found for: ALT.*  No components found for: BILIRUBIN, TOTAL.*    No components found for: ALKPHOS.*  No components found for: ALBUMIN.*      No components found for: AMYLASE.*  No components found for: LIPASE.*            Imaging Results (most recent)     Procedure Component Value Units Date/Time    US Nonvascular Extremity Limited [[id number]] Collected:  12/03/17 0713     Updated:  12/03/17 0717    Narrative:       INDICATION: Hip surgery one day prior. Increased pain and swelling along  the incision.     TECHNIQUE: Ultrasound evaluation was performed with grayscale and color  flow imaging.     FINDINGS: Ultrasound evaluation of the area of swelling demonstrates a  complex fluid collection just inferior to the incision measuring 4.5 x  3.4 x 1.7 cm. There are mixed internal echoes suggesting hematoma. No  shadowing is seen to suggest air bubbles within the collection. No  additional collections are seen. No evidence of pseudoaneurysm.       Impression:       Complex fluid collection at the site of swelling measuring  4.5 x 3.4 x 1.7 cm. Probable hematoma.     This report was finalized on 12/3/2017 7:15 AM by Dr. Nitish Salas MD.                  mohan  ringers 50 mL/hr Last Rate: Stopped (12/01/17 1256)   sodium chloride 20 mL/hr          Assessment/Plan     Patient Active Problem List   Diagnosis Code   • History of hip surgery Z98.890   • Primary osteoarthritis of both knees M17.0   • Arthritis of right knee M17.11   • Aftercare following right knee joint replacement surgery Z47.1, Z96.651   • Status post total right knee replacement Z96.651   • History of knee replacement, total Z96.659   • History of total knee arthroplasty Z96.659   • Chronic pain of left knee M25.562, G89.29   • Arthritis of left knee M17.12   • Closed right hip fracture S72.001A   • Femoral neck fracture S72.009A   • Tear of right gluteus minimus tendon S76.011A         Due to the patient's immobility and difficulty getting in and out of bed believe she benefited from a stay at [facility].  We'll consult them regarding transfer.    Mirza Whittington MD  12/03/17  10:02 AM

## 2017-12-03 NOTE — PROGRESS NOTES
Patient: Ashli León  Procedure(s):  GLUTEAL TENDON REPAIR-minimus and medius  Anesthesia type: [unfilled]    Patient location: [facility] Surgical Floor  Last vitals:   Vitals:    12/03/17 0652   BP: 149/77   Pulse: 69   Resp: 18   Temp: 97.1 °F (36.2 °C)   SpO2: 96%     Level of consciousness: awake, alert and oriented    Post-anesthesia pain: adequate analgesia  Airway patency: patent  Respiratory: spontaneous ventilation, room air  Cardiovascular: stable  Hydration: euvolemic    Anesthetic complications: no

## 2017-12-03 NOTE — PROGRESS NOTES
"Hospitalist Team      Patient Care Team:  Arnaud Monson MD as PCP - General  Arnaud Monson MD as PCP - Family Medicine        Chief Complaint:  Pain right hip    Subjective    Interval History and ROS:     Patient had hip fracture with repair then had a fall and tore muscles.  She had repair of muscles and now has had hematoma develop at surgical site causing a lot of pain.  She is having great difficult mobilizing.  Attempt was made to aspirate the hematoma without a great deal of success.      Objective    Vital Signs  Temp:  [97.1 °F (36.2 °C)-97.5 °F (36.4 °C)] 97.5 °F (36.4 °C)  Heart Rate:  [66-74] 66  Resp:  [18] 18  BP: (107-149)/(50-77) 110/59  Oxygen Therapy  SpO2: 94 %  Pulse Oximetry Type: Intermittent  O2 Device: room air  Flow (L/min): 1  EtCO2 (mm Hg) (Respiratory Monitoring): 36}  Flowsheet Rows         First Filed Value    Admission Height  63\" (160 cm) Documented at 12/01/2017 1040    Admission Weight  123 lb 6.4 oz (56 kg) Documented at 12/01/2017 0638        Body mass index is 21.86 kg/(m^2).      Physical Exam:    Physical Exam   Constitutional: Patient appears well-developed and well-nourished and in no acute distress   HEENT:   Head: Normocephalic and atraumatic.   Eyes:  Pupils are equal, round, and reactive to light. EOM are intact. Sclera are anicteric and non-injected.  Mouth and Throat: Patient has moist mucous membranes. Oropharynx is clear of any erythema or exudate.     Neck: Neck supple. No JVD present. No thyromegaly present. No lymphadenopathy present.  Cardiovascular: Regular rate, regular rhythm, S1 normal and S2 normal.  Exam reveals no gallop and no friction rub.  No murmur heard.  Pulmonary/Chest: Lungs are clear to auscultation bilaterally. No respiratory distress. No wheezes. No rhonchi. No rales.   Abdominal: Soft. Bowel sounds are normal. No distension and no mass. There is no hepatosplenomegaly. There is no tenderness.   Musculoskeletal: Normal Muscle " tone  Extremities: No edema. Pulses are palpable in all 4 extremities.  Incision right hip    Neurological: Patient is alert and oriented to person, place, and time. Cranial nerves II-XII are grossly intact with no focal deficits.  Skin: Skin is warm. No rash noted. Nails show no clubbing.  No cyanosis or erythema.         Results Review:     I reviewed the patient's new clinical results.    Lab Results (last 24 hours)     Procedure Component Value Units Date/Time    Basic Metabolic Panel [[id number]]  (Abnormal) Collected:  12/03/17 0400    Specimen:  Blood Updated:  12/03/17 0445     Glucose 122 (H) mg/dL      BUN 14 mg/dL      Creatinine 0.67 mg/dL      Sodium 135 (L) mmol/L      Potassium 4.1 mmol/L      Chloride 98 mmol/L      CO2 29.5 (H) mmol/L      Calcium 9.2 mg/dL      eGFR Non African Amer 84 mL/min/1.73      BUN/Creatinine Ratio 20.9     Anion Gap 7.5 mmol/L     Narrative:       The MDRD GFR formula is only valid for adults with stable renal function between ages 18 and 70.          Imaging Results (last 24 hours)     Procedure Component Value Units Date/Time    US Nonvascular Extremity Limited [[id number]] Collected:  12/03/17 0713     Updated:  12/03/17 0717    Narrative:       INDICATION: Hip surgery one day prior. Increased pain and swelling along  the incision.     TECHNIQUE: Ultrasound evaluation was performed with grayscale and color  flow imaging.     FINDINGS: Ultrasound evaluation of the area of swelling demonstrates a  complex fluid collection just inferior to the incision measuring 4.5 x  3.4 x 1.7 cm. There are mixed internal echoes suggesting hematoma. No  shadowing is seen to suggest air bubbles within the collection. No  additional collections are seen. No evidence of pseudoaneurysm.       Impression:       Complex fluid collection at the site of swelling measuring  4.5 x 3.4 x 1.7 cm. Probable hematoma.     This report was finalized on 12/3/2017 7:15 AM by Dr. Nitish Salas MD.              Xray not reviewed personally by physician.      ECG not reviewed personally by physician  ECG/EMG Results (most recent)     None          Medication Review:   I have reviewed the patient's current medication list    Current Facility-Administered Medications:   •  allopurinol (ZYLOPRIM) tablet 100 mg, 100 mg, Oral, Daily, Mirza Whittington MD, 100 mg at 12/03/17 1004  •  aspirin EC tablet 325 mg, 325 mg, Oral, Daily, Mirza Whittington MD, 325 mg at 12/03/17 1003  •  atorvastatin (LIPITOR) tablet 10 mg, 10 mg, Oral, Daily, Mirza Whittington MD, 10 mg at 12/03/17 1005  •  cholecalciferol (VITAMIN D3) tablet 2,000 Units, 2,000 Units, Oral, Daily, Mirza Whittington MD, 2,000 Units at 12/03/17 1004  •  docusate sodium (COLACE) capsule 100 mg, 100 mg, Oral, BID, Mirza Whittington MD  •  escitalopram (LEXAPRO) tablet 10 mg, 10 mg, Oral, Daily, Mirza Whittington MD, 10 mg at 12/03/17 1005  •  lactated ringers infusion, 50 mL/hr, Intravenous, Continuous, Christina aHrris CRNA, Stopped at 12/01/17 1256  •  latanoprost (XALATAN) 0.005 % ophthalmic solution 1 drop, 1 drop, Both Eyes, Nightly, Mirza Whittington MD  •  levothyroxine (SYNTHROID, LEVOTHROID) tablet 50 mcg, 50 mcg, Oral, Daily, Mirza Whittington MD, 50 mcg at 12/03/17 1003  •  metoprolol tartrate (LOPRESSOR) tablet 25 mg, 25 mg, Oral, Daily, Mirza Whittington MD, 25 mg at 12/03/17 1004  •  oxyCODONE-acetaminophen (PERCOCET) 7.5-325 MG per tablet 1 tablet, 1 tablet, Oral, Q4H PRN, Mirza Whittington MD, 1 tablet at 12/03/17 1411  •  pantoprazole (PROTONIX) EC tablet 40 mg, 40 mg, Oral, QAM, Mirza Whittington MD, 40 mg at 12/03/17 0630  •  sodium chloride 0.9 % infusion, 20 mL/hr, Intravenous, Continuous, Mirza Whittington MD  •  zolpidem (AMBIEN) tablet 10 mg, 10 mg, Oral, Nightly, Mirza Whittington MD, 10 mg at 12/02/17 2054      Assessment/Plan      Repair of right gluteal minimus and medius tendons per orthopedic surgery  Hematoma of surgical site with attempted aspiration of hematoma     Essential  Hypertension/  110/59  HLD/ no issues  GOUT/ no issues  GERD/ no issues  Hypothyroidism/ no issues  Depression/ no issues       Plan for disposition:Skilled care evaluation    Kalyn Young DO  12/03/17  5:05 PM      Time:  15 min

## 2017-12-03 NOTE — SIGNIFICANT NOTE
12/03/17 0845   Rehab Treatment   Discipline physical therapy assistant   Treatment Not Performed other (see comments)  (pt having increased pain/drainage- tests performed and awaiting MD.  Will hold)

## 2017-12-03 NOTE — PLAN OF CARE
Problem: Patient Care Overview (Adult)  Goal: Plan of Care Review  Outcome: Ongoing (interventions implemented as appropriate)    12/02/17 2143   Coping/Psychosocial Response Interventions   Plan Of Care Reviewed With patient   Patient Care Overview   Progress progress towards functional goals is fair         Problem: Fall Risk (Adult)  Goal: Absence of Falls  Outcome: Ongoing (interventions implemented as appropriate)    12/02/17 2143   Fall Risk (Adult)   Absence of Falls making progress toward outcome

## 2017-12-03 NOTE — NURSING NOTE
Continued Stay Note  REAL Coleman     Patient Name: Ashli León  MRN: [id number]  Today's Date: 12/3/2017    Admit Date: 12/1/2017          Discharge Plan       12/03/17 1529    Case Management/Social Work Plan    Additional Comments Spoke with patient at bedside, IMM explained, signed and copy provided.Will continue to follow.              Discharge Codes     None            Navjot Montoya RN

## 2017-12-04 LAB
ANION GAP SERPL CALCULATED.3IONS-SCNC: 7 MMOL/L
BUN BLD-MCNC: 14 MG/DL (ref 8–23)
BUN/CREAT SERPL: 20 (ref 7–25)
CALCIUM SPEC-SCNC: 9.4 MG/DL (ref 8.8–10.5)
CHLORIDE SERPL-SCNC: 96 MMOL/L (ref 98–107)
CO2 SERPL-SCNC: 33 MMOL/L (ref 22–29)
CREAT BLD-MCNC: 0.7 MG/DL (ref 0.57–1)
GFR SERPL CREATININE-BSD FRML MDRD: 80 ML/MIN/1.73
GLUCOSE BLD-MCNC: 94 MG/DL (ref 65–99)
POTASSIUM BLD-SCNC: 4.2 MMOL/L (ref 3.5–5.2)
SODIUM BLD-SCNC: 136 MMOL/L (ref 136–145)

## 2017-12-04 PROCEDURE — 99231 SBSQ HOSP IP/OBS SF/LOW 25: CPT | Performed by: HOSPITALIST

## 2017-12-04 PROCEDURE — 97535 SELF CARE MNGMENT TRAINING: CPT

## 2017-12-04 PROCEDURE — 94799 UNLISTED PULMONARY SVC/PX: CPT

## 2017-12-04 PROCEDURE — 97110 THERAPEUTIC EXERCISES: CPT

## 2017-12-04 PROCEDURE — 80048 BASIC METABOLIC PNL TOTAL CA: CPT | Performed by: INTERNAL MEDICINE

## 2017-12-04 RX ADMIN — LATANOPROST 1 DROP: 50 SOLUTION OPHTHALMIC at 21:04

## 2017-12-04 RX ADMIN — OXYCODONE HYDROCHLORIDE AND ACETAMINOPHEN 1 TABLET: 7.5; 325 TABLET ORAL at 21:05

## 2017-12-04 RX ADMIN — OXYCODONE HYDROCHLORIDE AND ACETAMINOPHEN 1 TABLET: 7.5; 325 TABLET ORAL at 16:21

## 2017-12-04 RX ADMIN — METOPROLOL TARTRATE 25 MG: 25 TABLET ORAL at 09:26

## 2017-12-04 RX ADMIN — ASPIRIN 325 MG: 325 TABLET, DELAYED RELEASE ORAL at 09:26

## 2017-12-04 RX ADMIN — ESCITALOPRAM OXALATE 10 MG: 10 TABLET, FILM COATED ORAL at 09:27

## 2017-12-04 RX ADMIN — LEVOTHYROXINE SODIUM 50 MCG: 50 TABLET ORAL at 09:28

## 2017-12-04 RX ADMIN — VITAMIN D, TAB 1000IU (100/BT) 2000 UNITS: 25 TAB at 09:26

## 2017-12-04 RX ADMIN — ZOLPIDEM TARTRATE 10 MG: 5 TABLET, FILM COATED ORAL at 21:04

## 2017-12-04 RX ADMIN — OXYCODONE HYDROCHLORIDE AND ACETAMINOPHEN 1 TABLET: 7.5; 325 TABLET ORAL at 11:15

## 2017-12-04 RX ADMIN — ALLOPURINOL 100 MG: 100 TABLET ORAL at 09:26

## 2017-12-04 RX ADMIN — OXYCODONE HYDROCHLORIDE AND ACETAMINOPHEN 1 TABLET: 7.5; 325 TABLET ORAL at 06:44

## 2017-12-04 RX ADMIN — DOCUSATE SODIUM 100 MG: 100 CAPSULE, LIQUID FILLED ORAL at 17:30

## 2017-12-04 RX ADMIN — PANTOPRAZOLE SODIUM 40 MG: 40 TABLET, DELAYED RELEASE ORAL at 06:44

## 2017-12-04 RX ADMIN — DOCUSATE SODIUM 100 MG: 100 CAPSULE, LIQUID FILLED ORAL at 09:26

## 2017-12-04 RX ADMIN — ATORVASTATIN CALCIUM 10 MG: 10 TABLET, FILM COATED ORAL at 09:27

## 2017-12-04 NOTE — PROGRESS NOTES
Orthopedic Progress Note   Chief Complaint:  Status post right hip abductor repair    Subjective     Interval History: Patient is afebrile hemodynamically stable and is doing well this morning.  Still needing maximum assistance as far as getting in and out of bed and ambulation          Objective     Vital Signs  Temp:  [97 °F (36.1 °C)-97.9 °F (36.6 °C)] 97 °F (36.1 °C)  Heart Rate:  [64-69] 69  Resp:  [18] 18  BP: (110-163)/(59-73) 163/73  Body mass index is 21.86 kg/(m^2).    Intake/Output Summary (Last 24 hours) at 12/04/17 0709  Last data filed at 12/04/17 0638   Gross per 24 hour   Intake              840 ml   Output             2125 ml   Net            -1285 ml             Physical Exam:   General: patient awake, alert and cooperative   Cardiovascular: regular rhythm and rate   Pulm: clear to auscultation bilaterally   Abdomen: Benign.  Soft bowel sounds   Extremities: Right leg is good distal pulses no motor deficit.  There is swelling around the incision is decreasing Is nontender   Neurologic: Normal mood and behavior     Results Review:     I reviewed the patient's new clinical results.      WBC No results found for: WBCS   HGB Hemoglobin   Date Value Ref Range Status   12/02/2017 10.1 (L) 12.0 - 16.0 g/dL Final   12/02/2017 9.3 (L) 12.0 - 16.0 g/dL Final      HCT Hematocrit   Date Value Ref Range Status   12/02/2017 30.7 (L) 37.0 - 47.0 % Final   12/02/2017 29.1 (L) 37.0 - 47.0 % Final      Platlets No results found for: LABPLAT     PT/INR:  No results found for: PROTIME/No results found for: INR    Sodium Sodium   Date Value Ref Range Status   12/04/2017 136 136 - 145 mmol/L Final   12/03/2017 135 (L) 136 - 145 mmol/L Final   12/02/2017 134 (L) 136 - 145 mmol/L Final      Potassium Potassium   Date Value Ref Range Status   12/04/2017 4.2 3.5 - 5.2 mmol/L Final   12/03/2017 4.1 3.5 - 5.2 mmol/L Final   12/02/2017 4.0 3.5 - 5.2 mmol/L Final      Chloride Chloride   Date Value Ref Range Status   12/04/2017  96 (L) 98 - 107 mmol/L Final   12/03/2017 98 98 - 107 mmol/L Final   12/02/2017 97 (L) 98 - 107 mmol/L Final      Bicarbonate No results found for: PLASMABICARB   BUN BUN   Date Value Ref Range Status   12/04/2017 14 8 - 23 mg/dL Final   12/03/2017 14 8 - 23 mg/dL Final   12/02/2017 14 8 - 23 mg/dL Final      Creatinine Creatinine   Date Value Ref Range Status   12/04/2017 0.70 0.57 - 1.00 mg/dL Final   12/03/2017 0.67 0.57 - 1.00 mg/dL Final   12/02/2017 0.71 0.57 - 1.00 mg/dL Final      Calcium Calcium   Date Value Ref Range Status   12/04/2017 9.4 8.8 - 10.5 mg/dL Final   12/03/2017 9.2 8.8 - 10.5 mg/dL Final   12/02/2017 8.9 8.8 - 10.5 mg/dL Final      Magnesium  AST  ALT  Bilirubin, Total  AlkPhos  Albumin    Amylase  Lipase    Radiology: No results found for: MG  No components found for: AST.*  No components found for: ALT.*  No components found for: BILIRUBIN, TOTAL.*    No components found for: ALKPHOS.*  No components found for: ALBUMIN.*      No components found for: AMYLASE.*  No components found for: LIPASE.*            Imaging Results (most recent)     Procedure Component Value Units Date/Time    US Nonvascular Extremity Limited [[id number]] Collected:  12/03/17 0713     Updated:  12/03/17 0717    Narrative:       INDICATION: Hip surgery one day prior. Increased pain and swelling along  the incision.     TECHNIQUE: Ultrasound evaluation was performed with grayscale and color  flow imaging.     FINDINGS: Ultrasound evaluation of the area of swelling demonstrates a  complex fluid collection just inferior to the incision measuring 4.5 x  3.4 x 1.7 cm. There are mixed internal echoes suggesting hematoma. No  shadowing is seen to suggest air bubbles within the collection. No  additional collections are seen. No evidence of pseudoaneurysm.       Impression:       Complex fluid collection at the site of swelling measuring  4.5 x 3.4 x 1.7 cm. Probable hematoma.     This report was finalized on 12/3/2017 7:15 AM  by Dr. Nitish Salas MD.                  lactated ringers 50 mL/hr Last Rate: Stopped (12/01/17 1256)   sodium chloride 20 mL/hr          Assessment/Plan     Patient Active Problem List   Diagnosis Code   • History of hip surgery Z98.890   • Primary osteoarthritis of both knees M17.0   • Arthritis of right knee M17.11   • Aftercare following right knee joint replacement surgery Z47.1, Z96.651   • Status post total right knee replacement Z96.651   • History of knee replacement, total Z96.659   • History of total knee arthroplasty Z96.659   • Chronic pain of left knee M25.562, G89.29   • Arthritis of left knee M17.12   • Closed right hip fracture S72.001A   • Femoral neck fracture S72.009A   • Tear of right gluteus minimus tendon S76.011A       Awaiting approval for transfer to [facility]      Mirza Whittington MD  12/04/17  7:09 AM

## 2017-12-04 NOTE — THERAPY TREATMENT NOTE
Acute Care - Occupational Therapy Treatment Note  [facility]     Patient Name: Ashli León  : 1935  MRN: [id number]  Today's Date: 2017  Onset of Illness/Injury or Date of Surgery Date: 17  Date of Referral to OT: 17  Referring Physician: Dr. Whittington      Admit Date: 2017    Visit Dx:     ICD-10-CM ICD-9-CM   1. Tear of right gluteus minimus tendon, initial encounter S76.011A 843.8     Patient Active Problem List   Diagnosis   • History of hip surgery   • Primary osteoarthritis of both knees   • Arthritis of right knee   • Aftercare following right knee joint replacement surgery   • Status post total right knee replacement   • History of knee replacement, total   • History of total knee arthroplasty   • Chronic pain of left knee   • Arthritis of left knee   • Closed right hip fracture   • Femoral neck fracture   • Tear of right gluteus minimus tendon             Adult Rehabilitation Note       17 0848 17 0847 17 0925       Discipline occupational therapist  -EN    Document Type therapy note (daily note)  -EN    Subjective Information agree to therapy;complains of;pain  -EN    Patient Effort, Rehab Treatment     Symptoms Noted During/After Treatment increased pain  -EN    Precautions/Limitations fall precautions;brace on when up   hip abduction brace  -EN    Specific Treatment Considerations     Recorded by [EN] SCOTT Berrios       Pre SpO2 (%)     O2 Delivery Pre Treatment     Recorded by        Pain Assessment 0-10  -EN    Pain Score 5  -EN    Post Pain Score 6  -EN    Pain Type Acute pain;Surgical pain  -EN    Pain Location Hip  -EN    Pain Orientation Right  -EN    Pain Intervention(s) Medication (See MAR);Repositioned  -EN    Response to Interventions tolerated  -EN    Recorded by [EN] SCOTT Berrios       Personal Safety Interventions gait belt;nonskid shoes/slippers when out of bed  -EN    Recorded by [EN] SCTOT Berrios        Bed Mobility, Assistive Device bed rails;head of bed elevated  -EN    Bed Mob, Supine to Sit, [place] minimum assist (75% patient effort)  -EN    Bed Mobility, Impairments pain  -EN    Bed Mobility, Comment Assist to manage R LE out of bed  -EN    Recorded by [EN] SCOTT Berrios       Transfers, Sit-Stand [place] contact guard assist;verbal cues required  -EN    Transfers, Stand-Sit [place] verbal cues required;contact guard assist  -EN    Transfers, Sit-Stand-Sit, Assist Device rolling walker  -EN    Toilet Transfer, [place] --   SBA  -EN    Toilet Transfer, Assistive Device rolling walker;elevated toilet seat  -EN    Transfer, Comment verbal cues for hand placement  -EN    Recorded by [EN] SCOTT Berrios       Gait, [place] Level     Gait, Assistive Device     Gait, Distance (Feet)     Gait, Gait Pattern Analysis     Gait, Gait Deviations     Gait, Safety Issues     Gait, Impairments     Gait, Comment     Recorded by        Functional Mobility- Ind. Level contact guard assist  -EN    Functional Mobility- Device rolling walker  -EN    Functional Mobility-Distance (Feet) 40  -EN    Functional Mobility- Comment Increased pain with mobility  -EN    Recorded by [EN] SCOTT Berrios       LB Bathing Assess/Train, Comment Managed underwear for toileting with CGA  -EN    Recorded by [EN] SCOTT Berrios       Toileting Assess/Train, Indepen Level supervision required  -EN    Recorded by [EN] SCOTT Berrios       Bilateral Lower Extremities     Recorded by        Additional Documentation --   dependent for donning LE abduction brace  -EN    Recorded by [EN] SCOTT Berrios       Pre-Treatment Position in bed  -EN    Post Treatment Position chair  -EN    In Chair reclined;call light within reach;encouraged to call for assist  -EN    Recorded by [EN] SCOTT Berrios      User Key  (r) = Recorded By, (t) = Taken By, (c) = Cosigned By     Initials Name Effective Dates    KM Pat Salamanca, PTA 08/11/15 -     EN Niyah Demarco, OTR 06/22/16 -     MOIZ Mars, PT 12/01/15 -     KWASI Johnson, PT Student 10/12/17 -                 OT Goals       12/01/17 1456          Transfer Training OT STG    Transfer Training OT STG, Date Established 12/01/17  -EN      Transfer Training OT STG, Time to Achieve 3 days  -EN      Transfer Training OT STG, Activity Type sit to stand/stand to sit;toilet  -EN      Transfer Training OT STG, [place] Level supervision required  -EN      Transfer Training OT STG, Assist Device commode, bedside;walker, rolling  -EN      LB Dressing OT STG    LB Dressing Goal OT STG, Date Established 12/01/17  -EN      LB Dressing Goal OT STG, Time to Achieve 5 days  -EN      LB Dressing Goal OT STG, [place] Level supervision required  -EN      LB Dressing Goal OT STG, Adaptive Equipment reacher;sock-aid;shoe horn, long handled  -EN        User Key  (r) = Recorded By, (t) = Taken By, (c) = Cosigned By    Initials Name Provider Type    EN Niyah Demarco OTDI Occupational Therapist          Occupational Therapy Education     Title: PT OT SLP Therapies (Done)     Topic: Occupational Therapy (Done)     Point: ADL training (Done)    Description: Instruct learner(s) on proper safety adaptation and remediation techniques during self care or transfers.   Instruct in proper use of assistive devices.    Learning Progress Summary    Learner Readiness Method Response Comment Documented by Status   Patient Acceptance E VU Educated on brace management and safety during functional transfers. EN 12/04/17 0848 Done    Acceptance E VU Patient educated on safety during functional transfer, adaptive equipment and adl retraining. EN 12/01/17 1339 Done                      User Key     Initials Effective Dates Name Provider Type Discipline    EN 06/22/16 -  SCOTT Berrios Occupational Therapist OT                  OT  Recommendation and Plan  Anticipated Equipment Needs At Discharge:  (issued LH shoe horn, sock aid, reacher)  Anticipated Discharge Disposition: home with assist, home with home health  Planned Therapy Interventions: ADL retraining, adaptive equipment training, transfer training  Therapy Frequency: 3 times/wk  Plan of Care Review  Outcome Summary/Follow up Plan: OT- Patient required min assist with supine to sit with use of bed rails and HOB elevated.  patient stood from EOB with CGA and verbal cues for hand placement.  Patient performed toilet transfer with SBA and managed underwear with CGA/rolling walker.  Patient was dependent for brace management.          Outcome Measures       12/04/17 0900 12/02/17 0926 12/01/17 1339    How much help from another person do you currently need...    Turning from your back to your side while in flat bed without using bedrails? 3  -JW (r) KWASI (t) JW (c) 3  -KM     Moving from lying on back to sitting on the side of a flat bed without bedrails? 3  -JW (r) KWASI (t) JW (c) 3  -KM     Moving to and from a bed to a chair (including a wheelchair)? 3  -JW (r) KWASI (t) JW (c) 3  -KM     Standing up from a chair using your arms (e.g., wheelchair, bedside chair)? 3  -JW (r) KWASI (t) JW (c) 3  -KM     Climbing 3-5 steps with a railing? 2  -JW (r) KWASI (t) JW (c) 2  -KM     To walk in hospital room? 3  -JW (r) KWASI (t) JW (c) 3  -KM     AM-PAC 6 Clicks Score 17  -JW (r) KWASI (t) 17  -KM     How much help from another is currently needed...    Putting on and taking off regular lower body clothing? 2  -EN  2  -EN    Bathing (including washing, rinsing, and drying) 2  -EN  2  -EN    Toileting (which includes using toilet bed pan or urinal) 3  -EN  3  -EN    Putting on and taking off regular upper body clothing 4  -EN  4  -EN    Taking care of personal grooming (such as brushing teeth) 4  -EN  4  -EN    Eating meals 4  -EN  4  -EN    Score 19  -EN  19  -EN    Functional Assessment    Outcome Measure Options  -[facility] 6 Clicks Basic Mobility (PT)  -MOIZ (r) KWASI (t) JW (c) AM-[facility] 6 Clicks Basic Mobility (PT)  -[facility] 6 Clicks Daily Activity (OT)  -EN      12/01/17 1338          How much help from another person do you currently need...    Turning from your back to your side while in flat bed without using bedrails? 3  -BP      Moving from lying on back to sitting on the side of a flat bed without bedrails? 3  -BP      Moving to and from a bed to a chair (including a wheelchair)? 3  -BP      Standing up from a chair using your arms (e.g., wheelchair, bedside chair)? 3  -BP      Climbing 3-5 steps with a railing? 3  -BP      To walk in hospital room? 3  -BP      AM-PAC 6 Clicks Score 18  -BP      Functional Assessment    Outcome Measure Options AM-[facility] 6 Clicks Basic Mobility (PT)  -BP        User Key  (r) = Recorded By, (t) = Taken By, (c) = Cosigned By    Initials Name Provider Type     Pat Salamanca, PTA Physical Therapy Assistant    MARIUSZ Demarco, OTR Occupational Therapist    BP Cherry Lawson, PT Physical Therapist    MOIZ Mars, PT Physical Therapist    KWASI Johnson, PT Student PT Student           Time Calculation:         Time Calculation- OT       12/04/17 1021          Time Calculation- OT    OT Start Time 0847  -EN      OT Stop Time 0903  -EN      OT Time Calculation (min) 16 min  -EN        User Key  (r) = Recorded By, (t) = Taken By, (c) = Cosigned By    Initials Name Provider Type    SCOTT Grey Occupational Therapist           Therapy Charges for Today     Code Description Service Date Service Provider Modifiers Qty    [id number]  OT SELF CARE/MGMT/TRAIN EA 15 MIN 12/4/2017 SCOTT Berrios GO 1          OT G-codes  OT Professional Judgement Used?: Yes  OT Functional Scales Options: AM-PAC 6 Clicks Daily Activity (OT)  Score: 19  Functional Limitation: Self care  Self Care Current Status (): At least 40 percent but less than 60 percent impaired, limited or  restricted  Self Care Goal Status (): At least 1 percent but less than 20 percent impaired, limited or restricted    Niyah Demarco, OTR  12/4/2017

## 2017-12-04 NOTE — THERAPY TREATMENT NOTE
Acute Care - Physical Therapy Treatment Note   Roxanna Phillip     Patient Name: Ashli León  : 1935  MRN: [id number]  Today's Date: 2017  Onset of Illness/Injury or Date of Surgery Date: 17  Date of Referral to PT: 17  Referring Physician: Dr. Whittington    Admit Date: 2017    Visit Dx:    ICD-10-CM ICD-9-CM   1. Tear of right gluteus minimus tendon, initial encounter S76.011A 843.8     Patient Active Problem List   Diagnosis   • History of hip surgery   • Primary osteoarthritis of both knees   • Arthritis of right knee   • Aftercare following right knee joint replacement surgery   • Status post total right knee replacement   • History of knee replacement, total   • History of total knee arthroplasty   • Chronic pain of left knee   • Arthritis of left knee   • Closed right hip fracture   • Femoral neck fracture   • Tear of right gluteus minimus tendon               Adult Rehabilitation Note       17 0847 17 0925       Rehab Assessment/Intervention    Discipline physical therapist  -KWASI ROCKWELL JW2 physical therapy assistant  -     Document Type therapy note (daily note)  -KWASI ROCKWELL JW2 therapy note (daily note)  -KM     Subjective Information agree to therapy;complains of;pain  -KWASI ROCKWELL JW2 agree to therapy;no complaints;pain   requests to go to the bathroom  -KM     Patient Effort, Rehab Treatment  good  -KM     Symptoms Noted During/After Treatment increased pain  -KWASI ROCKWELL JW2 increased pain  -KM     Precautions/Limitations fall precautions;brace on when up   hip abduction brace, WBAT  -KWASI ROCKWELL JW2 fall precautions;brace on when up  -KM     Specific Treatment Considerations  pt reports pain worse today and she is not able to put as much weight on LE  -KM     Recorded by [KWASI ROCKWELL,MOIZ2] Emilee Mars, PT (r) Renny Johnson PT Student (t) Emilee Mars, PT (c) [KM] Pat Salamanca, SKYLAR     Vital Signs    Pre SpO2 (%)  96  -KM     O2 Delivery Pre Treatment  room air  -KM     Recorded by  [KM]  Pat Salamanca PTA     Pain Assessment    Pain Assessment 0-10  -KWASI ROCKWELL,JW2 0-10  -KM     Pain Score 7  -KWASI ROCKWELL,JW2 7  -KM     Post Pain Score 6  -KWASI ROCKWELL,JW2 --   10 with ambulation, 5 at rest  -KM     Pain Type Acute pain;Surgical pain  -KWASI ROCKWELL,JW2 Acute pain;Surgical pain  -KM     Pain Location Hip  -KWASI ROCKWELL,JW2 Hip  -KM     Pain Orientation Right  -KWASI ROCKWELL,JW2 Right  -KM     Pain Intervention(s) Medication (See MAR);Repositioned;Ambulation/increased activity  -KWASI ROCKWELL,JW2 Medication (See MAR);Repositioned   nurse notified  -KM     Response to Interventions tolerated  -KWASI ROCKWELL,JW2 --   tolerated  -KM     Recorded by [MOIZ,KWASI,JW2] Emilee Mars, PT (r) Renny Johnson, PT Student (t) Emilee Mars, PT (c) [KM] Pat Salamanca PTA     Cognitive Assessment/Intervention    Personal Safety Interventions gait belt;nonskid shoes/slippers when out of bed;fall prevention program maintained  -KWASI ROCKWELL,JW2 gait belt;nonskid shoes/slippers when out of bed  -KM     Recorded by [MOIZ,KWASI,JW2] Emilee Mars, PT (r) Renny Johnson, PT Student (t) Emilee Mars, PT (c) [KM] Pat Salamanca PTA     Bed Mobility, Assessment/Treatment    Bed Mobility, Assistive Device bed rails;head of bed elevated  -KWASI ROCKWELL,JW2 bed rails   bed flat  -KM     Bed Mob, Supine to Sit, [place] minimum assist (75% patient effort)  -KWASI ROCKWELL,JW2 supervision required;verbal cues required  -KM     Bed Mobility, Impairments pain  -KWASI ROCKWELL,JW2      Bed Mobility, Comment pt required assist with R LE out of bed  -MOIZ,KWASI,JW2      Recorded by [MOIZ,KWASI,JW2] Emilee Mars, PT (r) Renny Johnson, PT Student (t) Emilee Mars, PT (c) [KM] Pat Salamanca PTA     Transfer Assessment/Treatment    Transfers, Sit-Stand [place] contact guard assist;verbal cues required  -KWASI ROCKWELL,JW2 contact guard assist;verbal cues required  -KM     Transfers, Stand-Sit [place] contact guard assist;verbal cues required  -MOIZ,KWASI,JW2 verbal cues required;contact guard assist  -KM     Transfers, Sit-Stand-Sit,  Assist Device rolling walker  -KWASI ROCKWELL,JW2 rolling walker  -KM     Toilet Transfer, [place] other (see comments)   SBA  -MOIZ,KWASI,JW2 contact guard assist;verbal cues required  -KM     Toilet Transfer, Assistive Device elevated toilet seat;rolling walker  -MOIZ,KWASI,JW2 elevated toilet seat;rolling walker  -KM     Transfer, Comment verbal cues for proper hand placement  -MOIZ,KWASI,JW2 verbal cues for handplacement- some assist with clothes due to brace  -KM     Recorded by [MOIZ,KWASI,JW2] Emilee Mars, PT (r) Renny Johnson, PT Student (t) Emilee Mars, PT (c) [KM] Pat Salamanca PTA     Gait Assessment/Treatment    Gait, [place] Level contact guard assist  -KWASI ROCKWELL JW2 contact guard assist;verbal cues required  -KM     Gait, Assistive Device rolling walker  -KWASI ROCKWELL,JW2 rolling walker  -KM     Gait, Distance (Feet) 40  -KWASI ROCKWELL,MOIZ2 10   seated rest then 43 ft  -KM     Gait, Gait Pattern Analysis swing-through gait  -KWASI ROCKWELL JW2      Gait, Gait Deviations right:;antalgic;forward flexed posture;step length decreased  -KWASI ROCKWELL,JW2      Gait, Safety Issues step length decreased  -KWASI ROCKWELL,MOIZ2      Gait, Impairments pain  -KWASI ROCKWELL,MOIZ2      Gait, Comment pt complains of increased pain with ambulation  -KWASI ROCKWELL JW2 pt reports increased pain with ambulation today- limited weight on right LE- brace on;  encouraged pt to ambulate with staff.  discussed with RN and PCA pt's progress  -KM     Recorded by [KWASI ROCKWELL,JW2] Emilee Mars PT (r) Renny Johnson, PT Student (t) Emilee Mars, PT (c) [KM] Pat Salamanca, SKYLAR     Therapy Exercises    Bilateral Lower Extremities  5 reps;10 reps;ankle pumps/circles;quad sets  -KM     Recorded by  [KM] Pat Salamanca PTA     Positioning and Restraints    Pre-Treatment Position in bed  -KWASI ROCKWELL JW2 in bed  -KM     Post Treatment Position chair  -KWASI ROCKWELL JW2 chair  -KM     In Chair reclined;call light within reach;encouraged to call for assist;with brace  -KWASI ROCKWELL JW2 notified nsg;call light within reach;reclined;legs  elevated   brace on  -KM     Recorded by [MOIZ,KWASI,JW2] Emilee Mars, PT (r) Renny Johnson, PT Student (t) Emilee Mars, PT (c) [KM] Pat Salamanca PTA       User Key  (r) = Recorded By, (t) = Taken By, (c) = Cosigned By    Initials Name Effective Dates     Pat ARIANA Salamanca, PTA 08/11/15 -     MOIZ Mars, PT 12/01/15 -     KWASI Johnson, PT Student 10/12/17 -                 IP PT Goals       12/01/17 1427          Bed Mobility PT STG    Bed Mobility PT STG, Date Established 12/01/17  -BP      Bed Mobility PT STG, Time to Achieve 3 days  -BP      Bed Mobility PT STG, Activity Type supine to sit/sit to supine  -BP      Bed Mobility PT STG, [place] Level supervision required  -BP      Transfer Training PT STG    Transfer Training PT STG, Date Established 12/01/17  -BP      Transfer Training PT STG, Time to Achieve 3 days  -BP      Transfer Training PT STG, Activity Type sit to stand/stand to sit  -BP      Transfer Training PT STG, [place] Level supervision required  -BP      Transfer Training PT STG, Assist Device walker, rolling  -BP      Gait Training PT STG    Gait Training Goal PT STG, Date Established 12/01/17  -BP      Gait Training Goal PT STG, Time to Achieve 3 days  -BP      Gait Training Goal PT STG, [place] Level supervision required  -BP      Gait Training Goal PT STG, Assist Device walker, rolling  -BP      Gait Training Goal PT STG, Distance to Achieve 100  -BP        User Key  (r) = Recorded By, (t) = Taken By, (c) = Cosigned By    Initials Name Provider Type    BP Cherry Lawson, PT Physical Therapist          Physical Therapy Education     Title: PT OT SLP Therapies (Done)     Topic: Physical Therapy (Done)     Point: Mobility training (Done)    Learning Progress Summary    Learner Readiness Method Response Comment Documented by Status   Patient Acceptance E JOMAR MCKEON 12/04/17 0916 Done    Acceptance E,TB,D MIKE  KM 12/02/17 1111 Done    Acceptance E VU  BP 12/01/17 1425 Done                Point: Home exercise program (Done)    Learning Progress Summary    Learner Readiness Method Response Comment Documented by Status   Patient Acceptance E,TB,D VU   12/02/17 1111 Done               Point: Precautions (Done)    Learning Progress Summary    Learner Readiness Method Response Comment Documented by Status   Patient Acceptance E JOMAR MCKEON  KWASI 12/04/17 0916 Done    Acceptance E,TB,D VU   12/02/17 1111 Done    Acceptance E [facility] 12/01/17 1425 Done                      User Key     Initials Effective Dates Name Provider Type Discipline     08/11/15 -  Pat Salamanca, PTA Physical Therapy Assistant PT     12/01/15 -  Cherry Lawson, PT Physical Therapist PT     10/12/17 -  Renny Johnson, PT Student PT Student PT                    PT Recommendation and Plan  Anticipated Equipment Needs At Discharge:  (none)  Anticipated Discharge Disposition: home with home health  Planned Therapy Interventions: bed mobility training, gait training, home exercise program, transfer training, strengthening, patient/family education  PT Frequency: daily  Plan of Care Review  Plan Of Care Reviewed With: patient  Outcome Summary/Follow up Plan: PT: pt requires min assist with supine to sit transfer, requires use of bed rails, and HOB elevated, assist with R LE out of bed. pt requires CGA with sit to/from stand transfer, verbal cues for proper hand placement. pt ambulates x40 feet with CGA with use of rolling walker, complaints of increased pain with ambulation.            Outcome Measures       12/04/17 0900 12/02/17 0926 12/01/17 1339    How much help from another person do you currently need...    Turning from your back to your side while in flat bed without using bedrails? 3  -JW (r) KWASI (t) JW (c) 3  -KM     Moving from lying on back to sitting on the side of a flat bed without bedrails? 3  -JW (r) KWASI (t) JW (c) 3  -KM     Moving to and from a bed to a chair (including a wheelchair)? 3  -JW (r) KWASI (t) JW  (c) 3  -KM     Standing up from a chair using your arms (e.g., wheelchair, bedside chair)? 3  -JW (r) KWASI (t) MOIZ (c) 3  -KM     Climbing 3-5 steps with a railing? 2  -JW (r) KWASI (t) MOIZ (c) 2  -KM     To walk in hospital room? 3  -JW (r) KWASI (t) MOIZ (c) 3  -KM     AM-PAC 6 Clicks Score 17  -JW (r) KWASI (t) 17  -KM     How much help from another is currently needed...    Putting on and taking off regular lower body clothing?   2  -EN    Bathing (including washing, rinsing, and drying)   2  -EN    Toileting (which includes using toilet bed pan or urinal)   3  -EN    Putting on and taking off regular upper body clothing   4  -EN    Taking care of personal grooming (such as brushing teeth)   4  -EN    Eating meals   4  -EN    Score   19  -EN    Functional Assessment    Outcome Measure Options AM-PAC 6 Clicks Basic Mobility (PT)  -MOIZ (r) KWASI (t) MOIZ (c) AM-PAC 6 Clicks Basic Mobility (PT)  -KM AM-PAC 6 Clicks Daily Activity (OT)  -EN      12/01/17 6658          How much help from another person do you currently need...    Turning from your back to your side while in flat bed without using bedrails? 3  -BP      Moving from lying on back to sitting on the side of a flat bed without bedrails? 3  -BP      Moving to and from a bed to a chair (including a wheelchair)? 3  -BP      Standing up from a chair using your arms (e.g., wheelchair, bedside chair)? 3  -BP      Climbing 3-5 steps with a railing? 3  -BP      To walk in hospital room? 3  -BP      AM-PAC 6 Clicks Score 18  -BP      Functional Assessment    Outcome Measure Options AM-PAC 6 Clicks Basic Mobility (PT)  -BP        User Key  (r) = Recorded By, (t) = Taken By, (c) = Cosigned By    Initials Name Provider Type    ANETA Salamanca, PTA Physical Therapy Assistant    MARIUSZ Demarco, OTR Occupational Therapist    BP Cherry Lawson, PT Physical Therapist    MOIZ Mars, PT Physical Therapist    KWASI Johnson, PT Student PT Student           Time Calculation:          PT Charges       12/04/17 0921          Time Calculation    Start Time 0847  -JW (r) KWASI (t) MOIZ (c)      Stop Time 0903  -JW (r) KWASI (t) MOIZ (c)      Time Calculation (min) 16 min  -JW (r) KWASI (t)      PT Received On 12/04/17  -JW (r) KWASI (t) MOIZ (c)      PT - Next Appointment 12/05/17  -JW (r) KWASI (t) MOIZ (c)        User Key  (r) = Recorded By, (t) = Taken By, (c) = Cosigned By    Initials Name Provider Type    MOIZ Mars, PT Physical Therapist    KWASI Johnson, PT Student PT Student          Therapy Charges for Today     Code Description Service Date Service Provider Modifiers Qty    75992333113 HC PT THER PROC EA 15 MIN 12/4/2017 Renny Johnson PT Student GP 1          PT G-Codes  PT Professional Judgement Used?: Yes  Outcome Measure Options: AM-PAC 6 Clicks Basic Mobility (PT)  Functional Limitation: Mobility: Walking and moving around  Mobility: Walking and Moving Around Current Status (): At least 20 percent but less than 40 percent impaired, limited or restricted  Mobility: Walking and Moving Around Goal Status (): At least 1 percent but less than 20 percent impaired, limited or restricted    Renny Johnson PT Student  12/4/2017

## 2017-12-04 NOTE — NURSING NOTE
Continued Stay Note  REAL Coleman     Patient Name: Ashli León  MRN: [id number]  Today's Date: 12/4/2017    Admit Date: 12/1/2017          Discharge Plan       12/04/17 0842    Case Management/Social Work Plan    Additional Comments Patient in agreement with speaking to  at bedside.  Discussed disposition options with patient.  She says she has been at [facility] after hip fracture and would like a referral made to that facility. Per patient's request referral made to Leslye @[facility].  Will await bed availability.                Discharge Codes     None            Mine Martinez RN

## 2017-12-04 NOTE — PLAN OF CARE
Problem: Patient Care Overview (Adult)  Goal: Plan of Care Review  Outcome: Ongoing (interventions implemented as appropriate)    12/03/17 2016   Coping/Psychosocial Response Interventions   Plan Of Care Reviewed With patient   Outcome Evaluation   Outcome Summary/Follow up Plan Dr laughlin attempted to drain hematoma on left hip, nothing to aspirate, placed mepilex on site. Patient states she would like to go to [facility] for possible rehab, has to wear brace on left hip for 12 weeks. Pain controlled with PO pain medications          Problem: Fall Risk (Adult)  Goal: Absence of Falls  Outcome: Ongoing (interventions implemented as appropriate)    12/03/17 2016   Fall Risk (Adult)   Absence of Falls making progress toward outcome

## 2017-12-04 NOTE — PLAN OF CARE
Problem: Patient Care Overview (Adult)  Goal: Plan of Care Review  Outcome: Ongoing (interventions implemented as appropriate)    12/04/17 0907   Coping/Psychosocial Response Interventions   Plan Of Care Reviewed With patient   Outcome Evaluation   Outcome Summary/Follow up Plan PT: pt requires min assist with supine to sit transfer, requires use of bed rails, and HOB elevated, assist with R LE out of bed. pt requires CGA with sit to/from stand transfer, verbal cues for proper hand placement. pt ambulates x40 feet with CGA with use of rolling walker, complaints of increased pain with ambulation.

## 2017-12-04 NOTE — PLAN OF CARE
Problem: Patient Care Overview (Adult)  Goal: Plan of Care Review    12/04/17 1018   Outcome Evaluation   Outcome Summary/Follow up Plan OT- Patient required min assist with supine to sit with use of bed rails and HOB elevated. patient stood from EOB with CGA and verbal cues for hand placement. Patient performed toilet transfer with SBA and managed underwear with CGA/rolling walker. Patient was dependent for brace management.

## 2017-12-05 LAB
ANION GAP SERPL CALCULATED.3IONS-SCNC: 10.3 MMOL/L
BUN BLD-MCNC: 17 MG/DL (ref 8–23)
BUN/CREAT SERPL: 23.9 (ref 7–25)
CALCIUM SPEC-SCNC: 9.1 MG/DL (ref 8.8–10.5)
CHLORIDE SERPL-SCNC: 96 MMOL/L (ref 98–107)
CO2 SERPL-SCNC: 29.7 MMOL/L (ref 22–29)
CREAT BLD-MCNC: 0.71 MG/DL (ref 0.57–1)
GFR SERPL CREATININE-BSD FRML MDRD: 79 ML/MIN/1.73
GLUCOSE BLD-MCNC: 101 MG/DL (ref 65–99)
POTASSIUM BLD-SCNC: 3.9 MMOL/L (ref 3.5–5.2)
SODIUM BLD-SCNC: 136 MMOL/L (ref 136–145)

## 2017-12-05 PROCEDURE — 97535 SELF CARE MNGMENT TRAINING: CPT

## 2017-12-05 PROCEDURE — 99024 POSTOP FOLLOW-UP VISIT: CPT | Performed by: ORTHOPAEDIC SURGERY

## 2017-12-05 PROCEDURE — 80048 BASIC METABOLIC PNL TOTAL CA: CPT | Performed by: INTERNAL MEDICINE

## 2017-12-05 PROCEDURE — 99232 SBSQ HOSP IP/OBS MODERATE 35: CPT | Performed by: NURSE PRACTITIONER

## 2017-12-05 PROCEDURE — 97110 THERAPEUTIC EXERCISES: CPT

## 2017-12-05 PROCEDURE — 94799 UNLISTED PULMONARY SVC/PX: CPT

## 2017-12-05 RX ADMIN — DOCUSATE SODIUM 100 MG: 100 CAPSULE, LIQUID FILLED ORAL at 17:08

## 2017-12-05 RX ADMIN — ESCITALOPRAM OXALATE 10 MG: 10 TABLET, FILM COATED ORAL at 09:11

## 2017-12-05 RX ADMIN — ALLOPURINOL 100 MG: 100 TABLET ORAL at 09:11

## 2017-12-05 RX ADMIN — ATORVASTATIN CALCIUM 10 MG: 10 TABLET, FILM COATED ORAL at 09:11

## 2017-12-05 RX ADMIN — LEVOTHYROXINE SODIUM 50 MCG: 50 TABLET ORAL at 09:12

## 2017-12-05 RX ADMIN — OXYCODONE HYDROCHLORIDE AND ACETAMINOPHEN 1 TABLET: 7.5; 325 TABLET ORAL at 10:51

## 2017-12-05 RX ADMIN — LATANOPROST 1 DROP: 50 SOLUTION OPHTHALMIC at 20:47

## 2017-12-05 RX ADMIN — OXYCODONE HYDROCHLORIDE AND ACETAMINOPHEN 1 TABLET: 7.5; 325 TABLET ORAL at 17:05

## 2017-12-05 RX ADMIN — OXYCODONE HYDROCHLORIDE AND ACETAMINOPHEN 1 TABLET: 7.5; 325 TABLET ORAL at 20:48

## 2017-12-05 RX ADMIN — ASPIRIN 325 MG: 325 TABLET, DELAYED RELEASE ORAL at 09:11

## 2017-12-05 RX ADMIN — METOPROLOL TARTRATE 25 MG: 25 TABLET ORAL at 09:11

## 2017-12-05 RX ADMIN — DOCUSATE SODIUM 100 MG: 100 CAPSULE, LIQUID FILLED ORAL at 09:11

## 2017-12-05 RX ADMIN — ZOLPIDEM TARTRATE 10 MG: 5 TABLET, FILM COATED ORAL at 20:48

## 2017-12-05 RX ADMIN — VITAMIN D, TAB 1000IU (100/BT) 2000 UNITS: 25 TAB at 09:11

## 2017-12-05 RX ADMIN — PANTOPRAZOLE SODIUM 40 MG: 40 TABLET, DELAYED RELEASE ORAL at 06:39

## 2017-12-05 RX ADMIN — OXYCODONE HYDROCHLORIDE AND ACETAMINOPHEN 1 TABLET: 7.5; 325 TABLET ORAL at 06:39

## 2017-12-05 NOTE — PROGRESS NOTES
Orthopedic Progress Note   Chief Complaint:  Status post repair right hip abductors    Subjective     Interval History: Patient is afebrile hemodynamically stable.  Wound is benign.  Still needing maximum assistance with ambulation          Objective     Vital Signs  Temp:  [97.4 °F (36.3 °C)-99.3 °F (37.4 °C)] 98.4 °F (36.9 °C)  Heart Rate:  [60-80] 68  Resp:  [17-18] 18  BP: ()/(51-69) 139/69  Body mass index is 21.86 kg/(m^2).    Intake/Output Summary (Last 24 hours) at 12/05/17 1032  Last data filed at 12/05/17 0922   Gross per 24 hour   Intake              720 ml   Output             1000 ml   Net             -280 ml     I/O this shift:  In: 240 [P.O.:240]  Out: -        Physical Exam:   General: patient awake, alert and cooperative   Cardiovascular: regular rhythm and rate   Pulm: clear to auscultation bilaterally   Abdomen: Benign.  Soft bowel sounds   Extremities:    NeRight leg is good distal pulses no motor deficit.  Wound is benign and her calf is nontenderurologic: Normal mood and behavior     Results Review:     I reviewed the patient's new clinical results.      WBC No results found for: WBCS   HGB Hemoglobin   Date Value Ref Range Status   12/02/2017 10.1 (L) 12.0 - 16.0 g/dL Final      HCT Hematocrit   Date Value Ref Range Status   12/02/2017 30.7 (L) 37.0 - 47.0 % Final      Platlets No results found for: LABPLAT     PT/INR:  No results found for: PROTIME/No results found for: INR    Sodium Sodium   Date Value Ref Range Status   12/05/2017 136 136 - 145 mmol/L Final   12/04/2017 136 136 - 145 mmol/L Final   12/03/2017 135 (L) 136 - 145 mmol/L Final      Potassium Potassium   Date Value Ref Range Status   12/05/2017 3.9 3.5 - 5.2 mmol/L Final   12/04/2017 4.2 3.5 - 5.2 mmol/L Final   12/03/2017 4.1 3.5 - 5.2 mmol/L Final      Chloride Chloride   Date Value Ref Range Status   12/05/2017 96 (L) 98 - 107 mmol/L Final   12/04/2017 96 (L) 98 - 107 mmol/L Final   12/03/2017 98 98 - 107 mmol/L Final       Bicarbonate No results found for: PLASMABICARB   BUN BUN   Date Value Ref Range Status   12/05/2017 17 8 - 23 mg/dL Final   12/04/2017 14 8 - 23 mg/dL Final   12/03/2017 14 8 - 23 mg/dL Final      Creatinine Creatinine   Date Value Ref Range Status   12/05/2017 0.71 0.57 - 1.00 mg/dL Final   12/04/2017 0.70 0.57 - 1.00 mg/dL Final   12/03/2017 0.67 0.57 - 1.00 mg/dL Final      Calcium Calcium   Date Value Ref Range Status   12/05/2017 9.1 8.8 - 10.5 mg/dL Final   12/04/2017 9.4 8.8 - 10.5 mg/dL Final   12/03/2017 9.2 8.8 - 10.5 mg/dL Final      Magnesium  AST  ALT  Bilirubin, Total  AlkPhos  Albumin    Amylase  Lipase    Radiology: No results found for: MG  No components found for: AST.*  No components found for: ALT.*  No components found for: BILIRUBIN, TOTAL.*    No components found for: ALKPHOS.*  No components found for: ALBUMIN.*      No components found for: AMYLASE.*  No components found for: LIPASE.*            Imaging Results (most recent)     Procedure Component Value Units Date/Time    US Nonvascular Extremity Limited [[id number]] Collected:  12/03/17 0713     Updated:  12/03/17 0717    Narrative:       INDICATION: Hip surgery one day prior. Increased pain and swelling along  the incision.     TECHNIQUE: Ultrasound evaluation was performed with grayscale and color  flow imaging.     FINDINGS: Ultrasound evaluation of the area of swelling demonstrates a  complex fluid collection just inferior to the incision measuring 4.5 x  3.4 x 1.7 cm. There are mixed internal echoes suggesting hematoma. No  shadowing is seen to suggest air bubbles within the collection. No  additional collections are seen. No evidence of pseudoaneurysm.       Impression:       Complex fluid collection at the site of swelling measuring  4.5 x 3.4 x 1.7 cm. Probable hematoma.     This report was finalized on 12/3/2017 7:15 AM by Dr. Nitish Salas MD.                  lactated ringers 50 mL/hr Last Rate: Stopped (12/01/17 1256)    sodium chloride 20 mL/hr          Assessment/Plan     Patient Active Problem List   Diagnosis Code   • History of hip surgery Z98.890   • Primary osteoarthritis of both knees M17.0   • Arthritis of right knee M17.11   • Aftercare following right knee joint replacement surgery Z47.1, Z96.651   • Status post total right knee replacement Z96.651   • History of knee replacement, total Z96.659   • History of total knee arthroplasty Z96.659   • Chronic pain of left knee M25.562, G89.29   • Arthritis of left knee M17.12   • Closed right hip fracture S72.001A   • Femoral neck fracture S72.009A   • Tear of right gluteus minimus tendon S76.011A       [facility] but will not be discharged until tomorrow      Mirza Whittington MD  12/05/17  10:32 AM

## 2017-12-05 NOTE — PLAN OF CARE
Problem: Patient Care Overview (Adult)  Goal: Plan of Care Review    12/05/17 1310   Outcome Evaluation   Outcome Summary/Follow up Plan OT- Patient performed sit to stand from chair and toilet with SBA with rolling walker. Managed underwear with SBA before and after toileting. Performed bathroom and room functional mobility with rolling walker and CGA with no LOB. Educated patient on benefits of adaptive equipment for improved ADL independence and UE AROM exercises.

## 2017-12-05 NOTE — PLAN OF CARE
Problem: Patient Care Overview (Adult)  Goal: Plan of Care Review  Outcome: Ongoing (interventions implemented as appropriate)    12/04/17 2021   Coping/Psychosocial Response Interventions   Plan Of Care Reviewed With patient   Patient Care Overview   Progress improving   Outcome Evaluation   Outcome Summary/Follow up Plan pt. is a one assist with brace,walker,and gait belt. Pain is controlled with Percocet. No new needs or concerns today. awaiting placement       Goal: Adult Individualization and Mutuality  Outcome: Ongoing (interventions implemented as appropriate)  Goal: Discharge Needs Assessment  Outcome: Ongoing (interventions implemented as appropriate)    Problem: Perioperative Period (Adult)  Goal: Signs and Symptoms of Listed Potential Problems Will be Absent or Manageable (Perioperative Period)  Outcome: Outcome(s) achieved Date Met:  12/04/17    Problem: Fall Risk (Adult)  Goal: Absence of Falls  Outcome: Ongoing (interventions implemented as appropriate)    Problem: Pressure Ulcer Risk (Rell Scale) (Adult,Obstetrics,Pediatric)  Goal: Skin Integrity  Outcome: Ongoing (interventions implemented as appropriate)    Problem: Mobility, Physical Impaired (Adult)  Goal: Enhanced Mobility Skills  Outcome: Ongoing (interventions implemented as appropriate)  Goal: Enhanced Functionality Ability  Outcome: Ongoing (interventions implemented as appropriate)

## 2017-12-05 NOTE — PLAN OF CARE
Problem: Patient Care Overview (Adult)  Goal: Plan of Care Review  Outcome: Ongoing (interventions implemented as appropriate)    12/05/17 0988   Coping/Psychosocial Response Interventions   Plan Of Care Reviewed With patient   Outcome Evaluation   Outcome Summary/Follow up Plan PT: pt requires CGA with sit to/from stand transfers. pt ambulates x40 feet with CGA x1 and use of rolling walker. pt limited with ambulation distance due to pain. pt requires significant motivation to increase activity, pt declines. Encouraged to ambulate with nursing staff as able.

## 2017-12-05 NOTE — NURSING NOTE
Continued Stay Note  REAL Coleman     Patient Name: Ashli León  MRN: [id number]  Today's Date: 12/5/2017    Admit Date: 12/1/2017          Discharge Plan       12/05/17 1354    Case Management/Social Work Plan    Additional Comments Per Leslye @ [facility] will accept patient on Wed. 11/6.  [facility] will provide transportation with medical van @2:30 pm.  Patient in agreement with plans.  Updated Important Message from Medicare with patient.  Please call report prior to transfer to [address] [place] [address].  Patient says she will notify her family of the plan.    will continue to follow.               Discharge Codes     None            Mine Martinez RN

## 2017-12-05 NOTE — THERAPY TREATMENT NOTE
Acute Care - Occupational Therapy Treatment Note   Roxanna Phillip     Patient Name: Ashli eLón  : 1935  MRN: [id number]  Today's Date: 2017  Onset of Illness/Injury or Date of Surgery Date: 17  Date of Referral to OT: 17  Referring Physician: Dr. Whittington      Admit Date: 2017    Visit Dx:     ICD-10-CM ICD-9-CM   1. Tear of right gluteus minimus tendon, initial encounter S76.011A 843.8     Patient Active Problem List   Diagnosis   • History of hip surgery   • Primary osteoarthritis of both knees   • Arthritis of right knee   • Aftercare following right knee joint replacement surgery   • Status post total right knee replacement   • History of knee replacement, total   • History of total knee arthroplasty   • Chronic pain of left knee   • Arthritis of left knee   • Closed right hip fracture   • Femoral neck fracture   • Tear of right gluteus minimus tendon             Adult Rehabilitation Note       17 1100 17 [id number] 17 0848       Discipline occupational therapist  -EN    Document Type therapy note (daily note)  -EN    Subjective Information agree to therapy;complains of;pain  -EN    Patient Effort, Rehab Treatment adequate  -EN    Symptoms Noted During/After Treatment increased pain  -EN    Precautions/Limitations fall precautions;brace on when up   hip abduction brace  -EN    Patient Response to Treatment Patient reports she went to SNF in January and they provided adaptive equipment while there.  Reports she is returning there at discharge.  Reviewed adaptive equipment for adl management.  -EN    Recorded by [EN] Niyah Demarco OTR       Pain Assessment 0-10  -EN    Pain Score 7  -EN    Post Pain Score 7  -EN    Pain Type Acute pain;Surgical pain  -EN    Pain Location Hip  -EN    Pain Orientation Right  -EN    Pain Intervention(s) Medication (See MAR)   tolerated  -EN    Response to Interventions     Recorded by [EN] Niyah Demarco, MARKELR       Personal Safety  Interventions gait belt;nonskid shoes/slippers when out of bed  -EN    Recorded by [EN] SCOTT Berrios       Bed Mobility, Assistive Device     Bed Mob, Supine to Sit, Clark     Bed Mobility, Impairments     Bed Mobility, Comment     Recorded by        Transfers, Sit-Stand Clark stand by assist  -EN    Transfers, Stand-Sit Clark stand by assist  -EN    Transfers, Sit-Stand-Sit, Assist Device rolling walker  -EN    Toilet Transfer, Clark supervision required  -EN    Toilet Transfer, Assistive Device rolling walker;elevated toilet seat  -EN    Transfer, Comment managed underwear before and after toileting with SBA  -EN    Recorded by [EN] SCOTT Berrios       Gait, Clark Level     Gait, Assistive Device     Gait, Distance (Feet)     Gait, Gait Pattern Analysis     Gait, Gait Deviations     Gait, Safety Issues     Gait, Impairments     Gait, Comment     Recorded by        Functional Mobility- Ind. Level contact guard assist  -EN    Functional Mobility- Device rolling walker  -EN    Functional Mobility-Distance (Feet)     Functional Mobility- Comment In room and bathroom functional mobility with CGA/rolling walker and had no LOB.  -EN    Recorded by [EN] SCOTT Berrios       LB Bathing Assess/Train, Comment     Recorded by        Toileting Assess/Train, Indepen Level supervision required  -EN    Recorded by [MARIUSZ] SCOTT Berrios       Bilateral Upper Extremity --   educated benefits of UE AROM   -EN    Recorded by [EN] SCOTT Berrios       Additional Documentation     Recorded by        Pre-Treatment Position bathroom  -EN    Post Treatment Position chair  -EN    In Chair reclined;call light within reach;encouraged to call for assist  -EN    Recorded by [MARIUSZ] SCOTT Berrios      12/04/17 0890            User Key  (r) = Recorded By, (t) = Taken By, (c) = Cosigned By    Initials Name Effective Dates    EN SCOTT Berrios  06/22/16 -     MOIZ Emilee Maciastato, PT 12/01/15 -     KWASI Renny Elizabeth, PT Student 10/12/17 -                 OT Goals       12/01/17 1456          Transfer Training OT STG    Transfer Training OT STG, Date Established 12/01/17  -EN      Transfer Training OT STG, Time to Achieve 3 days  -EN      Transfer Training OT STG, Activity Type sit to stand/stand to sit;toilet  -EN      Transfer Training OT STG, [place] Level supervision required  -EN      Transfer Training OT STG, Assist Device commode, bedside;walker, rolling  -EN      LB Dressing OT STG    LB Dressing Goal OT STG, Date Established 12/01/17  -EN      LB Dressing Goal OT STG, Time to Achieve 5 days  -EN      LB Dressing Goal OT STG, [place] Level supervision required  -EN      LB Dressing Goal OT STG, Adaptive Equipment reacher;sock-aid;shoe horn, long handled  -EN        User Key  (r) = Recorded By, (t) = Taken By, (c) = Cosigned By    Initials Name Provider Type    MARIUSZ Demarco OTR Occupational Therapist          Occupational Therapy Education     Title: PT OT SLP Therapies (Done)     Topic: Occupational Therapy (Done)     Point: ADL training (Done)    Description: Instruct learner(s) on proper safety adaptation and remediation techniques during self care or transfers.   Instruct in proper use of assistive devices.    Learning Progress Summary    Learner Readiness Method Response Comment Documented by Status   Patient Acceptance E VU Patient educated on safety during functional transfers, use of adaptive equipment for improved ADL independence, and UE AROM. EN 12/05/17 0930 Done    Acceptance E VU Educated on brace management and safety during functional transfers. EN 12/04/17 0848 Done    Acceptance E VU Patient educated on safety during functional transfer, adaptive equipment and adl retraining. EN 12/01/17 1339 Done                      User Key     Initials Effective Dates Name Provider Type Discipline    EN 06/22/16 -  Niyah AWAN  SCOTT Demarco Occupational Therapist OT                  OT Recommendation and Plan  Anticipated Equipment Needs At Discharge:  (issued LH shoe horn, sock aid, reacher)  Anticipated Discharge Disposition: home with assist, home with home health  Planned Therapy Interventions: ADL retraining, adaptive equipment training, transfer training  Therapy Frequency: 3 times/wk  Plan of Care Review  Outcome Summary/Follow up Plan: OT- Patient performed sit to stand from chair and toilet with SBA with rolling walker.  Managed underwear with SBA before and after toileting.  Performed bathroom and room functional mobility with rolling walker and CGA with no LOB.  Educated patient on benefits of adaptive equipment for improved ADL independence and UE AROM exercises.          Outcome Measures       12/05/17 0930 12/05/17 0900 12/04/17 0900    How much help from another person do you currently need...    Turning from your back to your side while in flat bed without using bedrails?  3  -JW (r) KWASI (t) JW (c) 3  -JW (r) KWASI (t) JW (c)    Moving from lying on back to sitting on the side of a flat bed without bedrails?  3  -JW (r) KWASI (t) JW (c) 3  -JW (r) KWASI (t) JW (c)    Moving to and from a bed to a chair (including a wheelchair)?  3  -JW (r) KWASI (t) JW (c) 3  -JW (r) KWASI (t) JW (c)    Standing up from a chair using your arms (e.g., wheelchair, bedside chair)?  3  -JW (r) KWASI (t) JW (c) 3  -JW (r) KWASI (t) JW (c)    Climbing 3-5 steps with a railing?  2  -JW (r) KWASI (t) JW (c) 2  -JW (r) KWASI (t) JW (c)    To walk in hospital room?  3  -JW (r) KWASI (t) JW (c) 3  -JW (r) KWASI (t) JW (c)    AM-PAC 6 Clicks Score  17  -JW (r) KWASI (t) 17  -JW (r) KWASI (t)    How much help from another is currently needed...    Putting on and taking off regular lower body clothing? 2  -EN  2  -EN    Bathing (including washing, rinsing, and drying) 2  -EN  2  -EN    Toileting (which includes using toilet bed pan or urinal) 3  -EN  3  -EN    Putting on and taking off regular  upper body clothing 4  -EN  4  -EN    Taking care of personal grooming (such as brushing teeth) 4  -EN  4  -EN    Eating meals 4  -EN  4  -EN    Score 19  -EN  19  -EN    Functional Assessment    Outcome Measure Options  AM-PAC 6 Clicks Basic Mobility (PT)  -OMIZ (r) KWASI (t) MOIZ (c) AM-PAC 6 Clicks Basic Mobility (PT)  -MOIZ (r) KWASI (t) JW (c)      User Key  (r) = Recorded By, (t) = Taken By, (c) = Cosigned By    Initials Name Provider Type    EN Niyah Demarco, OTR Occupational Therapist    MOIZ Mars, PT Physical Therapist    KWASI Johnson, PT Student PT Student           Time Calculation:         Time Calculation- OT       12/05/17 1315          Time Calculation- OT    OT Start Time 1100  -EN      OT Stop Time 1113  -EN      OT Time Calculation (min) 13 min  -EN        User Key  (r) = Recorded By, (t) = Taken By, (c) = Cosigned By    Initials Name Provider Type    EN SCOTT Berrios Occupational Therapist           Therapy Charges for Today     Code Description Service Date Service Provider Modifiers Qty    70352470509 HC OT SELF CARE/MGMT/TRAIN EA 15 MIN 12/4/2017 SCOTT Berrios GO 1    65505163981 HC OT SELF CARE/MGMT/TRAIN EA 15 MIN 12/5/2017 SCOTT Berrios GO 1          OT G-codes  OT Professional Judgement Used?: Yes  OT Functional Scales Options: AM-PAC 6 Clicks Daily Activity (OT)  Score: 19  Functional Limitation: Self care  Self Care Current Status (): At least 40 percent but less than 60 percent impaired, limited or restricted  Self Care Goal Status (): At least 1 percent but less than 20 percent impaired, limited or restricted    SCOTT Stein  12/5/2017

## 2017-12-05 NOTE — PROGRESS NOTES
Adult Nutrition  Assessment/PES    Patient Name:  Ashli León  YOB: 1935  MRN: [id number]  Admit Date:  12/1/2017    Assessment Date:  12/5/2017    Comments- Recommend:  Add ENSURE PLUS twice per day to aid with po, drinks at home.           Reason for Assessment       12/05/17 1411    Reason for Assessment    Reason For Assessment/Visit follow up protocol    Other diagnosis repair of tear of gluteal tendon            Data Eval/ Notes       12/05/17 1411     Notes    Note Pt up in chair NKFA, Reg PTA, reports appetite fair, not eating everything, maybe not even half reported. Drinks ENSURE at home any flavor and would bev here.             Nutrition/Diet History       12/05/17 1411    Nutrition/Diet History    Patient Reported Diet/Restrictions/Preferences regular              Labs/Tests/Procedures/Meds       12/05/17 1412    Labs/Tests/Procedures/Meds    Labs/Tests Review Reviewed    Medication Review Reviewed, pertinent                Nutrition Prescription Ordered       12/05/17 1412    Nutrition Prescription PO    Current PO Diet Regular            Evaluation of Received Nutrient/Fluid Intake       12/05/17 1414    Fluid Intake Evaluation    Oral Fluid (mL) 640    Total Fluid Intake (mL) 640    % Fluid Needs 53    PO Evaluation    Number of Meals 8    % PO Intake 63            Problem/Interventions:          Problem 2       12/05/17 1416    Nutrition Diagnoses Problem 2    Problem 2 Inadequate Intake/Infusion    Etiology (related to) Medical Diagnosis    Signs/Symptoms (evidenced by) Report of Minimal PO Intake                  Intervention Goal       12/05/17 1417    Intervention Goal    General Meet nutritional needs for age/condition    PO Increase intake    PO Intake % 75 %    Weight Maintain weight            Nutrition Intervention       12/05/17 1417    Nutrition Intervention    RD/Tech Action Interview for preference;Recommend/ordered    Recommended/Ordered  Supplement            Nutrition Prescription       12/05/17 1418    Nutrition Prescription PO    PO Prescription Begin/change supplement    Other Orders    Other Add ENSURE PLUS supplement twice per day            Education/Evaluation       12/05/17 1418    Education    Education Other (comment)   No needs identified. Enocuraged fluids and po.    Monitor/Evaluation    Monitor Per protocol;I&O;PO intake;Supplement intake;Pertinent labs;Weight;Symptoms        Electronically signed by:  Johanne Mack RD  12/05/17 2:19 PM

## 2017-12-05 NOTE — THERAPY TREATMENT NOTE
Acute Care - Physical Therapy Treatment Note   [facility]     Patient Name: Ashli León  : 1935  MRN: [id number]  Today's Date: 2017  Onset of Illness/Injury or Date of Surgery Date: 17  Date of Referral to PT: 17  Referring Physician: Dr. Whittingtno    Admit Date: 2017    Visit Dx:    ICD-10-CM ICD-9-CM   1. Tear of right gluteus minimus tendon, initial encounter S76.011A 843.8     Patient Active Problem List   Diagnosis   • History of hip surgery   • Primary osteoarthritis of both knees   • Arthritis of right knee   • Aftercare following right knee joint replacement surgery   • Status post total right knee replacement   • History of knee replacement, total   • History of total knee arthroplasty   • Chronic pain of left knee   • Arthritis of left knee   • Closed right hip fracture   • Femoral neck fracture   • Tear of right gluteus minimus tendon               Adult Rehabilitation Note       17 [id number] 17 0848 17 0847    Rehab Assessment/Intervention    Discipline physical therapist  -KAWSI ROCKWELL JW2 occupational therapist  -EN physical therapist  -KWASI ROCKWELL JW2    Document Type therapy note (daily note)  -KWASI ROCKWELL JW2 therapy note (daily note)  -EN therapy note (daily note)  -KWASI ROCKWELL JW2    Subjective Information agree to therapy;complains of;fatigue;pain  -KWASI ROCKWELL JW2 agree to therapy;complains of;pain  -EN agree to therapy;complains of;pain  -KWASI ROCKWELL JW2    Patient Effort, Rehab Treatment adequate  -KWASI ROCKWELL JW2      Symptoms Noted During/After Treatment increased pain  -KWASI ROCKWELL JW2 increased pain  -EN increased pain  -KWASI ROCKWELL JW2    Precautions/Limitations fall precautions;brace on when up   hip abduction brace, WBAT  -KWASI ROCKWELL JW2 fall precautions;brace on when up   hip abduction brace  -EN fall precautions;brace on when up   hip abduction brace, WBAT  -KWASI ROCKWELL JW2    Recorded by [KWASI ROCKWELL JW2] Emilee Mars, PT (r) Renny Johnson PT Student (t) Emilee Mars, PT (c) [EN] Niyah Demarco, OTR  [JW,KWASI,JW2] Emilee Mars, PT (r) Renny Johnson, PT Student (t) Emilee Mars, PT (c)    Pain Assessment    Pain Assessment 0-10  -JW,KWASI,JW2 0-10  -EN 0-10  -JW,KWASI,JW2    Pain Score 7  -JW,KWASI,JW2 5  -EN 7  -JW,KWASI,JW2    Post Pain Score 7  -JW,KWASI,JW2 6  -EN 6  -JW,KWASI,JW2    Pain Type Acute pain;Surgical pain  -JW,KWASI,JW2 Acute pain;Surgical pain  -EN Acute pain;Surgical pain  -JW,KWASI,JW2    Pain Location Hip  -JW,KWASI,JW2 Hip  -EN Hip  -JW,KWASI,JW2    Pain Orientation Right  -JW,KWASI,JW2 Right  -EN Right  -JW,KWASI,JW2    Pain Intervention(s) Medication (See MAR);Repositioned;Ambulation/increased activity  -JW,KWASI,JW2 Medication (See MAR);Repositioned  -EN Medication (See MAR);Repositioned;Ambulation/increased activity  -JW,KWASI,JW2    Response to Interventions tolerated  -JW,KWASI,JW2 tolerated  -EN tolerated  -JW,KWASI,JW2    Recorded by [JW,KWASI,JW2] Emilee Mars, PT (r) Renny Johnson PT Student (t) Emilee Mars, PT (c) [EN] Niyah Demarco, OTR [JW,KWASI,JW2] Emilee Mars PT (r) Renny Johnson PT Student (t) Emilee Mars PT (c)    Cognitive Assessment/Intervention    Personal Safety Interventions gait belt;nonskid shoes/slippers when out of bed;fall prevention program maintained  -JW,KWASI,JW2 gait belt;nonskid shoes/slippers when out of bed  -EN gait belt;nonskid shoes/slippers when out of bed;fall prevention program maintained  -JW,KWASI,JW2    Recorded by [JW,KWASI,JW2] Emilee Mars, PT (r) Renny Johnson PT Student (t) Emilee Mars, PT (c) [EN] Niyah Demarco, OTR [JW,KWASI,JW2] Emilee Mars PT (r) Renny Johnson PT Student (t) Emilee Mars, PT (c)    Bed Mobility, Assessment/Treatment    Bed Mobility, Assistive Device  bed rails;head of bed elevated  -EN bed rails;head of bed elevated  -KWASI ROCKWELL JW2    Bed Mob, Supine to Sit, [place]  minimum assist (75% patient effort)  -EN minimum assist (75% patient effort)  -KWASI ROCKWELL JW2    Bed Mobility, Impairments  pain  -EN pain  -KWASI ROCKWELL JW2    Bed Mobility, Comment deferred  - up in bathroom   -KWASI ROCKWELL,JW2 Assist to manage R LE out of bed  -EN pt required assist with R LE out of bed  -MOIZ,KWASI,JW2    Recorded by [MOIZ,KWASI,JW2] Emilee Mars, PT (r) Renny Johnson, PT Student (t) Emilee Mars, PT (c) [EN] Niyah Demarco, OTR [JW,KWASI,JW2] Emilee Mars, PT (r) Renny Johnson, PT Student (t) Emilee Mars, PT (c)    Transfer Assessment/Treatment    Transfers, Sit-Stand [place] contact guard assist  -MOIZ,KWASI,JW2 contact guard assist;verbal cues required  -EN contact guard assist;verbal cues required  -MOIZ,KWASI,JW2    Transfers, Stand-Sit [place] contact guard assist  -JW,KWASI,JW2 verbal cues required;contact guard assist  -EN contact guard assist;verbal cues required  -KWASI ROCKWELL,JW2    Transfers, Sit-Stand-Sit, Assist Device rolling walker  -KWASI ROCKWELL,JW2 rolling walker  -EN rolling walker  -MOIZ,KWASI,JW2    Toilet Transfer, [place] contact guard assist  -KWASI ROCKWELL,JW2 --   SBA  -EN other (see comments)   SBA  -MOIZ,KWASI,JW2    Toilet Transfer, Assistive Device rolling walker;elevated toilet seat  -KWASI ROCKWELL,JW2 rolling walker;elevated toilet seat  -EN elevated toilet seat;rolling walker  -MOIZ,KWASI,JW2    Transfer, Comment  verbal cues for hand placement  -EN verbal cues for proper hand placement  -MOIZ,KWASI,JW2    Recorded by [MOIZ,KWASI,JW2] Emilee Mars, PT (r) Renny Johnson, PT Student (t) Emilee Mars, PT (c) [EN] Niyah Demarco, OTR [JW,KWASI,JW2] Emilee Mars, PT (r) Renny Johnson, PT Student (t) Emilee Mars, PT (c)    Gait Assessment/Treatment    Gait, [place] Level contact guard assist  -JW,KWASI,JW2  contact guard assist  -JW,KWASI,JW2    Gait, Assistive Device rolling walker  -MOIZ,KWASI,JW2  rolling walker  -MOIZ,KWASI,JW2    Gait, Distance (Feet) 40  -MOIZ,KWASI,JW2  40  -JW,KWASI,JW2    Gait, Gait Pattern Analysis swing-through gait  -KWASI ROCKWELL JW2  swing-through gait  -MOIZ,KWASI,MOIZ2    Gait, Gait Deviations right:;antalgic;forward flexed posture;narrow base;step length decreased  -KWASI ROCKWELL,MOIZ2  right:;antalgic;forward  flexed posture;step length decreased  -JW,KWASI,JW2    Gait, Safety Issues step length decreased  -JW,KWASI,JW2  step length decreased  -JW,KWASI,JW2    Gait, Impairments pain  -JW,KWASI,JW2  pain  -JW,KWASI,JW2    Gait, Comment pt limited with ambulation due to pain  -JW,KWASI,JW2  pt complains of increased pain with ambulation  -JW,KWASI,JW2    Recorded by [MOIZ,KWASI,JW2] Emilee Mars, PT (r) Renny Johnson, PT Student (t) Emilee Mars, PT (c)  [MOIZ,KWASI,JW2] Emilee Mars, PT (r) Renny Johnson PT Student (t) Emilee Mars PT (c)    Functional Mobility    Functional Mobility- Ind. Level  contact guard assist  -EN     Functional Mobility- Device  rolling walker  -EN     Functional Mobility-Distance (Feet)  40  -EN     Functional Mobility- Comment  Increased pain with mobility  -EN     Recorded by  [EN] Niyah Demarco, OTR     Lower Body Bathing Assessment/Training    LB Bathing Assess/Train, Comment  Managed underwear for toileting with CGA  -EN     Recorded by  [EN] Niyah Demarco, OTR     Toileting Assessment/Training    Toileting Assess/Train, Indepen Level  supervision required  -EN     Recorded by  [EN] Niyah Demarco, MARKELR     Orthotics Prosthetics    Additional Documentation  --   dependent for donning LE abduction brace  -EN     Recorded by  [EN] Niyah Demarco, OTR     Positioning and Restraints    Pre-Treatment Position bathroom  -MOIZ,KWASI,JW2 in bed  -EN in bed  -MOIZ,KWASI,JW2    Post Treatment Position chair  -JW,KWASI,JW2 chair  -EN chair  -JW,KWASI,JW2    In Chair notified nsg;reclined;call light within reach;encouraged to call for assist;with nsg  -JW,KWASI,JW2 reclined;call light within reach;encouraged to call for assist  -EN reclined;call light within reach;encouraged to call for assist;with brace  -JW,KWASI,JW2    Recorded by [JW,KWASI,JW2] Emilee Mars, PT (r) Renny Johnson PT Student (t) Emilee Mars PT (c) [EN] Niyah Demarco, OTR [MOIZ,KWASI,JW2] Emilee Mars, PT (r) Renny Johnson, PT Student (t) Emilee  Jerad, PT (c)      User Key  (r) = Recorded By, (t) = Taken By, (c) = Cosigned By    Initials Name Effective Dates    EN Niyah Demarco, OTR 06/22/16 -     MOIZ Emilee Maciastato, PT 12/01/15 -     KWASI Johnson, PT Student 10/12/17 -                 IP PT Goals       12/01/17 1427          Bed Mobility PT STG    Bed Mobility PT STG, Date Established 12/01/17  -BP      Bed Mobility PT STG, Time to Achieve 3 days  -BP      Bed Mobility PT STG, Activity Type supine to sit/sit to supine  -BP      Bed Mobility PT STG, [place] Level supervision required  -BP      Transfer Training PT STG    Transfer Training PT STG, Date Established 12/01/17  -BP      Transfer Training PT STG, Time to Achieve 3 days  -BP      Transfer Training PT STG, Activity Type sit to stand/stand to sit  -BP      Transfer Training PT STG, [place] Level supervision required  -BP      Transfer Training PT STG, Assist Device walker, rolling  -BP      Gait Training PT STG    Gait Training Goal PT STG, Date Established 12/01/17  -BP      Gait Training Goal PT STG, Time to Achieve 3 days  -BP      Gait Training Goal PT STG, [place] Level supervision required  -BP      Gait Training Goal PT STG, Assist Device walker, rolling  -BP      Gait Training Goal PT STG, Distance to Achieve 100  -BP        User Key  (r) = Recorded By, (t) = Taken By, (c) = Cosigned By    Initials Name Provider Type    BP Cherry Lawson, PT Physical Therapist          Physical Therapy Education     Title: PT OT SLP Therapies (Done)     Topic: Physical Therapy (Done)     Point: Mobility training (Done)    Learning Progress Summary    Learner Readiness Method Response Comment Documented by Status   Patient Acceptance E JOMAR MCKEON 12/05/17 0934 Done    Acceptance E JOMAR MCKEON 12/04/17 0916 Done    Acceptance E,TB,D VU  KM 12/02/17 1111 Done    Acceptance E VU  BP 12/01/17 1425 Done               Point: Home exercise program (Done)    Learning Progress Summary     Learner Readiness Method Response Comment Documented by Status   Patient Acceptance FARRAH AWAN,D VU   12/02/17 1111 Done               Point: Precautions (Done)    Learning Progress Summary    Learner Readiness Method Response Comment Documented by Status   Patient Acceptance JOMAR SULLIVAN  KWASI 12/04/17 0916 Done    Acceptance FARRAH AWAND VU   12/02/17 1111 Done    Acceptance E [facility] 12/01/17 1425 Done                      User Key     Initials Effective Dates Name Provider Type Discipline     08/11/15 -  Pat Salamanca, PTA Physical Therapy Assistant PT     12/01/15 -  Cherry Lawson, PT Physical Therapist PT     10/12/17 -  Renny Johnson, PT Student PT Student PT                    PT Recommendation and Plan  Anticipated Equipment Needs At Discharge:  (none)  Anticipated Discharge Disposition: home with home health  Planned Therapy Interventions: bed mobility training, gait training, home exercise program, transfer training, strengthening, patient/family education  PT Frequency: daily  Plan of Care Review  Plan Of Care Reviewed With: patient  Outcome Summary/Follow up Plan: PT: pt requires CGA with sit to/from stand transfers. pt ambulates x40 feet with CGA x1 and use of rolling walker. pt limited with ambulation distance due to pain. pt requires significant motivation to increase activity, pt declines. Encouraged to ambulate with nursing staff as able.           Outcome Measures       12/05/17 0900 12/04/17 0900       How much help from another person do you currently need...    Turning from your back to your side while in flat bed without using bedrails? 3  -JW (r) KWASI (t) JW (c) 3  -JW (r) KWASI (t) JW (c)     Moving from lying on back to sitting on the side of a flat bed without bedrails? 3  -JW (r) KWASI (t) JW (c) 3  -JW (r) KWASI (t) JW (c)     Moving to and from a bed to a chair (including a wheelchair)? 3  -JW (r) KWASI (t) JW (c) 3  -JW (r) KWASI (t) JW (c)     Standing up from a chair using your arms (e.g., wheelchair,  bedside chair)? 3  -JW (r) KWASI (t) JW (c) 3  -JW (r) KWASI (t) JW (c)     Climbing 3-5 steps with a railing? 2  -JW (r) KWASI (t) JW (c) 2  -JW (r) KWASI (t) JW (c)     To walk in hospital room? 3  -JW (r) KWASI (t) JW (c) 3  -JW (r) KWASI (t) JW (c)     AM-PAC 6 Clicks Score 17  -JW (r) KWASI (t) 17  -JW (r) KWASI (t)     How much help from another is currently needed...    Putting on and taking off regular lower body clothing?  2  -EN     Bathing (including washing, rinsing, and drying)  2  -EN     Toileting (which includes using toilet bed pan or urinal)  3  -EN     Putting on and taking off regular upper body clothing  4  -EN     Taking care of personal grooming (such as brushing teeth)  4  -EN     Eating meals  4  -EN     Score  19  -EN     Functional Assessment    Outcome Measure Options AM-PAC 6 Clicks Basic Mobility (PT)  -JW (r) KWASI (t) JW (c) AM-PAC 6 Clicks Basic Mobility (PT)  -JW (r) KWASI (t) JW (c)       User Key  (r) = Recorded By, (t) = Taken By, (c) = Cosigned By    Initials Name Provider Type    MARIUSZ Demarco, OTR Occupational Therapist    MOIZ Mars, PT Physical Therapist    KWASI Johnson, PT Student PT Student           Time Calculation:         PT Charges       12/05/17 0938          Time Calculation    Start Time 0859  -JW (r) KWASI (t) JW (c)      Stop Time 0909  -JW (r) KWASI (t) JW (c)      Time Calculation (min) 10 min  -JW (r) KWASI (t)      PT Received On 12/05/17  -JW (r) KWASI (t) JW (c)      PT - Next Appointment 12/06/17  -JW (r) KWASI (t) JW (c)        User Key  (r) = Recorded By, (t) = Taken By, (c) = Cosigned By    Initials Name Provider Type    MOIZ Mars, PT Physical Therapist    KWASI Johnson, PT Student PT Student          Therapy Charges for Today     Code Description Service Date Service Provider Modifiers Qty    17421253583  PT THER PROC EA 15 MIN 12/4/2017 Renny Johnson, PT Student GP 1    15859852361 HC PT THER PROC EA 15 MIN 12/5/2017 Renny Johnson PT Student GP 1          PT  G-Codes  PT Professional Judgement Used?: Yes  Outcome Measure Options: AM-PAC 6 Clicks Basic Mobility (PT)  Functional Limitation: Mobility: Walking and moving around  Mobility: Walking and Moving Around Current Status (): At least 20 percent but less than 40 percent impaired, limited or restricted  Mobility: Walking and Moving Around Goal Status (): At least 1 percent but less than 20 percent impaired, limited or restricted    Renny Johnson PT Student  12/5/2017

## 2017-12-06 VITALS
SYSTOLIC BLOOD PRESSURE: 156 MMHG | BODY MASS INDEX: 21.86 KG/M2 | WEIGHT: 123.4 LBS | HEART RATE: 51 BPM | OXYGEN SATURATION: 94 % | RESPIRATION RATE: 19 BRPM | HEIGHT: 63 IN | DIASTOLIC BLOOD PRESSURE: 70 MMHG | TEMPERATURE: 97.7 F

## 2017-12-06 PROBLEM — S76.011A TEAR OF RIGHT GLUTEUS MINIMUS TENDON: Status: ACTIVE | Noted: 2017-11-21

## 2017-12-06 LAB
ANION GAP SERPL CALCULATED.3IONS-SCNC: 11.3 MMOL/L
BUN BLD-MCNC: 19 MG/DL (ref 8–23)
BUN/CREAT SERPL: 24.4 (ref 7–25)
CALCIUM SPEC-SCNC: 9.6 MG/DL (ref 8.8–10.5)
CHLORIDE SERPL-SCNC: 99 MMOL/L (ref 98–107)
CO2 SERPL-SCNC: 30.7 MMOL/L (ref 22–29)
CREAT BLD-MCNC: 0.78 MG/DL (ref 0.57–1)
GFR SERPL CREATININE-BSD FRML MDRD: 71 ML/MIN/1.73
GLUCOSE BLD-MCNC: 95 MG/DL (ref 65–99)
POTASSIUM BLD-SCNC: 4 MMOL/L (ref 3.5–5.2)
SODIUM BLD-SCNC: 141 MMOL/L (ref 136–145)

## 2017-12-06 PROCEDURE — 94799 UNLISTED PULMONARY SVC/PX: CPT

## 2017-12-06 PROCEDURE — 99024 POSTOP FOLLOW-UP VISIT: CPT | Performed by: ORTHOPAEDIC SURGERY

## 2017-12-06 PROCEDURE — 80048 BASIC METABOLIC PNL TOTAL CA: CPT | Performed by: INTERNAL MEDICINE

## 2017-12-06 RX ORDER — POLYETHYLENE GLYCOL 3350 17 G/17G
17 POWDER, FOR SOLUTION ORAL DAILY
Start: 2017-12-06 | End: 2018-09-14

## 2017-12-06 RX ORDER — PSEUDOEPHEDRINE HCL 30 MG
100 TABLET ORAL 2 TIMES DAILY
Start: 2017-12-06 | End: 2020-10-10

## 2017-12-06 RX ORDER — OXYCODONE AND ACETAMINOPHEN 7.5; 325 MG/1; MG/1
1 TABLET ORAL EVERY 4 HOURS PRN
Qty: 80 TABLET | Refills: 0 | Status: SHIPPED | OUTPATIENT
Start: 2017-12-06 | End: 2017-12-12

## 2017-12-06 RX ORDER — ZOLPIDEM TARTRATE 10 MG/1
10 TABLET ORAL NIGHTLY
Qty: 30 TABLET | Refills: 0
Start: 2017-12-06 | End: 2020-10-13 | Stop reason: HOSPADM

## 2017-12-06 RX ADMIN — OXYCODONE HYDROCHLORIDE AND ACETAMINOPHEN 1 TABLET: 7.5; 325 TABLET ORAL at 11:36

## 2017-12-06 RX ADMIN — DOCUSATE SODIUM 100 MG: 100 CAPSULE, LIQUID FILLED ORAL at 09:15

## 2017-12-06 RX ADMIN — ASPIRIN 325 MG: 325 TABLET, DELAYED RELEASE ORAL at 09:16

## 2017-12-06 RX ADMIN — PANTOPRAZOLE SODIUM 40 MG: 40 TABLET, DELAYED RELEASE ORAL at 06:57

## 2017-12-06 RX ADMIN — OXYCODONE HYDROCHLORIDE AND ACETAMINOPHEN 1 TABLET: 7.5; 325 TABLET ORAL at 06:57

## 2017-12-06 RX ADMIN — ALLOPURINOL 100 MG: 100 TABLET ORAL at 09:15

## 2017-12-06 RX ADMIN — ESCITALOPRAM OXALATE 10 MG: 10 TABLET, FILM COATED ORAL at 09:15

## 2017-12-06 RX ADMIN — ATORVASTATIN CALCIUM 10 MG: 10 TABLET, FILM COATED ORAL at 09:15

## 2017-12-06 RX ADMIN — METOPROLOL TARTRATE 25 MG: 25 TABLET ORAL at 09:15

## 2017-12-06 RX ADMIN — VITAMIN D, TAB 1000IU (100/BT) 2000 UNITS: 25 TAB at 09:15

## 2017-12-06 RX ADMIN — LEVOTHYROXINE SODIUM 50 MCG: 50 TABLET ORAL at 09:16

## 2017-12-06 NOTE — PLAN OF CARE
Problem: Inpatient Physical Therapy  Goal: Bed Mobility Goal STG- PT  Outcome: Unable to achieve outcome(s) by discharge Date Met:  12/06/17 12/01/17 1427 12/06/17 1140   Bed Mobility PT STG   Bed Mobility PT STG, Date Established 12/01/17 --    Bed Mobility PT STG, Time to Achieve 3 days --    Bed Mobility PT STG, Activity Type supine to sit/sit to supine --    Bed Mobility PT STG, [place] Level supervision required --    Bed Mobility PT STG, Date Goal Reviewed --  12/06/17   Bed Mobility PT STG, Outcome --  goal not met   Bed Mobility PT STG, Reason Goal Not Met --  discharged from facility       Goal: Transfer Training Goal 1 STG- PT  Outcome: Unable to achieve outcome(s) by discharge Date Met:  12/06/17 12/01/17 1427 12/06/17 1140   Transfer Training PT STG   Transfer Training PT STG, Date Established 12/01/17 --    Transfer Training PT STG, Time to Achieve 3 days --    Transfer Training PT STG, Activity Type sit to stand/stand to sit --    Transfer Training PT STG, [place] Level supervision required --    Transfer Training PT STG, Assist Device walker, rolling --    Transfer Training PT STG, Date Goal Reviewed --  12/06/17   Transfer Training PT STG, Outcome --  goal not met   Transfer Training PT STG, Reason Goal Not Met --  discharged from facility       Goal: Gait Training Goal STG- PT  Outcome: Unable to achieve outcome(s) by discharge Date Met:  12/06/17 12/01/17 1427 12/06/17 1140   Gait Training PT STG   Gait Training Goal PT STG, Date Established 12/01/17 --    Gait Training Goal PT STG, Time to Achieve 3 days --    Gait Training Goal PT STG, [place] Level supervision required --    Gait Training Goal PT STG, Assist Device walker, rolling --    Gait Training Goal PT STG, Distance to Achieve 100 --    Gait Training Goal PT STG, Date Goal Reviewed --  12/06/17   Gait Training Goal PT STG, Outcome --  goal not met   Gait Training Goal PT STG, Reason Goal Not Met --  discharged from  facility

## 2017-12-06 NOTE — DISCHARGE SUMMARY
Discharge Summary    Patient name: Ashli León    Medical record number: [id number]    Admission date: 12/1/2017  Discharge date:   12/6/2017    Attending physician: Pk    Primary care physician: Arnaud Monson MD    Referring physician:     Consulting physician(s): Hospitalist    Condition on discharge: Stable    Primary Diagnoses: Rupture of right hip gluteal abductor tendons    Secondary Diagnoses:     Operative Procedure: Repair of right gluteal tendon rupture     Hospital Course: The patient is a very pleasant 82 y.o. female that was admitted to the hospital with diagnosis of a ruptured of the right hip gluteal tendons, minimus and medius.  Patient is status post endoprosthetic hip replacement in January of this year did well to she fell approximately 3-4 months ago which time she sustained the above-stated injury in addition to a fracture of her thoracic spine.  MRI confirmed the tendon ruptures because of persistent pain and discomfort she is brought to the operating room on the first for the repair.  Postoperative she remained afebrile with stable the patient had significant problems with mobility to be transferred to the fracture management for continued postoperative rehabilitation weightbearing as tolerated with her hip abduction brace in place..    Discharge medications:    Ashli León   Home Medication Instructions DONAVON:[id number]    Printed on:12/06/17 0658   Medication Information                      alendronate (FOSAMAX) 5 MG tablet  Take 5 mg by mouth Every Morning Before Breakfast.             allopurinol (ZYLOPRIM) 100 MG tablet  Take 100 mg by mouth daily.             aspirin  MG EC tablet  Take 1 tablet by mouth Daily.             cholecalciferol (VITAMIN D3) 1000 units tablet  Take 2,000 Units by mouth Daily.             docusate sodium 100 MG capsule  Take 100 mg by mouth 2 (Two) Times a Day.             escitalopram (LEXAPRO) 10 MG tablet               latanoprost  (XALATAN) 0.005 % ophthalmic solution  Administer 1 drop to both eyes Every Night.             levothyroxine (SYNTHROID, LEVOTHROID) 50 MCG tablet  Take 50 mcg by mouth daily.             metoprolol tartrate (LOPRESSOR) 25 MG tablet  Take 25 mg by mouth daily.             Multiple Vitamins-Minerals (ICAPS AREDS 2) capsule  Take 2 capsules by mouth Daily.             omeprazole (PriLOSEC) 40 MG capsule  Take 40 mg by mouth Daily.             oxyCODONE-acetaminophen (PERCOCET) 7.5-325 MG per tablet  Take 1 tablet by mouth Every 4 (Four) Hours As Needed for Moderate Pain  or Severe Pain  (for pain) for up to 6 days.             polyethylene glycol (MIRALAX) packet  Take 17 g by mouth Daily.             pravastatin (PRAVACHOL) 40 MG tablet  40 mg Daily.             zolpidem (AMBIEN) 10 MG tablet  Take 1 tablet by mouth Every Night.                 Discharge instructions:  Patient may shower.  She is wearing a hip abduction brace when she is out of bed ambulating.  She is full weightbearing on the right leg.  She is to continue the aspirin for DVT prophylaxis.  Pain medication as needed      Follow-up appointment: Return to office appointment

## 2017-12-06 NOTE — PROGRESS NOTES
Orthopedic Progress Note   Chief Complaint:  Status post right hip abductor repair    Subjective     Interval History: Patient is afebrile hemodynamically stable.  Pain well-controlled oral medication.  Patient be transferred to nursing facility today.          Objective     Vital Signs  Temp:  [96.7 °F (35.9 °C)-98.6 °F (37 °C)] 97.7 °F (36.5 °C)  Heart Rate:  [58-71] 62  Resp:  [18-22] 19  BP: (110-156)/(56-70) 156/70  Body mass index is 21.86 kg/(m^2).    Intake/Output Summary (Last 24 hours) at 12/06/17 0648  Last data filed at 12/06/17 0300   Gross per 24 hour   Intake              840 ml   Output             1025 ml   Net             -185 ml     I/O this shift:  In: -   Out: 300 [Urine:300]       Physical Exam:   General: patient awake, alert and cooperative   Cardiovascular: regular rhythm and rate   Pulm: clear to auscultation bilaterally   Abdomen: Benign.  Soft bowel sounds   Extremities: Right hip remains benign.  Wound has no drainage minimal swelling no erythema.  Good distal pulses no motor deficit.  No sensory loss.  Calf is nontender.   Neurologic: Normal mood and behavior     Results Review:     I reviewed the patient's new clinical results.      WBC No results found for: WBCS   HGB No results found for: HGB   HCT No results found for: HCT   Platlets No results found for: LABPLAT     PT/INR:  No results found for: PROTIME/No results found for: INR    Sodium Sodium   Date Value Ref Range Status   12/06/2017 141 136 - 145 mmol/L Final   12/05/2017 136 136 - 145 mmol/L Final   12/04/2017 136 136 - 145 mmol/L Final      Potassium Potassium   Date Value Ref Range Status   12/06/2017 4.0 3.5 - 5.2 mmol/L Final   12/05/2017 3.9 3.5 - 5.2 mmol/L Final   12/04/2017 4.2 3.5 - 5.2 mmol/L Final      Chloride Chloride   Date Value Ref Range Status   12/06/2017 99 98 - 107 mmol/L Final   12/05/2017 96 (L) 98 - 107 mmol/L Final   12/04/2017 96 (L) 98 - 107 mmol/L Final      Bicarbonate No results found for:  PLASMABICARB   BUN BUN   Date Value Ref Range Status   12/06/2017 19 8 - 23 mg/dL Final   12/05/2017 17 8 - 23 mg/dL Final   12/04/2017 14 8 - 23 mg/dL Final      Creatinine Creatinine   Date Value Ref Range Status   12/06/2017 0.78 0.57 - 1.00 mg/dL Final   12/05/2017 0.71 0.57 - 1.00 mg/dL Final   12/04/2017 0.70 0.57 - 1.00 mg/dL Final      Calcium Calcium   Date Value Ref Range Status   12/06/2017 9.6 8.8 - 10.5 mg/dL Final   12/05/2017 9.1 8.8 - 10.5 mg/dL Final   12/04/2017 9.4 8.8 - 10.5 mg/dL Final      Magnesium  AST  ALT  Bilirubin, Total  AlkPhos  Albumin    Amylase  Lipase    Radiology: No results found for: MG  No components found for: AST.*  No components found for: ALT.*  No components found for: BILIRUBIN, TOTAL.*    No components found for: ALKPHOS.*  No components found for: ALBUMIN.*      No components found for: AMYLASE.*  No components found for: LIPASE.*            Imaging Results (most recent)     Procedure Component Value Units Date/Time    US Nonvascular Extremity Limited [[id number]] Collected:  12/03/17 0713     Updated:  12/03/17 0717    Narrative:       INDICATION: Hip surgery one day prior. Increased pain and swelling along  the incision.     TECHNIQUE: Ultrasound evaluation was performed with grayscale and color  flow imaging.     FINDINGS: Ultrasound evaluation of the area of swelling demonstrates a  complex fluid collection just inferior to the incision measuring 4.5 x  3.4 x 1.7 cm. There are mixed internal echoes suggesting hematoma. No  shadowing is seen to suggest air bubbles within the collection. No  additional collections are seen. No evidence of pseudoaneurysm.       Impression:       Complex fluid collection at the site of swelling measuring  4.5 x 3.4 x 1.7 cm. Probable hematoma.     This report was finalized on 12/3/2017 7:15 AM by Dr. Nitish Salas MD.                       Assessment/Plan     Patient Active Problem List   Diagnosis Code   • History of hip surgery Z98.890    • Primary osteoarthritis of both knees M17.0   • Arthritis of right knee M17.11   • Aftercare following right knee joint replacement surgery Z47.1, Z96.651   • Status post total right knee replacement Z96.651   • History of knee replacement, total Z96.659   • History of total knee arthroplasty Z96.659   • Chronic pain of left knee M25.562, G89.29   • Arthritis of left knee M17.12   • Closed right hip fracture S72.001A   • Femoral neck fracture S72.009A   • Tear of right gluteus minimus tendon S76.011A         [facility]    Mirza Whittington MD  12/06/17  6:48 AM

## 2017-12-06 NOTE — PLAN OF CARE
Problem: Patient Care Overview (Adult)  Goal: Plan of Care Review  Outcome: Outcome(s) achieved Date Met:  12/06/17 12/06/17 1139   Coping/Psychosocial Response Interventions   Plan Of Care Reviewed With patient   Patient Care Overview   Progress improving       Goal: Adult Individualization and Mutuality  Outcome: Outcome(s) achieved Date Met:  12/06/17  Goal: Discharge Needs Assessment  Outcome: Outcome(s) achieved Date Met:  12/06/17    Problem: Fall Risk (Adult)  Goal: Absence of Falls  Outcome: Outcome(s) achieved Date Met:  12/06/17    Problem: Pressure Ulcer Risk (Rell Scale) (Adult,Obstetrics,Pediatric)  Goal: Skin Integrity  Outcome: Outcome(s) achieved Date Met:  12/06/17    Problem: Mobility, Physical Impaired (Adult)  Goal: Enhanced Mobility Skills  Outcome: Outcome(s) achieved Date Met:  12/06/17  Goal: Enhanced Functionality Ability  Outcome: Outcome(s) achieved Date Met:  12/06/17

## 2017-12-06 NOTE — PLAN OF CARE
Problem: Patient Care Overview (Adult)  Goal: Plan of Care Review  Outcome: Ongoing (interventions implemented as appropriate)    12/05/17 1943   Coping/Psychosocial Response Interventions   Plan Of Care Reviewed With patient   Patient Care Overview   Progress improving   Outcome Evaluation   Outcome Summary/Follow up Plan pt. is going to be a discharge in am to [facility] for rehab.pt. is medically stable and pain controlled with Percocet       Goal: Adult Individualization and Mutuality  Outcome: Ongoing (interventions implemented as appropriate)  Goal: Discharge Needs Assessment  Outcome: Ongoing (interventions implemented as appropriate)    Problem: Fall Risk (Adult)  Goal: Absence of Falls  Outcome: Ongoing (interventions implemented as appropriate)    Problem: Pressure Ulcer Risk (Rell Scale) (Adult,Obstetrics,Pediatric)  Goal: Skin Integrity  Outcome: Ongoing (interventions implemented as appropriate)    Problem: Mobility, Physical Impaired (Adult)  Goal: Enhanced Mobility Skills  Outcome: Ongoing (interventions implemented as appropriate)  Goal: Enhanced Functionality Ability  Outcome: Ongoing (interventions implemented as appropriate)

## 2017-12-06 NOTE — NURSING NOTE
Case Management Discharge Note    Final Note: dc to SNF - [place]    Discharge Placement     Facility/Agency Request Status Selected? Address Phone Number Fax Number    [facility] Accepted    Yes [address] [place] [address] [phone]             Discharge Codes: 03  Discharged/transferred to skilled nursing facility (SNF) with Medicare certification in anticipation of skilled care

## 2017-12-06 NOTE — PLAN OF CARE
Problem: Patient Care Overview (Adult)  Goal: Plan of Care Review  Outcome: Ongoing (interventions implemented as appropriate)    12/06/17 1128   Coping/Psychosocial Response Interventions   Plan Of Care Reviewed With patient   Patient Care Overview   Progress improving   Outcome Evaluation   Outcome Summary/Follow up Plan pt. being discharged to [facility] today for rehab,brace is to be worn when up. Pt. taking        Goal: Adult Individualization and Mutuality  Outcome: Ongoing (interventions implemented as appropriate)  Goal: Discharge Needs Assessment  Outcome: Ongoing (interventions implemented as appropriate)    Problem: Fall Risk (Adult)  Goal: Absence of Falls  Outcome: Ongoing (interventions implemented as appropriate)    Problem: Pressure Ulcer Risk (Rell Scale) (Adult,Obstetrics,Pediatric)  Goal: Skin Integrity  Outcome: Ongoing (interventions implemented as appropriate)    Problem: Mobility, Physical Impaired (Adult)  Goal: Enhanced Mobility Skills  Outcome: Ongoing (interventions implemented as appropriate)  Goal: Enhanced Functionality Ability  Outcome: Ongoing (interventions implemented as appropriate)

## 2017-12-14 ENCOUNTER — OFFICE VISIT (OUTPATIENT)
Dept: ORTHOPEDIC SURGERY | Facility: CLINIC | Age: 82
End: 2017-12-14

## 2017-12-14 VITALS — HEIGHT: 63 IN | WEIGHT: 130 LBS | BODY MASS INDEX: 23.04 KG/M2

## 2017-12-14 DIAGNOSIS — Z96.649 S/P HIP HEMIARTHROPLASTY: Primary | ICD-10-CM

## 2017-12-14 DIAGNOSIS — S76.011A TEAR OF RIGHT GLUTEUS MINIMUS TENDON, INITIAL ENCOUNTER: ICD-10-CM

## 2017-12-14 DIAGNOSIS — Z09 STATUS POST ORTHOPEDIC SURGERY, FOLLOW-UP EXAM: ICD-10-CM

## 2017-12-14 PROCEDURE — 99024 POSTOP FOLLOW-UP VISIT: CPT | Performed by: ORTHOPAEDIC SURGERY

## 2017-12-14 RX ORDER — OXYCODONE AND ACETAMINOPHEN 7.5; 325 MG/1; MG/1
1 TABLET ORAL EVERY 6 HOURS PRN
Qty: 50 TABLET | Refills: 0 | Status: SHIPPED | OUTPATIENT
Start: 2017-12-14 | End: 2018-01-04

## 2017-12-14 NOTE — PROGRESS NOTES
Subjective: Status post right gluteal tendon repair     Patient ID: Ashli León is a 82 y.o. female.    Chief Complaint:    History of Present Illness patient is 2 weeks out doing well.  She is at the nursing facility apparently is ambulating with relative dependence using a rolling walker.  Still having mild to moderate pain which is not totally unexpected       Social History     Occupational History   • Not on file.     Social History Main Topics   • Smoking status: Never Smoker   • Smokeless tobacco: Never Used   • Alcohol use 1.8 oz/week     3 Shots of liquor per week      Comment: social use 1 weekly   • Drug use: No   • Sexual activity: Defer      Review of Systems   Constitutional: Negative for chills, diaphoresis, fever and unexpected weight change.   HENT: Negative for hearing loss, nosebleeds, sore throat and tinnitus.    Eyes: Negative for pain and visual disturbance.   Respiratory: Negative for cough, shortness of breath and wheezing.    Cardiovascular: Negative for chest pain and palpitations.   Gastrointestinal: Negative for abdominal pain, diarrhea, nausea and vomiting.   Endocrine: Negative for cold intolerance, heat intolerance and polydipsia.   Genitourinary: Negative for difficulty urinating, dysuria and hematuria.   Musculoskeletal: Positive for arthralgias and myalgias. Negative for joint swelling.   Skin: Negative for rash and wound.   Allergic/Immunologic: Negative for environmental allergies.   Neurological: Negative for dizziness, syncope and numbness.   Hematological: Does not bruise/bleed easily.   Psychiatric/Behavioral: Negative for dysphoric mood and sleep disturbance. The patient is not nervous/anxious.          Past Medical History:   Diagnosis Date   • Acid reflux    • Back pain    • Colitis     history of   • Diarrhea    • Disease of thyroid gland    • Diverticular disease    • Glaucoma    • Gout     - right big toe   • High blood pressure    • High cholesterol    • History of  vertebral compression fracture     multiple   • Hyponatremia    • Knee pain    • Macular degeneration    • Mitral regurgitation 06/2016    Mild to moderate per 2-D echocardiogram   • Primary osteoarthritis of both knees 4/1/2016   • Renal lesion     on mri   • Rheumatoid arthritis      Past Surgical History:   Procedure Laterality Date   • APPENDECTOMY  1951   • CATARACT EXTRACTION Bilateral    • FEMUR SURGERY Right     had fracture and has rajesh    • HEMORRHOIDECTOMY     • HIP HEMIARTHROPLASTY Right 1/3/2017    Procedure: HIP HEMIARTHROPLASTY with IM rajesh and 2 screw hardware removal antibiotic cement;  Surgeon: Mirza Whittington MD;  Location:  [facility];  Service:    • HIP SURGERY Right 1999    hx fracture   • LEG SURGERY Right 12/1/2017    Procedure: GLUTEAL TENDON REPAIR-minimus and medius;  Surgeon: Mirza Whittington MD;  Location:  [facility] OR;  Service:    • TOTAL KNEE ARTHROPLASTY Right 6/22/2016    Procedure: RT TOTAL KNEE ARTHROPLASTY WITH FINN NAVIGATION;  Surgeon: Adrian Swenson MD;  Location:  [facility];  Service:      Family History   Problem Relation Age of Onset   • Heart attack Father          Objective:  There were no vitals filed for this visit.  Last 3 weights    12/14/17  1016   Weight: 59 kg (130 lb)     Body mass index is 23.03 kg/(m^2).       Ortho Exam  she is alert and oriented ×3.  There is no motor deficit no sensory loss in the leg.    Assessment:       1. S/P hip hemiarthroplasty    2. Tear of right gluteus minimus tendon, initial encounter    3. Status post orthopedic surgery, follow-up exam          Plan:        Continue weightbearing as tolerated with a hip abduction brace in place.  At this point she can DC anticoagulants for DVT prophylaxis.  Return to see me in 3 weeks    Work Status:    JUDY query complete.    Orders:  No orders of the defined types were placed in this encounter.      Medications:  New Medications Ordered This Visit   Medications   • oxyCODONE-acetaminophen  (PERCOCET) 7.5-325 MG per tablet     Sig: Take 1 tablet by mouth Every 6 (Six) Hours As Needed for Moderate Pain  or Severe Pain .     Dispense:  50 tablet     Refill:  0       Followup:  Return in about 3 weeks (around 1/4/2018).          Dragon transcription disclaimer     Much of this encounter note is an electronic transcription/translation of spoken language to printed text. The electronic translation of spoken language may permit erroneous, or at times, nonsensical words or phrases to be inadvertently transcribed. Although I have reviewed the note for such errors, some may still exist.

## 2018-01-04 ENCOUNTER — OFFICE VISIT (OUTPATIENT)
Dept: ORTHOPEDIC SURGERY | Facility: CLINIC | Age: 83
End: 2018-01-04

## 2018-01-04 DIAGNOSIS — S76.011A TEAR OF RIGHT GLUTEUS MINIMUS TENDON, INITIAL ENCOUNTER: ICD-10-CM

## 2018-01-04 DIAGNOSIS — Z96.649 S/P HIP HEMIARTHROPLASTY: Primary | ICD-10-CM

## 2018-01-04 DIAGNOSIS — M17.12 PRIMARY OSTEOARTHRITIS OF LEFT KNEE: ICD-10-CM

## 2018-01-04 PROCEDURE — 99214 OFFICE O/P EST MOD 30 MIN: CPT | Performed by: ORTHOPAEDIC SURGERY

## 2018-01-04 PROCEDURE — 20610 DRAIN/INJ JOINT/BURSA W/O US: CPT | Performed by: ORTHOPAEDIC SURGERY

## 2018-01-04 RX ORDER — HYDROCODONE BITARTRATE AND ACETAMINOPHEN 7.5; 325 MG/1; MG/1
TABLET ORAL
COMMUNITY
Start: 2017-12-19 | End: 2018-01-04

## 2018-01-04 RX ORDER — HYDROCODONE BITARTRATE AND ACETAMINOPHEN 5; 325 MG/1; MG/1
1 TABLET ORAL EVERY 6 HOURS PRN
Qty: 50 TABLET | Refills: 0 | Status: SHIPPED | OUTPATIENT
Start: 2018-01-04 | End: 2018-02-01 | Stop reason: SDUPTHER

## 2018-01-04 RX ORDER — TRIAMCINOLONE ACETONIDE 40 MG/ML
40 INJECTION, SUSPENSION INTRA-ARTICULAR; INTRAMUSCULAR
Status: COMPLETED | OUTPATIENT
Start: 2018-01-04 | End: 2018-01-04

## 2018-01-04 RX ORDER — LIDOCAINE HYDROCHLORIDE 10 MG/ML
8 INJECTION, SOLUTION EPIDURAL; INFILTRATION; INTRACAUDAL; PERINEURAL
Status: COMPLETED | OUTPATIENT
Start: 2018-01-04 | End: 2018-01-04

## 2018-01-04 RX ADMIN — TRIAMCINOLONE ACETONIDE 40 MG: 40 INJECTION, SUSPENSION INTRA-ARTICULAR; INTRAMUSCULAR at 11:30

## 2018-01-04 RX ADMIN — LIDOCAINE HYDROCHLORIDE 8 ML: 10 INJECTION, SOLUTION EPIDURAL; INFILTRATION; INTRACAUDAL; PERINEURAL at 11:30

## 2018-01-04 NOTE — PROGRESS NOTES
Subjective: Status post right hip abductor repair, DJD left knee     Patient ID: Ashli León is a 82 y.o. female.    Chief Complaint:    History of Present Illness 82-year-old female is 4 weeks status post repair of the right hip abductors and is doing well that standpoint still some pain although she states it has decreased to some extent.  However since his latest surgery she's having increasing discomfort in the left knee which is a new complaint today.  She.  has discomfort at rest and with activity particularly getting in and out of a chair and navigating steps.  Has some discomfort even at rest.  No history of trauma to the left knee.       Social History     Occupational History   • Not on file.     Social History Main Topics   • Smoking status: Never Smoker   • Smokeless tobacco: Never Used   • Alcohol use 1.8 oz/week     3 Shots of liquor per week      Comment: social use 1 weekly   • Drug use: No   • Sexual activity: Defer      Review of Systems   Constitutional: Negative for chills, diaphoresis, fever and unexpected weight change.   HENT: Negative for hearing loss, nosebleeds, sore throat and tinnitus.    Eyes: Negative for pain and visual disturbance.   Respiratory: Negative for cough, shortness of breath and wheezing.    Cardiovascular: Negative for chest pain and palpitations.   Gastrointestinal: Negative for abdominal pain, diarrhea, nausea and vomiting.   Endocrine: Negative for cold intolerance, heat intolerance and polydipsia.   Genitourinary: Negative for difficulty urinating, dysuria and hematuria.   Musculoskeletal: Positive for arthralgias. Negative for joint swelling and myalgias.   Skin: Negative for rash and wound.   Allergic/Immunologic: Negative for environmental allergies.   Neurological: Negative for dizziness, syncope and numbness.   Hematological: Does not bruise/bleed easily.   Psychiatric/Behavioral: Negative for dysphoric mood and sleep disturbance. The patient is not  nervous/anxious.          Past Medical History:   Diagnosis Date   • Acid reflux    • Back pain    • Colitis     history of   • Diarrhea    • Disease of thyroid gland    • Diverticular disease    • Glaucoma    • Gout     - right big toe   • High blood pressure    • High cholesterol    • History of vertebral compression fracture     multiple   • Hyponatremia    • Knee pain    • Macular degeneration    • Mitral regurgitation 06/2016    Mild to moderate per 2-D echocardiogram   • Primary osteoarthritis of both knees 4/1/2016   • Renal lesion     on mri   • Rheumatoid arthritis      Past Surgical History:   Procedure Laterality Date   • APPENDECTOMY  1951   • CATARACT EXTRACTION Bilateral    • FEMUR SURGERY Right     had fracture and has rajesh    • HEMORRHOIDECTOMY     • HIP HEMIARTHROPLASTY Right 1/3/2017    Procedure: HIP HEMIARTHROPLASTY with IM rajesh and 2 screw hardware removal antibiotic cement;  Surgeon: Mirza Whittington MD;  Location:  [facility];  Service:    • HIP SURGERY Right 1999    hx fracture   • LEG SURGERY Right 12/1/2017    Procedure: GLUTEAL TENDON REPAIR-minimus and medius;  Surgeon: Mirza Whittington MD;  Location:  [facility];  Service:    • TOTAL KNEE ARTHROPLASTY Right 6/22/2016    Procedure: RT TOTAL KNEE ARTHROPLASTY WITH FINN NAVIGATION;  Surgeon: Adrian Swenson MD;  Location: [facility];  Service:      Family History   Problem Relation Age of Onset   • Heart attack Father          Objective:  There were no vitals filed for this visit.  There were no vitals filed for this visit.  There is no height or weight on file to calculate BMI.       Ortho Exam  she is alert and oriented ×3.  The right hip wound is benign.  The right leg is no motor deficit no sensory loss.  Her calf is nontender negative Homans.  His no pain with abduction of the hip against gravity is no real tenderness over the surgical site but were not doing vigorous testing.  Wound is completely benign skin is cool to touch.  The left  knee shows no swelling effusion erythema.  She has 0-125° of motion with moderate crepitus and pain throughout the arc of motion.  No instability at 0 90°.  No patella subluxation.  Quad function is 5 over 5.  Calf is nontender negative Homans.  His no sensory loss.  Unable to take anti-inflammatories due to diverticular disease.    Assessment:       1. S/P hip hemiarthroplasty    2. Tear of right gluteus minimus tendon, initial encounter    3. Primary osteoarthritis of left knee          Plan: Over 15 minutes of the visit was used to reviewed the patient's physical findings regarding the right hip and the left knee.  The hip is progressing well she needs to continue wearing a hip abduction brace when ambulating.  With regard to the left knee I believe her pain is coming from osteoarthritis and aggravated by the fact that she is putting more weight more stress on that leg when ambulating.  After discussion treatment options of topical gel physical therapy cortisone injection or proceed with a cortisone injection.  Those accomplished through the superior lateral portal about sterile prepping with 8 cc lidocaine 1 cc Kenalog.  Postinjection instructions given to the patient.  She is to continue wearing the hip abduction brace return to see me in 4 weeks for follow-up on the right hip and the left knee.  Large Joint Arthrocentesis  Date/Time: 1/4/2018 11:30 AM  Consent given by: patient  Site marked: site marked  Timeout: Immediately prior to procedure a time out was called to verify the correct patient, procedure, equipment, support staff and site/side marked as required   Supporting Documentation  Indications: pain   Procedure Details  Location: knee - L knee  Preparation: Patient was prepped and draped in the usual sterile fashion  Needle size: 22 G  Approach: superior  Medications administered: 8 mL lidocaine PF 1% 1 %; 40 mg triamcinolone acetonide 40 MG/ML  Patient tolerance: patient tolerated the procedure well  with no immediate complications                  Work Status:    JUDY query complete.    Orders:  Orders Placed This Encounter   Procedures   • Large Joint Arthrocentesis       Medications:  New Medications Ordered This Visit   Medications   • HYDROcodone-acetaminophen (NORCO) 5-325 MG per tablet     Sig: Take 1 tablet by mouth Every 6 (Six) Hours As Needed for Moderate Pain  or Severe Pain .     Dispense:  50 tablet     Refill:  0       Followup:  Return in about 4 weeks (around 2/1/2018).          Dragon transcription disclaimer     Much of this encounter note is an electronic transcription/translation of spoken language to printed text. The electronic translation of spoken language may permit erroneous, or at times, nonsensical words or phrases to be inadvertently transcribed. Although I have reviewed the note for such errors, some may still exist.

## 2018-02-01 ENCOUNTER — OFFICE VISIT (OUTPATIENT)
Dept: ORTHOPEDIC SURGERY | Facility: CLINIC | Age: 83
End: 2018-02-01

## 2018-02-01 DIAGNOSIS — S76.011A TEAR OF RIGHT GLUTEUS MINIMUS TENDON, INITIAL ENCOUNTER: ICD-10-CM

## 2018-02-01 DIAGNOSIS — Z09 STATUS POST ORTHOPEDIC SURGERY, FOLLOW-UP EXAM: ICD-10-CM

## 2018-02-01 DIAGNOSIS — Z96.649 S/P HIP HEMIARTHROPLASTY: Primary | ICD-10-CM

## 2018-02-01 PROCEDURE — 99024 POSTOP FOLLOW-UP VISIT: CPT | Performed by: ORTHOPAEDIC SURGERY

## 2018-02-01 RX ORDER — HYDROCODONE BITARTRATE AND ACETAMINOPHEN 5; 325 MG/1; MG/1
1 TABLET ORAL EVERY 6 HOURS PRN
Qty: 40 TABLET | Refills: 0 | Status: SHIPPED | OUTPATIENT
Start: 2018-02-01 | End: 2018-03-05 | Stop reason: SDUPTHER

## 2018-02-01 NOTE — PROGRESS NOTES
Subjective: Status post repair right gluteal tendons     Patient ID: Ashli León is a 82 y.o. female.    Chief Complaint:    History of Present Illness patient is 2 months status post repair of the right hip abductors.  Has been ambulating with a hip abduction brace and is doing well.  Still having pain but is noticed significant reduction from her previous surgery status       Social History     Occupational History   • Not on file.     Social History Main Topics   • Smoking status: Never Smoker   • Smokeless tobacco: Never Used   • Alcohol use 1.8 oz/week     3 Shots of liquor per week      Comment: social use 1 weekly   • Drug use: No   • Sexual activity: Defer      Review of Systems   Constitutional: Negative for chills, diaphoresis, fever and unexpected weight change.   HENT: Negative for hearing loss, nosebleeds, sore throat and tinnitus.    Eyes: Negative for pain and visual disturbance.   Respiratory: Negative for cough, shortness of breath and wheezing.    Cardiovascular: Negative for chest pain and palpitations.   Gastrointestinal: Negative for abdominal pain, diarrhea, nausea and vomiting.   Endocrine: Negative for cold intolerance, heat intolerance and polydipsia.   Genitourinary: Negative for difficulty urinating, dysuria and hematuria.   Musculoskeletal: Positive for arthralgias. Negative for joint swelling and myalgias.   Skin: Negative for rash and wound.   Allergic/Immunologic: Negative for environmental allergies.   Neurological: Negative for dizziness, syncope and numbness.   Hematological: Does not bruise/bleed easily.   Psychiatric/Behavioral: Negative for dysphoric mood and sleep disturbance. The patient is not nervous/anxious.    All other systems reviewed and are negative.          Objective:     Ortho Exam  alert and oriented ×3.  No motor deficit no sensory loss in that right leg Is nontender.  Wound is benign    Assessment:       1. S/P hip hemiarthroplasty    2. Tear of right gluteus  minimus tendon, initial encounter    3. Status post orthopedic surgery, follow-up exam          Plan:      return in a month with an AP of the pelvis at which time we'll remove the hip abduction brace and allow weightbearing without the brace in the meantime she is to continue using the brace when walking

## 2018-02-21 ENCOUNTER — TELEPHONE (OUTPATIENT)
Dept: ORTHOPEDIC SURGERY | Facility: CLINIC | Age: 83
End: 2018-02-21

## 2018-02-21 NOTE — TELEPHONE ENCOUNTER
[facility] WITH [facility] IS DISCHARGING PATIENT DUE TO MEDICARE POLICY. BUT PT CAN BE RESTARTED BY DR CID AT NEXT VISIT 03/05/18 IF NEEDED.

## 2018-03-05 ENCOUNTER — OFFICE VISIT (OUTPATIENT)
Dept: ORTHOPEDIC SURGERY | Facility: CLINIC | Age: 83
End: 2018-03-05

## 2018-03-05 DIAGNOSIS — Z96.649 S/P HIP HEMIARTHROPLASTY: ICD-10-CM

## 2018-03-05 DIAGNOSIS — S76.011A TEAR OF RIGHT GLUTEUS MINIMUS TENDON, INITIAL ENCOUNTER: ICD-10-CM

## 2018-03-05 DIAGNOSIS — R52 PAIN: Primary | ICD-10-CM

## 2018-03-05 PROCEDURE — 73502 X-RAY EXAM HIP UNI 2-3 VIEWS: CPT | Performed by: ORTHOPAEDIC SURGERY

## 2018-03-05 PROCEDURE — 99212 OFFICE O/P EST SF 10 MIN: CPT | Performed by: ORTHOPAEDIC SURGERY

## 2018-03-05 RX ORDER — HYDROCODONE BITARTRATE AND ACETAMINOPHEN 5; 325 MG/1; MG/1
1 TABLET ORAL EVERY 6 HOURS PRN
Qty: 36 TABLET | Refills: 0 | Status: SHIPPED | OUTPATIENT
Start: 2018-03-05 | End: 2018-08-08 | Stop reason: SDUPTHER

## 2018-03-05 NOTE — PROGRESS NOTES
Subjective: Status post right hip abductor repair     Patient ID: Ashli León is a 82 y.o. female.    Chief Complaint:    History of Present Illness patient is over 3 months out is doing well.  Although still having some hip pain is improved from the preoperative status       Social History     Occupational History   • Not on file.     Social History Main Topics   • Smoking status: Never Smoker   • Smokeless tobacco: Never Used   • Alcohol use 1.8 oz/week     3 Shots of liquor per week      Comment: social use 1 weekly   • Drug use: No   • Sexual activity: Defer      Review of Systems   Constitutional: Negative for chills, diaphoresis, fever and unexpected weight change.   HENT: Negative for hearing loss, nosebleeds, sore throat and tinnitus.    Eyes: Negative for pain and visual disturbance.   Respiratory: Negative for cough, shortness of breath and wheezing.    Cardiovascular: Negative for chest pain and palpitations.   Gastrointestinal: Negative for abdominal pain, diarrhea, nausea and vomiting.   Endocrine: Negative for cold intolerance, heat intolerance and polydipsia.   Genitourinary: Negative for difficulty urinating, dysuria and hematuria.   Musculoskeletal: Positive for arthralgias. Negative for joint swelling and myalgias.   Skin: Negative for rash and wound.   Allergic/Immunologic: Negative for environmental allergies.   Neurological: Negative for dizziness, syncope and numbness.   Hematological: Does not bruise/bleed easily.   Psychiatric/Behavioral: Negative for dysphoric mood and sleep disturbance. The patient is not nervous/anxious.    All other systems reviewed and are negative.        Past Medical History:   Diagnosis Date   • Acid reflux    • Back pain    • Colitis     history of   • Diarrhea    • Disease of thyroid gland    • Diverticular disease    • Glaucoma    • Gout     - right big toe   • High blood pressure    • High cholesterol    • History of vertebral compression fracture      multiple   • Hyponatremia    • Knee pain    • Macular degeneration    • Mitral regurgitation 06/2016    Mild to moderate per 2-D echocardiogram   • Primary osteoarthritis of both knees 4/1/2016   • Renal lesion     on mri   • Rheumatoid arthritis      Past Surgical History:   Procedure Laterality Date   • APPENDECTOMY  1951   • CATARACT EXTRACTION Bilateral    • FEMUR SURGERY Right     had fracture and has rajesh    • HEMORRHOIDECTOMY     • HIP HEMIARTHROPLASTY Right 1/3/2017    Procedure: HIP HEMIARTHROPLASTY with IM rajesh and 2 screw hardware removal antibiotic cement;  Surgeon: Mirza Whittington MD;  Location:  [facility];  Service:    • HIP SURGERY Right 1999    hx fracture   • LEG SURGERY Right 12/1/2017    Procedure: GLUTEAL TENDON REPAIR-minimus and medius;  Surgeon: Mirza Whittington MD;  Location:  [facility];  Service:    • TOTAL KNEE ARTHROPLASTY Right 6/22/2016    Procedure: RT TOTAL KNEE ARTHROPLASTY WITH FINN NAVIGATION;  Surgeon: Adrian Swenson MD;  Location:  [facility];  Service:      Family History   Problem Relation Age of Onset   • Heart attack Father          Objective:  There were no vitals filed for this visit.  There were no vitals filed for this visit.  There is no height or weight on file to calculate BMI.       Ortho Exam  AP of the pelvis shows good position of the implant.  No change compared to prior x-rays.  She is alert and oriented ×3.  No motor deficit no sensory loss.  Her back is giving her more problems than the hip.  There is some tenderness over the greater trochanter but again she has active abduction.  Hip abductors are +2 out of 5 secondary to weakness.    Assessment:       1. Pain    2. S/P hip hemiarthroplasty    3. Tear of right gluteus minimus tendon, initial encounter          Plan:      she can DC the hip abduction brace.  Weightbearing as tolerated.  Return to see me in a month for follow-up and is doing well may begin a strengthening program at that time.      Work  Status:    JUDY query complete.    Orders:  Orders Placed This Encounter   Procedures   • XR Hip With or Without Pelvis 2 - 3 View Right       Medications:  New Medications Ordered This Visit   Medications   • HYDROcodone-acetaminophen (NORCO) 5-325 MG per tablet     Sig: Take 1 tablet by mouth Every 6 (Six) Hours As Needed for Moderate Pain  or Severe Pain .     Dispense:  36 tablet     Refill:  0       Followup:  Return in about 4 weeks (around 4/2/2018).          Dragon transcription disclaimer     Much of this encounter note is an electronic transcription/translation of spoken language to printed text. The electronic translation of spoken language may permit erroneous, or at times, nonsensical words or phrases to be inadvertently transcribed. Although I have reviewed the note for such errors, some may still exist.

## 2018-04-12 RX ORDER — TRAMADOL HYDROCHLORIDE 50 MG/1
TABLET ORAL
Qty: 60 TABLET | Refills: 0 | OUTPATIENT
Start: 2018-04-12

## 2018-08-08 ENCOUNTER — OFFICE VISIT (OUTPATIENT)
Dept: ORTHOPEDIC SURGERY | Facility: CLINIC | Age: 83
End: 2018-08-08

## 2018-08-08 DIAGNOSIS — M17.12 PRIMARY OSTEOARTHRITIS OF LEFT KNEE: ICD-10-CM

## 2018-08-08 DIAGNOSIS — Z96.649 S/P HIP HEMIARTHROPLASTY: ICD-10-CM

## 2018-08-08 DIAGNOSIS — R52 PAIN: Primary | ICD-10-CM

## 2018-08-08 DIAGNOSIS — S76.011A TEAR OF RIGHT GLUTEUS MINIMUS TENDON, INITIAL ENCOUNTER: ICD-10-CM

## 2018-08-08 PROCEDURE — 99213 OFFICE O/P EST LOW 20 MIN: CPT | Performed by: ORTHOPAEDIC SURGERY

## 2018-08-08 PROCEDURE — 20610 DRAIN/INJ JOINT/BURSA W/O US: CPT | Performed by: ORTHOPAEDIC SURGERY

## 2018-08-08 RX ORDER — LIDOCAINE HYDROCHLORIDE 10 MG/ML
4 INJECTION, SOLUTION EPIDURAL; INFILTRATION; INTRACAUDAL; PERINEURAL
Status: COMPLETED | OUTPATIENT
Start: 2018-08-08 | End: 2018-08-08

## 2018-08-08 RX ORDER — TRIAMCINOLONE ACETONIDE 40 MG/ML
40 INJECTION, SUSPENSION INTRA-ARTICULAR; INTRAMUSCULAR
Status: COMPLETED | OUTPATIENT
Start: 2018-08-08 | End: 2018-08-08

## 2018-08-08 RX ORDER — HYDROCODONE BITARTRATE AND ACETAMINOPHEN 5; 325 MG/1; MG/1
1 TABLET ORAL EVERY 6 HOURS PRN
Qty: 36 TABLET | Refills: 0 | Status: SHIPPED | OUTPATIENT
Start: 2018-08-08 | End: 2018-09-14 | Stop reason: SDUPTHER

## 2018-08-08 RX ADMIN — TRIAMCINOLONE ACETONIDE 40 MG: 40 INJECTION, SUSPENSION INTRA-ARTICULAR; INTRAMUSCULAR at 15:42

## 2018-08-08 RX ADMIN — TRIAMCINOLONE ACETONIDE 40 MG: 40 INJECTION, SUSPENSION INTRA-ARTICULAR; INTRAMUSCULAR at 15:43

## 2018-08-08 RX ADMIN — LIDOCAINE HYDROCHLORIDE 4 ML: 10 INJECTION, SOLUTION EPIDURAL; INFILTRATION; INTRACAUDAL; PERINEURAL at 15:42

## 2018-08-08 RX ADMIN — LIDOCAINE HYDROCHLORIDE 4 ML: 10 INJECTION, SOLUTION EPIDURAL; INFILTRATION; INTRACAUDAL; PERINEURAL at 15:43

## 2018-08-08 NOTE — PROGRESS NOTES
Subjective: Right hip pain, left knee pain     Patient ID: Ashli León is a 83 y.o. female.    Chief Complaint:    History of Present Illness 83-year-old female returns having pain and discomfort in the right hip was again and persistent pain in the knee.  Again all over 18 months ago underwent repair of ruptured gluteal tendon of that left hip initially did well but now started having pain discomfort once again.  Also is had reoccurring pain and discomfort in the arthritic knee with any all activities.       Social History     Occupational History   • Not on file.     Social History Main Topics   • Smoking status: Never Smoker   • Smokeless tobacco: Never Used   • Alcohol use 1.8 oz/week     3 Shots of liquor per week      Comment: social use 1 weekly   • Drug use: No   • Sexual activity: Defer      Review of Systems   Constitutional: Negative for chills, diaphoresis, fever and unexpected weight change.   HENT: Negative for hearing loss, nosebleeds, sore throat and tinnitus.    Eyes: Negative for pain and visual disturbance.   Respiratory: Negative for cough, shortness of breath and wheezing.    Cardiovascular: Negative for chest pain and palpitations.   Gastrointestinal: Negative for abdominal pain, diarrhea, nausea and vomiting.   Endocrine: Negative for cold intolerance, heat intolerance and polydipsia.   Genitourinary: Negative for difficulty urinating, dysuria and hematuria.   Musculoskeletal: Positive for arthralgias. Negative for joint swelling and myalgias.   Skin: Negative for rash and wound.   Allergic/Immunologic: Negative for environmental allergies.   Neurological: Negative for dizziness, syncope and numbness.   Hematological: Does not bruise/bleed easily.   Psychiatric/Behavioral: Negative for dysphoric mood and sleep disturbance. The patient is not nervous/anxious.          Past Medical History:   Diagnosis Date   • Acid reflux    • Back pain    • Colitis     history of   • Diarrhea    • Disease  of thyroid gland    • Diverticular disease    • Glaucoma    • Gout     - right big toe   • High blood pressure    • High cholesterol    • History of vertebral compression fracture     multiple   • Hyponatremia    • Knee pain    • Macular degeneration    • Mitral regurgitation 06/2016    Mild to moderate per 2-D echocardiogram   • Primary osteoarthritis of both knees 4/1/2016   • Renal lesion     on mri   • Rheumatoid arthritis (CMS/HCC)      Past Surgical History:   Procedure Laterality Date   • APPENDECTOMY  1951   • CATARACT EXTRACTION Bilateral    • FEMUR SURGERY Right     had fracture and has rajesh    • HEMORRHOIDECTOMY     • HIP HEMIARTHROPLASTY Right 1/3/2017    Procedure: HIP HEMIARTHROPLASTY with IM rajesh and 2 screw hardware removal antibiotic cement;  Surgeon: Mirza Whittington MD;  Location:  [facility];  Service:    • HIP SURGERY Right 1999    hx fracture   • LEG SURGERY Right 12/1/2017    Procedure: GLUTEAL TENDON REPAIR-minimus and medius;  Surgeon: Mirza Whittington MD;  Location:  [facility];  Service:    • TOTAL KNEE ARTHROPLASTY Right 6/22/2016    Procedure: RT TOTAL KNEE ARTHROPLASTY WITH FINN NAVIGATION;  Surgeon: Adrian Swenson MD;  Location: [facility];  Service:      Family History   Problem Relation Age of Onset   • Heart attack Father          Objective:  There were no vitals filed for this visit.  There were no vitals filed for this visit.  There is no height or weight on file to calculate BMI.       Ortho Exam  is alert and oriented ×3.  With regard to the right hip is no motor or sensory deficit.  Abduction against resistance is painful and there is tenderness over the right greater trochanter.  His no palpable defect.  Negative Stinchfield test.  Her calf remains nontender.  His good capillary refill.  No pain with passive range of motion.  With regard to the left knee moderate pain and crepitus with range of motion which is 0 125°.  Quad function 5/5 and her calf Is nontender.  No instability  at 0 90° no varus or valgus instability.  Mild joint line tenderness but negative Meredith's.  Good capillary refill no sensory loss.    Assessment:       1. Pain    2. S/P hip hemiarthroplasty    3. Tear of right gluteus minimus tendon, initial encounter    4. Primary osteoarthritis of left knee          Plan: Over 10 minutes was spent with the patient.  Physical findings.  With that she's reruptured the gluteal tendinitis and not at this point is unable to determine to help control her pain I think it is safe to go ahead and proceed with a cortisone injection.  If she's reruptured it would not do more surgery.  Also for the knee pain going inject cortisone varus of both the right greater trochanter and the left knee was injected 4 cc lidocaine 1 cc Kenalog after sterile prepping.  Postinjection ins further pain medicationtructions given to the patient.  Return to see me in 6 weeks.  Refilled her pain medication: Referral to pain management for      Large Joint Arthrocentesis  Date/Time: 8/8/2018 3:42 PM  Consent given by: patient  Site marked: site marked  Supporting Documentation  Indications: pain   Procedure Details  Location: knee - L knee  Needle size: 22 G  Approach: superior  Medications administered: 40 mg triamcinolone acetonide 40 MG/ML; 4 mL lidocaine PF 1% 1 %  Patient tolerance: patient tolerated the procedure well with no immediate complications    Large Joint Arthrocentesis  Date/Time: 8/8/2018 3:43 PM  Supporting Documentation  Indications: pain   Procedure Details  Location: hip - R hip joint  Medications administered: 4 mL lidocaine PF 1% 1 %; 40 mg triamcinolone acetonide 40 MG/ML              Work Status:    JUDY query complete.    Orders:  Orders Placed This Encounter   Procedures   • Large Joint Arthrocentesis   • Large Joint Arthrocentesis       Medications:  No orders of the defined types were placed in this encounter.      Followup:  Return in about 6 weeks (around 9/19/2018).           Dragon transcription disclaimer     Much of this encounter note is an electronic transcription/translation of spoken language to printed text. The electronic translation of spoken language may permit erroneous, or at times, nonsensical words or phrases to be inadvertently transcribed. Although I have reviewed the note for such errors, some may still exist.

## 2018-09-06 ENCOUNTER — APPOINTMENT (OUTPATIENT)
Dept: CT IMAGING | Facility: HOSPITAL | Age: 83
End: 2018-09-06

## 2018-09-06 ENCOUNTER — HOSPITAL ENCOUNTER (EMERGENCY)
Facility: HOSPITAL | Age: 83
Discharge: HOME | End: 2018-09-06
Attending: EMERGENCY MEDICINE | Admitting: EMERGENCY MEDICINE

## 2018-09-06 ENCOUNTER — APPOINTMENT (OUTPATIENT)
Dept: GENERAL RADIOLOGY | Facility: HOSPITAL | Age: 83
End: 2018-09-06

## 2018-09-06 VITALS
OXYGEN SATURATION: 96 % | BODY MASS INDEX: 23.04 KG/M2 | WEIGHT: 130 LBS | HEIGHT: 63 IN | HEART RATE: 81 BPM | RESPIRATION RATE: 16 BRPM | SYSTOLIC BLOOD PRESSURE: 144 MMHG | TEMPERATURE: 98.2 F | DIASTOLIC BLOOD PRESSURE: 85 MMHG

## 2018-09-06 DIAGNOSIS — S73.101A HIP SPRAIN, RIGHT, INITIAL ENCOUNTER: Primary | ICD-10-CM

## 2018-09-06 LAB
ANION GAP SERPL CALCULATED.3IONS-SCNC: 15.3 MMOL/L
BASOPHILS # BLD AUTO: 0.03 10*3/MM3 (ref 0–0.2)
BASOPHILS NFR BLD AUTO: 0.5 % (ref 0–2)
BUN BLD-MCNC: 15 MG/DL (ref 8–23)
BUN/CREAT SERPL: 18.1 (ref 7–25)
CALCIUM SPEC-SCNC: 10.2 MG/DL (ref 8.8–10.5)
CHLORIDE SERPL-SCNC: 99 MMOL/L (ref 98–107)
CO2 SERPL-SCNC: 25.7 MMOL/L (ref 22–29)
CREAT BLD-MCNC: 0.83 MG/DL (ref 0.57–1)
DEPRECATED RDW RBC AUTO: 44.9 FL (ref 37–54)
EOSINOPHIL # BLD AUTO: 0.06 10*3/MM3 (ref 0.1–0.3)
EOSINOPHIL NFR BLD AUTO: 0.9 % (ref 0–4)
ERYTHROCYTE [DISTWIDTH] IN BLOOD BY AUTOMATED COUNT: 12.5 % (ref 11.5–14.5)
GFR SERPL CREATININE-BSD FRML MDRD: 66 ML/MIN/1.73
GLUCOSE BLD-MCNC: 115 MG/DL (ref 65–99)
HCT VFR BLD AUTO: 38.9 % (ref 37–47)
HGB BLD-MCNC: 12.8 G/DL (ref 12–16)
IMM GRANULOCYTES # BLD: 0.04 10*3/MM3 (ref 0–0.03)
IMM GRANULOCYTES NFR BLD: 0.6 % (ref 0–0.5)
LYMPHOCYTES # BLD AUTO: 2.3 10*3/MM3 (ref 0.6–4.8)
LYMPHOCYTES NFR BLD AUTO: 34.6 % (ref 20–45)
MCH RBC QN AUTO: 32.1 PG (ref 27–31)
MCHC RBC AUTO-ENTMCNC: 32.9 G/DL (ref 31–37)
MCV RBC AUTO: 97.5 FL (ref 81–99)
MONOCYTES # BLD AUTO: 0.4 10*3/MM3 (ref 0–1)
MONOCYTES NFR BLD AUTO: 6 % (ref 3–8)
NEUTROPHILS # BLD AUTO: 3.81 10*3/MM3 (ref 1.5–8.3)
NEUTROPHILS NFR BLD AUTO: 57.4 % (ref 45–70)
NRBC BLD MANUAL-RTO: 0 /100 WBC (ref 0–0)
PLAT MORPH BLD: NORMAL
PLATELET # BLD AUTO: 135 10*3/MM3 (ref 140–500)
PMV BLD AUTO: 10.3 FL (ref 7.4–10.4)
POTASSIUM BLD-SCNC: 3.9 MMOL/L (ref 3.5–5.2)
RBC # BLD AUTO: 3.99 10*6/MM3 (ref 4.2–5.4)
RBC MORPH BLD: NORMAL
SODIUM BLD-SCNC: 140 MMOL/L (ref 136–145)
WBC MORPH BLD: NORMAL
WBC NRBC COR # BLD: 6.64 10*3/MM3 (ref 4.8–10.8)

## 2018-09-06 PROCEDURE — 73502 X-RAY EXAM HIP UNI 2-3 VIEWS: CPT

## 2018-09-06 PROCEDURE — 99284 EMERGENCY DEPT VISIT MOD MDM: CPT | Performed by: EMERGENCY MEDICINE

## 2018-09-06 PROCEDURE — 73700 CT LOWER EXTREMITY W/O DYE: CPT

## 2018-09-06 PROCEDURE — 80048 BASIC METABOLIC PNL TOTAL CA: CPT | Performed by: EMERGENCY MEDICINE

## 2018-09-06 PROCEDURE — 99284 EMERGENCY DEPT VISIT MOD MDM: CPT

## 2018-09-06 PROCEDURE — 85025 COMPLETE CBC W/AUTO DIFF WBC: CPT | Performed by: EMERGENCY MEDICINE

## 2018-09-06 PROCEDURE — 85007 BL SMEAR W/DIFF WBC COUNT: CPT | Performed by: EMERGENCY MEDICINE

## 2018-09-06 RX ORDER — SODIUM CHLORIDE 0.9 % (FLUSH) 0.9 %
10 SYRINGE (ML) INJECTION AS NEEDED
Status: DISCONTINUED | OUTPATIENT
Start: 2018-09-06 | End: 2018-09-06 | Stop reason: HOSPADM

## 2018-09-06 RX ORDER — HYDROCODONE BITARTRATE AND ACETAMINOPHEN 5; 325 MG/1; MG/1
1 TABLET ORAL ONCE
Status: COMPLETED | OUTPATIENT
Start: 2018-09-06 | End: 2018-09-06

## 2018-09-06 RX ORDER — HYDROCODONE BITARTRATE AND ACETAMINOPHEN 5; 325 MG/1; MG/1
1 TABLET ORAL EVERY 8 HOURS PRN
Qty: 10 TABLET | Refills: 0 | Status: SHIPPED | OUTPATIENT
Start: 2018-09-06 | End: 2018-09-09

## 2018-09-06 RX ADMIN — HYDROCODONE BITARTRATE AND ACETAMINOPHEN 1 TABLET: 5; 325 TABLET ORAL at 10:23

## 2018-09-06 NOTE — DISCHARGE INSTRUCTIONS
Rest as needed.  May Ambulate and bear weight with the assistance of your walker as you are already doing.  May apply ice to the affected area.  Please return to the emergency room for any worsening pain, weakness, numbness, swelling, fevers, redness or any other concerns.

## 2018-09-06 NOTE — ED PROVIDER NOTES
Subjective   History of Present Illness  History of Present Illness    Chief complaint: Hip pain    Location: Right hip    Quality/Severity:  Moderate sharp pain    Timing/Duration: Began Sunday night, continued through this week    Modifying Factors: Worse when bearing weight or ambulating    Narrative: This patient presents via EMS for evaluation of right hip pain symptoms.  She has a remote history of right hip replacement surgery done by Dr. Gomez.  Normally when she is in good health, she can ambulate on both legs without any assistance of any kind.  On Sunday evening, she was in bed and she turned to her side.  As she was turning, she had a sudden pain in the right hip area that was sharp and spread down into the thigh area.  This caused some discomfort for quite some time now.  She says that the hip hurts to move in any direction.  She has only been able to ambulate with the use of a walker at home.  She lives alone.  She has taken some hydrocodone tablets occasionally this week for the pain and that does relieve it temporarily but does not seem to be allowing her to return to normal activities.  She denies any other areas of injury or concern.  She denies any fevers or recent illnesses.    Associated Symptoms: As above    Review of Systems   Constitutional: Negative for activity change and fever.   Respiratory: Negative for shortness of breath.    Cardiovascular: Negative for chest pain.   Gastrointestinal: Negative for abdominal pain and vomiting.   Musculoskeletal: Positive for arthralgias, gait problem and myalgias.   Skin: Negative for color change, rash and wound.   Neurological: Negative for syncope and headaches.   All other systems reviewed and are negative.      Past Medical History:   Diagnosis Date   • Acid reflux    • Back pain    • Colitis     history of   • Diarrhea    • Disease of thyroid gland    • Diverticular disease    • Glaucoma    • Gout     - right big toe   • High blood pressure    •  High cholesterol    • History of vertebral compression fracture     multiple   • Hyponatremia    • Knee pain    • Macular degeneration    • Mitral regurgitation 06/2016    Mild to moderate per 2-D echocardiogram   • Primary osteoarthritis of both knees 4/1/2016   • Renal lesion     on mri   • Rheumatoid arthritis (CMS/HCC)        Allergies   Allergen Reactions   • Neomycin Swelling   • Statins Myalgia     Not all statins       Past Surgical History:   Procedure Laterality Date   • APPENDECTOMY  1951   • CATARACT EXTRACTION Bilateral    • FEMUR SURGERY Right     had fracture and has rajesh    • HEMORRHOIDECTOMY     • HIP HEMIARTHROPLASTY Right 1/3/2017    Procedure: HIP HEMIARTHROPLASTY with IM rajesh and 2 screw hardware removal antibiotic cement;  Surgeon: Mirza Whittington MD;  Location:  [facility];  Service:    • HIP SURGERY Right 1999    hx fracture   • LEG SURGERY Right 12/1/2017    Procedure: GLUTEAL TENDON REPAIR-minimus and medius;  Surgeon: Mirza Whittington MD;  Location:  [facility];  Service:    • TOTAL KNEE ARTHROPLASTY Right 6/22/2016    Procedure: RT TOTAL KNEE ARTHROPLASTY WITH FINN NAVIGATION;  Surgeon: Adrian Swenson MD;  Location: [facility] OR;  Service:        Family History   Problem Relation Age of Onset   • Heart attack Father        Social History     Social History   • Marital status:      Social History Main Topics   • Smoking status: Never Smoker   • Smokeless tobacco: Never Used   • Alcohol use 1.8 oz/week     3 Shots of liquor per week      Comment: social use 1 weekly   • Drug use: No   • Sexual activity: Defer     Other Topics Concern   • Not on file     ED Triage Vitals [09/06/18 0725]   Temp Heart Rate Resp BP SpO2   98.2 °F (36.8 °C) 81 16 144/85 96 %      Temp src Heart Rate Source Patient Position BP Location FiO2 (%)   Oral Monitor Lying Left arm --           Objective   Physical Exam   Constitutional: She is oriented to person, place, and time. She appears well-developed and  well-nourished. No distress.   HENT:   Head: Normocephalic and atraumatic.   Eyes: Pupils are equal, round, and reactive to light. EOM are normal. Right eye exhibits no discharge. Left eye exhibits no discharge.   Neck: Normal range of motion. Neck supple.   Cardiovascular: Normal rate and regular rhythm.    Pulmonary/Chest: Effort normal. No respiratory distress.   Musculoskeletal: She exhibits tenderness. She exhibits no edema or deformity.   The legs are equidistant.  There is no external rotation notable.  The right hip is moderately tender to palpation especially at the lateral and anterior aspects.  There is no evidence of erythema or induration or dermatologic lesions of any kind in the hip and thigh area.  The patient can tolerate some passive range of motion testing with flexion and extension of the hip however her active range of motion is decreased in this time because of pain with movement.  The remainder of the leg is warm and well-perfused.  Distal sensation to the lower leg and foot is normal.   Neurological: She is alert and oriented to person, place, and time.   Skin: Skin is warm and dry. No rash noted. She is not diaphoretic. No erythema.   Psychiatric: She has a normal mood and affect. Her behavior is normal. Judgment and thought content normal.   Nursing note and vitals reviewed.    Results for orders placed or performed during the hospital encounter of 09/06/18   Basic Metabolic Panel   Result Value Ref Range    Glucose 115 (H) 65 - 99 mg/dL    BUN 15 8 - 23 mg/dL    Creatinine 0.83 0.57 - 1.00 mg/dL    Sodium 140 136 - 145 mmol/L    Potassium 3.9 3.5 - 5.2 mmol/L    Chloride 99 98 - 107 mmol/L    CO2 25.7 22.0 - 29.0 mmol/L    Calcium 10.2 8.8 - 10.5 mg/dL    eGFR Non African Amer 66 >60 mL/min/1.73    BUN/Creatinine Ratio 18.1 7.0 - 25.0    Anion Gap 15.3 mmol/L   CBC Auto Differential   Result Value Ref Range    WBC 6.64 4.80 - 10.80 10*3/mm3    RBC 3.99 (L) 4.20 - 5.40 10*6/mm3     Hemoglobin 12.8 12.0 - 16.0 g/dL    Hematocrit 38.9 37.0 - 47.0 %    MCV 97.5 81.0 - 99.0 fL    MCH 32.1 (H) 27.0 - 31.0 pg    MCHC 32.9 31.0 - 37.0 g/dL    RDW 12.5 11.5 - 14.5 %    RDW-SD 44.9 37.0 - 54.0 fl    MPV 10.3 7.4 - 10.4 fL    Platelets 135 (L) 140 - 500 10*3/mm3    Neutrophil % 57.4 45.0 - 70.0 %    Lymphocyte % 34.6 20.0 - 45.0 %    Monocyte % 6.0 3.0 - 8.0 %    Eosinophil % 0.9 0.0 - 4.0 %    Basophil % 0.5 0.0 - 2.0 %    Immature Grans % 0.6 (H) 0.0 - 0.5 %    Neutrophils, Absolute 3.81 1.50 - 8.30 10*3/mm3    Lymphocytes, Absolute 2.30 0.60 - 4.80 10*3/mm3    Monocytes, Absolute 0.40 0.00 - 1.00 10*3/mm3    Eosinophils, Absolute 0.06 (L) 0.10 - 0.30 10*3/mm3    Basophils, Absolute 0.03 0.00 - 0.20 10*3/mm3    Immature Grans, Absolute 0.04 (H) 0.00 - 0.03 10*3/mm3    nRBC 0.0 0.0 - 0.0 /100 WBC   Scan Slide   Result Value Ref Range    RBC Morphology Normal Normal    WBC Morphology Normal Normal    Platelet Morphology Normal Normal       RADIOLOGY        Study: X-ray right hip    Findings: IMPRESSION:   1. No acute osseous abnormality.  2. Well-positioned proximal right femoral endoprosthesis.  3. Thin zone of lucency at the cement-bone interface along the proximal  portion of the femoral arthroplasty component. See above.  4. Previous midshaft femur fracture. Cortical and intramedullary lucency  is present in this location that is of unknown significance. Previously  reported history of myeloma.    This report was finalized on 9/6/2018 8:25 AM by Dr. Italo Hazel MD.     Interpreted contemporaneously with treatment by Dr. Neal, independently viewed by me    RADIOLOGY        Study: CT right lower extremity    Findings: IMPRESSION:   1. No evidence of acute fracture involving the pelvis or hips.  2. Right proximal endoprosthesis is well positioned.  3. Marked generalized demineralization.  4. Healing fracture deformity of the midshaft right femur. I suspect  this was acute at the time of the  patient's surgery 1/3/2017 based on  review of prior imaging.  5. Permeative cortical and intramedullary lucency of the mid shaft right  femur at the site of prior fracture. While this most likely represents  severe focal demineralization related to the prior fracture, follow-up  assessment for myeloma may be indicated based on the patient's prior  history.    This report was finalized on 9/6/2018 10:18 AM by Dr. Italo Hazel MD.     Interpreted contemporaneously with treatment by Dr. Hazel, independently viewed by me          Procedures           ED Course  ED Course as of Sep 06 1413   Thu Sep 06, 2018   0842 I reviewed the x-ray report.  I spoke with Dr. Gomez on the phone about this report and he recommends that we get a CT scan for further clarification.  We'll also add some basic labs now.  Patient comfortable and resting in the bed at this time.  [SIDNEY]   1035 Have reviewed the labs and the CT scan now.  The CT seems to be reassuring for no acute osseous injury at this time.  I think she probably had a soft tissue injury of some sort based on the findings here.  I think she can discharge home in stable condition with her walker for assistance with a rotatory and weightbearing needs.  I advised her to continue symptomatic management.  I advised her to call her orthopedist today to schedule an appointment for further evaluation.  She agreed to do so.  Her son was here with her and he agreed to take her home in good condition.  I invited them to return to the emergency room for further evaluation if anything seemed to be worsening  [SIDNEY]      ED Course User Index  [SIDNEY] Scotty Iqbal MD                  MDM  Number of Diagnoses or Management Options     Amount and/or Complexity of Data Reviewed  Clinical lab tests: ordered and reviewed  Tests in the radiology section of CPT®: ordered and reviewed  Discuss the patient with other providers: yes (Dr. Gomez)  Independent visualization of images, tracings,  or specimens: yes    Risk of Complications, Morbidity, and/or Mortality  Presenting problems: moderate  Diagnostic procedures: moderate  Management options: moderate          Final diagnoses:   Hip sprain, right, initial encounter            Scotty Iqbal MD  09/06/18 0242

## 2018-09-14 ENCOUNTER — RESULTS ENCOUNTER (OUTPATIENT)
Dept: PAIN MEDICINE | Facility: CLINIC | Age: 83
End: 2018-09-14

## 2018-09-14 ENCOUNTER — OFFICE VISIT (OUTPATIENT)
Dept: PAIN MEDICINE | Facility: CLINIC | Age: 83
End: 2018-09-14

## 2018-09-14 VITALS
WEIGHT: 128 LBS | DIASTOLIC BLOOD PRESSURE: 69 MMHG | OXYGEN SATURATION: 98 % | SYSTOLIC BLOOD PRESSURE: 171 MMHG | HEART RATE: 50 BPM | BODY MASS INDEX: 22.68 KG/M2 | HEIGHT: 63 IN | TEMPERATURE: 97.8 F | RESPIRATION RATE: 16 BRPM

## 2018-09-14 DIAGNOSIS — M54.41 CHRONIC RIGHT-SIDED LOW BACK PAIN WITH RIGHT-SIDED SCIATICA: ICD-10-CM

## 2018-09-14 DIAGNOSIS — M25.551 CHRONIC RIGHT HIP PAIN: ICD-10-CM

## 2018-09-14 DIAGNOSIS — Z98.890 HISTORY OF HIP SURGERY: ICD-10-CM

## 2018-09-14 DIAGNOSIS — M54.41 CHRONIC RIGHT-SIDED LOW BACK PAIN WITH RIGHT-SIDED SCIATICA: Primary | ICD-10-CM

## 2018-09-14 DIAGNOSIS — G89.29 CHRONIC RIGHT-SIDED LOW BACK PAIN WITH RIGHT-SIDED SCIATICA: ICD-10-CM

## 2018-09-14 DIAGNOSIS — G89.29 CHRONIC RIGHT-SIDED LOW BACK PAIN WITH RIGHT-SIDED SCIATICA: Primary | ICD-10-CM

## 2018-09-14 DIAGNOSIS — G89.29 CHRONIC RIGHT HIP PAIN: ICD-10-CM

## 2018-09-14 PROBLEM — M54.42 CHRONIC LEFT-SIDED LOW BACK PAIN WITH LEFT-SIDED SCIATICA: Status: ACTIVE | Noted: 2018-09-14

## 2018-09-14 LAB
POC AMPHETAMINES: NEGATIVE
POC BARBITURATES: NEGATIVE
POC BENZODIAZEPHINES: NEGATIVE
POC COCAINE: NEGATIVE
POC METHADONE: NEGATIVE
POC METHAMPHETAMINE SCREEN URINE: NEGATIVE
POC OPIATES: NEGATIVE
POC OXYCODONE: POSITIVE
POC PHENCYCLIDINE: NEGATIVE
POC PROPOXYPHENE: NEGATIVE
POC THC: NEGATIVE
POC TRICYCLIC ANTIDEPRESSANTS: NEGATIVE

## 2018-09-14 PROCEDURE — 80305 DRUG TEST PRSMV DIR OPT OBS: CPT | Performed by: PAIN MEDICINE

## 2018-09-14 PROCEDURE — 99204 OFFICE O/P NEW MOD 45 MIN: CPT | Performed by: PAIN MEDICINE

## 2018-09-14 RX ORDER — HYDROCODONE BITARTRATE AND ACETAMINOPHEN 5; 325 MG/1; MG/1
.5-1 TABLET ORAL EVERY 6 HOURS PRN
Qty: 120 TABLET | Refills: 0 | Status: SHIPPED | OUTPATIENT
Start: 2018-09-14 | End: 2018-10-12
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
## 2018-09-28 ENCOUNTER — HOSPITAL ENCOUNTER (OUTPATIENT)
Dept: MRI IMAGING | Facility: HOSPITAL | Age: 83
Discharge: HOME | End: 2018-09-28
Admitting: PAIN MEDICINE

## 2018-09-28 DIAGNOSIS — G89.29 CHRONIC RIGHT-SIDED LOW BACK PAIN WITH RIGHT-SIDED SCIATICA: ICD-10-CM

## 2018-09-28 DIAGNOSIS — M54.41 CHRONIC RIGHT-SIDED LOW BACK PAIN WITH RIGHT-SIDED SCIATICA: ICD-10-CM

## 2018-09-28 DIAGNOSIS — G89.29 CHRONIC RIGHT HIP PAIN: ICD-10-CM

## 2018-09-28 DIAGNOSIS — M25.551 CHRONIC RIGHT HIP PAIN: ICD-10-CM

## 2018-09-28 PROCEDURE — 72148 MRI LUMBAR SPINE W/O DYE: CPT

## 2018-10-03 DIAGNOSIS — M54.41 CHRONIC BILATERAL LOW BACK PAIN WITH RIGHT-SIDED SCIATICA: Primary | ICD-10-CM

## 2018-10-03 DIAGNOSIS — G89.29 CHRONIC BILATERAL LOW BACK PAIN WITH RIGHT-SIDED SCIATICA: Primary | ICD-10-CM

## 2018-10-12 ENCOUNTER — OFFICE VISIT (OUTPATIENT)
Dept: PAIN MEDICINE | Facility: CLINIC | Age: 83
End: 2018-10-12

## 2018-10-12 VITALS
DIASTOLIC BLOOD PRESSURE: 68 MMHG | OXYGEN SATURATION: 98 % | TEMPERATURE: 97.3 F | HEIGHT: 63 IN | WEIGHT: 129.41 LBS | HEART RATE: 54 BPM | BODY MASS INDEX: 22.93 KG/M2 | SYSTOLIC BLOOD PRESSURE: 157 MMHG | RESPIRATION RATE: 15 BRPM

## 2018-10-12 DIAGNOSIS — G89.29 CHRONIC RIGHT-SIDED LOW BACK PAIN WITH RIGHT-SIDED SCIATICA: ICD-10-CM

## 2018-10-12 DIAGNOSIS — M54.41 CHRONIC RIGHT-SIDED LOW BACK PAIN WITH RIGHT-SIDED SCIATICA: ICD-10-CM

## 2018-10-12 DIAGNOSIS — G89.29 CHRONIC RIGHT HIP PAIN: Primary | ICD-10-CM

## 2018-10-12 DIAGNOSIS — M25.551 CHRONIC RIGHT HIP PAIN: Primary | ICD-10-CM

## 2018-10-12 PROCEDURE — 99213 OFFICE O/P EST LOW 20 MIN: CPT | Performed by: PAIN MEDICINE

## 2018-10-12 RX ORDER — HYDROCODONE BITARTRATE AND ACETAMINOPHEN 7.5; 325 MG/1; MG/1
1 TABLET ORAL 4 TIMES DAILY PRN
Qty: 120 TABLET | Refills: 0 | Status: SHIPPED | OUTPATIENT
Start: 2018-10-12 | End: 2018-11-09 | Stop reason: SDUPTHER

## 2018-10-12 RX ORDER — ALPRAZOLAM 0.5 MG/1
TABLET ORAL
Qty: 3 TABLET | Refills: 0 | Status: SHIPPED | OUTPATIENT
Start: 2018-10-12 | End: 2019-11-14

## 2018-10-12 NOTE — PROGRESS NOTES
"CHIEF COMPLAINT: Back Pain    HPI  Ashli León is a 83 y.o. female.  She is here to follow up for Back Pain    Ashli León is a 83 y.o. female  who presents to the office for follow-up.   Since last visit their pain has worsened . Pt states her pain keeps getting worse and she has to take the pain medicine every 3-4 hrs to \"even live with it.\" trouble sleeping due to pain. Wakes her up at night. Helps for approx 2 hours and then pain returns. In extreme pain for 2 hours before next dose. Not out of pain medication yet. Last dose was 8 am this am. Had MRI performed and here to discuss results.   The patient states their pain is a 6 on a scale of 1-10.  The patient describes this pain as constant dull and ache.  The pain is located in midline low back and does radiate lateral and posterior right thigh. This painful problem is aggravated by physical activity, standing and walking and is alleviated by pain medication and relaxation.    Past pain medications: percocet 7.5      Current pain medications: Lortab (out of).     Past therapies:  Physical Therapy: yes (after hip sx most recently)  Chiropractor: no  Massage Therapy: no  TENS: yes  Neck or back surgery: no Pt has had lumbar compression fractures.  Past pain management: yes     Previous Injections: yes - DEBI in early 2000s - unsure if helped, can not remember  Right hip steroid injection - 8/8/2018 - minimal help    PEG Assessment   What number best describes your pain on average in the past week? 7  What number best describes how, during the past week, pain has interfered with your enjoyment of life? 9  What number best describes how, during the past week, pain has interfered with your general activity? 9      Current Outpatient Prescriptions:   •  alendronate (FOSAMAX) 5 MG tablet, Take 5 mg by mouth Every Morning Before Breakfast., Disp: , Rfl:   •  allopurinol (ZYLOPRIM) 100 MG tablet, Take 100 mg by mouth daily., Disp: , Rfl:   •  cholecalciferol " (VITAMIN D3) 1000 units tablet, Take 2,000 Units by mouth Daily., Disp: , Rfl:   •  docusate sodium 100 MG capsule, Take 100 mg by mouth 2 (Two) Times a Day., Disp: , Rfl:   •  escitalopram (LEXAPRO) 10 MG tablet, , Disp: , Rfl:   •  latanoprost (XALATAN) 0.005 % ophthalmic solution, Administer 1 drop to both eyes Every Night., Disp: , Rfl:   •  levothyroxine (SYNTHROID, LEVOTHROID) 50 MCG tablet, Take 50 mcg by mouth daily., Disp: , Rfl:   •  metoprolol tartrate (LOPRESSOR) 25 MG tablet, Take 25 mg by mouth daily., Disp: , Rfl:   •  Multiple Vitamins-Minerals (ICAPS AREDS 2) capsule, Take 2 capsules by mouth Daily., Disp: , Rfl:   •  omeprazole (PriLOSEC) 40 MG capsule, Take 40 mg by mouth Daily., Disp: , Rfl:   •  pravastatin (PRAVACHOL) 40 MG tablet, 40 mg Daily., Disp: , Rfl:   •  zolpidem (AMBIEN) 10 MG tablet, Take 1 tablet by mouth Every Night., Disp: 30 tablet, Rfl: 0  •  ALPRAZolam (XANAX) 0.5 MG tablet, Take 1 tablet po 1 hour prior to imaging. May repeat x 2 doses if needed in 30 min., Disp: 3 tablet, Rfl: 0  •  HYDROcodone-acetaminophen (NORCO) 7.5-325 MG per tablet, Take 1 tablet by mouth 4 (Four) Times a Day As Needed for Moderate Pain ., Disp: 120 tablet, Rfl: 0    IMAGING  EXAM: LUMBAR SPINE MRI WITHOUT CONTRAST 9/28/2018  FINDINGS: Mild levoscoliosis, and slight L5-S1 retrolisthesis. Chronic  upper endplate compression deformities at L2 and L1 and T11 and T12, no  evidence of acute fracture or acute marrow edema or infiltration or  placement. The paraspinous soft tissues are unremarkable.  Tip of the conus is borderline low-lying, at about the level of the L2  lower endplate, the lower limit of normal, though the distal cord is  otherwise normal in signal and appearance. There could be canal stenosis  with mild cord compression due to degenerative change at T10-11, not  completely evaluated here on this lumbar spine MRI.  At L1-2 disc and endplate change and facet arthropathy cause canal  stenosis  and possibly slight compression of the distal cord/conus. There  is no abnormal cord signal. There is moderate right and severe left  foraminal stenosis.  At L2-3 there is disc and endplate change and facet arthropathy and mild  canal stenosis, and mild left and mild or mild to moderate right  foraminal stenosis.  At L3-4 there is disc and endplate change and facet arthropathy and mild  or mild to moderate canal stenosis and moderate right and left foraminal  stenosis.  At L4-5 there is disc and endplate change and facet arthropathy and  borderline to mild canal stenosis. There is moderate to severe right and  moderate left foraminal stenosis.  At L5-S1 there is degenerative change but no canal stenosis and moderate  or moderate to severe right and moderate to severe left foraminal  stenosis.  IMPRESSION:  Extensive chronic changes as above, no definite acute  abnormality.  Borderline low-lying tip of the conus, see above.  Possible lower thoracic cord compression at T10-11 due to degenerative  change, incompletely evaluated here on this lumbar spine MRI.    Imaging last reviewed: 10/15/18     PFSH:  The following portions of the patient's history were reviewed and updated as appropriate: problem list, past medical history, past surgery history, social history, family history, medications, and allergies    Review of Systems   Constitutional: Positive for activity change (decreased) and appetite change (decreased).   HENT: Positive for hearing loss. Negative for trouble swallowing.    Eyes: Negative for visual disturbance.   Respiratory: Negative for apnea, chest tightness and shortness of breath.    Cardiovascular: Negative for chest pain.   Gastrointestinal: Negative for constipation, diarrhea and nausea.   Genitourinary: Negative for difficulty urinating and dysuria.   Musculoskeletal: Positive for back pain and gait problem.   Skin: Negative for rash and wound.   Allergic/Immunologic: Negative for  "immunocompromised state.   Neurological: Positive for weakness (R leg) and numbness (R leg). Negative for dizziness, facial asymmetry, light-headedness and headaches.   Hematological: Does not bruise/bleed easily.   Psychiatric/Behavioral: Positive for sleep disturbance. Negative for agitation, confusion, hallucinations and suicidal ideas.   All other systems reviewed and are negative.      Vitals:    10/12/18 1055   BP: 157/68   Pulse: 54   Resp: 15   Temp: 97.3 °F (36.3 °C)   SpO2: 98%   Weight: 58.7 kg (129 lb 6.6 oz)   Height: 160 cm (62.99\")   PainSc:   6   PainLoc: Back       Physical Exam   Constitutional: She appears well-developed and well-nourished. No distress.   HENT:   Head: Normocephalic and atraumatic.   Nose: Nose normal.   Mouth/Throat: Oropharynx is clear and moist.   Eyes: Conjunctivae and EOM are normal.   Neck: Normal range of motion. Neck supple.   Pulmonary/Chest: Effort normal. No stridor. No respiratory distress.   Musculoskeletal:        Right hip: She exhibits decreased range of motion and tenderness.        Lumbar back: She exhibits decreased range of motion and pain.   Neurological: She is alert. No cranial nerve deficit or sensory deficit. Gait abnormal.   Skin: Skin is warm and dry. No rash noted. She is not diaphoretic.   Psychiatric: She has a normal mood and affect. Her speech is normal and behavior is normal.   Nursing note and vitals reviewed.    Ortho Exam  Neurologic Exam     Mental Status   Speech: speech is normal     Cranial Nerves     CN III, IV, VI   Extraocular motions are normal.       Lab Results   Component Value Date    POCMETH Negative 09/14/2018    POCAMPHET Negative 09/14/2018    POCBARBITUR Negative 09/14/2018    POCBENZO Negative 09/14/2018    POCCOCAINE Negative 09/14/2018    POCMETHADO Negative 09/14/2018    POCOPIATES Negative 09/14/2018    POCOXYCODO Positive (A) 09/14/2018    POCPHENCYC Negative 09/14/2018    POCPROPOXY Negative 09/14/2018    POCTHC Negative " 09/14/2018    POCTRICYC Negative 09/14/2018     Last UDS results reviewed: 10/15/18   Last UDS: 9/14/2018  Comments: Consistent     Date of last JUDY reviewed : 10/15/18   Comments: Consistent         Ashli was seen today for back pain.    Diagnoses and all orders for this visit:    Chronic right hip pain  -     HYDROcodone-acetaminophen (NORCO) 7.5-325 MG per tablet; Take 1 tablet by mouth 4 (Four) Times a Day As Needed for Moderate Pain .  -     Case Request    Chronic right-sided low back pain with right-sided sciatica  -     HYDROcodone-acetaminophen (NORCO) 7.5-325 MG per tablet; Take 1 tablet by mouth 4 (Four) Times a Day As Needed for Moderate Pain .  -     Case Request    Other orders  -     ALPRAZolam (XANAX) 0.5 MG tablet; Take 1 tablet po 1 hour prior to imaging. May repeat x 2 doses if needed in 30 min.      Requested Prescriptions     Signed Prescriptions Disp Refills   • HYDROcodone-acetaminophen (NORCO) 7.5-325 MG per tablet 120 tablet 0     Sig: Take 1 tablet by mouth 4 (Four) Times a Day As Needed for Moderate Pain .   • ALPRAZolam (XANAX) 0.5 MG tablet 3 tablet 0     Sig: Take 1 tablet po 1 hour prior to imaging. May repeat x 2 doses if needed in 30 min.     - Imaging reviewed with patient.  Extensive chronic changes in lumbar spine but no definite acute abnormality. Possible lower thoracic cord compression at T10-11 due to degenerative change, incompletely evaluated on lumbar spine MRI. Needs thoracic MRI to evaluate better. Ordered. Has scheduled. Asking for xanax to help with MRI.   - has been compliant with follow ups with surgeon.   - given her pain was non responsive to right hip injection and it radiates all the way down to her right shin, it may be coming from her low back instead of her right hip.   - Given lumbar radiculopathy follows the right L5/S1 dermatome distribution, patient would likely benefit from a right L5/S1 transforaminal epidural injection.  The procedure was described in  detail and the risks, benefits and alternatives were discussed with the patient (including but not limited to: bleeding, infection, nerve damage, worsening of pain, inability to perform injection, paralysis, seizures, and death) who agreed to proceed.     - will perform one injection and then reassess.     - will continue her pain medication. Helping with no side effects. Will increase from 5 mg to 7.5 mg to see if she can get more relief.   - Discussed with the patient regarding long-term side effects of opioids including but not limited to dependence, addiction, sedation, respiratory depression, opioid induced hormonal suppression, hyperalgesia, and elevated risk of myocardial infarction.   - will need narcotic agreement in future. Not performed today.   - Last UDS performed was reviewed and consistent.      Follow-up in 1 month.    Mey Joseph MD  Pain Management     JUDY REPORT  As part of the patient's treatment plan, I am prescribing controlled substances. The patient has been made aware of appropriate use of such medications, including potential risk of somnolence, limited ability to drive and/or work safely, and the potential for dependence or overdose. It has also bee made clear that these medications are for use by this patient only, without concomitant use of alcohol or other substances unless prescribed.   Patient has completed prescribing agreement detailing terms of continued prescribing of controlled substances, including monitoring JUDY reports, urine drug screening, and pill counts if necessary. The patient is aware that inappropriate use will results in cessation of prescribing such medications.  JUDY report has been reviewed and scanned into the patient's chart.  History and physical exam exhibit continued safe and appropriate use of controlled substances.

## 2018-10-19 ENCOUNTER — HOSPITAL ENCOUNTER (OUTPATIENT)
Dept: MRI IMAGING | Facility: HOSPITAL | Age: 83
Discharge: HOME | End: 2018-10-19
Admitting: PAIN MEDICINE

## 2018-10-19 DIAGNOSIS — G89.29 CHRONIC BILATERAL LOW BACK PAIN WITH RIGHT-SIDED SCIATICA: ICD-10-CM

## 2018-10-19 DIAGNOSIS — M54.41 CHRONIC BILATERAL LOW BACK PAIN WITH RIGHT-SIDED SCIATICA: ICD-10-CM

## 2018-10-19 PROCEDURE — 72146 MRI CHEST SPINE W/O DYE: CPT

## 2018-10-24 ENCOUNTER — OUTSIDE FACILITY SERVICE (OUTPATIENT)
Dept: PAIN MEDICINE | Facility: CLINIC | Age: 83
End: 2018-10-24

## 2018-10-24 ENCOUNTER — DOCUMENTATION (OUTPATIENT)
Dept: PAIN MEDICINE | Facility: CLINIC | Age: 83
End: 2018-10-24

## 2018-10-24 PROCEDURE — 64483 NJX AA&/STRD TFRM EPI L/S 1: CPT | Performed by: PAIN MEDICINE

## 2018-10-24 PROCEDURE — 64484 NJX AA&/STRD TFRM EPI L/S EA: CPT | Performed by: PAIN MEDICINE

## 2018-10-24 NOTE — PROGRESS NOTES
Please call patient with results:  Multiple healed compression fractures, nothing new. There is some spinal stenosis but nothing worrisome /cord compression which is why we ordered the scan.

## 2018-10-24 NOTE — PROGRESS NOTES
LTFESI with S1 TFESI  [facility]    PREOPERATIVE DIAGNOSIS:   Chronic low back pain and right Lumbar Radiculopathy    POSTOPERATIVE DIAGNOSIS: Same as preop dx    PROCEDURE:    1. 64483 --  Diagnostic & Therapeutic  Transforaminal Epidural Steroid Injection at the  Right  L5 Level  2. 64484 -- S1 Transforaminal Epidural Steroid Injection on the Right    PRE-PROCEDURE DISCUSSION WITH PATIENT:    Risks and complications were discussed with the patient prior to starting the procedure and informed consent was obtained.    SURGEON:  Mey Joseph MD    REASON FOR PROCEDURE:     Diagnostic injection at this level is needed, Degenerative changes are noted in the area. and Stenotic area is noted, and is likely contributing to this chronic &/or recurrent pain.     SEDATION:  Versed: 1 mg; fentanyl: 50 mcg  ANESTHETIC: Marcaine 0.25%  STEROID:     Methylprednisolone (DEPO MEDROL) 80mg/ml  TOTAL VOLUME OF SOLUTION:   4mL    DESCRIPTON OF PROCEDURE:  After obtaining informed consent, an I.V. was started in the preoperative area. The patient taken to the operating room and was placed in the prone position with a pillow under the abdomen.  All pressure points were well padded.  EKG, blood pressure, and pulse oximeter were monitored.  The lumbosacral area was prepped with Chloraprep and draped in a sterile fashion. Under fluoroscopic guidance in an oblique dimension, the transverse process of the above mentioned Lumbar vertebra at the junction of the body at 6 o'clock position respective to the pedicle on the aforementioned side was identified. Skin and subcutaneous tissue was anesthetized with 2-3mL of 1% lidocaine. A 22-gauge spinal needle was introduced under fluoroscopic guidance at the above junction into the foramen without parasthesias and into the epidural space. After confirming the position of the needle with PA, lateral, and oblique fluoroscopic views, aspiration was checked and was clear of  blood or CSF.  Next, 1 mL of contrast dye was injected. After seeing adequate spread on the corresponding nerve root, a total volume 2 mL of injectate containing half of the previously mentioned local anesthetic solution was slowly and easily injected into the space.    The needle was removed intact.  Vital signs were stable.      Next, the S1 posterior foramen was identified on the above mentioned side with fluoroscopy in a PA dimension, and a spinal needle was introduced to just caudad to the 6 o’clock position of the foramen until contact with os; then the needle was walked off cephalad and into the foramen.   After confirming the position of the needle with PA and lateral fluoroscopic views, aspiration was checked and was clear of blood or CSF.  Next, 1 mL of contrast dye was injected. After seeing adequate spread on the S1 nerve root and into the caudal epidural space, a total volume 2mL of injectate containing the other half of the local anesthetic solution was easily and slowly injected into the epidural space.   The needle was removed intact.  Vital signs were stable.  Onset of analgesia was noted prior to transport to the recovery area.      ESTIMATED BLOOD LOSS:  <5 mL  SPECIMENS:  none    COMPLICATIONS:   No complications were noted. and There was no indication of vascular uptake on live injection of contrast dye.    TOLERANCE & DISCHARGE CONDITION:    The patient tolerated the procedure well.  The patient was transported to the recovery area without difficulties.  The patient was discharged to home under the care of family in stable and satisfactory condition.    PLAN OF CARE:  1. The patient was given our standard instruction sheet.  2. The patient will Return to clinic 4 wks  3. The patient will resume all medications as per the medication reconciliation sheet.

## 2018-10-25 NOTE — PROGRESS NOTES
I spoke with Ms. León and relayed her MRI results to her. She would like to know if their is anything she should be doing.

## 2018-11-09 ENCOUNTER — OFFICE VISIT (OUTPATIENT)
Dept: PAIN MEDICINE | Facility: CLINIC | Age: 83
End: 2018-11-09

## 2018-11-09 VITALS
SYSTOLIC BLOOD PRESSURE: 157 MMHG | RESPIRATION RATE: 15 BRPM | BODY MASS INDEX: 22.5 KG/M2 | DIASTOLIC BLOOD PRESSURE: 81 MMHG | WEIGHT: 127 LBS | HEIGHT: 63 IN | TEMPERATURE: 98.1 F | HEART RATE: 64 BPM | OXYGEN SATURATION: 95 %

## 2018-11-09 DIAGNOSIS — G89.29 CHRONIC RIGHT-SIDED LOW BACK PAIN WITH RIGHT-SIDED SCIATICA: ICD-10-CM

## 2018-11-09 DIAGNOSIS — G89.29 OTHER CHRONIC PAIN: Primary | ICD-10-CM

## 2018-11-09 DIAGNOSIS — M54.41 CHRONIC RIGHT-SIDED LOW BACK PAIN WITH RIGHT-SIDED SCIATICA: ICD-10-CM

## 2018-11-09 DIAGNOSIS — M25.551 CHRONIC RIGHT HIP PAIN: ICD-10-CM

## 2018-11-09 DIAGNOSIS — G89.29 CHRONIC RIGHT HIP PAIN: ICD-10-CM

## 2018-11-09 DIAGNOSIS — M53.3 SACROILIAC JOINT DYSFUNCTION OF RIGHT SIDE: ICD-10-CM

## 2018-11-09 DIAGNOSIS — Z79.899 ENCOUNTER FOR LONG-TERM CURRENT USE OF HIGH RISK MEDICATION: ICD-10-CM

## 2018-11-09 PROCEDURE — 99214 OFFICE O/P EST MOD 30 MIN: CPT | Performed by: NURSE PRACTITIONER

## 2018-11-09 RX ORDER — HYDROCODONE BITARTRATE AND ACETAMINOPHEN 7.5; 325 MG/1; MG/1
1 TABLET ORAL 4 TIMES DAILY PRN
Qty: 120 TABLET | Refills: 0 | Status: SHIPPED | OUTPATIENT
Start: 2018-11-09 | End: 2018-12-18 | Stop reason: SDUPTHER
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
## 2018-11-21 ENCOUNTER — OUTSIDE FACILITY SERVICE (OUTPATIENT)
Dept: PAIN MEDICINE | Facility: CLINIC | Age: 83
End: 2018-11-21

## 2018-11-21 ENCOUNTER — DOCUMENTATION (OUTPATIENT)
Dept: PAIN MEDICINE | Facility: CLINIC | Age: 83
End: 2018-11-21

## 2018-11-21 PROCEDURE — 27096 INJECT SACROILIAC JOINT: CPT | Performed by: PAIN MEDICINE

## 2018-11-21 NOTE — PROGRESS NOTES
RIGHT Sacroiliac Joint Injection  [facility]    PREOPERATIVE DIAGNOSIS:   Sacroiliac joint dysfunction on the RIGHT    POSTOPERATIVE DIAGNOSIS:  Sacroiliac joint dysfunction on the RIGHT    PROCEDURE:  Sacroiliac Joint Injection, on the RIGHT, with fluoroscopic guidance    PRE-PROCEDURE DISCUSSION WITH PATIENT:    Risks and complications were discussed with the patient prior to starting the procedure and informed consent was obtained.  We discussed various topics including but not limited to bleeding, infection, injury, postprocedural site soreness, painful flareup, worsening of clinical picture, paralysis, coma, and death.     SURGEON:  Mey Joseph MD    REASON FOR PROCEDURE:    Patient has pain consistent with SI pathology on history and physical exam. Positive sacroiliac provocation maneuvers noted.      SEDATION:  Patient declined administration of moderate sedation    ANESTHETIC AGENT:  Marcaine 0.5%  STEROID AGENT:  40mg DepoMedrol    DESCRIPTON OF PROCEDURE:  After obtaining informed consent, IV access was obtained in the preoperative area.  The patient was transported to the operative suite and placed in the prone position with a pillow under the pelvic area. EKG, blood pressure, and pulse oximeter were monitored. The lumbosacral area was prepped with Chloraprep and draped in a sterile fashion.     Under fluoroscopic guidance the inferior most portion of the RIGHT sacroiliac joint was identified. The overlying skin and subcutaneous tissue was anesthetized with 1% lidocaine. A 22-gauge spinal needle was introduced from the inferior most portion of the joint into the sacroiliac joint under fluoroscopic guidance in the AP dimension with slight oblique rotation to the contralateral side.  Aspiration was negative.  After confirming the position of the needle with fluoroscopy, 1 mL of Omnipaque was injected and after seeing appropriate spread into the joint a total of 2.5 mL of Marcaine,  with approximately 40 mg of DepoMedrol, was injected very slowly.  Vital signs remained stable.  The onset of analgesia was noted.    ESTIMATED BLOOD LOSS:  minimal  SPECIMENS:  None    COMPLICATIONS:  No complications were noted. and There was no indication of vascular uptake on live injection of contrast dye.    TOLERANCE & DISCHARGE CONDITION:    The patient tolerated the procedure well.  The patient was transported to the recovery area without difficulties.  The patient was discharged to home under the care of family in stable and satisfactory condition.    PLAN OF CARE:  1. The patient was given our standard instruction sheet and will resume all medications as per the medication reconciliation sheet.  2. The patient will Return to clinic 4-6 wks.  3. The patient is instructed to keep a pain log hourly for 8 hours after the procedure.

## 2018-12-18 ENCOUNTER — OFFICE VISIT (OUTPATIENT)
Dept: PAIN MEDICINE | Facility: CLINIC | Age: 83
End: 2018-12-18

## 2018-12-18 VITALS
BODY MASS INDEX: 22.32 KG/M2 | DIASTOLIC BLOOD PRESSURE: 73 MMHG | SYSTOLIC BLOOD PRESSURE: 146 MMHG | WEIGHT: 126 LBS | OXYGEN SATURATION: 96 % | RESPIRATION RATE: 18 BRPM | HEIGHT: 63 IN | HEART RATE: 74 BPM | TEMPERATURE: 98.5 F

## 2018-12-18 DIAGNOSIS — M25.551 CHRONIC RIGHT HIP PAIN: ICD-10-CM

## 2018-12-18 DIAGNOSIS — G89.29 CHRONIC RIGHT HIP PAIN: ICD-10-CM

## 2018-12-18 DIAGNOSIS — G89.29 OTHER CHRONIC PAIN: Primary | ICD-10-CM

## 2018-12-18 DIAGNOSIS — G89.29 CHRONIC RIGHT-SIDED LOW BACK PAIN WITH RIGHT-SIDED SCIATICA: ICD-10-CM

## 2018-12-18 DIAGNOSIS — M54.41 CHRONIC RIGHT-SIDED LOW BACK PAIN WITH RIGHT-SIDED SCIATICA: ICD-10-CM

## 2018-12-18 DIAGNOSIS — Z79.899 ENCOUNTER FOR LONG-TERM CURRENT USE OF HIGH RISK MEDICATION: ICD-10-CM

## 2018-12-18 DIAGNOSIS — M53.3 SACROILIAC JOINT DYSFUNCTION: ICD-10-CM

## 2018-12-18 PROCEDURE — 99214 OFFICE O/P EST MOD 30 MIN: CPT | Performed by: NURSE PRACTITIONER

## 2018-12-18 RX ORDER — HYDROCODONE BITARTRATE AND ACETAMINOPHEN 7.5; 325 MG/1; MG/1
1 TABLET ORAL 4 TIMES DAILY PRN
Qty: 120 TABLET | Refills: 0 | Status: SHIPPED | OUTPATIENT
Start: 2018-12-18 | End: 2019-01-18 | Stop reason: SDUPTHER
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
## 2019-01-18 ENCOUNTER — OFFICE VISIT (OUTPATIENT)
Dept: PAIN MEDICINE | Facility: CLINIC | Age: 84
End: 2019-01-18

## 2019-01-18 ENCOUNTER — RESULTS ENCOUNTER (OUTPATIENT)
Dept: PAIN MEDICINE | Facility: CLINIC | Age: 84
End: 2019-01-18

## 2019-01-18 VITALS
TEMPERATURE: 97.9 F | DIASTOLIC BLOOD PRESSURE: 65 MMHG | HEIGHT: 63 IN | SYSTOLIC BLOOD PRESSURE: 134 MMHG | HEART RATE: 68 BPM | WEIGHT: 130.2 LBS | OXYGEN SATURATION: 97 % | BODY MASS INDEX: 23.07 KG/M2 | RESPIRATION RATE: 18 BRPM

## 2019-01-18 DIAGNOSIS — B02.29 PHN (POSTHERPETIC NEURALGIA): ICD-10-CM

## 2019-01-18 DIAGNOSIS — M25.551 CHRONIC RIGHT HIP PAIN: ICD-10-CM

## 2019-01-18 DIAGNOSIS — G89.29 CHRONIC RIGHT HIP PAIN: ICD-10-CM

## 2019-01-18 DIAGNOSIS — M53.3 SACROILIAC JOINT DYSFUNCTION: ICD-10-CM

## 2019-01-18 DIAGNOSIS — M54.41 CHRONIC RIGHT-SIDED LOW BACK PAIN WITH RIGHT-SIDED SCIATICA: ICD-10-CM

## 2019-01-18 DIAGNOSIS — G89.29 CHRONIC RIGHT-SIDED LOW BACK PAIN WITH RIGHT-SIDED SCIATICA: ICD-10-CM

## 2019-01-18 DIAGNOSIS — B02.29 PHN (POSTHERPETIC NEURALGIA): Primary | ICD-10-CM

## 2019-01-18 PROCEDURE — 99213 OFFICE O/P EST LOW 20 MIN: CPT | Performed by: PAIN MEDICINE

## 2019-01-18 RX ORDER — HYDROCODONE BITARTRATE AND ACETAMINOPHEN 7.5; 325 MG/1; MG/1
1 TABLET ORAL 4 TIMES DAILY PRN
Qty: 120 TABLET | Refills: 0 | Status: SHIPPED | OUTPATIENT
Start: 2019-01-18 | End: 2019-04-17 | Stop reason: SDUPTHER

## 2019-01-18 RX ORDER — DOXEPIN HYDROCHLORIDE 25 MG/1
CAPSULE ORAL
COMMUNITY
Start: 2018-12-21 | End: 2020-10-10

## 2019-01-18 RX ORDER — LIDOCAINE 50 MG/G
1 PATCH TOPICAL EVERY 24 HOURS
Qty: 30 PATCH | Refills: 3 | Status: SHIPPED | OUTPATIENT
Start: 2019-01-18 | End: 2019-02-18

## 2019-01-18 NOTE — PROGRESS NOTES
CHIEF COMPLAINT: Back Pain    HPI  Ashli León is a 83 y.o. female.  She is here to follow up for Back Pain    Ashli León is a 83 y.o. female  who presents to the office for follow-up.    Since last visit their pain has remain unchanged. Has tried multiple injections in past without benefit.     The patient states their pain is a 5 on a scale of 1-10.  The patient describes this pain as constant dull, ache and throbbing.  The pain is located in bilateral low back and does radiate right leg. This painful problem is aggravated by physical activity and walking and is alleviated by pain medication.    Past pain medications: percocet 7.5      Current pain medications:   Lortab qid  Lidocaine cream     Past therapies:  Physical Therapy: yes (after hip sx most recently)  Chiropractor: no  Massage Therapy: no  TENS: yes  Neck or back surgery: no Pt has had lumbar compression fractures.  Past pain management: yes     Previous Injection: RIGHT Sacroiliac Joint Injection on 11/21/2018 and Right L5 and S1 LTFESI 10/24/128   Effect of Injection (%): 10% relief on 11/21/18 and 25% on 10/24/18  Length of Relief:  3 days from 2nd injection    Previous Injections: yes - LESI in early 2000s - unsure if helped, can not remember  Right hip steroid injection - 8/8/2018 - minimal help    PEG Assessment   What number best describes your pain on average in the past week? 5  What number best describes how, during the past week, pain has interfered with your enjoyment of life? 8  What number best describes how, during the past week, pain has interfered with your general activity? 8      Current Outpatient Medications:   •  alendronate (FOSAMAX) 5 MG tablet, Take 5 mg by mouth Every Morning Before Breakfast., Disp: , Rfl:   •  allopurinol (ZYLOPRIM) 100 MG tablet, Take 100 mg by mouth daily., Disp: , Rfl:   •  ALPRAZolam (XANAX) 0.5 MG tablet, Take 1 tablet po 1 hour prior to imaging. May repeat x 2 doses if needed in 30 min.,  Disp: 3 tablet, Rfl: 0  •  cholecalciferol (VITAMIN D3) 1000 units tablet, Take 2,000 Units by mouth Daily., Disp: , Rfl:   •  docusate sodium 100 MG capsule, Take 100 mg by mouth 2 (Two) Times a Day., Disp: , Rfl:   •  doxepin (SINEquan) 25 MG capsule, , Disp: , Rfl:   •  escitalopram (LEXAPRO) 10 MG tablet, , Disp: , Rfl:   •  HYDROcodone-acetaminophen (NORCO) 7.5-325 MG per tablet, Take 1 tablet by mouth 4 (Four) Times a Day As Needed for Moderate Pain ., Disp: 120 tablet, Rfl: 0  •  latanoprost (XALATAN) 0.005 % ophthalmic solution, Administer 1 drop to both eyes Every Night., Disp: , Rfl:   •  levothyroxine (SYNTHROID, LEVOTHROID) 50 MCG tablet, Take 50 mcg by mouth daily., Disp: , Rfl:   •  metoprolol tartrate (LOPRESSOR) 25 MG tablet, Take 25 mg by mouth daily., Disp: , Rfl:   •  Multiple Vitamins-Minerals (ICAPS AREDS 2) capsule, Take 2 capsules by mouth Daily., Disp: , Rfl:   •  omeprazole (PriLOSEC) 40 MG capsule, Take 40 mg by mouth Daily., Disp: , Rfl:   •  pravastatin (PRAVACHOL) 40 MG tablet, 40 mg Daily., Disp: , Rfl:   •  zolpidem (AMBIEN) 10 MG tablet, Take 1 tablet by mouth Every Night., Disp: 30 tablet, Rfl: 0    IMAGING  Imaging last reviewed: EXAM: LUMBAR SPINE MRI WITHOUT CONTRAST 9/28/2018  FINDINGS: Mild levoscoliosis, and slight L5-S1 retrolisthesis. Chronic  upper endplate compression deformities at L2 and L1 and T11 and T12, no  evidence of acute fracture or acute marrow edema or infiltration or  placement. The paraspinous soft tissues are unremarkable.   Tip of the conus is borderline low-lying, at about the level of the L2  lower endplate, the lower limit of normal, though the distal cord is  otherwise normal in signal and appearance. There could be canal stenosis  with mild cord compression due to degenerative change at T10-11, not  completely evaluated here on this lumbar spine MRI.   At L1-2 disc and endplate change and facet arthropathy cause canal  stenosis and possibly slight  compression of the distal cord/conus. There  is no abnormal cord signal. There is moderate right and severe left  foraminal stenosis.   At L2-3 there is disc and endplate change and facet arthropathy and mild  canal stenosis, and mild left and mild or mild to moderate right  foraminal stenosis.   At L3-4 there is disc and endplate change and facet arthropathy and mild  or mild to moderate canal stenosis and moderate right and left foraminal  stenosis.   At L4-5 there is disc and endplate change and facet arthropathy and  borderline to mild canal stenosis. There is moderate to severe right and  moderate left foraminal stenosis.   At L5-S1 there is degenerative change but no canal stenosis and moderate  or moderate to severe right and moderate to severe left foraminal  stenosis.   IMPRESSION:  Extensive chronic changes as above, no definite acute  abnormality.   Borderline low-lying tip of the conus, see above.   Possible lower thoracic cord compression at T10-11 due to degenerative  change, incompletely evaluated here on this lumbar spine MRI.    I personally reviewed the images of lumbar MRI while patient was in the office.  Findings discussed with patient.      PFSH:  The following portions of the patient's history were reviewed and updated as appropriate: problem list, past medical history, past surgery history, social history, family history, medications, and allergies    Review of Systems   Constitutional: Positive for fatigue.   HENT: Negative for congestion.    Eyes: Negative for visual disturbance.   Respiratory: Negative for cough, shortness of breath and wheezing.    Cardiovascular: Negative.    Gastrointestinal: Negative for constipation and diarrhea.   Genitourinary: Negative for difficulty urinating.   Musculoskeletal: Positive for back pain.   Neurological: Negative for weakness and numbness.   Psychiatric/Behavioral: Positive for sleep disturbance. Negative for suicidal ideas. The patient is not  "nervous/anxious.    All other systems reviewed and are negative.      Vitals:    01/18/19 1300   BP: 134/65   Pulse: 68   Resp: 18   Temp: 97.9 °F (36.6 °C)   SpO2: 97%   Weight: 59.1 kg (130 lb 3.2 oz)   Height: 160 cm (62.99\")   PainSc:   5   PainLoc: Back       Physical Exam   Constitutional: She is oriented to person, place, and time. She appears well-developed and well-nourished. No distress.   HENT:   Head: Normocephalic and atraumatic.   Eyes: Conjunctivae and EOM are normal.   Neck: Neck supple.   Cardiovascular: Normal rate.   Pulmonary/Chest: Effort normal. No respiratory distress.   Musculoskeletal:        Lumbar back: She exhibits tenderness and pain.   RIGHT SI JOINT TENDERNESS   +SLR RIGHT    Neurological: She is alert and oriented to person, place, and time. She has normal strength. No sensory deficit. Gait (AMBULATES WITH WALKER) abnormal.   Skin: Skin is warm and dry. She is not diaphoretic.   Psychiatric: She has a normal mood and affect. Her behavior is normal.   Nursing note and vitals reviewed.    Ortho Exam  Neurologic Exam     Mental Status   Oriented to person, place, and time.     Cranial Nerves     CN III, IV, VI   Extraocular motions are normal.     Motor Exam     Strength   Strength 5/5 throughout.       Lab Results   Component Value Date    POCMETH Negative 09/14/2018    POCAMPHET Negative 09/14/2018    POCBARBITUR Negative 09/14/2018    POCBENZO Negative 09/14/2018    POCCOCAINE Negative 09/14/2018    POCMETHADO Negative 09/14/2018    POCOPIATES Negative 09/14/2018    POCOXYCODO Positive (A) 09/14/2018    POCPHENCYC Negative 09/14/2018    POCPROPOXY Negative 09/14/2018    POCTHC Negative 09/14/2018    POCTRICYC Negative 09/14/2018     Last UDS results reviewed: 01/18/19   Last UDS: 9/14/18  Comments: Consistent     Date of last JUDY reviewed : 01/18/19   Comments: Consistent         Ashli was seen today for back pain.    Diagnoses and all orders for this visit:    PHN (postherpetic " neuralgia)    Sacroiliac joint dysfunction    Chronic right-sided low back pain with right-sided sciatica    Chronic right hip pain      Requested Prescriptions     Pending Prescriptions Disp Refills   • lidocaine (LIDODERM) 5 % 30 patch 3     Sig: Place 1 patch on the skin as directed by provider Daily. Remove & Discard patch within 12 hours or as directed by MD     - Compound cream ordered today with gabapentin and ketamine. Alternatives ok if insurance does not cover.    - Neurosurgery to eval - patient would like to see Dr. Mae on a day when he is at [facility]- scheduled for today but he had to reschedule - to see next week.   - Refill Hydrocodone. Patient appears stable with current regimen. No adverse effects. Regarding continuation of opioids, there is no evidence of aberrant behavior or any red flags.  The patient continues with appropriate response to opioid therapy. ADL's remain intact by self.   - The urine drug screen confirmation from 9/14/18 has been reviewed and the result is appropriate based on patient history and JUDY report. Random urine drug screen per office policy today, to be checked at next visit.       Follow-up in 2 months.    Mey Joseph MD  Pain Management     JUDY REPORT  As part of the patient's treatment plan, I am prescribing controlled substances. The patient has been made aware of appropriate use of such medications, including potential risk of somnolence, limited ability to drive and/or work safely, and the potential for dependence or overdose. It has also bee made clear that these medications are for use by this patient only, without concomitant use of alcohol or other substances unless prescribed.   Patient has completed prescribing agreement detailing terms of continued prescribing of controlled substances, including monitoring JUDY reports, urine drug screening, and pill counts if necessary. The patient is aware that inappropriate use will results in  cessation of prescribing such medications.  JUDY report has been reviewed and scanned into the patient's chart.  History and physical exam exhibit continued safe and appropriate use of controlled substances.

## 2019-02-18 ENCOUNTER — HOSPITAL ENCOUNTER (EMERGENCY)
Facility: HOSPITAL | Age: 84
Discharge: ANOTHER HOSPITAL | End: 2019-02-18
Attending: EMERGENCY MEDICINE | Admitting: EMERGENCY MEDICINE

## 2019-02-18 ENCOUNTER — APPOINTMENT (OUTPATIENT)
Dept: GENERAL RADIOLOGY | Facility: HOSPITAL | Age: 84
End: 2019-02-18

## 2019-02-18 ENCOUNTER — APPOINTMENT (OUTPATIENT)
Dept: CT IMAGING | Facility: HOSPITAL | Age: 84
End: 2019-02-18

## 2019-02-18 VITALS
HEART RATE: 75 BPM | RESPIRATION RATE: 16 BRPM | TEMPERATURE: 98 F | BODY MASS INDEX: 21.44 KG/M2 | WEIGHT: 121 LBS | OXYGEN SATURATION: 97 % | HEIGHT: 63 IN | DIASTOLIC BLOOD PRESSURE: 95 MMHG | SYSTOLIC BLOOD PRESSURE: 193 MMHG

## 2019-02-18 DIAGNOSIS — S06.5XAA SUBDURAL HEMATOMA (HCC): Primary | ICD-10-CM

## 2019-02-18 LAB
ALBUMIN SERPL-MCNC: 4.3 G/DL (ref 3.5–5.2)
ALBUMIN/GLOB SERPL: 1.4 G/DL
ALP SERPL-CCNC: 45 U/L (ref 40–129)
ALT SERPL W P-5'-P-CCNC: 15 U/L (ref 5–33)
ANION GAP SERPL CALCULATED.3IONS-SCNC: 9.9 MMOL/L
APTT PPP: <20 SECONDS (ref 24.3–38.1)
AST SERPL-CCNC: 21 U/L (ref 5–32)
BILIRUB SERPL-MCNC: 1.2 MG/DL (ref 0.2–1.2)
BUN BLD-MCNC: 10 MG/DL (ref 8–23)
BUN/CREAT SERPL: 14.9 (ref 7–25)
CALCIUM SPEC-SCNC: 9.7 MG/DL (ref 8.8–10.5)
CHLORIDE SERPL-SCNC: 96 MMOL/L (ref 98–107)
CO2 SERPL-SCNC: 27.1 MMOL/L (ref 22–29)
CREAT BLD-MCNC: 0.67 MG/DL (ref 0.57–1)
GFR SERPL CREATININE-BSD FRML MDRD: 84 ML/MIN/1.73
GLOBULIN UR ELPH-MCNC: 3.1 GM/DL
GLUCOSE BLD-MCNC: 128 MG/DL (ref 65–99)
INR PPP: 0.96 (ref 0.9–1.1)
POTASSIUM BLD-SCNC: 4.3 MMOL/L (ref 3.5–5.2)
PROT SERPL-MCNC: 7.4 G/DL (ref 6–8.5)
PROTHROMBIN TIME: 12.5 SECONDS (ref 12.1–15)
SODIUM BLD-SCNC: 133 MMOL/L (ref 136–145)

## 2019-02-18 PROCEDURE — 85610 PROTHROMBIN TIME: CPT | Performed by: PHYSICIAN ASSISTANT

## 2019-02-18 PROCEDURE — 73502 X-RAY EXAM HIP UNI 2-3 VIEWS: CPT

## 2019-02-18 PROCEDURE — 85730 THROMBOPLASTIN TIME PARTIAL: CPT | Performed by: PHYSICIAN ASSISTANT

## 2019-02-18 PROCEDURE — 99284 EMERGENCY DEPT VISIT MOD MDM: CPT

## 2019-02-18 PROCEDURE — 99284 EMERGENCY DEPT VISIT MOD MDM: CPT | Performed by: PHYSICIAN ASSISTANT

## 2019-02-18 PROCEDURE — 80053 COMPREHEN METABOLIC PANEL: CPT | Performed by: PHYSICIAN ASSISTANT

## 2019-02-18 PROCEDURE — 70450 CT HEAD/BRAIN W/O DYE: CPT

## 2019-02-18 PROCEDURE — 71101 X-RAY EXAM UNILAT RIBS/CHEST: CPT

## 2019-02-18 RX ORDER — SODIUM CHLORIDE 0.9 % (FLUSH) 0.9 %
10 SYRINGE (ML) INJECTION AS NEEDED
Status: DISCONTINUED | OUTPATIENT
Start: 2019-02-18 | End: 2019-02-18 | Stop reason: HOSPADM

## 2019-02-18 NOTE — ED NOTES
Patient states that she was carrying wood and fell. Patient states that she does not know what caused her fall but states that she did not pass out. Patient states that she hit the left side of her chest, the left side of her torso and her left hip. Patient states that she felt dizzy immediately after her fall but dizziness has resolved. Patient left leg appears slightly shorter than the right. No other sign of obvious injury/deformity noted.      Layla Monique RN  02/18/19 5553

## 2019-02-18 NOTE — ED PROVIDER NOTES
Subjective   History of Present Illness  History of Present Illness    Chief complaint: Fall    Location: Home    Quality/Severity: Ache.  Moderate    Timing/Duration: Just prior to arrival    Modifying Factors: Movement makes worse.  Nothing makes better.    Associated Symptoms: Dizziness has resolved.  Positive headache.  Denies change in vision.  Positive left rib pain.  Positive left hip pain.  Denies knee pain.  Denies upper extremity pain.  Denies chest pain.  Denies abdominal pain.   Denies neck or back pain.    Narrative: 83-year-old female presents after tripping on a corner of a wood stove and falling onto her left hip.  She also hit her head, but denies loss of consciousness.  She states that she was dizzy immediately, but then that resolved. Hx also taken from EMS.    Review of Systems   Constitutional: Negative.    Eyes: Negative.    Respiratory: Negative.    Cardiovascular:        + Left rib pain   Gastrointestinal: Negative for abdominal pain, nausea and vomiting.   Genitourinary: Negative.    Musculoskeletal: Positive for arthralgias. Negative for back pain and neck pain.   Skin: Negative.    Neurological: Positive for dizziness, light-headedness and headaches. Negative for tremors, seizures, syncope, facial asymmetry, speech difficulty, weakness and numbness.   Hematological: Negative.    Psychiatric/Behavioral: Negative.    All other systems reviewed and are negative.      Past Medical History:   Diagnosis Date   • Acid reflux    • Back pain    • Colitis     history of   • Diarrhea    • Disease of thyroid gland    • Diverticular disease    • Glaucoma    • Gout     - right big toe   • High blood pressure    • High cholesterol    • History of vertebral compression fracture     multiple   • Hyponatremia    • Knee pain    • Low back pain    • Macular degeneration    • Mitral regurgitation 06/2016    Mild to moderate per 2-D echocardiogram   • Peripheral neuropathy    • Primary osteoarthritis of both  knees 4/1/2016   • Renal lesion     on mri   • Rheumatoid arthritis (CMS/HCC)        Allergies   Allergen Reactions   • Neomycin Swelling   • Statins Myalgia     Not all statins       Past Surgical History:   Procedure Laterality Date   • APPENDECTOMY  1951   • CATARACT EXTRACTION Bilateral    • FEMUR SURGERY Right     had fracture and has rajesh    • HEMORRHOIDECTOMY     • HIP HEMIARTHROPLASTY Right 1/3/2017    Procedure: HIP HEMIARTHROPLASTY with IM rajesh and 2 screw hardware removal antibiotic cement;  Surgeon: Mirza Whittington MD;  Location:  [facility] OR;  Service:    • HIP SURGERY Right 1999    hx fracture   • LEG SURGERY Right 12/1/2017    Procedure: GLUTEAL TENDON REPAIR-minimus and medius;  Surgeon: Mirza Whittington MD;  Location:  [facility] OR;  Service:    • TOTAL KNEE ARTHROPLASTY Right 6/22/2016    Procedure: RT TOTAL KNEE ARTHROPLASTY WITH FINN NAVIGATION;  Surgeon: Adrian Swenson MD;  [facility];  Service:        Family History   Problem Relation Age of Onset   • Heart attack Father        Social History     Socioeconomic History   • Marital status:      Spouse name: Not on file   • Number of children: Not on file   • Years of education: Not on file   • Highest education level: Not on file   Tobacco Use   • Smoking status: Never Smoker   • Smokeless tobacco: Never Used   Substance and Sexual Activity   • Alcohol use: Yes     Alcohol/week: 1.8 oz     Types: 3 Shots of liquor per week     Comment: social use 1 weekly   • Drug use: No   • Sexual activity: Defer     No current facility-administered medications for this encounter.     Current Outpatient Medications:   •  alendronate (FOSAMAX) 5 MG tablet, Take 5 mg by mouth Every Morning Before Breakfast., Disp: , Rfl:   •  allopurinol (ZYLOPRIM) 100 MG tablet, Take 100 mg by mouth daily., Disp: , Rfl:   •  ALPRAZolam (XANAX) 0.5 MG tablet, Take 1 tablet po 1 hour prior to imaging. May repeat x 2 doses if needed in 30 min., Disp: 3 tablet, Rfl: 0  •   cholecalciferol (VITAMIN D3) 1000 units tablet, Take 2,000 Units by mouth Daily., Disp: , Rfl:   •  docusate sodium 100 MG capsule, Take 100 mg by mouth 2 (Two) Times a Day., Disp: , Rfl:   •  doxepin (SINEquan) 25 MG capsule, , Disp: , Rfl:   •  escitalopram (LEXAPRO) 10 MG tablet, , Disp: , Rfl:   •  HYDROcodone-acetaminophen (NORCO) 7.5-325 MG per tablet, Take 1 tablet by mouth 4 (Four) Times a Day As Needed for Moderate Pain ., Disp: 120 tablet, Rfl: 0  •  latanoprost (XALATAN) 0.005 % ophthalmic solution, Administer 1 drop to both eyes Every Night., Disp: , Rfl:   •  levothyroxine (SYNTHROID, LEVOTHROID) 50 MCG tablet, Take 50 mcg by mouth daily., Disp: , Rfl:   •  metoprolol tartrate (LOPRESSOR) 25 MG tablet, Take 25 mg by mouth daily., Disp: , Rfl:   •  Multiple Vitamins-Minerals (ICAPS AREDS 2) capsule, Take 2 capsules by mouth Daily., Disp: , Rfl:   •  omeprazole (PriLOSEC) 40 MG capsule, Take 40 mg by mouth Daily., Disp: , Rfl:   •  pravastatin (PRAVACHOL) 40 MG tablet, 40 mg Daily., Disp: , Rfl:   •  zolpidem (AMBIEN) 10 MG tablet, Take 1 tablet by mouth Every Night., Disp: 30 tablet, Rfl: 0        Objective   Physical Exam  Vitals:    02/18/19 1125   BP: (!) 169/107   Pulse: 69   Resp: 18   Temp: 98 °F (36.7 °C)   SpO2: 96%   GENERAL: a/o x 4, NAD, GCS 15  SKIN: Warm pink and dry.  No ecchymosis or abrasions.  HEENT:  PERRLA, EOM intact, conjunctiva normal, sclera clear  NECK: supple, no spinal or paraspinal tenderness.  No step-offs.  LUNGS: Clear to auscultation bilaterally without wheezes, rales or rhonchi.  No accessory muscle use and no nasal flaring.  Left lateral chest wall tenderness.  Reproduces symptoms.  CARDIAC:  Regular rate and rhythm, S1-S2.  No murmurs, rubs or gallops.  No peripheral edema.  Equal pulses bilaterally.  ABDOMEN: Soft, nontender, nondistended.  No guarding or rebound tenderness.  Normal bowel sounds.  MUSCULOSKELETAL:  No deformity.  Left lateral hip tenderness to  palpation.  Full range of motion to bilateral knees.  No spinal or paraspinal tenderness.   NEURO: Cranial nerves II through XII grossly intact.  No gross focal deficits.  Alert.  Normal speech and motor.  PSYCH: Normal mood and affect        Critical Care  Performed by: Debbie No PA-C  Authorized by: Gulshan Boone MD     Critical care provider statement:     Critical care time (minutes):  35    Critical care time was exclusive of:  Separately billable procedures and treating other patients and teaching time    Critical care was necessary to treat or prevent imminent or life-threatening deterioration of the following conditions:  CNS failure or compromise    Critical care was time spent personally by me on the following activities:  Development of treatment plan with patient or surrogate, discussions with consultants, examination of patient, obtaining history from patient or surrogate, ordering and performing treatments and interventions, ordering and review of laboratory studies, ordering and review of radiographic studies and re-evaluation of patient's condition    I assumed direction of critical care for this patient from another provider in my specialty: no                 ED Course      Reviewed head CT.  Independently viewed by me. Interpreted by radiologist and d/w radiologist. Discussed with pt.  Ct Head Without Contrast    Result Date: 2/18/2019  Narrative: CT HEAD, NONCONTRAST, 2/18/2019  HISTORY: 83-year-old female in the ED after head injury. Fell, striking left side of head today. Headache.  TECHNIQUE:  CT examination of the head without IV contrast. Radiation dose reduction techniques included automated exposure control or exposure modulation based on body size. Radiation audit for CT and nuclear cardiology exams in the last 12 months: 1.  FINDINGS:  Extensive acute subdural hematoma moderate thickness is present over the left cerebral hemisphere extending from the vertex to the midportion  of the left middle cranial fossa. The largest portions of the hematoma measure 9 to 10 mm in thickness. This is associated with mild left hemispheric mass effect with effacement of cerebral sulci and approximately 7 mm midline shift from left to right.  There is no visible intraparenchymal, intraventricular or subarachnoid hemorrhage. No evidence of intraparenchymal mass, cerebral edema or hydrocephalus. No visible skull fracture.      Impression: 1. Extensive acute left subdural hematoma extending over much of the left cerebral hemisphere associated with mild left hemispheric mass effect and midline shift as detailed above. 2. No skull fracture. 3. No additional intracranial hemorrhage. 4. I have discussed the findings by telephone with the ordering provider at 12:05 hours on 2/18/2019.  This report was finalized on 2/18/2019 12:09 PM by Dr. Italo Hazel MD.          CONSULT  Time 1210   Discussed case witam, results and treatments.  Discussed concerns and plan. Dr Hart accepts to [facility].    Other xrays results pending. Cd sent with pt to [facility]. Pt's son is here.        MDM  Number of Diagnoses or Management Options  Subdural hematoma (CMS/HCC): new and requires workup     Amount and/or Complexity of Data Reviewed  Clinical lab tests: ordered and reviewed  Tests in the radiology section of CPT®: reviewed and ordered  Tests in the medicine section of CPT®: reviewed and ordered  Discussion of test results with the performing providers: yes  Obtain history from someone other than the patient: yes  Discuss the patient with other providers: yes  Independent visualization of images, tracings, or specimens: yes    Risk of Complications, Morbidity, and/or Mortality  Presenting problems: high  Diagnostic procedures: high  Management options: high    Critical Care  Total time providing critical care: 30-74 minutes    Patient Progress  Patient progress: stable        Final diagnoses:   Subdural hematoma (CMS/HCC)     Dictated  utilizing Patricia dictation       Debbie No PA-C  02/18/19 [id number]

## 2019-02-21 ENCOUNTER — TELEPHONE (OUTPATIENT)
Dept: PAIN MEDICINE | Facility: CLINIC | Age: 84
End: 2019-02-21

## 2019-02-27 ENCOUNTER — TRANSCRIBE ORDERS (OUTPATIENT)
Dept: ADMINISTRATIVE | Facility: HOSPITAL | Age: 84
End: 2019-02-27

## 2019-02-27 DIAGNOSIS — R48.8 APHASIC AGRAPHIA: ICD-10-CM

## 2019-02-27 DIAGNOSIS — T14.8XXA HEMATOMA: Primary | ICD-10-CM

## 2019-02-28 ENCOUNTER — HOSPITAL ENCOUNTER (OUTPATIENT)
Dept: CT IMAGING | Facility: HOSPITAL | Age: 84
Discharge: HOME | End: 2019-02-28
Admitting: INTERNAL MEDICINE

## 2019-02-28 DIAGNOSIS — T14.8XXA HEMATOMA: ICD-10-CM

## 2019-02-28 DIAGNOSIS — R48.8 APHASIC AGRAPHIA: ICD-10-CM

## 2019-02-28 PROCEDURE — 70450 CT HEAD/BRAIN W/O DYE: CPT

## 2019-03-01 ENCOUNTER — HOSPITAL ENCOUNTER (EMERGENCY)
Facility: HOSPITAL | Age: 84
Discharge: HOME | End: 2019-03-01
Attending: EMERGENCY MEDICINE | Admitting: EMERGENCY MEDICINE

## 2019-03-01 VITALS
OXYGEN SATURATION: 95 % | WEIGHT: 132 LBS | TEMPERATURE: 97.8 F | HEART RATE: 106 BPM | HEIGHT: 63 IN | BODY MASS INDEX: 23.39 KG/M2 | DIASTOLIC BLOOD PRESSURE: 83 MMHG | RESPIRATION RATE: 16 BRPM | SYSTOLIC BLOOD PRESSURE: 149 MMHG

## 2019-03-01 DIAGNOSIS — R56.9 FOCAL SEIZURE (HCC): Primary | ICD-10-CM

## 2019-03-01 DIAGNOSIS — Z87.828 H/O TRAUMATIC SUBDURAL HEMATOMA: ICD-10-CM

## 2019-03-01 LAB
ALBUMIN SERPL-MCNC: 4.1 G/DL (ref 3.5–5.2)
ALBUMIN/GLOB SERPL: 1.1 G/DL
ALP SERPL-CCNC: 166 U/L (ref 39–117)
ALT SERPL W P-5'-P-CCNC: 54 U/L (ref 1–33)
ANION GAP SERPL CALCULATED.3IONS-SCNC: 13.9 MMOL/L
APTT PPP: 27.4 SECONDS (ref 22.7–35.4)
AST SERPL-CCNC: 20 U/L (ref 1–32)
BASOPHILS # BLD AUTO: 0.03 10*3/MM3 (ref 0–0.2)
BASOPHILS NFR BLD AUTO: 0.3 % (ref 0–1.5)
BILIRUB SERPL-MCNC: 0.6 MG/DL (ref 0.1–1.2)
BUN BLD-MCNC: 16 MG/DL (ref 8–23)
BUN/CREAT SERPL: 19.8 (ref 7–25)
CALCIUM SPEC-SCNC: 10.9 MG/DL (ref 8.6–10.5)
CHLORIDE SERPL-SCNC: 95 MMOL/L (ref 98–107)
CO2 SERPL-SCNC: 25.1 MMOL/L (ref 22–29)
CREAT BLD-MCNC: 0.81 MG/DL (ref 0.57–1)
DEPRECATED RDW RBC AUTO: 41.3 FL (ref 37–54)
EOSINOPHIL # BLD AUTO: 0.02 10*3/MM3 (ref 0–0.4)
EOSINOPHIL NFR BLD AUTO: 0.2 % (ref 0.3–6.2)
ERYTHROCYTE [DISTWIDTH] IN BLOOD BY AUTOMATED COUNT: 11.4 % (ref 12.3–15.4)
GFR SERPL CREATININE-BSD FRML MDRD: 68 ML/MIN/1.73
GLOBULIN UR ELPH-MCNC: 3.9 GM/DL
GLUCOSE BLD-MCNC: 122 MG/DL (ref 65–99)
GLUCOSE BLDC GLUCOMTR-MCNC: 115 MG/DL (ref 70–130)
HCT VFR BLD AUTO: 33.9 % (ref 34–46.6)
HGB BLD-MCNC: 10.8 G/DL (ref 12–15.9)
HOLD SPECIMEN: NORMAL
HOLD SPECIMEN: NORMAL
IMM GRANULOCYTES # BLD AUTO: 0.08 10*3/MM3 (ref 0–0.05)
IMM GRANULOCYTES NFR BLD AUTO: 0.9 % (ref 0–0.5)
INR PPP: 0.97 (ref 0.9–1.1)
LYMPHOCYTES # BLD AUTO: 1.93 10*3/MM3 (ref 0.7–3.1)
LYMPHOCYTES NFR BLD AUTO: 21.4 % (ref 19.6–45.3)
MCH RBC QN AUTO: 31.8 PG (ref 26.6–33)
MCHC RBC AUTO-ENTMCNC: 31.9 G/DL (ref 31.5–35.7)
MCV RBC AUTO: 99.7 FL (ref 79–97)
MONOCYTES # BLD AUTO: 0.76 10*3/MM3 (ref 0.1–0.9)
MONOCYTES NFR BLD AUTO: 8.4 % (ref 5–12)
NEUTROPHILS # BLD AUTO: 6.19 10*3/MM3 (ref 1.4–7)
NEUTROPHILS NFR BLD AUTO: 68.8 % (ref 42.7–76)
NRBC BLD AUTO-RTO: 0 /100 WBC (ref 0–0)
PLATELET # BLD AUTO: 455 10*3/MM3 (ref 140–450)
PMV BLD AUTO: 9.4 FL (ref 6–12)
POTASSIUM BLD-SCNC: 4.2 MMOL/L (ref 3.5–5.2)
PROT SERPL-MCNC: 8 G/DL (ref 6–8.5)
PROTHROMBIN TIME: 12.7 SECONDS (ref 11.7–14.2)
RBC # BLD AUTO: 3.4 10*6/MM3 (ref 3.77–5.28)
SODIUM BLD-SCNC: 134 MMOL/L (ref 136–145)
TROPONIN T SERPL-MCNC: <0.01 NG/ML (ref 0–0.03)
WBC NRBC COR # BLD: 9.01 10*3/MM3 (ref 3.4–10.8)
WHOLE BLOOD HOLD SPECIMEN: NORMAL
WHOLE BLOOD HOLD SPECIMEN: NORMAL

## 2019-03-01 PROCEDURE — 84484 ASSAY OF TROPONIN QUANT: CPT | Performed by: EMERGENCY MEDICINE

## 2019-03-01 PROCEDURE — 85025 COMPLETE CBC W/AUTO DIFF WBC: CPT | Performed by: EMERGENCY MEDICINE

## 2019-03-01 PROCEDURE — 80053 COMPREHEN METABOLIC PANEL: CPT | Performed by: EMERGENCY MEDICINE

## 2019-03-01 PROCEDURE — 93005 ELECTROCARDIOGRAM TRACING: CPT | Performed by: EMERGENCY MEDICINE

## 2019-03-01 PROCEDURE — 82962 GLUCOSE BLOOD TEST: CPT

## 2019-03-01 PROCEDURE — 85730 THROMBOPLASTIN TIME PARTIAL: CPT | Performed by: EMERGENCY MEDICINE

## 2019-03-01 PROCEDURE — 99285 EMERGENCY DEPT VISIT HI MDM: CPT

## 2019-03-01 PROCEDURE — 85610 PROTHROMBIN TIME: CPT | Performed by: EMERGENCY MEDICINE

## 2019-03-01 PROCEDURE — 93010 ELECTROCARDIOGRAM REPORT: CPT | Performed by: INTERNAL MEDICINE

## 2019-03-01 RX ORDER — LEVETIRACETAM 500 MG/1
500 TABLET ORAL ONCE
Status: COMPLETED | OUTPATIENT
Start: 2019-03-01 | End: 2019-03-01

## 2019-03-01 RX ORDER — SODIUM CHLORIDE 0.9 % (FLUSH) 0.9 %
10 SYRINGE (ML) INJECTION AS NEEDED
Status: DISCONTINUED | OUTPATIENT
Start: 2019-03-01 | End: 2019-03-01

## 2019-03-01 RX ORDER — LEVETIRACETAM 500 MG/1
500 TABLET ORAL 2 TIMES DAILY
Qty: 60 TABLET | Refills: 0 | Status: SHIPPED | OUTPATIENT
Start: 2019-03-01 | End: 2020-10-13 | Stop reason: HOSPADM

## 2019-03-01 RX ORDER — HYDROCODONE BITARTRATE AND ACETAMINOPHEN 7.5; 325 MG/1; MG/1
1 TABLET ORAL ONCE
Status: COMPLETED | OUTPATIENT
Start: 2019-03-01 | End: 2019-03-01

## 2019-03-01 RX ORDER — PHENYTOIN SODIUM 100 MG/1
300 CAPSULE, EXTENDED RELEASE ORAL ONCE
Status: DISCONTINUED | OUTPATIENT
Start: 2019-03-01 | End: 2019-03-01

## 2019-03-01 RX ADMIN — HYDROCODONE BITARTRATE AND ACETAMINOPHEN 1 TABLET: 7.5; 325 TABLET ORAL at 18:48

## 2019-03-01 RX ADMIN — LEVETIRACETAM 500 MG: 500 TABLET, FILM COATED ORAL at 18:31

## 2019-03-01 NOTE — ED TRIAGE NOTES
Pt was sent from [facility] for rehab do to subdural hematoma 2/18/19, past 2 days MD at facility concerned about intermittent aphasia x 2 days went to [facility] had neg CT and sent back to facility. Today MD still concerned about intermittent aphasia sent to ER.

## 2019-03-01 NOTE — ED NOTES
Pt's only complaint is that the top of her head has been painful since yesterday and worse today. Pt neurologically intact. She reports that she is here for a CT scan of her head. Pt denies blurry vision, dizziness, or photo sensitivity. Reassurance given; call light in reach. Pts breathing even and unlabored. Pt appears in NAD at this time.        Alejandra Miller, RN  03/01/19 2691

## 2019-03-01 NOTE — ED PROVIDER NOTES
EMERGENCY DEPARTMENT ENCOUNTER    CHIEF COMPLAINT  Chief Complaint: Aphasia  History given by: Pt, [facility] records  History limited by: none  Room Number: 23/23  PMD: Arnaud Monson MD (Tony)      HPI:  Pt is a 83 y.o. female who was seen at [facility] on 2/18/19 for left-sided Subdural hematoma and pelvic Fx and d/c to rehab. Pt was sent back to [facility] and had a CT Head done yesterday that showed decreased left hematoma and decreased midline shift. Pt was at rehab today and sent back here for further evaluation due to intermittent aphasia for the past 2 days. Pt reports some difficulty swallowing but denies choking on food. Pt denies CP, SOA, N/V/D.     Duration:  2 days  Onset: gradual  Timing: intermittent  Location: n/a  Radiation: none  Quality: aphasia  Intensity/Severity: moderate  Progression: unchanged  Associated Symptoms: mild difficulty swallowing  Aggravating Factors: none  Alleviating Factors: none  Previous Episodes: none    PAST MEDICAL HISTORY  Active Ambulatory Problems     Diagnosis Date Noted   • History of hip surgery 04/01/2016   • Primary osteoarthritis of both knees 04/01/2016   • Arthritis of right knee 06/14/2016   • Aftercare following right knee joint replacement surgery 06/25/2016   • Status post total right knee replacement 07/07/2016   • History of knee replacement, total 08/15/2016   • History of total knee arthroplasty 11/17/2016   • Chronic pain of left knee 11/17/2016   • Arthritis of left knee 11/17/2016   • Closed right hip fracture (CMS/[facility]) 01/02/2017   • Femoral neck fracture (CMS/[facility]) 01/03/2017   • Tear of right gluteus minimus tendon 11/21/2017   • Chronic right-sided low back pain with right-sided sciatica 09/14/2018   • Chronic right hip pain 09/14/2018   • Sacroiliac joint dysfunction 12/18/2018   • Encounter for long-term current use of high risk medication 12/18/2018     Resolved Ambulatory Problems     Diagnosis Date Noted   • No Resolved Ambulatory Problems      Past Medical History:   Diagnosis Date   • Acid reflux    • Back pain    • Colitis    • Diarrhea    • Disease of thyroid gland    • Diverticular disease    • Glaucoma    • Gout    • High blood pressure    • High cholesterol    • History of vertebral compression fracture    • Hyponatremia    • Knee pain    • Low back pain    • Macular degeneration    • Mitral regurgitation 06/2016   • Peripheral neuropathy    • Primary osteoarthritis of both knees 4/1/2016   • Renal lesion    • Rheumatoid arthritis (CMS/HCC)        PAST SURGICAL HISTORY  Past Surgical History:   Procedure Laterality Date   • APPENDECTOMY  1950   • CATARACT EXTRACTION Bilateral    • FEMUR SURGERY Right     had fracture and has rajesh    • HEMORRHOIDECTOMY     • HIP HEMIARTHROPLASTY Right 1/3/2017    Procedure: HIP HEMIARTHROPLASTY with IM rajesh and 2 screw hardware removal antibiotic cement;  Surgeon: Mirza Whittington MD;  Location:  [facility] OR;  Service:    • HIP SURGERY Right 1999    hx fracture   • LEG SURGERY Right 12/1/2017    Procedure: GLUTEAL TENDON REPAIR-minimus and medius;  Surgeon: Mirza Whittington MD;  Location:  [facility] OR;  Service:    • TOTAL KNEE ARTHROPLASTY Right 6/22/2016    Procedure: RT TOTAL KNEE ARTHROPLASTY WITH FINN NAVIGATION;  Surgeon: Adrian Swenson MD;  Location: [facility] MAIN OR;  Service:        FAMILY HISTORY  Family History   Problem Relation Age of Onset   • Heart attack Father        SOCIAL HISTORY  Social History     Socioeconomic History   • Marital status:      Spouse name: Not on file   • Number of children: Not on file   • Years of education: Not on file   • Highest education level: Not on file   Social Needs   • Financial resource strain: Not on file   • Food insecurity - worry: Not on file   • Food insecurity - inability: Not on file   • Transportation needs - medical: Not on file   • Transportation needs - non-medical: Not on file   Occupational History   • Not on file   Tobacco Use   • Smoking status: Never  Smoker   • Smokeless tobacco: Never Used   Substance and Sexual Activity   • Alcohol use: Yes     Alcohol/week: 1.8 oz     Types: 3 Shots of liquor per week     Comment: social use 1 weekly   • Drug use: No   • Sexual activity: Defer   Other Topics Concern   • Not on file   Social History Narrative   • Not on file       ALLERGIES  Neomycin and Statins    REVIEW OF SYSTEMS  Review of Systems   Constitutional: Negative for fever.   HENT: Positive for trouble swallowing (mild). Negative for sore throat.    Eyes: Negative.    Respiratory: Negative for cough and shortness of breath.    Cardiovascular: Negative for chest pain.   Gastrointestinal: Negative for abdominal pain, diarrhea and vomiting.   Genitourinary: Negative for dysuria.   Musculoskeletal: Negative for neck pain.   Skin: Negative for rash.   Neurological: Positive for speech difficulty (intermittent aphasia for the past 2 days). Negative for weakness, numbness and headaches.   Hematological: Negative.    Psychiatric/Behavioral: Negative.    All other systems reviewed and are negative.      PHYSICAL EXAM  ED Triage Vitals   Temp Heart Rate Resp BP SpO2   03/01/19 1555 03/01/19 1554 03/01/19 1554 03/01/19 1554 03/01/19 1554   97.8 °F (36.6 °C) 114 16 150/90 97 %      Temp src Heart Rate Source Patient Position BP Location FiO2 (%)   03/01/19 1555 -- -- -- --   Oral           Physical Exam   Constitutional: She is oriented to person, place, and time. No distress.   HENT:   Head: Normocephalic and atraumatic.   Eyes: EOM are normal. Pupils are equal, round, and reactive to light.   Neck: Normal range of motion. Neck supple.   Cardiovascular: Normal rate and regular rhythm.   Pulmonary/Chest: Effort normal and breath sounds normal. No respiratory distress. She has no wheezes. She has no rales.   Abdominal: Soft. There is no tenderness. There is no rebound and no guarding.   Musculoskeletal: Normal range of motion. She exhibits no edema.   Neurological: She is  alert and oriented to person, place, and time. She has normal sensation and normal strength.    Pt awake. Follows all commands.  strength equal. Can lift all extremities off stretcher.    Skin: Skin is warm and dry. No rash noted.   Psychiatric: Mood and affect normal.   Nursing note and vitals reviewed.      LAB RESULTS  Lab Results (last 24 hours)     Procedure Component Value Units Date/Time    POC Glucose Once [[id number]]  (Normal) Collected:  03/01/19 1601    Specimen:  Blood Updated:  03/01/19 1603     Glucose 115 mg/dL     CBC & Differential [[id number]] Collected:  03/01/19 1608    Specimen:  Blood Updated:  03/01/19 1646    Narrative:       The following orders were created for panel order CBC & Differential.  Procedure                               Abnormality         Status                     ---------                               -----------         ------                     CBC Auto Differential[[id number]]        Abnormal            Final result                 Please view results for these tests on the individual orders.    Comprehensive Metabolic Panel [[id number]]  (Abnormal) Collected:  03/01/19 1608    Specimen:  Blood Updated:  03/01/19 1657     Glucose 122 mg/dL      BUN 16 mg/dL      Creatinine 0.81 mg/dL      Sodium 134 mmol/L      Potassium 4.2 mmol/L      Chloride 95 mmol/L      CO2 25.1 mmol/L      Calcium 10.9 mg/dL      Total Protein 8.0 g/dL      Albumin 4.10 g/dL      ALT (SGPT) 54 U/L      AST (SGOT) 20 U/L      Alkaline Phosphatase 166 U/L      Total Bilirubin 0.6 mg/dL      eGFR Non African Amer 68 mL/min/1.73      Globulin 3.9 gm/dL      A/G Ratio 1.1 g/dL      BUN/Creatinine Ratio 19.8     Anion Gap 13.9 mmol/L     Narrative:       The MDRD GFR formula is only valid for adults with stable renal function between ages 18 and 70.    Protime-INR [[id number]]  (Normal) Collected:  03/01/19 1608    Specimen:  Blood Updated:  03/01/19 1640     Protime 12.7 Seconds      INR 0.97    aPTT  [[id number]]  (Normal) Collected:  03/01/19 1608    Specimen:  Blood Updated:  03/01/19 1640     PTT 27.4 seconds     Troponin [[id number]]  (Normal) Collected:  03/01/19 1608    Specimen:  Blood Updated:  03/01/19 1659     Troponin T <0.010 ng/mL     Narrative:       Troponin T Reference Range:  <= 0.03 ng/mL-   Negative for AMI  >0.03 ng/mL-     Abnormal for myocardial necrosis.  Clinicians would have to utilize clinical acumen, EKG, Troponin and serial changes to determine if it is an Acute Myocardial Infarction or myocardial injury due to an underlying chronic condition.     CBC Auto Differential [[id number]]  (Abnormal) Collected:  03/01/19 1608    Specimen:  Blood Updated:  03/01/19 1646     WBC 9.01 10*3/mm3      RBC 3.40 10*6/mm3      Hemoglobin 10.8 g/dL      Hematocrit 33.9 %      MCV 99.7 fL      MCH 31.8 pg      MCHC 31.9 g/dL      RDW 11.4 %      RDW-SD 41.3 fl      MPV 9.4 fL      Platelets 455 10*3/mm3      Neutrophil % 68.8 %      Lymphocyte % 21.4 %      Monocyte % 8.4 %      Eosinophil % 0.2 %      Basophil % 0.3 %      Immature Grans % 0.9 %      Neutrophils, Absolute 6.19 10*3/mm3      Lymphocytes, Absolute 1.93 10*3/mm3      Monocytes, Absolute 0.76 10*3/mm3      Eosinophils, Absolute 0.02 10*3/mm3      Basophils, Absolute 0.03 10*3/mm3      Immature Grans, Absolute 0.08 10*3/mm3      nRBC 0.0 /100 WBC           I ordered the above labs and reviewed the results        PROCEDURES  Procedures    EKG          EKG time: 1634  Rhythm/Rate: sinus tach, 100  QRS, axis: LAD, PRWP   ST and T waves: nonspecific ST changes     Interpreted Contemporaneously by me, independently viewed  Rate faster otherwise changed compared to prior 11/28/17      PROGRESS AND CONSULTS        1607  Ordered lab work for further evaluation.     1643  Discussed with Dr. Pal, Neurology who states it sounds like pt is having seizures. Recommends to start on Dilantin and outpatient f/u with Neurology.     1743  Discussed the pt's  case with Dr. Marrero from Rehab facility who recommends Keppra instead and for pt to follow up with outpatient Neurology consult.     1744  Ordered PO Keppra    1747  Rechecked pt. Pt is resting comfortably. Notified pt of discussion with Dr. Pal and Dr. Marrero. Discussed the plan to discharge the pt home with prescriptions for Keppra.- son at bedside. I instructed the pt to f/u with Neurology. Pt understands and agrees with the plan, all questions answered.        MEDICAL DECISION MAKING  Results were reviewed/discussed with the patient and they were also made aware of online access. Pt also made aware that some labs, such as cultures, will not be resulted during ER visit and follow up with PMD is necessary.     MDM  Number of Diagnoses or Management Options     Amount and/or Complexity of Data Reviewed  Clinical lab tests: ordered and reviewed (troponin<0.01; CBC: Hgb-10; CMP: sodium-134, BUN-16, creatinine-0.81)  Tests in the medicine section of CPT®: ordered and reviewed (See EKG results in procedure. )  Decide to obtain previous medical records or to obtain history from someone other than the patient: yes  Review and summarize past medical records: yes (On 2/18/19, pt was seen at [facility] for left-sided subdural hematoma and pelvic Fx. Pt was transferred to [facility] then transferred to [facility] for rehab. Pt was then transferred to [place] yesterday for episodic aphasia. They did a CT Head that showed decreased size in subdural hematoma and decrease in size of mid-line shift. Pt was then transferred to to rehab facility then sent here for further evaluation. )  Discuss the patient with other providers: yes ( Dr. Marrero; Dr. Pal, Neurology)  Independent visualization of images, tracings, or specimens: yes           DIAGNOSIS  Final diagnoses:   Focal seizure (CMS/HCC)   H/O traumatic subdural hematoma       DISPOSITION  DISCHARGE    Patient discharged in stable condition.    Reviewed  implications of results, diagnosis, meds, responsibility to follow up, warning signs and symptoms of possible worsening, potential complications and reasons to return to ER.    Patient/Family voiced understanding of above instructions.    Discussed plan for discharge, as there is no emergent indication for admission. Patient referred to primary care provider for BP management due to today's BP. Pt/family is agreeable and understands need for follow up and repeat testing.  Pt is aware that discharge does not mean that nothing is wrong but it indicates no emergency is present that requires admission and they must continue care with follow-up as given below or physician of their choice.     FOLLOW-UP  Yoana Marrero MD  [address]  [facility] [id number] [phone]      follow up at Rehab facility for continued evaluation    Landy Butcher MD  [address] [place] [address] [phone]    Schedule an appointment as soon as possible for a visit   for continued evaluation of symptoms         Medication List      New Prescriptions    levETIRAcetam 500 MG tablet  Commonly known as:  KEPPRA  Take 1 tablet by mouth 2 (Two) Times a Day.              Latest Documented Vital Signs:  As of 6:12 PM  BP- 150/90 HR- 114 Temp- 97.8 °F (36.6 °C) (Oral) O2 sat- 97%    --  Documentation assistance provided by todd Curry for Dr. Carrillo.  Information recorded by the scribe was done at my direction and has been verified and validated by me.        Sabino Curry  03/01/19 1813       Brian Carrillo MD  03/01/19 6682

## 2019-03-02 NOTE — ED NOTES
Spoke to Sonja at [facility] and cancelled pickup.  Pt going back to facility per [facility].     Mayte Rodriguez RN  03/01/19 1931

## 2019-03-08 ENCOUNTER — TELEPHONE (OUTPATIENT)
Dept: NEUROLOGY | Facility: CLINIC | Age: 84
End: 2019-03-08

## 2019-03-08 NOTE — TELEPHONE ENCOUNTER
----- Message from Sabino De León MD sent at 3/7/2019  5:42 PM EST -----  Regarding: RE: Neuro changes  Contact: [phone]  I would recommend he be admitted for continuous EEG and MRI. I agree with Demarco this sounds like seizure but it doesn't seem like the Dilantin is working.    Kwesi    ----- Message -----  From: Shannan Rainey RN  Sent: 3/7/2019  11:38 AM  To: Sabino De León MD  Subject: Neuro changes                                    Dr. De León,    Dr. Marrero, the patient's facility md, called in concern for this patient.     2/18 Patient had fallen, went to ER head CT showed acute left subdural hematoma, pt dc to facility  2/28 Follow up Head Ct  showed decreased left hematoma and decreased midline shift  3/1 Pt sent to ER for intermittent aphasia. In the ER the case was discussed with Dr. Pal who stated it sounds like pt is having seizures. He recommended to start on Dilantin and outpatient f/u with Neurology. Pt was discharged back to the facility.  This week the patient has still been having intermittent episodes of being unable to speak. These episodes are becoming more frequent. During therapy today the OT noticed the patient being unable to notice things in her right field of vision.    With these symptoms I advised him for the patient to be evaluated in the ER. He states he does not want to do this as this would be a waste of time. He is asking for a provider to evaluate  the patient in the office either today or tomorrow.   I notified him I would ask you if you have any other recommendations.

## 2019-03-08 NOTE — TELEPHONE ENCOUNTER
Returned call Dr. Marrero, notifying of Dr. Montero recommendations.   Dr. Marrero states the patient is being evaluated at [facility].

## 2019-04-02 DIAGNOSIS — G89.29 CHRONIC RIGHT HIP PAIN: ICD-10-CM

## 2019-04-02 DIAGNOSIS — M25.551 CHRONIC RIGHT HIP PAIN: ICD-10-CM

## 2019-04-02 DIAGNOSIS — G89.29 CHRONIC RIGHT-SIDED LOW BACK PAIN WITH RIGHT-SIDED SCIATICA: ICD-10-CM

## 2019-04-02 DIAGNOSIS — M54.41 CHRONIC RIGHT-SIDED LOW BACK PAIN WITH RIGHT-SIDED SCIATICA: ICD-10-CM

## 2019-04-02 RX ORDER — HYDROCODONE BITARTRATE AND ACETAMINOPHEN 7.5; 325 MG/1; MG/1
1 TABLET ORAL 4 TIMES DAILY PRN
Qty: 120 TABLET | Refills: 0 | OUTPATIENT
Start: 2019-04-02

## 2019-04-02 NOTE — TELEPHONE ENCOUNTER
She no-showed her 2 month f/u.  Needs to re-schedule f/u visit before even a partial will be considered.  Also need updated nitin.

## 2019-04-02 NOTE — TELEPHONE ENCOUNTER
Pt called today made appt for 4/17    As requested for office visit 3pm         Updated [facility]     Medication request refill for     HYDROcodone-acetaminophen (NORCO) 7.5-325 MG per tablet     Pharmacy:  [facility] - [address] - [phone]  - [phone] FX Phone:  [phone] Fax:  [phone]    Address:  [address] [place] [address]

## 2019-04-02 NOTE — TELEPHONE ENCOUNTER
Medication Refill Request    Date of phone call: 4/2/2019    Medication being requested: Norco 7.5-325 sig: Take 1 tablet by mouth four times a  Day as needed for moderate pain.  Qty: 120    Date of last visit: 01/18/2019    Date of last refill: 01/18/2019    JUDY up to date?: yes    Next Follow up?: Not paco    Any new pertinent information? (i.e, new medication allergies, new use of medications, change in patient's health or condition, non-compliance or inconsistency with prescribing agreement?):

## 2019-04-17 ENCOUNTER — OFFICE VISIT (OUTPATIENT)
Dept: PAIN MEDICINE | Facility: CLINIC | Age: 84
End: 2019-04-17

## 2019-04-17 VITALS
HEIGHT: 63 IN | TEMPERATURE: 97.9 F | WEIGHT: 126.6 LBS | SYSTOLIC BLOOD PRESSURE: 160 MMHG | RESPIRATION RATE: 20 BRPM | DIASTOLIC BLOOD PRESSURE: 79 MMHG | BODY MASS INDEX: 22.43 KG/M2 | OXYGEN SATURATION: 96 % | HEART RATE: 70 BPM

## 2019-04-17 DIAGNOSIS — M54.41 CHRONIC RIGHT-SIDED LOW BACK PAIN WITH RIGHT-SIDED SCIATICA: Primary | ICD-10-CM

## 2019-04-17 DIAGNOSIS — M25.551 CHRONIC RIGHT HIP PAIN: ICD-10-CM

## 2019-04-17 DIAGNOSIS — G89.29 CHRONIC RIGHT HIP PAIN: ICD-10-CM

## 2019-04-17 DIAGNOSIS — M53.3 SACROILIAC JOINT DYSFUNCTION: ICD-10-CM

## 2019-04-17 DIAGNOSIS — G89.29 CHRONIC RIGHT-SIDED LOW BACK PAIN WITH RIGHT-SIDED SCIATICA: Primary | ICD-10-CM

## 2019-04-17 PROCEDURE — 99214 OFFICE O/P EST MOD 30 MIN: CPT | Performed by: NURSE PRACTITIONER

## 2019-04-17 RX ORDER — LANOLIN ALCOHOL/MO/W.PET/CERES
325 CREAM (GRAM) TOPICAL
Refills: 0 | COMMUNITY
Start: 2019-03-30 | End: 2023-01-05 | Stop reason: ALTCHOICE

## 2019-04-17 RX ORDER — HYDROCODONE BITARTRATE AND ACETAMINOPHEN 7.5; 325 MG/1; MG/1
1 TABLET ORAL 4 TIMES DAILY PRN
Qty: 120 TABLET | Refills: 0 | Status: SHIPPED | OUTPATIENT
Start: 2019-04-17 | End: 2019-05-16 | Stop reason: SDUPTHER

## 2019-04-17 RX ORDER — ACETAMINOPHEN 160 MG
TABLET,DISINTEGRATING ORAL
Refills: 0 | COMMUNITY
Start: 2019-03-30 | End: 2020-10-10

## 2019-04-17 NOTE — PROGRESS NOTES
"CHIEF COMPLAINT  F/u back and left hip pain. Recently had a fall 3/2019, hit head, injured left hip, seen at hospital, had CT brain and Xrays showed brain bleed, was in ICU. Then went to rehab x 1 month.     Subjective   Ashli León is a 83 y.o. female  who presents to the office for follow-up.She has a history of chronic back and hip pain. REports this pain is worse but associates this with recent fall.    Since her last office visit, she fell in February, hitting her head and fracture her left hip. Did not require surgery. \"he's letting heal on its own.\" Seeing orthopedist. Also had \"Bleeding on the brain.\" Was in ICU. Was discharged to rehab for approximately 1 month.  She is currently home. Son was staying but now she stays by herself. Does not drive.    Complains of pain in her low back and left hip. Today her pain is 6/10VAS. Describes the pain as continuous aching and throbbing. Pain increases with activity; pain decreases with medication and rest. Previously was taking Hydrocodone 7.5/325 2-4/day. Denies any side effects from the regimen. The regimen helps decrease her pain by 50%. Notes improvement in function and activity with regimen. ADL's by self.     Back Pain   This is a chronic problem. The current episode started more than 1 year ago. The problem occurs constantly. The problem has been gradually worsening (worse since last office visit) since onset. The pain is present in the lumbar spine. The pain does not radiate. The pain is at a severity of 6/10. The pain is moderate. The pain is worse during the day. The symptoms are aggravated by bending, position, standing and twisting. Associated symptoms include weakness (occ). Pertinent negatives include no bladder incontinence, bowel incontinence, chest pain or headaches. Risk factors include lack of exercise, sedentary lifestyle and history of osteoporosis. She has tried analgesics for the symptoms.      Past pain medications: percocet 7.5 "      Current pain medications:   Lortab qid  Lidocaine cream     Past therapies:  Physical Therapy: yes (after hip sx most recently)  Chiropractor: no  Massage Therapy: no  TENS: yes  Neck or back surgery: no Pt has had lumbar compression fractures.  Past pain management: yes     Previous Injection: RIGHT Sacroiliac Joint Injection on 11/21/2018 and Right L5 and S1 LTFESI 10/24/128   Effect of Injection (%): 10% relief on 11/21/18 and 25% on 10/24/18  Length of Relief:  3 days from 2nd injection     Previous Injections: yes - LESI in early 2000s - unsure if helped, can not remember  Right hip steroid injection - 8/8/2018 - minimal help    PEG Assessment   What number best describes your pain on average in the past week?6  What number best describes how, during the past week, pain has interfered with your enjoyment of life?9  What number best describes how, during the past week, pain has interfered with your general activity?  9      The following portions of the patient's history were reviewed and updated as appropriate: allergies, current medications, past family history, past medical history, past social history, past surgical history and problem list.    Review of Systems   Constitutional: Positive for activity change (decreased) and fatigue (occ).   HENT: Negative for congestion.    Eyes: Negative for visual disturbance.   Respiratory: Negative for shortness of breath.    Cardiovascular: Negative for chest pain.   Gastrointestinal: Negative for bowel incontinence, constipation and diarrhea.   Genitourinary: Negative for bladder incontinence, difficulty urinating and dyspareunia.   Musculoskeletal: Positive for arthralgias, back pain and gait problem (uses walker).   Allergic/Immunologic: Negative for immunocompromised state.   Neurological: Positive for seizures (during recent fall) and weakness (occ). Negative for headaches.   Psychiatric/Behavioral: Positive for sleep disturbance (occ). Negative for agitation  "and suicidal ideas.       Vitals:    04/17/19 1453   BP: 160/79   Pulse: 70   Resp: 20   Temp: 97.9 °F (36.6 °C)   SpO2: 96%   Weight: 57.4 kg (126 lb 9.6 oz)   Height: 160 cm (63\")   PainSc:   6   PainLoc: Back     Objective   Physical Exam   Constitutional: She is oriented to person, place, and time. She appears well-developed and well-nourished. No distress.   HENT:   Head: Normocephalic and atraumatic.   Eyes: Conjunctivae are normal.   Neck: Neck supple.   Cardiovascular: Normal rate.   Pulmonary/Chest: Effort normal. No respiratory distress.   Musculoskeletal:        Lumbar back: She exhibits tenderness and pain.   RIGHT SI JOINT TENDERNESS      Neurological: She is alert and oriented to person, place, and time. She has normal strength. No sensory deficit. Gait (AMBULATES WITH WALKER) abnormal.   Skin: Skin is warm and dry.   Psychiatric: She has a normal mood and affect. Her behavior is normal.   Nursing note and vitals reviewed.    Assessment/Plan   Ashli was seen today for back pain and hip pain.    Diagnoses and all orders for this visit:    Chronic right-sided low back pain with right-sided sciatica  -     HYDROcodone-acetaminophen (NORCO) 7.5-325 MG per tablet; Take 1 tablet by mouth 4 (Four) Times a Day As Needed for Moderate Pain .    Sacroiliac joint dysfunction    Chronic right hip pain  -     HYDROcodone-acetaminophen (NORCO) 7.5-325 MG per tablet; Take 1 tablet by mouth 4 (Four) Times a Day As Needed for Moderate Pain .      --- The urine drug screen confirmation from 1-18-19 has been reviewed and the result is APPROPRIATE based on patient history and JUDY report  --- Re-start Hydrocodone 7.5/325 q6hrs PRN. Patient appears stable with current regimen. No adverse effects. Regarding continuation of opioids, there is no evidence of aberrant behavior or any red flags.  The patient continues with appropriate response to opioid therapy. ADL's remain intact by self.   --- Follow-up 1 month or sooner if " needed     JUDY REPORT    As part of the patient's treatment plan, I am prescribing controlled substances. The patient has been made aware of appropriate use of such medications, including potential risk of somnolence, limited ability to drive and/or work safely, and the potential for dependence or overdose. It has also bee made clear that these medications are for use by this patient only, without concomitant use of alcohol or other substances unless prescribed.     Patient has completed prescribing agreement detailing terms of continued prescribing of controlled substances, including monitoring JUDY reports, urine drug screening, and pill counts if necessary. The patient is aware that inappropriate use will results in cessation of prescribing such medications.    JUDY report has been reviewed and scanned into the patient's chart.    As the clinician, I personally reviewed the JUDY from 4-15-19 while the patient was in the office today.    History and physical exam exhibit continued safe and appropriate use of controlled substances.      EMR Dragon/Transcription disclaimer:   Much of this encounter note is an electronic transcription/translation of spoken language to printed text. The electronic translation of spoken language may permit erroneous, or at times, nonsensical words or phrases to be inadvertently transcribed; Although I have reviewed the note for such errors, some may still exist.

## 2019-05-16 ENCOUNTER — OFFICE VISIT (OUTPATIENT)
Dept: PAIN MEDICINE | Facility: CLINIC | Age: 84
End: 2019-05-16

## 2019-05-16 ENCOUNTER — TRANSCRIBE ORDERS (OUTPATIENT)
Dept: ADMINISTRATIVE | Facility: HOSPITAL | Age: 84
End: 2019-05-16

## 2019-05-16 VITALS
TEMPERATURE: 98.6 F | OXYGEN SATURATION: 98 % | BODY MASS INDEX: 22.68 KG/M2 | HEART RATE: 68 BPM | DIASTOLIC BLOOD PRESSURE: 75 MMHG | WEIGHT: 128 LBS | HEIGHT: 63 IN | RESPIRATION RATE: 16 BRPM | SYSTOLIC BLOOD PRESSURE: 132 MMHG

## 2019-05-16 DIAGNOSIS — G89.29 CHRONIC RIGHT-SIDED LOW BACK PAIN WITH RIGHT-SIDED SCIATICA: Primary | ICD-10-CM

## 2019-05-16 DIAGNOSIS — G89.29 CHRONIC RIGHT HIP PAIN: ICD-10-CM

## 2019-05-16 DIAGNOSIS — M25.552 PAIN OF LEFT HIP JOINT: ICD-10-CM

## 2019-05-16 DIAGNOSIS — S06.5XAA SUBDURAL HEMATOMA (HCC): Primary | ICD-10-CM

## 2019-05-16 DIAGNOSIS — M25.551 CHRONIC RIGHT HIP PAIN: ICD-10-CM

## 2019-05-16 DIAGNOSIS — Z79.899 ENCOUNTER FOR LONG-TERM CURRENT USE OF HIGH RISK MEDICATION: ICD-10-CM

## 2019-05-16 DIAGNOSIS — M54.41 CHRONIC RIGHT-SIDED LOW BACK PAIN WITH RIGHT-SIDED SCIATICA: Primary | ICD-10-CM

## 2019-05-16 PROCEDURE — 99214 OFFICE O/P EST MOD 30 MIN: CPT | Performed by: NURSE PRACTITIONER

## 2019-05-16 RX ORDER — HYDROCODONE BITARTRATE AND ACETAMINOPHEN 7.5; 325 MG/1; MG/1
1 TABLET ORAL 4 TIMES DAILY PRN
Qty: 120 TABLET | Refills: 0 | Status: SHIPPED | OUTPATIENT
Start: 2019-05-16 | End: 2019-06-12 | Stop reason: SDUPTHER

## 2019-05-16 RX ORDER — SENNOSIDES 8.6 MG/1
TABLET, FILM COATED ORAL
Refills: 0 | COMMUNITY
Start: 2019-03-30 | End: 2020-10-10

## 2019-05-16 NOTE — PROGRESS NOTES
"CHIEF COMPLAINT  Pt is here to f/u on back and Right hip pain. Pt sts the pain is the same. Pt also sts that she is experiencing muscle pain bi-lat leg x3 months    Subjective   Ashli León is a 83 y.o. female  who presents to the office for follow-up.She has a history of chronic back and hip pain. Reports her pain is improved since last office visit. She was able to re-start Hydrocodone 7.5/325 q6hrs PRN. Does notice an improvement since re-starting this.    Complains of pain in her low back and left hip. Today her pain is 4/10VAS. Describes the pain as nearly continuous aching and throbbing. \"About the only time I can say i'm pain free is when I go to bed at night and that's after I take the medication.\"  Continues with Hydrocodone 7.5/325 4/day. Denies any side effects from the regimen. The regimen helps decrease her pain by 50%. ADL's by self. Remains at home by self. Neighbors check in frequently.     Since her last office visit, she fell in February, hitting her head and fracture her left hip. Did not require surgery. \"he's letting heal on its own.\" Seeing orthopedist. Also had \"Bleeding on the brain.\" Was in ICU. Was discharged to rehab for approximately 1 month.  . Does not drive.  She has not been back to ortho since discharge from rehab.  Back Pain   This is a chronic problem. The current episode started more than 1 year ago. The problem occurs constantly. The problem has been gradually worsening (unchanged since last office visit) since onset. The pain is present in the lumbar spine. The pain does not radiate. The pain is at a severity of 4/10. The pain is moderate. The pain is worse during the day. The symptoms are aggravated by bending, position, standing and twisting. Associated symptoms include weakness (occ). Pertinent negatives include no abdominal pain, bladder incontinence, bowel incontinence, chest pain or headaches. Risk factors include lack of exercise, sedentary lifestyle and history of " osteoporosis. She has tried analgesics for the symptoms.   Hip Pain    Incident onset: fall february 2019 with fracture. The incident occurred at home. The injury mechanism was a fall. The pain is present in the left hip. The pain is at a severity of 4/10. The pain is moderate. The pain has been constant since onset. Associated symptoms include an inability to bear weight and a loss of motion.        Past pain medications: percocet 7.5      Current pain medications:   Lortab qid  Lidocaine cream     Past therapies:  Physical Therapy: yes (after hip sx most recently)  Chiropractor: no  Massage Therapy: no  TENS: yes  Neck or back surgery: no Pt has had lumbar compression fractures.  Past pain management: yes     Previous Injection: RIGHT Sacroiliac Joint Injection on 11/21/2018 and Right L5 and S1 LTFESI 10/24/128   Effect of Injection (%): 10% relief on 11/21/18 and 25% on 10/24/18  Length of Relief:  3 days from 2nd injection     Previous Injections: yes - LESI in early 2000s - unsure if helped, can not remember  Right hip steroid injection - 8/8/2018 - minimal help    PEG Assessment   What number best describes your pain on average in the past week?6  What number best describes how, during the past week, pain has interfered with your enjoyment of life?8  What number best describes how, during the past week, pain has interfered with your general activity?  8      The following portions of the patient's history were reviewed and updated as appropriate: allergies, current medications, past family history, past medical history, past social history, past surgical history and problem list.    Review of Systems   Constitutional: Negative for fatigue.   HENT: Negative for congestion.    Respiratory: Negative for shortness of breath.    Cardiovascular: Negative for chest pain and palpitations.   Gastrointestinal: Negative for abdominal pain and bowel incontinence.   Genitourinary: Negative for bladder incontinence and  "difficulty urinating.   Musculoskeletal: Positive for back pain.   Neurological: Positive for weakness (occ). Negative for dizziness and headaches.   Psychiatric/Behavioral: Negative for sleep disturbance.       Vitals:    05/16/19 1449   BP: 132/75   Pulse: 68   Resp: 16   Temp: 98.6 °F (37 °C)   SpO2: 98%   Weight: 58.1 kg (128 lb)   Height: 160 cm (62.99\")   PainSc:   4         Objective   Physical Exam   Constitutional: She is oriented to person, place, and time. She appears well-developed and well-nourished.   HENT:   Head: Normocephalic and atraumatic.   Eyes: Conjunctivae are normal.   Neck: Neck supple.   Cardiovascular: Normal rate.   Pulmonary/Chest: Effort normal.   Musculoskeletal:        Left hip: She exhibits decreased range of motion.        Lumbar back: She exhibits tenderness and pain.   mild to moderate right Si joint tenderness     Neurological: She is alert and oriented to person, place, and time. Gait (AMBULATES WITH WALKER) abnormal.   Reflex Scores:       Patellar reflexes are 1+ on the right side and 1+ on the left side.  Skin: Skin is warm and dry.   Psychiatric: She has a normal mood and affect. Her behavior is normal.   Nursing note and vitals reviewed.      Assessment/Plan   Ashli was seen today for back pain and hip pain.    Diagnoses and all orders for this visit:    Chronic right-sided low back pain with right-sided sciatica  -     HYDROcodone-acetaminophen (NORCO) 7.5-325 MG per tablet; Take 1 tablet by mouth 4 (Four) Times a Day As Needed for Moderate Pain .    Encounter for long-term current use of high risk medication    Pain of left hip joint    Chronic right hip pain  -     HYDROcodone-acetaminophen (NORCO) 7.5-325 MG per tablet; Take 1 tablet by mouth 4 (Four) Times a Day As Needed for Moderate Pain .      --- The urine drug screen confirmation from 1-18-19 has been reviewed and the result is APPROPRIATE based on patient history and JUDY report  --- Refill Hydrocodone. Patient " appears stable with current regimen. No adverse effects. Regarding continuation of opioids, there is no evidence of aberrant behavior or any red flags.  The patient continues with appropriate response to opioid therapy. ADL's remain intact by self.   -- Encouraged patient to follow-up with Dr Gomez. Declines referral stating she will call and set up appointment. Is also complaining of increased knee pain.   --- Follow-up  2 months or sooner if needed.     JUDY REPORT    As part of the patient's treatment plan, I am prescribing controlled substances. The patient has been made aware of appropriate use of such medications, including potential risk of somnolence, limited ability to drive and/or work safely, and the potential for dependence or overdose. It has also bee made clear that these medications are for use by this patient only, without concomitant use of alcohol or other substances unless prescribed.     Patient has completed prescribing agreement detailing terms of continued prescribing of controlled substances, including monitoring JUDY reports, urine drug screening, and pill counts if necessary. The patient is aware that inappropriate use will results in cessation of prescribing such medications.    JUDY report has been reviewed and scanned into the patient's chart.    As the clinician, I personally reviewed the JUDY from 5-14-19 while the patient was in the office today.    History and physical exam exhibit continued safe and appropriate use of controlled substances.      EMR Dragon/Transcription disclaimer:   Much of this encounter note is an electronic transcription/translation of spoken language to printed text. The electronic translation of spoken language may permit erroneous, or at times, nonsensical words or phrases to be inadvertently transcribed; Although I have reviewed the note for such errors, some may still exist.

## 2019-06-12 DIAGNOSIS — G89.29 CHRONIC RIGHT HIP PAIN: ICD-10-CM

## 2019-06-12 DIAGNOSIS — M54.41 CHRONIC RIGHT-SIDED LOW BACK PAIN WITH RIGHT-SIDED SCIATICA: ICD-10-CM

## 2019-06-12 DIAGNOSIS — M25.551 CHRONIC RIGHT HIP PAIN: ICD-10-CM

## 2019-06-12 DIAGNOSIS — G89.29 CHRONIC RIGHT-SIDED LOW BACK PAIN WITH RIGHT-SIDED SCIATICA: ICD-10-CM

## 2019-06-12 RX ORDER — HYDROCODONE BITARTRATE AND ACETAMINOPHEN 7.5; 325 MG/1; MG/1
1 TABLET ORAL 4 TIMES DAILY PRN
Qty: 120 TABLET | Refills: 0 | Status: SHIPPED | OUTPATIENT
Start: 2019-06-12 | End: 2019-06-25 | Stop reason: SDUPTHER

## 2019-06-12 NOTE — TELEPHONE ENCOUNTER
Medication Refill Request    Date of phone call: 19    Medication being requested: norco 7.5/325mg si tab po qid prn   Qty: 120    Date of last visit: 19    Date of last refill: 19    JUDY up to date?: yes    Next Follow up?: 19    Any new pertinent information? (i.e, new medication allergies, new use of medications, change in patient's health or condition, non-compliance or inconsistency with prescribing agreement?):

## 2019-06-25 ENCOUNTER — TELEPHONE (OUTPATIENT)
Dept: PAIN MEDICINE | Facility: CLINIC | Age: 84
End: 2019-06-25

## 2019-06-25 DIAGNOSIS — G89.29 CHRONIC RIGHT-SIDED LOW BACK PAIN WITH RIGHT-SIDED SCIATICA: ICD-10-CM

## 2019-06-25 DIAGNOSIS — G89.29 CHRONIC RIGHT HIP PAIN: ICD-10-CM

## 2019-06-25 DIAGNOSIS — M25.551 CHRONIC RIGHT HIP PAIN: ICD-10-CM

## 2019-06-25 DIAGNOSIS — M54.41 CHRONIC RIGHT-SIDED LOW BACK PAIN WITH RIGHT-SIDED SCIATICA: ICD-10-CM

## 2019-06-25 RX ORDER — HYDROCODONE BITARTRATE AND ACETAMINOPHEN 7.5; 325 MG/1; MG/1
1 TABLET ORAL 4 TIMES DAILY PRN
Qty: 120 TABLET | Refills: 0 | Status: SHIPPED | OUTPATIENT
Start: 2019-06-25 | End: 2019-07-16 | Stop reason: SDUPTHER

## 2019-06-25 NOTE — TELEPHONE ENCOUNTER
Can you please resend medication to the pharmacy. Norco I called pharmacy they don't have it and it says transmission failed.

## 2019-07-08 ENCOUNTER — HOSPITAL ENCOUNTER (OUTPATIENT)
Dept: CT IMAGING | Facility: HOSPITAL | Age: 84
Discharge: HOME | End: 2019-07-08
Admitting: NEUROLOGICAL SURGERY

## 2019-07-08 DIAGNOSIS — S06.5XAA SUBDURAL HEMATOMA (HCC): ICD-10-CM

## 2019-07-08 PROCEDURE — 70450 CT HEAD/BRAIN W/O DYE: CPT

## 2019-07-16 ENCOUNTER — OFFICE VISIT (OUTPATIENT)
Dept: PAIN MEDICINE | Facility: CLINIC | Age: 84
End: 2019-07-16

## 2019-07-16 VITALS
SYSTOLIC BLOOD PRESSURE: 124 MMHG | HEART RATE: 65 BPM | DIASTOLIC BLOOD PRESSURE: 60 MMHG | BODY MASS INDEX: 23.39 KG/M2 | RESPIRATION RATE: 16 BRPM | WEIGHT: 132 LBS | TEMPERATURE: 98.5 F | HEIGHT: 63 IN | OXYGEN SATURATION: 95 %

## 2019-07-16 DIAGNOSIS — G89.29 OTHER CHRONIC PAIN: Primary | ICD-10-CM

## 2019-07-16 DIAGNOSIS — M53.3 SACROILIAC JOINT DYSFUNCTION: ICD-10-CM

## 2019-07-16 DIAGNOSIS — Z79.899 ENCOUNTER FOR LONG-TERM CURRENT USE OF HIGH RISK MEDICATION: ICD-10-CM

## 2019-07-16 DIAGNOSIS — M25.551 CHRONIC RIGHT HIP PAIN: ICD-10-CM

## 2019-07-16 DIAGNOSIS — G89.29 CHRONIC RIGHT-SIDED LOW BACK PAIN WITH RIGHT-SIDED SCIATICA: ICD-10-CM

## 2019-07-16 DIAGNOSIS — G89.29 CHRONIC RIGHT HIP PAIN: ICD-10-CM

## 2019-07-16 DIAGNOSIS — M54.41 CHRONIC RIGHT-SIDED LOW BACK PAIN WITH RIGHT-SIDED SCIATICA: ICD-10-CM

## 2019-07-16 PROCEDURE — 99214 OFFICE O/P EST MOD 30 MIN: CPT | Performed by: NURSE PRACTITIONER

## 2019-07-16 RX ORDER — HYDROCODONE BITARTRATE AND ACETAMINOPHEN 7.5; 325 MG/1; MG/1
1 TABLET ORAL 4 TIMES DAILY PRN
Qty: 120 TABLET | Refills: 0 | Status: SHIPPED | OUTPATIENT
Start: 2019-07-16 | End: 2019-09-12 | Stop reason: SDUPTHER

## 2019-07-16 NOTE — PROGRESS NOTES
"CHIEF COMPLAINT  F/U back and right hip pain- patient states that her pain has remained the same. She states that her low back pain has began radiating to her bilateral thighs.     Subjective   Ashli León is a 84 y.o. female  who presents to the office for follow-up.She has a history of chronic back and hip pain. Reports her pain is unchanged since last office visit. \"I think i'm just going to always hurt.\"    Complains of pain in her low back and right hip. Today her pain is 4/10VAS. Describes the pain as nearly continuous aching and throbbing. Pain increases with walking, standing; pain decreases with medication and rest. Pain is usually worst in the morning. Continues with Hydrocodone 7.5/325 4/day. Denies any side effects from the regimen. The regimen helps decrease her pain by 50%. Admits her pain medication helps her with activity and function.  Pain usually does not interfere with sleep. ADL's by self. Remains at home by self.   NO hospitalizations since last office visit.  Back Pain   This is a chronic problem. The current episode started more than 1 year ago. The problem occurs constantly. The problem has been gradually worsening (unchanged since last office visit) since onset. The pain is present in the lumbar spine. The pain does not radiate. The pain is at a severity of 4/10. The pain is moderate. The pain is worse during the day. The symptoms are aggravated by bending, position, standing and twisting. Associated symptoms include weakness (occ). Pertinent negatives include no abdominal pain, bladder incontinence, bowel incontinence, chest pain, dysuria or headaches. Risk factors include lack of exercise, sedentary lifestyle and history of osteoporosis. She has tried analgesics for the symptoms.   Hip Pain    Incident onset: fall february 2019 with fracture. The incident occurred at home. The injury mechanism was a fall. The pain is present in the left hip. The pain is at a severity of 4/10. The " pain is moderate. The pain has been constant since onset. Associated symptoms include an inability to bear weight and a loss of motion.      Past pain medications: percocet 7.5      Current pain medications:   Lortab qid  Lidocaine cream     Past therapies:  Physical Therapy: yes (after hip sx most recently)  Chiropractor: no  Massage Therapy: no  TENS: yes  Neck or back surgery: no Pt has had lumbar compression fractures.  Past pain management: yes     Previous Injection: RIGHT Sacroiliac Joint Injection on 11/21/2018 and Right L5 and S1 LTFESI 10/24/18   Effect of Injection (%): 10% relief on 11/21/18 and 25% on 10/24/18  Length of Relief:  3 days from 2nd injection     Previous Injections: yes - LESI in early 2000s - unsure if helped, can not remember  Right hip steroid injection - 8/8/2018 - minimal help    EXAM: LUMBAR SPINE MRI WITHOUT CONTRAST 9/28/2018     HISTORY: Low back pain radiating to right hip and knee since December 20, 2017, more severe for the past 2 months     TECHNIQUE: Lumbar spine MRI without contrast      COMPARISON:None     FINDINGS: Mild levoscoliosis, and slight L5-S1 retrolisthesis. Chronic  upper endplate compression deformities at L2 and L1 and T11 and T12, no  evidence of acute fracture or acute marrow edema or infiltration or  placement. The paraspinous soft tissues are unremarkable.     Tip of the conus is borderline low-lying, at about the level of the L2  lower endplate, the lower limit of normal, though the distal cord is  otherwise normal in signal and appearance. There could be canal stenosis  with mild cord compression due to degenerative change at T10-11, not  completely evaluated here on this lumbar spine MRI.     At L1-2 disc and endplate change and facet arthropathy cause canal  stenosis and possibly slight compression of the distal cord/conus. There  is no abnormal cord signal. There is moderate right and severe left  foraminal stenosis.     At L2-3 there is disc and  endplate change and facet arthropathy and mild  canal stenosis, and mild left and mild or mild to moderate right  foraminal stenosis.     At L3-4 there is disc and endplate change and facet arthropathy and mild  or mild to moderate canal stenosis and moderate right and left foraminal  stenosis.     At L4-5 there is disc and endplate change and facet arthropathy and  borderline to mild canal stenosis. There is moderate to severe right and  moderate left foraminal stenosis.     At L5-S1 there is degenerative change but no canal stenosis and moderate  or moderate to severe right and moderate to severe left foraminal  stenosis.     IMPRESSION:  Extensive chronic changes as above, no definite acute  abnormality.     Borderline low-lying tip of the conus, see above.     Possible lower thoracic cord compression at T10-11 due to degenerative  change, incompletely evaluated here on this lumbar spine MRI.     This report was finalized on 9/29/2018 6:59 AM by Dr. Gulshan Sigala MD.    PEG Assessment   What number best describes your pain on average in the past week?6  What number best describes how, during the past week, pain has interfered with your enjoyment of life?8  What number best describes how, during the past week, pain has interfered with your general activity?  8    The following portions of the patient's history were reviewed and updated as appropriate: allergies, current medications, past family history, past medical history, past social history, past surgical history and problem list.    Review of Systems   Constitutional: Negative for chills and fatigue.   HENT: Negative for congestion.    Respiratory: Negative for shortness of breath.    Cardiovascular: Negative for chest pain and palpitations.   Gastrointestinal: Negative for abdominal pain and bowel incontinence.   Genitourinary: Negative for bladder incontinence, difficulty urinating and dysuria.   Musculoskeletal: Positive for back pain.   Neurological:  "Positive for weakness (occ). Negative for dizziness and headaches.   Psychiatric/Behavioral: Positive for agitation (occ). Negative for sleep disturbance. The patient is not nervous/anxious.        Vitals:    07/16/19 1407   BP: 124/60   Pulse: 65   Resp: 16   Temp: 98.5 °F (36.9 °C)   SpO2: 95%   Weight: 59.9 kg (132 lb)   Height: 160 cm (63\")   PainSc:   4   PainLoc: Back     Objective   Physical Exam   Constitutional: She is oriented to person, place, and time. She appears well-developed and well-nourished.   HENT:   Head: Normocephalic and atraumatic.   Eyes: Conjunctivae are normal.   Neck: Neck supple.   Cardiovascular: Normal rate.   Pulmonary/Chest: Effort normal.   Musculoskeletal:        Left hip: She exhibits decreased range of motion.        Lumbar back: She exhibits tenderness and pain.   mild to moderate right Si joint tenderness     Neurological: She is alert and oriented to person, place, and time. Gait (AMBULATES WITH WALKER) abnormal.   Skin: Skin is warm and dry.   Psychiatric: She has a normal mood and affect. Her behavior is normal.   Nursing note and vitals reviewed.      Assessment/Plan   Ashli was seen today for back pain.    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    Sacroiliac joint dysfunction    Encounter for long-term current use of high risk medication    Chronic right hip pain  -     HYDROcodone-acetaminophen (NORCO) 7.5-325 MG per tablet; Take 1 tablet by mouth 4 (Four) Times a Day As Needed for Moderate Pain .      --- The urine drug screen confirmation from 1-18-19 has been reviewed and the result is APPROPRIATE based on patient history and JUDY report  --- Consider LESI. Patient wants to wait at this time.  --- Refill Hydrocodone. Patient appears stable with " current regimen. No adverse effects. Regarding continuation of opioids, there is no evidence of aberrant behavior or any red flags.  The patient continues with appropriate response to opioid therapy. ADL's remain intact by self.   --- Follow-up 2 months or sooner if needed.     JUDY REPORT    As part of the patient's treatment plan, I am prescribing controlled substances. The patient has been made aware of appropriate use of such medications, including potential risk of somnolence, limited ability to drive and/or work safely, and the potential for dependence or overdose. It has also bee made clear that these medications are for use by this patient only, without concomitant use of alcohol or other substances unless prescribed.     Patient has completed prescribing agreement detailing terms of continued prescribing of controlled substances, including monitoring JUDY reports, urine drug screening, and pill counts if necessary. The patient is aware that inappropriate use will results in cessation of prescribing such medications.    JUDY report has been reviewed and scanned into the patient's chart.    As the clinician, I personally reviewed the JUDY from 7-15-19 while the patient was in the office today.    History and physical exam exhibit continued safe and appropriate use of controlled substances.      EMR Dragon/Transcription disclaimer:   Much of this encounter note is an electronic transcription/translation of spoken language to printed text. The electronic translation of spoken language may permit erroneous, or at times, nonsensical words or phrases to be inadvertently transcribed; Although I have reviewed the note for such errors, some may still exist.

## 2019-09-12 ENCOUNTER — OFFICE VISIT (OUTPATIENT)
Dept: PAIN MEDICINE | Facility: CLINIC | Age: 84
End: 2019-09-12

## 2019-09-12 VITALS
SYSTOLIC BLOOD PRESSURE: 143 MMHG | BODY MASS INDEX: 23.53 KG/M2 | HEART RATE: 69 BPM | OXYGEN SATURATION: 96 % | DIASTOLIC BLOOD PRESSURE: 72 MMHG | WEIGHT: 132.8 LBS | HEIGHT: 63 IN | TEMPERATURE: 97.7 F | RESPIRATION RATE: 20 BRPM

## 2019-09-12 DIAGNOSIS — M25.551 CHRONIC RIGHT HIP PAIN: ICD-10-CM

## 2019-09-12 DIAGNOSIS — M54.41 CHRONIC RIGHT-SIDED LOW BACK PAIN WITH RIGHT-SIDED SCIATICA: ICD-10-CM

## 2019-09-12 DIAGNOSIS — M53.3 SACROILIAC JOINT DYSFUNCTION: ICD-10-CM

## 2019-09-12 DIAGNOSIS — Z79.899 ENCOUNTER FOR LONG-TERM CURRENT USE OF HIGH RISK MEDICATION: ICD-10-CM

## 2019-09-12 DIAGNOSIS — G89.29 OTHER CHRONIC PAIN: Primary | ICD-10-CM

## 2019-09-12 DIAGNOSIS — G89.29 CHRONIC RIGHT HIP PAIN: ICD-10-CM

## 2019-09-12 DIAGNOSIS — G89.29 CHRONIC RIGHT-SIDED LOW BACK PAIN WITH RIGHT-SIDED SCIATICA: ICD-10-CM

## 2019-09-12 PROCEDURE — 99213 OFFICE O/P EST LOW 20 MIN: CPT | Performed by: NURSE PRACTITIONER

## 2019-09-12 RX ORDER — HYDROCODONE BITARTRATE AND ACETAMINOPHEN 7.5; 325 MG/1; MG/1
1 TABLET ORAL 4 TIMES DAILY PRN
Qty: 120 TABLET | Refills: 0 | Status: SHIPPED | OUTPATIENT
Start: 2019-09-12 | End: 2019-10-15 | Stop reason: SDUPTHER

## 2019-09-12 NOTE — PROGRESS NOTES
"CHIEF COMPLAINT  F/u back pain. Pt sts pain has remained the same since last ov. Pt c/o back pain radiating down bilat legs, pain increased based on activity.    Subjective   Ashli León is a 84 y.o. female  who presents to the office for follow-up.She has a history of chronic back pain. Reports this is unchanged since last office visit.    Complains of pain in her back. Today her pain is 5/10VAs. Describes the pain as nearly continuous aching and throbbing with activity. Pain increases with walking, standing, activity; pain decreases with medication, rest and position changes.  Continues with Hydrocodone 7.5/325 3-4/day. Denies any side effects from the regimen, including constipation. The regimen helps decrease her pain by 50%. Admits her pain medication helps her with activity and function. \"I couldn't get by without it.\"  Pain usually does not interfere with sleep. ADL's by self. Remains at home by self.     Back Pain   This is a chronic problem. The current episode started more than 1 year ago. The problem occurs constantly. The problem has been gradually worsening (unchanged since last office visit) since onset. The pain is present in the lumbar spine. The pain does not radiate. The pain is at a severity of 5/10. The pain is moderate. The pain is worse during the day. The symptoms are aggravated by bending, position, standing and twisting. Associated symptoms include numbness (bilat hands) and weakness (occ). Pertinent negatives include no abdominal pain, bladder incontinence, bowel incontinence, chest pain, dysuria or headaches. Risk factors include lack of exercise, sedentary lifestyle and history of osteoporosis. She has tried analgesics for the symptoms.   Hip Pain    Incident onset: fall february 2019 with fracture. The incident occurred at home. The injury mechanism was a fall. The pain is present in the left hip. The pain is moderate. The pain has been constant since onset. Associated symptoms " include an inability to bear weight, a loss of motion and numbness (bilat hands).      Past pain medications: percocet 7.5      Current pain medications:   Lortab qid  Lidocaine cream     Past therapies:  Physical Therapy: yes (after hip sx most recently)  Chiropractor: no  Massage Therapy: no  TENS: yes  Neck or back surgery: no Pt has had lumbar compression fractures.  Past pain management: yes     Previous Injection: RIGHT Sacroiliac Joint Injection on 11/21/2018 and Right L5 and S1 LTFESI 10/24/18   Effect of Injection (%): 10% relief on 11/21/18 and 25% on 10/24/18  Length of Relief:  3 days from 2nd injection     Previous Injections: yes - LESI in early 2000s - unsure if helped, can not remember  Right hip steroid injection - 8/8/2018 - minimal help    PEG Assessment   What number best describes your pain on average in the past week?5  What number best describes how, during the past week, pain has interfered with your enjoyment of life?5  What number best describes how, during the past week, pain has interfered with your general activity?  5    The following portions of the patient's history were reviewed and updated as appropriate: allergies, current medications, past family history, past medical history, past social history, past surgical history and problem list.    Review of Systems   Constitutional: Negative for activity change, chills and fatigue.   HENT: Negative for congestion.    Eyes: Negative for visual disturbance.   Respiratory: Negative for shortness of breath.    Cardiovascular: Negative for chest pain and palpitations.   Gastrointestinal: Negative for abdominal pain, bowel incontinence, constipation, diarrhea and vomiting.   Endocrine: Negative for polyuria.   Genitourinary: Negative for bladder incontinence, difficulty urinating and dysuria.   Musculoskeletal: Positive for back pain and gait problem (walker). Negative for joint swelling.   Allergic/Immunologic: Negative for immunocompromised  "state.   Neurological: Positive for weakness (occ) and numbness (bilat hands). Negative for dizziness and headaches.   Psychiatric/Behavioral: Positive for sleep disturbance (occ). Negative for agitation (occ) and suicidal ideas. The patient is not nervous/anxious.        Vitals:    09/12/19 1328   BP: 143/72   Pulse: 69   Resp: 20   Temp: 97.7 °F (36.5 °C)   SpO2: 96%   Weight: 60.2 kg (132 lb 12.8 oz)   Height: 160 cm (63\")   PainSc:   5   PainLoc: Back     Objective   Physical Exam   Constitutional: She is oriented to person, place, and time. She appears well-developed and well-nourished.   HENT:   Head: Normocephalic and atraumatic.   Eyes: Conjunctivae are normal.   Neck: Neck supple.   Cardiovascular: Normal rate.   Pulmonary/Chest: Effort normal.   Musculoskeletal:        Left hip: She exhibits decreased range of motion.        Lumbar back: She exhibits tenderness and pain.   mild to moderate right Si joint tenderness     Neurological: She is alert and oriented to person, place, and time. Gait (AMBULATES WITH WALKER) abnormal.   Skin: Skin is warm and dry.   Psychiatric: She has a normal mood and affect. Her behavior is normal.   Nursing note and vitals reviewed.    Assessment/Plan   Ashli was seen today for back pain.    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    Sacroiliac joint dysfunction    Encounter for long-term current use of high risk medication    Chronic right hip pain  -     HYDROcodone-acetaminophen (NORCO) 7.5-325 MG per tablet; Take 1 tablet by mouth 4 (Four) Times a Day As Needed for Moderate Pain .      --- The urine drug screen confirmation from 1-18-19 has been reviewed and the result is APPROPRIATE based on patient history and JUDY report  --- Refill Hydrocodone.  " Patient appears stable with current regimen. No adverse effects. Regarding continuation of opioids, there is no evidence of aberrant behavior or any red flags.  The patient continues with appropriate response to opioid therapy. ADL's remain intact by self.   ---Repeat interventions in future PRN.  --- Follow-up 2 months or sooner if needed.     JUDY REPORT    As part of the patient's treatment plan, I am prescribing controlled substances. The patient has been made aware of appropriate use of such medications, including potential risk of somnolence, limited ability to drive and/or work safely, and the potential for dependence or overdose. It has also bee made clear that these medications are for use by this patient only, without concomitant use of alcohol or other substances unless prescribed.     Patient has completed prescribing agreement detailing terms of continued prescribing of controlled substances, including monitoring JUDY reports, urine drug screening, and pill counts if necessary. The patient is aware that inappropriate use will results in cessation of prescribing such medications.    JUDY report has been reviewed and scanned into the patient's chart.    As the clinician, I personally reviewed the JUDY from 9-11-19 while the patient was in the office today.    History and physical exam exhibit continued safe and appropriate use of controlled substances.      EMR Dragon/Transcription disclaimer:   Much of this encounter note is an electronic transcription/translation of spoken language to printed text. The electronic translation of spoken language may permit erroneous, or at times, nonsensical words or phrases to be inadvertently transcribed; Although I have reviewed the note for such errors, some may still exist.

## 2019-10-15 DIAGNOSIS — G89.29 CHRONIC RIGHT-SIDED LOW BACK PAIN WITH RIGHT-SIDED SCIATICA: ICD-10-CM

## 2019-10-15 DIAGNOSIS — M54.41 CHRONIC RIGHT-SIDED LOW BACK PAIN WITH RIGHT-SIDED SCIATICA: ICD-10-CM

## 2019-10-15 DIAGNOSIS — M25.551 CHRONIC RIGHT HIP PAIN: ICD-10-CM

## 2019-10-15 DIAGNOSIS — G89.29 CHRONIC RIGHT HIP PAIN: ICD-10-CM

## 2019-10-15 RX ORDER — HYDROCODONE BITARTRATE AND ACETAMINOPHEN 7.5; 325 MG/1; MG/1
1 TABLET ORAL 4 TIMES DAILY PRN
Qty: 120 TABLET | Refills: 0 | Status: SHIPPED | OUTPATIENT
Start: 2019-10-15 | End: 2019-11-14 | Stop reason: SDUPTHER

## 2019-10-15 NOTE — TELEPHONE ENCOUNTER
Medication Refill Request    Date of phone call: 10/15/19    Medication being requested: hydro/apap 7.5 mg si tab QID  Qty: 120    Date of last visit: 19    Date of last refill: 19    JUDY up to date?: yes    Next Follow up?: 19    Any new pertinent information? (i.e, new medication allergies, new use of medications, change in patient's health or condition, non-compliance or inconsistency with prescribing agreement?):

## 2019-11-14 ENCOUNTER — OFFICE VISIT (OUTPATIENT)
Dept: PAIN MEDICINE | Facility: CLINIC | Age: 84
End: 2019-11-14

## 2019-11-14 VITALS
BODY MASS INDEX: 23.67 KG/M2 | WEIGHT: 133.6 LBS | HEART RATE: 67 BPM | OXYGEN SATURATION: 93 % | TEMPERATURE: 97.8 F | RESPIRATION RATE: 18 BRPM | HEIGHT: 63 IN | SYSTOLIC BLOOD PRESSURE: 159 MMHG | DIASTOLIC BLOOD PRESSURE: 55 MMHG

## 2019-11-14 DIAGNOSIS — M25.551 CHRONIC RIGHT HIP PAIN: ICD-10-CM

## 2019-11-14 DIAGNOSIS — Z79.899 ENCOUNTER FOR LONG-TERM CURRENT USE OF HIGH RISK MEDICATION: ICD-10-CM

## 2019-11-14 DIAGNOSIS — G89.29 CHRONIC RIGHT-SIDED LOW BACK PAIN WITH RIGHT-SIDED SCIATICA: ICD-10-CM

## 2019-11-14 DIAGNOSIS — M54.41 CHRONIC RIGHT-SIDED LOW BACK PAIN WITH RIGHT-SIDED SCIATICA: ICD-10-CM

## 2019-11-14 DIAGNOSIS — M53.3 SACROILIAC JOINT DYSFUNCTION: ICD-10-CM

## 2019-11-14 DIAGNOSIS — G89.29 CHRONIC RIGHT HIP PAIN: ICD-10-CM

## 2019-11-14 DIAGNOSIS — G89.29 OTHER CHRONIC PAIN: Primary | ICD-10-CM

## 2019-11-14 PROCEDURE — 99214 OFFICE O/P EST MOD 30 MIN: CPT | Performed by: NURSE PRACTITIONER

## 2019-11-14 RX ORDER — HYDROCODONE BITARTRATE AND ACETAMINOPHEN 7.5; 325 MG/1; MG/1
1 TABLET ORAL 4 TIMES DAILY PRN
Qty: 120 TABLET | Refills: 0 | Status: SHIPPED | OUTPATIENT
Start: 2019-11-14 | End: 2019-12-17 | Stop reason: SDUPTHER

## 2019-11-14 NOTE — PROGRESS NOTES
CHIEF COMPLAINT  Follow-up for back pain. Ms. León states that her back pain has gotten worse since her last appt.    Subjective   Ashli León is a 84 y.o. female  who presents to the office for follow-up.She has a history of back pain.    C/o low back pain. Pain today 7/10 in severity.  She continues with use of hydrocodone 7.5/325 4/day.  reports complete pain relief with medication but only lasting for 3 hours.  She denies adverse reactions. No constipation. Provide moderate pain relief.    Minimal benefit from LESI, LTFESI, SI joint injection.      Back Pain   This is a chronic problem. The current episode started more than 1 year ago. The problem occurs daily. The problem has been waxing and waning since onset. The pain is present in the lumbar spine and gluteal. The quality of the pain is described as aching and burning. The pain radiates to the right thigh. The pain is at a severity of 7/10. Exacerbated by: sleeping, laying, walking, standing. Pertinent negatives include no chest pain, dysuria, fever, headaches, numbness or weakness. She has tried NSAIDs and analgesics for the symptoms. The treatment provided moderate relief.     PEG Assessment   What number best describes your pain on average in the past week?4  What number best describes how, during the past week, pain has interfered with your enjoyment of life?10  What number best describes how, during the past week, pain has interfered with your general activity?  9    The following portions of the patient's history were reviewed and updated as appropriate: allergies, current medications, past family history, past medical history, past social history, past surgical history and problem list.    Review of Systems   Constitutional: Negative for fatigue and fever.   HENT: Negative for congestion.    Eyes: Negative for visual disturbance.   Respiratory: Negative for cough, shortness of breath and wheezing.    Cardiovascular: Negative.  Negative for  "chest pain.   Gastrointestinal: Negative for constipation and diarrhea.   Genitourinary: Negative for difficulty urinating and dysuria.   Musculoskeletal: Positive for back pain.   Neurological: Negative for weakness, numbness and headaches.   Psychiatric/Behavioral: Negative for sleep disturbance and suicidal ideas. The patient is nervous/anxious.      Vitals:    11/14/19 1417   BP: 159/55   Pulse: 67   Resp: 18   Temp: 97.8 °F (36.6 °C)   SpO2: 93%   Weight: 60.6 kg (133 lb 9.6 oz)   Height: 160 cm (63\")   PainSc:   7   PainLoc: Back     Objective   Physical Exam   Constitutional: She is oriented to person, place, and time. She appears well-developed and well-nourished. No distress.   HENT:   Head: Normocephalic and atraumatic.   Eyes: Conjunctivae and EOM are normal.   Neck: Neck supple.   Cardiovascular: Normal rate.   Pulmonary/Chest: Effort normal. No respiratory distress.   Musculoskeletal:        Lumbar back: She exhibits tenderness and pain.   RIGHT SI JOINT TENDERNESS   +SLR RIGHT    Neurological: She is alert and oriented to person, place, and time. She has normal strength. No sensory deficit. Gait (AMBULATES WITH WALKER) abnormal.   Skin: Skin is warm and dry. She is not diaphoretic.   Psychiatric: She has a normal mood and affect. Her behavior is normal.   Nursing note and vitals reviewed.    Assessment/Plan   Ashli was seen today for back pain.    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    Sacroiliac joint dysfunction    Encounter for long-term current use of high risk medication    Chronic right hip pain  -     HYDROcodone-acetaminophen (NORCO) 7.5-325 MG per tablet; Take 1 tablet by mouth 4 (Four) Times a Day As Needed for Moderate Pain .      --- Refill Hydrocodone. Patient " appears stable with current regimen. No adverse effects. Regarding continuation of opioids, there is no evidence of aberrant behavior or any red flags.  The patient continues with appropriate response to opioid therapy. ADL's remain intact by self.   --- The urine drug screen confirmation from 1/18/19 has been reviewed and the result is appropriate based on patient history and JUDY report  --- Asks for more pain medication today. Discussed alternative options including neurosurgery opinion and SCS. She is adamant to avoid back surgery. Given information on SCS today.  ----------  Education about SCS therapy:    -  This was an extended office visit in which we entered into discussion about advanced pain relieving techniques, and discussed implantable pain therapies.  We discussed advanced neuromodulation in the form of Spinal Cord Stimulation.  This is a reasonable therapy for patients who have exhausted basic nonnarcotic options, basic modalities and physical therapies, and do not have any other reasonable surgical options.  This therapy as an alternative to long term high dose opioid therapy.    -  Risks include but are not limited to bleeding, infection, injury, paralysis, nerve injury, dural puncture, and risk for postprocedural pain.  Implanted equipment risks include but are not limited to lead migration, lead fracture, risk of loss of pain relieving stimulation, risk of electrical shock, and risk of system failure.    - We discussed the theory and basic science behind SCS therapy including but not limited to energy delivery and relevant anatomy, in terms that are easy to understand and also with use of illustrative devices.  Spinal Cord Stimulation therapies apply an electromagnetic field to a specific area on the spinal cord (Dorsal Column) to attempt to block transmission of painful signals from the peripheral nerves to the brain.    -  We discussed that prior to trialing, I request that patients review  relevant materials and perform some research, and also have a follow up education session with a device specialist from the .  Also, insurance requires a presurgical psychological evaluation.  When these have been completed, and all the patient's questions have been answered to their satisfaction, then we will plan to request authorization for trialing.   - We discussed the trialing process (aka Phase 1)  that usually lasts a week, and the temporary nature of this trial.  Trial success will determine whether or not we proceed to implant.  We discussed reasonable expectations, and that I feel that consistent 50% pain relief is medically successful and is a reasonable expectation to justify moving forward to permanent implant.    -  Additional risks of Phase 1 include but are not limited to bleeding on insertion, bleeding on lead removal, and procedural site soreness.  - We discussed the percutaneous surgical implant, including postsurgical restrictions, risks, and alternatives.   For spinal cord stimulation implanted device (aka SCS Phase 2) there is usually a midline vertical incision for the spinally implanted leads, and also a horizontal incision in the posterior lumbar flank for implantation of the battery & computer (aka IPG).  The leads are tunneled from the midline incision to the medial aspect of the battery pocket incision.    -  Postoperative restrictions include limiting the following activity as much as possible for 90 days:  Lifting >10 lbs, bending at the waist, stretching/reaching overhead, and twisting.  ----------  --- Follow-up 2 months        JUDY DELAROSA    As part of the patient's treatment plan, I am prescribing controlled substances. The patient has been made aware of appropriate use of such medications, including potential risk of somnolence, limited ability to drive and/or work safely, and the potential for dependence or overdose. It has also bee made clear that these medications  are for use by this patient only, without concomitant use of alcohol or other substances unless prescribed.     Patient has completed prescribing agreement detailing terms of continued prescribing of controlled substances, including monitoring JUDY reports, urine drug screening, and pill counts if necessary. The patient is aware that inappropriate use will results in cessation of prescribing such medications.    JUDY report has been reviewed and scanned into the patient's chart.    As the clinician, I personally reviewed the JUDY from 11/14/19 while the patient was in the office today.    History and physical exam exhibit continued safe and appropriate use of controlled substances.    EMR Dragon/Transcription disclaimer:   Much of this encounter note is an electronic transcription/translation of spoken language to printed text. The electronic translation of spoken language may permit erroneous, or at times, nonsensical words or phrases to be inadvertently transcribed; Although I have reviewed the note for such errors, some may still exist.

## 2019-12-17 DIAGNOSIS — M25.551 CHRONIC RIGHT HIP PAIN: ICD-10-CM

## 2019-12-17 DIAGNOSIS — G89.29 CHRONIC RIGHT HIP PAIN: ICD-10-CM

## 2019-12-17 DIAGNOSIS — M54.41 CHRONIC RIGHT-SIDED LOW BACK PAIN WITH RIGHT-SIDED SCIATICA: ICD-10-CM

## 2019-12-17 DIAGNOSIS — G89.29 CHRONIC RIGHT-SIDED LOW BACK PAIN WITH RIGHT-SIDED SCIATICA: ICD-10-CM

## 2019-12-17 RX ORDER — HYDROCODONE BITARTRATE AND ACETAMINOPHEN 7.5; 325 MG/1; MG/1
1 TABLET ORAL 4 TIMES DAILY PRN
Qty: 120 TABLET | Refills: 0 | Status: SHIPPED | OUTPATIENT
Start: 2019-12-17 | End: 2020-01-14 | Stop reason: SDUPTHER

## 2019-12-17 NOTE — TELEPHONE ENCOUNTER
Medication Refill Request    Date of phone call: 12/15/19    Medication being requested: norco 7.5/325mg si tab po qid prn  Qty: 120    Date of last visit: 19    Date of last refill: 19    JUDY up to date?: yes    Next Follow up?: 20    Any new pertinent information? (i.e, new medication allergies, new use of medications, change in patient's health or condition, non-compliance or inconsistency with prescribing agreement?):

## 2020-01-14 ENCOUNTER — OFFICE VISIT (OUTPATIENT)
Dept: PAIN MEDICINE | Facility: CLINIC | Age: 85
End: 2020-01-14

## 2020-01-14 VITALS
HEART RATE: 59 BPM | TEMPERATURE: 98.2 F | SYSTOLIC BLOOD PRESSURE: 140 MMHG | OXYGEN SATURATION: 91 % | HEIGHT: 63 IN | DIASTOLIC BLOOD PRESSURE: 63 MMHG | BODY MASS INDEX: 24.17 KG/M2 | WEIGHT: 136.4 LBS | RESPIRATION RATE: 18 BRPM

## 2020-01-14 DIAGNOSIS — M25.551 CHRONIC RIGHT HIP PAIN: ICD-10-CM

## 2020-01-14 DIAGNOSIS — G89.29 CHRONIC RIGHT HIP PAIN: ICD-10-CM

## 2020-01-14 DIAGNOSIS — Z79.899 ENCOUNTER FOR LONG-TERM CURRENT USE OF HIGH RISK MEDICATION: ICD-10-CM

## 2020-01-14 DIAGNOSIS — G89.29 CHRONIC RIGHT-SIDED LOW BACK PAIN WITH RIGHT-SIDED SCIATICA: ICD-10-CM

## 2020-01-14 DIAGNOSIS — G89.29 OTHER CHRONIC PAIN: Primary | ICD-10-CM

## 2020-01-14 DIAGNOSIS — M54.41 CHRONIC RIGHT-SIDED LOW BACK PAIN WITH RIGHT-SIDED SCIATICA: ICD-10-CM

## 2020-01-14 LAB
POC AMPHETAMINES: NEGATIVE
POC BARBITURATES: NEGATIVE
POC BENZODIAZEPHINES: NEGATIVE
POC COCAINE: NEGATIVE
POC METHADONE: NEGATIVE
POC METHAMPHETAMINE SCREEN URINE: NEGATIVE
POC OPIATES: POSITIVE
POC OXYCODONE: NEGATIVE
POC PHENCYCLIDINE: NEGATIVE
POC PROPOXYPHENE: NEGATIVE
POC THC: NEGATIVE
POC TRICYCLIC ANTIDEPRESSANTS: NEGATIVE

## 2020-01-14 PROCEDURE — 80305 DRUG TEST PRSMV DIR OPT OBS: CPT | Performed by: NURSE PRACTITIONER

## 2020-01-14 PROCEDURE — 99213 OFFICE O/P EST LOW 20 MIN: CPT | Performed by: NURSE PRACTITIONER

## 2020-01-14 RX ORDER — HYDROCODONE BITARTRATE AND ACETAMINOPHEN 7.5; 325 MG/1; MG/1
1 TABLET ORAL 4 TIMES DAILY PRN
Qty: 120 TABLET | Refills: 0 | Status: SHIPPED | OUTPATIENT
Start: 2020-01-14 | End: 2020-02-21 | Stop reason: SDUPTHER

## 2020-01-14 NOTE — PROGRESS NOTES
CHIEF COMPLAINT  Follow-up for back pain. Ms. León states that her back pain is unchanged.    Subjective   Ashli León is a 84 y.o. female  who presents to the office for follow-up.She has a history of back pain.    C/o low back pain. Pain today 5/10 in severity.  She continues with use of hydrocodone 7.5/325 4/day.  Reports complete pain relief with medication but only lasting for 3 hours.  She denies adverse reactions. No constipation. Provide moderate pain relief.       Minimal benefit from LESI, LTFESI, SI joint injection.  Discussed SCS last visit.  Asked for more pain medication.      Back Pain   This is a chronic problem. The current episode started more than 1 year ago. The problem occurs daily. The problem has been waxing and waning since onset. The pain is present in the lumbar spine and gluteal. The quality of the pain is described as aching and burning. The pain radiates to the right thigh. The pain is at a severity of 5/10. Exacerbated by: sleeping, laying, walking, standing. Pertinent negatives include no chest pain, dysuria, fever, headaches, numbness or weakness. She has tried NSAIDs and analgesics for the symptoms. The treatment provided moderate relief.     PEG Assessment   What number best describes your pain on average in the past week?6  What number best describes how, during the past week, pain has interfered with your enjoyment of life?8  What number best describes how, during the past week, pain has interfered with your general activity?  9    The following portions of the patient's history were reviewed and updated as appropriate: allergies, current medications, past family history, past medical history, past social history, past surgical history and problem list.    Review of Systems   Constitutional: Positive for fatigue. Negative for fever.   HENT: Negative for congestion.    Eyes: Negative for visual disturbance.   Respiratory: Negative for cough, shortness of breath and  "wheezing.    Cardiovascular: Negative.  Negative for chest pain.   Gastrointestinal: Negative for constipation and diarrhea.   Genitourinary: Negative for difficulty urinating and dysuria.   Musculoskeletal: Positive for back pain.   Neurological: Negative for weakness, numbness and headaches.   Psychiatric/Behavioral: Positive for sleep disturbance. Negative for suicidal ideas. The patient is not nervous/anxious.      Vitals:    01/14/20 1152   BP: 140/63   Pulse: 59   Resp: 18   Temp: 98.2 °F (36.8 °C)   SpO2: 91%   Weight: 61.9 kg (136 lb 6.4 oz)   Height: 160 cm (63\")   PainSc:   5   PainLoc: Back     Objective   Physical Exam   Constitutional: She is oriented to person, place, and time. She appears well-developed and well-nourished. No distress.   HENT:   Head: Normocephalic and atraumatic.   Eyes: Conjunctivae and EOM are normal.   Neck: Neck supple.   Cardiovascular: Normal rate.   Pulmonary/Chest: Effort normal. No respiratory distress.   Musculoskeletal:        Lumbar back: She exhibits tenderness and pain.   RIGHT SI JOINT TENDERNESS   +SLR RIGHT    Neurological: She is alert and oriented to person, place, and time. She has normal strength. No sensory deficit. Gait (AMBULATES WITH WALKER) abnormal.   Skin: Skin is warm and dry. She is not diaphoretic.   Psychiatric: She has a normal mood and affect. Her behavior is normal.   Nursing note and vitals reviewed.    Assessment/Plan   Ashli was seen today for back pain.    Diagnoses and all orders for this visit:    Other chronic pain  -     POC Urine Drug Screen, Triage  -     Urine Drug Screen Confirmation - Urine, Clean Catch; Future    Chronic right-sided low back pain with right-sided sciatica  -     HYDROcodone-acetaminophen (NORCO) 7.5-325 MG per tablet; Take 1 tablet by mouth 4 (Four) Times a Day As Needed for Moderate Pain . dnf 1/16/2020  -     POC Urine Drug Screen, Triage  -     Urine Drug Screen Confirmation - Urine, Clean Catch; Future    Encounter " for long-term current use of high risk medication  -     POC Urine Drug Screen, Triage  -     Urine Drug Screen Confirmation - Urine, Clean Catch; Future    Chronic right hip pain  -     HYDROcodone-acetaminophen (NORCO) 7.5-325 MG per tablet; Take 1 tablet by mouth 4 (Four) Times a Day As Needed for Moderate Pain . dnf 1/16/2020  -     POC Urine Drug Screen, Triage  -     Urine Drug Screen Confirmation - Urine, Clean Catch; Future      --- discussed scs today again in detail   --- Refill Hydrocodone. Patient appears stable with current regimen. No adverse effects. Regarding continuation of opioids, there is no evidence of aberrant behavior or any red flags.  The patient continues with appropriate response to opioid therapy. ADL's remain intact by self.   --- Routine UDS in office today as part of monitoring requirements for controlled substances.  The specimen was viewed and the immunoassay result reviewed and is +OPI.  This specimen will be sent to [facility] laboratory for confirmation.     --- Follow-up 2 months       JUDY REPORT  As part of the patient's treatment plan, I am prescribing controlled substances. The patient has been made aware of appropriate use of such medications, including potential risk of somnolence, limited ability to drive and/or work safely, and the potential for dependence or overdose. It has also bee made clear that these medications are for use by this patient only, without concomitant use of alcohol or other substances unless prescribed.     Patient has completed prescribing agreement detailing terms of continued prescribing of controlled substances, including monitoring JUDY reports, urine drug screening, and pill counts if necessary. The patient is aware that inappropriate use will results in cessation of prescribing such medications.    JUDY report has been reviewed and scanned into the patient's chart.    As the clinician, I personally reviewed the JUDY from 1/14/2020 while  the patient was in the office today.    History and physical exam exhibit continued safe and appropriate use of controlled substances.    EMR Dragon/Transcription disclaimer:   Much of this encounter note is an electronic transcription/translation of spoken language to printed text. The electronic translation of spoken language may permit erroneous, or at times, nonsensical words or phrases to be inadvertently transcribed; Although I have reviewed the note for such errors, some may still exist.

## 2020-01-19 ENCOUNTER — RESULTS ENCOUNTER (OUTPATIENT)
Dept: PAIN MEDICINE | Facility: CLINIC | Age: 85
End: 2020-01-19

## 2020-01-19 DIAGNOSIS — G89.29 CHRONIC RIGHT-SIDED LOW BACK PAIN WITH RIGHT-SIDED SCIATICA: ICD-10-CM

## 2020-01-19 DIAGNOSIS — G89.29 CHRONIC RIGHT HIP PAIN: ICD-10-CM

## 2020-01-19 DIAGNOSIS — Z79.899 ENCOUNTER FOR LONG-TERM CURRENT USE OF HIGH RISK MEDICATION: ICD-10-CM

## 2020-01-19 DIAGNOSIS — G89.29 OTHER CHRONIC PAIN: ICD-10-CM

## 2020-01-19 DIAGNOSIS — M25.551 CHRONIC RIGHT HIP PAIN: ICD-10-CM

## 2020-01-19 DIAGNOSIS — M54.41 CHRONIC RIGHT-SIDED LOW BACK PAIN WITH RIGHT-SIDED SCIATICA: ICD-10-CM

## 2020-01-28 ENCOUNTER — RESULTS ENCOUNTER (OUTPATIENT)
Dept: PAIN MEDICINE | Facility: CLINIC | Age: 85
End: 2020-01-28

## 2020-01-28 ENCOUNTER — OFFICE VISIT (OUTPATIENT)
Dept: PAIN MEDICINE | Facility: CLINIC | Age: 85
End: 2020-01-28

## 2020-01-28 VITALS
WEIGHT: 134.2 LBS | RESPIRATION RATE: 16 BRPM | DIASTOLIC BLOOD PRESSURE: 69 MMHG | BODY MASS INDEX: 23.78 KG/M2 | HEART RATE: 62 BPM | TEMPERATURE: 97.7 F | HEIGHT: 63 IN | OXYGEN SATURATION: 97 % | SYSTOLIC BLOOD PRESSURE: 167 MMHG

## 2020-01-28 DIAGNOSIS — M54.42 CHRONIC BILATERAL LOW BACK PAIN WITH BILATERAL SCIATICA: ICD-10-CM

## 2020-01-28 DIAGNOSIS — Z79.899 ENCOUNTER FOR LONG-TERM CURRENT USE OF HIGH RISK MEDICATION: ICD-10-CM

## 2020-01-28 DIAGNOSIS — M54.41 CHRONIC BILATERAL LOW BACK PAIN WITH BILATERAL SCIATICA: ICD-10-CM

## 2020-01-28 DIAGNOSIS — G89.29 CHRONIC BILATERAL LOW BACK PAIN WITH BILATERAL SCIATICA: ICD-10-CM

## 2020-01-28 DIAGNOSIS — G89.29 OTHER CHRONIC PAIN: Primary | ICD-10-CM

## 2020-01-28 DIAGNOSIS — M17.12 ARTHRITIS OF LEFT KNEE: ICD-10-CM

## 2020-01-28 DIAGNOSIS — G89.29 OTHER CHRONIC PAIN: ICD-10-CM

## 2020-01-28 PROCEDURE — 99213 OFFICE O/P EST LOW 20 MIN: CPT | Performed by: NURSE PRACTITIONER

## 2020-01-28 PROCEDURE — 20610 DRAIN/INJ JOINT/BURSA W/O US: CPT | Performed by: NURSE PRACTITIONER

## 2020-01-28 PROCEDURE — 80305 DRUG TEST PRSMV DIR OPT OBS: CPT | Performed by: NURSE PRACTITIONER

## 2020-01-28 RX ORDER — METHYLPREDNISOLONE ACETATE 40 MG/ML
40 INJECTION, SUSPENSION INTRA-ARTICULAR; INTRALESIONAL; INTRAMUSCULAR; SOFT TISSUE
Status: DISCONTINUED | OUTPATIENT
Start: 2020-01-28 | End: 2020-01-28 | Stop reason: HOSPADM

## 2020-01-28 RX ADMIN — METHYLPREDNISOLONE ACETATE 40 MG: 40 INJECTION, SUSPENSION INTRA-ARTICULAR; INTRALESIONAL; INTRAMUSCULAR; SOFT TISSUE at 11:05

## 2020-01-28 NOTE — PROGRESS NOTES
CHIEF COMPLAINT  F/U back and knee pain- patient states that her pain has worsened since her last visit. She would like to discuss a procedure for her back and a knee procedure. She also brought in her pills for a pill count. She was prescribed Norco 7.5-325mg to be takes four times a day as needed. The patient had a count of 74/120 and the medication was filled on 1/16/20. They pills were white oblong pills marked with M366 which is appropriate. She filled her medication at [facility] in [place].     Subjective   Ashli León is a 84 y.o. female  who presents to the office for follow-up.She has a history of back pain, knee pain.    Left knee hurting today. Requests an injection. These have helped in the past    C/o low back pain. Pain today 8/10 in severity (knee).  She continues with use of hydrocodone 7.5/325 4/day.  Reports complete pain relief with medication but only lasting for 3 hours.  She denies adverse reactions. No constipation. Provide moderate pain relief.       Minimal benefit from LESI, LTFESI, SI joint injection.  Discussed SCS last visit.  Today she states that injection actually helped her some in the past.  Reports pain across the low back and radiates to the anterior/lateral legs bilaterally.  Interested in an epidural injection.      Back Pain   This is a chronic problem. The current episode started more than 1 year ago. The problem occurs daily. The problem has been waxing and waning since onset. The pain is present in the lumbar spine and gluteal. The quality of the pain is described as aching and burning. The pain radiates to the right thigh. The pain is at a severity of 5/10. Exacerbated by: sleeping, laying, walking, standing. Associated symptoms include weakness. Pertinent negatives include no abdominal pain, chest pain, dysuria, fever, headaches or numbness. She has tried NSAIDs and analgesics for the symptoms. The treatment provided moderate relief.   Knee Pain    The pain is  "present in the left knee. The pain is at a severity of 8/10. The pain has been fluctuating since onset. Pertinent negatives include no numbness. The symptoms are aggravated by movement and weight bearing.        PEG Assessment   What number best describes your pain on average in the past week?7  What number best describes how, during the past week, pain has interfered with your enjoyment of life?10  What number best describes how, during the past week, pain has interfered with your general activity?  10      The following portions of the patient's history were reviewed and updated as appropriate: allergies, current medications, past family history, past medical history, past surgical history and problem list.    Review of Systems   Constitutional: Positive for activity change (decreased) and fatigue. Negative for fever.   HENT: Negative for congestion.    Eyes: Negative for visual disturbance.   Respiratory: Negative for cough, chest tightness, shortness of breath and wheezing.    Cardiovascular: Negative.  Negative for chest pain.   Gastrointestinal: Negative for abdominal pain, constipation and diarrhea.   Genitourinary: Negative for difficulty urinating and dysuria.   Musculoskeletal: Positive for back pain.   Neurological: Positive for weakness. Negative for dizziness, light-headedness, numbness and headaches.   Psychiatric/Behavioral: Positive for agitation and sleep disturbance. Negative for suicidal ideas. The patient is not nervous/anxious.        Vitals:    01/28/20 1039   BP: 167/69   Pulse: 62   Resp: 16   Temp: 97.7 °F (36.5 °C)   SpO2: 97%   Weight: 60.9 kg (134 lb 3.2 oz)   Height: 160 cm (63\")   PainSc:   8   PainLoc: Knee         Objective   Physical Exam   Constitutional: She is oriented to person, place, and time. She appears well-developed and well-nourished. No distress.   HENT:   Head: Normocephalic and atraumatic.   Eyes: Conjunctivae and EOM are normal.   Neck: Neck supple.   Cardiovascular: " Normal rate.   Pulmonary/Chest: Effort normal. No respiratory distress.   Musculoskeletal:        Left knee: She exhibits swelling. She exhibits no ecchymosis, no deformity and no erythema. Tenderness found.        Lumbar back: She exhibits tenderness and pain.   +SLR bilateral     Neurological: She is alert and oriented to person, place, and time. She has normal strength. No sensory deficit. Gait (AMBULATES WITH WALKER) abnormal.   Skin: Skin is warm and dry. She is not diaphoretic.   Psychiatric: She has a normal mood and affect. Her behavior is normal.   Nursing note and vitals reviewed.      Large Joint Arthrocentesis: L knee  Date/Time: 1/28/2020 11:05 AM  Consent given by: patient  Site marked: site marked  Timeout: Immediately prior to procedure a time out was called to verify the correct patient, procedure, equipment, support staff and site/side marked as required   Supporting Documentation  Indications: pain and diagnostic evaluation   Procedure Details  Location: knee - L knee  Needle size: 25 G  Approach: medial  Medications administered: 40 mg methylPREDNISolone acetate 40 MG/ML (Bupivicaine Lot # [id number] Exp #: 08/2022)  Patient tolerance: patient tolerated the procedure well with no immediate complications          Assessment/Plan   Ashli was seen today for back pain.    Diagnoses and all orders for this visit:    Other chronic pain    Arthritis of left knee  -     Large Joint Arthrocentesis: L knee    Chronic bilateral low back pain with bilateral sciatica  -     Case Request    Encounter for long-term current use of high risk medication          Bupivicaine Lot # [id number] Exp #: 08/2022  Depo-medrrol Lot # [id number]  Exp #: 03/2021    --- L3/4 LESI x 2, 2-4 weeks apart. Reviewed the procedure at length with the patient.  Included in the review was expectations, complications, risk and benefits.The procedure was described in detail and the risks, benefits and alternatives were discussed with the  patient (including but not limited to: bleeding, infection, nerve damage, worsening of pain, inability to perform injection, paralysis, seizures, and death) who agreed to proceed.  Discussed the potential for sedation if warranted/wanted.  The procedure will plan to be performed at [facility] with fluoroscopic guidance(unless ultrasound is indicated). Questions were answered and in a way the patient could understand.  Patient verbalized understanding and wishes to proceed.  This intervention will be ordered.  Discussed with patient that all procedures are part of a multimodal plan of care and include either formal PT or a home exercise program.  Patient has no evidence of coagulopathy or current infection.  --- Left knee injection today  --- Continue Hydrocodone. Patient appears stable with current regimen. No adverse effects. Regarding continuation of opioids, there is no evidence of aberrant behavior or any red flags.  The patient continues with appropriate response to opioid therapy. ADL's remain intact by self.   --- Routine UDS in office today as part of monitoring requirements for controlled substances.  The specimen was viewed and the immunoassay result reviewed and is +OPI.  This specimen will be sent to [facility] for confirmation.     --- Appropriate pill count today   --- Follow-up 2 months                JUDY REPORT    As part of the patient's treatment plan, I am prescribing controlled substances. The patient has been made aware of appropriate use of such medications, including potential risk of somnolence, limited ability to drive and/or work safely, and the potential for dependence or overdose. It has also bee made clear that these medications are for use by this patient only, without concomitant use of alcohol or other substances unless prescribed.     Patient has completed prescribing agreement detailing terms of continued prescribing of controlled substances,  including monitoring JUDY reports, urine drug screening, and pill counts if necessary. The patient is aware that inappropriate use will results in cessation of prescribing such medications.    JUDY report has been reviewed and scanned into the patient's chart.    As the clinician, I personally reviewed the JUDY from 1/14/2020 while the patient was in the office today.    History and physical exam exhibit continued safe and appropriate use of controlled substances.      EMR Dragon/Transcription disclaimer:   Much of this encounter note is an electronic transcription/translation of spoken language to printed text. The electronic translation of spoken language may permit erroneous, or at times, nonsensical words or phrases to be inadvertently transcribed; Although I have reviewed the note for such errors, some may still exist.

## 2020-02-05 ENCOUNTER — DOCUMENTATION (OUTPATIENT)
Dept: PAIN MEDICINE | Facility: CLINIC | Age: 85
End: 2020-02-05

## 2020-02-05 ENCOUNTER — OUTSIDE FACILITY SERVICE (OUTPATIENT)
Dept: PAIN MEDICINE | Facility: CLINIC | Age: 85
End: 2020-02-05

## 2020-02-05 PROCEDURE — 62323 NJX INTERLAMINAR LMBR/SAC: CPT | Performed by: ANESTHESIOLOGY

## 2020-02-05 NOTE — PROGRESS NOTES
Lumbar Epidural Steroid Injection  [facility]    PREOPERATIVE DIAGNOSIS:   Chronic low back pain and bilateral sciatica  POSTOPERATIVE DIAGNOSIS:  Same as preop diagnosis    PROCEDURE:   Lumbar Epidural Steroid Injection, Therapeutic Translaminar Injection, with epidurogram, at  L3/L4 level    PRE-PROCEDURE DISCUSSION WITH PATIENT:    Risks and complications were discussed with the patient prior to starting the procedure and informed consent was obtained.  We discussed various topics including but not limited to bleeding, infection, injury, paralysis, nerve injury, dural puncture, coma, death, worsening of clinical picture, lack of pain relief, and postprocedural soreness.    SURGEON:  Angel Bustos MD    REASON FOR PROCEDURE:    Diagnostic injection at this level is needed, Degenerative changes are noted in the area. and Radiating pattern of pain is likely consistent with degenerative changes in the area.    SEDATION:  Versed 2mg & Fentanyl 50 mcg IV  ANESTHETIC:  Marcaine 0.25%  STEROID:   Methylprednisolone (DEPO MEDROL) 80mg/ml    DESCRIPTON OF PROCEDURE:    After obtaining informed consent, I.V. was started in the preop area.   The patient was taken to the operating room and placed in the prone position.  EKG, blood pressure, and pulse oximeter were monitored throughout, and sedation was provided as needed by the RN under my guidance. All pressure points were well padded.  The lumbar spine area was prepped with Chloraprep and draped in a sterile fashion.  Under fluoroscopic guidance, the above mentioned interlaminar space was identified. Skin and subcutaneous tissues were anesthetized with 1% lidocaine in the middle of the space. A Tuohy needle was introduced through the skin and advanced to this interlaminar space and into the epidural space under fluoroscopic guidance and verified with loss-of-resistance technique to air.  After confirming the position of the needle with the fluoroscope  with all the views, and after aspiration was confirmed negative for blood and CSF, 1.5 mL of Omnipaque was injected.  After seeing appropriate epidurogram with lateral and PA views, a total of 3 cc solution was injected, consisting of 1cc of local anesthetic as above, with normal saline and injectable steroid as above.     ESTIMATED BLOOD LOSS:  <5 mL  SPECIMENS:  None    COMPLICATIONS:     No complications were noted., There was no indication of vascular uptake on live injection of contrast dye., There was no indication of intrathecal uptake on live injection of contrast dye., There was not any evidence of dural puncture.   and The patient did not have any signs of postprocedure numbness nor weakness.    TOLERANCE & DISCHARGE CONDITION:    The patient tolerated the procedure well.  The patient was transported to the recovery area without difficulties.  The patient was discharged to home under the care of family in stable and satisfactory condition.    PLAN OF CARE:  1. The patient was given our standard instruction sheet.  2. The patient will Repeat injection 2 wks  3. The patient will resume all medications as per the medication reconciliation sheet.

## 2020-02-19 ENCOUNTER — OUTSIDE FACILITY SERVICE (OUTPATIENT)
Dept: PAIN MEDICINE | Facility: CLINIC | Age: 85
End: 2020-02-19

## 2020-02-19 ENCOUNTER — DOCUMENTATION (OUTPATIENT)
Dept: PAIN MEDICINE | Facility: CLINIC | Age: 85
End: 2020-02-19

## 2020-02-19 DIAGNOSIS — R82.90 ABNORMAL URINE FINDINGS: Primary | ICD-10-CM

## 2020-02-19 PROCEDURE — 62323 NJX INTERLAMINAR LMBR/SAC: CPT | Performed by: ANESTHESIOLOGY

## 2020-02-19 NOTE — PROGRESS NOTES
Lumbar Epidural Steroid Injection  [facility]    PREOPERATIVE DIAGNOSIS:   Chronic low back pain and bilateral sciatica  POSTOPERATIVE DIAGNOSIS:  Same as preop diagnosis    PROCEDURE:   Lumbar Epidural Steroid Injection, Therapeutic Translaminar Injection, with epidurogram, at  L3/L4 level    PRE-PROCEDURE DISCUSSION WITH PATIENT:    Risks and complications were discussed with the patient prior to starting the procedure and informed consent was obtained.  We discussed various topics including but not limited to bleeding, infection, injury, paralysis, nerve injury, dural puncture, coma, death, worsening of clinical picture, lack of pain relief, and postprocedural soreness.    SURGEON:  Angel Bustos MD    REASON FOR PROCEDURE:    Chronic low back pain with sciatica    SEDATION:  Patient declined administration of moderate sedation    ANESTHETIC:  Marcaine 0.25%  STEROID:   Methylprednisolone (DEPO MEDROL) 80mg/ml    DESCRIPTON OF PROCEDURE:    After obtaining informed consent, I.V. was not started in the preop area.   The patient was taken to the operating room and placed in the prone position.  EKG, blood pressure, and pulse oximeter were monitored throughout, and sedation was provided as needed by the RN under my guidance. All pressure points were well padded.  The lumbar spine area was prepped with Chloraprep and draped in a sterile fashion.  Under fluoroscopic guidance, the above mentioned interlaminar space was identified. Skin and subcutaneous tissues were anesthetized with 1% lidocaine in the middle of the space. A Tuohy needle was introduced through the skin and advanced to this interlaminar space and into the epidural space under fluoroscopic guidance and verified with loss-of-resistance technique to air.  After confirming the position of the needle with the fluoroscope with all the views, and after aspiration was confirmed negative for blood and CSF, 1.5 mL of Omnipaque was injected.   After seeing appropriate epidurogram with lateral and PA views, a total of 3 cc solution was injected, consisting of 1cc of local anesthetic as above, with normal saline and injectable steroid as above.     ESTIMATED BLOOD LOSS:  <5 mL  SPECIMENS:  None    COMPLICATIONS:     No complications were noted., There was no indication of vascular uptake on live injection of contrast dye., There was no indication of intrathecal uptake on live injection of contrast dye., There was not any evidence of dural puncture.   and The patient did not have any signs of postprocedure numbness nor weakness.    TOLERANCE & DISCHARGE CONDITION:    The patient tolerated the procedure well.  The patient was transported to the recovery area without difficulties.  The patient was discharged to home under the care of family in stable and satisfactory condition.    PLAN OF CARE:  1. The patient was given our standard instruction sheet.  2. The patient will Return to clinic 3-4 wks  3. The patient will resume all medications as per the medication reconciliation sheet.

## 2020-02-19 NOTE — PROGRESS NOTES
PGT TESTING IS INDICATED....    [facility] UDS confirmation x2 was negative for hydromorphone metabolite, therefore concern for possible CYP2D6 variant.

## 2020-02-21 DIAGNOSIS — M54.41 CHRONIC RIGHT-SIDED LOW BACK PAIN WITH RIGHT-SIDED SCIATICA: ICD-10-CM

## 2020-02-21 DIAGNOSIS — M25.551 CHRONIC RIGHT HIP PAIN: ICD-10-CM

## 2020-02-21 DIAGNOSIS — G89.29 CHRONIC RIGHT-SIDED LOW BACK PAIN WITH RIGHT-SIDED SCIATICA: ICD-10-CM

## 2020-02-21 DIAGNOSIS — G89.29 CHRONIC RIGHT HIP PAIN: ICD-10-CM

## 2020-02-21 RX ORDER — HYDROCODONE BITARTRATE AND ACETAMINOPHEN 7.5; 325 MG/1; MG/1
1 TABLET ORAL 4 TIMES DAILY PRN
Qty: 120 TABLET | Refills: 0 | Status: SHIPPED | OUTPATIENT
Start: 2020-02-21 | End: 2020-03-17 | Stop reason: SDUPTHER

## 2020-02-21 NOTE — TELEPHONE ENCOUNTER
Medication Refill Request    Date of phone call: 20    Medication being requested: norco 7.5/325mg si tab po qid prn   Qty: 120    Date of last visit: 20    Date of last refill: 20    JUDY up to date?: yes    Next Follow up?: 3/26/20    Any new pertinent information? (i.e, new medication allergies, new use of medications, change in patient's health or condition, non-compliance or inconsistency with prescribing agreement?): can you please fill for mendoza? Thank you.

## 2020-03-17 DIAGNOSIS — M54.41 CHRONIC RIGHT-SIDED LOW BACK PAIN WITH RIGHT-SIDED SCIATICA: ICD-10-CM

## 2020-03-17 DIAGNOSIS — M25.551 CHRONIC RIGHT HIP PAIN: ICD-10-CM

## 2020-03-17 DIAGNOSIS — G89.29 CHRONIC RIGHT HIP PAIN: ICD-10-CM

## 2020-03-17 DIAGNOSIS — G89.29 CHRONIC RIGHT-SIDED LOW BACK PAIN WITH RIGHT-SIDED SCIATICA: ICD-10-CM

## 2020-03-17 NOTE — TELEPHONE ENCOUNTER
Medication Refill Request    Date of phone call: 3/17/20    Medication being requested: norco 7.5/325mg si tab po qid prn   Qty: 120    Date of last visit: 20    Date of last refill: 20    JUDY up to date?: yes    Next Follow up?: pt cancelled 3/26/20 appt due to corona    Any new pertinent information? (i.e, new medication allergies, new use of medications, change in patient's health or condition, non-compliance or inconsistency with prescribing agreement?): pt cancelled f/u appt due to corona but would like refill. Please advise.

## 2020-03-18 RX ORDER — HYDROCODONE BITARTRATE AND ACETAMINOPHEN 7.5; 325 MG/1; MG/1
1 TABLET ORAL 4 TIMES DAILY PRN
Qty: 120 TABLET | Refills: 0 | Status: SHIPPED | OUTPATIENT
Start: 2020-03-18 | End: 2020-04-30 | Stop reason: SDUPTHER

## 2020-03-19 ENCOUNTER — RESULTS ENCOUNTER (OUTPATIENT)
Dept: PAIN MEDICINE | Facility: CLINIC | Age: 85
End: 2020-03-19

## 2020-03-19 DIAGNOSIS — R82.90 ABNORMAL URINE FINDINGS: ICD-10-CM

## 2020-04-27 DIAGNOSIS — G89.29 CHRONIC RIGHT HIP PAIN: ICD-10-CM

## 2020-04-27 DIAGNOSIS — M54.41 CHRONIC RIGHT-SIDED LOW BACK PAIN WITH RIGHT-SIDED SCIATICA: ICD-10-CM

## 2020-04-27 DIAGNOSIS — M25.551 CHRONIC RIGHT HIP PAIN: ICD-10-CM

## 2020-04-27 DIAGNOSIS — G89.29 CHRONIC RIGHT-SIDED LOW BACK PAIN WITH RIGHT-SIDED SCIATICA: ICD-10-CM

## 2020-04-27 RX ORDER — HYDROCODONE BITARTRATE AND ACETAMINOPHEN 7.5; 325 MG/1; MG/1
1 TABLET ORAL 4 TIMES DAILY PRN
Qty: 120 TABLET | Refills: 0 | Status: CANCELLED | OUTPATIENT
Start: 2020-04-27

## 2020-04-30 ENCOUNTER — OFFICE VISIT (OUTPATIENT)
Dept: PAIN MEDICINE | Facility: CLINIC | Age: 85
End: 2020-04-30

## 2020-04-30 ENCOUNTER — RESULTS ENCOUNTER (OUTPATIENT)
Dept: PAIN MEDICINE | Facility: CLINIC | Age: 85
End: 2020-04-30

## 2020-04-30 VITALS
RESPIRATION RATE: 16 BRPM | BODY MASS INDEX: 24.03 KG/M2 | WEIGHT: 135.6 LBS | TEMPERATURE: 97.4 F | HEIGHT: 63 IN | DIASTOLIC BLOOD PRESSURE: 70 MMHG | OXYGEN SATURATION: 99 % | SYSTOLIC BLOOD PRESSURE: 149 MMHG | HEART RATE: 59 BPM

## 2020-04-30 DIAGNOSIS — M25.562 CHRONIC PAIN OF LEFT KNEE: ICD-10-CM

## 2020-04-30 DIAGNOSIS — G89.29 CHRONIC RIGHT HIP PAIN: ICD-10-CM

## 2020-04-30 DIAGNOSIS — M54.41 CHRONIC BILATERAL LOW BACK PAIN WITH BILATERAL SCIATICA: ICD-10-CM

## 2020-04-30 DIAGNOSIS — G89.29 CHRONIC PAIN OF LEFT KNEE: Primary | ICD-10-CM

## 2020-04-30 DIAGNOSIS — M25.562 CHRONIC PAIN OF LEFT KNEE: Primary | ICD-10-CM

## 2020-04-30 DIAGNOSIS — M54.41 CHRONIC RIGHT-SIDED LOW BACK PAIN WITH RIGHT-SIDED SCIATICA: ICD-10-CM

## 2020-04-30 DIAGNOSIS — M54.42 CHRONIC BILATERAL LOW BACK PAIN WITH BILATERAL SCIATICA: ICD-10-CM

## 2020-04-30 DIAGNOSIS — G89.29 CHRONIC BILATERAL LOW BACK PAIN WITH BILATERAL SCIATICA: ICD-10-CM

## 2020-04-30 DIAGNOSIS — G89.29 CHRONIC RIGHT-SIDED LOW BACK PAIN WITH RIGHT-SIDED SCIATICA: ICD-10-CM

## 2020-04-30 DIAGNOSIS — G89.29 CHRONIC PAIN OF LEFT KNEE: ICD-10-CM

## 2020-04-30 DIAGNOSIS — R82.90 ABNORMAL URINE FINDING: ICD-10-CM

## 2020-04-30 DIAGNOSIS — M25.551 CHRONIC RIGHT HIP PAIN: ICD-10-CM

## 2020-04-30 PROCEDURE — 80305 DRUG TEST PRSMV DIR OPT OBS: CPT | Performed by: ANESTHESIOLOGY

## 2020-04-30 PROCEDURE — 99214 OFFICE O/P EST MOD 30 MIN: CPT | Performed by: ANESTHESIOLOGY

## 2020-04-30 RX ORDER — HYDROCODONE BITARTRATE AND ACETAMINOPHEN 7.5; 325 MG/1; MG/1
1 TABLET ORAL 3 TIMES DAILY PRN
Qty: 90 TABLET | Refills: 0 | Status: SHIPPED | OUTPATIENT
Start: 2020-04-30 | End: 2020-06-17 | Stop reason: SDUPTHER

## 2020-05-12 ENCOUNTER — TELEPHONE (OUTPATIENT)
Dept: PAIN MEDICINE | Facility: CLINIC | Age: 85
End: 2020-05-12

## 2020-05-12 NOTE — TELEPHONE ENCOUNTER
I spoke with Sulma and she stated that the PGT would not be approved by medicare for Ms. León's diagnosis. She stated that the flat rate is $249 and it can be paid over payments. I called Ms. León and left a message to return our call so I discuss this with her.

## 2020-05-13 NOTE — TELEPHONE ENCOUNTER
The patient states that she cannot afford the $249 even if she can make payments. She states that she is barely getting by at this time with the money she has.

## 2020-06-02 ENCOUNTER — DOCUMENTATION (OUTPATIENT)
Dept: PAIN MEDICINE | Facility: CLINIC | Age: 85
End: 2020-06-02

## 2020-06-02 NOTE — PROGRESS NOTES
She was to have LESI and a knee injection today  Cancelled d/t lack of covid19 testing    --  Plan to schedule a follow up, can be telehealth

## 2020-06-08 ENCOUNTER — LAB REQUISITION (OUTPATIENT)
Dept: LAB | Facility: HOSPITAL | Age: 85
End: 2020-06-08

## 2020-06-08 DIAGNOSIS — Z00.00 ENCOUNTER FOR GENERAL ADULT MEDICAL EXAMINATION WITHOUT ABNORMAL FINDINGS: ICD-10-CM

## 2020-06-08 PROCEDURE — U0004 COV-19 TEST NON-CDC HGH THRU: HCPCS | Performed by: ANESTHESIOLOGY

## 2020-06-09 LAB
REF LAB TEST METHOD: NORMAL
SARS-COV-2 RNA RESP QL NAA+PROBE: NOT DETECTED

## 2020-06-10 ENCOUNTER — OUTSIDE FACILITY SERVICE (OUTPATIENT)
Dept: PAIN MEDICINE | Facility: CLINIC | Age: 85
End: 2020-06-10

## 2020-06-10 ENCOUNTER — DOCUMENTATION (OUTPATIENT)
Dept: PAIN MEDICINE | Facility: CLINIC | Age: 85
End: 2020-06-10

## 2020-06-10 PROCEDURE — 62323 NJX INTERLAMINAR LMBR/SAC: CPT | Performed by: ANESTHESIOLOGY

## 2020-06-10 PROCEDURE — 20610 DRAIN/INJ JOINT/BURSA W/O US: CPT | Performed by: ANESTHESIOLOGY

## 2020-06-10 NOTE — PROGRESS NOTES
TWO INDEPENDENT PROCEDURES:  LESI and knee inj    -------      Lumbar Epidural Steroid Injection  [facility]    PREOPERATIVE DIAGNOSIS:   Chronic low back pain and bilateral sciatica  POSTOPERATIVE DIAGNOSIS:  Same as preop diagnosis    PROCEDURE:   Lumbar Epidural Steroid Injection, Therapeutic Translaminar Injection, with epidurogram, at  L5/S1 level    PRE-PROCEDURE DISCUSSION WITH PATIENT:    Risks and complications were discussed with the patient prior to starting the procedure and informed consent was obtained.  We discussed various topics including but not limited to bleeding, infection, injury, paralysis, nerve injury, dural puncture, coma, death, worsening of clinical picture, lack of pain relief, and postprocedural soreness.    SURGEON:  Angel Bustos MD    REASON FOR PROCEDURE:    Degenerative changes are noted in the area.    SEDATION:  Versed 2mg & Fentanyl 50 mcg IV  ANESTHETIC:  Marcaine 0.25%  STEROID:   Methylprednisolone (DEPO MEDROL) 80mg/ml    DESCRIPTON OF PROCEDURE:    After obtaining informed consent, I.V. was started in the preop area.   The patient was taken to the operating room and placed in the prone position.  EKG, blood pressure, and pulse oximeter were monitored throughout, and sedation was provided as needed by the RN under my guidance. All pressure points were well padded.  The lumbar spine area was prepped with Chloraprep and draped in a sterile fashion.  Under fluoroscopic guidance, the above mentioned interlaminar space was identified. Skin and subcutaneous tissues were anesthetized with 1% lidocaine in the middle of the space. A Tuohy needle was introduced through the skin and advanced to this interlaminar space and into the epidural space under fluoroscopic guidance and verified with loss-of-resistance technique to air.  After confirming the position of the needle with the fluoroscope with all the views, and after aspiration was confirmed negative for  blood and CSF, 1.5 mL of Omnipaque was injected.  After seeing appropriate epidurogram with lateral and PA views, a total of 3 cc solution was injected, consisting of 2cc of local anesthetic as above, with normal saline and injectable steroid as above.     ESTIMATED BLOOD LOSS:  <5 mL  SPECIMENS:  None    COMPLICATIONS:     No complications were noted., There was no indication of vascular uptake on live injection of contrast dye., There was no indication of intrathecal uptake on live injection of contrast dye., There was not any evidence of dural puncture.   and The patient did not have any signs of postprocedure numbness nor weakness.    TOLERANCE & DISCHARGE CONDITION:    The patient tolerated the procedure well.  The patient was transported to the recovery area without difficulties.  The patient was discharged to home under the care of family in stable and satisfactory condition.    PLAN OF CARE:  1. The patient was given our standard instruction sheet.  2. The patient will Return to clinic 2-3 wks  3. The patient will resume all medications as per the medication reconciliation sheet.        --------------------------      Left Knee Intraarticular injection under fluoroscopic guidance  [facility]      PREOPERATIVE DIAGNOSIS:  Chronic left knee pain and OA    POSTOPERATIVE DIAGNOSIS:  Same as preop diagnosis    PROCEDURE:   Left Knee Intraarticular injection under fluoroscopic guidance      PRE-PROCEDURE DISCUSSION WITH PATIENT:    Risks and complications were discussed with the patient prior to starting the procedure and informed consent was obtained.  We discussed various topics including but not limited to bleeding, infection, injury, nerve injury, paralysis, coma, death, postprocedural painful flare-up, postprocedural site soreness, and a lack of pain relief.        SURGEON:  Angel Bustos MD    REASON FOR PROCEDURE:    Significant degeneration and arthritis assoc deformity.  Because of  this, I felt that fluoroscopic assistance was necessary to confirm that we entered the joint space.      SEDATION:  as above  ANESTHETIC:  Marcaine 0.5%  STEROID:  Methylprednisolone (DEPO MEDROL) 40mg/ml  HYALURONIC PRODUCT:   no      DESCRIPTON OF PROCEDURE:    After obtaining informed consent, an I.V. was started in the preoperative area. The patient taken to the operating room and was placed in the supine position.  All pressure points were well padded.  EKG, blood pressure, and pulse oximeter were monitored.  The appropriate area was prepped with Chloraprep and draped in a sterile fashion.     The above indicated solution was prepared and the area over the entirety of the knee was prepared with a wide surgical prep of chlorhexidine solution.    On the LEFT KNEE, a point on the medial aspect of the joint line was identified to enter into the joint space, and the sterile needle was inserted parallel with a slight posterior angle to enter easily into the joint space.  Aspiration was positive for 1.5ml of bright yellow aspirate.   Contrast dye was injected and a proper arthrogram was seen.   Then, slowly, the aforementioned solution was injected easily into the joint space.     The needle was removed intact and bleeding was minimal. The insertion site was dressed with a Band-Aid.        ESTIMATED BLOOD LOSS:  <5 mL  SPECIMENS:  none      COMPLICATIONS:   No complications were noted. and There was no indication of vascular uptake on live injection of contrast dye.      TOLERANCE & DISCHARGE CONDITION:    The patient tolerated the procedure well.  The patient was transported to the recovery area without difficulties.  The patient was discharged to home under the care of family in stable and satisfactory condition.      PLAN OF CARE:  1. The patient was given our standard instruction sheet.  2. The patient will Return to clinic 2-3 wks.  3. The patient will resume all medications as per the medication reconciliation  sheet.

## 2020-06-11 DIAGNOSIS — M54.41 CHRONIC RIGHT-SIDED LOW BACK PAIN WITH RIGHT-SIDED SCIATICA: ICD-10-CM

## 2020-06-11 DIAGNOSIS — G89.29 CHRONIC RIGHT HIP PAIN: ICD-10-CM

## 2020-06-11 DIAGNOSIS — M25.551 CHRONIC RIGHT HIP PAIN: ICD-10-CM

## 2020-06-11 DIAGNOSIS — G89.29 CHRONIC RIGHT-SIDED LOW BACK PAIN WITH RIGHT-SIDED SCIATICA: ICD-10-CM

## 2020-06-11 RX ORDER — HYDROCODONE BITARTRATE AND ACETAMINOPHEN 7.5; 325 MG/1; MG/1
1 TABLET ORAL 3 TIMES DAILY PRN
Qty: 90 TABLET | Refills: 0 | OUTPATIENT
Start: 2020-06-11

## 2020-06-11 NOTE — TELEPHONE ENCOUNTER
Medication Refill Request    Date of phone call: 6/10/2020    Medication being requested: hydro/apap 7.5-325 mg si tab TID prn  Qty: 90    Date of last visit: 2020    Date of last refill: 2020    JUDY up to date?: yes    Next Follow up?: 2020    Any new pertinent information? (i.e, new medication allergies, new use of medications, change in patient's health or condition, non-compliance or inconsistency with prescribing agreement?):

## 2020-06-17 DIAGNOSIS — G89.29 CHRONIC RIGHT HIP PAIN: ICD-10-CM

## 2020-06-17 DIAGNOSIS — M25.551 CHRONIC RIGHT HIP PAIN: ICD-10-CM

## 2020-06-17 DIAGNOSIS — M54.41 CHRONIC RIGHT-SIDED LOW BACK PAIN WITH RIGHT-SIDED SCIATICA: ICD-10-CM

## 2020-06-17 DIAGNOSIS — G89.29 CHRONIC RIGHT-SIDED LOW BACK PAIN WITH RIGHT-SIDED SCIATICA: ICD-10-CM

## 2020-06-17 RX ORDER — HYDROCODONE BITARTRATE AND ACETAMINOPHEN 7.5; 325 MG/1; MG/1
1 TABLET ORAL 3 TIMES DAILY PRN
Qty: 90 TABLET | Refills: 0 | Status: SHIPPED | OUTPATIENT
Start: 2020-06-17 | End: 2020-07-21 | Stop reason: SDUPTHER

## 2020-06-17 NOTE — TELEPHONE ENCOUNTER
Medication Refill Request    Date of phone call: 20    Medication being requested: Norco 7.5-325 mg   si tab po TID PRN  Qty: 90    Date of last visit: 20    Date of last refill: 20    JUDY up to date?: yes    Next Follow up?: 20    Any new pertinent information? (i.e, new medication allergies, new use of medications, change in patient's health or condition, non-compliance or inconsistency with prescribing agreement?):

## 2020-06-29 ENCOUNTER — OFFICE VISIT (OUTPATIENT)
Dept: PAIN MEDICINE | Facility: CLINIC | Age: 85
End: 2020-06-29

## 2020-06-29 DIAGNOSIS — G89.4 CHRONIC PAIN SYNDROME: Primary | ICD-10-CM

## 2020-06-29 DIAGNOSIS — Z79.899 ENCOUNTER FOR LONG-TERM CURRENT USE OF HIGH RISK MEDICATION: ICD-10-CM

## 2020-06-29 PROCEDURE — 99441 PR PHYS/QHP TELEPHONE EVALUATION 5-10 MIN: CPT | Performed by: ANESTHESIOLOGY

## 2020-07-21 DIAGNOSIS — M54.41 CHRONIC RIGHT-SIDED LOW BACK PAIN WITH RIGHT-SIDED SCIATICA: ICD-10-CM

## 2020-07-21 DIAGNOSIS — G89.29 CHRONIC RIGHT-SIDED LOW BACK PAIN WITH RIGHT-SIDED SCIATICA: ICD-10-CM

## 2020-07-21 DIAGNOSIS — G89.29 CHRONIC RIGHT HIP PAIN: ICD-10-CM

## 2020-07-21 DIAGNOSIS — M25.551 CHRONIC RIGHT HIP PAIN: ICD-10-CM

## 2020-07-21 NOTE — TELEPHONE ENCOUNTER
Medication Refill Request    Date of phone call: 2020    Medication being requested: hydro/apap 7.5-325 si tab TID prn   Qty: 90    Date of last visit: 2020    Date of last refill: 2020    JUDY up to date?: yes    Next Follow up?: 2020    Any new pertinent information? (i.e, new medication allergies, new use of medications, change in patient's health or condition, non-compliance or inconsistency with prescribing agreement?):

## 2020-07-22 RX ORDER — HYDROCODONE BITARTRATE AND ACETAMINOPHEN 7.5; 325 MG/1; MG/1
1 TABLET ORAL 3 TIMES DAILY PRN
Qty: 90 TABLET | Refills: 0 | Status: SHIPPED | OUTPATIENT
Start: 2020-07-22 | End: 2020-08-18 | Stop reason: SDUPTHER

## 2020-08-14 ENCOUNTER — TRANSCRIBE ORDERS (OUTPATIENT)
Dept: ADMINISTRATIVE | Facility: HOSPITAL | Age: 85
End: 2020-08-14

## 2020-08-14 DIAGNOSIS — R19.07 GENERALIZED ABDOMINAL OR PELVIC SWELLING OR MASS OR LUMP: Primary | ICD-10-CM

## 2020-08-14 DIAGNOSIS — R68.81 EARLY SATIETY: ICD-10-CM

## 2020-08-14 DIAGNOSIS — R63.4 LOSS OF WEIGHT: ICD-10-CM

## 2020-08-18 DIAGNOSIS — M54.41 CHRONIC RIGHT-SIDED LOW BACK PAIN WITH RIGHT-SIDED SCIATICA: ICD-10-CM

## 2020-08-18 DIAGNOSIS — M25.551 CHRONIC RIGHT HIP PAIN: ICD-10-CM

## 2020-08-18 DIAGNOSIS — G89.29 CHRONIC RIGHT HIP PAIN: ICD-10-CM

## 2020-08-18 DIAGNOSIS — G89.29 CHRONIC RIGHT-SIDED LOW BACK PAIN WITH RIGHT-SIDED SCIATICA: ICD-10-CM

## 2020-08-18 RX ORDER — HYDROCODONE BITARTRATE AND ACETAMINOPHEN 7.5; 325 MG/1; MG/1
1 TABLET ORAL 3 TIMES DAILY PRN
Qty: 90 TABLET | Refills: 0 | Status: SHIPPED | OUTPATIENT
Start: 2020-08-18 | End: 2020-08-26 | Stop reason: ALTCHOICE

## 2020-08-20 ENCOUNTER — HOSPITAL ENCOUNTER (OUTPATIENT)
Dept: CT IMAGING | Facility: HOSPITAL | Age: 85
Discharge: HOME | End: 2020-08-20
Admitting: INTERNAL MEDICINE

## 2020-08-20 DIAGNOSIS — R68.81 EARLY SATIETY: ICD-10-CM

## 2020-08-20 DIAGNOSIS — R63.4 LOSS OF WEIGHT: ICD-10-CM

## 2020-08-20 DIAGNOSIS — R19.07 GENERALIZED ABDOMINAL OR PELVIC SWELLING OR MASS OR LUMP: ICD-10-CM

## 2020-08-20 LAB — CREAT BLDA-MCNC: 1 MG/DL (ref 0.6–1.3)

## 2020-08-20 PROCEDURE — 74177 CT ABD & PELVIS W/CONTRAST: CPT

## 2020-08-20 PROCEDURE — 0 IOPAMIDOL PER 1 ML: Performed by: INTERNAL MEDICINE

## 2020-08-20 PROCEDURE — 82565 ASSAY OF CREATININE: CPT

## 2020-08-20 RX ADMIN — IOPAMIDOL 100 ML: 755 INJECTION, SOLUTION INTRAVENOUS at 09:52

## 2020-08-26 ENCOUNTER — OFFICE VISIT (OUTPATIENT)
Dept: PAIN MEDICINE | Facility: CLINIC | Age: 85
End: 2020-08-26

## 2020-08-26 VITALS
DIASTOLIC BLOOD PRESSURE: 68 MMHG | HEIGHT: 63 IN | OXYGEN SATURATION: 95 % | HEART RATE: 64 BPM | WEIGHT: 132.2 LBS | TEMPERATURE: 98.5 F | SYSTOLIC BLOOD PRESSURE: 147 MMHG | RESPIRATION RATE: 16 BRPM | BODY MASS INDEX: 23.42 KG/M2

## 2020-08-26 DIAGNOSIS — M25.562 CHRONIC PAIN OF LEFT KNEE: ICD-10-CM

## 2020-08-26 DIAGNOSIS — G89.29 OTHER CHRONIC PAIN: Primary | ICD-10-CM

## 2020-08-26 DIAGNOSIS — Z79.899 ENCOUNTER FOR LONG-TERM CURRENT USE OF HIGH RISK MEDICATION: ICD-10-CM

## 2020-08-26 DIAGNOSIS — G89.29 CHRONIC PAIN OF LEFT KNEE: ICD-10-CM

## 2020-08-26 DIAGNOSIS — M54.42 CHRONIC BILATERAL LOW BACK PAIN WITH BILATERAL SCIATICA: ICD-10-CM

## 2020-08-26 DIAGNOSIS — M54.41 CHRONIC BILATERAL LOW BACK PAIN WITH BILATERAL SCIATICA: ICD-10-CM

## 2020-08-26 DIAGNOSIS — G89.29 CHRONIC BILATERAL LOW BACK PAIN WITH BILATERAL SCIATICA: ICD-10-CM

## 2020-08-26 PROCEDURE — 99214 OFFICE O/P EST MOD 30 MIN: CPT | Performed by: NURSE PRACTITIONER

## 2020-08-26 NOTE — PROGRESS NOTES
CHIEF COMPLAINT  F/U back pain- patient states that her pain has remained the same.     Subjective   Ashli León is a 85 y.o. female  who presents for follow-up.  She has a history of back and knee pain.    Benefit with LESI (last 6/10/2020).  She reports relief for 1.5-2 months.      Benefit with left knee injections (last 6/10/2020).  Helped for about 1.5 months.  Considering surgery. Right TKA was about 5 years ago with Dr. Swenson.      C/o low back pain. Pain today 8/10 in severity (knee).  She continues with use of hydrocodone 7.5/325 3/day (reduced by Dr. Bustos a few months ago).  Reports significant pain relief with medication but only lasting for 3-4 hours, wants to take an extra.  She denies adverse reactions. No constipation. Provide moderate pain relief.  Concern for PGT abnormality, says she cannot afford the testing.      Back Pain   This is a chronic problem. The current episode started more than 1 year ago. The problem occurs daily. The problem has been gradually worsening since onset. The pain is present in the lumbar spine. The pain radiates to the left thigh and right thigh. The pain is at a severity of 5/10. The pain is moderate. The symptoms are aggravated by position, standing, sitting and bending. Associated symptoms include weakness. Pertinent negatives include no abdominal pain, chest pain, dysuria, fever, headaches or numbness. Risk factors include sedentary lifestyle. She has tried analgesics, NSAIDs, ice and heat (injections) for the symptoms. The treatment provided moderate relief.   Knee Pain    The pain is present in the left knee. The pain is at a severity of 5/10. The pain is moderate. The pain has been worsening since onset. Pertinent negatives include no numbness. The symptoms are aggravated by weight bearing and movement. She has tried NSAIDs and acetaminophen (injections) for the symptoms. The treatment provided moderate relief.      PEG Assessment   What number best  "describes your pain on average in the past week?4  What number best describes how, during the past week, pain has interfered with your enjoyment of life?9  What number best describes how, during the past week, pain has interfered with your general activity?  9    The following portions of the patient's history were reviewed and updated as appropriate: allergies, current medications, past family history, past medical history, past social history, past surgical history and problem list.    Review of Systems   Constitutional: Positive for activity change. Negative for chills, fatigue and fever.   HENT: Negative for congestion.    Eyes: Negative for visual disturbance.   Respiratory: Negative for chest tightness and shortness of breath.    Cardiovascular: Negative for chest pain.   Gastrointestinal: Negative for abdominal pain, constipation and diarrhea.   Genitourinary: Negative for difficulty urinating and dysuria.   Musculoskeletal: Positive for back pain.   Neurological: Positive for weakness. Negative for dizziness, light-headedness, numbness and headaches.   Psychiatric/Behavioral: Positive for agitation and sleep disturbance. The patient is not nervous/anxious.      I have reviewed and confirmed the accuracy of the ROS as documented by the MA/LPN/RN TANIA Tracy    Vitals:    08/26/20 1411   BP: 147/68   Pulse: 64   Resp: 16   Temp: 98.5 °F (36.9 °C)   SpO2: 95%   Weight: 60 kg (132 lb 3.2 oz)   Height: 160 cm (63\")   PainSc:   5   PainLoc: Back     Objective   Physical Exam   Constitutional: She is oriented to person, place, and time. She appears well-developed and well-nourished. No distress.   HENT:   Head: Normocephalic and atraumatic.   Eyes: Conjunctivae and EOM are normal.   Neck: Neck supple.   Cardiovascular: Normal rate.   Pulmonary/Chest: Effort normal. No respiratory distress.   Musculoskeletal:        Left knee: She exhibits swelling. She exhibits no ecchymosis, no deformity and no " erythema. Tenderness found.        Lumbar back: She exhibits tenderness and pain.   +SLR bilateral     Neurological: She is alert and oriented to person, place, and time. She has normal strength. No sensory deficit. Gait (AMBULATES WITH WALKER) abnormal.   Skin: Skin is warm and dry. She is not diaphoretic.   Psychiatric: She has a normal mood and affect. Her behavior is normal.   Nursing note and vitals reviewed.    Assessment/Plan   Ashli was seen today for back pain.    Diagnoses and all orders for this visit:    Other chronic pain    Chronic bilateral low back pain with bilateral sciatica  -     Case Request    Chronic pain of left knee  -     Case Request    Encounter for long-term current use of high risk medication    Other orders  -     Buprenorphine HCl (Belbuca) 75 MCG film; Apply 75 mcg to cheek Every 12 (Twelve) Hours.      --- L3/4 lumbar epidural steroid injection + left knee injection. Reviewed the procedure at length with the patient.  Included in the review was expectations, complications, risk and benefits.The procedure was described in detail and the risks, benefits and alternatives were discussed with the patient (including but not limited to: bleeding, infection, nerve damage, worsening of pain, inability to perform injection, paralysis, seizures, and death) who agreed to proceed.  Discussed the potential for sedation if warranted/wanted.  The procedure will plan to be performed at [facility] with fluoroscopic guidance(unless ultrasound is indicated) and could potentially have steroids and contrast dye used. Questions were answered and in a way the patient could understand.  Patient verbalized understanding and wishes to proceed.  This intervention will be ordered.  Discussed with patient that all procedures are part of a multimodal plan of care and include either formal PT or a home exercise program.  Patient has no evidence of coagulopathy or current infection.  ---  Discontinue Hydrocodone. Trial Belbuca.  Start at 75 mcg/hr patch bid, anticipate the need to increase to at least 150 mcg/hr patch bid.   Wishes to complete current prescription of hydrocodone which she just filled a couple of days ago.  She understands that she is not to take both medications together.  Discussed medication with the patient.  Included in this discussion was the potential for side effects and adverse events.  Patient verbalized understanding and wished to proceed.  Prescription will be sent to pharmacy.  --- The urine drug screen confirmation from 4/30/2020 has been reviewed and the result is (abnormal, absence of hydromorphone metabolite consistently, suggestive of a PGT abnormality in addition to patient report of short duration of relief with medication) based on patient history and JUDY report  --- Refuses PGT due to cost, cannot afford $250 for the test.   --- Follow-up 1 month          JUDY REPORT  As part of the patient's treatment plan, I am prescribing controlled substances. The patient has been made aware of appropriate use of such medications, including potential risk of somnolence, limited ability to drive and/or work safely, and the potential for dependence or overdose. It has also bee made clear that these medications are for use by this patient only, without concomitant use of alcohol or other substances unless prescribed.     Patient has completed prescribing agreement detailing terms of continued prescribing of controlled substances, including monitoring JUDY reports, urine drug screening, and pill counts if necessary. The patient is aware that inappropriate use will results in cessation of prescribing such medications.    JUDY report has been reviewed and scanned into the patient's chart.    As the clinician, I personally reviewed the JUDY from 8/26/2020 while the patient was in the office today.    History and physical exam exhibit continued safe and appropriate use of  controlled substances.    EMR Dragon/Transcription disclaimer:   Much of this encounter note is an electronic transcription/translation of spoken language to printed text. The electronic translation of spoken language may permit erroneous, or at times, nonsensical words or phrases to be inadvertently transcribed; Although I have reviewed the note for such errors, some may still exist.

## 2020-08-26 NOTE — PATIENT INSTRUCTIONS
Buprenorphine buccal film  What is this medicine?  BUPRENORPHINE (byoo pre NOR feen) is a pain reliever. It is used to treat moderate to severe pain.  This medicine may be used for other purposes; ask your health care provider or pharmacist if you have questions.  COMMON BRAND NAME(S): Edgar  What should I tell my health care provider before I take this medicine?  They need to know if you have any of these conditions:  · blockage in your bowel  · brain tumor  · drink more than 3 alcohol-containing drinks per day  · drug abuse or addiction  · head injury  · kidney disease  · liver disease  · lung or breathing disease, like asthma  · mouth sores  · thyroid disease  · trouble passing urine or change in the amount of urine  · an unusual or allergic reaction to buprenorphine, other medicines, foods, dyes, or preservatives  · pregnant or trying to get pregnant  · breast-feeding  How should I use this medicine?  Take this medicine by mouth. Follow the directions on the prescription label. Wet the inside of your cheek with tongue or rinse mouth with water before using this medicine. Open package with dry hands just before you are ready to use. Do not cut or tear the film. Use the tip of a dry finger to put film in the mouth with the yellow side of the film facing the cheek. Hold the film in place for 5 seconds. After you place the medicine on your cheek leave the film in place until it dissolves away in about 30 minutes. Do not move or touch the film with fingers or tongue. Do not eat or drink until the film has dissolved. Do not chew or swallow the film. Take your medicine at regular intervals. Do not take your medicine more often than directed. Do not stop taking except on your doctor's advice.  A special MedGuide will be given to you by the pharmacist with each prescription and refill. Be sure to read this information carefully each time.  Talk to your pediatrician regarding the use of this medicine in children.  Special care may be needed.  Overdosage: If you think you have taken too much of this medicine contact a poison control center or emergency room at once.  NOTE: This medicine is only for you. Do not share this medicine with others.  What if I miss a dose?  If you miss a dose, take it as soon as you can. If it is almost time for your next dose, take only that dose. Do not take double or extra doses.  What may interact with this medicine?  Do not take this medication with any of the following medicines:  · cisapride  · certain medicines for fungal infections like ketoconazole and itraconazole  · dronedarone  · pimozide  · thioridazine  This medicine may interact with the following medications:  · alcohol  · antihistamines for allergy, cough and cold  · antiviral medicines for HIV or AIDS  · atropine  · certain antibiotics like clarithromycin, erythromycin, linezolid, rifampin  · certain medicines for anxiety or sleep  · certain medicines for bladder problems like oxybutynin, tolterodine  · certain medicines for depression like amitriptyline, fluoxetine, sertraline  · certain medicines for migraine headache like almotriptan, eletriptan, frovatriptan, naratriptan, rizatriptan, sumatriptan, zolmitriptan  · certain medicines for nausea or vomiting like dolasetron, ondansetron, palonosetron  · certain medicines for Parkinson's disease like benztropine, trihexyphenidyl  · certain medicines for seizures like phenobarbital, primidone  · certain medicines for stomach problems like cimetidine, dicyclomine, hyoscyamine  · certain medicines for travel sickness like scopolamine  · diuretics  · dofetilide  · general anesthetics like halothane, isoflurane, methoxyflurane, propofol  · ipratropium  · local anesthetics like lidocaine, pramoxine, tetracaine  · MAOIs like Carbex, Eldepryl, Marplan, Nardil, and Parnate  · medicines that relax muscles for surgery  · methylene blue  · other medicines that prolong the QT interval (cause an  abnormal heart rhythm)  · other narcotic medicines for pain or cough  · phenothiazines like chlorpromazine, mesoridazine, prochlorperazine  · ritonavir  · ziprasidone  This list may not describe all possible interactions. Give your health care provider a list of all the medicines, herbs, non-prescription drugs, or dietary supplements you use. Also tell them if you smoke, drink alcohol, or use illegal drugs. Some items may interact with your medicine.  What should I watch for while using this medicine?  Tell your health care provider if your pain does not go away, if it gets worse, or if you have new or a different type of pain. You may develop tolerance to this drug. Tolerance means that you will need a higher dose of the drug for pain relief. Tolerance is normal and is expected if you take this drug for a long time.  Do not suddenly stop taking your drug because you may develop a severe reaction. Your body becomes used to the drug. This does NOT mean you are addicted. Addiction is a behavior related to getting and using a drug for a nonmedical reason. If you have pain, you have a medical reason to take pain drug. Your health care provider will tell you how much drug to take. If your health care provider wants you to stop the drug, the dose will be slowly lowered over time to avoid any side effects.  If you take other drugs that also cause drowsiness like other narcotic pain drugs, benzodiazepines, or other drugs for sleep, you may have more side effects. Give your health care provider a list of all drugs you use. He or she will tell you how much drug to take. Do not take more drug than directed. Get emergency help right away if you have trouble breathing or are unusually tired or sleepy.  Talk to your health care provider about naloxone and how to get it. Naloxone is an emergency drug used for an opioid overdose. An overdose can happen if you take too much opioid. It can also happen if an opioid is taken with some  other drugs or substances, like alcohol. Know the symptoms of an overdose, like trouble breathing, unusually tired or sleepy, or not being able to respond or wake up. Make sure to tell caregivers and close contacts where it is stored. Make sure they know how to use it. After naloxone is given, you must get emergency help right away. Naloxone is a temporary treatment. Repeat doses may be needed.  You may get drowsy or dizzy. Do not drive, use machinery, or do anything that needs mental alertness until you know how this drug affects you. Do not stand up or sit up quickly, especially if you are an older patient. This reduces the risk of dizzy or fainting spells. Alcohol may interfere with the effect of this drug. Avoid alcoholic drinks.  This drug will cause constipation. If you do not have a bowel movement for 3 days, call your health care provider.  Your mouth may get dry. Chewing sugarless gum or sucking hard candy and drinking plenty of water may help. Contact your health care provider if the problem does not go away or is severe.  What side effects may I notice from receiving this medicine?  Side effects that you should report to your doctor or health care professional as soon as possible:  · allergic reactions like skin rash, itching or hives, swelling of the face, lips, or tongue  · breathing problems  · confusion  · signs and symptoms of a dangerous change in heartbeat or heart rhythm like chest pain; dizziness; fast or irregular heartbeat; palpitations; feeling faint or lightheaded, falls; breathing problems  · signs and symptoms of liver injury like dark yellow or brown urine; general ill feeling or flu-like symptoms; light-colored stools; loss of appetite; nausea; right upper belly pain; unusually weak or tired; yellow of the eyes or skin  · signs and symptoms of low blood pressure like dizziness; feeling faint or lightheaded, falls; unusually weak or tired  · trouble passing urine or change in the amount of  urine  Side effects that usually do not require medical attention (report to your doctor or health care professional if they continue or are bothersome):  · constipation  · dry mouth  · nausea, vomiting  · tiredness  This list may not describe all possible side effects. Call your doctor for medical advice about side effects. You may report side effects to FDA at 2-928-ULK-7781.  Where should I keep my medicine?  Keep out of the reach of children.  This medicine can be abused. Keep your medicine in a safe place to protect it from theft. Do not share this medicine with anyone. Selling or giving away this medicine is dangerous and against the law. Follow the directions in the GovtodayGuide.  Store at room temperature between 15 and 30 degrees C (59 and 86 degrees F).  This medicine may cause accidental overdose and death if it is taken by other adults, children, or pets. Return medicine that has not been used to an official disposal site. Contact the [facility] at 1-730.830.5398 or your [facility] government to find a site. If you cannot return the medicine, remove any unused films from the foil packs and flush any down the toilet to reduce the chance of harm. Throw away the empty foil packaging in the trash. Do not use the medicine after the expiration date.  NOTE: This sheet is a summary. It may not cover all possible information. If you have questions about this medicine, talk to your doctor, pharmacist, or health care provider.  © 2020 [employer] (2020-07-28 11:41:05)

## 2020-08-31 ENCOUNTER — TELEPHONE (OUTPATIENT)
Dept: PAIN MEDICINE | Facility: CLINIC | Age: 85
End: 2020-08-31

## 2020-09-01 NOTE — TELEPHONE ENCOUNTER
This problem needs to be resolved.  Every provider in this office regularly prescribes Belbuca for chronic pain and not one of us have an XDEA#.  They must be running/entering the medication wrong.  Belbuca is a medication with only one FDA indication which is chronic pain.  This is not a medication for abuse/addiction treatment and therefore an XDEA# is not needed to prescribe.  Perhaps the drug rep could contact the pharmacy and do some educating.

## 2020-09-01 NOTE — TELEPHONE ENCOUNTER
Just spoke with [facility] pharmacy manager. He sts he spoke with his boss and they are unable to process med without XDEA#. I told [facility] to cancel Rx belbuca and I will contact pt to send to another local pharmacy. I will also call rep in am.

## 2020-09-01 NOTE — TELEPHONE ENCOUNTER
You do not need an XDEA# to prescribe Belbuca. I am prescribing the medication for chronic pain which is the FDA approved indication for this drug.  It is not an addiction treatment medication.

## 2020-09-01 NOTE — TELEPHONE ENCOUNTER
Called pharmacy again this afternoon spoke with pharmacist Ila who std [facility] was out of pharmacy at this time, Ila sts med still requires XDEA#, I asked her to cancel med, she told me she didn't know if [facility] talked to compliance and wait until he calls me back he should return to pharmacy by 330pm and will call me back to let me know. If they can't fill med, would you like me to just cancel Rx and I can call pt to send belbuca to another pharmacy?

## 2020-09-01 NOTE — TELEPHONE ENCOUNTER
Called [facility] pharmacy this morning spoke with pharmacy manager [facility]. Told him your message above, he said he has to check with compliance, the system is blocking him from filling the med, he is unable to do an override for belbuca. I told him you've always prescribed belbuca to several other pharmacies no problem. He sts this is the first fill of belbuca for pt so maybe that's the problem, but he is checking with his boss and he will call me back. I'll let you know if we have to cancel and send to another pharmacy.

## 2020-09-03 NOTE — TELEPHONE ENCOUNTER
[facility] rep called back, he said he went to [facility] pharmacy and everything is fixed now, he said if you resend the belbuca Rx today they will be able to fill it tomorrow. Can you please resend Rx? Thank you.

## 2020-09-12 ENCOUNTER — DOCUMENTATION (OUTPATIENT)
Dept: PAIN MEDICINE | Facility: CLINIC | Age: 85
End: 2020-09-12

## 2020-09-12 DIAGNOSIS — Z01.818 PREOP TESTING: Primary | ICD-10-CM

## 2020-09-12 DIAGNOSIS — Z01.818 PREOP TESTING: ICD-10-CM

## 2020-09-12 PROCEDURE — U0004 COV-19 TEST NON-CDC HGH THRU: HCPCS | Performed by: ANESTHESIOLOGY

## 2020-09-14 LAB — SARS-COV-2 RNA RESP QL NAA+PROBE: NOT DETECTED

## 2020-09-15 ENCOUNTER — OUTSIDE FACILITY SERVICE (OUTPATIENT)
Dept: PAIN MEDICINE | Facility: CLINIC | Age: 85
End: 2020-09-15

## 2020-09-15 ENCOUNTER — DOCUMENTATION (OUTPATIENT)
Dept: PAIN MEDICINE | Facility: CLINIC | Age: 85
End: 2020-09-15

## 2020-09-15 PROCEDURE — 62323 NJX INTERLAMINAR LMBR/SAC: CPT | Performed by: ANESTHESIOLOGY

## 2020-09-15 PROCEDURE — 20610 DRAIN/INJ JOINT/BURSA W/O US: CPT | Performed by: ANESTHESIOLOGY

## 2020-09-15 NOTE — PROGRESS NOTES
TWO INDEPENDENT PROCEDURES:  LESI and Knee    -------      Lumbar Epidural Steroid Injection  [facility]– [place]    PREOPERATIVE DIAGNOSIS:   Chronic low back pain and bilateral recurrent sciatica  POSTOPERATIVE DIAGNOSIS:  Same as preop diagnosis    PROCEDURE:   Lumbar Epidural Steroid Injection, Therapeutic Translaminar Injection, with epidurogram, at  L3/L4 level    PRE-PROCEDURE DISCUSSION WITH PATIENT:    Risks and complications were discussed with the patient prior to starting the procedure and informed consent was obtained.  We discussed various topics including but not limited to bleeding, infection, injury, paralysis, nerve injury, dural puncture, coma, death, worsening of clinical picture, lack of pain relief, and postprocedural soreness.    SURGEON:  Angel Bustos MD    REASON FOR PROCEDURE:    Previous clinically significant therapeutic effect is noted., Degenerative changes are noted in the area. and Radiating pattern of pain is likely consistent with degenerative changes in the area.    SEDATION:  Patient declined administration of moderate sedation    ANESTHETIC:  Marcaine 0.25%  STEROID:   Methylprednisolone (DEPO MEDROL) 80mg/ml    DESCRIPTON OF PROCEDURE:    After obtaining informed consent, I.V. was not started in the preop area.   The patient was taken to the operating room and placed in the prone position.  EKG, blood pressure, and pulse oximeter were monitored throughout, and sedation was provided as needed by the RN under my guidance. All pressure points were well padded.  The lumbar spine area was prepped with Chloraprep and draped in a sterile fashion.  Under fluoroscopic guidance, the above mentioned interlaminar space was identified. Skin and subcutaneous tissues were anesthetized with 1% lidocaine in the middle of the space. A Tuohy needle was introduced through the skin and advanced to this interlaminar space and into the epidural space under fluoroscopic guidance and  verified with loss-of-resistance technique to air.  After confirming the position of the needle with the fluoroscope with all the views, and after aspiration was confirmed negative for blood and CSF, 1.5 mL of Omnipaque was injected.  After seeing appropriate epidurogram with lateral and PA views, a total of 3 cc solution was injected, consisting of 2cc of local anesthetic as above, with normal saline and injectable steroid as above.     ESTIMATED BLOOD LOSS:  <5 mL  SPECIMENS:  None    COMPLICATIONS:     No complications were noted., There was no indication of vascular uptake on live injection of contrast dye., There was no indication of intrathecal uptake on live injection of contrast dye., There was not any evidence of dural puncture.   and The patient did not have any signs of postprocedure numbness nor weakness.    TOLERANCE & DISCHARGE CONDITION:    The patient tolerated the procedure well.  The patient was transported to the recovery area without difficulties.  The patient was discharged to home under the care of family in stable and satisfactory condition.    PLAN OF CARE:  1. The patient was given our standard instruction sheet.  2. The patient will Return to clinic 3 wks  3. The patient will resume all medications as per the medication reconciliation sheet.        ------------    Left Knee Intraarticular injection under fluoroscopic guidance  [facility]      PREOPERATIVE DIAGNOSIS:  Chronic left knee pain    POSTOPERATIVE DIAGNOSIS:  Same as preop diagnosis    PROCEDURE:   Left Knee Intraarticular injection under fluoroscopic guidance      PRE-PROCEDURE DISCUSSION WITH PATIENT:    Risks and complications were discussed with the patient prior to starting the procedure and informed consent was obtained.  We discussed various topics including but not limited to bleeding, infection, injury, nerve injury, paralysis, coma, death, postprocedural painful flare-up, postprocedural site  soreness, and a lack of pain relief.        SURGEON:  Angel Bustos MD    REASON FOR PROCEDURE:    OA degen change is significant.  Because of this, I felt that fluoroscopic assistance was reasonablly necessary to confirm that we entered the joint space.      SEDATION:  Patient declined administration of moderate sedation    ANESTHETIC:  Marcaine 0.5%  STEROID:  Methylprednisolone (DEPO MEDROL) 40mg/ml  HYALURONIC PRODUCT:   no      DESCRIPTON OF PROCEDURE:    After obtaining informed consent, an I.V. was not started in the preoperative area. The patient taken to the operating room and was placed in the supine position.  All pressure points were well padded.  EKG, blood pressure, and pulse oximeter were monitored.  The appropriate area was prepped with Chloraprep and draped in a sterile fashion.     The above indicated solution was prepared and the area over the entirety of the knee was prepared with a wide surgical prep of chlorhexidine solution.    On the LEFT KNEE, a point on the lateral aspect of the joint line was identified to enter into the joint space, and the sterile needle was inserted parallel with a slight posterior angle to enter easily into the joint space.  Aspiration was negative.   Contrast dye was injected and a proper arthrogram was seen.   Then, slowly, the aforementioned solution was injected easily into the joint space.     The needle was removed intact and bleeding was minimal. The insertion site was dressed with a Band-Aid.        ESTIMATED BLOOD LOSS:  <5 mL  SPECIMENS:  none      COMPLICATIONS:   No complications were noted., There was no indication of vascular uptake on live injection of contrast dye. and The patient did not have any signs of postprocedure numbness nor weakness.      TOLERANCE & DISCHARGE CONDITION:    The patient tolerated the procedure well.  The patient was transported to the recovery area without difficulties.  The patient was discharged to home under the care of  family in stable and satisfactory condition.      PLAN OF CARE:  1. The patient was given our standard instruction sheet.  2. The patient will Return to clinic 3 wks.  3. The patient will resume all medications as per the medication reconciliation sheet.

## 2020-10-07 ENCOUNTER — OFFICE VISIT (OUTPATIENT)
Dept: PAIN MEDICINE | Facility: CLINIC | Age: 85
End: 2020-10-07

## 2020-10-07 DIAGNOSIS — M54.41 CHRONIC RIGHT-SIDED LOW BACK PAIN WITH RIGHT-SIDED SCIATICA: ICD-10-CM

## 2020-10-07 DIAGNOSIS — M25.562 CHRONIC PAIN OF LEFT KNEE: ICD-10-CM

## 2020-10-07 DIAGNOSIS — G89.29 CHRONIC RIGHT-SIDED LOW BACK PAIN WITH RIGHT-SIDED SCIATICA: ICD-10-CM

## 2020-10-07 DIAGNOSIS — Z79.899 ENCOUNTER FOR LONG-TERM CURRENT USE OF HIGH RISK MEDICATION: ICD-10-CM

## 2020-10-07 DIAGNOSIS — G89.29 CHRONIC PAIN OF LEFT KNEE: ICD-10-CM

## 2020-10-07 DIAGNOSIS — G89.29 OTHER CHRONIC PAIN: Primary | ICD-10-CM

## 2020-10-07 PROCEDURE — 99442 PR PHYS/QHP TELEPHONE EVALUATION 11-20 MIN: CPT | Performed by: NURSE PRACTITIONER

## 2020-10-07 RX ORDER — HYDROCODONE BITARTRATE AND ACETAMINOPHEN 7.5; 325 MG/1; MG/1
TABLET ORAL
Qty: 90 TABLET | Refills: 0 | Status: SHIPPED | OUTPATIENT
Start: 2020-10-07 | End: 2020-10-13 | Stop reason: HOSPADM

## 2020-10-07 NOTE — PROGRESS NOTES
TELEPHONE VISIT  You have chosen to receive care through a telephone visit. Do you consent to use a telephone visit for your medical care today? Yes    CHIEF COMPLAINT  Back pain  Knee pain    Subjective   Ashli León is a 85 y.o. female  who presents for a telephonic follow-up.She has a history of back pain, knee pain.    C/o low back and left knee pain. Pain today 5/10 in severity.  At the previous office visit the patient was changed from hydrocodone to Belbuca due to concerns of PGT abnormality.  She was unable to afford the testing that was reporting that the medication was only lasting for a couple of hours and was lacking all the metabolites on multiple drug screens.  Currently taking Belbuca 75 mcg bid.  She reports very little pain relief.  She reports that she is having difficulty opening the package due to her arthritis.  She has also reported diarrhea.  She also reports that it is cost prohibitive.  Today she reports better relief with use of the hydrocodone, they provided relief for 4-5 hours at least and the relief was quite significant, no adverse reactions.      On 9/15/2020 the patient completed an L3-L4 LESI as well as a left knee injection with fluoroscopic guidance. She reports moderate relief but only helped for about a week or two.      Back Pain  This is a chronic problem. The current episode started more than 1 year ago. The problem occurs daily. The problem has been waxing and waning since onset. The pain is present in the lumbar spine. The pain radiates to the left thigh and right thigh. The pain is at a severity of 5/10. The pain is moderate. The symptoms are aggravated by position, standing, sitting and bending. Associated symptoms include weakness. Pertinent negatives include no abdominal pain, chest pain, dysuria, fever, headaches or numbness. Risk factors include sedentary lifestyle. She has tried analgesics, NSAIDs, ice and heat (injections) for the symptoms. The treatment  provided moderate relief.   Knee Pain   The pain is present in the left knee. The pain is at a severity of 5/10. The pain is moderate. The pain has been worsening since onset. Pertinent negatives include no numbness. The symptoms are aggravated by weight bearing and movement. She has tried NSAIDs and acetaminophen (injections) for the symptoms. The treatment provided moderate relief.      The following portions of the patient's history were reviewed and updated as appropriate: allergies, current medications, past family history, past medical history, past social history, past surgical history and problem list.    Review of Systems   Constitutional: Negative for fever.   Cardiovascular: Negative for chest pain.   Gastrointestinal: Negative for abdominal pain.   Genitourinary: Negative for dysuria.   Musculoskeletal: Positive for back pain.   Neurological: Positive for weakness. Negative for numbness and headaches.     Vitals:    10/07/20 1259   PainSc:   5   PainLoc: Back         Objective   Physical Exam  As this is a telephone check-in, the ability to perform a routine physical exam is extremely limited. The patient seems alert and is oriented appropriately.   On this phone call there is not any evidence of respiratory distress.   The patient seems of normal mood.   The remainder of a routine physical exam is deferred.      Assessment/Plan   Diagnoses and all orders for this visit:    Other chronic pain    Chronic pain of left knee    Chronic right-sided low back pain with right-sided sciatica    Encounter for long-term current use of high risk medication    Other orders  -     HYDROcodone-acetaminophen (NORCO) 7.5-325 MG per tablet; Every 6-8 hours prn severe pain. May 3/day.  This is a 30 day supply      ----------------    Our practice is offering alternative &/or electronic methods to continue to follow our patients while at the same time further the efforts toward social distancing, in accordance with our  organizational policies, professional societies' guidance, and [facility] mandates.  I support the Healthy at Home campaign and in this visit I have counseled the patient on our needs to limit in-person office visits and physical encounters with medical facilities whenever possible.  I have also educated the patient on the medical necessities of maintaining social distancing while we continue to function during this crisis period.      The patient was counseled on the need to consider telehealth options. The patient was offered the opportunity for a Video Visit using [employer]. The patient had obstacles which preclude consideration for a Video Visit. As a Video Visit was not practical, the patient was offered the option for a Telephone Check-In. The patient agreed to a Telephone Check-In. The patient was counseled on the need for a check-in visit. The patient was educated about our efforts to comply with monitoring standards when prescribing potent medications.    ----------------    This visit has been rescheduled as a phone visit to comply with patient safety concerns in accordance with CDC recommendations. Total time of discussion was 11 minutes.    --- PGT next office visit. Now covered under Medicare.    --- For now will resume hydrocodone.    --- The urine drug screen confirmation from 4/30/2020 has been reviewed and the result is appropriate based on patient history and JUDY report  --- Follow-up 2 months.  IN OFFICE - UPDATE UDS, PRESCRIBING AGREEMENT, AND OBTAIN PGT        JUDY REPORT  As part of the patient's treatment plan, I am prescribing controlled substances. The patient has been made aware of appropriate use of such medications, including potential risk of somnolence, limited ability to drive and/or work safely, and the potential for dependence or overdose. It has also bee made clear that these medications are for use by this patient only, without concomitant use of alcohol or other substances  unless prescribed.     Patient has completed prescribing agreement detailing terms of continued prescribing of controlled substances, including monitoring JUDY reports, urine drug screening, and pill counts if necessary. The patient is aware that inappropriate use will results in cessation of prescribing such medications.    JUDY report has been reviewed and scanned into the patient's chart.    As the clinician, I personally reviewed the JUDY from 10/7/2020 while the patient was in the office today.    History and physical exam exhibit continued safe and appropriate use of controlled substances.  -------    EMR Dragon/Transcription disclaimer:   Much of this encounter note is an electronic transcription/translation of spoken language to printed text. The electronic translation of spoken language may permit erroneous, or at times, nonsensical words or phrases to be inadvertently transcribed; Although I have reviewed the note for such errors, some may still exist.

## 2020-10-10 ENCOUNTER — APPOINTMENT (OUTPATIENT)
Dept: GENERAL RADIOLOGY | Facility: HOSPITAL | Age: 85
End: 2020-10-10

## 2020-10-10 ENCOUNTER — HOSPITAL ENCOUNTER (INPATIENT)
Facility: HOSPITAL | Age: 85
LOS: 2 days | Discharge: NURSING, REHAB OR LONG-TERM CARE | End: 2020-10-13
Attending: EMERGENCY MEDICINE | Admitting: INTERNAL MEDICINE

## 2020-10-10 ENCOUNTER — APPOINTMENT (OUTPATIENT)
Dept: CT IMAGING | Facility: HOSPITAL | Age: 85
End: 2020-10-10

## 2020-10-10 DIAGNOSIS — F51.01 PRIMARY INSOMNIA: ICD-10-CM

## 2020-10-10 DIAGNOSIS — S72.301A CLOSED FRACTURE OF SHAFT OF RIGHT FEMUR, UNSPECIFIED FRACTURE MORPHOLOGY, INITIAL ENCOUNTER (HCC): Primary | ICD-10-CM

## 2020-10-10 DIAGNOSIS — S72.351A CLOSED DISPLACED COMMINUTED FRACTURE OF SHAFT OF RIGHT FEMUR, INITIAL ENCOUNTER (HCC): ICD-10-CM

## 2020-10-10 LAB
ABO GROUP BLD: NORMAL
ALBUMIN SERPL-MCNC: 4.5 G/DL (ref 3.5–5.2)
ALBUMIN/GLOB SERPL: 1.6 G/DL
ALP SERPL-CCNC: 51 U/L (ref 39–117)
ALT SERPL W P-5'-P-CCNC: 13 U/L (ref 1–33)
ANION GAP SERPL CALCULATED.3IONS-SCNC: 10.7 MMOL/L (ref 5–15)
ANION GAP SERPL CALCULATED.3IONS-SCNC: 12.8 MMOL/L (ref 5–15)
AST SERPL-CCNC: 16 U/L (ref 1–32)
B PARAPERT DNA SPEC QL NAA+PROBE: NOT DETECTED
B PERT DNA SPEC QL NAA+PROBE: NOT DETECTED
BASOPHILS # BLD AUTO: 0.02 10*3/MM3 (ref 0–0.2)
BASOPHILS NFR BLD AUTO: 0.4 % (ref 0–1.5)
BILIRUB SERPL-MCNC: 1.4 MG/DL (ref 0–1.2)
BLD GP AB SCN SERPL QL: NEGATIVE
BUN SERPL-MCNC: 12 MG/DL (ref 8–23)
BUN SERPL-MCNC: 13 MG/DL (ref 8–23)
BUN/CREAT SERPL: 14.4 (ref 7–25)
BUN/CREAT SERPL: 14.6 (ref 7–25)
C PNEUM DNA NPH QL NAA+NON-PROBE: NOT DETECTED
CALCIUM SPEC-SCNC: 9.8 MG/DL (ref 8.6–10.5)
CALCIUM SPEC-SCNC: 9.8 MG/DL (ref 8.6–10.5)
CHLORIDE SERPL-SCNC: 104 MMOL/L (ref 98–107)
CHLORIDE SERPL-SCNC: 105 MMOL/L (ref 98–107)
CO2 SERPL-SCNC: 24.2 MMOL/L (ref 22–29)
CO2 SERPL-SCNC: 28.3 MMOL/L (ref 22–29)
CREAT SERPL-MCNC: 0.82 MG/DL (ref 0.57–1)
CREAT SERPL-MCNC: 0.9 MG/DL (ref 0.57–1)
DEPRECATED RDW RBC AUTO: 41.1 FL (ref 37–54)
EOSINOPHIL # BLD AUTO: 0.02 10*3/MM3 (ref 0–0.4)
EOSINOPHIL NFR BLD AUTO: 0.4 % (ref 0.3–6.2)
ERYTHROCYTE [DISTWIDTH] IN BLOOD BY AUTOMATED COUNT: 11.8 % (ref 12.3–15.4)
FLUAV H1 2009 PAND RNA NPH QL NAA+PROBE: NOT DETECTED
FLUAV H1 HA GENE NPH QL NAA+PROBE: NOT DETECTED
FLUAV H3 RNA NPH QL NAA+PROBE: NOT DETECTED
FLUAV SUBTYP SPEC NAA+PROBE: NOT DETECTED
FLUBV RNA ISLT QL NAA+PROBE: NOT DETECTED
GFR SERPL CREATININE-BSD FRML MDRD: 60 ML/MIN/1.73
GFR SERPL CREATININE-BSD FRML MDRD: 66 ML/MIN/1.73
GLOBULIN UR ELPH-MCNC: 2.8 GM/DL
GLUCOSE SERPL-MCNC: 106 MG/DL (ref 65–99)
GLUCOSE SERPL-MCNC: 117 MG/DL (ref 65–99)
HADV DNA SPEC NAA+PROBE: NOT DETECTED
HCOV 229E RNA SPEC QL NAA+PROBE: NOT DETECTED
HCOV HKU1 RNA SPEC QL NAA+PROBE: NOT DETECTED
HCOV NL63 RNA SPEC QL NAA+PROBE: NOT DETECTED
HCOV OC43 RNA SPEC QL NAA+PROBE: NOT DETECTED
HCT VFR BLD AUTO: 35 % (ref 34–46.6)
HGB BLD-MCNC: 11.8 G/DL (ref 12–15.9)
HMPV RNA NPH QL NAA+NON-PROBE: NOT DETECTED
HPIV1 RNA SPEC QL NAA+PROBE: NOT DETECTED
HPIV2 RNA SPEC QL NAA+PROBE: NOT DETECTED
HPIV3 RNA NPH QL NAA+PROBE: NOT DETECTED
HPIV4 P GENE NPH QL NAA+PROBE: NOT DETECTED
IMM GRANULOCYTES # BLD AUTO: 0.02 10*3/MM3 (ref 0–0.05)
IMM GRANULOCYTES NFR BLD AUTO: 0.4 % (ref 0–0.5)
INR PPP: 1.04 (ref 0.9–1.1)
LYMPHOCYTES # BLD AUTO: 1.61 10*3/MM3 (ref 0.7–3.1)
LYMPHOCYTES NFR BLD AUTO: 29.8 % (ref 19.6–45.3)
M PNEUMO IGG SER IA-ACNC: NOT DETECTED
MCH RBC QN AUTO: 31.7 PG (ref 26.6–33)
MCHC RBC AUTO-ENTMCNC: 33.7 G/DL (ref 31.5–35.7)
MCV RBC AUTO: 94.1 FL (ref 79–97)
MONOCYTES # BLD AUTO: 0.29 10*3/MM3 (ref 0.1–0.9)
MONOCYTES NFR BLD AUTO: 5.4 % (ref 5–12)
NEUTROPHILS NFR BLD AUTO: 3.45 10*3/MM3 (ref 1.7–7)
NEUTROPHILS NFR BLD AUTO: 63.6 % (ref 42.7–76)
NRBC BLD AUTO-RTO: 0 /100 WBC (ref 0–0.2)
PLATELET # BLD AUTO: 242 10*3/MM3 (ref 140–450)
PMV BLD AUTO: 9.4 FL (ref 6–12)
POTASSIUM SERPL-SCNC: 3.6 MMOL/L (ref 3.5–5.2)
POTASSIUM SERPL-SCNC: 4.3 MMOL/L (ref 3.5–5.2)
PROT SERPL-MCNC: 7.3 G/DL (ref 6–8.5)
PROTHROMBIN TIME: 13.5 SECONDS (ref 11.7–14.2)
RBC # BLD AUTO: 3.72 10*6/MM3 (ref 3.77–5.28)
RH BLD: POSITIVE
RHINOVIRUS RNA SPEC NAA+PROBE: NOT DETECTED
RSV RNA NPH QL NAA+NON-PROBE: NOT DETECTED
SARS-COV-2 RNA NPH QL NAA+NON-PROBE: NOT DETECTED
SODIUM SERPL-SCNC: 142 MMOL/L (ref 136–145)
SODIUM SERPL-SCNC: 143 MMOL/L (ref 136–145)
T&S EXPIRATION DATE: NORMAL
WBC # BLD AUTO: 5.41 10*3/MM3 (ref 3.4–10.8)

## 2020-10-10 PROCEDURE — 72192 CT PELVIS W/O DYE: CPT

## 2020-10-10 PROCEDURE — 85730 THROMBOPLASTIN TIME PARTIAL: CPT | Performed by: EMERGENCY MEDICINE

## 2020-10-10 PROCEDURE — 73700 CT LOWER EXTREMITY W/O DYE: CPT

## 2020-10-10 PROCEDURE — 0202U NFCT DS 22 TRGT SARS-COV-2: CPT | Performed by: EMERGENCY MEDICINE

## 2020-10-10 PROCEDURE — 72110 X-RAY EXAM L-2 SPINE 4/>VWS: CPT

## 2020-10-10 PROCEDURE — G0378 HOSPITAL OBSERVATION PER HR: HCPCS

## 2020-10-10 PROCEDURE — 99285 EMERGENCY DEPT VISIT HI MDM: CPT

## 2020-10-10 PROCEDURE — 85025 COMPLETE CBC W/AUTO DIFF WBC: CPT | Performed by: EMERGENCY MEDICINE

## 2020-10-10 PROCEDURE — 73502 X-RAY EXAM HIP UNI 2-3 VIEWS: CPT

## 2020-10-10 PROCEDURE — 80053 COMPREHEN METABOLIC PANEL: CPT | Performed by: EMERGENCY MEDICINE

## 2020-10-10 PROCEDURE — 73552 X-RAY EXAM OF FEMUR 2/>: CPT

## 2020-10-10 PROCEDURE — 86850 RBC ANTIBODY SCREEN: CPT | Performed by: EMERGENCY MEDICINE

## 2020-10-10 PROCEDURE — 86901 BLOOD TYPING SEROLOGIC RH(D): CPT | Performed by: EMERGENCY MEDICINE

## 2020-10-10 PROCEDURE — 36415 COLL VENOUS BLD VENIPUNCTURE: CPT | Performed by: INTERNAL MEDICINE

## 2020-10-10 PROCEDURE — 85610 PROTHROMBIN TIME: CPT | Performed by: EMERGENCY MEDICINE

## 2020-10-10 PROCEDURE — C9803 HOPD COVID-19 SPEC COLLECT: HCPCS | Performed by: EMERGENCY MEDICINE

## 2020-10-10 PROCEDURE — 71045 X-RAY EXAM CHEST 1 VIEW: CPT

## 2020-10-10 PROCEDURE — 86900 BLOOD TYPING SEROLOGIC ABO: CPT | Performed by: EMERGENCY MEDICINE

## 2020-10-10 RX ORDER — LEVOTHYROXINE SODIUM 0.05 MG/1
50 TABLET ORAL DAILY
Status: DISCONTINUED | OUTPATIENT
Start: 2020-10-10 | End: 2020-10-13 | Stop reason: HOSPADM

## 2020-10-10 RX ORDER — SODIUM CHLORIDE 0.9 % (FLUSH) 0.9 %
10 SYRINGE (ML) INJECTION AS NEEDED
Status: DISCONTINUED | OUTPATIENT
Start: 2020-10-10 | End: 2020-10-13 | Stop reason: HOSPADM

## 2020-10-10 RX ORDER — PANTOPRAZOLE SODIUM 40 MG/1
40 TABLET, DELAYED RELEASE ORAL EVERY MORNING
Status: DISCONTINUED | OUTPATIENT
Start: 2020-10-11 | End: 2020-10-13 | Stop reason: HOSPADM

## 2020-10-10 RX ORDER — HYDROCODONE BITARTRATE AND ACETAMINOPHEN 5; 325 MG/1; MG/1
1 TABLET ORAL ONCE
Status: COMPLETED | OUTPATIENT
Start: 2020-10-10 | End: 2020-10-10

## 2020-10-10 RX ORDER — MORPHINE SULFATE 2 MG/ML
2 INJECTION, SOLUTION INTRAMUSCULAR; INTRAVENOUS EVERY 4 HOURS PRN
Status: DISCONTINUED | OUTPATIENT
Start: 2020-10-10 | End: 2020-10-13 | Stop reason: HOSPADM

## 2020-10-10 RX ORDER — HYDROCODONE BITARTRATE AND ACETAMINOPHEN 7.5; 325 MG/1; MG/1
1 TABLET ORAL EVERY 4 HOURS PRN
Status: DISCONTINUED | OUTPATIENT
Start: 2020-10-10 | End: 2020-10-13 | Stop reason: HOSPADM

## 2020-10-10 RX ORDER — PRAVASTATIN SODIUM 40 MG
40 TABLET ORAL DAILY
Status: DISCONTINUED | OUTPATIENT
Start: 2020-10-10 | End: 2020-10-13 | Stop reason: HOSPADM

## 2020-10-10 RX ADMIN — LEVOTHYROXINE SODIUM 50 MCG: 50 TABLET ORAL at 23:08

## 2020-10-10 RX ADMIN — METOPROLOL TARTRATE 25 MG: 25 TABLET, FILM COATED ORAL at 23:08

## 2020-10-10 RX ADMIN — HYDROCODONE BITARTRATE AND ACETAMINOPHEN 1 TABLET: 5; 325 TABLET ORAL at 12:43

## 2020-10-10 RX ADMIN — HYDROCODONE BITARTRATE AND ACETAMINOPHEN 1 TABLET: 7.5; 325 TABLET ORAL at 23:07

## 2020-10-10 NOTE — ED PROVIDER NOTES
EMERGENCY DEPARTMENT ENCOUNTER    Room Number:  24/24  Date of encounter:  10/10/2020  PCP: Arnaud Monson MD (Tony)  Historian: Patient      HPI:  Chief Complaint: Fall  A complete HPI/ROS/PMH/PSH/SH/FH are unobtainable due to: None    Context: Ashli León is a 85 y.o. female who presents to the ED c/o fall with right hip pain.  Pain is constant.  Worse with movement.  She states that she fell last night at 6 PM.  She dragged herself to the bed and was able to get up.  She states that she is indeed able to stand up but it hurts quite a bit.    She states that she is confident that she did not hit her head.  She denies pain elsewhere.      PAST MEDICAL HISTORY  Active Ambulatory Problems     Diagnosis Date Noted   • History of hip surgery 04/01/2016   • Primary osteoarthritis of both knees 04/01/2016   • Arthritis of right knee 06/14/2016   • Aftercare following right knee joint replacement surgery 06/25/2016   • Status post total right knee replacement 07/07/2016   • History of knee replacement, total 08/15/2016   • History of total knee arthroplasty 11/17/2016   • Chronic pain of left knee 11/17/2016   • Arthritis of left knee 11/17/2016   • Closed right hip fracture (CMS/[facility]) 01/02/2017   • Femoral neck fracture (CMS/[facility]) 01/03/2017   • Tear of right gluteus minimus tendon 11/21/2017   • Chronic right-sided low back pain with right-sided sciatica 09/14/2018   • Chronic right hip pain 09/14/2018   • Sacroiliac joint dysfunction 12/18/2018   • Encounter for long-term current use of high risk medication 12/18/2018   • Pain of left hip joint 05/16/2019   • Other chronic pain 07/16/2019   • Bilateral low back pain with bilateral sciatica 04/30/2020     Resolved Ambulatory Problems     Diagnosis Date Noted   • No Resolved Ambulatory Problems     Past Medical History:   Diagnosis Date   • Acid reflux    • Back pain    • Colitis    • Diarrhea    • Disease of thyroid gland    • Diverticular disease    •  Glaucoma    • Gout    • High blood pressure    • High cholesterol    • History of vertebral compression fracture    • Hyponatremia    • Knee pain    • Low back pain    • Macular degeneration    • Mitral regurgitation 06/2016   • Peripheral neuropathy    • Renal lesion    • Rheumatoid arthritis (CMS/HCC)          PAST SURGICAL HISTORY  Past Surgical History:   Procedure Laterality Date   • APPENDECTOMY  1951   • CATARACT EXTRACTION Bilateral    • FEMUR SURGERY Right     had fracture and has rajesh    • HEMORRHOIDECTOMY     • HIP HEMIARTHROPLASTY Right 1/3/2017    Procedure: HIP HEMIARTHROPLASTY with IM rajesh and 2 screw hardware removal antibiotic cement;  Surgeon: Mirza Whittington MD;  Location:  [facility] OR;  Service:    • HIP SURGERY Right 1999    hx fracture   • LEG SURGERY Right 12/1/2017    Procedure: GLUTEAL TENDON REPAIR-minimus and medius;  Surgeon: Mirza Whittington MD;  Location:  [facility] OR;  Service:    • TOTAL KNEE ARTHROPLASTY Right 6/22/2016    Procedure: RT TOTAL KNEE ARTHROPLASTY WITH FINN NAVIGATION;  Surgeon: Adrian Swenson MD;  Location: [facility] MAIN OR;  Service:          FAMILY HISTORY  Family History   Problem Relation Age of Onset   • Heart attack Father          SOCIAL HISTORY  Social History     Socioeconomic History   • Marital status:      Spouse name: Not on file   • Number of children: Not on file   • Years of education: Not on file   • Highest education level: Not on file   Tobacco Use   • Smoking status: Never Smoker   • Smokeless tobacco: Never Used   Substance and Sexual Activity   • Alcohol use: Yes     Alcohol/week: 3.0 standard drinks     Types: 3 Shots of liquor per week     Comment: social use 1 weekly   • Drug use: No     Types: Oxycodone   • Sexual activity: Defer         ALLERGIES  Neomycin and Statins        REVIEW OF SYSTEMS  Review of Systems     All systems reviewed and negative except for those discussed in HPI.       PHYSICAL EXAM    I have reviewed the triage vital signs and  nursing notes.    ED Triage Vitals [10/10/20 1032]   Temp Heart Rate Resp BP SpO2   99.1 °F (37.3 °C) 89 16 144/81 94 %      Temp src Heart Rate Source Patient Position BP Location FiO2 (%)   -- -- -- -- --       Physical Exam  GENERAL: not distressed  HENT: nares patent  EYES: no scleral icterus  CV: regular rhythm, regular rate  RESPIRATORY: normal effort, clear to auscultation bilaterally  ABDOMEN: soft, nontender  MUSCULOSKELETAL: No chest wall tenderness, no C/T/L-spine tenderness, no pain elevation of the 4 extremities, however, there is mild pain that is elicited with passive range of motion of the right hip that occurs only with extreme range of motion particularly with full flexion of the right hip as well as internal rotation  NEURO: alert, moves all extremities, follows commands  SKIN: warm, dry        LAB RESULTS  Recent Results (from the past 24 hour(s))   Comprehensive Metabolic Panel    Collection Time: 10/10/20  4:05 PM    Specimen: Blood   Result Value Ref Range    Glucose 117 (H) 65 - 99 mg/dL    BUN 13 8 - 23 mg/dL    Creatinine 0.90 0.57 - 1.00 mg/dL    Sodium 143 136 - 145 mmol/L    Potassium 3.6 3.5 - 5.2 mmol/L    Chloride 104 98 - 107 mmol/L    CO2 28.3 22.0 - 29.0 mmol/L    Calcium 9.8 8.6 - 10.5 mg/dL    Total Protein 7.3 6.0 - 8.5 g/dL    Albumin 4.50 3.50 - 5.20 g/dL    ALT (SGPT) 13 1 - 33 U/L    AST (SGOT) 16 1 - 32 U/L    Alkaline Phosphatase 51 39 - 117 U/L    Total Bilirubin 1.4 (H) 0.0 - 1.2 mg/dL    eGFR Non African Amer 60 (L) >60 mL/min/1.73    Globulin 2.8 gm/dL    A/G Ratio 1.6 g/dL    BUN/Creatinine Ratio 14.4 7.0 - 25.0    Anion Gap 10.7 5.0 - 15.0 mmol/L   Protime-INR    Collection Time: 10/10/20  4:05 PM    Specimen: Blood   Result Value Ref Range    Protime 13.5 11.7 - 14.2 Seconds    INR 1.04 0.90 - 1.10   aPTT    Collection Time: 10/10/20  4:05 PM    Specimen: Blood   Result Value Ref Range    PTT 20.0 (L) 22.7 - 35.4 seconds   Type & Screen    Collection Time: 10/10/20   4:05 PM    Specimen: Blood   Result Value Ref Range    ABO Type A     RH type Positive     Antibody Screen Negative     T&S Expiration Date 10/13/2020 11:59:59 PM    CBC Auto Differential    Collection Time: 10/10/20  4:05 PM    Specimen: Blood   Result Value Ref Range    WBC 5.41 3.40 - 10.80 10*3/mm3    RBC 3.72 (L) 3.77 - 5.28 10*6/mm3    Hemoglobin 11.8 (L) 12.0 - 15.9 g/dL    Hematocrit 35.0 34.0 - 46.6 %    MCV 94.1 79.0 - 97.0 fL    MCH 31.7 26.6 - 33.0 pg    MCHC 33.7 31.5 - 35.7 g/dL    RDW 11.8 (L) 12.3 - 15.4 %    RDW-SD 41.1 37.0 - 54.0 fl    MPV 9.4 6.0 - 12.0 fL    Platelets 242 140 - 450 10*3/mm3    Neutrophil % 63.6 42.7 - 76.0 %    Lymphocyte % 29.8 19.6 - 45.3 %    Monocyte % 5.4 5.0 - 12.0 %    Eosinophil % 0.4 0.3 - 6.2 %    Basophil % 0.4 0.0 - 1.5 %    Immature Grans % 0.4 0.0 - 0.5 %    Neutrophils, Absolute 3.45 1.70 - 7.00 10*3/mm3    Lymphocytes, Absolute 1.61 0.70 - 3.10 10*3/mm3    Monocytes, Absolute 0.29 0.10 - 0.90 10*3/mm3    Eosinophils, Absolute 0.02 0.00 - 0.40 10*3/mm3    Basophils, Absolute 0.02 0.00 - 0.20 10*3/mm3    Immature Grans, Absolute 0.02 0.00 - 0.05 10*3/mm3    nRBC 0.0 0.0 - 0.2 /100 WBC   Respiratory Panel PCR w/COVID-19(SARS-CoV-2) [facility] In-House, NP Swab in [facility], 3-4 HR TAT - Swab, Nasopharynx    Collection Time: 10/10/20  4:09 PM    Specimen: Nasopharynx; Swab   Result Value Ref Range    ADENOVIRUS, PCR Not Detected Not Detected    Coronavirus 229E Not Detected Not Detected    Coronavirus HKU1 Not Detected Not Detected    Coronavirus NL63 Not Detected Not Detected    Coronavirus OC43 Not Detected Not Detected    COVID19 Not Detected Not Detected - Ref. Range    Human Metapneumovirus Not Detected Not Detected    Human Rhinovirus/Enterovirus Not Detected Not Detected    Influenza A PCR Not Detected Not Detected    Influenza A H1 Not Detected Not Detected    Influenza A H1 2009 PCR Not Detected Not Detected    Influenza A H3 Not Detected Not  Detected    Influenza B PCR Not Detected Not Detected    Parainfluenza Virus 1 Not Detected Not Detected    Parainfluenza Virus 2 Not Detected Not Detected    Parainfluenza Virus 3 Not Detected Not Detected    Parainfluenza Virus 4 Not Detected Not Detected    RSV, PCR Not Detected Not Detected    Bordetella pertussis pcr Not Detected Not Detected    Bordetella parapertussis PCR Not Detected Not Detected    Chlamydophila pneumoniae PCR Not Detected Not Detected    Mycoplasma pneumo by PCR Not Detected Not Detected       Ordered the above labs and independently reviewed the results.        RADIOLOGY  Xr Femur 2 View Right    Result Date: 10/10/2020  PORTABLE CHEST/LUMBAR SPINE COMPLETE/PELVIS AND RIGHT HIP/RIGHT FEMUR  HISTORY: Fall last night with right femoral pain.  COMPARISON: None.  FINDINGS: CHEST: A single view of the chest demonstrates the heart to be at the upper limits of normal in size. There is no evidence of focal infiltrate or effusion. There are fractures involving the posterolateral aspects of the left 6th, 7th, and 8th ribs, likely remote. A remote fracture involving the right 7th rib posterolaterally is also noted.  LUMBAR SPINE: AP, lateral and bilateral oblique views of the lumbar spine demonstrate a moderate compression deformity involving L2 and a mild compression deformity involving L1. The compression deformities appear similar to the MRI examination of 09/28/2018 given differences in technique. A moderate compression deformity involving T11 is noted, also present previously. There is mild levoscoliosis with the apex at the level of L4 with a compensatory dextroscoliosis with the apex at the level of T12. No convincing acute fracture is identified. There is a grade 1 retrolisthesis of L5 upon S1 and moderate facet degenerative disease at L4-L5 and L5-S1. Further evaluation could be performed with MRI examination of the lumbar spine as indicated.  PELVIS AND RIGHT HIP: An AP view of the pelvis  as well as AP and frog leg lateral views of the right hip demonstrate a total hip prosthesis. There is no evidence of a proximal femoral fracture or dislocation.  RIGHT FEMUR: Extensive vascular calcification is noted. There is an oblique plane of lucency appreciated involving the midshaft of the femur consistent with a remote fracture. The fracture plane is still evident. There is periosteal reaction appreciated involving the right femur proximally extending to the level of the fracture anteriorly. No obvious acute fracture is identified. A right knee prosthesis is noted.      Xr Chest 1 View    Result Date: 10/10/2020  PORTABLE CHEST/LUMBAR SPINE COMPLETE/PELVIS AND RIGHT HIP/RIGHT FEMUR  HISTORY: Fall last night with right femoral pain.  COMPARISON: None.  FINDINGS: CHEST: A single view of the chest demonstrates the heart to be at the upper limits of normal in size. There is no evidence of focal infiltrate or effusion. There are fractures involving the posterolateral aspects of the left 6th, 7th, and 8th ribs, likely remote. A remote fracture involving the right 7th rib posterolaterally is also noted.  LUMBAR SPINE: AP, lateral and bilateral oblique views of the lumbar spine demonstrate a moderate compression deformity involving L2 and a mild compression deformity involving L1. The compression deformities appear similar to the MRI examination of 09/28/2018 given differences in technique. A moderate compression deformity involving T11 is noted, also present previously. There is mild levoscoliosis with the apex at the level of L4 with a compensatory dextroscoliosis with the apex at the level of T12. No convincing acute fracture is identified. There is a grade 1 retrolisthesis of L5 upon S1 and moderate facet degenerative disease at L4-L5 and L5-S1. Further evaluation could be performed with MRI examination of the lumbar spine as indicated.  PELVIS AND RIGHT HIP: An AP view of the pelvis as well as AP and frog leg  lateral views of the right hip demonstrate a total hip prosthesis. There is no evidence of a proximal femoral fracture or dislocation.  RIGHT FEMUR: Extensive vascular calcification is noted. There is an oblique plane of lucency appreciated involving the midshaft of the femur consistent with a remote fracture. The fracture plane is still evident. There is periosteal reaction appreciated involving the right femur proximally extending to the level of the fracture anteriorly. No obvious acute fracture is identified. A right knee prosthesis is noted.      Xr Spine Lumbar Complete 4+vw    Result Date: 10/10/2020  PORTABLE CHEST/LUMBAR SPINE COMPLETE/PELVIS AND RIGHT HIP/RIGHT FEMUR  HISTORY: Fall last night with right femoral pain.  COMPARISON: None.  FINDINGS: CHEST: A single view of the chest demonstrates the heart to be at the upper limits of normal in size. There is no evidence of focal infiltrate or effusion. There are fractures involving the posterolateral aspects of the left 6th, 7th, and 8th ribs, likely remote. A remote fracture involving the right 7th rib posterolaterally is also noted.  LUMBAR SPINE: AP, lateral and bilateral oblique views of the lumbar spine demonstrate a moderate compression deformity involving L2 and a mild compression deformity involving L1. The compression deformities appear similar to the MRI examination of 09/28/2018 given differences in technique. A moderate compression deformity involving T11 is noted, also present previously. There is mild levoscoliosis with the apex at the level of L4 with a compensatory dextroscoliosis with the apex at the level of T12. No convincing acute fracture is identified. There is a grade 1 retrolisthesis of L5 upon S1 and moderate facet degenerative disease at L4-L5 and L5-S1. Further evaluation could be performed with MRI examination of the lumbar spine as indicated.  PELVIS AND RIGHT HIP: An AP view of the pelvis as well as AP and frog leg lateral views  of the right hip demonstrate a total hip prosthesis. There is no evidence of a proximal femoral fracture or dislocation.  RIGHT FEMUR: Extensive vascular calcification is noted. There is an oblique plane of lucency appreciated involving the midshaft of the femur consistent with a remote fracture. The fracture plane is still evident. There is periosteal reaction appreciated involving the right femur proximally extending to the level of the fracture anteriorly. No obvious acute fracture is identified. A right knee prosthesis is noted.      Xr Hip With Or Without Pelvis 2 - 3 View Right    Result Date: 10/10/2020  PORTABLE CHEST/LUMBAR SPINE COMPLETE/PELVIS AND RIGHT HIP/RIGHT FEMUR  HISTORY: Fall last night with right femoral pain.  COMPARISON: None.  FINDINGS: CHEST: A single view of the chest demonstrates the heart to be at the upper limits of normal in size. There is no evidence of focal infiltrate or effusion. There are fractures involving the posterolateral aspects of the left 6th, 7th, and 8th ribs, likely remote. A remote fracture involving the right 7th rib posterolaterally is also noted.  LUMBAR SPINE: AP, lateral and bilateral oblique views of the lumbar spine demonstrate a moderate compression deformity involving L2 and a mild compression deformity involving L1. The compression deformities appear similar to the MRI examination of 09/28/2018 given differences in technique. A moderate compression deformity involving T11 is noted, also present previously. There is mild levoscoliosis with the apex at the level of L4 with a compensatory dextroscoliosis with the apex at the level of T12. No convincing acute fracture is identified. There is a grade 1 retrolisthesis of L5 upon S1 and moderate facet degenerative disease at L4-L5 and L5-S1. Further evaluation could be performed with MRI examination of the lumbar spine as indicated.  PELVIS AND RIGHT HIP: An AP view of the pelvis as well as AP and frog leg lateral  views of the right hip demonstrate a total hip prosthesis. There is no evidence of a proximal femoral fracture or dislocation.  RIGHT FEMUR: Extensive vascular calcification is noted. There is an oblique plane of lucency appreciated involving the midshaft of the femur consistent with a remote fracture. The fracture plane is still evident. There is periosteal reaction appreciated involving the right femur proximally extending to the level of the fracture anteriorly. No obvious acute fracture is identified. A right knee prosthesis is noted.        I ordered the above noted radiological studies. Reviewed by me and discussed with radiologist.  See dictation for official radiology interpretation.      PROCEDURES    Procedures      MEDICATIONS GIVEN IN ER    Medications   sodium chloride 0.9 % flush 10 mL (has no administration in time range)   HYDROcodone-acetaminophen (NORCO) 7.5-325 MG per tablet 1 tablet (has no administration in time range)   levothyroxine (SYNTHROID, LEVOTHROID) tablet 50 mcg (has no administration in time range)   metoprolol tartrate (LOPRESSOR) tablet 25 mg (has no administration in time range)   pantoprazole (PROTONIX) EC tablet 40 mg (has no administration in time range)   pravastatin (PRAVACHOL) tablet 40 mg (has no administration in time range)   HYDROcodone-acetaminophen (NORCO) 5-325 MG per tablet 1 tablet (1 tablet Oral Given 10/10/20 1243)         PROGRESS, DATA ANALYSIS, CONSULTS, AND MEDICAL DECISION MAKING    All labs have been independently reviewed by me.  All radiology studies have been reviewed by me and discussed with radiologist dictating the report.   EKG's independently viewed and interpreted by me.  Discussion below represents my analysis of pertinent findings related to patient's condition, differential diagnosis, treatment plan and final disposition.        ED Course as of Oct 10 1916   Sat Oct 10, 2020   1519 X-ray of the right femur interpreted myself.  Patient has midshaft  femur fracture.    [TD]   1619 Pt is unable to ambulate.     [TD]   1633 I discussed the x-ray of the right femur with Dr. Frances.  He states the appearance appears to be subacute.  He recommends getting a CT of the pelvis and femur.    [TD]      ED Course User Index  [TD] Gulshan Everett II, MD     Hemoglobin 11.8      PPE: Both the patient and I wore a surgical mask throughout the entire patient encounter. I wore protective goggles.     AS OF 19:16 EDT VITALS:    BP - 160/87  HR - 92  TEMP - 99.1 °F (37.3 °C)  O2 SATS - 95%        DIAGNOSIS  Final diagnoses:   Closed fracture of shaft of right femur, unspecified fracture morphology, initial encounter (CMS/[facility])         DISPOSITION  Admit           Gulshan Everett II, MD  10/10/20 1917

## 2020-10-10 NOTE — H&P
Patient Name:  Ashli León  YOB: 1935  MRN:  [id number]  Admit Date:  10/10/2020  Patient Care Team:  Arnaud Monson MD (Tony) as PCP - General  Arnaud Monson MD (Tony) as PCP - Family Medicine  Filiberto Ayala MD as PCP - Claims Attributed      Subjective   History Present Illness     Chief Complaint   Patient presents with   • Fall       Ms. León is a 85 y.o. with a history of osteoporosis, gout, anxiety depression, chronic anemia, hypothyroidism, hypertension, gastroesophageal reflux disease, hyperlipidemia, that presents to [facility] complaining of fall.      History of Present Illness Ashli is a delightful 85-year-old female who comes to the emergency room after sustaining a fall last evening at home while she was going to the bathroom.  She states that her hip gave way and she fell.  She denies tripping.  She denies any loss of consciousness or preceding chest pain shortness of air palpitations dizziness headache visual changes.  Review of Systems   She denies recent fevers chills nausea vomiting diarrhea weight loss hemoptysis hematemesis melena bright red blood per rectum.  She denies chest pain orthopnea or PND  Personal History     Past Medical History:   Diagnosis Date   • Acid reflux    • Back pain    • Colitis     history of   • Diarrhea    • Disease of thyroid gland    • Diverticular disease    • Glaucoma    • Gout     - right big toe   • High blood pressure    • High cholesterol    • History of vertebral compression fracture     multiple   • Hyponatremia    • Knee pain    • Low back pain    • Macular degeneration    • Mitral regurgitation 06/2016    Mild to moderate per 2-D echocardiogram   • Peripheral neuropathy    • Primary osteoarthritis of both knees 4/1/2016   • Renal lesion     on mri   • Rheumatoid arthritis (CMS/HCC)      Past Surgical History:   Procedure Laterality Date   • APPENDECTOMY  1951   • CATARACT EXTRACTION  Bilateral    • FEMUR SURGERY Right     had fracture and has rajesh    • HEMORRHOIDECTOMY     • HIP HEMIARTHROPLASTY Right 1/3/2017    Procedure: HIP HEMIARTHROPLASTY with IM rajesh and 2 screw hardware removal antibiotic cement;  Surgeon: Mirza Whittington MD;  Location:  [facility];  Service:    • HIP SURGERY Right 1999    hx fracture   • LEG SURGERY Right 12/1/2017    Procedure: GLUTEAL TENDON REPAIR-minimus and medius;  Surgeon: Mirza Whittington MD;  Location:  [facility];  Service:    • TOTAL KNEE ARTHROPLASTY Right 6/22/2016    Procedure: RT TOTAL KNEE ARTHROPLASTY WITH FINN NAVIGATION;  Surgeon: Adrian Swenson MD;  Location:  [facility];  Service:      Family History   Problem Relation Age of Onset   • Heart attack Father      Social History     Tobacco Use   • Smoking status: Never Smoker   • Smokeless tobacco: Never Used   Substance Use Topics   • Alcohol use: Yes     Alcohol/week: 3.0 standard drinks     Types: 3 Shots of liquor per week     Comment: social use 1 weekly   • Drug use: No     Types: Oxycodone     No current facility-administered medications on file prior to encounter.      Current Outpatient Medications on File Prior to Encounter   Medication Sig Dispense Refill   • alendronate (FOSAMAX) 5 MG tablet Take 5 mg by mouth Every Morning Before Breakfast.     • allopurinol (ZYLOPRIM) 100 MG tablet Take 100 mg by mouth daily.     • cholecalciferol (VITAMIN D3) 1000 units tablet Take 2,000 Units by mouth Daily.     • Cholecalciferol (VITAMIN D3) 2000 units capsule   0   • docusate sodium 100 MG capsule Take 100 mg by mouth 2 (Two) Times a Day.     • doxepin (SINEquan) 25 MG capsule      • EQ NATURAL LAXATIVE 8.6 MG tablet   0   • escitalopram (LEXAPRO) 10 MG tablet      • ferrous sulfate 325 (65 FE) MG EC tablet   0   • HYDROcodone-acetaminophen (NORCO) 7.5-325 MG per tablet Every 6-8 hours prn severe pain. May 3/day.  This is a 30 day supply 90 tablet 0   • latanoprost (XALATAN) 0.005 % ophthalmic solution  Administer 1 drop to both eyes Every Night.     • levothyroxine (SYNTHROID, LEVOTHROID) 50 MCG tablet Take 50 mcg by mouth daily.     • metoprolol tartrate (LOPRESSOR) 25 MG tablet Take 25 mg by mouth daily.     • Multiple Vitamins-Minerals (ICAPS AREDS 2) capsule Take 2 capsules by mouth Daily.     • omeprazole (PriLOSEC) 40 MG capsule Take 40 mg by mouth Daily.     • pravastatin (PRAVACHOL) 40 MG tablet 40 mg Daily.     • zolpidem (AMBIEN) 10 MG tablet Take 1 tablet by mouth Every Night. 30 tablet 0   • levETIRAcetam (KEPPRA) 500 MG tablet Take 1 tablet by mouth 2 (Two) Times a Day. 60 tablet 0     Allergies   Allergen Reactions   • Neomycin Swelling   • Statins Myalgia     Not all statins       Objective    Objective     Vital Signs  Temp:  [99.1 °F (37.3 °C)] 99.1 °F (37.3 °C)  Heart Rate:  [89-98] 92  Resp:  [12-16] 16  BP: (136-160)/(81-96) 160/87  SpO2:  [94 %-95 %] 95 %  on   ;   Device (Oxygen Therapy): room air  Body mass index is 23.38 kg/m².    Physical Exam  General: Alert pleasant early female appearing much younger than stated age lying flat in bed in no acute distress  HEENT: Normocephalic atraumatic sclerae clear pharynx is clear  Neck: Supple without adenopathy JVD or bruits noted  Lungs: Clear to auscultation with good air exchange  Cardiovascular: Regular rate and rhythm with normal S1 and S2 appreciate murmur gallop or rub.  Distal pulses are 1-2+ and symmetric  Abdomen: Soft nontender nondistended without hepatosplenomegaly  Extremities: Moves extremities, extremities warm and well-perfused, without rash or edema  Neurologic: Grossly nonfocal  Psychiatric: Awake alert pleasant mood  Results Review:  I reviewed the patient's new clinical results.  I reviewed the patient's new imaging results and agree with the interpretation.  I reviewed the patient's other test results and agree with the interpretation  I personally viewed and interpreted the patient's EKG/Telemetry data  Discussed with ED  provider.    Lab Results (last 24 hours)     Procedure Component Value Units Date/Time    CBC & Differential [[id number]]  (Abnormal) Collected: 10/10/20 1605    Specimen: Blood Updated: 10/10/20 1623    Narrative:      The following orders were created for panel order CBC & Differential.  Procedure                               Abnormality         Status                     ---------                               -----------         ------                     CBC Auto Differential[[id number]]        Abnormal            Final result                 Please view results for these tests on the individual orders.    Comprehensive Metabolic Panel [[id number]]  (Abnormal) Collected: 10/10/20 1605    Specimen: Blood Updated: 10/10/20 1646     Glucose 117 mg/dL      BUN 13 mg/dL      Creatinine 0.90 mg/dL      Sodium 143 mmol/L      Potassium 3.6 mmol/L      Chloride 104 mmol/L      CO2 28.3 mmol/L      Calcium 9.8 mg/dL      Total Protein 7.3 g/dL      Albumin 4.50 g/dL      ALT (SGPT) 13 U/L      AST (SGOT) 16 U/L      Alkaline Phosphatase 51 U/L      Total Bilirubin 1.4 mg/dL      eGFR Non African Amer 60 mL/min/1.73      Globulin 2.8 gm/dL      A/G Ratio 1.6 g/dL      BUN/Creatinine Ratio 14.4     Anion Gap 10.7 mmol/L     Narrative:      GFR Normal >60  Chronic Kidney Disease <60  Kidney Failure <15      Protime-INR [[id number]]  (Normal) Collected: 10/10/20 1605    Specimen: Blood Updated: 10/10/20 1640     Protime 13.5 Seconds      INR 1.04    aPTT [[id number]]  (Abnormal) Collected: 10/10/20 1605    Specimen: Blood Updated: 10/10/20 1719     PTT 20.0 seconds     CBC Auto Differential [[id number]]  (Abnormal) Collected: 10/10/20 1605    Specimen: Blood Updated: 10/10/20 1623     WBC 5.41 10*3/mm3      RBC 3.72 10*6/mm3      Hemoglobin 11.8 g/dL      Hematocrit 35.0 %      MCV 94.1 fL      MCH 31.7 pg      MCHC 33.7 g/dL      RDW 11.8 %      RDW-SD 41.1 fl      MPV 9.4 fL      Platelets 242 10*3/mm3      Neutrophil % 63.6 %       Lymphocyte % 29.8 %      Monocyte % 5.4 %      Eosinophil % 0.4 %      Basophil % 0.4 %      Immature Grans % 0.4 %      Neutrophils, Absolute 3.45 10*3/mm3      Lymphocytes, Absolute 1.61 10*3/mm3      Monocytes, Absolute 0.29 10*3/mm3      Eosinophils, Absolute 0.02 10*3/mm3      Basophils, Absolute 0.02 10*3/mm3      Immature Grans, Absolute 0.02 10*3/mm3      nRBC 0.0 /100 WBC     COVID PRE-OP / PRE-PROCEDURE SCREENING ORDER (NO ISOLATION) - Swab, Nasopharynx [[id number]]  (Normal) Collected: 10/10/20 1609    Specimen: Swab from Nasopharynx Updated: 10/10/20 1736    Narrative:      The following orders were created for panel order COVID PRE-OP / PRE-PROCEDURE SCREENING ORDER (NO ISOLATION) - Swab, Nasopharynx.  Procedure                               Abnormality         Status                     ---------                               -----------         ------                     Respiratory Panel PCR w/...[[id number]]  Normal              Final result                 Please view results for these tests on the individual orders.    Respiratory Panel PCR w/COVID-19(SARS-CoV-2) [facility] In-House, NP Swab in UTM/VTM, 3-4 HR TAT - Swab, Nasopharynx [[id number]]  (Normal) Collected: 10/10/20 1609    Specimen: Swab from Nasopharynx Updated: 10/10/20 1736     ADENOVIRUS, PCR Not Detected     Coronavirus 229E Not Detected     Coronavirus HKU1 Not Detected     Coronavirus NL63 Not Detected     Coronavirus OC43 Not Detected     COVID19 Not Detected     Human Metapneumovirus Not Detected     Human Rhinovirus/Enterovirus Not Detected     Influenza A PCR Not Detected     Influenza A H1 Not Detected     Influenza A H1 2009 PCR Not Detected     Influenza A H3 Not Detected     Influenza B PCR Not Detected     Parainfluenza Virus 1 Not Detected     Parainfluenza Virus 2 Not Detected     Parainfluenza Virus 3 Not Detected     Parainfluenza Virus 4 Not Detected     RSV, PCR Not Detected     Bordetella pertussis  pcr Not Detected     Bordetella parapertussis PCR Not Detected     Chlamydophila pneumoniae PCR Not Detected     Mycoplasma pneumo by PCR Not Detected    Narrative:      Fact sheet for providers: https://docs.[employer]/wp-content/uploads/NXV8506-3557-NH3.1-EUA-Provider-Fact-Sheet-3.pdf    Fact sheet for patients: https://docs.[employer]/wp-content/uploads/AGB7469-9595-SJ6.1-EUA-Patient-Fact-Sheet-1.pdf          Imaging Results (Last 24 Hours)     Procedure Component Value Units Date/Time    XR Hip With or Without Pelvis 2 - 3 View Right [[id number]] Collected: 10/10/20 1624     Updated: 10/10/20 1624    Narrative:      PORTABLE CHEST/LUMBAR SPINE COMPLETE/PELVIS AND RIGHT HIP/RIGHT FEMUR     HISTORY: Fall last night with right femoral pain.     COMPARISON: None.     FINDINGS:  CHEST: A single view of the chest demonstrates the heart to be at the  upper limits of normal in size. There is no evidence of focal infiltrate  or effusion. There are fractures involving the posterolateral aspects of  the left 6th, 7th, and 8th ribs, likely remote. A remote fracture  involving the right 7th rib posterolaterally is also noted.     LUMBAR SPINE: AP, lateral and bilateral oblique views of the lumbar  spine demonstrate a moderate compression deformity involving L2 and a  mild compression deformity involving L1. The compression deformities  appear similar to the MRI examination of 09/28/2018 given differences in  technique. A moderate compression deformity involving T11 is noted, also  present previously. There is mild levoscoliosis with the apex at the  level of L4 with a compensatory dextroscoliosis with the apex at the  level of T12. No convincing acute fracture is identified. There is a  grade 1 retrolisthesis of L5 upon S1 and moderate facet degenerative  disease at L4-L5 and L5-S1. Further evaluation could be performed with  MRI examination of the lumbar spine as indicated.     PELVIS AND RIGHT HIP: An AP view of the  pelvis as well as AP and frog  leg lateral views of the right hip demonstrate a total hip prosthesis.  There is no evidence of a proximal femoral fracture or dislocation.     RIGHT FEMUR: Extensive vascular calcification is noted. There is an  oblique plane of lucency appreciated involving the midshaft of the femur  consistent with a remote fracture. The fracture plane is still evident.  There is periosteal reaction appreciated involving the right femur  proximally extending to the level of the fracture anteriorly. No obvious  acute fracture is identified. A right knee prosthesis is noted.       XR Spine Lumbar Complete 4+VW [[id number]] Collected: 10/10/20 1624     Updated: 10/10/20 1624    Narrative:      PORTABLE CHEST/LUMBAR SPINE COMPLETE/PELVIS AND RIGHT HIP/RIGHT FEMUR     HISTORY: Fall last night with right femoral pain.     COMPARISON: None.     FINDINGS:  CHEST: A single view of the chest demonstrates the heart to be at the  upper limits of normal in size. There is no evidence of focal infiltrate  or effusion. There are fractures involving the posterolateral aspects of  the left 6th, 7th, and 8th ribs, likely remote. A remote fracture  involving the right 7th rib posterolaterally is also noted.     LUMBAR SPINE: AP, lateral and bilateral oblique views of the lumbar  spine demonstrate a moderate compression deformity involving L2 and a  mild compression deformity involving L1. The compression deformities  appear similar to the MRI examination of 09/28/2018 given differences in  technique. A moderate compression deformity involving T11 is noted, also  present previously. There is mild levoscoliosis with the apex at the  level of L4 with a compensatory dextroscoliosis with the apex at the  level of T12. No convincing acute fracture is identified. There is a  grade 1 retrolisthesis of L5 upon S1 and moderate facet degenerative  disease at L4-L5 and L5-S1. Further evaluation could be performed with  MRI  examination of the lumbar spine as indicated.     PELVIS AND RIGHT HIP: An AP view of the pelvis as well as AP and frog  leg lateral views of the right hip demonstrate a total hip prosthesis.  There is no evidence of a proximal femoral fracture or dislocation.     RIGHT FEMUR: Extensive vascular calcification is noted. There is an  oblique plane of lucency appreciated involving the midshaft of the femur  consistent with a remote fracture. The fracture plane is still evident.  There is periosteal reaction appreciated involving the right femur  proximally extending to the level of the fracture anteriorly. No obvious  acute fracture is identified. A right knee prosthesis is noted.       XR Femur 2 View Right [[id number]] Collected: 10/10/20 1624     Updated: 10/10/20 1624    Narrative:      PORTABLE CHEST/LUMBAR SPINE COMPLETE/PELVIS AND RIGHT HIP/RIGHT FEMUR     HISTORY: Fall last night with right femoral pain.     COMPARISON: None.     FINDINGS:  CHEST: A single view of the chest demonstrates the heart to be at the  upper limits of normal in size. There is no evidence of focal infiltrate  or effusion. There are fractures involving the posterolateral aspects of  the left 6th, 7th, and 8th ribs, likely remote. A remote fracture  involving the right 7th rib posterolaterally is also noted.     LUMBAR SPINE: AP, lateral and bilateral oblique views of the lumbar  spine demonstrate a moderate compression deformity involving L2 and a  mild compression deformity involving L1. The compression deformities  appear similar to the MRI examination of 09/28/2018 given differences in  technique. A moderate compression deformity involving T11 is noted, also  present previously. There is mild levoscoliosis with the apex at the  level of L4 with a compensatory dextroscoliosis with the apex at the  level of T12. No convincing acute fracture is identified. There is a  grade 1 retrolisthesis of L5 upon S1 and moderate facet  degenerative  disease at L4-L5 and L5-S1. Further evaluation could be performed with  MRI examination of the lumbar spine as indicated.     PELVIS AND RIGHT HIP: An AP view of the pelvis as well as AP and frog  leg lateral views of the right hip demonstrate a total hip prosthesis.  There is no evidence of a proximal femoral fracture or dislocation.     RIGHT FEMUR: Extensive vascular calcification is noted. There is an  oblique plane of lucency appreciated involving the midshaft of the femur  consistent with a remote fracture. The fracture plane is still evident.  There is periosteal reaction appreciated involving the right femur  proximally extending to the level of the fracture anteriorly. No obvious  acute fracture is identified. A right knee prosthesis is noted.       XR Chest 1 View [[id number]] Collected: 10/10/20 1624     Updated: 10/10/20 1624    Narrative:      PORTABLE CHEST/LUMBAR SPINE COMPLETE/PELVIS AND RIGHT HIP/RIGHT FEMUR     HISTORY: Fall last night with right femoral pain.     COMPARISON: None.     FINDINGS:  CHEST: A single view of the chest demonstrates the heart to be at the  upper limits of normal in size. There is no evidence of focal infiltrate  or effusion. There are fractures involving the posterolateral aspects of  the left 6th, 7th, and 8th ribs, likely remote. A remote fracture  involving the right 7th rib posterolaterally is also noted.     LUMBAR SPINE: AP, lateral and bilateral oblique views of the lumbar  spine demonstrate a moderate compression deformity involving L2 and a  mild compression deformity involving L1. The compression deformities  appear similar to the MRI examination of 09/28/2018 given differences in  technique. A moderate compression deformity involving T11 is noted, also  present previously. There is mild levoscoliosis with the apex at the  level of L4 with a compensatory dextroscoliosis with the apex at the  level of T12. No convincing acute fracture is identified.  There is a  grade 1 retrolisthesis of L5 upon S1 and moderate facet degenerative  disease at L4-L5 and L5-S1. Further evaluation could be performed with  MRI examination of the lumbar spine as indicated.     PELVIS AND RIGHT HIP: An AP view of the pelvis as well as AP and frog  leg lateral views of the right hip demonstrate a total hip prosthesis.  There is no evidence of a proximal femoral fracture or dislocation.     RIGHT FEMUR: Extensive vascular calcification is noted. There is an  oblique plane of lucency appreciated involving the midshaft of the femur  consistent with a remote fracture. The fracture plane is still evident.  There is periosteal reaction appreciated involving the right femur  proximally extending to the level of the fracture anteriorly. No obvious  acute fracture is identified. A right knee prosthesis is noted.             Results for orders placed during the hospital encounter of 06/20/16   Adult transthoracic echo complete with contrast    Narrative · Left ventricular function is normal. Calculated EF = 73.4%. Estimated EF   was in agreement with the calculated EF. Normal left ventricular cavity   size noted. All left ventricular wall segments contract normally. Left   ventricular wall thickness is consistent with mild concentric hypertrophy.   Left ventricular diastolic function was unable to be assessed.  · Mild-to-moderate mitral valve regurgitation is present.          No orders to display        Assessment/Plan     Active Hospital Problems    Diagnosis  POA   • Closed fracture of shaft of right femur (CMS/[facility]) [S72.301A]  Yes      Resolved Hospital Problems   No resolved problems to display.           · Femur fracture: Plain x-rays reveal midshaft fracture of the right femur this appears to be remote in nature.  Patient with extensive history hip and knee surgery.  CT scan of lower extremity and pelvis are pending.  Consult orthopedics.  [employer] PRN for pain  · Hypertension: Continue  metoprolol.  · GERD: Continue Protonix  · Per lipidemia continue patient statin therapy  · Hypothyroidism we will continue her Synthroid and check her TSH in the morning.  · Osteoporosis osteopenia: Patient has been treated long-term with vitamin D and Fosamax.  · Hyperglycemia: Mild no history of a diabetes mellitus will check glycohemoglobin in the a.m.  · I discussed the patient's findings and my recommendations with patient, family and ED provider.    VTE Prophylaxis - SCDs.  Code Status - Full code.       Brian Zambrano MD  [facility]  10/10/20  17:58 EDT

## 2020-10-10 NOTE — ED NOTES
Pt states she was taken to Xray and while there she asked if they were getting an Xray of her back.  When they told her they were not, she asked to be brought back to talk with the doctor.      Alejandra Bartlett RN  10/10/20 [id number]

## 2020-10-11 ENCOUNTER — ANESTHESIA (OUTPATIENT)
Dept: PERIOP | Facility: HOSPITAL | Age: 85
End: 2020-10-11

## 2020-10-11 ENCOUNTER — APPOINTMENT (OUTPATIENT)
Dept: GENERAL RADIOLOGY | Facility: HOSPITAL | Age: 85
End: 2020-10-11

## 2020-10-11 ENCOUNTER — ANESTHESIA EVENT (OUTPATIENT)
Dept: PERIOP | Facility: HOSPITAL | Age: 85
End: 2020-10-11

## 2020-10-11 LAB
ANION GAP SERPL CALCULATED.3IONS-SCNC: 9.4 MMOL/L (ref 5–15)
APTT PPP: 20 SECONDS (ref 22.7–35.4)
BASOPHILS # BLD AUTO: 0.04 10*3/MM3 (ref 0–0.2)
BASOPHILS NFR BLD AUTO: 0.7 % (ref 0–1.5)
BUN SERPL-MCNC: 13 MG/DL (ref 8–23)
BUN/CREAT SERPL: 15.7 (ref 7–25)
CALCIUM SPEC-SCNC: 9.1 MG/DL (ref 8.6–10.5)
CHLORIDE SERPL-SCNC: 105 MMOL/L (ref 98–107)
CO2 SERPL-SCNC: 25.6 MMOL/L (ref 22–29)
CREAT SERPL-MCNC: 0.83 MG/DL (ref 0.57–1)
DEPRECATED RDW RBC AUTO: 41.1 FL (ref 37–54)
EOSINOPHIL # BLD AUTO: 0.07 10*3/MM3 (ref 0–0.4)
EOSINOPHIL NFR BLD AUTO: 1.3 % (ref 0.3–6.2)
ERYTHROCYTE [DISTWIDTH] IN BLOOD BY AUTOMATED COUNT: 11.7 % (ref 12.3–15.4)
GFR SERPL CREATININE-BSD FRML MDRD: 65 ML/MIN/1.73
GLUCOSE SERPL-MCNC: 103 MG/DL (ref 65–99)
HBA1C MFR BLD: 5.4 % (ref 4.8–5.6)
HCT VFR BLD AUTO: 32.3 % (ref 34–46.6)
HGB BLD-MCNC: 10.5 G/DL (ref 12–15.9)
IMM GRANULOCYTES # BLD AUTO: 0.02 10*3/MM3 (ref 0–0.05)
IMM GRANULOCYTES NFR BLD AUTO: 0.4 % (ref 0–0.5)
LYMPHOCYTES # BLD AUTO: 2.11 10*3/MM3 (ref 0.7–3.1)
LYMPHOCYTES NFR BLD AUTO: 39.4 % (ref 19.6–45.3)
MCH RBC QN AUTO: 31.2 PG (ref 26.6–33)
MCHC RBC AUTO-ENTMCNC: 32.5 G/DL (ref 31.5–35.7)
MCV RBC AUTO: 95.8 FL (ref 79–97)
MONOCYTES # BLD AUTO: 0.41 10*3/MM3 (ref 0.1–0.9)
MONOCYTES NFR BLD AUTO: 7.7 % (ref 5–12)
NEUTROPHILS NFR BLD AUTO: 2.7 10*3/MM3 (ref 1.7–7)
NEUTROPHILS NFR BLD AUTO: 50.5 % (ref 42.7–76)
NRBC BLD AUTO-RTO: 0 /100 WBC (ref 0–0.2)
PLATELET # BLD AUTO: 221 10*3/MM3 (ref 140–450)
PMV BLD AUTO: 9.6 FL (ref 6–12)
POTASSIUM SERPL-SCNC: 3.4 MMOL/L (ref 3.5–5.2)
RBC # BLD AUTO: 3.37 10*6/MM3 (ref 3.77–5.28)
SODIUM SERPL-SCNC: 140 MMOL/L (ref 136–145)
TSH SERPL DL<=0.05 MIU/L-ACNC: 0.69 UIU/ML (ref 0.27–4.2)
WBC # BLD AUTO: 5.35 10*3/MM3 (ref 3.4–10.8)

## 2020-10-11 PROCEDURE — 25010000002 HYDROMORPHONE PER 4 MG: Performed by: NURSE ANESTHETIST, CERTIFIED REGISTERED

## 2020-10-11 PROCEDURE — 97162 PT EVAL MOD COMPLEX 30 MIN: CPT

## 2020-10-11 PROCEDURE — C1713 ANCHOR/SCREW BN/BN,TIS/BN: HCPCS | Performed by: ORTHOPAEDIC SURGERY

## 2020-10-11 PROCEDURE — 83036 HEMOGLOBIN GLYCOSYLATED A1C: CPT | Performed by: INTERNAL MEDICINE

## 2020-10-11 PROCEDURE — 76000 FLUOROSCOPY <1 HR PHYS/QHP: CPT

## 2020-10-11 PROCEDURE — 25010000003 CEFAZOLIN IN DEXTROSE 2-4 GM/100ML-% SOLUTION: Performed by: ORTHOPAEDIC SURGERY

## 2020-10-11 PROCEDURE — 73552 X-RAY EXAM OF FEMUR 2/>: CPT

## 2020-10-11 PROCEDURE — 25010000002 FENTANYL CITRATE (PF) 100 MCG/2ML SOLUTION: Performed by: ANESTHESIOLOGY

## 2020-10-11 PROCEDURE — 25010000002 DEXAMETHASONE PER 1 MG: Performed by: NURSE ANESTHETIST, CERTIFIED REGISTERED

## 2020-10-11 PROCEDURE — 80048 BASIC METABOLIC PNL TOTAL CA: CPT | Performed by: INTERNAL MEDICINE

## 2020-10-11 PROCEDURE — 84443 ASSAY THYROID STIM HORMONE: CPT | Performed by: INTERNAL MEDICINE

## 2020-10-11 PROCEDURE — 25010000002 ONDANSETRON PER 1 MG: Performed by: NURSE ANESTHETIST, CERTIFIED REGISTERED

## 2020-10-11 PROCEDURE — 0QS804Z REPOSITION RIGHT FEMORAL SHAFT WITH INTERNAL FIXATION DEVICE, OPEN APPROACH: ICD-10-PCS | Performed by: ORTHOPAEDIC SURGERY

## 2020-10-11 PROCEDURE — 25010000002 MORPHINE PER 10 MG: Performed by: NURSE PRACTITIONER

## 2020-10-11 PROCEDURE — 25010000002 FENTANYL CITRATE (PF) 100 MCG/2ML SOLUTION: Performed by: NURSE ANESTHETIST, CERTIFIED REGISTERED

## 2020-10-11 PROCEDURE — 25010000002 MORPHINE PER 10 MG: Performed by: ORTHOPAEDIC SURGERY

## 2020-10-11 PROCEDURE — 25010000002 PROPOFOL 10 MG/ML EMULSION: Performed by: NURSE ANESTHETIST, CERTIFIED REGISTERED

## 2020-10-11 PROCEDURE — 85025 COMPLETE CBC W/AUTO DIFF WBC: CPT | Performed by: INTERNAL MEDICINE

## 2020-10-11 PROCEDURE — 25010000002 NEOSTIGMINE PER 0.5 MG: Performed by: NURSE ANESTHETIST, CERTIFIED REGISTERED

## 2020-10-11 PROCEDURE — 97110 THERAPEUTIC EXERCISES: CPT

## 2020-10-11 DEVICE — CORTEX SCREW
Type: IMPLANTABLE DEVICE | Status: FUNCTIONAL
Brand: AXSOS

## 2020-10-11 DEVICE — LOCKING SCREW
Type: IMPLANTABLE DEVICE | Status: FUNCTIONAL
Brand: AXSOS

## 2020-10-11 DEVICE — KNOTLESS TISSUE CONTROL DEVICE, UNDYED UNIDIRECTIONAL (ANTIBACTERIAL) SYNTHETIC ABSORBABLE DEVICE
Type: IMPLANTABLE DEVICE | Status: FUNCTIONAL
Brand: STRATAFIX

## 2020-10-11 DEVICE — VIOLET ANTIBACTERIAL POLYDIOXANONE, KNOTLESS TISSUE CONTROL DEVICE
Type: IMPLANTABLE DEVICE | Status: FUNCTIONAL
Brand: STRATAFIX

## 2020-10-11 DEVICE — DISTAL LATERAL FEMUR PLATE
Type: IMPLANTABLE DEVICE | Status: FUNCTIONAL
Brand: AXSOS

## 2020-10-11 DEVICE — PERIPROSTHETIC LOCKING SCREW
Type: IMPLANTABLE DEVICE | Status: FUNCTIONAL
Brand: AXSOS

## 2020-10-11 RX ORDER — DEXAMETHASONE SODIUM PHOSPHATE 10 MG/ML
INJECTION INTRAMUSCULAR; INTRAVENOUS AS NEEDED
Status: DISCONTINUED | OUTPATIENT
Start: 2020-10-11 | End: 2020-10-11 | Stop reason: SURG

## 2020-10-11 RX ORDER — CEFAZOLIN SODIUM 2 G/100ML
2 INJECTION, SOLUTION INTRAVENOUS EVERY 8 HOURS
Status: COMPLETED | OUTPATIENT
Start: 2020-10-11 | End: 2020-10-12

## 2020-10-11 RX ORDER — EPHEDRINE SULFATE 50 MG/ML
5 INJECTION, SOLUTION INTRAVENOUS ONCE AS NEEDED
Status: DISCONTINUED | OUTPATIENT
Start: 2020-10-11 | End: 2020-10-11 | Stop reason: HOSPADM

## 2020-10-11 RX ORDER — FENTANYL CITRATE 50 UG/ML
50 INJECTION, SOLUTION INTRAMUSCULAR; INTRAVENOUS
Status: DISCONTINUED | OUTPATIENT
Start: 2020-10-11 | End: 2020-10-11 | Stop reason: HOSPADM

## 2020-10-11 RX ORDER — OXYCODONE AND ACETAMINOPHEN 7.5; 325 MG/1; MG/1
1 TABLET ORAL ONCE AS NEEDED
Status: DISCONTINUED | OUTPATIENT
Start: 2020-10-11 | End: 2020-10-11 | Stop reason: HOSPADM

## 2020-10-11 RX ORDER — MORPHINE SULFATE 2 MG/ML
2 INJECTION, SOLUTION INTRAMUSCULAR; INTRAVENOUS EVERY 4 HOURS PRN
Status: DISCONTINUED | OUTPATIENT
Start: 2020-10-11 | End: 2020-10-13 | Stop reason: HOSPADM

## 2020-10-11 RX ORDER — NALOXONE HCL 0.4 MG/ML
0.2 VIAL (ML) INJECTION AS NEEDED
Status: DISCONTINUED | OUTPATIENT
Start: 2020-10-11 | End: 2020-10-11 | Stop reason: HOSPADM

## 2020-10-11 RX ORDER — LIDOCAINE HYDROCHLORIDE 10 MG/ML
0.5 INJECTION, SOLUTION EPIDURAL; INFILTRATION; INTRACAUDAL; PERINEURAL ONCE AS NEEDED
Status: DISCONTINUED | OUTPATIENT
Start: 2020-10-11 | End: 2020-10-11 | Stop reason: HOSPADM

## 2020-10-11 RX ORDER — EPHEDRINE SULFATE 50 MG/ML
INJECTION, SOLUTION INTRAVENOUS AS NEEDED
Status: DISCONTINUED | OUTPATIENT
Start: 2020-10-11 | End: 2020-10-11 | Stop reason: SURG

## 2020-10-11 RX ORDER — POTASSIUM CHLORIDE 750 MG/1
40 CAPSULE, EXTENDED RELEASE ORAL ONCE
Status: COMPLETED | OUTPATIENT
Start: 2020-10-11 | End: 2020-10-11

## 2020-10-11 RX ORDER — DIPHENHYDRAMINE HYDROCHLORIDE 50 MG/ML
12.5 INJECTION INTRAMUSCULAR; INTRAVENOUS
Status: DISCONTINUED | OUTPATIENT
Start: 2020-10-11 | End: 2020-10-11 | Stop reason: HOSPADM

## 2020-10-11 RX ORDER — LABETALOL HYDROCHLORIDE 5 MG/ML
5 INJECTION, SOLUTION INTRAVENOUS
Status: DISCONTINUED | OUTPATIENT
Start: 2020-10-11 | End: 2020-10-11 | Stop reason: HOSPADM

## 2020-10-11 RX ORDER — ONDANSETRON 2 MG/ML
4 INJECTION INTRAMUSCULAR; INTRAVENOUS ONCE AS NEEDED
Status: DISCONTINUED | OUTPATIENT
Start: 2020-10-11 | End: 2020-10-11 | Stop reason: HOSPADM

## 2020-10-11 RX ORDER — MIDAZOLAM HYDROCHLORIDE 1 MG/ML
1 INJECTION INTRAMUSCULAR; INTRAVENOUS
Status: DISCONTINUED | OUTPATIENT
Start: 2020-10-11 | End: 2020-10-11 | Stop reason: HOSPADM

## 2020-10-11 RX ORDER — MAGNESIUM HYDROXIDE 1200 MG/15ML
LIQUID ORAL AS NEEDED
Status: DISCONTINUED | OUTPATIENT
Start: 2020-10-11 | End: 2020-10-11 | Stop reason: HOSPADM

## 2020-10-11 RX ORDER — CEFAZOLIN SODIUM 2 G/100ML
2 INJECTION, SOLUTION INTRAVENOUS ONCE
Status: COMPLETED | OUTPATIENT
Start: 2020-10-11 | End: 2020-10-11

## 2020-10-11 RX ORDER — HYDROCODONE BITARTRATE AND ACETAMINOPHEN 7.5; 325 MG/1; MG/1
1 TABLET ORAL ONCE AS NEEDED
Status: DISCONTINUED | OUTPATIENT
Start: 2020-10-11 | End: 2020-10-11 | Stop reason: HOSPADM

## 2020-10-11 RX ORDER — SODIUM CHLORIDE 0.9 % (FLUSH) 0.9 %
3 SYRINGE (ML) INJECTION EVERY 12 HOURS SCHEDULED
Status: DISCONTINUED | OUTPATIENT
Start: 2020-10-11 | End: 2020-10-11 | Stop reason: HOSPADM

## 2020-10-11 RX ORDER — PROMETHAZINE HYDROCHLORIDE 25 MG/1
25 TABLET ORAL ONCE AS NEEDED
Status: DISCONTINUED | OUTPATIENT
Start: 2020-10-11 | End: 2020-10-11 | Stop reason: HOSPADM

## 2020-10-11 RX ORDER — ONDANSETRON 2 MG/ML
INJECTION INTRAMUSCULAR; INTRAVENOUS AS NEEDED
Status: DISCONTINUED | OUTPATIENT
Start: 2020-10-11 | End: 2020-10-11 | Stop reason: SURG

## 2020-10-11 RX ORDER — DIPHENHYDRAMINE HCL 25 MG
25 CAPSULE ORAL
Status: DISCONTINUED | OUTPATIENT
Start: 2020-10-11 | End: 2020-10-11 | Stop reason: HOSPADM

## 2020-10-11 RX ORDER — HYDRALAZINE HYDROCHLORIDE 20 MG/ML
5 INJECTION INTRAMUSCULAR; INTRAVENOUS
Status: DISCONTINUED | OUTPATIENT
Start: 2020-10-11 | End: 2020-10-11 | Stop reason: HOSPADM

## 2020-10-11 RX ORDER — FAMOTIDINE 10 MG/ML
20 INJECTION, SOLUTION INTRAVENOUS ONCE
Status: COMPLETED | OUTPATIENT
Start: 2020-10-11 | End: 2020-10-11

## 2020-10-11 RX ORDER — SODIUM CHLORIDE, SODIUM LACTATE, POTASSIUM CHLORIDE, CALCIUM CHLORIDE 600; 310; 30; 20 MG/100ML; MG/100ML; MG/100ML; MG/100ML
9 INJECTION, SOLUTION INTRAVENOUS CONTINUOUS
Status: DISCONTINUED | OUTPATIENT
Start: 2020-10-11 | End: 2020-10-13 | Stop reason: HOSPADM

## 2020-10-11 RX ORDER — PROMETHAZINE HYDROCHLORIDE 25 MG/1
25 SUPPOSITORY RECTAL ONCE AS NEEDED
Status: DISCONTINUED | OUTPATIENT
Start: 2020-10-11 | End: 2020-10-11 | Stop reason: HOSPADM

## 2020-10-11 RX ORDER — HYDROMORPHONE HYDROCHLORIDE 1 MG/ML
0.25 INJECTION, SOLUTION INTRAMUSCULAR; INTRAVENOUS; SUBCUTANEOUS
Status: DISCONTINUED | OUTPATIENT
Start: 2020-10-11 | End: 2020-10-11 | Stop reason: HOSPADM

## 2020-10-11 RX ORDER — GLYCOPYRROLATE 0.2 MG/ML
INJECTION INTRAMUSCULAR; INTRAVENOUS AS NEEDED
Status: DISCONTINUED | OUTPATIENT
Start: 2020-10-11 | End: 2020-10-11 | Stop reason: SURG

## 2020-10-11 RX ORDER — LIDOCAINE HYDROCHLORIDE 20 MG/ML
INJECTION, SOLUTION INFILTRATION; PERINEURAL AS NEEDED
Status: DISCONTINUED | OUTPATIENT
Start: 2020-10-11 | End: 2020-10-11 | Stop reason: SURG

## 2020-10-11 RX ORDER — FLUMAZENIL 0.1 MG/ML
0.2 INJECTION INTRAVENOUS AS NEEDED
Status: DISCONTINUED | OUTPATIENT
Start: 2020-10-11 | End: 2020-10-11 | Stop reason: HOSPADM

## 2020-10-11 RX ORDER — ZOLPIDEM TARTRATE 5 MG/1
5 TABLET ORAL NIGHTLY
Status: DISCONTINUED | OUTPATIENT
Start: 2020-10-11 | End: 2020-10-13 | Stop reason: HOSPADM

## 2020-10-11 RX ORDER — ROCURONIUM BROMIDE 10 MG/ML
INJECTION, SOLUTION INTRAVENOUS AS NEEDED
Status: DISCONTINUED | OUTPATIENT
Start: 2020-10-11 | End: 2020-10-11 | Stop reason: SURG

## 2020-10-11 RX ORDER — PROPOFOL 10 MG/ML
VIAL (ML) INTRAVENOUS AS NEEDED
Status: DISCONTINUED | OUTPATIENT
Start: 2020-10-11 | End: 2020-10-11 | Stop reason: SURG

## 2020-10-11 RX ORDER — SODIUM CHLORIDE 0.9 % (FLUSH) 0.9 %
3-10 SYRINGE (ML) INJECTION AS NEEDED
Status: DISCONTINUED | OUTPATIENT
Start: 2020-10-11 | End: 2020-10-11 | Stop reason: HOSPADM

## 2020-10-11 RX ADMIN — ZOLPIDEM TARTRATE 5 MG: 5 TABLET ORAL at 20:21

## 2020-10-11 RX ADMIN — POTASSIUM CHLORIDE 40 MEQ: 10 CAPSULE, COATED, EXTENDED RELEASE ORAL at 17:25

## 2020-10-11 RX ADMIN — HYDROMORPHONE HYDROCHLORIDE 0.25 MG: 1 INJECTION, SOLUTION INTRAMUSCULAR; INTRAVENOUS; SUBCUTANEOUS at 11:35

## 2020-10-11 RX ADMIN — ZOLPIDEM TARTRATE 5 MG: 5 TABLET ORAL at 00:44

## 2020-10-11 RX ADMIN — SODIUM CHLORIDE, POTASSIUM CHLORIDE, SODIUM LACTATE AND CALCIUM CHLORIDE 9 ML/HR: 600; 310; 30; 20 INJECTION, SOLUTION INTRAVENOUS at 08:51

## 2020-10-11 RX ADMIN — FENTANYL CITRATE 50 MCG: 50 INJECTION INTRAMUSCULAR; INTRAVENOUS at 10:01

## 2020-10-11 RX ADMIN — SODIUM CHLORIDE, POTASSIUM CHLORIDE, SODIUM LACTATE AND CALCIUM CHLORIDE: 600; 310; 30; 20 INJECTION, SOLUTION INTRAVENOUS at 10:45

## 2020-10-11 RX ADMIN — EPHEDRINE SULFATE 10 MG: 50 INJECTION INTRAVENOUS at 09:34

## 2020-10-11 RX ADMIN — LIDOCAINE HYDROCHLORIDE 60 MG: 20 INJECTION, SOLUTION INFILTRATION; PERINEURAL at 09:06

## 2020-10-11 RX ADMIN — FAMOTIDINE 20 MG: 10 INJECTION, SOLUTION INTRAVENOUS at 08:53

## 2020-10-11 RX ADMIN — FENTANYL CITRATE 50 MCG: 50 INJECTION, SOLUTION INTRAMUSCULAR; INTRAVENOUS at 11:10

## 2020-10-11 RX ADMIN — FENTANYL CITRATE 50 MCG: 50 INJECTION INTRAMUSCULAR; INTRAVENOUS at 08:50

## 2020-10-11 RX ADMIN — HYDROCODONE BITARTRATE AND ACETAMINOPHEN 1 TABLET: 7.5; 325 TABLET ORAL at 18:38

## 2020-10-11 RX ADMIN — FENTANYL CITRATE 50 MCG: 50 INJECTION, SOLUTION INTRAMUSCULAR; INTRAVENOUS at 11:25

## 2020-10-11 RX ADMIN — ROCURONIUM BROMIDE 40 MG: 10 INJECTION INTRAVENOUS at 09:06

## 2020-10-11 RX ADMIN — GLYCOPYRROLATE 0.2 MG: 0.2 INJECTION INTRAMUSCULAR; INTRAVENOUS at 10:46

## 2020-10-11 RX ADMIN — CEFAZOLIN SODIUM 2 G: 2 INJECTION, SOLUTION INTRAVENOUS at 17:25

## 2020-10-11 RX ADMIN — MORPHINE SULFATE 2 MG: 2 INJECTION, SOLUTION INTRAMUSCULAR; INTRAVENOUS at 14:59

## 2020-10-11 RX ADMIN — MORPHINE SULFATE 2 MG: 2 INJECTION, SOLUTION INTRAMUSCULAR; INTRAVENOUS at 01:33

## 2020-10-11 RX ADMIN — EPHEDRINE SULFATE 20 MG: 50 INJECTION INTRAVENOUS at 09:22

## 2020-10-11 RX ADMIN — METOPROLOL TARTRATE 25 MG: 25 TABLET, FILM COATED ORAL at 07:44

## 2020-10-11 RX ADMIN — HYDROCODONE BITARTRATE AND ACETAMINOPHEN 1 TABLET: 7.5; 325 TABLET ORAL at 13:26

## 2020-10-11 RX ADMIN — CEFAZOLIN SODIUM 2 G: 2 INJECTION, SOLUTION INTRAVENOUS at 09:11

## 2020-10-11 RX ADMIN — PRAVASTATIN SODIUM 40 MG: 40 TABLET ORAL at 00:44

## 2020-10-11 RX ADMIN — FENTANYL CITRATE 50 MCG: 50 INJECTION INTRAMUSCULAR; INTRAVENOUS at 09:04

## 2020-10-11 RX ADMIN — HYDROMORPHONE HYDROCHLORIDE 0.25 MG: 1 INJECTION, SOLUTION INTRAMUSCULAR; INTRAVENOUS; SUBCUTANEOUS at 11:15

## 2020-10-11 RX ADMIN — ONDANSETRON HYDROCHLORIDE 4 MG: 2 SOLUTION INTRAMUSCULAR; INTRAVENOUS at 09:28

## 2020-10-11 RX ADMIN — HYDROCODONE BITARTRATE AND ACETAMINOPHEN 1 TABLET: 7.5; 325 TABLET ORAL at 04:20

## 2020-10-11 RX ADMIN — NEOSTIGMINE METHYLSULFATE 2 MG: 1 INJECTION INTRAMUSCULAR; INTRAVENOUS; SUBCUTANEOUS at 10:46

## 2020-10-11 RX ADMIN — HYDROMORPHONE HYDROCHLORIDE 0.25 MG: 1 INJECTION, SOLUTION INTRAMUSCULAR; INTRAVENOUS; SUBCUTANEOUS at 11:28

## 2020-10-11 RX ADMIN — PROPOFOL 30 MG: 10 INJECTION, EMULSION INTRAVENOUS at 09:04

## 2020-10-11 RX ADMIN — HYDROMORPHONE HYDROCHLORIDE 0.25 MG: 1 INJECTION, SOLUTION INTRAMUSCULAR; INTRAVENOUS; SUBCUTANEOUS at 11:20

## 2020-10-11 RX ADMIN — PROPOFOL 100 MG: 10 INJECTION, EMULSION INTRAVENOUS at 09:06

## 2020-10-11 RX ADMIN — HYDROCODONE BITARTRATE AND ACETAMINOPHEN 1 TABLET: 7.5; 325 TABLET ORAL at 22:45

## 2020-10-11 RX ADMIN — DEXAMETHASONE SODIUM PHOSPHATE 6 MG: 10 INJECTION INTRAMUSCULAR; INTRAVENOUS at 09:28

## 2020-10-11 NOTE — PROGRESS NOTES
"DAILY PROGRESS NOTE  [facility]    Patient Identification:  Name: Ashli León  Age: 85 y.o.  Sex: female  :  1935  MRN: [id number]         Primary Care Physician: Arnaud Monson MD (Tony)      Subjective  No complaints.  She is doing amazingly well postop.  She is anxious to get back on her feet.    Objective:  General Appearance:  Comfortable, well-appearing, in no acute distress and not in pain.    Vital signs: (most recent): Blood pressure 116/62, pulse 76, temperature 96.7 °F (35.9 °C), temperature source Oral, resp. rate 16, height 160 cm (63\"), weight 59.9 kg (132 lb), SpO2 99 %.    Lungs:  Normal effort and normal respiratory rate.  Breath sounds clear to auscultation.    Heart: Normal rate.  Regular rhythm.    Extremities: There is no dependent edema.    Neurological: Patient is alert and oriented to person, place and time.    Skin:  Warm and dry.                Vital signs in last 24 hours:  Temp:  [96.7 °F (35.9 °C)-97.7 °F (36.5 °C)] 96.7 °F (35.9 °C)  Heart Rate:  [44-92] 76  Resp:  [14-19] 16  BP: (103-160)/(56-93) 116/62    Intake/Output:    Intake/Output Summary (Last 24 hours) at 10/11/2020 1616  Last data filed at 10/11/2020 1057  Gross per 24 hour   Intake 1000 ml   Output 250 ml   Net 750 ml         Results from last 7 days   Lab Units 10/11/20  0416 10/10/20  1605   WBC 10*3/mm3 5.35 5.41   HEMOGLOBIN g/dL 10.5* 11.8*   PLATELETS 10*3/mm3 221 242     Results from last 7 days   Lab Units 10/11/20  0416 10/10/20  2057 10/10/20  1605   SODIUM mmol/L 140 142 143   POTASSIUM mmol/L 3.4* 4.3 3.6   CHLORIDE mmol/L 105 105 104   CO2 mmol/L 25.6 24.2 28.3   BUN mg/dL 13 12 13   CREATININE mg/dL 0.83 0.82 0.90   GLUCOSE mg/dL 103* 106* 117*   Estimated Creatinine Clearance: 47.7 mL/min (by C-G formula based on SCr of 0.83 mg/dL).  Results from last 7 days   Lab Units 10/11/20  0416 10/10/20  2057 10/10/20  1605   CALCIUM mg/dL 9.1 9.8 9.8   ALBUMIN g/dL  --   --  4.50 "     Results from last 7 days   Lab Units 10/10/20  1605   ALBUMIN g/dL 4.50   BILIRUBIN mg/dL 1.4*   ALK PHOS U/L 51   AST (SGOT) U/L 16   ALT (SGPT) U/L 13       Assessment:    Closed fracture of shaft of right femur: Status post open reduction and internal fixation.  Hypertension: Controlled.  Hyperglycemia: Monitor  Anemia: Appears to be pretty close to her baseline.  She has a tendency towards a macrocytosis.  I will check a B12, folate with tomorrow's labs.  Hypokalemia: We will give a dose of oral KCl tonight recheck in the morning.        Plan:  Please see above.  Overall doing very nicely postop.    Sumeet Kellogg MD  10/11/2020  16:16 EDT

## 2020-10-11 NOTE — OP NOTE
Procedure Note    Ashli León  10/11/2020    Pre-op Diagnosis:   1.  Closed displaced right femoral shaft fracture       Post-Op Diagnosis Codes:  Same    Procedure:  1.  Open reduction internal fixation, right femoral shaft fracture    Surgeon(s):  Mac Frances Jr., MD    Assistant:  None    Anesthesia: General      Estimated Blood Loss: 50cc    Specimens:                None    Drains:   None    Complications:   None apparent    Disposition: Stable to PACU for recovery    Indications for procedure:  This is a pleasant 85-year-old female with history of osteoporosis, hypertension, hyperlipidemia, anemia presenting with an acute fracture of the midshaft of the femur. The risks/benefits of surgery, including pain, infection, wound healing problems, hematoma, failure of fixation, need for future procedures, persistent pain,  iatrogenic nerve and/or blood vessel injury resulting in permanent weakness, numbness, or dysfunction, positioning related neuropraxia, mal/nonunion, DVT/PE, cardiac event, and/or death were discussed, and the patient elected to proceed with surgery.       Procedure in detail:  The correct patient was identified in preoperative holding.  All risks and benefits of surgery were again discussed in detail, and the patient agreed to proceed with surgery.  The operative extremity was confirmed and marked by myself.  Operative consent reviewed and confirmed to be signed by the patient and myself.    At this time, the patient was wheeled to the operative theatre and placed supine on the OR table.  JEFF/SCD placed on nonoperative leg. Anesthesia was induced smoothly by our anesthesia colleagues.    The right lower extremity was prepped and draped in standard sterile fashion.  Appropriate presurgical timeout was performed, confirming correct patient, correct extremity, correct procedure, availability of sterile instruments/implants, and the administration of intravenous antibiotics in the form of Ancef  within one hour of skin incision.    At this time, her prior incision over the distal lateral femur was opened with a 15 blade.  Dissection was carried down bluntly to the IT band/lateral fascia.  This was opened longitudinally with a knife blade.  Quadriceps was reflected anteriorly the distal femur was exposed.  Periosteal elevator was used to elevate proximally to the level of the hip.    The fracture was reduced.  Ideal reduction confirmed on biplanar fluoroscopy.    A Carolyn 16 hole distal femur plate was selected.  It was slid submuscularly up the femur across the fracture site and above the level of the hip prosthesis.  It was provisionally pinned in place.  Ideal plate placement was confirmed.  The fracture remained well reduced.    The guide outrigger was attached.  Proximally, the plate was secured to bone through a small stab incision using a threaded pin.  Distally, the plate was secured with a combination of 5.0 mm locking screws and 4.5 mm cortical screws.  Proximally through small stab incisions from the guide outrigger, I removed the threaded pin and placed three 5.0 mm locking screws in bicortical fashion above the fracture but below the tip of the prosthesis.  In the proximal 2 holes of the plate above the tip of the prosthesis, through the guide outrigger and a small stab incision I placed two 5.0 periprosthetic unicortical locking screws.    At this time, final fluoroscopic images confirmed excellent maintained reduction of the femoral shaft fracture.  The plate was in appropriate position with all hardware of appropriate length.    The wounds were copiously irrigated with sterile saline.  The deep fascia was closed with a combination of 0 Vicryl and 0 strata fix.  The subcutaneous layer was closed with 3-0 strata fix in the distal wound.  Stab incisions proximally were closed with 2-0 Vicryl in the subcutaneous layer.  Staples were used to close the skin.    Xeroform, sterile gauze, and a soft  compressive sterile dressing were applied.  Drapes taken down.  Patient is awoken from anesthesia without apparent complication taken to PACU for recovery.      Postoperative Plan:  Weightbearing status: Non-weightbearing   DVT Prophylaxis:Lovenox 40mg daily ; JEFF,SCDs                Mac Frances Jr, MD     Date: 10/11/2020  Time: 10:57 EDT

## 2020-10-11 NOTE — THERAPY EVALUATION
Patient Name: Ashli León  : 1935    MRN: [id number]                              Today's Date: 10/11/2020       Admit Date: 10/10/2020    Visit Dx:     ICD-10-CM ICD-9-CM   1. Closed fracture of shaft of right femur, unspecified fracture morphology, initial encounter (CMS/HCC)  S72.301A 821.01     Patient Active Problem List   Diagnosis   • History of hip surgery   • Primary osteoarthritis of both knees   • Arthritis of right knee   • Aftercare following right knee joint replacement surgery   • Status post total right knee replacement   • History of knee replacement, total   • History of total knee arthroplasty   • Chronic pain of left knee   • Arthritis of left knee   • Closed right hip fracture (CMS/[facility])   • Femoral neck fracture (CMS/HCC)   • Tear of right gluteus minimus tendon   • Chronic right-sided low back pain with right-sided sciatica   • Chronic right hip pain   • Sacroiliac joint dysfunction   • Encounter for long-term current use of high risk medication   • Pain of left hip joint   • Other chronic pain   • Bilateral low back pain with bilateral sciatica   • Closed fracture of shaft of right femur (CMS/[facility])     Past Medical History:   Diagnosis Date   • Acid reflux    • Back pain    • Colitis     history of   • Diarrhea    • Disease of thyroid gland    • Diverticular disease    • Glaucoma    • Gout     - right big toe   • High blood pressure    • High cholesterol    • History of vertebral compression fracture     multiple   • Hyponatremia    • Knee pain    • Low back pain    • Macular degeneration    • Mitral regurgitation 2016    Mild to moderate per 2-D echocardiogram   • Peripheral neuropathy    • Primary osteoarthritis of both knees 2016   • Renal lesion     on mri   • Rheumatoid arthritis (CMS/[facility])      Past Surgical History:   Procedure Laterality Date   • APPENDECTOMY     • CATARACT EXTRACTION Bilateral    • FEMUR SURGERY Right     had fracture and has rajesh    •  HEMORRHOIDECTOMY     • HIP HEMIARTHROPLASTY Right 1/3/2017    Procedure: HIP HEMIARTHROPLASTY with IM rajesh and 2 screw hardware removal antibiotic cement;  Surgeon: Mirza Whittington MD;  Location:  [facility] OR;  Service:    • HIP SURGERY Right 1999    hx fracture   • LEG SURGERY Right 12/1/2017    Procedure: GLUTEAL TENDON REPAIR-minimus and medius;  Surgeon: Mirza Whittington MD;  Location:  [facility] OR;  Service:    • TOTAL KNEE ARTHROPLASTY Right 6/22/2016    Procedure: RT TOTAL KNEE ARTHROPLASTY WITH FINN NAVIGATION;  Surgeon: Adrian Swenson MD;  [facility];  Service:      General Information     Row Name 10/11/20 1553          Physical Therapy Time and Intention    Document Type  evaluation  -CS     Mode of Treatment  physical therapy  -CS     Row Name 10/11/20 1555          General Information    Patient Profile Reviewed  yes  -CS     Existing Precautions/Restrictions  fall;hip;non-weight bearing  -CS     Row Name 10/11/20 1552          Living Environment    Lives With  alone  -     Row Name 10/11/20 0695          Cognition    Orientation Status (Cognition)  oriented x 3  -CS     Row Name 10/11/20 8484          Safety Issues, Functional Mobility    Impairments Affecting Function (Mobility)  balance;endurance/activity tolerance;pain;range of motion (ROM);strength  -CS       User Key  (r) = Recorded By, (t) = Taken By, (c) = Cosigned By    Initials Name Provider Type    CS Gopi Mattson, PT Physical Therapist        Mobility     Row Name 10/11/20 8575          Bed Mobility    Bed Mobility  supine-sit;sit-supine  -CS     Supine-Sit [place] (Bed Mobility)  maximum assist (25% patient effort);verbal cues;nonverbal cues (demo/gesture)  -CS     Sit-Supine [place] (Bed Mobility)  maximum assist (25% patient effort);verbal cues;nonverbal cues (demo/gesture)  -CS     Assistive Device (Bed Mobility)  bed rails;head of bed elevated  -CS     Row Name 10/11/20 9613          Sit-Stand Transfer    Sit-Stand  Tyler (Transfers)  maximum assist (25% patient effort);2 person assist  -     Assistive Device (Sit-Stand Transfers)  walker, front-wheeled  -     Row Name 10/11/20 1554          Gait/Stairs (Locomotion)    Distance in Feet (Gait)  not tested too much weakness/pain  -     Row Name 10/11/20 1554          Mobility    Extremity Weight-bearing Status  right lower extremity  -     Right Lower Extremity (Weight-bearing Status)  non weight-bearing (NWB)  -       User Key  (r) = Recorded By, (t) = Taken By, (c) = Cosigned By    Initials Name Provider Type    Gopi Rodriguez, PT Physical Therapist        Obj/Interventions     Row Name 10/11/20 1554          Range of Motion Comprehensive    Comment, General Range of Motion  RLE not tested  -[facility] Name 10/11/20 1554          Strength Comprehensive (MMT)    Comment, General Manual Muscle Testing (MMT) Assessment  RLE not tested  -       User Key  (r) = Recorded By, (t) = Taken By, (c) = Cosigned By    Initials Name Provider Type    Gopi Rodriguez, PT Physical Therapist        Goals/Plan     Row Name 10/11/20 1555          Bed Mobility Goal 1 (PT)    Activity/Assistive Device (Bed Mobility Goal 1, PT)  sit to supine;supine to sit  -CS     Tyler Level/Cues Needed (Bed Mobility Goal 1, PT)  minimum assist (75% or more patient effort)  -CS     Time Frame (Bed Mobility Goal 1, PT)  1 week  -     Row Name 10/11/20 1555          Transfer Goal 1 (PT)    Activity/Assistive Device (Transfer Goal 1, PT)  sit-to-stand/stand-to-sit;bed-to-chair/chair-to-bed  -CS     Tyler Level/Cues Needed (Transfer Goal 1, PT)  minimum assist (75% or more patient effort)  -CS     Time Frame (Transfer Goal 1, PT)  1 week  -     Row Name 10/11/20 1555          Gait Training Goal 1 (PT)    Activity/Assistive Device (Gait Training Goal 1, PT)  assistive device use  -CS     Tyler Level (Gait Training Goal 1, PT)  minimum assist (75% or more patient  effort)  -CS     Distance (Gait Training Goal 1, PT)  25  -CS     Time Frame (Gait Training Goal 1, PT)  1 week  -CS       User Key  (r) = Recorded By, (t) = Taken By, (c) = Cosigned By    Initials Name Provider Type    Gopi Rodriguez, PT Physical Therapist        Clinical Impression     Row Name 10/11/20 2010          Pain    Additional Documentation  Pain Scale: FACES Pre/Post-Treatment (Group)  -CS     Row Name 10/11/20 9667          Pain Scale: Numbers Pre/Post-Treatment    Pain Intervention(s)  Repositioned;Ambulation/increased activity  -CS     Row Name 10/11/20 7467          Pain Scale: FACES Pre/Post-Treatment    Pain: FACES Scale, Pretreatment  4-->hurts little more  -CS     Posttreatment Pain Rating  4-->hurts little more  -CS     Pain Location - Side  Right  -CS     Pain Location - Orientation  incisional  -CS     Row Name 10/11/20 5001          Plan of Care Review    Plan of Care Reviewed With  patient  -CS     Row Name 10/11/20 5216          Therapy Assessment/Plan (PT)    Patient/Family Therapy Goals Statement (PT)  improve mobility  -CS     Rehab Potential (PT)  good, to achieve stated therapy goals  -CS     Criteria for Skilled Interventions Met (PT)  yes  -CS     Row Name 10/11/20 7889          Positioning and Restraints    Pre-Treatment Position  in bed  -CS     Post Treatment Position  bed  -CS     In Bed  supine;call light within reach;encouraged to call for assist;exit alarm on;with family/caregiver  -CS       User Key  (r) = Recorded By, (t) = Taken By, (c) = Cosigned By    Initials Name Provider Type    Gopi Rodriguez, PT Physical Therapist        Outcome Measures     Row Name 10/11/20 5144          How much help from another person do you currently need...    Turning from your back to your side while in flat bed without using bedrails?  2  -CS     Moving from lying on back to sitting on the side of a flat bed without bedrails?  2  -CS     Moving to and from a bed to a chair  (including a wheelchair)?  2  -CS     Standing up from a chair using your arms (e.g., wheelchair, bedside chair)?  2  -CS     Climbing 3-5 steps with a railing?  1  -CS     To walk in hospital room?  1  -CS     AM-PAC 6 Clicks Score (PT)  10  -CS     Row Name 10/11/20 1556          Functional Assessment    Outcome Measure Options  AM-PAC 6 Clicks Basic Mobility (PT)  -CS       User Key  (r) = Recorded By, (t) = Taken By, (c) = Cosigned By    Initials Name Provider Type    CS Gopi Mattson, PT Physical Therapist        Physical Therapy Education                 Title: PT OT SLP Therapies (Done)     Topic: Physical Therapy (Done)     Point: Mobility training (Done)     Learning Progress Summary           Patient Acceptance, E,TB, VU,NR by  at 10/11/2020 1556                   Point: Home exercise program (Done)     Learning Progress Summary           Patient Acceptance, E,TB, VU,NR by  at 10/11/2020 1556                   Point: Body mechanics (Done)     Learning Progress Summary           Patient Acceptance, E,TB, VU,NR by  at 10/11/2020 1556                   Point: Precautions (Done)     Learning Progress Summary           Patient Acceptance, E,TB, VU,NR by  at 10/11/2020 1556                               User Key     Initials Effective Dates Name Provider Type Discipline     05/14/18 -  Gopi Mattson, PT Physical Therapist PT              PT Recommendation and Plan  Planned Therapy Interventions (PT): balance training, bed mobility training, gait training, transfer training, strengthening, ROM (range of motion), patient/family education, home exercise program  Plan of Care Reviewed With: patient     Time Calculation:   PT Charges     Row Name 10/11/20 1600             Time Calculation    Start Time  1505  -      Stop Time  1525  -      Time Calculation (min)  20 min  -      PT Received On  10/11/20  -      PT - Next Appointment  10/12/20  -      PT Goal Re-Cert Due Date  10/18/20  -         User Key  (r) = Recorded By, (t) = Taken By, (c) = Cosigned By    Initials Name Provider Type     Gopi Mattson, PT Physical Therapist        Therapy Charges for Today     Code Description Service Date Service Provider Modifiers Qty    19580730854  PT EVAL MOD COMPLEXITY 2 10/11/2020 Twila, Gopi CABRAL, PT GP 1    76260768359 HC PT THER PROC EA 15 MIN 10/11/2020 Gopi Mattson, PT GP 1    [id number]  PT THER SUPP EA 15 MIN 10/11/2020 Gopi Mattson PT GP 1          PT G-Codes  Outcome Measure Options: AM-PAC 6 Clicks Basic Mobility (PT)  AM-PAC 6 Clicks Score (PT): 10    Gopi Mattson PT  10/11/2020

## 2020-10-11 NOTE — PLAN OF CARE
Goal Outcome Evaluation:  Plan of Care Reviewed With: patient  Progress: no change  Outcome Summary: 85/F ED admit w/ a right closed fx of shaft of femur.  VSS.  Accumax in place.  Pain controlled w/ po and IV pain meds.  Pt was NPO, sips w/meds at midnight.  SX schedulled for 10/11/20 @ 0830.

## 2020-10-11 NOTE — PLAN OF CARE
Goal Outcome Evaluation:  Plan of Care Reviewed With: patient  Progress: improving  Outcome Summary: Surgery today to repair R femur fracture. Pain controlled at this time. One dose of IV morphine given for breakthrough pain. VSS. Voids per donnyck. DC plan pending therapy eval. EDucated on BP monitoring.

## 2020-10-11 NOTE — ANESTHESIA PREPROCEDURE EVALUATION
Anesthesia Evaluation     no history of anesthetic complications:  NPO Solid Status: > 8 hours  NPO Liquid Status: > 8 hours           Airway   Mallampati: II  TM distance: >3 FB  Small opening  Dental      Pulmonary - normal exam   Cardiovascular - normal exam    (+) hypertension,   (-) valvular problems/murmurs      Neuro/Psych  GI/Hepatic/Renal/Endo    (+)  GERD,      Musculoskeletal     (+) back pain,   Abdominal    Substance History      OB/GYN          Other   arthritis, blood dyscrasia anemia,       Other Comment: RA                Anesthesia Plan    ASA 3     general   (Had plavix last night, no block)  intravenous induction     Anesthetic plan, all risks, benefits, and alternatives have been provided, discussed and informed consent has been obtained with: patient.    Plan discussed with CRNA.

## 2020-10-11 NOTE — ANESTHESIA POSTPROCEDURE EVALUATION
"Patient: Ashli León    Procedure Summary     Date: 10/11/20 Room / Location: [facility] OR [address] [place] [address] MAIN OR    Anesthesia Start: 0901 Anesthesia Stop: 1106    Procedure: OPEN REDUCTION INTERNAL FIXATION OF RIGHT FEMUR FRACTURE (Right Thigh) Diagnosis:     Surgeon: Mac Frances Jr., MD Provider: Rohan Dumas MD    Anesthesia Type: general ASA Status: 3          Anesthesia Type: general    Vitals  Vitals Value Taken Time   /63 10/11/20 1135   Temp 36.4 °C (97.5 °F) 10/11/20 1103   Pulse 53 10/11/20 1139   Resp 16 10/11/20 1135   SpO2 99 % 10/11/20 1139   Vitals shown include unvalidated device data.        Post Anesthesia Care and Evaluation    Patient location during evaluation: PACU  Patient participation: complete - patient participated  Level of consciousness: awake and alert  Pain management: adequate  Airway patency: patent  Anesthetic complications: No anesthetic complications    Cardiovascular status: acceptable  Respiratory status: acceptable  Hydration status: acceptable    Comments: /63   Pulse 53   Temp 36.4 °C (97.5 °F) (Oral)   Resp 16   Ht 160 cm (63\")   Wt 59.9 kg (132 lb)   SpO2 100%   BMI 23.38 kg/m²         "

## 2020-10-11 NOTE — PLAN OF CARE
Pt admitted after a fall, found to have R femoral shaft fracture now presents postoperatively day 0 ORIF and is non weight bearing on the RLE. This visit she was maxAx2 with bed mobility and was able to stand with mild-moderate complaints of pain. With current level of assist needed and history of fall, she will likely need subacute rehab at discharge for safety.

## 2020-10-11 NOTE — CONSULTS
"Adult Nutrition  Assessment/PES    Patient Name:  Ashli León  YOB: 1935  MRN: [id number]  Admit Date:  10/10/2020    Assessment Date:  10/11/2020    Comments:  Nutrition consult form nursing referral. Wt appears relatively stable x 1 year and actually up from 2018. Pt just back from OR for repair of Fx hip. Hx noted. She is confused. NPO at present. Await initiation of po diet, intake and tolerance.  Will offer nutrition supplements if needed to support healing of hip.    Reason for Assessment     Row Name 10/11/20 1213          Reason for Assessment    Reason For Assessment  identified at risk by screening criteria;nurse/nurse practitioner consult     Diagnosis  trauma S/P Open reduction internal fixation, right femoral shaft fracture today, Hx - osteoporosis, hypertension, hyperlipidemia, anemia     Identified At Risk by Screening Criteria  MST SCORE 2+         Nutrition/Diet History     Row Name 10/11/20 1216          Nutrition/Diet History    Factors Affecting Nutritional Intake  impaired cognitive status/motor control         Anthropometrics     Row Name 10/11/20 1214          Anthropometrics    Height  160 cm (63\")        Admit Weight    Admit Weight  59.8 kg (131 lb 13.4 oz)        Ideal Body Weight (IBW)    Ideal Body Weight (IBW) (kg)  52.72     % of Ideal Body Weight Assessment  -- 113% IBW        Body Mass Index (BMI)    BMI Assessment  BMI 18.5-24.9: normal BMI 23         Labs/Tests/Procedures/Meds     Row Name 10/11/20 1215          Labs/Procedures/Meds    Lab Results Reviewed  reviewed, pertinent     Lab Results Comments  h/h        Diagnostic Tests/Procedures    Diagnostic Test/Procedure Reviewed  reviewed, pertinent     Diagnostic Test/Procedures Comments  xray, OR today - repair of hip        Medications    Pertinent Medications Reviewed  reviewed, pertinent     Pertinent Medications Comments  synthroid, protonix, pravachol, ivf         Physical Findings     Row Name 10/11/20 " 1215          Physical Findings    Overall Physical Appearance  on oxygen therapy     Oral/Mouth Cavity  poor dentition     Skin  surgical incision b=16, bruising         Nutrition Prescription Ordered     Row Name 10/11/20 1216          Nutrition Prescription PO    Current PO Diet  NPO           Problem/Interventions:  Problem 1     Row Name 10/11/20 1217          Nutrition Diagnoses Problem 1    Problem 1  Inadequate Nutrient Intake     Signs/Symptoms (evidenced by)  Report of Mnimal PO Intake         Intervention Goal     Row Name 10/11/20 1217          Intervention Goal    General  Maintain nutrition;Disease management/therapy;Improved nutrition related lab(s);Reduce/improve symptoms;Meet nutritional needs for age/condition     PO  Initiate feeding;Tolerate PO     Weight  Maintain weight         Nutrition Intervention     Row Name 10/11/20 1217          Nutrition Intervention    RD/Tech Action  Await begin PO;Care plan reviewd;Follow Tx progress           Education/Evaluation     Row Name 10/11/20 1217          Education    Education  Will Instruct as appropriate        Monitor/Evaluation    Monitor  Per protocol;Skin status;Symptoms;I&O;PO intake;Pertinent labs;Weight           Electronically signed by:  Chey Everett RD  10/11/20 12:17 EDT

## 2020-10-11 NOTE — CONSULTS
Inpatient Orthopedic Surgery Consult  Consult performed by: Mac Frances Jr., MD  Consult ordered by: Brian Zambrano MD          Patient Care Team:  Arnaud Monson MD (Tony) as PCP - General  Arnaud Monson MD (Tony) as PCP - Family Medicine  [facility] as PCP - Claims Attributed    Chief complaint: Right hip pain    Subjective     History of Present Illness     The patient is a pleasant 85-year-old female with history of osteoporosis, gout, anemia, hypothyroidism, hypertension, hyperlipidemia who presented with right leg pain following a mechanical fall.  The patient normally lives independently and alone.  She reports a mechanical fall yesterday while going to the bathroom.  She had immediate leg pain and difficulty bearing weight.  She was brought to the emergency room.  Radiographs showed an acute on chronic fracture of the midshaft of the femur.  She was admitted to the medicine service.  Orthopedics was consulted for evaluation and management.    She has a long history related to this right lower extremity.  She reports a right femur fracture which was treated with an intramedullary nail in the late 90s.  She eventually underwent right total knee replacement by .  In 2017 she had a right femoral neck fracture and underwent removal of the intramedullary rajesh with hip hemiarthroplasty.    She localizes pain to the right leg.  Pain can be a 7 out of 10.  It is sharp and aching.  It is worse with activity and better with rest.    Review of Systems   Review of Systems:  Constitutional: Negative  HENT: Negative  Eyes: Negative  Respiratory: Negative; no shortness of breath  Cardiovascular: Negative; no current chest pain  Gastrointestinal: Negative  : Negative  Skin: Negative except as listed in HPI  Neurological: Negative, no numbness or deficits  Hematological: Negative  Muscoloskeletal: Per HPI                     Past Medical History:   Diagnosis Date   • Acid reflux    •  Back pain    • Colitis     history of   • Diarrhea    • Disease of thyroid gland    • Diverticular disease    • Glaucoma    • Gout     - right big toe   • High blood pressure    • High cholesterol    • History of vertebral compression fracture     multiple   • Hyponatremia    • Knee pain    • Low back pain    • Macular degeneration    • Mitral regurgitation 06/2016    Mild to moderate per 2-D echocardiogram   • Peripheral neuropathy    • Primary osteoarthritis of both knees 4/1/2016   • Renal lesion     on mri   • Rheumatoid arthritis (CMS/HCC)    ,   Past Surgical History:   Procedure Laterality Date   • APPENDECTOMY  1951   • CATARACT EXTRACTION Bilateral    • FEMUR SURGERY Right     had fracture and has rajesh    • HEMORRHOIDECTOMY     • HIP HEMIARTHROPLASTY Right 1/3/2017    Procedure: HIP HEMIARTHROPLASTY with IM rajesh and 2 screw hardware removal antibiotic cement;  Surgeon: Mirza Whittington MD;  Location:  [facility] OR;  Service:    • HIP SURGERY Right 1999    hx fracture   • LEG SURGERY Right 12/1/2017    Procedure: GLUTEAL TENDON REPAIR-minimus and medius;  Surgeon: Mirza Whittington MD;  Location:  [facility] OR;  Service:    • TOTAL KNEE ARTHROPLASTY Right 6/22/2016    Procedure: RT TOTAL KNEE ARTHROPLASTY WITH FINN NAVIGATION;  Surgeon: Adrian Swenson MD;  Location: [facility] MAIN OR;  Service:    ,   Family History   Problem Relation Age of Onset   • Heart attack Father    ,   Social History     Tobacco Use   • Smoking status: Never Smoker   • Smokeless tobacco: Never Used   Substance Use Topics   • Alcohol use: Yes     Alcohol/week: 3.0 standard drinks     Types: 3 Shots of liquor per week     Comment: social use 1 weekly   • Drug use: No     Types: Oxycodone     E-cigarette/Vaping     E-cigarette/Vaping Substances     E-cigarette/Vaping Devices       ,   Medications Prior to Admission   Medication Sig Dispense Refill Last Dose   • ferrous sulfate 325 (65 FE) MG EC tablet   0    • HYDROcodone-acetaminophen (NORCO) 7.5-325  MG per tablet Every 6-8 hours prn severe pain. May 3/day.  This is a 30 day supply 90 tablet 0    • latanoprost (XALATAN) 0.005 % ophthalmic solution Administer 1 drop to both eyes Every Night.      • levothyroxine (SYNTHROID, LEVOTHROID) 50 MCG tablet Take 50 mcg by mouth daily.      • metoprolol tartrate (LOPRESSOR) 25 MG tablet Take 25 mg by mouth daily.      • Multiple Vitamins-Minerals (ICAPS AREDS 2) capsule Take 2 capsules by mouth Daily.      • omeprazole (PriLOSEC) 40 MG capsule Take 40 mg by mouth Daily.      • pravastatin (PRAVACHOL) 40 MG tablet 40 mg Daily.      • zolpidem (AMBIEN) 10 MG tablet Take 1 tablet by mouth Every Night. 30 tablet 0    • levETIRAcetam (KEPPRA) 500 MG tablet Take 1 tablet by mouth 2 (Two) Times a Day. 60 tablet 0    , Scheduled Meds:  ceFAZolin, 2 g, Intravenous, Once  famotidine, 20 mg, Intravenous, Once  [MAR Hold] influenza vaccine, 0.5 mL, Intramuscular, Once  [MAR Hold] levothyroxine, 50 mcg, Oral, Daily  metoprolol tartrate, 25 mg, Oral, Daily  [MAR Hold] pantoprazole, 40 mg, Oral, QAM  [MAR Hold] pravastatin, 40 mg, Oral, Daily  sodium chloride, 3 mL, Intravenous, Q12H  [MAR Hold] zolpidem, 5 mg, Oral, Nightly    , Continuous Infusions:  lactated ringers, 9 mL/hr, Last Rate: 9 mL/hr (10/11/20 0851)    , PRN Meds:  fentanyl  •  [MAR Hold] HYDROcodone-acetaminophen  •  lidocaine PF 1%  •  midazolam  •  [MAR Hold] Morphine  •  [COMPLETED] Insert peripheral IV **AND** [MAR Hold] sodium chloride  •  sodium chloride and Allergies:  Neomycin and Statins    Objective      Vital Signs  Temp:  [97.1 °F (36.2 °C)-99.1 °F (37.3 °C)] 97.7 °F (36.5 °C)  Heart Rate:  [61-98] 61  Resp:  [12-19] 19  BP: (106-160)/(63-96) 123/70    Physical Exam  Physical Exam:  Vital signs reviewed.  Constitutional:  Appears well-developed, well nourished.  HEENT:  Head: normocephalic, atraumatic  Eyes: EOMI, grossly normal.  No scleral icterus.  Neck: Normal range of motion.  Supple, no tracheal  deviation.  Cardiovascular: Regular rate.  Pulmonary: Effort normal, symmetric chest expansion, no labored breathing.  Abdominal: Soft, non distended  Neurological: No gross deficits, oriented to person, place, and time.  Skin: Warm and dry  Psychiatric: Normal mood and affect  Musculoskeletal:  Examination of her right lower extremity shows no clinical malalignment.  Shows no significant shortening or rotation.  Well-healed incisions around the hip and knee.    She is tender diffusely over the midshaft of the femur.  No tenderness over the knee or hip.    Active ankle dorsiflexion and plantarflexion.  Sensation is intact to light touch in sural, saphenous, deep and superficial peroneal, tibial nerve distribution.  Toes are warm and well perfused with brisk capillary refill.      Results Review:   Radiographs and CT of the right lower extremity are independently reviewed.  These show a cemented right hip hemiarthroplasty in place without evidence of failure.  Total knee in place without evidence of loosening or failure.  There is an area in the midshaft of the femur with chronic changes consistent with subacute fracture.  However, there are several lucent lines on both x-ray and CT concerning for acute fracture.  No significant displacement.      Radiology interpretation:  CT PELVIS AND RIGHT HIP WITHOUT IV CONTRAST     HISTORY: Concern for periprosthetic fracture     TECHNIQUE: Radiation dose reduction techniques were utilized, including  automated exposure control and exposure modulation based on body size.   3 mm images were obtained through the pelvis and right hip without IV  contrast.      COMPARISON: Right femur radiographs 10/10/2020     IMPRESSION:  FINDINGS AND IMPRESSION:  Right total hip arthroplasty is present. Well-corticated left inferior  pubic ramus fracture is present and likely chronic.     There is colonic diverticulosis. Small amount of free fluid is present  within the pelvis, nonspecific. The  bladder is not well evaluated due to  extensive streak artifact from right total hip arthroplasty.  Postsurgical changes from removal of right intramedullary rajesh which is  incompletely visualized and right knee radiograph 06/20/2016 are  present. Subtle cortical irregularity is present at the mid femoral  diaphysis anteriorly with what appears to be overlying periosteal new  bone formation. An oblique lucency extends through the femoral diaphysis  in this area and is highly concerning for an acute minimally displaced  fracture. This can be confirmed with MRI of this area if clinically  indicated.        This report was finalized on 10/10/2020 8:12 PM by Dr. Jose Kang M.D.        Assessment/Plan       Closed fracture of shaft of right femur (CMS/HCC)      Assessment & Plan    This is a pleasant 85-year-old female with history of osteoporosis, hypertension, hyperlipidemia, anemia presenting with what appears to be an acute on chronic fracture of the midshaft of the femur.    Based on her history and exam as well as the CT findings, she does appear to have an acute fracture through the old midshaft femur fracture.    In this setting, I have recommended formal open reduction internal fixation to stabilize the femur.        The risks/benefits of surgery, including pain, infection, wound healing problems, hematoma, failure of fixation, need for future procedures, persistent pain,  iatrogenic nerve and/or blood vessel injury resulting in permanent weakness, numbness, or dysfunction, positioning related neuropraxia, mal/nonunion, DVT/PE, cardiac event, and/or death were discussed, and the patient elected to proceed with surgery.         - Please keep NPO for surgery later today  - Appreciate [facility]/medicine team assistance, has been cleared for surgery  - Please hold any formal chemoprophylaxis pending surgery, JEFF/SCDs for now  - NWB CORWIN     Thank you for this consultation, will follow.    Mac Frances Jr,  MD  10/11/20  08:52 EDT

## 2020-10-11 NOTE — ANESTHESIA PROCEDURE NOTES
Airway  Urgency: elective    Date/Time: 10/11/2020 9:15 AM  Airway not difficult    General Information and Staff    Patient location during procedure: OR  CRNA: Acacia Matthews CRNA    Indications and Patient Condition  Indications for airway management: airway protection    Preoxygenated: yes  Mask difficulty assessment: 1 - vent by mask    Final Airway Details  Final airway type: endotracheal airway      Successful airway: ETT  Cuffed: yes   Successful intubation technique: direct laryngoscopy  Endotracheal tube insertion site: oral  Blade: Kulwinder  Blade size: 3  ETT size (mm): 7.0  Cormack-Lehane Classification: grade IIa - partial view of glottis  Placement verified by: chest auscultation and capnometry   Cuff volume (mL): 5  Measured from: lips  ETT/EBT  to lips (cm): 20  Number of attempts at approach: 1  Assessment: lips, teeth, and gum same as pre-op and atraumatic intubation    Additional Comments  Smooth IV induction. Trachea intubated. Cuff up. Ett secured. BEBS. Dentition intact without injury.

## 2020-10-12 ENCOUNTER — APPOINTMENT (OUTPATIENT)
Dept: GENERAL RADIOLOGY | Facility: HOSPITAL | Age: 85
End: 2020-10-12

## 2020-10-12 PROBLEM — M81.0 OSTEOPOROSIS: Status: ACTIVE | Noted: 2020-10-12

## 2020-10-12 PROBLEM — I10 HTN (HYPERTENSION): Status: ACTIVE | Noted: 2020-10-12

## 2020-10-12 PROBLEM — K59.00 CONSTIPATION: Status: ACTIVE | Noted: 2020-10-12

## 2020-10-12 PROBLEM — E03.9 HYPOTHYROIDISM (ACQUIRED): Status: ACTIVE | Noted: 2020-10-12

## 2020-10-12 PROBLEM — D64.9 ANEMIA: Status: ACTIVE | Noted: 2020-10-12

## 2020-10-12 PROBLEM — E53.8 B12 DEFICIENCY: Status: ACTIVE | Noted: 2020-10-12

## 2020-10-12 PROBLEM — E78.5 HLD (HYPERLIPIDEMIA): Status: ACTIVE | Noted: 2020-10-12

## 2020-10-12 LAB
ANION GAP SERPL CALCULATED.3IONS-SCNC: 9.9 MMOL/L (ref 5–15)
BASOPHILS # BLD AUTO: 0.01 10*3/MM3 (ref 0–0.2)
BASOPHILS NFR BLD AUTO: 0.2 % (ref 0–1.5)
BUN SERPL-MCNC: 16 MG/DL (ref 8–23)
BUN/CREAT SERPL: 14.8 (ref 7–25)
CALCIUM SPEC-SCNC: 8.9 MG/DL (ref 8.6–10.5)
CHLORIDE SERPL-SCNC: 102 MMOL/L (ref 98–107)
CO2 SERPL-SCNC: 25.1 MMOL/L (ref 22–29)
CREAT SERPL-MCNC: 1.08 MG/DL (ref 0.57–1)
DEPRECATED RDW RBC AUTO: 40.9 FL (ref 37–54)
EOSINOPHIL # BLD AUTO: 0 10*3/MM3 (ref 0–0.4)
EOSINOPHIL NFR BLD AUTO: 0 % (ref 0.3–6.2)
ERYTHROCYTE [DISTWIDTH] IN BLOOD BY AUTOMATED COUNT: 11.8 % (ref 12.3–15.4)
FOLATE SERPL-MCNC: 12.5 NG/ML (ref 4.78–24.2)
GFR SERPL CREATININE-BSD FRML MDRD: 48 ML/MIN/1.73
GLUCOSE SERPL-MCNC: 123 MG/DL (ref 65–99)
HCT VFR BLD AUTO: 25.8 % (ref 34–46.6)
HGB BLD-MCNC: 8.8 G/DL (ref 12–15.9)
IMM GRANULOCYTES # BLD AUTO: 0.03 10*3/MM3 (ref 0–0.05)
IMM GRANULOCYTES NFR BLD AUTO: 0.5 % (ref 0–0.5)
LYMPHOCYTES # BLD AUTO: 1.53 10*3/MM3 (ref 0.7–3.1)
LYMPHOCYTES NFR BLD AUTO: 23.6 % (ref 19.6–45.3)
MCH RBC QN AUTO: 32.2 PG (ref 26.6–33)
MCHC RBC AUTO-ENTMCNC: 34.1 G/DL (ref 31.5–35.7)
MCV RBC AUTO: 94.5 FL (ref 79–97)
MONOCYTES # BLD AUTO: 0.79 10*3/MM3 (ref 0.1–0.9)
MONOCYTES NFR BLD AUTO: 12.2 % (ref 5–12)
NEUTROPHILS NFR BLD AUTO: 4.13 10*3/MM3 (ref 1.7–7)
NEUTROPHILS NFR BLD AUTO: 63.5 % (ref 42.7–76)
NRBC BLD AUTO-RTO: 0 /100 WBC (ref 0–0.2)
PLATELET # BLD AUTO: 200 10*3/MM3 (ref 140–450)
PMV BLD AUTO: 9.8 FL (ref 6–12)
POTASSIUM SERPL-SCNC: 4.8 MMOL/L (ref 3.5–5.2)
RBC # BLD AUTO: 2.73 10*6/MM3 (ref 3.77–5.28)
SODIUM SERPL-SCNC: 137 MMOL/L (ref 136–145)
VIT B12 BLD-MCNC: 350 PG/ML (ref 211–946)
WBC # BLD AUTO: 6.49 10*3/MM3 (ref 3.4–10.8)

## 2020-10-12 PROCEDURE — 85025 COMPLETE CBC W/AUTO DIFF WBC: CPT | Performed by: ORTHOPAEDIC SURGERY

## 2020-10-12 PROCEDURE — 80048 BASIC METABOLIC PNL TOTAL CA: CPT | Performed by: HOSPITALIST

## 2020-10-12 PROCEDURE — 82607 VITAMIN B-12: CPT | Performed by: HOSPITALIST

## 2020-10-12 PROCEDURE — 25010000002 CYANOCOBALAMIN PER 1000 MCG: Performed by: NURSE PRACTITIONER

## 2020-10-12 PROCEDURE — 97110 THERAPEUTIC EXERCISES: CPT

## 2020-10-12 PROCEDURE — 97535 SELF CARE MNGMENT TRAINING: CPT

## 2020-10-12 PROCEDURE — 25010000002 ENOXAPARIN PER 10 MG: Performed by: ORTHOPAEDIC SURGERY

## 2020-10-12 PROCEDURE — 25010000003 CEFAZOLIN IN DEXTROSE 2-4 GM/100ML-% SOLUTION: Performed by: ORTHOPAEDIC SURGERY

## 2020-10-12 PROCEDURE — 92610 EVALUATE SWALLOWING FUNCTION: CPT

## 2020-10-12 PROCEDURE — 97166 OT EVAL MOD COMPLEX 45 MIN: CPT

## 2020-10-12 PROCEDURE — 73552 X-RAY EXAM OF FEMUR 2/>: CPT

## 2020-10-12 PROCEDURE — 82746 ASSAY OF FOLIC ACID SERUM: CPT | Performed by: HOSPITALIST

## 2020-10-12 RX ORDER — POLYETHYLENE GLYCOL 3350 17 G/17G
17 POWDER, FOR SOLUTION ORAL DAILY PRN
Status: DISCONTINUED | OUTPATIENT
Start: 2020-10-12 | End: 2020-10-13 | Stop reason: HOSPADM

## 2020-10-12 RX ORDER — CYANOCOBALAMIN 1000 UG/ML
1000 INJECTION, SOLUTION INTRAMUSCULAR; SUBCUTANEOUS ONCE
Status: COMPLETED | OUTPATIENT
Start: 2020-10-12 | End: 2020-10-12

## 2020-10-12 RX ORDER — PSEUDOEPHEDRINE HCL 30 MG
100 TABLET ORAL 2 TIMES DAILY
Start: 2020-10-12

## 2020-10-12 RX ORDER — CHOLECALCIFEROL (VITAMIN D3) 125 MCG
1000 CAPSULE ORAL DAILY
Status: DISCONTINUED | OUTPATIENT
Start: 2020-10-13 | End: 2020-10-13 | Stop reason: HOSPADM

## 2020-10-12 RX ORDER — DOCUSATE SODIUM 100 MG/1
100 CAPSULE, LIQUID FILLED ORAL 2 TIMES DAILY
Status: DISCONTINUED | OUTPATIENT
Start: 2020-10-12 | End: 2020-10-13 | Stop reason: HOSPADM

## 2020-10-12 RX ORDER — BISACODYL 10 MG
10 SUPPOSITORY, RECTAL RECTAL DAILY PRN
Status: DISCONTINUED | OUTPATIENT
Start: 2020-10-12 | End: 2020-10-13 | Stop reason: HOSPADM

## 2020-10-12 RX ORDER — HYDROCODONE BITARTRATE AND ACETAMINOPHEN 7.5; 325 MG/1; MG/1
1 TABLET ORAL EVERY 4 HOURS PRN
Qty: 30 TABLET | Refills: 0 | Status: SHIPPED | OUTPATIENT
Start: 2020-10-12 | End: 2020-12-04 | Stop reason: SDUPTHER

## 2020-10-12 RX ORDER — POLYETHYLENE GLYCOL 3350 17 G/17G
17 POWDER, FOR SOLUTION ORAL DAILY PRN
Start: 2020-10-12 | End: 2020-11-24

## 2020-10-12 RX ADMIN — DOCUSATE SODIUM 100 MG: 100 CAPSULE ORAL at 21:06

## 2020-10-12 RX ADMIN — POLYETHYLENE GLYCOL 3350 17 G: 17 POWDER, FOR SOLUTION ORAL at 11:55

## 2020-10-12 RX ADMIN — ZOLPIDEM TARTRATE 5 MG: 5 TABLET ORAL at 21:06

## 2020-10-12 RX ADMIN — METOPROLOL TARTRATE 25 MG: 25 TABLET, FILM COATED ORAL at 08:08

## 2020-10-12 RX ADMIN — HYDROCODONE BITARTRATE AND ACETAMINOPHEN 1 TABLET: 7.5; 325 TABLET ORAL at 08:55

## 2020-10-12 RX ADMIN — PRAVASTATIN SODIUM 40 MG: 40 TABLET ORAL at 08:08

## 2020-10-12 RX ADMIN — LEVOTHYROXINE SODIUM 50 MCG: 50 TABLET ORAL at 08:08

## 2020-10-12 RX ADMIN — DOCUSATE SODIUM 100 MG: 100 CAPSULE ORAL at 11:55

## 2020-10-12 RX ADMIN — PANTOPRAZOLE SODIUM 40 MG: 40 TABLET, DELAYED RELEASE ORAL at 05:42

## 2020-10-12 RX ADMIN — HYDROCODONE BITARTRATE AND ACETAMINOPHEN 1 TABLET: 7.5; 325 TABLET ORAL at 04:24

## 2020-10-12 RX ADMIN — CEFAZOLIN SODIUM 2 G: 2 INJECTION, SOLUTION INTRAVENOUS at 08:08

## 2020-10-12 RX ADMIN — HYDROCODONE BITARTRATE AND ACETAMINOPHEN 1 TABLET: 7.5; 325 TABLET ORAL at 13:07

## 2020-10-12 RX ADMIN — CYANOCOBALAMIN 1000 MCG: 1000 INJECTION, SOLUTION INTRAMUSCULAR at 17:16

## 2020-10-12 RX ADMIN — CEFAZOLIN SODIUM 2 G: 2 INJECTION, SOLUTION INTRAVENOUS at 00:41

## 2020-10-12 RX ADMIN — HYDROCODONE BITARTRATE AND ACETAMINOPHEN 1 TABLET: 7.5; 325 TABLET ORAL at 21:06

## 2020-10-12 RX ADMIN — HYDROCODONE BITARTRATE AND ACETAMINOPHEN 1 TABLET: 7.5; 325 TABLET ORAL at 17:16

## 2020-10-12 RX ADMIN — ENOXAPARIN SODIUM 40 MG: 40 INJECTION SUBCUTANEOUS at 08:08

## 2020-10-12 NOTE — DISCHARGE SUMMARY
Patient Name: Ashli León  : 1935  MRN: [id number]    Date of Admission: 10/10/2020  Date of Discharge:  10/12/2020  Primary Care Physician: Arnaud Monson MD (Tony)      Chief Complaint:   Fall      Discharge Diagnoses     Active Hospital Problems    Diagnosis  POA   • **Closed fracture of shaft of right femur (CMS/HCC) [S72.301A]  Yes   • Hypothyroidism (acquired) [E03.9]  Yes   • Constipation [K59.00]  Yes   • Anemia [D64.9]  Yes   • HTN (hypertension) [I10]  Yes   • HLD (hyperlipidemia) [E78.5]  Yes   • Osteoporosis [M81.0]  Yes   • B12 deficiency [E53.8]  Yes      Resolved Hospital Problems   No resolved problems to display.        Hospital Course     Ms. León is a 85 y.o. female with a history of osteoporosis, gout, anxiety depression, chronic anemia, hypothyroidism, hypertension, gastroesophageal reflux disease, hyperlipidemia who presented to [facility] initially complaining of fall w/RLE pain.  Please see the admitting history and physical for further details.  She was found to have rt femur midshaft fracture on xray and was admitted to the hospital for further evaluation and treatment.  CT imaging confirmed fracture. Ortho was consulted. She was cleared medically and underwent ORIF rt femoral shaft fracture on 10/11/20. She is TTWB currently with a walker. Lovenox was prescribed for VTE ppx per Ortho. Pain is controlled on oral medications. Bowel med have been added; received dose or Miralax today. Hgb is lower today postoperatively with no evidence of bleeding. Recommend H&H on Wednesday. She is afebrile. Cr is a little higher, she has been encouraged to increase po fluids. She has been started on B12 replacement. Medically, she is stable for discharge to SNF.       Day of Discharge     Pain is controlled. Afebrile. Denies nausea, dyspnea, chest pain. Needs to have BM.     Physical Exam:  Temp:  [97.1 °F (36.2 °C)-98.4 °F (36.9 °C)] 98 °F (36.7 °C)  Heart Rate:   [62-89] 75  Resp:  [16-18] 18  BP: (111-151)/(55-72) 151/67  Body mass index is 23.38 kg/m².  Physical Exam  Vitals signs and nursing note reviewed.   Constitutional:       General: She is not in acute distress.  HENT:      Head: Normocephalic.   Eyes:      Conjunctiva/sclera: Conjunctivae normal.   Neck:      Musculoskeletal: Normal range of motion and neck supple.   Cardiovascular:      Rate and Rhythm: Normal rate and regular rhythm.   Pulmonary:      Effort: Pulmonary effort is normal. No respiratory distress.      Breath sounds: Normal breath sounds.   Abdominal:      General: There is no distension.      Palpations: Abdomen is soft.      Comments: Bowel sounds hypoactive   Musculoskeletal:      Right lower leg: No edema.      Left lower leg: No edema.   Skin:     General: Skin is warm and dry.   Neurological:      Mental Status: She is alert and oriented to person, place, and time.   Psychiatric:         Mood and Affect: Mood normal.      Consultants     Consult Orders (all) (From admission, onward)     Start     Ordered    10/11/20 0000  Inpatient Orthopedic Surgery Consult  Once     Specialty:  Orthopedic Surgery  Provider:  Mac Frances Jr., MD    10/10/20 1813    10/10/20 2137  Inpatient Nutrition Consult  Once     Provider:  (Not yet assigned)    10/10/20 2143    10/10/20 1603  Ortho (on-call MD unless specified)  Once     Specialty:  Orthopedic Surgery  Provider:  (Not yet assigned)    10/10/20 1602    10/10/20 1552  [facility] (on-call MD unless specified) Details  Once     Specialty:  Hospitalist  Provider:  (Not yet assigned)    10/10/20 1551              Procedures     Imaging Results (All)     Procedure Component Value Units Date/Time    XR Hip With or Without Pelvis 2 - 3 View Right [[id number]] Collected: 10/10/20 1624     Updated: 10/11/20 1221    Narrative:      PORTABLE CHEST/LUMBAR SPINE COMPLETE/PELVIS AND RIGHT HIP/RIGHT FEMUR     HISTORY: Fall last night with right femoral pain.     COMPARISON:  None.     FINDINGS:  CHEST: A single view of the chest demonstrates the heart to be at the  upper limits of normal in size. There is no evidence of focal infiltrate  or effusion. There are fractures involving the posterolateral aspects of  the left 6th, 7th, and 8th ribs, likely remote. A remote fracture  involving the right 7th rib posterolaterally is also noted.     LUMBAR SPINE: AP, lateral and bilateral oblique views of the lumbar  spine demonstrate a moderate compression deformity involving L2 and a  mild compression deformity involving L1. The compression deformities  appear similar to the MRI examination of 09/28/2018 given differences in  technique. A moderate compression deformity involving T11 is noted, also  present previously. There is mild levoscoliosis with the apex at the  level of L4 with a compensatory dextroscoliosis with the apex at the  level of T12. No convincing acute fracture is identified. There is a  grade 1 retrolisthesis of L5 upon S1 and moderate facet degenerative  disease at L4-L5 and L5-S1. Further evaluation could be performed with  MRI examination of the lumbar spine as indicated.     PELVIS AND RIGHT HIP: An AP view of the pelvis as well as AP and frog  leg lateral views of the right hip demonstrate a total hip prosthesis.  There is no evidence of a proximal femoral fracture or dislocation.     RIGHT FEMUR: Extensive vascular calcification is noted. There is an  oblique plane of lucency appreciated involving the midshaft of the femur  consistent with a remote fracture. The fracture plane is still evident.  There is periosteal reaction appreciated involving the right femur  proximally extending to the level of the fracture anteriorly. No obvious  acute fracture is identified. Further evaluation could be performed with  a CT examination. A right knee prosthesis is noted.     This report was finalized on 10/11/2020 12:18 PM by Dr. Adrian Contreras M.D.       XR Femur 2 View Right  [[id number]] Collected: 10/10/20 1624     Updated: 10/11/20 1221    Narrative:      PORTABLE CHEST/LUMBAR SPINE COMPLETE/PELVIS AND RIGHT HIP/RIGHT FEMUR     HISTORY: Fall last night with right femoral pain.     COMPARISON: None.     FINDINGS:  CHEST: A single view of the chest demonstrates the heart to be at the  upper limits of normal in size. There is no evidence of focal infiltrate  or effusion. There are fractures involving the posterolateral aspects of  the left 6th, 7th, and 8th ribs, likely remote. A remote fracture  involving the right 7th rib posterolaterally is also noted.     LUMBAR SPINE: AP, lateral and bilateral oblique views of the lumbar  spine demonstrate a moderate compression deformity involving L2 and a  mild compression deformity involving L1. The compression deformities  appear similar to the MRI examination of 09/28/2018 given differences in  technique. A moderate compression deformity involving T11 is noted, also  present previously. There is mild levoscoliosis with the apex at the  level of L4 with a compensatory dextroscoliosis with the apex at the  level of T12. No convincing acute fracture is identified. There is a  grade 1 retrolisthesis of L5 upon S1 and moderate facet degenerative  disease at L4-L5 and L5-S1. Further evaluation could be performed with  MRI examination of the lumbar spine as indicated.     PELVIS AND RIGHT HIP: An AP view of the pelvis as well as AP and frog  leg lateral views of the right hip demonstrate a total hip prosthesis.  There is no evidence of a proximal femoral fracture or dislocation.     RIGHT FEMUR: Extensive vascular calcification is noted. There is an  oblique plane of lucency appreciated involving the midshaft of the femur  consistent with a remote fracture. The fracture plane is still evident.  There is periosteal reaction appreciated involving the right femur  proximally extending to the level of the fracture anteriorly. No obvious  acute fracture is  identified. Further evaluation could be performed with  a CT examination. A right knee prosthesis is noted.     This report was finalized on 10/11/2020 12:18 PM by Dr. Adrian Contreras M.D.       XR Spine Lumbar Complete 4+VW [[id number]] Collected: 10/10/20 1624     Updated: 10/11/20 1221    Narrative:      PORTABLE CHEST/LUMBAR SPINE COMPLETE/PELVIS AND RIGHT HIP/RIGHT FEMUR     HISTORY: Fall last night with right femoral pain.     COMPARISON: None.     FINDINGS:  CHEST: A single view of the chest demonstrates the heart to be at the  upper limits of normal in size. There is no evidence of focal infiltrate  or effusion. There are fractures involving the posterolateral aspects of  the left 6th, 7th, and 8th ribs, likely remote. A remote fracture  involving the right 7th rib posterolaterally is also noted.     LUMBAR SPINE: AP, lateral and bilateral oblique views of the lumbar  spine demonstrate a moderate compression deformity involving L2 and a  mild compression deformity involving L1. The compression deformities  appear similar to the MRI examination of 09/28/2018 given differences in  technique. A moderate compression deformity involving T11 is noted, also  present previously. There is mild levoscoliosis with the apex at the  level of L4 with a compensatory dextroscoliosis with the apex at the  level of T12. No convincing acute fracture is identified. There is a  grade 1 retrolisthesis of L5 upon S1 and moderate facet degenerative  disease at L4-L5 and L5-S1. Further evaluation could be performed with  MRI examination of the lumbar spine as indicated.     PELVIS AND RIGHT HIP: An AP view of the pelvis as well as AP and frog  leg lateral views of the right hip demonstrate a total hip prosthesis.  There is no evidence of a proximal femoral fracture or dislocation.     RIGHT FEMUR: Extensive vascular calcification is noted. There is an  oblique plane of lucency appreciated involving the midshaft of the  femur  consistent with a remote fracture. The fracture plane is still evident.  There is periosteal reaction appreciated involving the right femur  proximally extending to the level of the fracture anteriorly. No obvious  acute fracture is identified. Further evaluation could be performed with  a CT examination. A right knee prosthesis is noted.     This report was finalized on 10/11/2020 12:18 PM by Dr. Adrian Contreras M.D.       XR Chest 1 View [[id number]] Collected: 10/10/20 1624     Updated: 10/11/20 1221    Narrative:      PORTABLE CHEST/LUMBAR SPINE COMPLETE/PELVIS AND RIGHT HIP/RIGHT FEMUR     HISTORY: Fall last night with right femoral pain.     COMPARISON: None.     FINDINGS:  CHEST: A single view of the chest demonstrates the heart to be at the  upper limits of normal in size. There is no evidence of focal infiltrate  or effusion. There are fractures involving the posterolateral aspects of  the left 6th, 7th, and 8th ribs, likely remote. A remote fracture  involving the right 7th rib posterolaterally is also noted.     LUMBAR SPINE: AP, lateral and bilateral oblique views of the lumbar  spine demonstrate a moderate compression deformity involving L2 and a  mild compression deformity involving L1. The compression deformities  appear similar to the MRI examination of 09/28/2018 given differences in  technique. A moderate compression deformity involving T11 is noted, also  present previously. There is mild levoscoliosis with the apex at the  level of L4 with a compensatory dextroscoliosis with the apex at the  level of T12. No convincing acute fracture is identified. There is a  grade 1 retrolisthesis of L5 upon S1 and moderate facet degenerative  disease at L4-L5 and L5-S1. Further evaluation could be performed with  MRI examination of the lumbar spine as indicated.     PELVIS AND RIGHT HIP: An AP view of the pelvis as well as AP and frog  leg lateral views of the right hip demonstrate a total hip  prosthesis.  There is no evidence of a proximal femoral fracture or dislocation.     RIGHT FEMUR: Extensive vascular calcification is noted. There is an  oblique plane of lucency appreciated involving the midshaft of the femur  consistent with a remote fracture. The fracture plane is still evident.  There is periosteal reaction appreciated involving the right femur  proximally extending to the level of the fracture anteriorly. No obvious  acute fracture is identified. Further evaluation could be performed with  a CT examination. A right knee prosthesis is noted.     This report was finalized on 10/11/2020 12:18 PM by Dr. Adrian Contreras M.D.       XR Femur 2 View Right [[id number]] Collected: 10/11/20 1055     Updated: 10/11/20 1101    Narrative:      XR FEMUR 2 VW RIGHT-, FL C ARM DURING SURGERY-     INDICATIONS: ORIF     TECHNIQUE: FLUOROSCOPIC ASSISTANCE IN THE OPERATING ROOM.     FINDINGS:     8 intraoperative fluoroscopic spot views were obtained and recorded the  PACS for review, revealing plate and screw hardware fixative hardware at  the right femoral shaft. 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 Please see operative report for full  details.     Fluoroscopy time: 86.6 seconds       Impression:         As described.     This report was finalized on 10/11/2020 10:58 AM by Dr. Jay Puentes M.D.       FL C Arm During Surgery [[id number]] Collected: 10/11/20 1055     Updated: 10/11/20 1101    Narrative:      XR FEMUR 2 VW RIGHT-, FL C ARM DURING SURGERY-     INDICATIONS: ORIF     TECHNIQUE: FLUOROSCOPIC ASSISTANCE IN THE OPERATING ROOM.     FINDINGS:     8 intraoperative fluoroscopic spot views were obtained and recorded the  PACS for review, revealing plate and screw hardware fixative hardware at  the right femoral shaft. 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 Please see operative report for full  details.     Fluoroscopy time: 86.6 seconds        Impression:         As described.     This report was finalized on 10/11/2020 10:58 AM by Dr. Jay Puentes M.D.       CT Pelvis Without Contrast [[id number]] Collected: 10/10/20 1959     Updated: 10/10/20 2015    Narrative:      CT PELVIS AND RIGHT HIP WITHOUT IV CONTRAST     HISTORY: Concern for periprosthetic fracture     TECHNIQUE: Radiation dose reduction techniques were utilized, including  automated exposure control and exposure modulation based on body size.   3 mm images were obtained through the pelvis and right hip without IV  contrast.      COMPARISON: Right femur radiographs 10/10/2020       Impression:      FINDINGS AND IMPRESSION:  Right total hip arthroplasty is present. Well-corticated left inferior  pubic ramus fracture is present and likely chronic.     There is colonic diverticulosis. Small amount of free fluid is present  within the pelvis, nonspecific. The bladder is not well evaluated due to  extensive streak artifact from right total hip arthroplasty.  Postsurgical changes from removal of right intramedullary rajesh which is  incompletely visualized and right knee radiograph 06/20/2016 are  present. Subtle cortical irregularity is present at the mid femoral  diaphysis anteriorly with what appears to be overlying periosteal new  bone formation. An oblique lucency extends through the femoral diaphysis  in this area and is highly concerning for an acute minimally displaced  fracture. This can be confirmed with MRI of this area if clinically  indicated.        This report was finalized on 10/10/2020 8:12 PM by Dr. Jose Kang M.D.       CT Lower Extremity Right Without Contrast [[id number]] Collected: 10/10/20 1959     Updated: 10/10/20 2015    Narrative:      CT PELVIS AND RIGHT HIP WITHOUT IV CONTRAST     HISTORY: Concern for periprosthetic fracture     TECHNIQUE: Radiation dose reduction techniques were utilized, including  automated exposure control and exposure modulation based on  body size.   3 mm images were obtained through the pelvis and right hip without IV  contrast.      COMPARISON: Right femur radiographs 10/10/2020       Impression:      FINDINGS AND IMPRESSION:  Right total hip arthroplasty is present. Well-corticated left inferior  pubic ramus fracture is present and likely chronic.     There is colonic diverticulosis. Small amount of free fluid is present  within the pelvis, nonspecific. The bladder is not well evaluated due to  extensive streak artifact from right total hip arthroplasty.  Postsurgical changes from removal of right intramedullary rajesh which is  incompletely visualized and right knee radiograph 06/20/2016 are  present. Subtle cortical irregularity is present at the mid femoral  diaphysis anteriorly with what appears to be overlying periosteal new  bone formation. An oblique lucency extends through the femoral diaphysis  in this area and is highly concerning for an acute minimally displaced  fracture. This can be confirmed with MRI of this area if clinically  indicated.        This report was finalized on 10/10/2020 8:12 PM by Dr. Jose Kang M.D.             Pertinent Labs     Results from last 7 days   Lab Units 10/12/20  0508 10/11/20  0416 10/10/20  1605   WBC 10*3/mm3 6.49 5.35 5.41   HEMOGLOBIN g/dL 8.8* 10.5* 11.8*   PLATELETS 10*3/mm3 200 221 242     Results from last 7 days   Lab Units 10/12/20  0508 10/11/20  0416 10/10/20  2057 10/10/20  1605   SODIUM mmol/L 137 140 142 143   POTASSIUM mmol/L 4.8 3.4* 4.3 3.6   CHLORIDE mmol/L 102 105 105 104   CO2 mmol/L 25.1 25.6 24.2 28.3   BUN mg/dL 16 13 12 13   CREATININE mg/dL 1.08* 0.83 0.82 0.90   GLUCOSE mg/dL 123* 103* 106* 117*   Estimated Creatinine Clearance: 36.7 mL/min (A) (by C-G formula based on SCr of 1.08 mg/dL (H)).  Results from last 7 days   Lab Units 10/10/20  1605   ALBUMIN g/dL 4.50   BILIRUBIN mg/dL 1.4*   ALK PHOS U/L 51   AST (SGOT) U/L 16   ALT (SGPT) U/L 13     Results from last 7 days    Lab Units 10/12/20  0508 10/11/20  0416 10/10/20  2057 10/10/20  1605   CALCIUM mg/dL 8.9 9.1 9.8 9.8   ALBUMIN g/dL  --   --   --  4.50               Invalid input(s): LDLCALC        Test Results Pending at Discharge       Discharge Details        Discharge Medications      New Medications      Instructions Start Date   cyanocobalamin 1000 MCG tablet  Commonly known as: VITAMIN B-12   1,000 mcg, Oral, Daily   Start Date: October 13, 2020     docusate sodium 100 MG capsule   100 mg, Oral, 2 Times Daily      enoxaparin 40 MG/0.4ML solution syringe  Commonly known as: LOVENOX   40 mg, Subcutaneous, Daily   Start Date: October 13, 2020     polyethylene glycol 17 g packet  Commonly known as: MIRALAX   17 g, Oral, Daily PRN         Continue These Medications      Instructions Start Date   ferrous sulfate 325 (65 FE) MG EC tablet   No dose, route, or frequency recorded.      ICaps Areds 2 capsule   2 capsules, Oral, Daily      latanoprost 0.005 % ophthalmic solution  Commonly known as: XALATAN   1 drop, Both Eyes, Nightly      levothyroxine 50 MCG tablet  Commonly known as: SYNTHROID, LEVOTHROID   50 mcg, Oral, Daily      metoprolol tartrate 25 MG tablet  Commonly known as: LOPRESSOR   25 mg, Oral, Daily      omeprazole 40 MG capsule  Commonly known as: priLOSEC   40 mg, Oral, Daily      pravastatin 40 MG tablet  Commonly known as: PRAVACHOL   40 mg, Daily         Stop These Medications    HYDROcodone-acetaminophen 7.5-325 MG per tablet  Commonly known as: NORCO     levETIRAcetam 500 MG tablet  Commonly known as: KEPPRA     zolpidem 10 MG tablet  Commonly known as: AMBIEN            Allergies   Allergen Reactions   • Neomycin Swelling   • Statins Myalgia     Not all statins         Discharge Disposition:  Skilled Nursing Facility (DC - External)    Discharge Diet:  Diet Order   Procedures   • Diet Regular       Discharge Activity:   Activity Instructions     Activity as Tolerated            CODE STATUS:    Code Status  and Medical Interventions:   Ordered at: 10/10/20 1808     Level Of Support Discussed With:    Patient     Code Status:    CPR     Medical Interventions (Level of Support Prior to Arrest):    Full       Future Appointments   Date Time Provider Department Center   12/4/2020  1:40 PM Radha Orta APRN [facility]      Contact information for follow-up providers     Arnaud Monson MD (Tony) Follow up in 2 week(s).    Specialties: Internal Medicine, Emergency Medicine  Contact information:  [address]  [facility] [address] [phone]             Mac Frances Jr., MD. Schedule an appointment as soon as possible for a visit in 2 week(s).    Specialty: Orthopedic Surgery  Contact information:  [address]  [address]  [facility] [id number] [phone]                   Contact information for after-discharge care     Destination     [facility] .    Service: Skilled Nursing  Contact information:  [address] [place] [address] [phone]                             Time Spent on Discharge:  Greater than 30 minutes      TANIA Finnegan  [facility]  10/12/20  15:48 EDT

## 2020-10-12 NOTE — PROGRESS NOTES
Name: Ashli León ADMIT: 10/10/2020   : 1935  PCP: Arnaud Monson MD (Tony)    MRN: [id number] LOS: 1 days   AGE/SEX: 85 y.o. female  ROOM: [facility]     Subjective   Subjective   Requesting stool softener. Pain controlled with medications. Afebrile. Denies dyspnea, chest pain, difficulty urinating.    Review of Systems   Constitutional: Negative for fever.   HENT: Negative for congestion.    Respiratory: Negative for shortness of breath.    Cardiovascular: Negative for chest pain.   Gastrointestinal: Positive for constipation. Negative for abdominal pain and nausea.   Genitourinary: Negative for difficulty urinating.   Musculoskeletal: Positive for gait problem.   Skin: Negative for rash.   Neurological: Negative for dizziness and headaches.   Psychiatric/Behavioral: Negative for sleep disturbance.        Objective   Objective   Vital Signs  Temp:  [97.1 °F (36.2 °C)-98.4 °F (36.9 °C)] 98.2 °F (36.8 °C)  Heart Rate:  [62-89] 62  Resp:  [16-18] 18  BP: (111-144)/(55-72) 124/64  SpO2:  [95 %-98 %] 95 %  on   ;   Device (Oxygen Therapy): room air  Body mass index is 23.38 kg/m².  Physical Exam  Vitals signs and nursing note reviewed.   Constitutional:       General: She is not in acute distress.  HENT:      Head: Normocephalic.   Eyes:      Conjunctiva/sclera: Conjunctivae normal.   Neck:      Musculoskeletal: Normal range of motion and neck supple.   Cardiovascular:      Rate and Rhythm: Normal rate and regular rhythm.   Pulmonary:      Effort: Pulmonary effort is normal. No respiratory distress.      Breath sounds: Normal breath sounds.   Abdominal:      General: There is no distension.      Palpations: Abdomen is soft.      Comments: Bowel sounds hypoactive   Musculoskeletal:      Right lower leg: No edema.      Left lower leg: No edema.   Skin:     General: Skin is warm and dry.   Neurological:      Mental Status: She is alert and oriented to person, place, and time.   Psychiatric:          Mood and Affect: Mood normal.         Results Review     I reviewed the patient's new clinical results.  Results from last 7 days   Lab Units 10/12/20  0508 10/11/20  0416 10/10/20  1605   WBC 10*3/mm3 6.49 5.35 5.41   HEMOGLOBIN g/dL 8.8* 10.5* 11.8*   PLATELETS 10*3/mm3 200 221 242     Results from last 7 days   Lab Units 10/12/20  0508 10/11/20  0416 10/10/20  2057 10/10/20  1605   SODIUM mmol/L 137 140 142 143   POTASSIUM mmol/L 4.8 3.4* 4.3 3.6   CHLORIDE mmol/L 102 105 105 104   CO2 mmol/L 25.1 25.6 24.2 28.3   BUN mg/dL 16 13 12 13   CREATININE mg/dL 1.08* 0.83 0.82 0.90   GLUCOSE mg/dL 123* 103* 106* 117*   Estimated Creatinine Clearance: 36.7 mL/min (A) (by C-G formula based on SCr of 1.08 mg/dL (H)).  Results from last 7 days   Lab Units 10/10/20  1605   ALBUMIN g/dL 4.50   BILIRUBIN mg/dL 1.4*   ALK PHOS U/L 51   AST (SGOT) U/L 16   ALT (SGPT) U/L 13     Results from last 7 days   Lab Units 10/12/20  0508 10/11/20  0416 10/10/20  2057 10/10/20  1605   CALCIUM mg/dL 8.9 9.1 9.8 9.8   ALBUMIN g/dL  --   --   --  4.50       COVID19   Date Value Ref Range Status   10/10/2020 Not Detected Not Detected - Ref. Range Final   06/08/2020 Not Detected Not Detected - Ref. Range Final     SARS-CoV-2 PCR   Date Value Ref Range Status   09/12/2020 Not Detected Not Detected Final     Comment:     Nucleic acid specific to SARS-CoV-2 (COVID-19) virus was not detected inthis sample by the TaqPath (TM) COVID-19 Combo Kit.SARS-CoV-2 (COVID-19) nucleic acid testing performed using [employer] Aptima (R) SARS-CoV-2 Assay or [employer] TaqPath   (TM)COVID-19 Combo Kit as indicated above under Emergency Use Authorization (EUA) from the FDA. Aptima (R) and TaqPath (TM) test performancecharacteristics verified by [employer] in accordance with the FDAs Guidance Document (Policy for Diagnostic   Tests for Coronavirus Disease-2019during the Public Health Emergency) issued on March 16, 2020. The laboratory is regulated  under CLIA as qualified to perform high-complexity testingUnless otherwise noted, all testing was performed at [facility],   CLIA No. [id number], [place] State Licensee No. [id number]     Hemoglobin A1C   Date/Time Value Ref Range Status   10/11/2020 0416 5.40 4.80 - 5.60 % Final       XR Femur 2 View Right  PORTABLE CHEST/LUMBAR SPINE COMPLETE/PELVIS AND RIGHT HIP/RIGHT FEMUR     HISTORY: Fall last night with right femoral pain.     COMPARISON: None.     FINDINGS:  CHEST: A single view of the chest demonstrates the heart to be at the  upper limits of normal in size. There is no evidence of focal infiltrate  or effusion. There are fractures involving the posterolateral aspects of  the left 6th, 7th, and 8th ribs, likely remote. A remote fracture  involving the right 7th rib posterolaterally is also noted.     LUMBAR SPINE: AP, lateral and bilateral oblique views of the lumbar  spine demonstrate a moderate compression deformity involving L2 and a  mild compression deformity involving L1. The compression deformities  appear similar to the MRI examination of 09/28/2018 given differences in  technique. A moderate compression deformity involving T11 is noted, also  present previously. There is mild levoscoliosis with the apex at the  level of L4 with a compensatory dextroscoliosis with the apex at the  level of T12. No convincing acute fracture is identified. There is a  grade 1 retrolisthesis of L5 upon S1 and moderate facet degenerative  disease at L4-L5 and L5-S1. Further evaluation could be performed with  MRI examination of the lumbar spine as indicated.     PELVIS AND RIGHT HIP: An AP view of the pelvis as well as AP and frog  leg lateral views of the right hip demonstrate a total hip prosthesis.  There is no evidence of a proximal femoral fracture or dislocation.     RIGHT FEMUR: Extensive vascular calcification is noted. There is an  oblique plane of lucency appreciated involving the midshaft of the femur  consistent  with a remote fracture. The fracture plane is still evident.  There is periosteal reaction appreciated involving the right femur  proximally extending to the level of the fracture anteriorly. No obvious  acute fracture is identified. Further evaluation could be performed with  a CT examination. A right knee prosthesis is noted.     This report was finalized on 10/11/2020 12:18 PM by Dr. Adrian Contreras M.D.     XR Hip With or Without Pelvis 2 - 3 View Right  PORTABLE CHEST/LUMBAR SPINE COMPLETE/PELVIS AND RIGHT HIP/RIGHT FEMUR     HISTORY: Fall last night with right femoral pain.     COMPARISON: None.     FINDINGS:  CHEST: A single view of the chest demonstrates the heart to be at the  upper limits of normal in size. There is no evidence of focal infiltrate  or effusion. There are fractures involving the posterolateral aspects of  the left 6th, 7th, and 8th ribs, likely remote. A remote fracture  involving the right 7th rib posterolaterally is also noted.     LUMBAR SPINE: AP, lateral and bilateral oblique views of the lumbar  spine demonstrate a moderate compression deformity involving L2 and a  mild compression deformity involving L1. The compression deformities  appear similar to the MRI examination of 09/28/2018 given differences in  technique. A moderate compression deformity involving T11 is noted, also  present previously. There is mild levoscoliosis with the apex at the  level of L4 with a compensatory dextroscoliosis with the apex at the  level of T12. No convincing acute fracture is identified. There is a  grade 1 retrolisthesis of L5 upon S1 and moderate facet degenerative  disease at L4-L5 and L5-S1. Further evaluation could be performed with  MRI examination of the lumbar spine as indicated.     PELVIS AND RIGHT HIP: An AP view of the pelvis as well as AP and frog  leg lateral views of the right hip demonstrate a total hip prosthesis.  There is no evidence of a proximal femoral fracture or  dislocation.     RIGHT FEMUR: Extensive vascular calcification is noted. There is an  oblique plane of lucency appreciated involving the midshaft of the femur  consistent with a remote fracture. The fracture plane is still evident.  There is periosteal reaction appreciated involving the right femur  proximally extending to the level of the fracture anteriorly. No obvious  acute fracture is identified. Further evaluation could be performed with  a CT examination. A right knee prosthesis is noted.     This report was finalized on 10/11/2020 12:18 PM by Dr. Adrian Contreras M.D.     XR Chest 1 View  PORTABLE CHEST/LUMBAR SPINE COMPLETE/PELVIS AND RIGHT HIP/RIGHT FEMUR     HISTORY: Fall last night with right femoral pain.     COMPARISON: None.     FINDINGS:  CHEST: A single view of the chest demonstrates the heart to be at the  upper limits of normal in size. There is no evidence of focal infiltrate  or effusion. There are fractures involving the posterolateral aspects of  the left 6th, 7th, and 8th ribs, likely remote. A remote fracture  involving the right 7th rib posterolaterally is also noted.     LUMBAR SPINE: AP, lateral and bilateral oblique views of the lumbar  spine demonstrate a moderate compression deformity involving L2 and a  mild compression deformity involving L1. The compression deformities  appear similar to the MRI examination of 09/28/2018 given differences in  technique. A moderate compression deformity involving T11 is noted, also  present previously. There is mild levoscoliosis with the apex at the  level of L4 with a compensatory dextroscoliosis with the apex at the  level of T12. No convincing acute fracture is identified. There is a  grade 1 retrolisthesis of L5 upon S1 and moderate facet degenerative  disease at L4-L5 and L5-S1. Further evaluation could be performed with  MRI examination of the lumbar spine as indicated.     PELVIS AND RIGHT HIP: An AP view of the pelvis as well as AP and  frog  leg lateral views of the right hip demonstrate a total hip prosthesis.  There is no evidence of a proximal femoral fracture or dislocation.     RIGHT FEMUR: Extensive vascular calcification is noted. There is an  oblique plane of lucency appreciated involving the midshaft of the femur  consistent with a remote fracture. The fracture plane is still evident.  There is periosteal reaction appreciated involving the right femur  proximally extending to the level of the fracture anteriorly. No obvious  acute fracture is identified. Further evaluation could be performed with  a CT examination. A right knee prosthesis is noted.     This report was finalized on 10/11/2020 12:18 PM by Dr. Adrian Contreras M.D.     XR Spine Lumbar Complete 4+VW  PORTABLE CHEST/LUMBAR SPINE COMPLETE/PELVIS AND RIGHT HIP/RIGHT FEMUR     HISTORY: Fall last night with right femoral pain.     COMPARISON: None.     FINDINGS:  CHEST: A single view of the chest demonstrates the heart to be at the  upper limits of normal in size. There is no evidence of focal infiltrate  or effusion. There are fractures involving the posterolateral aspects of  the left 6th, 7th, and 8th ribs, likely remote. A remote fracture  involving the right 7th rib posterolaterally is also noted.     LUMBAR SPINE: AP, lateral and bilateral oblique views of the lumbar  spine demonstrate a moderate compression deformity involving L2 and a  mild compression deformity involving L1. The compression deformities  appear similar to the MRI examination of 09/28/2018 given differences in  technique. A moderate compression deformity involving T11 is noted, also  present previously. There is mild levoscoliosis with the apex at the  level of L4 with a compensatory dextroscoliosis with the apex at the  level of T12. No convincing acute fracture is identified. There is a  grade 1 retrolisthesis of L5 upon S1 and moderate facet degenerative  disease at L4-L5 and L5-S1. Further evaluation  could be performed with  MRI examination of the lumbar spine as indicated.     PELVIS AND RIGHT HIP: An AP view of the pelvis as well as AP and frog  leg lateral views of the right hip demonstrate a total hip prosthesis.  There is no evidence of a proximal femoral fracture or dislocation.     RIGHT FEMUR: Extensive vascular calcification is noted. There is an  oblique plane of lucency appreciated involving the midshaft of the femur  consistent with a remote fracture. The fracture plane is still evident.  There is periosteal reaction appreciated involving the right femur  proximally extending to the level of the fracture anteriorly. No obvious  acute fracture is identified. Further evaluation could be performed with  a CT examination. A right knee prosthesis is noted.     This report was finalized on 10/11/2020 12:18 PM by Dr. Adrian Contreras M.D.     FL C Arm During Surgery  Narrative: XR FEMUR 2 VW RIGHT-, FL C ARM DURING SURGERY-     INDICATIONS: ORIF     TECHNIQUE: FLUOROSCOPIC ASSISTANCE IN THE OPERATING ROOM.     FINDINGS:     8 intraoperative fluoroscopic spot views were obtained and recorded the  PACS for review, revealing plate and screw hardware fixative hardware at  the right femoral shaft. 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 Please see operative report for full  details.     Fluoroscopy time: 86.6 seconds     Impression:    As described.     This report was finalized on 10/11/2020 10:58 AM by Dr. Jay Puentes M.D.     XR Femur 2 View Right  Narrative: XR FEMUR 2 VW RIGHT-, FL C ARM DURING SURGERY-     INDICATIONS: ORIF     TECHNIQUE: FLUOROSCOPIC ASSISTANCE IN THE OPERATING ROOM.     FINDINGS:     8 intraoperative fluoroscopic spot views were obtained and recorded the  PACS for review, revealing plate and screw hardware fixative hardware at  the right femoral shaft. 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 Please see operative report  for full  details.     Fluoroscopy time: 86.6 seconds     Impression:    As described.     This report was finalized on 10/11/2020 10:58 AM by Dr. Jay Puentes M.D.       Scheduled Medications  cyanocobalamin, 1,000 mcg, Intramuscular, Once  docusate sodium, 100 mg, Oral, BID  enoxaparin, 40 mg, Subcutaneous, Daily  influenza vaccine, 0.5 mL, Intramuscular, Once  levothyroxine, 50 mcg, Oral, Daily  metoprolol tartrate, 25 mg, Oral, Daily  pantoprazole, 40 mg, Oral, QAM  pravastatin, 40 mg, Oral, Daily  [START ON 10/13/2020] vitamin B-12, 1,000 mcg, Oral, Daily  zolpidem, 5 mg, Oral, Nightly    Infusions  lactated ringers, 9 mL/hr, Last Rate: Stopped (10/11/20 1509)    Diet  Diet Regular       Assessment/Plan     Active Hospital Problems    Diagnosis  POA   • **Closed fracture of shaft of right femur (CMS/HCC) [S72.301A]  Yes   • Hypothyroidism (acquired) [E03.9]  Yes   • Constipation [K59.00]  Yes   • Anemia [D64.9]  Yes   • HTN (hypertension) [I10]  Yes   • HLD (hyperlipidemia) [E78.5]  Yes   • Osteoporosis [M81.0]  Yes   • B12 deficiency [E53.8]  Yes      Resolved Hospital Problems   No resolved problems to display.       85 y.o. female admitted with Closed fracture of shaft of right femur (CMS/HCC).    · Continue OT/PT; plan SNF vs home at discharge  · Add bowel meds   · BP stable  · Hgb down to 8.8 today; no evidence of active bleeding. B12 350; replete.   · Electrolytes stable; Cr 1.08, encourage increased po fluids  · Follow up labs tomorrow    · Lovenox 40 mg SC daily for DVT prophylaxis.  · Full code.  · Discussed with patient and CCP.  · Anticipate discharge snf vs home tomorrow.      TANIA Finnegan  [facility]  10/12/20  14:09 EDT    Patient was wearing facemask when I entered the room and throughout our encounter.  I wore protective equipment throughout this patient encounter including a face mask, gloves and protective eyewear.  Hand hygiene was performed before  donning protective equipment and after removal when leaving the room.

## 2020-10-12 NOTE — PLAN OF CARE
Pt presents to [facility] 2/2 to a fall at home resulting in a ORIF right femur. Pt lives alone at home with a ramp to enter. Today, pt up in bedside chair. Mod A LB dressing. She stood with Min A and transferred into bathroom with CGA and slow pacing. Pt completed a commode transfer and toileting with Min A. Increased fatigue reported and she returned to bed with CGA. Supervision to transfer back to supine. Pt and OT discussed d/c planning - pt requesting to d/c home with her sons 24/7 assistance. This OT recommends SNF to increase her independence and safety but if she were to d/c home she would need 2/47 assist and HH.    Patient was wearing a face mask during this therapy encounter. Therapist used appropriate personal protective equipment including mask, goggles and gloves. Mask used was standard procedure mask. Appropriate PPE was worn during the entire therapy session. Hand hygiene was completed before and after therapy session. Patient is not in enhanced droplet precautions.

## 2020-10-12 NOTE — PLAN OF CARE
Problem: Adult Inpatient Plan of Care  Goal: Plan of Care Review  Outcome: Ongoing, Progressing  Flowsheets (Taken 10/12/2020 6289)  Plan of Care Reviewed With: patient  Outcome Summary:   Bedside swallow evaluation completed this date. Pt appears at baseline with oral stage appearing WFL and no suspected pharyngeal impairment or s/s aspiration.   Recommend: initiation of regular and thin liquid diet, medications whole with thin liquids, and general compensations as discussed with pt - small bites/sis, upright posture while eating, monitor for s/s aspiration.   SLP to sign off as no skilled needs were identified. Please re-consult if difficulties arise.

## 2020-10-12 NOTE — PLAN OF CARE
See below.    Problem: Adult Inpatient Plan of Care  Goal: Plan of Care Review  Outcome: Met  Flowsheets  Taken 10/12/2020 1630 by Abundio Everett, RN  Progress: improving  Outcome Summary: 85/F POD#1 right femur ORIF.  ALOx4, RA, lungs clear, BS hypoactive but improving, voiding independently.  Up x1 BRP with walker/gait belt.  2+ pedal pulses noted, no c/o numbness/tingling in operative extremity, dressing CDI.  Pain well-controlled with PO pain meds only.  PIV removed.  D/C to SNF today.  Taken 10/12/2020 1334 by Sherine Smith, MS CCC-SLP  Plan of Care Reviewed With: patient

## 2020-10-12 NOTE — PROGRESS NOTES
Continued Stay Note  [facility]     Patient Name: Ashli León  MRN: [id number]  Today's Date: 10/12/2020    Admit Date: 10/10/2020    Discharge Plan     Row Name 10/12/20 1730       Plan    Plan  [facility] -- Accepted    Patient/Family in Agreement with Plan  yes    Plan Comments  Spoke with [facility] who is able to change the transportation time today from 1700 to 1830. Plan is for [facility] to transport the patient to [facility] today at 1830. CCP following.    Final Discharge Disposition Code  03 - skilled nursing facility (SNF)    Final Note  Pt to d/c to [facility]. [facility] to transport.    Row Name 10/12/20 1526       Plan    Plan  [facility] -- Accepted    Patient/Family in Agreement with Plan  yes    Plan Comments  Spoke with patient, verified current information, and CCP role explained. Patient states she has family support at home. She's IADL, and has no history with DME/HH. Pt states she's been to [facility] several time. CCP discussed benefits of SNF at d/, and the patient requests a referral to [facility]. Referral placed; spoke with [facility] who can accept the patient and will have a bed for her today. Scheduled a [facility] to transport the patient today at 1700. Updated the patient who is agreeable with entire d/c plan. CCP also called and updated the patient's daughter-in-law/Samina Reid who is agreeable with entire d/c plan. Plan is for pt to d/c today to [facility], and [facility] will transport the pt today at 1700. No other needs identified. Packet in CCP office. CCP following.        Discharge Codes    No documentation.       Expected Discharge Date and Time     Expected Discharge Date Expected Discharge Time    Oct 12, 2020             Jinny Arauz RN

## 2020-10-12 NOTE — THERAPY TREATMENT NOTE
Patient Name: Ashli León  : 1935    MRN: [id number]                              Today's Date: 10/12/2020       Admit Date: 10/10/2020    Visit Dx:     ICD-10-CM ICD-9-CM   1. Closed fracture of shaft of right femur, unspecified fracture morphology, initial encounter (CMS/HCC)  S72.301A 821.01     Patient Active Problem List   Diagnosis   • History of hip surgery   • Primary osteoarthritis of both knees   • Arthritis of right knee   • Aftercare following right knee joint replacement surgery   • Status post total right knee replacement   • History of knee replacement, total   • History of total knee arthroplasty   • Chronic pain of left knee   • Arthritis of left knee   • Closed right hip fracture (CMS/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)     Past Medical History:   Diagnosis Date   • Acid reflux    • Back pain    • Colitis     history of   • Diarrhea    • Disease of thyroid gland    • Diverticular disease    • Glaucoma    • Gout     - right big toe   • High blood pressure    • High cholesterol    • History of vertebral compression fracture     multiple   • Hyponatremia    • Knee pain    • Low back pain    • Macular degeneration    • Mitral regurgitation 2016    Mild to moderate per 2-D echocardiogram   • Peripheral neuropathy    • Primary osteoarthritis of both knees 2016   • Renal lesion     on mri   • Rheumatoid arthritis (CMS/[facility])      Past Surgical History:   Procedure Laterality Date   • APPENDECTOMY     • CATARACT EXTRACTION Bilateral    • FEMUR SURGERY Right     had fracture and has rajesh    •  HEMORRHOIDECTOMY     • HIP HEMIARTHROPLASTY Right 1/3/2017    Procedure: HIP HEMIARTHROPLASTY with IM rajesh and 2 screw hardware removal antibiotic cement;  Surgeon: Mirza Whittington MD;  Location:  [facility] OR;  Service:    • HIP SURGERY Right 1999    hx fracture   • LEG SURGERY Right 12/1/2017    Procedure: GLUTEAL TENDON REPAIR-minimus and medius;  Surgeon: Mirza Whittington MD;  Location:  [facility] OR;  Service:    • TOTAL KNEE ARTHROPLASTY Right 6/22/2016    Procedure: RT TOTAL KNEE ARTHROPLASTY WITH FINN NAVIGATION;  Surgeon: Adrian Swenson MD;  [facility];  Service:      General Information     Row Name 10/12/20 0821          Physical Therapy Time and Intention    Document Type  therapy note (daily note)  -     Mode of Treatment  physical therapy  -     Row Name 10/12/20 0821          Safety Issues, Functional Mobility    Impairments Affecting Function (Mobility)  balance;endurance/activity tolerance;pain;range of motion (ROM);strength  -PH       User Key  (r) = Recorded By, (t) = Taken By, (c) = Cosigned By    Initials Name Provider Type    PH Elva Santos PTA Physical Therapy Assistant        Mobility     Row Name 10/12/20 0821          Bed Mobility    Bed Mobility  supine-sit  -PH     Supine-Sit [place] (Bed Mobility)  contact guard;supervision;verbal cues  -PH     Assistive Device (Bed Mobility)  head of bed elevated  -PH     Comment (Bed Mobility)  Pt assisted for gown change after spilling coffee prior to session  -     Row Name 10/12/20 0821          Sit-Stand Transfer    Sit-Stand [place] (Transfers)  contact guard;verbal cues;1 person assist  -PH     Assistive Device (Sit-Stand Transfers)  walker, front-wheeled  -PH     Row Name 10/12/20 0821          Gait/Stairs (Locomotion)    [place] Level (Gait)  contact guard;2 person assist;verbal cues  -PH     Assistive Device (Gait)  walker, front-wheeled  -PH     Distance in Feet (Gait)  8' w/ pt toe touch wt bearing and  cued for NWB  -PH     Deviations/Abnormal Patterns (Gait)  gait speed decreased;sarah decreased;stride length decreased  -PH     Row Name 10/12/20 0821          Mobility    Extremity Weight-bearing Status  right lower extremity  -PH     Right Lower Extremity (Weight-bearing Status)  non weight-bearing (NWB)  -PH       User Key  (r) = Recorded By, (t) = Taken By, (c) = Cosigned By    Initials Name Provider Type     Elva Santos PTA Physical Therapy Assistant        Obj/Interventions     Row Name 10/12/20 0823          Motor Skills    Therapeutic Exercise  other (see comments) LLE: abd/add, AP, HS, QS, GS, LAQ, RLE: hip abd/add, AP  -PH       User Key  (r) = Recorded By, (t) = Taken By, (c) = Cosigned By    Initials Name Provider Type     Elva Santos PTA Physical Therapy Assistant        Goals/Plan    No documentation.       Clinical Impression     Row Name 10/12/20 0825          Pain    Additional Documentation  Pain Scale: Numbers Pre/Post-Treatment (Group)  -PH     Row Name 10/12/20 0825          Pain Scale: Numbers Pre/Post-Treatment    Pretreatment Pain Rating  7/10  -PH     Pain Location - Side  Right  -PH     Pain Location - Orientation  incisional  -PH     Pain Location  extremity  -PH     Pain Intervention(s)  Cold pack;Repositioned;Rest  -PH     Row Name 10/12/20 0825          Plan of Care Review    Plan of Care Reviewed With  patient  -PH     Progress  improving  -PH     Outcome Summary  Pt improving and req less assist today. Pt sat up to EOB and stood w/ CGA. Pt w/ diff at times w/ NWB status. Pt amb 8' req CGA / fww. Pt would benefit from SNF after dc to improve her strength, mobility, and endurance.  -PH     Row Name 10/12/20 0825          Positioning and Restraints    Pre-Treatment Position  in bed  -PH     Post Treatment Position  chair  -PH     In Chair  reclined;call light within reach;encouraged to call for assist;exit alarm on  -PH       User Key  (r) = Recorded By,  (t) = Taken By, (c) = Cosigned By    Initials Name Provider Type     Elva Santos PTA Physical Therapy Assistant        Outcome Measures     Row Name 10/12/20 0828          How much help from another person do you currently need...    Turning from your back to your side while in flat bed without using bedrails?  3  -PH     Moving from lying on back to sitting on the side of a flat bed without bedrails?  3  -PH     Moving to and from a bed to a chair (including a wheelchair)?  2  -PH     Standing up from a chair using your arms (e.g., wheelchair, bedside chair)?  3  -PH     Climbing 3-5 steps with a railing?  1  -PH     To walk in hospital room?  3  -PH     AM-PAC 6 Clicks Score (PT)  15  -PH     Row Name 10/12/20 0828          Functional Assessment    Outcome Measure Options  AM-PAC 6 Clicks Basic Mobility (PT)  -PH       User Key  (r) = Recorded By, (t) = Taken By, (c) = Cosigned By    Initials Name Provider Type     Elva Santos PTA Physical Therapy Assistant        Physical Therapy Education                 Title: PT OT SLP Therapies (Done)     Topic: Physical Therapy (Done)     Point: Mobility training (Done)     Learning Progress Summary           Patient Acceptance, E,D, VU,DU,NR by  at 10/12/2020 0828    Acceptance, E,TB,D, NR by KB at 10/12/2020 0205    Acceptance, E,TB, VU,NR by CS at 10/11/2020 1556                   Point: Home exercise program (Done)     Learning Progress Summary           Patient Acceptance, E,D, VU,DU,NR by  at 10/12/2020 0828    Acceptance, E,TB,D, NR by KB at 10/12/2020 0205    Acceptance, E,TB, VU,NR by CS at 10/11/2020 1556                   Point: Body mechanics (Done)     Learning Progress Summary           Patient Acceptance, E,D, VU,DU,NR by  at 10/12/2020 0828    Acceptance, E,TB,D, NR by KB at 10/12/2020 0205    Acceptance, E,TB, VU,NR by CS at 10/11/2020 1556                   Point: Precautions (Done)     Learning Progress Summary            Patient Acceptance, E,D, VU,DU,NR by  at 10/12/2020 0828    Acceptance, E,TB,D, NR by  at 10/12/2020 0205    Acceptance, E,TB, VU,NR by  at 10/11/2020 1556                               User Key     Initials Effective Dates Name Provider Type Discipline    KB 06/16/16 -  Pat Marcelo RN Registered Nurse Nurse     05/14/18 -  Gopi Mattson, PT Physical Therapist PT     08/20/19 -  Elva Santos PTA Physical Therapy Assistant PT              PT Recommendation and Plan     Plan of Care Reviewed With: patient  Progress: improving  Outcome Summary: Pt improving and req less assist today. Pt sat up to EOB and stood w/ CGA. Pt w/ diff at times w/ NWB status. Pt amb 8' req CGA / fww. Pt would benefit from SNF after dc to improve her strength, mobility, and endurance.     Time Calculation:   PT Charges     Row Name 10/12/20 0829             Time Calculation    Start Time  0805  -PH      Stop Time  0830  -PH      Time Calculation (min)  25 min  -PH      PT Received On  10/12/20  -      PT - Next Appointment  10/13/20  -        User Key  (r) = Recorded By, (t) = Taken By, (c) = Cosigned By    Initials Name Provider Type     Elva Santos PTA Physical Therapy Assistant        Therapy Charges for Today     Code Description Service Date Service Provider Modifiers Qty    66372096455 HC PT THER PROC EA 15 MIN 10/12/2020 Elva Santos PTA GP 2    32912959568 HC PT THER SUPP EA 15 MIN 10/12/2020 Elva Santos PTA GP 1          PT G-Codes  Outcome Measure Options: AM-PAC 6 Clicks Basic Mobility (PT)  AM-PAC 6 Clicks Score (PT): 15    Elva Santos PTA  10/12/2020

## 2020-10-12 NOTE — PLAN OF CARE
Problem: Adult Inpatient Plan of Care  Goal: Plan of Care Review  Recent Flowsheet Documentation  Taken 10/12/2020 0825 by Elva Santos PTA  Progress: improving  Plan of Care Reviewed With: patient  Outcome Summary: Pt improving and req less assist today. Pt sat up to EOB and stood w/ CGA. Pt w/ diff at times w/ NWB status. Pt amb 8' req CGA / fww. Pt would benefit from SNF after dc to improve her strength, mobility, and endurance.    Patient was intermittently wearing a face mask during this therapy encounter. Therapist used appropriate personal protective equipment including eye protection, mask, and gloves.  Mask used was standard procedure mask. Appropriate PPE was worn during the entire therapy session. Hand hygiene was completed before and after therapy session. Patient is not in enhanced droplet precautions.  PT tech: Clarissa Goodwin.

## 2020-10-12 NOTE — THERAPY EVALUATION
Acute Care - Speech Language Pathology   Swallow Initial Evaluation [facility]     Patient Name: Ashli León  : 1935  MRN: [id number]  Today's Date: 10/12/2020               Admit Date: 10/10/2020    Visit Dx:     ICD-10-CM ICD-9-CM   1. Closed fracture of shaft of right femur, unspecified fracture morphology, initial encounter (CMS/[facility])  S72.301A 821.01     PPE: Patient was not wearing a face mask during this therapy encounter due to swallow evaluation performed. Therapist used appropriate personal protective equipment including mask, eye protection and gloves.  Mask used was standard procedure mask. Appropriate PPE was worn during the entire therapy session. The patient coughed during this evaluation. Therapist was within 6 feet for 15 minutes or more during the evaluation. Hand hygiene was completed before and after therapy session. Patient is not in enhanced droplet precautions.        SWALLOW EVALUATION (last 72 hours)      SLP Adult Swallow Evaluation     Row Name 10/12/20 1300       Rehab Evaluation    Document Type  evaluation  -AB    Subjective Information  no complaints  -AB    Patient Observations  alert;cooperative;agree to therapy  -AB    Care Plan Review  evaluation/treatment results reviewed  -AB    Patient Effort  excellent  -AB       General Information    Patient Profile Reviewed  yes  -AB    Pertinent History Of Current Problem   85 y.o. female presents s/p fall w/c/o R hip pain, found to have R femur fx, undergoing open reduction internal fixation of R femur  -AB    Current Method of Nutrition  NPO  -AB    Precautions/Limitations, Vision  WFL  -AB    Precautions/Limitations, Hearing  WFL  -AB    Prior Level of Function-Communication  WFL  -AB    Prior Level of Function-Swallowing  no diet consistency restrictions;safe, efficient swallowing in all situations  -AB    Plans/Goals Discussed with  patient;agreed upon  -AB    Barriers to Rehab  none identified  -AB    Patient's Goals  lmom about no show for appt and to call back and reschedule  Pt WC states he needed an appt in order to be prescribed pain meds for his back  They made an appt with Albert Carbajal last night and said they would contact him with the time of appt  for Discharge  return home;return to all previous roles/activities  -AB       Pain    Additional Documentation  Pain Scale: Numbers Pre/Post-Treatment (Group)  -AB       Pain Scale: Numbers Pre/Post-Treatment    Pretreatment Pain Rating  0/10 - no pain  -AB    Posttreatment Pain Rating  0/10 - no pain  -AB       Oral Motor Structure and Function    Oral Lesions or Structural Abnormalities and/or variants  NA  -AB    Dentition Assessment  natural, present and adequate  -AB    Secretion Management  WNL/WFL  -AB    Mucosal Quality  moist, healthy  -AB    Volitional Swallow  WFL  -AB    Volitional Cough  WFL  -AB       Oral Musculature and Cranial Nerve Assessment    Oral Motor General Assessment  WFL  -AB       Clinical Swallow Eval    Oral Prep Phase  WFL  -AB    Oral Transit  WFL  -AB    Oral Residue  WFL  -AB    Pharyngeal Phase  no overt signs/symptoms of pharyngeal impairment  -AB    Clinical Swallow Evaluation Summary  Bedside swallow evaluation completed with: ice chips, water by spoon/cup/straw, puree, mechanical soft, and regular solids. Pt completes oral care by brushing teeth prior to initiation of eval. All oral phase functions deemed WFL; labial seal adequate without anterior loss, A-P transport is timely and efficient, no oral residue or buccal pocketing are noted post prandial. Upon pharyngeal swallow initiation, digital palpation performed with all swallows. Hyo-laryngeal movement appears adequate with x1 timely swallow initiated per bolus. No overt s/s aspiration are noted post prandial. No significant comorbidities indicative that instrumental evaluation is required. Recommendations include: initiation of regular and thin liquid diet, with no further interventions recommended at this time.   -AB       Clinical Impression    SLP Swallowing Diagnosis  swallow WFL  -AB    Functional Impact  no impact on function  -AB    Swallow Criteria for Skilled Therapeutic Interventions Met  no problems identified  which require skilled intervention  -AB       Recommendations    Therapy Frequency (Swallow)  evaluation only  -AB    SLP Diet Recommendation  regular textures;thin liquids  -AB    Recommended Diagnostics  No further SLP services recommended  -AB    Recommended Precautions and Strategies  upright posture during/after eating;small bites of food and sips of liquid  -AB    Oral Care Recommendations  Oral Care BID/PRN  -AB    SLP Rec. for Method of Medication Administration  meds whole;with thin liquids  -AB    Monitor for Signs of Aspiration  yes;notify SLP if any concerns  -AB    Anticipated Discharge Disposition (SLP)  inpatient rehabilitation facility  -AB      User Key  (r) = Recorded By, (t) = Taken By, (c) = Cosigned By    Initials Name Effective Dates    Sherine Rees, MS CCC-SLP 10/05/20 -           EDUCATION  The patient has been educated in the following areas:   Dysphagia (Swallowing Impairment).    SLP Recommendation and Plan  SLP Swallowing Diagnosis: swallow WFL  SLP Diet Recommendation: regular textures, thin liquids  Recommended Precautions and Strategies: upright posture during/after eating, small bites of food and sips of liquid  SLP Rec. for Method of Medication Administration: meds whole, with thin liquids     Monitor for Signs of Aspiration: yes, notify SLP if any concerns  Recommended Diagnostics: No further SLP services recommended  Swallow Criteria for Skilled Therapeutic Interventions Met: no problems identified which require skilled intervention  Anticipated Discharge Disposition (SLP): inpatient rehabilitation facility     Therapy Frequency (Swallow): evaluation only                            Plan of Care Reviewed With: patient  Outcome Summary: Bedside swallow evaluation completed this date. Pt appears at baseline with oral stage appearing WFL and no suspected pharyngeal impairment or s/s aspiration. Recommend: initiation of regular and thin liquid diet; medications whole with thin  liquids; and general compensations as discussed with pt - small bites/sis, upright posture while eating, monitor for s/s aspiration. SLP to sign off as no skilled needs were identified. Please re-consult if difficulties arise.         SLP Outcome Measures (last 72 hours)      SLP Outcome Measures     Row Name 10/12/20 1300             SLP Outcome Measures    Outcome Measure Used?  Adult NOMS  -AB         Adult FCM Scores    FCM Chosen  Swallowing  -AB      Swallowing FCM Score  7  -AB        User Key  (r) = Recorded By, (t) = Taken By, (c) = Cosigned By    Initials Name Effective Dates    Sherine Rees MS CCC-SLP 10/05/20 -            Time Calculation:   Time Calculation- SLP     Row Name 10/12/20 1337             Time Calculation- SLP    SLP Received On  10/12/20  -AB        User Key  (r) = Recorded By, (t) = Taken By, (c) = Cosigned By    Initials Name Provider Type    Sherine Rees MS CCC-SLP Speech and Language Pathologist          Therapy Charges for Today     Code Description Service Date Service Provider Modifiers Qty    [id number] HC ST EVAL ORAL PHARYNG SWALLOW 4 10/12/2020 Sherine Smith, MS CCC-SLP GN 1               Sherine Smith MS CCC-SLP  10/12/2020

## 2020-10-12 NOTE — PROGRESS NOTES
"Orthopaedic Surgery  Daily Progress Note    /55 (BP Location: Left arm, Patient Position: Lying)   Pulse 78   Temp 97.1 °F (36.2 °C) (Oral)   Resp 16   Ht 160 cm (63\")   Wt 59.9 kg (132 lb)   SpO2 98%   BMI 23.38 kg/m²     Lab Results (last 24 hours)     Procedure Component Value Units Date/Time    Vitamin B12 [[id number]]  (Normal) Collected: 10/12/20 0508    Specimen: Blood Updated: 10/12/20 0633     Vitamin B-12 350 pg/mL     Narrative:      Results may be falsely increased if patient taking Biotin.      Folate [[id number]]  (Normal) Collected: 10/12/20 0508    Specimen: Blood Updated: 10/12/20 0633     Folate 12.50 ng/mL     Narrative:      Results may be falsely increased if patient taking Biotin.      Basic Metabolic Panel [[id number]]  (Abnormal) Collected: 10/12/20 0508    Specimen: Blood Updated: 10/12/20 0620     Glucose 123 mg/dL      BUN 16 mg/dL      Creatinine 1.08 mg/dL      Sodium 137 mmol/L      Potassium 4.8 mmol/L      Chloride 102 mmol/L      CO2 25.1 mmol/L      Calcium 8.9 mg/dL      eGFR Non African Amer 48 mL/min/1.73      BUN/Creatinine Ratio 14.8     Anion Gap 9.9 mmol/L     Narrative:      GFR Normal >60  Chronic Kidney Disease <60  Kidney Failure <15      CBC & Differential [[id number]]  (Abnormal) Collected: 10/12/20 0508    Specimen: Blood Updated: 10/12/20 0554    Narrative:      The following orders were created for panel order CBC & Differential.  Procedure                               Abnormality         Status                     ---------                               -----------         ------                     CBC Auto Differential[[id number]]        Abnormal            Final result                 Please view results for these tests on the individual orders.    CBC Auto Differential [[id number]]  (Abnormal) Collected: 10/12/20 0508    Specimen: Blood Updated: 10/12/20 0554     WBC 6.49 10*3/mm3      RBC 2.73 10*6/mm3      Hemoglobin 8.8 g/dL      Hematocrit 25.8 %     "  MCV 94.5 fL      MCH 32.2 pg      MCHC 34.1 g/dL      RDW 11.8 %      RDW-SD 40.9 fl      MPV 9.8 fL      Platelets 200 10*3/mm3      Neutrophil % 63.5 %      Lymphocyte % 23.6 %      Monocyte % 12.2 %      Eosinophil % 0.0 %      Basophil % 0.2 %      Immature Grans % 0.5 %      Neutrophils, Absolute 4.13 10*3/mm3      Lymphocytes, Absolute 1.53 10*3/mm3      Monocytes, Absolute 0.79 10*3/mm3      Eosinophils, Absolute 0.00 10*3/mm3      Basophils, Absolute 0.01 10*3/mm3      Immature Grans, Absolute 0.03 10*3/mm3      nRBC 0.0 /100 WBC     aPTT [[id number]]  (Abnormal) Collected: 10/10/20 1605    Specimen: Blood Updated: 10/11/20 1340     PTT 20.0 seconds           Imaging Results (Last 24 Hours)     Procedure Component Value Units Date/Time    XR Hip With or Without Pelvis 2 - 3 View Right [[id number]] Collected: 10/10/20 1624     Updated: 10/11/20 1221    Narrative:      PORTABLE CHEST/LUMBAR SPINE COMPLETE/PELVIS AND RIGHT HIP/RIGHT FEMUR     HISTORY: Fall last night with right femoral pain.     COMPARISON: None.     FINDINGS:  CHEST: A single view of the chest demonstrates the heart to be at the  upper limits of normal in size. There is no evidence of focal infiltrate  or effusion. There are fractures involving the posterolateral aspects of  the left 6th, 7th, and 8th ribs, likely remote. A remote fracture  involving the right 7th rib posterolaterally is also noted.     LUMBAR SPINE: AP, lateral and bilateral oblique views of the lumbar  spine demonstrate a moderate compression deformity involving L2 and a  mild compression deformity involving L1. The compression deformities  appear similar to the MRI examination of 09/28/2018 given differences in  technique. A moderate compression deformity involving T11 is noted, also  present previously. There is mild levoscoliosis with the apex at the  level of L4 with a compensatory dextroscoliosis with the apex at the  level of T12. No convincing acute fracture is  identified. There is a  grade 1 retrolisthesis of L5 upon S1 and moderate facet degenerative  disease at L4-L5 and L5-S1. Further evaluation could be performed with  MRI examination of the lumbar spine as indicated.     PELVIS AND RIGHT HIP: An AP view of the pelvis as well as AP and frog  leg lateral views of the right hip demonstrate a total hip prosthesis.  There is no evidence of a proximal femoral fracture or dislocation.     RIGHT FEMUR: Extensive vascular calcification is noted. There is an  oblique plane of lucency appreciated involving the midshaft of the femur  consistent with a remote fracture. The fracture plane is still evident.  There is periosteal reaction appreciated involving the right femur  proximally extending to the level of the fracture anteriorly. No obvious  acute fracture is identified. Further evaluation could be performed with  a CT examination. A right knee prosthesis is noted.     This report was finalized on 10/11/2020 12:18 PM by Dr. Adrian Contreras M.D.       XR Femur 2 View Right [[id number]] Collected: 10/10/20 1624     Updated: 10/11/20 1221    Narrative:      PORTABLE CHEST/LUMBAR SPINE COMPLETE/PELVIS AND RIGHT HIP/RIGHT FEMUR     HISTORY: Fall last night with right femoral pain.     COMPARISON: None.     FINDINGS:  CHEST: A single view of the chest demonstrates the heart to be at the  upper limits of normal in size. There is no evidence of focal infiltrate  or effusion. There are fractures involving the posterolateral aspects of  the left 6th, 7th, and 8th ribs, likely remote. A remote fracture  involving the right 7th rib posterolaterally is also noted.     LUMBAR SPINE: AP, lateral and bilateral oblique views of the lumbar  spine demonstrate a moderate compression deformity involving L2 and a  mild compression deformity involving L1. The compression deformities  appear similar to the MRI examination of 09/28/2018 given differences in  technique. A moderate compression deformity  involving T11 is noted, also  present previously. There is mild levoscoliosis with the apex at the  level of L4 with a compensatory dextroscoliosis with the apex at the  level of T12. No convincing acute fracture is identified. There is a  grade 1 retrolisthesis of L5 upon S1 and moderate facet degenerative  disease at L4-L5 and L5-S1. Further evaluation could be performed with  MRI examination of the lumbar spine as indicated.     PELVIS AND RIGHT HIP: An AP view of the pelvis as well as AP and frog  leg lateral views of the right hip demonstrate a total hip prosthesis.  There is no evidence of a proximal femoral fracture or dislocation.     RIGHT FEMUR: Extensive vascular calcification is noted. There is an  oblique plane of lucency appreciated involving the midshaft of the femur  consistent with a remote fracture. The fracture plane is still evident.  There is periosteal reaction appreciated involving the right femur  proximally extending to the level of the fracture anteriorly. No obvious  acute fracture is identified. Further evaluation could be performed with  a CT examination. A right knee prosthesis is noted.     This report was finalized on 10/11/2020 12:18 PM by Dr. Adrian Contreras M.D.       XR Spine Lumbar Complete 4+VW [[id number]] Collected: 10/10/20 1624     Updated: 10/11/20 1221    Narrative:      PORTABLE CHEST/LUMBAR SPINE COMPLETE/PELVIS AND RIGHT HIP/RIGHT FEMUR     HISTORY: Fall last night with right femoral pain.     COMPARISON: None.     FINDINGS:  CHEST: A single view of the chest demonstrates the heart to be at the  upper limits of normal in size. There is no evidence of focal infiltrate  or effusion. There are fractures involving the posterolateral aspects of  the left 6th, 7th, and 8th ribs, likely remote. A remote fracture  involving the right 7th rib posterolaterally is also noted.     LUMBAR SPINE: AP, lateral and bilateral oblique views of the lumbar  spine demonstrate a moderate  compression deformity involving L2 and a  mild compression deformity involving L1. The compression deformities  appear similar to the MRI examination of 09/28/2018 given differences in  technique. A moderate compression deformity involving T11 is noted, also  present previously. There is mild levoscoliosis with the apex at the  level of L4 with a compensatory dextroscoliosis with the apex at the  level of T12. No convincing acute fracture is identified. There is a  grade 1 retrolisthesis of L5 upon S1 and moderate facet degenerative  disease at L4-L5 and L5-S1. Further evaluation could be performed with  MRI examination of the lumbar spine as indicated.     PELVIS AND RIGHT HIP: An AP view of the pelvis as well as AP and frog  leg lateral views of the right hip demonstrate a total hip prosthesis.  There is no evidence of a proximal femoral fracture or dislocation.     RIGHT FEMUR: Extensive vascular calcification is noted. There is an  oblique plane of lucency appreciated involving the midshaft of the femur  consistent with a remote fracture. The fracture plane is still evident.  There is periosteal reaction appreciated involving the right femur  proximally extending to the level of the fracture anteriorly. No obvious  acute fracture is identified. Further evaluation could be performed with  a CT examination. A right knee prosthesis is noted.     This report was finalized on 10/11/2020 12:18 PM by Dr. Adrian Contreras M.D.       XR Chest 1 View [[id number]] Collected: 10/10/20 1624     Updated: 10/11/20 1221    Narrative:      PORTABLE CHEST/LUMBAR SPINE COMPLETE/PELVIS AND RIGHT HIP/RIGHT FEMUR     HISTORY: Fall last night with right femoral pain.     COMPARISON: None.     FINDINGS:  CHEST: A single view of the chest demonstrates the heart to be at the  upper limits of normal in size. There is no evidence of focal infiltrate  or effusion. There are fractures involving the posterolateral aspects of  the left 6th, 7th,  and 8th ribs, likely remote. A remote fracture  involving the right 7th rib posterolaterally is also noted.     LUMBAR SPINE: AP, lateral and bilateral oblique views of the lumbar  spine demonstrate a moderate compression deformity involving L2 and a  mild compression deformity involving L1. The compression deformities  appear similar to the MRI examination of 09/28/2018 given differences in  technique. A moderate compression deformity involving T11 is noted, also  present previously. There is mild levoscoliosis with the apex at the  level of L4 with a compensatory dextroscoliosis with the apex at the  level of T12. No convincing acute fracture is identified. There is a  grade 1 retrolisthesis of L5 upon S1 and moderate facet degenerative  disease at L4-L5 and L5-S1. Further evaluation could be performed with  MRI examination of the lumbar spine as indicated.     PELVIS AND RIGHT HIP: An AP view of the pelvis as well as AP and frog  leg lateral views of the right hip demonstrate a total hip prosthesis.  There is no evidence of a proximal femoral fracture or dislocation.     RIGHT FEMUR: Extensive vascular calcification is noted. There is an  oblique plane of lucency appreciated involving the midshaft of the femur  consistent with a remote fracture. The fracture plane is still evident.  There is periosteal reaction appreciated involving the right femur  proximally extending to the level of the fracture anteriorly. No obvious  acute fracture is identified. Further evaluation could be performed with  a CT examination. A right knee prosthesis is noted.     This report was finalized on 10/11/2020 12:18 PM by Dr. Adrian Contreras M.D.       XR Femur 2 View Right [[id number]] Collected: 10/11/20 1055     Updated: 10/11/20 1101    Narrative:      XR FEMUR 2 VW RIGHT-, FL C ARM DURING SURGERY-     INDICATIONS: ORIF     TECHNIQUE: FLUOROSCOPIC ASSISTANCE IN THE OPERATING ROOM.     FINDINGS:     8 intraoperative fluoroscopic  spot views were obtained and recorded the  PACS for review, revealing plate and screw hardware fixative hardware at  the right femoral shaft. 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 Please see operative report for full  details.     Fluoroscopy time: 86.6 seconds       Impression:         As described.     This report was finalized on 10/11/2020 10:58 AM by Dr. Jay Puentes M.D.       FL C Arm During Surgery [[id number]] Collected: 10/11/20 1055     Updated: 10/11/20 1101    Narrative:      XR FEMUR 2 VW RIGHT-, FL C ARM DURING SURGERY-     INDICATIONS: ORIF     TECHNIQUE: FLUOROSCOPIC ASSISTANCE IN THE OPERATING ROOM.     FINDINGS:     8 intraoperative fluoroscopic spot views were obtained and recorded the  PACS for review, revealing plate and screw hardware fixative hardware at  the right femoral shaft. 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 Please see operative report for full  details.     Fluoroscopy time: 86.6 seconds       Impression:         As described.     This report was finalized on 10/11/2020 10:58 AM by Dr. Jay Puentes M.D.             Patient Care Team:  Arnaud Monson MD (Tony) as PCP - General  Arnaud Monson MD (Tony) as PCP - Family Medicine  AyalaFiliberto MD as PCP - Claims Attributed    SUBJECTIVE  Pain controlled    PHYSICAL EXAM  Resting in NAD  Dressing clean, dry, intact; tiny s/s staining at distal knee incision  Toes warm, perfused, brisk capillary refill  Flexes/extends toes, ADF/APF  SILT s/s/dp/sp/t              Closed fracture of shaft of right femur (CMS/[facility])      PLAN / DISPOSITION:  POD 1 s/p ORIF right femur, doing well  1. Pain control: Orals  2.  Antibiotics: Periop Ancef to end today  3.  PT: TTWB with walker  4. DVT: Lovenox 40mg plus JEFF/SCDs, mobilization  5. Dispo: pending, will likely need rehabplacement, follow up 2-3 weeks with me at [facility]  ([phone] to make appointment)        Mac Frances Jr, MD  10/12/20  07:16 EDT

## 2020-10-12 NOTE — THERAPY EVALUATION
Patient Name: Ashli León  : 1935    MRN: [id number]                              Today's Date: 10/12/2020       Admit Date: 10/10/2020    Visit Dx:     ICD-10-CM ICD-9-CM   1. Closed fracture of shaft of right femur, unspecified fracture morphology, initial encounter (CMS/HCC)  S72.301A 821.01     Patient Active Problem List   Diagnosis   • History of hip surgery   • Primary osteoarthritis of both knees   • Arthritis of right knee   • Aftercare following right knee joint replacement surgery   • Status post total right knee replacement   • History of knee replacement, total   • History of total knee arthroplasty   • Chronic pain of left knee   • Arthritis of left knee   • Closed right hip fracture (CMS/[facility])   • Femoral neck fracture (CMS/HCC)   • Tear of right gluteus minimus tendon   • Chronic right-sided low back pain with right-sided sciatica   • Chronic right hip pain   • Sacroiliac joint dysfunction   • Encounter for long-term current use of high risk medication   • Pain of left hip joint   • Other chronic pain   • Bilateral low back pain with bilateral sciatica   • Closed fracture of shaft of right femur (CMS/[facility])     Past Medical History:   Diagnosis Date   • Acid reflux    • Back pain    • Colitis     history of   • Diarrhea    • Disease of thyroid gland    • Diverticular disease    • Glaucoma    • Gout     - right big toe   • High blood pressure    • High cholesterol    • History of vertebral compression fracture     multiple   • Hyponatremia    • Knee pain    • Low back pain    • Macular degeneration    • Mitral regurgitation 2016    Mild to moderate per 2-D echocardiogram   • Peripheral neuropathy    • Primary osteoarthritis of both knees 2016   • Renal lesion     on mri   • Rheumatoid arthritis (CMS/[facility])      Past Surgical History:   Procedure Laterality Date   • APPENDECTOMY     • CATARACT EXTRACTION Bilateral    • FEMUR SURGERY Right     had fracture and has rajesh    •  HEMORRHOIDECTOMY     • HIP HEMIARTHROPLASTY Right 1/3/2017    Procedure: HIP HEMIARTHROPLASTY with IM rajesh and 2 screw hardware removal antibiotic cement;  Surgeon: Mirza Whittington MD;  Location:  [facility] OR;  Service:    • HIP SURGERY Right 1999    hx fracture   • LEG SURGERY Right 12/1/2017    Procedure: GLUTEAL TENDON REPAIR-minimus and medius;  Surgeon: Mirza hWittington MD;  Location:  [facility] OR;  Service:    • TOTAL KNEE ARTHROPLASTY Right 6/22/2016    Procedure: RT TOTAL KNEE ARTHROPLASTY WITH FINN NAVIGATION;  Surgeon: Adrian Swenson MD;  [facility];  Service:      General Information     Row Name 10/12/20 1053          OT Time and Intention    Document Type  evaluation  -RB     Mode of Treatment  individual therapy  -RB     Row Name 10/12/20 1053          General Information    Patient Profile Reviewed  yes  -RB     Prior Level of Function  independent:;ADL's;transfer  -RB     Existing Precautions/Restrictions  fall;weight bearing TTWB  -RB     Barriers to Rehab  none identified  -RB     Row Name 10/12/20 1053          Living Environment    Lives With  alone  -RB     Row Name 10/12/20 1053          Home Main Entrance    Number of Stairs, Main Entrance  none Ramp  -RB     Row Name 10/12/20 1053          Cognition    Orientation Status (Cognition)  oriented x 3  -RB     Row Name 10/12/20 1053          Safety Issues, Functional Mobility    Safety Issues Affecting Function (Mobility)  safety precaution awareness;safety precautions follow-through/compliance;insight into deficits/self-awareness  -RB     Impairments Affecting Function (Mobility)  balance;strength;range of motion (ROM);endurance/activity tolerance;pain  -RB       User Key  (r) = Recorded By, (t) = Taken By, (c) = Cosigned By    Initials Name Provider Type    RB Shilpi Preston OT Occupational Therapist        Mobility/ADL's     Row Name 10/12/20 1050          Bed Mobility    Bed Mobility  bed mobility (all) activities  -RB     All  Activities, [place] (Bed Mobility)  supervision  -RB     Bed Mobility, Safety Issues  decreased use of legs for bridging/pushing  -RB     Assistive Device (Bed Mobility)  bed rails  -RB     Row Name 10/12/20 1054          Transfers    Transfers  sit-stand transfer;toilet transfer  -RB     Sit-Stand [place] (Transfers)  contact guard  -RB     [place] Level (Toilet Transfer)  minimum assist (75% patient effort)  -RB     Assistive Device (Toilet Transfer)  grab bars/safety frame;walker, front-wheeled  -RB     Row Name 10/12/20 1054          Sit-Stand Transfer    Assistive Device (Sit-Stand Transfers)  walker, front-wheeled  -RB     Row Name 10/12/20 1054          Functional Mobility    Functional Mobility- Ind. Level  contact guard assist  -RB     Functional Mobility- Device  rolling walker  -RB     Functional Mobility-Maintain WBing  able to maintain weight bearing status  -RB     Functional Mobility- Safety Issues  balance decreased during turns;step length decreased;weight-shifting ability decreased  -RB     Functional Mobility- Comment  Slow pacing - transferred from bedside chair->toilet and toilet->supine in bed.  -RB     Row Name 10/12/20 1054          Activities of Daily Living    BADL Assessment/Intervention  lower body dressing;toileting;grooming  -RB     Row Name 10/12/20 1054          Lower Body Dressing Assessment/Training    [place] Level (Lower Body Dressing)  lower body dressing skills;moderate assist (50% patient effort)  -RB     Position (Lower Body Dressing)  supported sitting  -RB     Row Name 10/12/20 1054          Toileting Assessment/Training    [place] Level (Toileting)  minimum assist (75% patient effort)  -RB     Position (Toileting)  supported sitting;supported standing  -RB     Row Name 10/12/20 1054          Grooming Assessment/Training    [place] Level (Grooming)  grooming skills;set up  -RB     Position (Grooming)  supported sitting  -RB       User Key  (r) =  Recorded By, (t) = Taken By, (c) = Cosigned By    Initials Name Provider Type    Shilpi Erickson OT Occupational Therapist        Obj/Interventions     Row Name 10/12/20 1059          Sensory Assessment (Somatosensory)    Sensory Assessment (Somatosensory)  sensation intact  -     Row Name 10/12/20 1059          Vision Assessment/Intervention    Visual Impairment/Limitations  WNL  -     Row Name 10/12/20 1059          Range of Motion Comprehensive    Comment, General Range of Motion  RLE impaired due to surgeical fixation. [facility] WFL  -     Row Name 10/12/20 1059          Strength Comprehensive (MMT)    Comment, General Manual Muscle Testing (MMT) Assessment  RLE not tested, [facility] Name 10/12/20 1059          Balance    Balance Assessment  standing static balance;standing dynamic balance;sitting dynamic balance;sitting static balance  -RB     Static Sitting Balance  WNL  -RB     Dynamic Sitting Balance  WNL  -RB     Static Standing Balance  mild impairment  -RB     Dynamic Standing Balance  mild impairment  -RB     Comment, Balance  Use of walker for support and TTWB  -RB       User Key  (r) = Recorded By, (t) = Taken By, (c) = Cosigned By    Initials Name Provider Type    Shilpi Erickson OT Occupational Therapist        Goals/Plan     Row Name 10/12/20 1101          Bed Mobility Goal 1 (OT)    Activity/Assistive Device (Bed Mobility Goal 1, OT)  bed mobility activities, all  -RB     [place] Level/Cues Needed (Bed Mobility Goal 1, OT)  modified independence  -RB     Time Frame (Bed Mobility Goal 1, OT)  short term goal (STG);2 weeks  -RB     Progress/Outcomes (Bed Mobility Goal 1, OT)  continuing progress toward goal  -RB     Row Name 10/12/20 1101          Transfer Goal 1 (OT)    Activity/Assistive Device (Transfer Goal 1, OT)  transfers, all  -RB     [place] Level/Cues Needed (Transfer Goal 1, OT)  modified independence  -RB     Time Frame (Transfer Goal 1, OT)  short term  goal (STG);2 weeks  -RB     Progress/Outcome (Transfer Goal 1, OT)  continuing progress toward goal  -RB     Row Name 10/12/20 1101          Bathing Goal 1 (OT)    Activity/Device (Bathing Goal 1, OT)  bathing skills, all  -RB     [place] Level/Cues Needed (Bathing Goal 1, OT)  modified independence  -RB     Time Frame (Bathing Goal 1, OT)  short term goal (STG);2 weeks  -RB     Progress/Outcomes (Bathing Goal 1, OT)  continuing progress toward goal  -RB     Row Name 10/12/20 1101          Dressing Goal 1 (OT)    Activity/Device (Dressing Goal 1, OT)  dressing skills, all  -RB     [place]/Cues Needed (Dressing Goal 1, OT)  modified independence  -RB     Time Frame (Dressing Goal 1, OT)  short term goal (STG);2 weeks  -RB     Progress/Outcome (Dressing Goal 1, OT)  continuing progress toward goal  -RB     Row Name 10/12/20 1101          Toileting Goal 1 (OT)    Activity/Device (Toileting Goal 1, OT)  toileting skills, all  -RB     [place] Level/Cues Needed (Toileting Goal 1, OT)  modified independence  -RB     Time Frame (Toileting Goal 1, OT)  short term goal (STG);2 weeks  -RB     Progress/Outcome (Toileting Goal 1, OT)  continuing progress toward goal  -RB     Row Name 10/12/20 1101          Grooming Goal 1 (OT)    Activity/Device (Grooming Goal 1, OT)  grooming skills, all  -RB     [place] (Grooming Goal 1, OT)  modified independence  -RB     Time Frame (Grooming Goal 1, OT)  short term goal (STG);2 weeks  -RB     Progress/Outcome (Grooming Goal 1, OT)  continuing progress toward goal  -RB     Row Name 10/12/20 1101          Self-Feeding Goal 1 (OT)    Activity/Device (Self-Feeding Goal 1, OT)  self-feeding skills, all  -RB     [place] Level/Cues Needed (Self-Feeding Goal 1, OT)  modified independence  -RB     Time Frame (Self-Feeding Goal 1, OT)  short term goal (STG);2 weeks  -RB     Progress/Outcomes (Self-Feeding Goal 1, OT)  continuing progress toward goal  -RB     Row Name 10/12/20  1101          Therapy Assessment/Plan (OT)    Planned Therapy Interventions (OT)  activity tolerance training;adaptive equipment training;BADL retraining;transfer/mobility retraining;strengthening exercise;ROM/therapeutic exercise;patient/caregiver education/training;occupation/activity based interventions;IADL retraining;functional balance retraining  -RB       User Key  (r) = Recorded By, (t) = Taken By, (c) = Cosigned By    Initials Name Provider Type    RB Shilpi Preston OT Occupational Therapist        Clinical Impression     Row Name 10/12/20 1100          Pain Assessment    Additional Documentation  Pain Scale: Numbers Pre/Post-Treatment (Group)  -RB     Row Name 10/12/20 1100          Pain Scale: Numbers Pre/Post-Treatment    Pretreatment Pain Rating  5/10  -RB     Posttreatment Pain Rating  5/10  -RB     Pain Location - Side  Right  -RB     Pain Location  extremity  -RB     Pain Intervention(s)  Repositioned;Rest  -RB     Row Name 10/12/20 1100          Plan of Care Review    Progress  improving  -RB     Row Name 10/12/20 1100          Therapy Assessment/Plan (OT)    Rehab Potential (OT)  good, to achieve stated therapy goals  -RB     Criteria for Skilled Therapeutic Interventions Met (OT)  yes;skilled treatment is necessary  -RB     Therapy Frequency (OT)  5 times/wk  -RB     Row Name 10/12/20 1100          Therapy Plan Review/Discharge Plan (OT)    Anticipated Discharge Disposition (OT)  skilled nursing facility;home with 24/7 care;home with home health SNF would be the best option for pt - pt wanting home.  -RB     Row Name 10/12/20 1100          Vital Signs    O2 Delivery Pre Treatment  room air  -RB     O2 Delivery Intra Treatment  room air  -RB     O2 Delivery Post Treatment  room air  -RB     Pre Patient Position  Sitting  -RB     Intra Patient Position  Standing  -RB     Post Patient Position  Supine  -RB     Row Name 10/12/20 1100          Positioning and Restraints    Pre-Treatment Position   sitting in chair/recliner  -RB     Post Treatment Position  bed  -RB     In Bed  notified nsg;supine;patient within staff view;call light within reach;fowlers;encouraged to call for assist;exit alarm on  -RB       User Key  (r) = Recorded By, (t) = Taken By, (c) = Cosigned By    Initials Name Provider Type    Shilpi Erickson OT Occupational Therapist        Outcome Measures     Row Name 10/12/20 1102          How much help from another is currently needed...    Putting on and taking off regular lower body clothing?  2  -RB     Bathing (including washing, rinsing, and drying)  2  -RB     Toileting (which includes using toilet bed pan or urinal)  3  -RB     Putting on and taking off regular upper body clothing  3  -RB     Taking care of personal grooming (such as brushing teeth)  3  -RB     Eating meals  3  -RB     AM-PAC 6 Clicks Score (OT)  16  -RB     Row Name 10/12/20 1102          Functional Assessment    Outcome Measure Options  AM-PAC 6 Clicks Daily Activity (OT)  -RB       User Key  (r) = Recorded By, (t) = Taken By, (c) = Cosigned By    Initials Name Provider Type    Shilpi Erickson OT Occupational Therapist        Occupational Therapy Education                 Title: PT OT SLP Therapies (In Progress)     Topic: Occupational Therapy (Not Started)     Point: ADL training (Not Started)     Description:   Instruct learner(s) on proper safety adaptation and remediation techniques during self care or transfers.   Instruct in proper use of assistive devices.              Learner Progress:  Not documented in this visit.          Point: Home exercise program (Not Started)     Description:   Instruct learner(s) on appropriate technique for monitoring, assisting and/or progressing therapeutic exercises/activities.              Learner Progress:  Not documented in this visit.          Point: Precautions (Not Started)     Description:   Instruct learner(s) on prescribed precautions during self-care and  functional transfers.              Learner Progress:  Not documented in this visit.          Point: Body mechanics (Not Started)     Description:   Instruct learner(s) on proper positioning and spine alignment during self-care, functional mobility activities and/or exercises.              Learner Progress:  Not documented in this visit.                          OT Recommendation and Plan  Retired Outcome Summary/Treatment Plan (OT)  Anticipated Discharge Disposition (OT): skilled nursing facility, home with 24/7 care, home with home health(SNF would be the best option for pt - pt wanting home.)  Planned Therapy Interventions (OT): activity tolerance training, adaptive equipment training, BADL retraining, transfer/mobility retraining, strengthening exercise, ROM/therapeutic exercise, patient/caregiver education/training, occupation/activity based interventions, IADL retraining, functional balance retraining  Therapy Frequency (OT): 5 times/wk  Plan of Care Review  Progress: improving     Time Calculation:   Time Calculation- OT     Row Name 10/12/20 1053             Time Calculation- OT    OT Start Time  1021  -RB      OT Stop Time  1052  -RB      OT Time Calculation (min)  31 min  -RB      Total Timed Code Minutes- OT  23 minute(s)  -RB      OT Received On  10/12/20  -RB      OT - Next Appointment  10/13/20  -RB      OT Goal Re-Cert Due Date  10/26/20  -        User Key  (r) = Recorded By, (t) = Taken By, (c) = Cosigned By    Initials Name Provider Type    RB Shilpi Preston OT Occupational Therapist        Therapy Charges for Today     Code Description Service Date Service Provider Modifiers Qty    [id number]  OT EVAL MOD COMPLEXITY 2 10/12/2020 Shilpi Preston OT GO 1    [id number]  OT SELF CARE/MGMT/TRAIN EA 15 MIN 10/12/2020 Shilpi Preston OT GO 2               Shilpi Preston OT  10/12/2020

## 2020-10-12 NOTE — PROGRESS NOTES
Discharge Planning Assessment  [facility]     Patient Name: Ashli León  MRN: [id number]  Today's Date: 10/12/2020    Admit Date: 10/10/2020    Discharge Needs Assessment     Row Name 10/12/20 1526       Living Environment    Current Living Arrangements  home/apartment/condo    Primary Care Provided by  self    Provides Primary Care For  no one    Family Caregiver if Needed  child(tara), adult    Able to Return to Prior Arrangements  yes       Transition Planning    Patient/Family Anticipates Transition to  inpatient rehabilitation facility    Transportation Anticipated  health plan transportation       Discharge Needs Assessment    Readmission Within the Last 30 Days  no previous admission in last 30 days    Equipment Currently Used at Home  none    Discharge Facility/Level of Care Needs  nursing facility, skilled    Provided Post Acute Provider List?  Refused    Provided Post Acute Provider Quality & Resource List?  Refused        Discharge Plan     Row Name 10/12/20 1526       Plan    Plan  [facility]    Patient/Family in Agreement with Plan  yes    Plan Comments  Spoke with patient, verified current information, and CCP role explained. Patient states she has family support at home. She's IADL, and has no history with DME/HH. Pt states she's been to [facility] several time. CCP discussed benefits of SNF at d/c, and the patient requests a referral to [facility]. Referral placed; spoke with [facility] who can accept the patient and will have a bed for her today. Scheduled a [facility] [facility] to transport the patient today at 1700. Updated the patient who is agreeable with entire d/c plan. [facility] also called and updated the patient's daughter-in-law/Samina Reid who is agreeable with entire d/c plan. Plan is for pt to d/c today to [facility], and [facility] [facility] will transport the pt today at 1700. No other needs identified. Packet in CCP Office.  CCP following.        Continued Care and Services - Admitted Since 10/10/2020     Destination Coordination complete    Service Provider Request Status Selected Services Address Phone Fax    [facility]   Selected Skilled Nursing [address] [facility] [address] [phone]                Demographic Summary     Row Name 10/12/20 1526       General Information    Admission Type  inpatient    Reason for Consult  discharge planning    Preferred Language  English     Used During This Interaction  no       Contact Information    Permission Granted to Share Info With  ;family/designee        Functional Status    No documentation.       Psychosocial    No documentation.       Abuse/Neglect    No documentation.       Legal    No documentation.       Substance Abuse    No documentation.       Patient Forms    No documentation.           Jinny Arauz, RN

## 2020-10-12 NOTE — PROGRESS NOTES
Continued Stay Note  [facility]     Patient Name: Ashli León  MRN: [id number]  Today's Date: 10/12/2020    Admit Date: 10/10/2020    Discharge Plan     Row Name 10/12/20 1826       Plan    Plan  [facility] -- Accepted    Patient/Family in Agreement with Plan  yes    Plan Comments  The patient's nurse/MOI Barahona spoke with the Ortho MD who is currently in OR and unable to sign prescriptions for the patient to d/c to SNF until he is out of OR later this evening. Transportation time could not be post-poned until 2000, and [facility] cannot accept the patient after 2000 in the evening. Patient's d/c will be tomorrow. CCP following.    Row Name 10/12/20 1730       Plan    Plan  [facility] -- Accepted    Patient/Family in Agreement with Plan  yes    Plan Comments  Spoke with Flora/[employer] [facility] who is able to change the transportation time today from 1700 to 1830. Plan is for [facility] [facility] to transport the patient to [facility] today at 1830. CCP following.    Final Discharge Disposition Code  03 - skilled nursing facility (SNF)    Final Note  Pt to d/c to [facility]. [employer] [facility] to transport.        Discharge Codes    No documentation.       Expected Discharge Date and Time     Expected Discharge Date Expected Discharge Time    Oct 12, 2020             Jinny Arauz, MOI

## 2020-10-12 NOTE — PLAN OF CARE
Goal Outcome Evaluation:  Plan of Care Reviewed With: patient  Progress: no change  Outcome Summary: vss, dsg c/d/i, neurovascular status wnl, periods of confusion tonight, voiding well, reports improved pain control, educated on monitoring b/p due to htn hx, discharge plans pending, will cont to monitor

## 2020-10-12 NOTE — DISCHARGE PLACEMENT REQUEST
"Teri Bobo (85 y.o. Female)     Date of Birth Social Security Number Address Home Phone MRN    1935  [id number] [place] [id number] [phone] [id number]    [employer] Marital Status          None        Admission Date Admission Type Admitting Provider Attending Provider Department, Room/Bed    10/10/20 Emergency Brian Zambrano MD Broaddus, Emmett J., MD [address], P882/1    Discharge Date Discharge Disposition Discharge Destination                       Attending Provider: Sumeet Kellogg MD    Allergies: Neomycin, Statins    Isolation: None   Infection: None   Code Status: CPR    Ht: 160 cm (63\")   Wt: 59.9 kg (132 lb)    Admission Cmt: None   Principal Problem: None                Active Insurance as of 10/10/2020     Primary Coverage     Payor Plan Insurance Group Employer/Plan Group    MEDICARE MEDICARE A & B      Payor Plan Address Payor Plan Phone Number Payor Plan Fax Number Effective Dates    [address] [phone]  6/1/2001 - None Entered    [facility] [id number]       Subscriber Name Subscriber Birth Date Member ID       TERI BOBO 1935 [id number]           Secondary Coverage     Payor Plan Insurance Group Employer/Plan Group    AARP  SUP A[employer] HEALTH CARE OPTIONS      Payor Plan Address Payor Plan Phone Number Payor Plan Fax Number Effective Dates    [facility] [phone]  1/1/2016 - None Entered    [address]       [facility] [address]       Subscriber Name Subscriber Birth Date Member ID       TERI BOBO 1935 [id number]                 Emergency Contacts      (Rel.) Home Phone Work Phone Mobile Phone    Janes Reid (Son) [phone] -- [phone]              "

## 2020-10-13 VITALS
DIASTOLIC BLOOD PRESSURE: 49 MMHG | HEART RATE: 71 BPM | RESPIRATION RATE: 16 BRPM | SYSTOLIC BLOOD PRESSURE: 95 MMHG | WEIGHT: 132 LBS | BODY MASS INDEX: 23.39 KG/M2 | OXYGEN SATURATION: 95 % | TEMPERATURE: 97.3 F | HEIGHT: 63 IN

## 2020-10-13 LAB
ANION GAP SERPL CALCULATED.3IONS-SCNC: 10.2 MMOL/L (ref 5–15)
BUN SERPL-MCNC: 16 MG/DL (ref 8–23)
BUN/CREAT SERPL: 18 (ref 7–25)
CALCIUM SPEC-SCNC: 9.1 MG/DL (ref 8.6–10.5)
CHLORIDE SERPL-SCNC: 103 MMOL/L (ref 98–107)
CO2 SERPL-SCNC: 25.8 MMOL/L (ref 22–29)
CREAT SERPL-MCNC: 0.89 MG/DL (ref 0.57–1)
DEPRECATED RDW RBC AUTO: 39.8 FL (ref 37–54)
ERYTHROCYTE [DISTWIDTH] IN BLOOD BY AUTOMATED COUNT: 11.7 % (ref 12.3–15.4)
GFR SERPL CREATININE-BSD FRML MDRD: 60 ML/MIN/1.73
GLUCOSE SERPL-MCNC: 97 MG/DL (ref 65–99)
HCT VFR BLD AUTO: 24.7 % (ref 34–46.6)
HGB BLD-MCNC: 8.4 G/DL (ref 12–15.9)
MCH RBC QN AUTO: 32.2 PG (ref 26.6–33)
MCHC RBC AUTO-ENTMCNC: 34 G/DL (ref 31.5–35.7)
MCV RBC AUTO: 94.6 FL (ref 79–97)
PLATELET # BLD AUTO: 193 10*3/MM3 (ref 140–450)
PMV BLD AUTO: 9.6 FL (ref 6–12)
POTASSIUM SERPL-SCNC: 4.1 MMOL/L (ref 3.5–5.2)
RBC # BLD AUTO: 2.61 10*6/MM3 (ref 3.77–5.28)
SODIUM SERPL-SCNC: 139 MMOL/L (ref 136–145)
WBC # BLD AUTO: 5.65 10*3/MM3 (ref 3.4–10.8)

## 2020-10-13 PROCEDURE — 80048 BASIC METABOLIC PNL TOTAL CA: CPT | Performed by: NURSE PRACTITIONER

## 2020-10-13 PROCEDURE — 85027 COMPLETE CBC AUTOMATED: CPT | Performed by: NURSE PRACTITIONER

## 2020-10-13 PROCEDURE — 25010000002 ENOXAPARIN PER 10 MG: Performed by: ORTHOPAEDIC SURGERY

## 2020-10-13 RX ORDER — BISACODYL 10 MG
10 SUPPOSITORY, RECTAL RECTAL DAILY
Status: DISCONTINUED | OUTPATIENT
Start: 2020-10-13 | End: 2020-10-13 | Stop reason: HOSPADM

## 2020-10-13 RX ORDER — ZOLPIDEM TARTRATE 5 MG/1
5 TABLET ORAL NIGHTLY
Qty: 3 TABLET | Refills: 0 | Status: SHIPPED | OUTPATIENT
Start: 2020-10-13 | End: 2021-02-23 | Stop reason: SDUPTHER

## 2020-10-13 RX ADMIN — HYDROCODONE BITARTRATE AND ACETAMINOPHEN 1 TABLET: 7.5; 325 TABLET ORAL at 12:44

## 2020-10-13 RX ADMIN — PANTOPRAZOLE SODIUM 40 MG: 40 TABLET, DELAYED RELEASE ORAL at 05:49

## 2020-10-13 RX ADMIN — PRAVASTATIN SODIUM 40 MG: 40 TABLET ORAL at 08:43

## 2020-10-13 RX ADMIN — LEVOTHYROXINE SODIUM 50 MCG: 50 TABLET ORAL at 08:43

## 2020-10-13 RX ADMIN — DOCUSATE SODIUM 100 MG: 100 CAPSULE ORAL at 08:57

## 2020-10-13 RX ADMIN — BISACODYL 10 MG: 10 SUPPOSITORY RECTAL at 08:42

## 2020-10-13 RX ADMIN — ENOXAPARIN SODIUM 40 MG: 40 INJECTION SUBCUTANEOUS at 08:43

## 2020-10-13 RX ADMIN — HYDROCODONE BITARTRATE AND ACETAMINOPHEN 1 TABLET: 7.5; 325 TABLET ORAL at 08:43

## 2020-10-13 RX ADMIN — Medication 1000 MCG: at 08:43

## 2020-10-13 RX ADMIN — HYDROCODONE BITARTRATE AND ACETAMINOPHEN 1 TABLET: 7.5; 325 TABLET ORAL at 03:20

## 2020-10-13 RX ADMIN — METOPROLOL TARTRATE 25 MG: 25 TABLET, FILM COATED ORAL at 08:42

## 2020-10-13 NOTE — PLAN OF CARE
Goal Outcome Evaluation:  Plan of Care Reviewed With: patient  Progress: improving  Outcome Summary: vss, dsg c/d/i, neurovascular status wnl, voiding well, reports adequate pain control, discharge postponed until today, edcated on monitoring b/p due to htn hx, will continue to monitor

## 2020-10-13 NOTE — DISCHARGE INSTR - ACTIVITY
1. Pain control: Orals  2. Dressing: begin daily dry dressing changes today  3.  PT: TTWB with walker  4. DVT: Lovenox 40 mg daily plus JEFF/SCDs, mobilization  5. Dispo: discharge to rehab today if ok with admitting; follow up 2-3 weeks with me at [facility] ([phone] to make appointment). Remove staples 10-14 days post-op. Continue daily dry dressing changes at rehab.

## 2020-10-13 NOTE — PLAN OF CARE
Goal Outcome Evaluation:  Plan of Care Reviewed With: patient  Progress: improving  Outcome Summary: VSS. Dressing cchanged, c/d/i. Up to chair today. Voiding per BSC. Large BM today. Discussed bp med and montoring r/t HTN. DIscharging to rehab today.

## 2020-10-13 NOTE — PROGRESS NOTES
"Orthopaedic Surgery  Daily Progress Note    /74 (BP Location: Right arm, Patient Position: Lying)   Pulse 89   Temp 97.1 °F (36.2 °C) (Temporal)   Resp 18   Ht 160 cm (63\")   Wt 59.9 kg (132 lb)   SpO2 95%   BMI 23.38 kg/m²     Lab Results (last 24 hours)     Procedure Component Value Units Date/Time    Basic Metabolic Panel [[id number]]  (Abnormal) Collected: 10/13/20 0520    Specimen: Blood Updated: 10/13/20 0622     Glucose 97 mg/dL      BUN 16 mg/dL      Creatinine 0.89 mg/dL      Sodium 139 mmol/L      Potassium 4.1 mmol/L      Chloride 103 mmol/L      CO2 25.8 mmol/L      Calcium 9.1 mg/dL      eGFR Non African Amer 60 mL/min/1.73      BUN/Creatinine Ratio 18.0     Anion Gap 10.2 mmol/L     Narrative:      GFR Normal >60  Chronic Kidney Disease <60  Kidney Failure <15      CBC (No Diff) [[id number]]  (Abnormal) Collected: 10/13/20 0520    Specimen: Blood Updated: 10/13/20 0605     WBC 5.65 10*3/mm3      RBC 2.61 10*6/mm3      Hemoglobin 8.4 g/dL      Hematocrit 24.7 %      MCV 94.6 fL      MCH 32.2 pg      MCHC 34.0 g/dL      RDW 11.7 %      RDW-SD 39.8 fl      MPV 9.6 fL      Platelets 193 10*3/mm3           Imaging Results (Last 24 Hours)     Procedure Component Value Units Date/Time    XR Femur 2 View Right [[id number]] Collected: 10/12/20 1609     Updated: 10/12/20 1612    Narrative:      TWO-VIEW RIGHT FEMUR     HISTORY: Postop imaging after fracture fixation.     The patient has previous total hip replacement and total knee  replacement on the right. There has been recent internal fixation of a  fracture near the mid shaft of the femur with a lateral plate and  multiple screws, and the alignment appears satisfactory.     This report was finalized on 10/12/2020 4:09 PM by Dr. Vinicius Troy M.D.             Patient Care Team:  Arnaud Monson MD (Tony) as PCP - General  Arnaud Monson MD (Tony) as PCP - Family Medicine  [place], Filiberto Morales MD as PCP - Claims " Attributed    SUBJECTIVE  Pain controlled with oral narcotics. Tolerating diet. Voiding without difficulty. Complains of constipation this AM with no BM since admission. Plans to discharge to rehab yesterday but delayed due to no RX for pain medication. RX on chart.     PHYSICAL EXAM  Resting in NAD  4x4/tegaderm dressing dry and intact; dried serosanguineous drainage at distal knee incision  Toes warm, perfused, brisk capillary refill  Flexes/extends toes; active dorsi/plantar flexion  SILT over toes  Pulses palpable            Closed fracture of shaft of right femur (CMS/HCC)    Hypothyroidism (acquired)    Constipation    Anemia    HTN (hypertension)    HLD (hyperlipidemia)    Osteoporosis    B12 deficiency      PLAN / DISPOSITION:  POD 2 s/p ORIF right femur, doing well  1. Pain control: Orals  2. Dressing: begin daily dry dressing changes today  3.  PT: TTWB with walker  4. DVT: Lovenox 40 mg daily plus JEFF/SCDs, mobilization  5. Constipation: Dulcolax suppository ordered; continue with stool softeners and MiraLAX as needed  6. Dispo: discharge to rehab today if ok with admitting; follow up 2-3 weeks with me at [facility] ([phone] to make appointment). Remove staples 10-14 days post-op. Continue daily dry dressing changes at rehab.        TANIA Soliman  10/13/20  07:36 EDT

## 2020-10-15 ENCOUNTER — EPISODE CHANGES (OUTPATIENT)
Dept: CASE MANAGEMENT | Facility: OTHER | Age: 85
End: 2020-10-15

## 2020-10-20 ENCOUNTER — TELEPHONE (OUTPATIENT)
Dept: PAIN MEDICINE | Facility: CLINIC | Age: 85
End: 2020-10-20

## 2020-10-20 NOTE — TELEPHONE ENCOUNTER
Dr. Yoana Marrero called concerning this patient,    She is in a nursing facility due to her breaking a leg. She wants to discuss pain management     Please call her at [phone]

## 2020-11-17 ENCOUNTER — EPISODE CHANGES (OUTPATIENT)
Dept: CASE MANAGEMENT | Facility: OTHER | Age: 85
End: 2020-11-17

## 2020-11-18 ENCOUNTER — PATIENT OUTREACH (OUTPATIENT)
Dept: CASE MANAGEMENT | Facility: OTHER | Age: 85
End: 2020-11-18

## 2020-11-18 NOTE — OUTREACH NOTE
The main concerns and/or symptoms the patient would like to address are:Pain Management, Refill of sleeping medication, Ambien, and meal delivery from [employer]    Education/instruction provided by Care Coordinator:   Care Plan Note      Responses   Lifestyle Goals  Decrease falls risk, Eat a healthy diet, Increase physical activity, Less pain, Medication management, Routine follow-up with doctor(s)   Barriers  Disease education, Pain, Financial, Transportation issues   Self Management  Get flu/pneumonia shot, Increase Physical Activities, Medication Adherence   Suggested Appointments  Other (See Comment), Make an Appointment with  [PCP]   Annual Wellness Visit:   Patient Refuses at This Time   AWV Materials  Send Materials   Care Gaps Addressed  Flu Shot, Pneumonia Vaccine   Flu Shot Status  Patient will Complete   Specific Disease Process Teaching  -- [S/P ORIF Right Femur]   Other Patient Education/Resources   Home Healthcare, MyChart, Social, Transportation   Home Healthcare Education Method  Verbal   MyChart Education Method  Verbal   Social Resources Education Method  Verbal   Transportation Education Method  Verbal   Does patient have depression diagnosis?  No   Advanced Directives:  Patient Has [Living Will, SON/POA  Janes Calderon Reid]   Ed Visits past 12 months:  1   Hospitalizations past 12 months  1   Discharged From:  [facility]   Discharged to:  Home   Admit Date:  10/13/20   Discharge Date:  11/13/20   Discharge destination:  Home Health   Agency:  [facility]   Medication Adherence  Medications understood   Goal Progress  Making Progress Toward Goal(s)   Readiness Scale  6   Confidence Scale  6   Health Literacy  Fair        The main concerns and/or symptoms the patient would like to address are: Pain management, Refill of sleeping medication, and meal delivery from [employer]    Education/instruction provided by Care Coordinator: Principles of pain management education with review of  "prescription for Hydrocodone 7.5/325 every four hours as needed and patient attempting to prolong frequency.  Teaching related to achieving comfort with pain medication administration enables increase in mobility and physical activity.  Takes Madhuri for bowel regimen and denies nash issues with constipation.  [facility] and awaiting meal delivery from the agency.  Reports difficulties obtaining groceries in person, delivery, or ordering, and without neighbor, friends, or family members to assist.  Informed ACM would contact [facility] worker, Meera. [facility] currently providing homemaking services and personal care assistance three times a week which patient states adequate.  Her son is living with her and minimal assistance.  Wheels for transportation to physician  appts.  Requests assistance with refills from Dr. Monson for her Ambien as she states has left telephone messages for him and without return notification. Advised to schedule follow-up appt related to hospitalization and rehab stay.Reports the rehab facility, [facility], where she was receiving services, currently active with [employer] and COVID 19 outbreak.  Teaching related to COVID 19 symptoms and CDC guidelines with mask wearing, handwashing, cleansing surfaces with Clorox wipes and she voices understanding.  Monitoring temperature daily and home health services are masked during visits in her home. Teaching related to receiving urgent/ED care and reporting symptoms to PCP.  Current with [facility] and PT services and has contact information to their office for on-call services and feels nurse was going to call Dr. Monson today.  Fall precaution measures and mostly using wheelchair for mobility or walker.  States wound healing \"nicely,\" without signs of infection.  Dr. Frances, ortho apt. 12/4/2020.  AWV due, Not active with my chart, and Has a living will/POA but does not know where documents are.  Advised to follow-up " with routine health care maintenance needs and havve flu shot administered.     Follow Up Outreach Due: 2- 4 weeks    Cody Abreu RN  Ambulatory     11/18/2020, 15:34 EST  Call routed to Dr. Monson and requested contact to patient.  Spoke with Meera ([phone]) and [employer] contracts with [employer] and patient to receive 7 frozen breakfasts and 7 frozen dinners with weekly deliveries,  Meera has emailed the  from [employer] and awaiting return notification regarding delivery.  States patient will receive this week.  Patient has contact information to Meera and aware to contact her as needed.     Follow Up Outreach Due: 2-4 weeks to patient    Cody Abreu RN  Ambulatory     11/18/2020, 15:22 EST

## 2020-11-24 ENCOUNTER — HOSPITAL ENCOUNTER (EMERGENCY)
Facility: HOSPITAL | Age: 85
Discharge: HOME | End: 2020-11-24
Attending: EMERGENCY MEDICINE | Admitting: EMERGENCY MEDICINE

## 2020-11-24 ENCOUNTER — APPOINTMENT (OUTPATIENT)
Dept: GENERAL RADIOLOGY | Facility: HOSPITAL | Age: 85
End: 2020-11-24

## 2020-11-24 VITALS
BODY MASS INDEX: 23 KG/M2 | HEIGHT: 64 IN | HEART RATE: 75 BPM | RESPIRATION RATE: 18 BRPM | TEMPERATURE: 98.2 F | SYSTOLIC BLOOD PRESSURE: 144 MMHG | OXYGEN SATURATION: 95 % | WEIGHT: 134.7 LBS | DIASTOLIC BLOOD PRESSURE: 81 MMHG

## 2020-11-24 DIAGNOSIS — R06.00 DYSPNEA, UNSPECIFIED TYPE: ICD-10-CM

## 2020-11-24 DIAGNOSIS — U07.1 COVID-19 VIRUS INFECTION: Primary | ICD-10-CM

## 2020-11-24 LAB
ALBUMIN SERPL-MCNC: 3.4 G/DL (ref 3.5–5.2)
ALBUMIN/GLOB SERPL: 0.9 G/DL
ALP SERPL-CCNC: 73 U/L (ref 39–117)
ALT SERPL W P-5'-P-CCNC: 16 U/L (ref 1–33)
ANION GAP SERPL CALCULATED.3IONS-SCNC: 13 MMOL/L (ref 5–15)
APTT PPP: 22.4 SECONDS (ref 24.3–38.1)
AST SERPL-CCNC: 21 U/L (ref 1–32)
BASOPHILS # BLD AUTO: 0.02 10*3/MM3 (ref 0–0.2)
BASOPHILS NFR BLD AUTO: 0.2 % (ref 0–1.5)
BILIRUB SERPL-MCNC: 0.4 MG/DL (ref 0–1.2)
BUN SERPL-MCNC: 8 MG/DL (ref 8–23)
BUN/CREAT SERPL: 10.5 (ref 7–25)
CALCIUM SPEC-SCNC: 10.1 MG/DL (ref 8.6–10.5)
CHLORIDE SERPL-SCNC: 93 MMOL/L (ref 98–107)
CO2 SERPL-SCNC: 23 MMOL/L (ref 22–29)
CREAT SERPL-MCNC: 0.76 MG/DL (ref 0.57–1)
D DIMER PPP FEU-MCNC: 1.66 MCGFEU/ML (ref 0–0.46)
DEPRECATED RDW RBC AUTO: 40.8 FL (ref 37–54)
EOSINOPHIL # BLD AUTO: 0.03 10*3/MM3 (ref 0–0.4)
EOSINOPHIL NFR BLD AUTO: 0.4 % (ref 0.3–6.2)
ERYTHROCYTE [DISTWIDTH] IN BLOOD BY AUTOMATED COUNT: 11.8 % (ref 12.3–15.4)
GFR SERPL CREATININE-BSD FRML MDRD: 73 ML/MIN/1.73
GLOBULIN UR ELPH-MCNC: 3.9 GM/DL
GLUCOSE SERPL-MCNC: 128 MG/DL (ref 65–99)
HCT VFR BLD AUTO: 31.3 % (ref 34–46.6)
HGB BLD-MCNC: 10.1 G/DL (ref 12–15.9)
IMM GRANULOCYTES # BLD AUTO: 0.09 10*3/MM3 (ref 0–0.05)
IMM GRANULOCYTES NFR BLD AUTO: 1.1 % (ref 0–0.5)
INR PPP: 1 (ref 0.9–1.1)
LYMPHOCYTES # BLD AUTO: 1 10*3/MM3 (ref 0.7–3.1)
LYMPHOCYTES NFR BLD AUTO: 12.5 % (ref 19.6–45.3)
MCH RBC QN AUTO: 29.7 PG (ref 26.6–33)
MCHC RBC AUTO-ENTMCNC: 32.3 G/DL (ref 31.5–35.7)
MCV RBC AUTO: 92.1 FL (ref 79–97)
MONOCYTES # BLD AUTO: 0.68 10*3/MM3 (ref 0.1–0.9)
MONOCYTES NFR BLD AUTO: 8.5 % (ref 5–12)
NEUTROPHILS NFR BLD AUTO: 6.2 10*3/MM3 (ref 1.7–7)
NEUTROPHILS NFR BLD AUTO: 77.3 % (ref 42.7–76)
PLAT MORPH BLD: NORMAL
PLATELET # BLD AUTO: 438 10*3/MM3 (ref 140–450)
PMV BLD AUTO: 9.1 FL (ref 6–12)
POTASSIUM SERPL-SCNC: 4.8 MMOL/L (ref 3.5–5.2)
PROT SERPL-MCNC: 7.3 G/DL (ref 6–8.5)
PROTHROMBIN TIME: 12.9 SECONDS (ref 12.1–15)
QT INTERVAL: 411 MS
RBC # BLD AUTO: 3.4 10*6/MM3 (ref 3.77–5.28)
RBC MORPH BLD: NORMAL
SARS-COV-2 RNA PNL SPEC NAA+PROBE: DETECTED
SODIUM SERPL-SCNC: 129 MMOL/L (ref 136–145)
TROPONIN T SERPL-MCNC: <0.01 NG/ML (ref 0–0.03)
WBC # BLD AUTO: 8.02 10*3/MM3 (ref 3.4–10.8)
WBC MORPH BLD: NORMAL

## 2020-11-24 PROCEDURE — 85379 FIBRIN DEGRADATION QUANT: CPT | Performed by: EMERGENCY MEDICINE

## 2020-11-24 PROCEDURE — 99284 EMERGENCY DEPT VISIT MOD MDM: CPT | Performed by: EMERGENCY MEDICINE

## 2020-11-24 PROCEDURE — 93005 ELECTROCARDIOGRAM TRACING: CPT | Performed by: EMERGENCY MEDICINE

## 2020-11-24 PROCEDURE — 71045 X-RAY EXAM CHEST 1 VIEW: CPT

## 2020-11-24 PROCEDURE — 93010 ELECTROCARDIOGRAM REPORT: CPT | Performed by: INTERNAL MEDICINE

## 2020-11-24 PROCEDURE — 85610 PROTHROMBIN TIME: CPT | Performed by: EMERGENCY MEDICINE

## 2020-11-24 PROCEDURE — 87635 SARS-COV-2 COVID-19 AMP PRB: CPT | Performed by: EMERGENCY MEDICINE

## 2020-11-24 PROCEDURE — 99284 EMERGENCY DEPT VISIT MOD MDM: CPT

## 2020-11-24 PROCEDURE — 85730 THROMBOPLASTIN TIME PARTIAL: CPT | Performed by: EMERGENCY MEDICINE

## 2020-11-24 PROCEDURE — 85025 COMPLETE CBC W/AUTO DIFF WBC: CPT | Performed by: EMERGENCY MEDICINE

## 2020-11-24 PROCEDURE — 84484 ASSAY OF TROPONIN QUANT: CPT | Performed by: EMERGENCY MEDICINE

## 2020-11-24 PROCEDURE — 85007 BL SMEAR W/DIFF WBC COUNT: CPT | Performed by: EMERGENCY MEDICINE

## 2020-11-24 PROCEDURE — 80053 COMPREHEN METABOLIC PANEL: CPT | Performed by: EMERGENCY MEDICINE

## 2020-11-24 RX ORDER — SODIUM CHLORIDE 0.9 % (FLUSH) 0.9 %
10 SYRINGE (ML) INJECTION AS NEEDED
Status: DISCONTINUED | OUTPATIENT
Start: 2020-11-24 | End: 2020-11-24 | Stop reason: HOSPADM

## 2020-11-24 RX ORDER — SODIUM CHLORIDE 9 MG/ML
INJECTION, SOLUTION INTRAVENOUS
Status: DISCONTINUED
Start: 2020-11-24 | End: 2020-11-24 | Stop reason: HOSPADM

## 2020-11-24 RX ORDER — VITAMIN E 268 MG
400 CAPSULE ORAL DAILY
COMMUNITY

## 2020-11-24 RX ORDER — ALENDRONATE SODIUM 35 MG/1
35 TABLET ORAL
COMMUNITY
End: 2023-01-05 | Stop reason: ALTCHOICE

## 2020-11-24 NOTE — ED PROVIDER NOTES
EMERGENCY DEPARTMENT ENCOUNTER      Room Number: 1B/1B      HPI:    Chief complaint: Shortness of breath, chest tightness and palpitations    Location: Chest tightness described as anterior    Quality/Severity: Moderate    Timing/Duration: Symptoms started yesterday    Modifying Factors: Symptoms are exertional    Associated Symptoms: None    Narrative: Pt is a 85 y.o. female who presents complaining of shortness of breath, chest tightness and palpitations as noted above.  Patient was discharged from the hospital October 12 after ORIF on a right femur fracture.  Patient was discharged to [facility] for rehabilitation and subsequently discharged home 15 days ago.  Patient states that she had been recovering reasonably well until yesterday when she noticed chest tightness, shortness of breath and palpitations with physical therapy.  Patient has not had any Lovenox since her discharge from the hospital.  Although the patient tested negative for COVID-19 while in [facility] other residents there did have positive tests.  Patient denies cough, fever, change in taste or loss of appetite.  No significant cardiac or respiratory history per the patient.      PMD: Arnaud Monson MD (Tony)    REVIEW OF SYSTEMS  Review of Systems   Constitutional: Positive for activity change (Limited ambulation and patient is currently using a walker.). Negative for appetite change, fatigue and fever.   HENT: Negative for congestion.    Respiratory: Negative for cough, shortness of breath and wheezing.    Cardiovascular: Negative for chest pain, palpitations and leg swelling.   Gastrointestinal: Negative for abdominal pain, diarrhea, nausea and vomiting.   Endocrine: Negative for polydipsia.   Genitourinary: Negative for difficulty urinating, dysuria, flank pain, frequency and urgency.   Musculoskeletal: Negative for back pain.        Continued right hip pain from surgery 6 weeks ago   Skin: Negative for rash.    Neurological: Negative for dizziness, weakness and headaches.   Psychiatric/Behavioral: Negative for confusion.   All other systems reviewed and are negative.      PAST MEDICAL HISTORY  Active Ambulatory Problems     Diagnosis Date Noted   • History of hip surgery 04/01/2016   • Primary osteoarthritis of both knees 04/01/2016   • Arthritis of right knee 06/14/2016   • Aftercare following right knee joint replacement surgery 06/25/2016   • Status post total right knee replacement 07/07/2016   • History of knee replacement, total 08/15/2016   • History of total knee arthroplasty 11/17/2016   • Chronic pain of left knee 11/17/2016   • Arthritis of left knee 11/17/2016   • Closed right hip fracture (CMS/[facility]) 01/02/2017   • Femoral neck fracture (CMS/[facility]) 01/03/2017   • Tear of right gluteus minimus tendon 11/21/2017   • Chronic right-sided low back pain with right-sided sciatica 09/14/2018   • Chronic right hip pain 09/14/2018   • Sacroiliac joint dysfunction 12/18/2018   • Encounter for long-term current use of high risk medication 12/18/2018   • Pain of left hip joint 05/16/2019   • Other chronic pain 07/16/2019   • Bilateral low back pain with bilateral sciatica 04/30/2020   • Closed fracture of shaft of right femur (CMS/[facility]) 10/10/2020   • Hypothyroidism (acquired) 10/12/2020   • Constipation 10/12/2020   • Anemia 10/12/2020   • HTN (hypertension) 10/12/2020   • HLD (hyperlipidemia) 10/12/2020   • Osteoporosis 10/12/2020   • B12 deficiency 10/12/2020     Resolved Ambulatory Problems     Diagnosis Date Noted   • No Resolved Ambulatory Problems     Past Medical History:   Diagnosis Date   • Acid reflux    • Back pain    • Colitis    • Diarrhea    • Disease of thyroid gland    • Diverticular disease    • Glaucoma    • Gout    • High blood pressure    • High cholesterol    • History of vertebral compression fracture    • Hyponatremia    • Knee pain    • Low back pain    • Macular degeneration    • Mitral regurgitation  06/2016   • Peripheral neuropathy    • Renal lesion    • Rheumatoid arthritis (CMS/HCC)        PAST SURGICAL HISTORY  Past Surgical History:   Procedure Laterality Date   • APPENDECTOMY  1950   • CATARACT EXTRACTION Bilateral    • FEMUR OPEN REDUCTION INTERNAL FIXATION Right 10/11/2020    Procedure: OPEN REDUCTION INTERNAL FIXATION OF RIGHT FEMUR FRACTURE;  Surgeon: Mac Frances Jr., MD;  Location: [facility] MAIN OR;  Service: Orthopedics;  Laterality: Right;   • FEMUR SURGERY Right     had fracture and has rajesh    • HEMORRHOIDECTOMY     • HIP HEMIARTHROPLASTY Right 1/3/2017    Procedure: HIP HEMIARTHROPLASTY with IM rajesh and 2 screw hardware removal antibiotic cement;  Surgeon: Mirza Whittington MD;  Location:  [facility] OR;  Service:    • HIP SURGERY Right 1999    hx fracture   • LEG SURGERY Right 12/1/2017    Procedure: GLUTEAL TENDON REPAIR-minimus and medius;  Surgeon: Mirza Whittington MD;  Location:  [facility] OR;  Service:    • TOTAL KNEE ARTHROPLASTY Right 6/22/2016    Procedure: RT TOTAL KNEE ARTHROPLASTY WITH FINN NAVIGATION;  Surgeon: Adrian Swenson MD;  Location: [facility] MAIN OR;  Service:        FAMILY HISTORY  Family History   Problem Relation Age of Onset   • Heart attack Father        SOCIAL HISTORY  Social History     Socioeconomic History   • Marital status:      Spouse name: Not on file   • Number of children: Not on file   • Years of education: Not on file   • Highest education level: Not on file   Social Needs   • Financial resource strain: Somewhat hard   • Food insecurity     Worry: Sometimes true     Inability: Sometimes true   • Transportation needs     Medical: Not on file     Non-medical: Yes   Tobacco Use   • Smoking status: Never Smoker   • Smokeless tobacco: Never Used   Substance and Sexual Activity   • Alcohol use: Yes     Alcohol/week: 3.0 standard drinks     Types: 3 Shots of liquor per week     Comment: social use 1 weekly   • Drug use: No     Types: Oxycodone   • Sexual activity: Defer        ALLERGIES  Neomycin and Statins    PHYSICAL EXAM  ED Triage Vitals [11/24/20 1019]   Temp Heart Rate Resp BP SpO2   -- 81 18 149/81 93 %      Temp src Heart Rate Source Patient Position BP Location FiO2 (%)   -- Monitor Lying Right arm --       Physical Exam   Constitutional: She is oriented to person, place, and time.   Healthy-appearing, 85-year-old, female in no acute distress.   HENT:   Head: Normocephalic and atraumatic.   Eyes: Conjunctivae and EOM are normal.   Neck: Normal range of motion. Neck supple.   Cardiovascular: Normal rate and normal heart sounds.   No murmur heard.  Pulmonary/Chest: Effort normal and breath sounds normal. No respiratory distress.   Abdominal: Soft. Bowel sounds are normal. There is no abdominal tenderness.   Musculoskeletal:         General: No edema.      Comments: Right hip incision well-healed   Neurological: She is alert and oriented to person, place, and time.   Skin: Skin is warm and dry.   Small skin tear noted on the anterior right ankle that appears to be healing without complications.   Psychiatric: Affect and judgment normal.   Nursing note and vitals reviewed.      LAB RESULTS  Results for orders placed or performed during the hospital encounter of 11/24/20   COVID-19,[employer] IN-HOUSE,Nasal Swab No Transport Media 3-4 HR TAT - Swab, Nasal Cavity    Specimen: Nasal Cavity; Swab   Result Value Ref Range    COVID19 Detected (C) Not Detected - Ref. Range   Comprehensive Metabolic Panel    Specimen: Blood   Result Value Ref Range    Glucose 128 (H) 65 - 99 mg/dL    BUN 8 8 - 23 mg/dL    Creatinine 0.76 0.57 - 1.00 mg/dL    Sodium 129 (L) 136 - 145 mmol/L    Potassium 4.8 3.5 - 5.2 mmol/L    Chloride 93 (L) 98 - 107 mmol/L    CO2 23.0 22.0 - 29.0 mmol/L    Calcium 10.1 8.6 - 10.5 mg/dL    Total Protein 7.3 6.0 - 8.5 g/dL    Albumin 3.40 (L) 3.50 - 5.20 g/dL    ALT (SGPT) 16 1 - 33 U/L    AST (SGOT) 21 1 - 32 U/L    Alkaline Phosphatase 73 39 - 117 U/L    Total  Bilirubin 0.4 0.0 - 1.2 mg/dL    eGFR Non African Amer 73 >60 mL/min/1.73    Globulin 3.9 gm/dL    A/G Ratio 0.9 g/dL    BUN/Creatinine Ratio 10.5 7.0 - 25.0    Anion Gap 13.0 5.0 - 15.0 mmol/L   Protime-INR    Specimen: Blood   Result Value Ref Range    Protime 12.9 12.1 - 15.0 Seconds    INR 1.00 0.90 - 1.10   aPTT    Specimen: Blood   Result Value Ref Range    PTT 22.4 (L) 24.3 - 38.1 seconds   Troponin    Specimen: Blood   Result Value Ref Range    Troponin T <0.010 0.000 - 0.030 ng/mL   D-dimer, Quantitative    Specimen: Blood   Result Value Ref Range    D-Dimer, Quantitative 1.66 (H) 0.00 - 0.46 MCGFEU/mL   CBC Auto Differential    Specimen: Blood   Result Value Ref Range    WBC 8.02 3.40 - 10.80 10*3/mm3    RBC 3.40 (L) 3.77 - 5.28 10*6/mm3    Hemoglobin 10.1 (L) 12.0 - 15.9 g/dL    Hematocrit 31.3 (L) 34.0 - 46.6 %    MCV 92.1 79.0 - 97.0 fL    MCH 29.7 26.6 - 33.0 pg    MCHC 32.3 31.5 - 35.7 g/dL    RDW 11.8 (L) 12.3 - 15.4 %    RDW-SD 40.8 37.0 - 54.0 fl    MPV 9.1 6.0 - 12.0 fL    Platelets 438 140 - 450 10*3/mm3    Neutrophil % 77.3 (H) 42.7 - 76.0 %    Lymphocyte % 12.5 (L) 19.6 - 45.3 %    Monocyte % 8.5 5.0 - 12.0 %    Eosinophil % 0.4 0.3 - 6.2 %    Basophil % 0.2 0.0 - 1.5 %    Immature Grans % 1.1 (H) 0.0 - 0.5 %    Neutrophils, Absolute 6.20 1.70 - 7.00 10*3/mm3    Lymphocytes, Absolute 1.00 0.70 - 3.10 10*3/mm3    Monocytes, Absolute 0.68 0.10 - 0.90 10*3/mm3    Eosinophils, Absolute 0.03 0.00 - 0.40 10*3/mm3    Basophils, Absolute 0.02 0.00 - 0.20 10*3/mm3    Immature Grans, Absolute 0.09 (H) 0.00 - 0.05 10*3/mm3   Scan Slide    Specimen: Blood   Result Value Ref Range    RBC Morphology Normal Normal    WBC Morphology Normal Normal    Platelet Morphology Normal Normal   ECG 12 Lead   Result Value Ref Range    QT Interval 411 ms         I ordered the above labs and reviewed the results    RADIOLOGY  Xr Chest 1 View    Result Date: 11/24/2020  Narrative: AP PORTABLE CHEST, 11/24/2020  HISTORY:  Shortness of air for CT 2 days  COMPARISON: 11/28/2017 and 2/18/2019  TECHNIQUE: AP portable chest x-ray.  FINDINGS: Heart size and vascularity are normal. Bones are unremarkable except for old rib fractures. There is mild interstitial prominence present which is unchanged from the previous study      Impression: Old rib fractures bilaterally.  No infiltrates  This report was finalized on 11/24/2020 11:59 AM by Dr. Armando Arriaga MD.        I ordered the above radiologic testing and reviewed the results    PROCEDURES  Procedures      PROGRESS AND CONSULTS  ED Course as of Nov 24 1404   Tue Nov 24, 2020   1059 EKG was contemporaneously reviewed at 1055 hrs. and showed a normal sinus rhythm with a first-degree AV block.  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  ST segments and T waves unremarkable.  No ectopy.  EKG compared to last available dated March 1, 2019 and there are no significant new changes present today.    [ML]   1213 COVID-19 test returned positive and patient presents with relatively mild symptoms.  Discharge expected and Dr. Mathew was consulted concerning any possible discharge medications.  Dr. Mathew felt that supportive care only was indicated at this time.  Small bolus of normal saline will be given to help correct the patient's mild hyponatremia.    [ML]   1401 Case management spoken to concerning follow-up on this patient.  They did speak with the patient's daughter-in-law who did not feel that further outpatient care was needed at this time.  Patient to be discharged home with appropriate warnings.    [ML]      ED Course User Index  [ML] Silvestre Castelan MD           MEDICAL DECISION MAKING  Results were reviewed/discussed with the patient and they were also made aware of online access. Pt also made aware that some labs, such as cultures, will not be resulted during ER visit and follow up with PMD is necessary.     MDM       DIAGNOSIS  Final diagnoses:   COVID-19  virus infection   Dyspnea, unspecified type       Latest Documented Vital Signs:  As of 14:04 EST  BP- 158/83 HR- 75 Temp- 98.2 °F (36.8 °C) (Oral) O2 sat- 95%    DISPOSITION  Discharged in stable condition       Medication List      No changes were made to your prescriptions during this visit.         Follow-up Information     Arnaud Monson MD (Tony).    Specialties: Internal Medicine, Emergency Medicine  Why: As needed  Contact information:  [address] [address] [phone]                      Silvestre Castelan MD  11/24/20 4096

## 2020-11-24 NOTE — NURSING NOTE
Rec'd call from Dr Castelan regarding patient that is currently in ER. He gives history and patient has stated she is getting some PT at home. He asks if there are additional services for the patient at home as she does not need to be admitted to the hospital at this time. Spoke with Leslye/Peter who states  When patient was dc'd from [place] she was set up with [facility] with both nursing and PT following. Leslye states they can discuss with family the option of her returning to [place] if needed. Shalonda PRATER/ER updated. Request that CM contact patients daughter-in-law Samina Reid ([phone]) to update. Spoke with Mrs Reid who confirms [facility]  nurs[facility] & PT are currently seeing patient. Offer listing of private pay agencies that can provide in home care. She states that patients son lives with her and declines listing. Informed they could contact Leslye/Peter if they feel patient would need additional rehab. She appreciates the information and denies additional needs at this time. CM will follow up with [facility] to inform of covid + result. Delfino/ER updated.

## 2020-11-24 NOTE — ED NOTES
"PT stated she was discharged from [facility] two weeks ago where she had tested negative for COVID twice.  She said she lives alone but since she got back home her son has stayed with her some.  She said she did not do much physical therapy at [facility] \"because a broke my hip!\" and that since she has been home she has had home health come in and PT.  She said she feels SOB with exertion when she is walking for PT and called her doctor who sent her to ER.  Pt stated she is still in a lot of pain even while in bed from broken hip and is still taking pain medication regularly.  Pt said she has no other s/s EXCEPT shortness of breath with exertion during physical therapy.     Niyah Vela RN  11/24/20 2909    "

## 2020-11-24 NOTE — ED NOTES
"Talked to Samina, wife of pt's son Janes.  She stated she will inform pt's son, her home health aid and her physical therapist and agreed to quarantine.  Samina stated Janes will be at her house, that \"he is not driving right now\"     Niyah Vela, RN  11/24/20 2482    "

## 2020-11-24 NOTE — ED NOTES
Navjot with case management relayed that the pt's daughter in law stated that there were no additional interventions required at this time. Dr Castelan notified.     Delfino Inman  11/24/20 [id number]

## 2020-12-04 ENCOUNTER — TELEPHONE (OUTPATIENT)
Dept: PAIN MEDICINE | Facility: CLINIC | Age: 85
End: 2020-12-04

## 2020-12-04 ENCOUNTER — DOCUMENTATION (OUTPATIENT)
Dept: PAIN MEDICINE | Facility: CLINIC | Age: 85
End: 2020-12-04

## 2020-12-04 DIAGNOSIS — S72.351A CLOSED DISPLACED COMMINUTED FRACTURE OF SHAFT OF RIGHT FEMUR, INITIAL ENCOUNTER (HCC): ICD-10-CM

## 2020-12-04 RX ORDER — HYDROCODONE BITARTRATE AND ACETAMINOPHEN 7.5; 325 MG/1; MG/1
1 TABLET ORAL TAKE AS DIRECTED
Qty: 21 TABLET | Refills: 0 | Status: SHIPPED | OUTPATIENT
Start: 2020-12-04 | End: 2020-12-04

## 2020-12-04 RX ORDER — HYDROCODONE BITARTRATE AND ACETAMINOPHEN 7.5; 325 MG/1; MG/1
1 TABLET ORAL TAKE AS DIRECTED
Qty: 21 TABLET | Refills: 0 | Status: SHIPPED | OUTPATIENT
Start: 2020-12-04 | End: 2020-12-10 | Stop reason: SDUPTHER

## 2020-12-04 NOTE — PROGRESS NOTES
Attempted to contact patient twice for telephone encounter.  I was unable to get in touch with this patient.

## 2020-12-05 NOTE — TELEPHONE ENCOUNTER
1 week supply of [place] 7.5/325 sent to pharmacy.  Please change this patients next visit to an IN OFFICE once has completed her 14 day quarantine.

## 2020-12-07 NOTE — TELEPHONE ENCOUNTER
Pt informed today. End of quarantine is 12/8/20. Told Ely to call pt to reschedule in office with Tania on 12/10/20. Thank you.

## 2020-12-10 ENCOUNTER — OFFICE VISIT (OUTPATIENT)
Dept: PAIN MEDICINE | Facility: CLINIC | Age: 85
End: 2020-12-10

## 2020-12-10 VITALS
BODY MASS INDEX: 21 KG/M2 | DIASTOLIC BLOOD PRESSURE: 71 MMHG | TEMPERATURE: 97.5 F | WEIGHT: 123 LBS | OXYGEN SATURATION: 94 % | SYSTOLIC BLOOD PRESSURE: 139 MMHG | HEART RATE: 65 BPM | HEIGHT: 64 IN | RESPIRATION RATE: 16 BRPM

## 2020-12-10 DIAGNOSIS — M54.42 CHRONIC BILATERAL LOW BACK PAIN WITH BILATERAL SCIATICA: ICD-10-CM

## 2020-12-10 DIAGNOSIS — S72.351A CLOSED DISPLACED COMMINUTED FRACTURE OF SHAFT OF RIGHT FEMUR, INITIAL ENCOUNTER (HCC): ICD-10-CM

## 2020-12-10 DIAGNOSIS — S72.001A CLOSED FRACTURE OF RIGHT HIP, INITIAL ENCOUNTER (HCC): ICD-10-CM

## 2020-12-10 DIAGNOSIS — G89.29 OTHER CHRONIC PAIN: Primary | ICD-10-CM

## 2020-12-10 DIAGNOSIS — Z79.899 ENCOUNTER FOR LONG-TERM CURRENT USE OF HIGH RISK MEDICATION: ICD-10-CM

## 2020-12-10 DIAGNOSIS — G89.29 CHRONIC BILATERAL LOW BACK PAIN WITH BILATERAL SCIATICA: ICD-10-CM

## 2020-12-10 DIAGNOSIS — M17.12 ARTHRITIS OF LEFT KNEE: ICD-10-CM

## 2020-12-10 DIAGNOSIS — M54.41 CHRONIC BILATERAL LOW BACK PAIN WITH BILATERAL SCIATICA: ICD-10-CM

## 2020-12-10 DIAGNOSIS — M17.11 ARTHRITIS OF RIGHT KNEE: ICD-10-CM

## 2020-12-10 PROCEDURE — 99214 OFFICE O/P EST MOD 30 MIN: CPT | Performed by: NURSE PRACTITIONER

## 2020-12-10 RX ORDER — HYDROCODONE BITARTRATE AND ACETAMINOPHEN 7.5; 325 MG/1; MG/1
1 TABLET ORAL TAKE AS DIRECTED
Qty: 90 TABLET | Refills: 0 | Status: SHIPPED | OUTPATIENT
Start: 2020-12-10 | End: 2021-01-18 | Stop reason: SDUPTHER

## 2020-12-10 NOTE — PROGRESS NOTES
CHIEF COMPLAINT  F/U back and knee pain- patient states that her pain has remained the same since her last visit.     Subjective   Ashli León is a 85 y.o. female  who presents for follow-up.  She has a history of back pain, knee pain.    C/o low back and left knee pain. Pain today 7/10 in severity (right hip).  A few months ago the patient was changed from hydrocodone to Belbuca due to concerns of PGT abnormality.  She was unable to afford the testing that was reporting that the medication was only lasting for a couple of hours and was lacking all the metabolites on multiple drug screens.  Trailed on Belbuca 75 mcg bid.  She reported very little pain relief.  She reported that she is having difficulty opening the package due to her arthritis.  She has also reported diarrhea.  She also reported that it was cost prohibitive.  She was switched back to hydrocodone two months ago with plan for PGT next time in the office.    she currently takes hydrocodone 7.5/325 3/day. Reports that it really does help quite a bit. Denies adverse reactions.       On 9/15/2020 the patient completed an L3-L4 LESI as well as a left knee injection with fluoroscopic guidance. She reports moderate relief but only helped for about a week or two.      Since the last time patient was evaluated she fell and sustained a right femoral shaft fracture.  Underwent ORIF of the right femoral shaft fracture on 10/11/2020.  Was discharged into an acute rehab facility for a month.  Pain medication was managed by the rehab facility.  Home PT to resume next week.      Diagnosed with COVID-19 11/24/2020.    Patient remained masked during entire encounter. No cough present. I donned a mask and eye protection throughout entire visit. Prior to donning mask and eye protection, hand hygiene was performed, as well as when it was doffed.  I was closer than 6 feet, but not for an extended period of time. No obvious exposure to any bodily fluids.    Back  Pain  This is a chronic problem. The current episode started more than 1 year ago. The problem occurs daily. The problem has been waxing and waning since onset. The pain is present in the lumbar spine. The pain radiates to the left thigh and right thigh. The pain is at a severity of 7/10. The pain is moderate. The symptoms are aggravated by position, standing, sitting and bending. Associated symptoms include numbness (bilateral feet) and weakness. Pertinent negatives include no abdominal pain, chest pain, dysuria, fever or headaches. Risk factors include sedentary lifestyle. She has tried analgesics, NSAIDs, ice and heat (injections) for the symptoms. The treatment provided moderate relief.   Knee Pain   The pain is present in the left knee. The pain is at a severity of 7/10. The pain is moderate. The pain has been worsening since onset. Associated symptoms include numbness (bilateral feet). The symptoms are aggravated by weight bearing and movement. She has tried NSAIDs and acetaminophen (injections) for the symptoms. The treatment provided moderate relief.      PEG Assessment   What number best describes your pain on average in the past week?5  What number best describes how, during the past week, pain has interfered with your enjoyment of life?10  What number best describes how, during the past week, pain has interfered with your general activity?  10    The following portions of the patient's history were reviewed and updated as appropriate: allergies, current medications, past family history, past medical history, past social history, past surgical history and problem list.    Review of Systems   Constitutional: Positive for activity change (decreased). Negative for chills, fatigue and fever.   HENT: Negative for congestion.    Eyes: Negative for visual disturbance.   Respiratory: Negative for chest tightness and shortness of breath.    Cardiovascular: Negative for chest pain.   Gastrointestinal: Negative for  "abdominal pain, constipation and diarrhea.   Genitourinary: Negative for difficulty urinating, dyspareunia and dysuria.   Musculoskeletal: Positive for back pain.   Neurological: Positive for weakness and numbness (bilateral feet). Negative for dizziness, light-headedness and headaches.   Psychiatric/Behavioral: Positive for sleep disturbance. Negative for agitation. The patient is not nervous/anxious.      I have reviewed and confirmed the accuracy of the ROS as documented by the MA/LPN/RN TANIA Tracy    Vitals:    12/10/20 1413   BP: 139/71   Pulse: 65   Resp: 16   Temp: 97.5 °F (36.4 °C)   SpO2: 94%   Weight: 55.8 kg (123 lb)  Comment: patient states- unable to stand   Height: 162.6 cm (64\")   PainSc:   7   PainLoc: Hip       Objective   Physical Exam  Vitals signs and nursing note reviewed.   Constitutional:       General: She is not in acute distress.     Appearance: Normal appearance. She is not ill-appearing.   HENT:      Head: Atraumatic.   Eyes:      Conjunctiva/sclera: Conjunctivae normal.   Cardiovascular:      Rate and Rhythm: Normal rate.   Pulmonary:      Effort: Pulmonary effort is normal. No respiratory distress.   Musculoskeletal:      Right hip: She exhibits tenderness.      Lumbar back: She exhibits tenderness.   Skin:     General: Skin is warm and dry.   Neurological:      Mental Status: She is alert and oriented to person, place, and time.      Gait: Gait abnormal (wheelchair).   Psychiatric:         Mood and Affect: Mood normal.         Behavior: Behavior normal.       Assessment/Plan   Diagnoses and all orders for this visit:    1. Other chronic pain (Primary)  -     Oral Fluid Drug Screen - Saliva, Oral Cavity; Future    2. Chronic bilateral low back pain with bilateral sciatica  -     Oral Fluid Drug Screen - Saliva, Oral Cavity; Future    3. Arthritis of left knee  -     Oral Fluid Drug Screen - Saliva, Oral Cavity; Future    4. Arthritis of right knee  -     Oral Fluid Drug " Screen - Saliva, Oral Cavity; Future    5. Encounter for long-term current use of high risk medication  -     Oral Fluid Drug Screen - Saliva, Oral Cavity; Future    6. Closed fracture of right hip, initial encounter (CMS/[facility])  -     Oral Fluid Drug Screen - Saliva, Oral Cavity; Future    7. Closed displaced comminuted fracture of shaft of right femur, initial encounter (CMS/[facility])  -     HYDROcodone-acetaminophen (NORCO) 7.5-325 MG per tablet; Take 1 tablet by mouth Take As Directed. 1 tablet every 6-8 hours PRN. Do Not exceed 3 tablets in 24 hours  Dispense: 90 tablet; Refill: 0  -     Oral Fluid Drug Screen - Saliva, Oral Cavity; Future      --- PGT today  --- Refill Hydrocodone. Patient appears stable with current regimen. No adverse effects. Regarding continuation of opioids, there is no evidence of aberrant behavior or any red flags.  The patient continues with appropriate response to opioid therapy. ADL's remain intact by self.   --- Routine ODT in office today as part of monitoring requirements for controlled substances.  The specimen was viewed and the immunoassay result reviewed and is NO POC available.  This specimen will be sent to [facility] laboratory for confirmation.     --- Follow-up 2 months          JUDY REPORT  As part of the patient's treatment plan, I am prescribing controlled substances. The patient has been made aware of appropriate use of such medications, including potential risk of somnolence, limited ability to drive and/or work safely, and the potential for dependence or overdose. It has also bee made clear that these medications are for use by this patient only, without concomitant use of alcohol or other substances unless prescribed.     Patient has completed prescribing agreement detailing terms of continued prescribing of controlled substances, including monitoring JUDY reports, urine drug screening, and pill counts if necessary. The patient is aware that inappropriate use will  results in cessation of prescribing such medications.    JUDY report has been reviewed and scanned into the patient's chart.    As the clinician, I personally reviewed the JUDY from 12/10/2020 while the patient was in the office today.    History and physical exam exhibit continued safe and appropriate use of controlled substances.    EMR Dragon/Transcription disclaimer:   Much of this encounter note is an electronic transcription/translation of spoken language to printed text. The electronic translation of spoken language may permit erroneous, or at times, nonsensical words or phrases to be inadvertently transcribed; Although I have reviewed the note for such errors, some may still exist.

## 2020-12-11 ENCOUNTER — TELEPHONE (OUTPATIENT)
Dept: INTERNAL MEDICINE | Facility: CLINIC | Age: 85
End: 2020-12-11

## 2020-12-11 NOTE — TELEPHONE ENCOUNTER
Nurse from [facility] saying jose had tested positive for covid 11/24, can she come out of isolation now

## 2020-12-14 RX ORDER — OMEPRAZOLE 40 MG/1
40 CAPSULE, DELAYED RELEASE ORAL DAILY
Qty: 90 CAPSULE | Refills: 1 | Status: SHIPPED | OUTPATIENT
Start: 2020-12-14 | End: 2021-06-08

## 2021-01-05 ENCOUNTER — TELEPHONE (OUTPATIENT)
Dept: INTERNAL MEDICINE | Facility: CLINIC | Age: 86
End: 2021-01-05

## 2021-01-05 NOTE — TELEPHONE ENCOUNTER
Caller: GAGAN    Relationship:     Best call back number: [phone]    What orders are you requesting (i.e. lab or imaging): VERBAL ORDER FOR PHYSICAL THERAPY RECERT    In what timeframe would the patient need to come in: 1/5/2021, 1/6/2021    Where will you receive your lab/imaging services: [facility]

## 2021-01-05 NOTE — TELEPHONE ENCOUNTER
KATLIN FROM [facility] CALLED AND STATED THAT THEY NEED ORDERS FOR SKILLED NURSING ONE TIME A WEEK FOR 3 WEEKS FOR THE PATIENT.         PLEASE ADVISE.    CALL BACK NUMBER: [phone]

## 2021-01-06 ENCOUNTER — TELEPHONE (OUTPATIENT)
Dept: INTERNAL MEDICINE | Facility: CLINIC | Age: 86
End: 2021-01-06

## 2021-01-06 NOTE — TELEPHONE ENCOUNTER
I s/w Jun and okayed the PT order per Dr. Monson.  I also asked him to remind Ashli that she needs an office visit with Dr. Monson.

## 2021-01-06 NOTE — TELEPHONE ENCOUNTER
Caller: GAGAN     Relationship: PT THERAPIST    Best call back number: [phone]    What orders are you requesting (i.e. lab or imaging): NEED VERBAL ORDERS FOR PT STARTING NEXT WEEK 2 TIME A WEEK FOR 4 WEEKS THEN DOWN TO ONE TIME WEEK FOR 2 WEEKS.

## 2021-01-18 DIAGNOSIS — S72.351A CLOSED DISPLACED COMMINUTED FRACTURE OF SHAFT OF RIGHT FEMUR, INITIAL ENCOUNTER (HCC): ICD-10-CM

## 2021-01-18 RX ORDER — HYDROCODONE BITARTRATE AND ACETAMINOPHEN 7.5; 325 MG/1; MG/1
1 TABLET ORAL TAKE AS DIRECTED
Qty: 70 TABLET | Refills: 0 | Status: SHIPPED | OUTPATIENT
Start: 2021-01-18 | End: 2021-02-09 | Stop reason: SDUPTHER

## 2021-01-18 NOTE — TELEPHONE ENCOUNTER
Medication Refill Request    Date of phone call: 1/18/21    Medication being requested: Hydrocodone-apap 7.5-325 mg sig: q6-8hrs PRN  Qty: 90    Date of last visit: 12/10/20    Date of last refill: 12/10/20    JUDY up to date?: 12/9/20    Next Follow up?: 2/9/21    Any new pertinent information? (i.e, new medication allergies, new use of medications, change in patient's health or condition, non-compliance or inconsistency with prescribing agreement?): n/a

## 2021-02-04 ENCOUNTER — TELEPHONE (OUTPATIENT)
Dept: INTERNAL MEDICINE | Facility: CLINIC | Age: 86
End: 2021-02-04

## 2021-02-04 NOTE — TELEPHONE ENCOUNTER
ALOK WITH [facility] REQUESTED A VERBAL FOR A NURSE TO ASSESS A SKIN TEAR ON TERI'S LEG. TERI REQUESTED A NURSE TO COME Saturday 2/6/2021    PLEASE ADVISE:  [facility] [phone]

## 2021-02-08 ENCOUNTER — TELEPHONE (OUTPATIENT)
Dept: INTERNAL MEDICINE | Facility: CLINIC | Age: 86
End: 2021-02-08

## 2021-02-08 NOTE — TELEPHONE ENCOUNTER
LAMAR FROM [facility] CALLED STATING THAT SHE WENT TO SEE THE PATIENT, TERI BOBO, FOR AN OLD SKIN TEAR. LAMAR STATED THAT THE PATIENT HAS A VERY THICK, ALMOST BLACK, SCAB ON THE SKIN TEAR ON HER LOWER LEFT LEG. LAMAR STATED THAT THE AREA AROUND THE SCAB IS A PALE-PINK COLOR, AND THERE IS NO HEAT TO IT, SO SHE DOES NOT THINK THAT THERE IS ANY INFECTION.    LAMAR REQUESTED FOR AN ORDER TO BE WRITTEN FOR THE PATIENT FOR [facility]ERAHONEY SO THAT SHE CAN GET THE SCAB TO COME OFF AND SEE WHAT THE SKIN TEAR LOOKS LIKE UNDERNEATH IT.    PLEASE CALL [facility] [phone] WHEN THIS MESSAGE HAS BEEN RECEIVED AND ADVISE. LAMAR STATED THAT A DETAILED MESSAGE CAN BE LEFT ON HER VOICEMAIL IF SHE IS UNABLE TO ANSWER WHEN CALLED.

## 2021-02-09 ENCOUNTER — OFFICE VISIT (OUTPATIENT)
Dept: PAIN MEDICINE | Facility: CLINIC | Age: 86
End: 2021-02-09

## 2021-02-09 DIAGNOSIS — S72.351A CLOSED DISPLACED COMMINUTED FRACTURE OF SHAFT OF RIGHT FEMUR, INITIAL ENCOUNTER (HCC): ICD-10-CM

## 2021-02-09 DIAGNOSIS — Z79.899 ENCOUNTER FOR LONG-TERM CURRENT USE OF HIGH RISK MEDICATION: ICD-10-CM

## 2021-02-09 DIAGNOSIS — M54.42 CHRONIC BILATERAL LOW BACK PAIN WITH BILATERAL SCIATICA: ICD-10-CM

## 2021-02-09 DIAGNOSIS — G89.29 CHRONIC BILATERAL LOW BACK PAIN WITH BILATERAL SCIATICA: ICD-10-CM

## 2021-02-09 DIAGNOSIS — G89.29 OTHER CHRONIC PAIN: Primary | ICD-10-CM

## 2021-02-09 DIAGNOSIS — M17.11 ARTHRITIS OF RIGHT KNEE: ICD-10-CM

## 2021-02-09 DIAGNOSIS — M17.12 ARTHRITIS OF LEFT KNEE: ICD-10-CM

## 2021-02-09 DIAGNOSIS — M54.41 CHRONIC BILATERAL LOW BACK PAIN WITH BILATERAL SCIATICA: ICD-10-CM

## 2021-02-09 PROCEDURE — 99442 PR PHYS/QHP TELEPHONE EVALUATION 11-20 MIN: CPT | Performed by: NURSE PRACTITIONER

## 2021-02-09 RX ORDER — METHYLPREDNISOLONE 4 MG/1
TABLET ORAL
Qty: 21 TABLET | Refills: 0 | Status: SHIPPED | OUTPATIENT
Start: 2021-02-09 | End: 2021-02-23 | Stop reason: ALTCHOICE

## 2021-02-09 RX ORDER — HYDROCODONE BITARTRATE AND ACETAMINOPHEN 7.5; 325 MG/1; MG/1
1 TABLET ORAL TAKE AS DIRECTED
Qty: 90 TABLET | Refills: 0 | Status: SHIPPED | OUTPATIENT
Start: 2021-02-09 | End: 2021-03-25 | Stop reason: SDUPTHER

## 2021-02-09 NOTE — PROGRESS NOTES
TELEPHONE VISIT  You have chosen to receive care through a telephone visit. Do you consent to use a telephone visit for your medical care today? Yes    CHIEF COMPLAINT  Back pain    Subjective   Ashli León is a 85 y.o. female  who presents for a telephonic follow-up.She has a history of back and knee pain.    C/o low back and left knee pain. Pain today 7/10 in severity (right hip and low back).  A few months ago the patient was changed from hydrocodone to Belbuca due to concerns of PGT abnormality.  She was unable to afford the testing that was reporting that the medication was only lasting for a couple of hours and was lacking all the metabolites on multiple drug screens.  Trailed on Belbuca 75 mcg bid.  She reported very little pain relief.  She reported that she is having difficulty opening the package due to her arthritis.  She has also reported diarrhea.  She also reported that it was cost prohibitive.  She was switched back to hydrocodone two months ago with plan for PGT next time in the office.   She currently takes hydrocodone 7.5/325 3/day. Reports that it really does help quite a bit. Denies adverse reactions.  today she reports significant relief for about 6 hours.      On 9/15/2020 the patient completed an L3-L4 LESI as well as a left knee injection with fluoroscopic guidance. She reports moderate relief but only helped for about a week or two.      Since the last time patient was evaluated she fell and sustained a right femoral shaft fracture.  Underwent ORIF of the right femoral shaft fracture on 10/11/2020.  Was discharged into an acute rehab facility for a month.  Home PT twice a week.  Still ambulating with walker.      Diagnosed with COVID-19 11/24/2020.     Back Pain  This is a chronic problem. The current episode started more than 1 year ago. The problem occurs daily. The problem has been waxing and waning since onset. The pain is present in the lumbar spine. The pain radiates to the left  thigh and right thigh. The pain is at a severity of 7/10. The pain is moderate. The symptoms are aggravated by position, standing, sitting and bending. Associated symptoms include numbness (bilateral feet) and weakness. Pertinent negatives include no abdominal pain, chest pain, dysuria, fever or headaches. Risk factors include sedentary lifestyle. She has tried analgesics, NSAIDs, ice and heat (injections) for the symptoms. The treatment provided moderate relief.   Knee Pain   The pain is present in the left knee. The pain is at a severity of 7/10. The pain is moderate. The pain has been worsening since onset. Associated symptoms include numbness (bilateral feet). The symptoms are aggravated by weight bearing and movement. She has tried NSAIDs and acetaminophen (injections) for the symptoms. The treatment provided moderate relief.     The following portions of the patient's history were reviewed and updated as appropriate: allergies, current medications, past family history, past medical history, past social history, past surgical history and problem list.    Review of Systems   Constitutional: Negative for fever.   Cardiovascular: Negative for chest pain.   Gastrointestinal: Negative for abdominal pain.   Genitourinary: Negative for dysuria.   Musculoskeletal: Positive for back pain.   Neurological: Positive for weakness and numbness (bilateral feet). Negative for headaches.     Vitals:    02/09/21 1258   PainSc:   7   PainLoc: Hip     Objective   Physical Exam  As this is a telephone check-in, the ability to perform a routine physical exam is extremely limited. The patient seems alert and is oriented appropriately.   On this phone call there is not any evidence of respiratory distress.   The patient seems of normal mood.   The remainder of a routine physical exam is deferred.    Assessment/Plan   Diagnoses and all orders for this visit:    1. Other chronic pain (Primary)    2. Chronic bilateral low back pain with  bilateral sciatica    3. Encounter for long-term current use of high risk medication    4. Arthritis of left knee    5. Arthritis of right knee    6. Closed displaced comminuted fracture of shaft of right femur, initial encounter (CMS/[facility])      ----------------  Our practice is offering alternative &/or electronic methods to continue to follow our patients while at the same time further the efforts toward social distancing, in accordance with our organizational policies, professional societies' guidance, and [place] mandates.  I support the Healthy at Home campaign and in this visit I have counseled the patient on our needs to limit in-person office visits and physical encounters with medical facilities whenever possible.  I have also educated the patient on the medical necessities of maintaining social distancing while we continue to function during this crisis period.      The patient was counseled on the need to consider telehealth options. The patient was offered the opportunity for a Video Visit using [employer]. The patient declined a Video Visit. The patient agreed to a Telephone Encounter. The patient was counseled on the need for a telehealth visit for necessary monitoring. The patient was educated about our efforts to comply with monitoring standards when prescribing potent medications. During these pandemic conditions, telephone encounters are federally mandated as acceptable alternative to videoconference or in-person encounters to ensure that an individual can receive appropriate care and be monitored regardless of access to technology.   Coding & Billing for telehealth encounter is completed based on medical decision making concurrent with the 2021 E&M criteria, based on current federally mandated guidelines.    ----------------  --- Refill Hydrocodone.  Patient appears stable with current regimen. No adverse effects. Regarding continuation of opioids, there is no evidence of aberrant behavior or  any red flags.  The patient continues with appropriate response to opioid therapy. ADL's remain intact by self.   --- The urine drug screen confirmation from 12/10/2020 has been reviewed and the result is appropriate based on patient history and JUDY report  --- Medrol dose pack for acute flare   --- Reviewed the result of the PGT test from 12/10/2020.  Patient is a poor metabolizer of the CYP2D6.  She is currently reporting very appropriate response to the hydrocodone.  We will continue with the regimen for now.  If effectiveness or responsiveness become less would consider medication change at that time.  Unfortunately already failed Belbuca as noted above.  Could consider trying Butrans patch, would worry about cost of this medication as well.  --- Follow-up 2 months   This visit has been rescheduled as a phone visit to comply with patient safety concerns in accordance with CDC recommendations. Total time of discussion was 12 minutes.       JUDY REPORT  As part of the patient's treatment plan, I am prescribing controlled substances. The patient has been made aware of appropriate use of such medications, including potential risk of somnolence, limited ability to drive and/or work safely, and the potential for dependence or overdose. It has also bee made clear that these medications are for use by this patient only, without concomitant use of alcohol or other substances unless prescribed.     Patient has completed prescribing agreement detailing terms of continued prescribing of controlled substances, including monitoring JUDY reports, urine drug screening, and pill counts if necessary. The patient is aware that inappropriate use will results in cessation of prescribing such medications.    JUDY report has been reviewed and scanned into the patient's chart.    As the clinician, I personally reviewed the JUDY from 2/9/2021 while the patient was in the office today.    History and physical exam exhibit  continued safe and appropriate use of controlled substances.  -------  EMR Dragon/Transcription disclaimer:   Much of this encounter note is an electronic transcription/translation of spoken language to printed text. The electronic translation of spoken language may permit erroneous, or at times, nonsensical words or phrases to be inadvertently transcribed; Although I have reviewed the note for such errors, some may still exist.

## 2021-02-23 ENCOUNTER — OFFICE VISIT (OUTPATIENT)
Dept: INTERNAL MEDICINE | Facility: CLINIC | Age: 86
End: 2021-02-23

## 2021-02-23 VITALS — BODY MASS INDEX: 22.71 KG/M2 | HEIGHT: 64 IN | WEIGHT: 133 LBS

## 2021-02-23 DIAGNOSIS — G31.9 DEGENERATIVE DISEASE OF NERVOUS SYSTEM, UNSPECIFIED (HCC): ICD-10-CM

## 2021-02-23 DIAGNOSIS — F51.01 PRIMARY INSOMNIA: ICD-10-CM

## 2021-02-23 DIAGNOSIS — G47.09 OTHER INSOMNIA: ICD-10-CM

## 2021-02-23 DIAGNOSIS — G89.29 OTHER CHRONIC PAIN: ICD-10-CM

## 2021-02-23 DIAGNOSIS — S72.351A CLOSED DISPLACED COMMINUTED FRACTURE OF SHAFT OF RIGHT FEMUR, INITIAL ENCOUNTER (HCC): Primary | ICD-10-CM

## 2021-02-23 PROCEDURE — 99441 PR PHYS/QHP TELEPHONE EVALUATION 5-10 MIN: CPT | Performed by: INTERNAL MEDICINE

## 2021-02-23 RX ORDER — PRAVASTATIN SODIUM 40 MG
40 TABLET ORAL DAILY
Qty: 90 TABLET | Refills: 4 | Status: SHIPPED | OUTPATIENT
Start: 2021-02-23 | End: 2021-02-23 | Stop reason: SDUPTHER

## 2021-02-23 RX ORDER — ZOLPIDEM TARTRATE 5 MG/1
5 TABLET ORAL NIGHTLY
Qty: 3 TABLET | Refills: 0 | Status: SHIPPED | OUTPATIENT
Start: 2021-02-23 | End: 2021-02-23 | Stop reason: SDUPTHER

## 2021-02-23 RX ORDER — DOXEPIN HYDROCHLORIDE 25 MG/1
25 CAPSULE ORAL NIGHTLY
Qty: 90 CAPSULE | Refills: 1 | Status: SHIPPED | OUTPATIENT
Start: 2021-02-23 | End: 2021-02-23 | Stop reason: SDUPTHER

## 2021-02-23 RX ORDER — ZOLPIDEM TARTRATE 5 MG/1
5 TABLET ORAL NIGHTLY
Qty: 90 TABLET | Refills: 1 | Status: SHIPPED | OUTPATIENT
Start: 2021-02-23 | End: 2021-02-24 | Stop reason: ALTCHOICE

## 2021-02-23 RX ORDER — PRAVASTATIN SODIUM 40 MG
40 TABLET ORAL DAILY
Qty: 90 TABLET | Refills: 4 | Status: SHIPPED | OUTPATIENT
Start: 2021-02-23 | End: 2021-08-24

## 2021-02-23 RX ORDER — DOXEPIN HYDROCHLORIDE 25 MG/1
25 CAPSULE ORAL NIGHTLY
Qty: 90 CAPSULE | Refills: 1 | Status: SHIPPED | OUTPATIENT
Start: 2021-02-23 | End: 2022-03-19

## 2021-02-23 NOTE — PROGRESS NOTES
"Chief Complaint  Med Refill and Hip Pain (BROKEN RASHEL/NEEDS TO CONTINUE HOME HEALTH)    Subjective          Ashli León presents to [facility]  History of Present Illness  She is at home.  Walking on walker.  Seeing home health physical therapy seems to be helping.  Hip fracture is still giving her a lot of pain and she sees pain management.  She is on narcotics throughout the day.  Still difficulty sleeping which she has had for years.  She has been on Ambien and doxepin and need for refills.  Denies any sharing of the medication.You have chosen to receive care through a telephone visit. Do you consent to use a telephone visit for your medical care today? Yes.  6 minutes spent on the phone with the patient     Objective   Vital Signs:   Ht 161.3 cm (63.5\")   Wt 60.3 kg (133 lb)   BMI 23.19 kg/m²     Physical Exam   Result Review : Reviewed pain management notes                Assessment and Plan insomnia exacerbated by pain medication but she has been on these for years and has not had any luck without them.  Medication sent in.  She has follow-up with pain management.  Needs efills sent in.  Overall her mood seems good.  A little bit discouraged with her pain and somewhat lack of mobility.  Medication changes at this point.  Follow-up in the next few months or sooner if any problems  There are no diagnoses linked to this encounter.    Follow Up   No follow-ups on file.  Patient was given instructions and counseling regarding her condition or for health maintenance advice. Please see specific information pulled into the AVS if appropriate.       "

## 2021-02-24 ENCOUNTER — TELEPHONE (OUTPATIENT)
Dept: INTERNAL MEDICINE | Facility: CLINIC | Age: 86
End: 2021-02-24

## 2021-02-24 DIAGNOSIS — G47.09 OTHER INSOMNIA: Primary | ICD-10-CM

## 2021-02-24 RX ORDER — ZOLPIDEM TARTRATE 10 MG/1
10 TABLET ORAL NIGHTLY PRN
Qty: 90 TABLET | Refills: 1 | Status: SHIPPED | OUTPATIENT
Start: 2021-02-24 | End: 2021-11-02 | Stop reason: SDUPTHER

## 2021-02-24 NOTE — TELEPHONE ENCOUNTER
DR. PAREDES SENT IN REFILL FOR PATIENTS zolpidem (AMBIEN) 5 MG tablet HOWEVER PATIENT STATES SHE HAS ALWAYS TAKEN 10MG NOT 5MG. ALL OF HER REFILLS IN THE PAST HAVE BEEN 10MG, SHE SAYS THE 5MG WOULD NOT HELP HER BECAUSE SHE HAS A BROKE HIP AND IS DEALING WITH THAT AND IT WOULD TAKE 10MG TO HELP HER SLEEP AT NIGHT. CAN WE PLEASE FIX THIS MEDICATION DOSAGE FOR HER AND SEND IN CORRECT REFILL.      PATIENT CAN BE REACHED AT: [phone]         [facility] [address] [place] [address] [place], [place] - [address] - [phone]  - [phone] FX

## 2021-02-24 NOTE — TELEPHONE ENCOUNTER
Caller: Ashli León    Relationship: Self    Best call back number:[phone]    Medication needed:   Requested Prescriptions     Pending Prescriptions Disp Refills   • metoprolol tartrate (LOPRESSOR) 25 MG tablet        Sig: Take 1 tablet by mouth Daily.       When do you need the refill by: 02/24    What details did the patient provide when requesting the medication: DR. PAREDES SENT OVER ALL OF PATIENTS OTHER MEDICATION EXCEPT FOR THIS ONE, PATIENT IS COMPLETELY OUT AND NEEDS THIS REFILL.     Does the patient have less than a 3 day supply:  [x] Yes  [] No    What is the patient's preferred pharmacy: [facility] [address] [place] [address] - [address] [facility] [phone] [facility] [phone] FX

## 2021-03-02 ENCOUNTER — TELEPHONE (OUTPATIENT)
Dept: INTERNAL MEDICINE | Facility: CLINIC | Age: 86
End: 2021-03-02

## 2021-03-02 NOTE — TELEPHONE ENCOUNTER
[facility] CALLED REQUESTING ADDITIONAL ORDERS FOR ANOTHER 30 DAY ROUND OF VISITS 2 X A WEEK, AS WELL AS AN ORDER FOR 1 VISIT NEXT WEEK FOR REASSESSMENT.    PLEASE ADVISE  [phone]

## 2021-03-09 ENCOUNTER — TELEPHONE (OUTPATIENT)
Dept: INTERNAL MEDICINE | Facility: CLINIC | Age: 86
End: 2021-03-09

## 2021-03-25 RX ORDER — HYDROCODONE BITARTRATE AND ACETAMINOPHEN 7.5; 325 MG/1; MG/1
1 TABLET ORAL TAKE AS DIRECTED
Qty: 90 TABLET | Refills: 0 | Status: SHIPPED | OUTPATIENT
Start: 2021-03-25 | End: 2021-04-22 | Stop reason: SDUPTHER

## 2021-03-25 NOTE — TELEPHONE ENCOUNTER
Medication Refill Request    Date of phone call: 3/25/21    Medication being requested: Norco 7.5-325 mg  si tab po q 6-8 hrs prn  Qty: 90    Date of last visit: 21    Date of last refill: 21    JUDY up to date?: no    Next Follow up?: none scheduled    Any new pertinent information? (i.e, new medication allergies, new use of medications, change in patient's health or condition, non-compliance or inconsistency with prescribing agreement?):

## 2021-04-20 RX ORDER — HYDROCODONE BITARTRATE AND ACETAMINOPHEN 7.5; 325 MG/1; MG/1
TABLET ORAL
Qty: 90 TABLET | Refills: 0 | Status: CANCELLED | OUTPATIENT
Start: 2021-04-20

## 2021-04-20 NOTE — TELEPHONE ENCOUNTER
Medication Refill Request    Date of phone call: 4/20/2021    Medication being requested: hydro/apap 7.5-325 mg sig: take 1 tab q 6-8 hrs prn max 3/day  Qty: 90    Date of last visit: 2/9/2021    Date of last refill: 3/25/2021    JUDY up to date?: yes    Next Follow up?: no follow-up scheduled    Any new pertinent information? (i.e, new medication allergies, new use of medications, change in patient's health or condition, non-compliance or inconsistency with prescribing agreement?):

## 2021-04-22 ENCOUNTER — OFFICE VISIT (OUTPATIENT)
Dept: PAIN MEDICINE | Facility: CLINIC | Age: 86
End: 2021-04-22

## 2021-04-22 DIAGNOSIS — M25.551 CHRONIC RIGHT HIP PAIN: ICD-10-CM

## 2021-04-22 DIAGNOSIS — G89.29 CHRONIC RIGHT HIP PAIN: ICD-10-CM

## 2021-04-22 DIAGNOSIS — M54.41 CHRONIC BILATERAL LOW BACK PAIN WITH BILATERAL SCIATICA: ICD-10-CM

## 2021-04-22 DIAGNOSIS — G89.29 OTHER CHRONIC PAIN: Primary | ICD-10-CM

## 2021-04-22 DIAGNOSIS — G89.29 CHRONIC BILATERAL LOW BACK PAIN WITH BILATERAL SCIATICA: ICD-10-CM

## 2021-04-22 DIAGNOSIS — M54.42 CHRONIC BILATERAL LOW BACK PAIN WITH BILATERAL SCIATICA: ICD-10-CM

## 2021-04-22 DIAGNOSIS — Z79.899 ENCOUNTER FOR LONG-TERM CURRENT USE OF HIGH RISK MEDICATION: ICD-10-CM

## 2021-04-22 PROCEDURE — 99441 PR PHYS/QHP TELEPHONE EVALUATION 5-10 MIN: CPT | Performed by: NURSE PRACTITIONER

## 2021-04-22 RX ORDER — HYDROCODONE BITARTRATE AND ACETAMINOPHEN 7.5; 325 MG/1; MG/1
1 TABLET ORAL TAKE AS DIRECTED
Qty: 90 TABLET | Refills: 0 | Status: SHIPPED | OUTPATIENT
Start: 2021-04-22 | End: 2021-06-22 | Stop reason: ALTCHOICE

## 2021-04-22 NOTE — PROGRESS NOTES
TELEPHONE VISIT  You have chosen to receive care through a telephone visit. Do you consent to use a telephone visit for your medical care today? Yes    CHIEF COMPLAINT  Back pain  Hip pain    Subjective   Ashli León is a 85 y.o. female  who presents for a telephonic follow-up.She has a history of back, hip pain, knee pain.    C/o low back and left knee pain. Pain today 7/10 in severity (right hip and low back).  A few months ago the patient was changed from hydrocodone to Belbuca due to concerns of PGT abnormality.  Trailed on Belbuca 75 mcg bid.  She reported very little pain relief.  She reported that she was having difficulty opening the package due to her arthritis.  She has also reported diarrhea.  She also reported that it was cost prohibitive.  She was switched back to hydrocodone a few months ago.  She currently takes hydrocodone 7.5/325 3/day. Reports that it really does help quite a bit. Denies adverse reactions.  today she reports significant relief for about 6 hours.       On 9/15/2020 the patient completed an L3-L4 LESI as well as a left knee injection with fluoroscopic guidance. She reports moderate relief but only helped for about a week or two.       Since the last time patient was evaluated she fell and sustained a right femoral shaft fracture.  Underwent ORIF of the right femoral shaft fracture on 10/11/2020.  Was discharged into an acute rehab facility for a month.  Home PT twice a week.  Still ambulating with walker.       Reviewed the result of the PGT test from 12/10/2020.  Patient is a poor metabolizer of the CYP2D6.  She is currently reporting very appropriate response to the hydrocodone.  We will continue with the regimen for now.  If effectiveness or responsiveness become less would consider medication change at that time.  Unfortunately already failed Belbuca as noted above.  Could consider trying Butrans patch, would worry about cost of this medication as well.    Back Pain  This is  a chronic problem. The current episode started more than 1 year ago. The problem occurs daily. The problem has been waxing and waning since onset. The pain is present in the lumbar spine. The pain radiates to the left thigh and right thigh. The pain is at a severity of 7/10. The pain is moderate. The symptoms are aggravated by position, standing, sitting and bending. Associated symptoms include numbness (bilateral feet) and weakness. Pertinent negatives include no abdominal pain, chest pain, dysuria, fever or headaches. Risk factors include sedentary lifestyle. She has tried analgesics, NSAIDs, ice and heat (injections) for the symptoms. The treatment provided moderate relief.   Knee Pain   The pain is present in the left knee. The pain is at a severity of 7/10. The pain is moderate. The pain has been worsening since onset. Associated symptoms include numbness (bilateral feet). The symptoms are aggravated by weight bearing and movement. She has tried NSAIDs and acetaminophen (injections) for the symptoms. The treatment provided moderate relief.      The following portions of the patient's history were reviewed and updated as appropriate: allergies, current medications, past family history, past medical history, past social history, past surgical history and problem list.    Review of Systems   Constitutional: Negative for fever.   Cardiovascular: Negative for chest pain.   Gastrointestinal: Negative for abdominal pain.   Genitourinary: Negative for dysuria.   Musculoskeletal: Positive for back pain.   Neurological: Positive for weakness and numbness (bilateral feet). Negative for headaches.     Vitals:    04/22/21 1451   PainSc:   7   PainLoc: Hip     Objective   Physical Exam  As this is a telephone check-in, the ability to perform a routine physical exam is extremely limited. The patient seems alert and is oriented appropriately.   On this phone call there is not any evidence of respiratory distress.   The patient  seems of normal mood.   The remainder of a routine physical exam is deferred.    Assessment/Plan   Diagnoses and all orders for this visit:    1. Other chronic pain (Primary)    2. Chronic bilateral low back pain with bilateral sciatica    3. Chronic right hip pain    4. Encounter for long-term current use of high risk medication    Other orders  -     HYDROcodone-acetaminophen (NORCO) 7.5-325 MG per tablet; Take 1 tablet by mouth Take As Directed. 1 tablet every 6-8 hours PRN. Do Not exceed 3 tablets in 24 hours  Dispense: 90 tablet; Refill: 0      ----------------  Our practice is offering alternative &/or electronic methods to continue to follow our patients while at the same time further the efforts toward social distancing, in accordance with our organizational policies, professional societies' guidance, and gubernatorial mandates.  I support the Healthy at Home campaign and in this visit I have counseled the patient on our needs to limit in-person office visits and physical encounters with medical facilities whenever possible.  I have also educated the patient on the medical necessities of maintaining social distancing while we continue to function during this crisis period.      The patient was counseled on the need to consider telehealth options. The patient was offered the opportunity for a Video Visit using Info. The patient agreed to a Telephone Encounter. The patient was counseled on the need for a telehealth visit for necessary monitoring. The patient was educated about our efforts to comply with monitoring standards when prescribing potent medications. During these pandemic conditions, telephone encounters are federally mandated as acceptable alternative to videoconference or in-person encounters to ensure that an individual can receive appropriate care and be monitored regardless of access to technology.   Coding & Billing for telehealth encounter is completed based on medical decision making  concurrent with the 2021 E&M criteria, based on current federally mandated guidelines.    ----------------  This visit has been rescheduled as a phone visit to comply with patient safety concerns in accordance with CDC recommendations. Total time of discussion was 7 minutes.    --- Refill Hydrocodone.  Patient appears stable with current regimen. No adverse effects. Regarding continuation of opioids, there is no evidence of aberrant behavior or any red flags.  The patient continues with appropriate response to opioid therapy. ADL's remain intact by self.   --- The urine drug screen confirmation from 12/10/2020 has been reviewed and the result is appropriate based on patient history and JUDY report  --- The patient signed an updated copy of the controlled substance agreement on UPDATE NEXT VISIT   --- Follow-up 2 months   --- Follow-up 2 months (IN PERSON)       JUDY REPORT  As part of the patient's treatment plan, I am prescribing controlled substances. The patient has been made aware of appropriate use of such medications, including potential risk of somnolence, limited ability to drive and/or work safely, and the potential for dependence or overdose. It has also bee made clear that these medications are for use by this patient only, without concomitant use of alcohol or other substances unless prescribed.     Patient has completed prescribing agreement detailing terms of continued prescribing of controlled substances, including monitoring JUDY reports, urine drug screening, and pill counts if necessary. The patient is aware that inappropriate use will results in cessation of prescribing such medications.    JUDY report has been reviewed and scanned into the patient's chart.    As the clinician, I personally reviewed the JUDY from 4/2/2021 while the patient was in the office today.    History and physical exam exhibit continued safe and appropriate use of controlled substances.  -------    EMR  Dragon/Transcription disclaimer:   Much of this encounter note is an electronic transcription/translation of spoken language to printed text. The electronic translation of spoken language may permit erroneous, or at times, nonsensical words or phrases to be inadvertently transcribed; Although I have reviewed the note for such errors, some may still exist.

## 2021-05-24 ENCOUNTER — HOSPITAL ENCOUNTER (EMERGENCY)
Facility: HOSPITAL | Age: 86
Discharge: HOME | End: 2021-05-25
Attending: EMERGENCY MEDICINE | Admitting: EMERGENCY MEDICINE

## 2021-05-24 DIAGNOSIS — R10.13 EPIGASTRIC PAIN: ICD-10-CM

## 2021-05-24 DIAGNOSIS — R07.9 CHEST PAIN, UNSPECIFIED TYPE: Primary | ICD-10-CM

## 2021-05-24 LAB — QT INTERVAL: 393 MS

## 2021-05-24 PROCEDURE — 93005 ELECTROCARDIOGRAM TRACING: CPT

## 2021-05-24 PROCEDURE — 99284 EMERGENCY DEPT VISIT MOD MDM: CPT

## 2021-05-25 ENCOUNTER — APPOINTMENT (OUTPATIENT)
Dept: GENERAL RADIOLOGY | Facility: HOSPITAL | Age: 86
End: 2021-05-25

## 2021-05-25 ENCOUNTER — EPISODE CHANGES (OUTPATIENT)
Dept: CASE MANAGEMENT | Facility: OTHER | Age: 86
End: 2021-05-25

## 2021-05-25 VITALS
HEART RATE: 89 BPM | DIASTOLIC BLOOD PRESSURE: 83 MMHG | TEMPERATURE: 98.4 F | BODY MASS INDEX: 23.92 KG/M2 | HEIGHT: 63 IN | WEIGHT: 135 LBS | RESPIRATION RATE: 18 BRPM | OXYGEN SATURATION: 96 % | SYSTOLIC BLOOD PRESSURE: 157 MMHG

## 2021-05-25 LAB
ALBUMIN SERPL-MCNC: 4.5 G/DL (ref 3.5–5.2)
ALBUMIN/GLOB SERPL: 1.6 G/DL
ALP SERPL-CCNC: 47 U/L (ref 39–117)
ALT SERPL W P-5'-P-CCNC: 11 U/L (ref 1–33)
ANION GAP SERPL CALCULATED.3IONS-SCNC: 11.7 MMOL/L (ref 5–15)
AST SERPL-CCNC: 12 U/L (ref 1–32)
BASOPHILS # BLD AUTO: 0.03 10*3/MM3 (ref 0–0.2)
BASOPHILS NFR BLD AUTO: 0.4 % (ref 0–1.5)
BILIRUB SERPL-MCNC: 0.7 MG/DL (ref 0–1.2)
BUN SERPL-MCNC: 19 MG/DL (ref 8–23)
BUN/CREAT SERPL: 24.4 (ref 7–25)
CALCIUM SPEC-SCNC: 9.8 MG/DL (ref 8.6–10.5)
CHLORIDE SERPL-SCNC: 100 MMOL/L (ref 98–107)
CO2 SERPL-SCNC: 25.3 MMOL/L (ref 22–29)
CREAT SERPL-MCNC: 0.78 MG/DL (ref 0.57–1)
DEPRECATED RDW RBC AUTO: 40.4 FL (ref 37–54)
EOSINOPHIL # BLD AUTO: 0.05 10*3/MM3 (ref 0–0.4)
EOSINOPHIL NFR BLD AUTO: 0.6 % (ref 0.3–6.2)
ERYTHROCYTE [DISTWIDTH] IN BLOOD BY AUTOMATED COUNT: 11.9 % (ref 12.3–15.4)
GFR SERPL CREATININE-BSD FRML MDRD: 70 ML/MIN/1.73
GLOBULIN UR ELPH-MCNC: 2.8 GM/DL
GLUCOSE SERPL-MCNC: 109 MG/DL (ref 65–99)
HCT VFR BLD AUTO: 33.9 % (ref 34–46.6)
HGB BLD-MCNC: 11.5 G/DL (ref 12–15.9)
HOLD SPECIMEN: NORMAL
HOLD SPECIMEN: NORMAL
IMM GRANULOCYTES # BLD AUTO: 0.02 10*3/MM3 (ref 0–0.05)
IMM GRANULOCYTES NFR BLD AUTO: 0.3 % (ref 0–0.5)
LIPASE SERPL-CCNC: 43 U/L (ref 13–60)
LYMPHOCYTES # BLD AUTO: 2.2 10*3/MM3 (ref 0.7–3.1)
LYMPHOCYTES NFR BLD AUTO: 27.8 % (ref 19.6–45.3)
MCH RBC QN AUTO: 31.3 PG (ref 26.6–33)
MCHC RBC AUTO-ENTMCNC: 33.9 G/DL (ref 31.5–35.7)
MCV RBC AUTO: 92.4 FL (ref 79–97)
MONOCYTES # BLD AUTO: 0.41 10*3/MM3 (ref 0.1–0.9)
MONOCYTES NFR BLD AUTO: 5.2 % (ref 5–12)
NEUTROPHILS NFR BLD AUTO: 5.21 10*3/MM3 (ref 1.7–7)
NEUTROPHILS NFR BLD AUTO: 65.7 % (ref 42.7–76)
NRBC BLD AUTO-RTO: 0 /100 WBC (ref 0–0.2)
PLATELET # BLD AUTO: 258 10*3/MM3 (ref 140–450)
PMV BLD AUTO: 9.2 FL (ref 6–12)
POTASSIUM SERPL-SCNC: 4 MMOL/L (ref 3.5–5.2)
PROT SERPL-MCNC: 7.3 G/DL (ref 6–8.5)
RBC # BLD AUTO: 3.67 10*6/MM3 (ref 3.77–5.28)
SODIUM SERPL-SCNC: 137 MMOL/L (ref 136–145)
TROPONIN T SERPL-MCNC: <0.01 NG/ML (ref 0–0.03)
WBC # BLD AUTO: 7.92 10*3/MM3 (ref 3.4–10.8)
WHOLE BLOOD HOLD SPECIMEN: NORMAL
WHOLE BLOOD HOLD SPECIMEN: NORMAL

## 2021-05-25 PROCEDURE — 83690 ASSAY OF LIPASE: CPT | Performed by: EMERGENCY MEDICINE

## 2021-05-25 PROCEDURE — 85025 COMPLETE CBC W/AUTO DIFF WBC: CPT | Performed by: PHYSICIAN ASSISTANT

## 2021-05-25 PROCEDURE — 84484 ASSAY OF TROPONIN QUANT: CPT | Performed by: PHYSICIAN ASSISTANT

## 2021-05-25 PROCEDURE — 71045 X-RAY EXAM CHEST 1 VIEW: CPT

## 2021-05-25 PROCEDURE — 80053 COMPREHEN METABOLIC PANEL: CPT | Performed by: PHYSICIAN ASSISTANT

## 2021-05-25 RX ORDER — LEVOTHYROXINE SODIUM 50 UG/1
TABLET ORAL
Qty: 90 TABLET | Refills: 0 | Status: SHIPPED | OUTPATIENT
Start: 2021-05-25 | End: 2021-09-07 | Stop reason: SDUPTHER

## 2021-05-25 RX ORDER — SODIUM CHLORIDE 0.9 % (FLUSH) 0.9 %
10 SYRINGE (ML) INJECTION AS NEEDED
Status: DISCONTINUED | OUTPATIENT
Start: 2021-05-25 | End: 2021-05-25 | Stop reason: HOSPADM

## 2021-05-25 NOTE — ED NOTES
Pt taken to triage bay 2 to await ambulance transport to her home     Kori Giron RN  05/25/21 8443

## 2021-05-25 NOTE — ED NOTES
[facility] present for transport of patient. Pt transferred to EMS stretcher without difficulty. Discharge paperwork given to EMS staff.      Letty Manrique RN  05/25/21 6770

## 2021-05-25 NOTE — ED PROVIDER NOTES
EMERGENCY DEPARTMENT ENCOUNTER    Room Number:  02/02  Date of encounter:  5/25/2021  PCP: Arnaud Monson MD (Tony)  Historian: Patient      I used full protective equipment while examining this patient.  This includes face mask, gloves and protective eyewear.  I washed my hands before entering the room and immediately upon leaving the room      HPI:  Chief Complaint: Chest pain  A complete HPI/ROS/PMH/PSH/SH/FH are unobtainable due to: Nothing    Context: Ashli León is a 85 y.o. female who presents to the ED c/o sudden onset of chest pain at approximately 6:00 this evening.  Patient states she was sitting in a chair at the time.  Patient states she had a belchy, fullness sensation over her lower chest/epigastric area.  Patient said she felt like she had to burp.  Patient states this sensation lasted approximately 1 hour.  Patient denies any shortness of breath, nausea, radiation of pain.  Patient called EMS, however the pain had resolved by the time EMS had arrived.  Patient denies any underlying heart disease.  Patient does have a history of hypertension, hyperlipidemia.  She denies any significant family history of heart disease.      Review of Medical Records  I reviewed stress test from 6/20/2016.  Patient had normal perfusion study without evidence of ischemia.    PAST MEDICAL HISTORY  Active Ambulatory Problems     Diagnosis Date Noted   • History of hip surgery 04/01/2016   • Primary osteoarthritis of both knees 04/01/2016   • Arthritis of right knee 06/14/2016   • Aftercare following right knee joint replacement surgery 06/25/2016   • Status post total right knee replacement 07/07/2016   • History of knee replacement, total 08/15/2016   • History of total knee arthroplasty 11/17/2016   • Chronic pain of left knee 11/17/2016   • Arthritis of left knee 11/17/2016   • Closed right hip fracture (CMS/[facility]) 01/02/2017   • Femoral neck fracture (CMS/[facility]) 01/03/2017   • Tear of right gluteus minimus  tendon 11/21/2017   • Chronic right-sided low back pain with right-sided sciatica 09/14/2018   • Chronic right hip pain 09/14/2018   • Sacroiliac joint dysfunction 12/18/2018   • Encounter for long-term current use of high risk medication 12/18/2018   • Pain of left hip joint 05/16/2019   • Other chronic pain 07/16/2019   • Bilateral low back pain with bilateral sciatica 04/30/2020   • Closed fracture of shaft of right femur (CMS/[facility]) 10/10/2020   • Hypothyroidism (acquired) 10/12/2020   • Constipation 10/12/2020   • Anemia 10/12/2020   • HTN (hypertension) 10/12/2020   • HLD (hyperlipidemia) 10/12/2020   • Osteoporosis 10/12/2020   • B12 deficiency 10/12/2020   • Degenerative disease of nervous system, unspecified (CMS/[facility]) 02/23/2021     Resolved Ambulatory Problems     Diagnosis Date Noted   • No Resolved Ambulatory Problems     Past Medical History:   Diagnosis Date   • Acid reflux    • Back pain    • Colitis    • Diarrhea    • Disease of thyroid gland    • Diverticular disease    • Glaucoma    • Gout    • High blood pressure    • High cholesterol    • History of vertebral compression fracture    • Hyponatremia    • Knee pain    • Low back pain    • Macular degeneration    • Mitral regurgitation 06/2016   • Peripheral neuropathy    • Renal lesion    • Rheumatoid arthritis (CMS/[facility])          PAST SURGICAL HISTORY  Past Surgical History:   Procedure Laterality Date   • APPENDECTOMY  1951   • CATARACT EXTRACTION Bilateral    • FEMUR OPEN REDUCTION INTERNAL FIXATION Right 10/11/2020    Procedure: OPEN REDUCTION INTERNAL FIXATION OF RIGHT FEMUR FRACTURE;  Surgeon: Mac Frances Jr., MD;  Location: [facility] OR;  Service: Orthopedics;  Laterality: Right;   • FEMUR SURGERY Right     had fracture and has rajesh    • HEMORRHOIDECTOMY     • HIP HEMIARTHROPLASTY Right 1/3/2017    Procedure: HIP HEMIARTHROPLASTY with IM rajesh and 2 screw hardware removal antibiotic cement;  Surgeon: Mirza Whittington MD;  Location: [facility] OR;   Service:    • HIP SURGERY Right 1999    hx fracture   • LEG SURGERY Right 12/1/2017    Procedure: GLUTEAL TENDON REPAIR-minimus and medius;  Surgeon: Mirza Whittington MD;  Location: [facility] OR;  Service:    • TOTAL KNEE ARTHROPLASTY Right 6/22/2016    Procedure: RT TOTAL KNEE ARTHROPLASTY WITH FINN NAVIGATION;  Surgeon: Adrian Swenson MD;  Location: [facility] MAIN OR;  Service:          FAMILY HISTORY  Family History   Problem Relation Age of Onset   • Heart attack Father          SOCIAL HISTORY  Social History     Socioeconomic History   • Marital status:      Spouse name: Not on file   • Number of children: Not on file   • Years of education: Not on file   • Highest education level: Not on file   Tobacco Use   • Smoking status: Never Smoker   • Smokeless tobacco: Never Used   Vaping Use   • Vaping Use: Never used   Substance and Sexual Activity   • Alcohol use: Yes     Alcohol/week: 3.0 standard drinks     Types: 3 Shots of liquor per week     Comment: social use 1 weekly   • Drug use: No     Types: Oxycodone   • Sexual activity: Defer         ALLERGIES  Neomycin and Statins        REVIEW OF SYSTEMS  All systems reviewed and negative except for those discussed in HPI.       PHYSICAL EXAM    I have reviewed the triage vital signs and nursing notes.    ED Triage Vitals   Temp Heart Rate Resp BP SpO2   05/25/21 0027 05/24/21 2138 05/24/21 2138 05/24/21 2138 05/24/21 2138   98.4 °F (36.9 °C) 82 18 (!) 152/101 98 %      Temp src Heart Rate Source Patient Position BP Location FiO2 (%)   05/25/21 0027 -- -- -- --   Tympanic           Physical Exam  GENERAL: Alert, oriented, not distressed  HENT: head atraumatic, no nuchal rigidity  EYES: no scleral icterus, EOMI  CV: regular rhythm, regular rate, no murmur  RESPIRATORY: normal effort, CTA  ABDOMEN: soft, nontender  MUSCULOSKELETAL: no deformity, FROM, no calf swelling or tenderness  NEURO: alert, moves all extremities, follows commands  SKIN: warm, dry        LAB  RESULTS  Recent Results (from the past 24 hour(s))   ECG 12 Lead    Collection Time: 05/24/21  9:42 PM   Result Value Ref Range    QT Interval 393 ms   Light Blue Top    Collection Time: 05/25/21 12:41 AM   Result Value Ref Range    Extra Tube hold for add-on    Green Top (Gel)    Collection Time: 05/25/21 12:41 AM   Result Value Ref Range    Extra Tube Hold for add-ons.    Lavender Top    Collection Time: 05/25/21 12:41 AM   Result Value Ref Range    Extra Tube hold for add-on    Gold Top - SST    Collection Time: 05/25/21 12:41 AM   Result Value Ref Range    Extra Tube Hold for add-ons.    Comprehensive Metabolic Panel    Collection Time: 05/25/21 12:41 AM    Specimen: Blood   Result Value Ref Range    Glucose 109 (H) 65 - 99 mg/dL    BUN 19 8 - 23 mg/dL    Creatinine 0.78 0.57 - 1.00 mg/dL    Sodium 137 136 - 145 mmol/L    Potassium 4.0 3.5 - 5.2 mmol/L    Chloride 100 98 - 107 mmol/L    CO2 25.3 22.0 - 29.0 mmol/L    Calcium 9.8 8.6 - 10.5 mg/dL    Total Protein 7.3 6.0 - 8.5 g/dL    Albumin 4.50 3.50 - 5.20 g/dL    ALT (SGPT) 11 1 - 33 U/L    AST (SGOT) 12 1 - 32 U/L    Alkaline Phosphatase 47 39 - 117 U/L    Total Bilirubin 0.7 0.0 - 1.2 mg/dL    eGFR Non African Amer 70 >60 mL/min/1.73    Globulin 2.8 gm/dL    A/G Ratio 1.6 g/dL    BUN/Creatinine Ratio 24.4 7.0 - 25.0    Anion Gap 11.7 5.0 - 15.0 mmol/L   Troponin    Collection Time: 05/25/21 12:41 AM    Specimen: Blood   Result Value Ref Range    Troponin T <0.010 0.000 - 0.030 ng/mL   CBC Auto Differential    Collection Time: 05/25/21 12:41 AM    Specimen: Blood   Result Value Ref Range    WBC 7.92 3.40 - 10.80 10*3/mm3    RBC 3.67 (L) 3.77 - 5.28 10*6/mm3    Hemoglobin 11.5 (L) 12.0 - 15.9 g/dL    Hematocrit 33.9 (L) 34.0 - 46.6 %    MCV 92.4 79.0 - 97.0 fL    MCH 31.3 26.6 - 33.0 pg    MCHC 33.9 31.5 - 35.7 g/dL    RDW 11.9 (L) 12.3 - 15.4 %    RDW-SD 40.4 37.0 - 54.0 fl    MPV 9.2 6.0 - 12.0 fL    Platelets 258 140 - 450 10*3/mm3    Neutrophil % 65.7  42.7 - 76.0 %    Lymphocyte % 27.8 19.6 - 45.3 %    Monocyte % 5.2 5.0 - 12.0 %    Eosinophil % 0.6 0.3 - 6.2 %    Basophil % 0.4 0.0 - 1.5 %    Immature Grans % 0.3 0.0 - 0.5 %    Neutrophils, Absolute 5.21 1.70 - 7.00 10*3/mm3    Lymphocytes, Absolute 2.20 0.70 - 3.10 10*3/mm3    Monocytes, Absolute 0.41 0.10 - 0.90 10*3/mm3    Eosinophils, Absolute 0.05 0.00 - 0.40 10*3/mm3    Basophils, Absolute 0.03 0.00 - 0.20 10*3/mm3    Immature Grans, Absolute 0.02 0.00 - 0.05 10*3/mm3    nRBC 0.0 0.0 - 0.2 /100 WBC   Lipase    Collection Time: 05/25/21 12:41 AM    Specimen: Blood   Result Value Ref Range    Lipase 43 13 - 60 U/L       Ordered the above labs and independently reviewed the results.        RADIOLOGY  XR Chest 1 View    Result Date: 5/25/2021  Patient: TERI BOBO  Time Out: 01:28 Exam(s): FILM CXR 1 VIEW EXAM:   XR Chest, 1 View CLINICAL HISTORY:    CP SOA  Reason for exam: CP SOA. TECHNIQUE:   Frontal view of the chest. COMPARISON:   Chest x-ray 10 10 2020 FINDINGS:   Lungs:  No consolidation. No overt edema.   Pleural space:  No pleural effusion. No pneumothorax.   Heart:  Unremarkable.  No cardiomegaly.   Bones joints:  Chronic fracture deformities in the ribs bilaterally. IMPRESSION:       No acute findings in the chest.     Electronically signed by Jaskaran Smith MD on 05-25-21 at 0128      I ordered the above noted radiological studies. Reviewed by me and discussed with radiologist.  See dictation for official radiology interpretation.      MEDICATIONS GIVEN IN ER    Medications   sodium chloride 0.9 % flush 10 mL (has no administration in time range)         PROGRESS, DATA ANALYSIS, CONSULTS, AND MEDICAL DECISION MAKING    All labs have been independently reviewed by me.  All radiology studies have been reviewed by me and discussed with radiologist dictating the report.   EKG's independently viewed and interpreted by me.  Discussion below represents my analysis of pertinent findings related to  patient's condition, differential diagnosis, treatment plan and final disposition.    I have discussed case with Dr. Kerr, emergency room physician.  He has performed his own bedside examination and agrees with treatment plan.    ED Course as of May 25 0311   Tue May 25, 2021   0058 Patient presents with a now resolved episode of chest pain.  Differential diagnoses include but not limited to esophagitis, ACS, pneumonia.    [EE]   0059 EKG interpreted by myself.  Time 2142. Sinus rhythm, 84 bpm.  Normal P with first-degree heart block.  QRS shows LVH with left axis deviation.  No significant ST abnormalities.  EKG similar in comparison to previous EKG from 11/24/2020.    [EE]   0100 Chest x-ray interpreted by myself shows no acute infiltrate.  Mild cardiomegaly.  I will await final radiologist interpretation.    [EE]   0118 Hemoglobin(!): 11.5 [EE]   0119 WBC: 7.92 [EE]   0119 Troponin T: <0.010 [EE]   0119 HEART Score : 3 ( +2 age, +1 RFs (hypertension, hyperlipidemia))    [EE]   0210 Patient has a negative work-up here.  She is overall low heart risk.  She is asymptomatic here.  Plan to discharge.    [EE]      ED Course User Index  [EE] Suresh Ortez PA       AS OF 03:11 EDT VITALS:    BP - 157/83  HR - 89  TEMP - 98.4 °F (36.9 °C) (Tympanic)  O2 SATS - 96%        DIAGNOSIS  Final diagnoses:   Chest pain, unspecified type   Epigastric pain         DISPOSITION  Discharged           Suresh Ortez PA  05/25/21 0312

## 2021-05-25 NOTE — ED NOTES
"Pt resting comfortably in triage bay without reported needs. She appears to be in no acute distress at this time. Pt informed [facility] ETA, \"before nine this morning\" per dispatch. Pt verbalized understanding.      Letty Manrique RN  05/25/21 0821    "

## 2021-05-25 NOTE — ED NOTES
Pt arrives from home via EMS with c/o chest pressure, described as indigestion, but it had resolved upon EMS arrival. Was given 324 ASA & 1nitro PTA. PT a&ox4, abc's intact, NAD noted, ambulatory.    Patient wearing mask during triage. RN wearing appropriate PPE during triage. Hand hygiene performed.       Destiny Gutierrez, RN  05/24/21 2851

## 2021-05-25 NOTE — ED NOTES
[facility] contacted about possible transport to home. EMS informed Charge Nurse Goldie that transport would be available in the morning as the patients address is outside of our transport range of 20 miles per protocol. Patient transport can be scheduled in the morning.      Kvng More, PCT  05/25/21 0250

## 2021-05-25 NOTE — ED PROVIDER NOTES
Pt presents to the ED c/o  chest pain that is localized to her lower chest and upper abdomen, onset around 6:00 last night.  Lasted approximately 1 hour prior to resolving.     On exam,   Awake alert, no acute distress.  There is no epigastric tenderness on palpation.     Plan: Home with PCP follow up, HEART score 3, atypical chest pain.     Patient was placed in face mask in first look. Patient was wearing facemask when I entered the room and throughout our encounter. I wore full protective equipment throughout this patient encounter including a face mask, eye shield, gown and gloves. Hand hygiene was performed before donning protective equipment and after removal when leaving the room.       Attestation:  The VANITA and I have discussed this patient's history, physical exam, and treatment plan.  I have reviewed the documentation and personally had a face to face interaction with the patient. I affirm the documentation and agree with the treatment and plan.  The attached note describes my personal findings.            Peter Kerr MD  05/25/21 0315

## 2021-06-04 RX ORDER — FLUTICASONE PROPIONATE 0.05 MG/G
OINTMENT TOPICAL
Qty: 60 G | Refills: 0 | Status: SHIPPED | OUTPATIENT
Start: 2021-06-04 | End: 2021-11-16 | Stop reason: SDUPTHER

## 2021-06-08 RX ORDER — OMEPRAZOLE 40 MG/1
CAPSULE, DELAYED RELEASE ORAL
Qty: 90 CAPSULE | Refills: 0 | Status: SHIPPED | OUTPATIENT
Start: 2021-06-08 | End: 2021-09-28

## 2021-06-22 ENCOUNTER — OFFICE VISIT (OUTPATIENT)
Dept: PAIN MEDICINE | Facility: CLINIC | Age: 86
End: 2021-06-22

## 2021-06-22 VITALS
TEMPERATURE: 96.9 F | SYSTOLIC BLOOD PRESSURE: 168 MMHG | HEART RATE: 71 BPM | BODY MASS INDEX: 22.94 KG/M2 | RESPIRATION RATE: 18 BRPM | WEIGHT: 134.4 LBS | OXYGEN SATURATION: 95 % | DIASTOLIC BLOOD PRESSURE: 86 MMHG | HEIGHT: 64 IN

## 2021-06-22 DIAGNOSIS — G89.29 OTHER CHRONIC PAIN: Primary | ICD-10-CM

## 2021-06-22 DIAGNOSIS — Z79.899 ENCOUNTER FOR LONG-TERM CURRENT USE OF HIGH RISK MEDICATION: ICD-10-CM

## 2021-06-22 DIAGNOSIS — G89.29 CHRONIC RIGHT HIP PAIN: ICD-10-CM

## 2021-06-22 DIAGNOSIS — M54.42 CHRONIC BILATERAL LOW BACK PAIN WITH BILATERAL SCIATICA: ICD-10-CM

## 2021-06-22 DIAGNOSIS — G89.29 CHRONIC BILATERAL LOW BACK PAIN WITH BILATERAL SCIATICA: ICD-10-CM

## 2021-06-22 DIAGNOSIS — M25.551 CHRONIC RIGHT HIP PAIN: ICD-10-CM

## 2021-06-22 DIAGNOSIS — M54.41 CHRONIC BILATERAL LOW BACK PAIN WITH BILATERAL SCIATICA: ICD-10-CM

## 2021-06-22 PROCEDURE — 99214 OFFICE O/P EST MOD 30 MIN: CPT | Performed by: NURSE PRACTITIONER

## 2021-06-22 PROCEDURE — 80305 DRUG TEST PRSMV DIR OPT OBS: CPT | Performed by: NURSE PRACTITIONER

## 2021-06-22 RX ORDER — BUPRENORPHINE 5 UG/H
1 PATCH TRANSDERMAL WEEKLY
Qty: 4 PATCH | Refills: 0 | Status: SHIPPED | OUTPATIENT
Start: 2021-06-22 | End: 2021-07-21 | Stop reason: SDUPTHER

## 2021-06-22 NOTE — PROGRESS NOTES
CHIEF COMPLAINT  Follow-up for back pain.    Subjective   Ashli León is a 86 y.o. female  who presents for follow-up.  She has a history of chronic back pain.  She reports that her pain is worse.  Says her pain medication is not lasting between doses. This is a recurrent complaint.      C/o low back and left knee pain. Pain today 5/10 in severity (right hip and low back).  A few months ago the patient was changed from hydrocodone to Belbuca due to concerns of PGT abnormality.  Trailed on Belbuca 75 mcg bid.  She reported very little pain relief.  She reported that she was having difficulty opening the package due to her arthritis.  She has also reported diarrhea.  She also reported that it was cost prohibitive.  She was switched back to hydrocodone a few months ago as she reported at that time that the response was appropriate to the medication (helped significantly and lasted between doses). Today she is stating that it is not lasting between doses and asks to increase. 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       On 9/15/2020 the patient completed an L3-L4 LESI as well as a left knee injection with fluoroscopic guidance. She reports moderate relief but only helped for about a week or two.       ORIF of the right femoral shaft fracture on 10/11/2020.      Reviewed the result of the PGT test from 12/10/2020.  Patient is a poor metabolizer of the CYP2D6.      Patient remained masked during entire encounter. No cough present. I donned a mask and eye protection throughout entire visit. Prior to donning mask and eye protection, hand hygiene was performed, as well as when it was doffed.  I was closer than 6 feet, but not for an extended period of time. No obvious exposure to any bodily fluids.    Back Pain  This is a chronic problem. The current episode started more than 1 year ago. The problem occurs daily. The problem has been waxing and waning since  onset. The pain is present in the lumbar spine. The pain radiates to the left thigh and right thigh. The pain is at a severity of 5/10. The pain is moderate. The symptoms are aggravated by position, standing, sitting and bending. Associated symptoms include numbness (bilateral feet ). Pertinent negatives include no abdominal pain, chest pain, dysuria, fever, headaches or weakness. Risk factors include sedentary lifestyle. She has tried analgesics, NSAIDs, ice and heat (injections) for the symptoms. The treatment provided moderate relief.   Knee Pain   The pain is present in the left knee. The pain is at a severity of 5/10. The pain is moderate. The pain has been worsening since onset. Associated symptoms include numbness (bilateral feet ). The symptoms are aggravated by weight bearing and movement. She has tried NSAIDs and acetaminophen (injections) for the symptoms. The treatment provided moderate relief.      PEG Assessment   What number best describes your pain on average in the past week?6  What number best describes how, during the past week, pain has interfered with your enjoyment of life?8  What number best describes how, during the past week, pain has interfered with your general activity?  8    The following portions of the patient's history were reviewed and updated as appropriate: allergies, current medications, past family history, past medical history, past social history, past surgical history and problem list.    Review of Systems   Constitutional: Negative for fatigue and fever.   HENT: Negative for congestion.    Eyes: Negative for visual disturbance.   Respiratory: Negative for cough, shortness of breath and wheezing.    Cardiovascular: Negative.  Negative for chest pain.   Gastrointestinal: Negative for abdominal pain, constipation and diarrhea.   Genitourinary: Negative for difficulty urinating and dysuria.   Musculoskeletal: Positive for back pain.   Neurological: Positive for numbness  "(bilateral feet ). Negative for weakness and headaches.   Psychiatric/Behavioral: Positive for sleep disturbance. Negative for suicidal ideas. The patient is not nervous/anxious.      I have reviewed and confirmed the accuracy of the ROS as documented by the MA/LPN/RN TANIA Tracy    Vitals:    06/22/21 1436   BP: 168/86   Pulse: 71   Resp: 18   Temp: 96.9 °F (36.1 °C)   SpO2: 95%   Weight: 61 kg (134 lb 6.4 oz)   Height: 161.3 cm (63.5\")   PainSc:   5   PainLoc: Back     Objective   Physical Exam  Vitals and nursing note reviewed.   Constitutional:       General: She is not in acute distress.     Appearance: Normal appearance. She is not ill-appearing.   Pulmonary:      Effort: Pulmonary effort is normal. No respiratory distress.   Musculoskeletal:      Lumbar back: Tenderness present.      Right hip: Tenderness present.   Skin:     General: Skin is warm and dry.   Neurological:      Mental Status: She is alert and oriented to person, place, and time.      Gait: Gait abnormal (wheelchair).   Psychiatric:         Mood and Affect: Mood normal.         Behavior: Behavior normal.       Assessment/Plan   Diagnoses and all orders for this visit:    1. Other chronic pain (Primary)  -     Urine Drug Screen Confirmation - Urine, Clean Catch; Future  -     POC Urine Drug Screen, Triage    2. Chronic bilateral low back pain with bilateral sciatica  -     Urine Drug Screen Confirmation - Urine, Clean Catch; Future  -     POC Urine Drug Screen, Triage    3. Chronic right hip pain  -     Urine Drug Screen Confirmation - Urine, Clean Catch; Future  -     POC Urine Drug Screen, Triage    4. Encounter for long-term current use of high risk medication  -     Urine Drug Screen Confirmation - Urine, Clean Catch; Future  -     POC Urine Drug Screen, Triage    Other orders  -     Buprenorphine (Butrans) 5 MCG/HR patch weekly transdermal patch; Place 1 patch on the skin as directed by provider 1 (One) Time Per Week.  Dispense: " 4 patch; Refill: 0      --- D/c Hydrocodone   --- Trial Butrans.  Discussed medication with the patient.  Included in this discussion was the potential for side effects and adverse events.  Patient verbalized understanding and wished to proceed.  Prescription will be sent to pharmacy.  --- Routine UDS in office today as part of monitoring requirements for controlled substances.  The specimen was viewed and the immunoassay result reviewed and is +OPI.  This specimen will be sent to [facility] for confirmation.     --- The patient signed an updated copy of the controlled substance agreement on 6/22/2021 (may need to update if she says on Butrans)  --- Follow-up 1 month        JUDY REPORT  As part of the patient's treatment plan, I am prescribing controlled substances. The patient has been made aware of appropriate use of such medications, including potential risk of somnolence, limited ability to drive and/or work safely, and the potential for dependence or overdose. It has also bee made clear that these medications are for use by this patient only, without concomitant use of alcohol or other substances unless prescribed.     Patient has completed prescribing agreement detailing terms of continued prescribing of controlled substances, including monitoring JUDY reports, urine drug screening, and pill counts if necessary. The patient is aware that inappropriate use will results in cessation of prescribing such medications.    As the clinician, I personally reviewed the JUDY from 6/22/2021 while the patient was in the office today.    History and physical exam exhibit continued safe and appropriate use of controlled substances.    EMR Dragon/Transcription disclaimer:   Much of this encounter note is an electronic transcription/translation of spoken language to printed text. The electronic translation of spoken language may permit erroneous, or at times, nonsensical words or phrases to be inadvertently  transcribed; Although I have reviewed the note for such errors, some may still exist.

## 2021-07-21 ENCOUNTER — OFFICE VISIT (OUTPATIENT)
Dept: PAIN MEDICINE | Facility: CLINIC | Age: 86
End: 2021-07-21

## 2021-07-21 VITALS
RESPIRATION RATE: 16 BRPM | OXYGEN SATURATION: 92 % | WEIGHT: 134.2 LBS | TEMPERATURE: 97.3 F | DIASTOLIC BLOOD PRESSURE: 76 MMHG | HEART RATE: 62 BPM | BODY MASS INDEX: 22.91 KG/M2 | SYSTOLIC BLOOD PRESSURE: 144 MMHG | HEIGHT: 64 IN

## 2021-07-21 DIAGNOSIS — Z79.899 ENCOUNTER FOR LONG-TERM CURRENT USE OF HIGH RISK MEDICATION: ICD-10-CM

## 2021-07-21 DIAGNOSIS — G89.29 OTHER CHRONIC PAIN: Primary | ICD-10-CM

## 2021-07-21 DIAGNOSIS — G89.29 CHRONIC RIGHT HIP PAIN: ICD-10-CM

## 2021-07-21 DIAGNOSIS — G89.29 CHRONIC BILATERAL LOW BACK PAIN WITH BILATERAL SCIATICA: ICD-10-CM

## 2021-07-21 DIAGNOSIS — M54.41 CHRONIC BILATERAL LOW BACK PAIN WITH BILATERAL SCIATICA: ICD-10-CM

## 2021-07-21 DIAGNOSIS — M54.42 CHRONIC BILATERAL LOW BACK PAIN WITH BILATERAL SCIATICA: ICD-10-CM

## 2021-07-21 DIAGNOSIS — M25.551 CHRONIC RIGHT HIP PAIN: ICD-10-CM

## 2021-07-21 PROCEDURE — 99215 OFFICE O/P EST HI 40 MIN: CPT | Performed by: NURSE PRACTITIONER

## 2021-07-21 RX ORDER — LATANOPROST 50 UG/ML
SOLUTION/ DROPS OPHTHALMIC
COMMUNITY
Start: 2021-06-22

## 2021-07-21 RX ORDER — BUPRENORPHINE 5 UG/H
1 PATCH TRANSDERMAL WEEKLY
Qty: 4 PATCH | Refills: 1 | Status: SHIPPED | OUTPATIENT
Start: 2021-07-21 | End: 2021-09-15 | Stop reason: DRUGHIGH

## 2021-07-21 NOTE — PROGRESS NOTES
CHIEF COMPLAINT  F/U back pain- patient states that her pain has improved since her last visit.   Subjective   Ashli León is a 86 y.o. female  who presents for follow-up.  She has a history of chronic back and hip pain.    C/o low back and left knee pain. Pain today 4/10 in severity (right hip and low back).  Recurrent complaint of pain medication not working as well or lasting as long.  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  We initially changed her from hydrocodone to Belbuca which she did not tolerate due to price, side effects (diarrhea), and difficulty managing the product with the arthritis in her hands.  She was subsequently put back on hydrocodone temporarily which she again began requesting dose adjustments of.  Last month we opted to try patient on Butrans patch.  She was started on a 5 mcg/h patch weekly. She reports significant benefit with this patch.  She is quite happy with her pain control. Denies any current adverse reactions. Primary issue now is cost, reports that the patch is costing $300/month.       On 9/15/2020 the patient completed an L3-L4 LESI as well as a left knee injection with fluoroscopic guidance. She reports moderate relief but only helped for about a week or two.        ORIF of the right femoral shaft fracture on 10/11/2020.       Reviewed the result of the PGT test from 12/10/2020.  Patient is a poor metabolizer of the CYP2D6.      Patient remained masked during entire encounter. No cough present. I donned a mask and eye protection throughout entire visit. Prior to donning mask and eye protection, hand hygiene was performed, as well as when it was doffed.  I was closer than 6 feet, but not for an extended period of time. No obvious exposure to any bodily fluids.    Back Pain  This is a chronic problem. The current episode started more than 1 year ago. The problem occurs daily. The problem has been waxing and  waning since onset. The pain is present in the lumbar spine. The pain radiates to the left thigh and right thigh. The pain is at a severity of 4/10. The pain is moderate. The symptoms are aggravated by position, standing, sitting and bending. Associated symptoms include weakness. Pertinent negatives include no abdominal pain, chest pain, dysuria, fever, headaches or numbness. Risk factors include sedentary lifestyle. She has tried analgesics, NSAIDs, ice and heat (injections) for the symptoms. The treatment provided moderate relief.   Knee Pain   The pain is present in the left knee. The pain is at a severity of 4/10. The pain is moderate. The pain has been worsening since onset. Pertinent negatives include no numbness. The symptoms are aggravated by weight bearing and movement. She has tried NSAIDs and acetaminophen (injections) for the symptoms. The treatment provided moderate relief.        PEG Assessment   What number best describes your pain on average in the past week?4  What number best describes how, during the past week, pain has interfered with your enjoyment of life?8  What number best describes how, during the past week, pain has interfered with your general activity?  10      The following portions of the patient's history were reviewed and updated as appropriate: allergies, current medications, past family history, past medical history, past social history, past surgical history and problem list.    Review of Systems   Constitutional: Positive for activity change (decreased) and fatigue. Negative for chills and fever.   HENT: Negative for congestion.    Eyes: Negative for visual disturbance.   Respiratory: Negative for chest tightness and shortness of breath.    Cardiovascular: Negative for chest pain.   Gastrointestinal: Negative for abdominal pain, constipation and diarrhea.   Genitourinary: Negative for difficulty urinating, dyspareunia and dysuria.   Musculoskeletal: Positive for back pain.  "  Neurological: Positive for weakness. Negative for dizziness, light-headedness, numbness and headaches.   Psychiatric/Behavioral: Positive for sleep disturbance. Negative for agitation. The patient is not nervous/anxious.      I have reviewed and confirmed the accuracy of the ROS as documented by the MA/LPN/RN TANIA Tracy    Vitals:    07/21/21 1337   BP: 144/76   Pulse: 62   Resp: 16   Temp: 97.3 °F (36.3 °C)   SpO2: 92%   Weight: 60.9 kg (134 lb 3.2 oz)   Height: 161.3 cm (63.5\")   PainSc:   4   PainLoc: Back         Objective   Physical Exam  Vitals and nursing note reviewed.   Constitutional:       General: She is not in acute distress.     Appearance: Normal appearance. She is not ill-appearing.   Pulmonary:      Effort: Pulmonary effort is normal. No respiratory distress.   Musculoskeletal:      Lumbar back: Tenderness present.      Right hip: Tenderness present.   Skin:     General: Skin is warm and dry.   Neurological:      Mental Status: She is alert and oriented to person, place, and time.      Gait: Gait abnormal (slowed).   Psychiatric:         Mood and Affect: Mood normal.         Behavior: Behavior normal.       Assessment/Plan   Diagnoses and all orders for this visit:    1. Other chronic pain (Primary)    2. Chronic right hip pain    3. Chronic bilateral low back pain with bilateral sciatica    4. Encounter for long-term current use of high risk medication    Other orders  -     Buprenorphine (Butrans) 5 MCG/HR patch weekly transdermal patch; Place 1 patch on the skin as directed by provider 1 (One) Time Per Week.  Dispense: 4 patch; Refill: 1      --- Refill Butrans patch.  Assisted patient with finding GoodRx coupon. If she continues to have issues with cost we would have to try a different medication all together which she is not interested in trying right now.  --- The urine drug screen confirmation from 6/22/2021 has been reviewed and the result is appropriate based on patient " history and JUDY report  --- The patient signed an updated copy of the controlled substance agreement on 6/22/2021  --- Discussed case with Dr. Bustos. Plan B - trial Hydromorphone 2 mg tablet, take one half tablet bid prn severe pain.  Or consider Demerol.    --- Follow-up 2 months/sooner if needed          JUDY REPORT  As part of the patient's treatment plan, I am prescribing controlled substances. The patient has been made aware of appropriate use of such medications, including potential risk of somnolence, limited ability to drive and/or work safely, and the potential for dependence or overdose. It has also bee made clear that these medications are for use by this patient only, without concomitant use of alcohol or other substances unless prescribed.     Patient has completed prescribing agreement detailing terms of continued prescribing of controlled substances, including monitoring JUDY reports, urine drug screening, and pill counts if necessary. The patient is aware that inappropriate use will results in cessation of prescribing such medications.    As the clinician, I personally reviewed the JUDY from 7/21/2021 while the patient was in the office today.    History and physical exam exhibit continued safe and appropriate use of controlled substances.     Dictated utilizing Dragon dictation.     40 minutes total time spent on patient encounter. This includes EMR review, consult with Dr. Bustos, face to face time, and documentation.

## 2021-08-24 RX ORDER — PRAVASTATIN SODIUM 40 MG
TABLET ORAL
Qty: 90 TABLET | Refills: 0 | Status: SHIPPED | OUTPATIENT
Start: 2021-08-24 | End: 2021-11-16 | Stop reason: SDUPTHER

## 2021-09-07 RX ORDER — LEVOTHYROXINE SODIUM 0.05 MG/1
50 TABLET ORAL DAILY
Qty: 90 TABLET | Refills: 4 | Status: SHIPPED | OUTPATIENT
Start: 2021-09-07 | End: 2021-11-16 | Stop reason: SDUPTHER

## 2021-09-15 ENCOUNTER — OFFICE VISIT (OUTPATIENT)
Dept: PAIN MEDICINE | Facility: CLINIC | Age: 86
End: 2021-09-15

## 2021-09-15 VITALS
HEIGHT: 64 IN | RESPIRATION RATE: 18 BRPM | SYSTOLIC BLOOD PRESSURE: 168 MMHG | BODY MASS INDEX: 22.57 KG/M2 | OXYGEN SATURATION: 95 % | WEIGHT: 132.2 LBS | DIASTOLIC BLOOD PRESSURE: 73 MMHG | TEMPERATURE: 97.1 F | HEART RATE: 64 BPM

## 2021-09-15 DIAGNOSIS — G89.29 CHRONIC RIGHT HIP PAIN: ICD-10-CM

## 2021-09-15 DIAGNOSIS — M54.41 CHRONIC BILATERAL LOW BACK PAIN WITH BILATERAL SCIATICA: ICD-10-CM

## 2021-09-15 DIAGNOSIS — G89.29 OTHER CHRONIC PAIN: Primary | ICD-10-CM

## 2021-09-15 DIAGNOSIS — M54.42 CHRONIC BILATERAL LOW BACK PAIN WITH BILATERAL SCIATICA: ICD-10-CM

## 2021-09-15 DIAGNOSIS — Z79.899 ENCOUNTER FOR LONG-TERM CURRENT USE OF HIGH RISK MEDICATION: ICD-10-CM

## 2021-09-15 DIAGNOSIS — G89.29 CHRONIC BILATERAL LOW BACK PAIN WITH BILATERAL SCIATICA: ICD-10-CM

## 2021-09-15 DIAGNOSIS — M25.551 CHRONIC RIGHT HIP PAIN: ICD-10-CM

## 2021-09-15 PROCEDURE — 99214 OFFICE O/P EST MOD 30 MIN: CPT | Performed by: NURSE PRACTITIONER

## 2021-09-15 RX ORDER — BUPRENORPHINE 10 UG/H
1 PATCH TRANSDERMAL
Qty: 4 PATCH | Refills: 1 | Status: SHIPPED | OUTPATIENT
Start: 2021-09-15 | End: 2021-11-15

## 2021-09-15 NOTE — PROGRESS NOTES
"CHIEF COMPLAINT  Follow-up for back pain    Subjective   Ashli León is a 86 y.o. female  who presents for follow-up.  She has a history of chronic back and hip pain.    C/o low back and left knee pain. Pain today 5/10 in severity (right hip and low back).  Recurrent complaint of pain medication not working as well or lasting as long.  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  We initially changed her from hydrocodone to Belbuca which she did not tolerate due to price, side effects (diarrhea), and difficulty managing the product with the arthritis in her hands.  She was subsequently put back on hydrocodone temporarily which she again began requesting dose adjustments of the medication again.  Two months we opted to try patient on Butrans patch.  She was started on a 5 mcg/h patch weekly. She initially reported significant benefit with this patch.  Today she states she does not feel that the relief is \"quite there\". Denies any current adverse reactions. Primary issue now is cost, the coupon worked.       On 9/15/2020 the patient completed an L3-L4 LESI as well as a left knee injection with fluoroscopic guidance. She reports moderate relief but only helped for about a week or two.       Worsening bilateral knee pain.  History right TKA.       ORIF of the right femoral shaft fracture on 10/11/2020.       Reviewed the result of the PGT test from 12/10/2020.  Patient is a poor metabolizer of the CYP2D6.      Patient remained masked during entire encounter. No cough present. I donned a mask and eye protection throughout entire visit. Prior to donning mask and eye protection, hand hygiene was performed, as well as when it was doffed.  I was closer than 6 feet, but not for an extended period of time. No obvious exposure to any bodily fluids.    Back Pain  This is a chronic problem. The current episode started more than 1 year ago. The problem occurs daily. " The problem has been waxing and waning since onset. The pain is present in the lumbar spine. The pain radiates to the left thigh and right thigh. The pain is at a severity of 5/10. The pain is moderate. The symptoms are aggravated by position, standing, sitting and bending. Associated symptoms include numbness (bilateral legs) and weakness (bilateral legs). Pertinent negatives include no abdominal pain, chest pain, dysuria, fever or headaches. Risk factors include sedentary lifestyle. She has tried analgesics, NSAIDs, ice and heat (injections) for the symptoms. The treatment provided moderate relief.   Knee Pain   The pain is present in the left knee. The pain is at a severity of 5/10. The pain is moderate. The pain has been worsening since onset. Associated symptoms include numbness (bilateral legs). The symptoms are aggravated by weight bearing and movement. She has tried NSAIDs and acetaminophen (injections) for the symptoms. The treatment provided moderate relief.      PEG Assessment   What number best describes your pain on average in the past week?4  What number best describes how, during the past week, pain has interfered with your enjoyment of life?10  What number best describes how, during the past week, pain has interfered with your general activity?  6    The following portions of the patient's history were reviewed and updated as appropriate: allergies, current medications, past family history, past medical history, past social history, past surgical history and problem list.    Review of Systems   Constitutional: Positive for fatigue. Negative for fever.   HENT: Negative for congestion.    Eyes: Negative for visual disturbance.   Respiratory: Negative for cough.    Cardiovascular: Negative for chest pain.   Gastrointestinal: Negative for abdominal pain and constipation.   Genitourinary: Negative for difficulty urinating and dysuria.   Musculoskeletal: Positive for back pain.   Neurological: Positive for  "weakness (bilateral legs) and numbness (bilateral legs). Negative for headaches.   Psychiatric/Behavioral: Positive for sleep disturbance. Negative for suicidal ideas. The patient is not nervous/anxious.      I have reviewed and confirmed the accuracy of the ROS as documented by the MA/LPN/RN TANIA Tracy    Vitals:    09/15/21 1343   BP: 168/73   Pulse: 64   Resp: 18   Temp: 97.1 °F (36.2 °C)   SpO2: 95%   Weight: 60 kg (132 lb 3.2 oz)   Height: 161.3 cm (63.5\")   PainSc:   5   PainLoc: Back     Objective   Physical Exam  Vitals and nursing note reviewed.   Constitutional:       General: She is not in acute distress.     Appearance: Normal appearance. She is not ill-appearing.   Pulmonary:      Effort: Pulmonary effort is normal. No respiratory distress.   Musculoskeletal:      Lumbar back: Tenderness present.      Right hip: Tenderness present.      Right knee: Crepitus present. Tenderness present.      Left knee: Crepitus present. Tenderness present.   Skin:     General: Skin is warm and dry.   Neurological:      Mental Status: She is alert and oriented to person, place, and time.      Gait: Gait abnormal (slowed).   Psychiatric:         Mood and Affect: Mood normal.         Behavior: Behavior normal.         Assessment/Plan   Diagnoses and all orders for this visit:    1. Other chronic pain (Primary)    2. Chronic bilateral low back pain with bilateral sciatica    3. Chronic right hip pain    4. Encounter for long-term current use of high risk medication    Other orders  -     Buprenorphine 10 MCG/HR patch weekly; Place 1 patch on the skin as directed by provider Every 7 (Seven) Days.  Dispense: 4 patch; Refill: 1      --- Increase Butrans to 10 mcg/hr patch. Patient appears stable with current regimen. No adverse effects. Regarding continuation of opioids, there is no evidence of aberrant behavior or any red flags.  The patient continues with appropriate response to opioid therapy. ADL's remain " intact by self.   --- Could consider genicular nerve blocks in the future  --- The urine drug screen confirmation from 6/22/2021 has been reviewed and the result is appropriate based on patient history and JUDY report  --- The patient signed an updated copy of the controlled substance agreement on 6/22/2021  --- Follow-up 1 month          JUDY REPORT  As part of the patient's treatment plan, I am prescribing controlled substances. The patient has been made aware of appropriate use of such medications, including potential risk of somnolence, limited ability to drive and/or work safely, and the potential for dependence or overdose. It has also bee made clear that these medications are for use by this patient only, without concomitant use of alcohol or other substances unless prescribed.     Patient has completed prescribing agreement detailing terms of continued prescribing of controlled substances, including monitoring JUDY reports, urine drug screening, and pill counts if necessary. The patient is aware that inappropriate use will results in cessation of prescribing such medications.    As the clinician, I personally reviewed the JUDY from 9/15/2021 while the patient was in the office today.    History and physical exam exhibit continued safe and appropriate use of controlled substances.     Dictated utilizing Dragon dictation.

## 2021-09-28 RX ORDER — OMEPRAZOLE 40 MG/1
CAPSULE, DELAYED RELEASE ORAL
Qty: 90 CAPSULE | Refills: 0 | Status: SHIPPED | OUTPATIENT
Start: 2021-09-28 | End: 2021-11-16 | Stop reason: SDUPTHER

## 2021-10-07 ENCOUNTER — OFFICE VISIT (OUTPATIENT)
Dept: PAIN MEDICINE | Facility: CLINIC | Age: 86
End: 2021-10-07

## 2021-10-07 VITALS
OXYGEN SATURATION: 97 % | WEIGHT: 131.4 LBS | DIASTOLIC BLOOD PRESSURE: 66 MMHG | HEIGHT: 64 IN | SYSTOLIC BLOOD PRESSURE: 139 MMHG | BODY MASS INDEX: 22.43 KG/M2 | HEART RATE: 87 BPM | TEMPERATURE: 97.1 F | RESPIRATION RATE: 18 BRPM

## 2021-10-07 DIAGNOSIS — G89.29 OTHER CHRONIC PAIN: Primary | ICD-10-CM

## 2021-10-07 DIAGNOSIS — G89.29 CHRONIC BILATERAL LOW BACK PAIN WITH BILATERAL SCIATICA: ICD-10-CM

## 2021-10-07 DIAGNOSIS — G89.29 CHRONIC RIGHT HIP PAIN: ICD-10-CM

## 2021-10-07 DIAGNOSIS — M17.11 ARTHRITIS OF RIGHT KNEE: ICD-10-CM

## 2021-10-07 DIAGNOSIS — M54.42 CHRONIC BILATERAL LOW BACK PAIN WITH BILATERAL SCIATICA: ICD-10-CM

## 2021-10-07 DIAGNOSIS — M17.12 ARTHRITIS OF LEFT KNEE: ICD-10-CM

## 2021-10-07 DIAGNOSIS — Z79.899 ENCOUNTER FOR LONG-TERM CURRENT USE OF HIGH RISK MEDICATION: ICD-10-CM

## 2021-10-07 DIAGNOSIS — M25.551 CHRONIC RIGHT HIP PAIN: ICD-10-CM

## 2021-10-07 DIAGNOSIS — M54.41 CHRONIC BILATERAL LOW BACK PAIN WITH BILATERAL SCIATICA: ICD-10-CM

## 2021-10-07 PROCEDURE — 99214 OFFICE O/P EST MOD 30 MIN: CPT | Performed by: NURSE PRACTITIONER

## 2021-10-07 PROCEDURE — 20610 DRAIN/INJ JOINT/BURSA W/O US: CPT | Performed by: NURSE PRACTITIONER

## 2021-10-07 RX ORDER — BUPIVACAINE HYDROCHLORIDE 2.5 MG/ML
2 INJECTION, SOLUTION EPIDURAL; INFILTRATION; INTRACAUDAL
Status: DISCONTINUED | OUTPATIENT
Start: 2021-10-07 | End: 2021-10-07 | Stop reason: HOSPADM

## 2021-10-07 RX ADMIN — BUPIVACAINE HYDROCHLORIDE 2 ML: 2.5 INJECTION, SOLUTION EPIDURAL; INFILTRATION; INTRACAUDAL at 14:50

## 2021-10-07 NOTE — PROGRESS NOTES
"CHIEF COMPLAINT  Follow-up for back pain.    Subjective   Ashli León is a 86 y.o. female  who presents for follow-up.  She has a history of chronic back pain, joint pain.    C/o low back and left knee pain. Pain today 5/10 in severity (right hip and low back).  Recurrent complaint of pain medication not working as well or lasting as long.  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  We initially changed her from hydrocodone to Belbuca which she did not tolerate due to price, side effects (diarrhea), and difficulty managing the product with the arthritis in her hands.  She was subsequently put back on hydrocodone temporarily which she again began requesting dose adjustments of the medication again.  Two months we opted to try patient on Butrans patch.  She was started on a 5 mcg/h patch weekly. She initially reported significant benefit with this patch.  Last month she states she does not feel that the relief was \"quite there\". Increased to 10 mcg/hr patch last month which has helped.  She reports she would like to give it another month before making any further dose adjustments.  Denies any adverse reactions.       On 9/15/2020 the patient completed an L3-L4 LESI as well as a left knee injection with fluoroscopic guidance. She reports moderate relief but only helped for about a week or two.        Worsening bilateral knee pain.  History right TKA.  requests left knee injection today.      ORIF of the right femoral shaft fracture on 10/11/2020.       Reviewed the result of the PGT test from 12/10/2020.  Patient is a poor metabolizer of the CYP2D6.      Asks for left knee injection     Patient remained masked during entire encounter. No cough present. I donned a mask and eye protection throughout entire visit. Prior to donning mask and eye protection, hand hygiene was performed, as well as when it was doffed.  I was closer than 6 feet, but not for an " extended period of time. No obvious exposure to any bodily fluids.    Back Pain  This is a chronic problem. The current episode started more than 1 year ago. The problem occurs daily. The problem has been waxing and waning since onset. The pain is present in the lumbar spine. The pain radiates to the left thigh and right thigh. The pain is at a severity of 5/10. The pain is moderate. The symptoms are aggravated by position, standing, sitting and bending. Pertinent negatives include no abdominal pain, chest pain, dysuria, fever, headaches, numbness or weakness. Risk factors include sedentary lifestyle. She has tried analgesics, NSAIDs, ice and heat (injections) for the symptoms. The treatment provided moderate relief.   Knee Pain   The pain is present in the left knee. The pain is at a severity of 5/10. The pain is moderate. The pain has been worsening since onset. Pertinent negatives include no numbness. The symptoms are aggravated by weight bearing and movement. She has tried NSAIDs and acetaminophen (injections) for the symptoms. The treatment provided moderate relief.        PEG Assessment   What number best describes your pain on average in the past week?3  What number best describes how, during the past week, pain has interfered with your enjoyment of life?10  What number best describes how, during the past week, pain has interfered with your general activity?  6      The following portions of the patient's history were reviewed and updated as appropriate: allergies, current medications, past family history, past medical history, past social history, past surgical history and problem list.    Review of Systems   Constitutional: Negative for fatigue and fever.   HENT: Positive for hearing loss. Negative for congestion.    Eyes: Negative for photophobia.   Respiratory: Negative for shortness of breath.    Cardiovascular: Negative for chest pain.   Gastrointestinal: Negative for abdominal pain and constipation.  "  Genitourinary: Negative for difficulty urinating and dysuria.   Musculoskeletal: Positive for back pain.   Neurological: Negative for weakness, numbness and headaches.   Psychiatric/Behavioral: Positive for sleep disturbance. Negative for suicidal ideas. The patient is not nervous/anxious.      I have reviewed and confirmed the accuracy of the ROS as documented by the MA/LPN/RN TANIA Tracy    Vitals:    10/07/21 1424   BP: 139/66   Pulse: 87   Resp: 18   Temp: 97.1 °F (36.2 °C)   SpO2: 97%   Weight: 59.6 kg (131 lb 6.4 oz)   Height: 161.3 cm (63.5\")   PainSc:   5   PainLoc: Back         Objective     Physical Exam  Vitals and nursing note reviewed.   Constitutional:       General: She is not in acute distress.     Appearance: Normal appearance. She is not ill-appearing.   Pulmonary:      Effort: Pulmonary effort is normal. No respiratory distress.   Musculoskeletal:      Lumbar back: Tenderness present.      Right hip: Tenderness present.      Right knee: Crepitus present. Tenderness present.      Left knee: Crepitus present. Tenderness present.   Skin:     General: Skin is warm and dry.   Neurological:      Mental Status: She is alert and oriented to person, place, and time.      Gait: Gait abnormal (slowed).   Psychiatric:         Mood and Affect: Mood normal.         Behavior: Behavior normal.       Large Joint Arthrocentesis: L knee  Date/Time: 10/7/2021 2:50 PM  Consent given by: patient  Site marked: site marked  Timeout: Immediately prior to procedure a time out was called to verify the correct patient, procedure, equipment, support staff and site/side marked as required   Supporting Documentation  Indications: pain and diagnostic evaluation   Procedure Details  Location: knee - L knee  Needle size: 25 G  Approach: lateral  Medications administered: 2 mL bupivacaine (PF) 0.25 % (SoluMedrol 40mg/ml (NDC 1161-1765-27, LOT [id number], EXP 09/2022))  Patient tolerance: patient tolerated the procedure " well with no immediate complications          Assessment/Plan   Diagnoses and all orders for this visit:    1. Other chronic pain (Primary)    2. Chronic bilateral low back pain with bilateral sciatica    3. Chronic right hip pain    4. Arthritis of left knee  -     Large Joint Arthrocentesis: L knee    5. Arthritis of right knee    6. Encounter for long-term current use of high risk medication      --- Continue Butrans. Patient appears stable with current regimen. No adverse effects. Regarding continuation of opioids, there is no evidence of aberrant behavior or any red flags.  The patient continues with appropriate response to opioid therapy. ADL's remain intact by self.   --- The urine drug screen confirmation from 6/22/2021 has been reviewed and the result is appropriate based on patient history and JUDY report  --- The patient signed an updated copy of the controlled substance agreement on 6/22/2021  --- Left knee injection in office today   --- Follow-up 2 months        JUDY REPORT  As part of the patient's treatment plan, I am prescribing controlled substances. The patient has been made aware of appropriate use of such medications, including potential risk of somnolence, limited ability to drive and/or work safely, and the potential for dependence or overdose. It has also bee made clear that these medications are for use by this patient only, without concomitant use of alcohol or other substances unless prescribed.     Patient has completed prescribing agreement detailing terms of continued prescribing of controlled substances, including monitoring JUDY reports, urine drug screening, and pill counts if necessary. The patient is aware that inappropriate use will results in cessation of prescribing such medications.    As the clinician, I personally reviewed the JUDY from 10/7/2021 while the patient was in the office today.    History and physical exam exhibit continued safe and appropriate use of  controlled substances.     Dictated utilizing Dragon dictation.

## 2021-10-20 ENCOUNTER — TELEPHONE (OUTPATIENT)
Dept: INTERNAL MEDICINE | Facility: CLINIC | Age: 86
End: 2021-10-20

## 2021-10-20 NOTE — TELEPHONE ENCOUNTER
Hub to read:  Called pateint to let her know that Dr. Monson needs her to make an appointment and be seen before he can send a refill in for her.

## 2021-11-02 ENCOUNTER — TELEPHONE (OUTPATIENT)
Dept: INTERNAL MEDICINE | Facility: CLINIC | Age: 86
End: 2021-11-02

## 2021-11-02 DIAGNOSIS — G47.09 OTHER INSOMNIA: ICD-10-CM

## 2021-11-02 RX ORDER — ZOLPIDEM TARTRATE 10 MG/1
10 TABLET ORAL NIGHTLY PRN
Qty: 90 TABLET | Refills: 1 | Status: SHIPPED | OUTPATIENT
Start: 2021-11-02 | End: 2022-05-02

## 2021-11-02 NOTE — TELEPHONE ENCOUNTER
Caller: Ashli León    Relationship: Self      Medication requested (name and dosage):   zolpidem (AMBIEN) 10 MG tablet       Pharmacy where request should be sent:   [facility] [address] [place] [address] [place] [address] [place] - [phone]  - [phone]   [phone]  Additional details provided by patient: PATIENT IS CALLING TO REQUEST A NEW PRESCRIPTION FOR THE ABOVE MEDICATION.  SHE STATES SHE HAS BEEN OUT FOR 3 DAYS.  SHE SCHEDULED A MED REFILL FOLLOW UP, BUT FIRST AVAILABLE IS 11/16/21.    Best call back number: [phone]     Does the patient have less than a 3 day supply:  [x] Yes  [] No    Mayi Grajeda, Jose Rep   11/02/21 09:42 EDT       PLEASE ADVISE.

## 2021-11-15 RX ORDER — BUPRENORPHINE 10 UG/H
PATCH TRANSDERMAL
Qty: 4 PATCH | Refills: 0 | Status: SHIPPED | OUTPATIENT
Start: 2021-11-15 | End: 2021-11-16 | Stop reason: SDUPTHER

## 2021-11-16 ENCOUNTER — OFFICE VISIT (OUTPATIENT)
Dept: INTERNAL MEDICINE | Facility: CLINIC | Age: 86
End: 2021-11-16

## 2021-11-16 VITALS
SYSTOLIC BLOOD PRESSURE: 118 MMHG | WEIGHT: 134 LBS | OXYGEN SATURATION: 95 % | RESPIRATION RATE: 16 BRPM | HEIGHT: 64 IN | BODY MASS INDEX: 22.88 KG/M2 | TEMPERATURE: 96.8 F | DIASTOLIC BLOOD PRESSURE: 72 MMHG | HEART RATE: 66 BPM

## 2021-11-16 DIAGNOSIS — I10 PRIMARY HYPERTENSION: Primary | ICD-10-CM

## 2021-11-16 DIAGNOSIS — E03.9 HYPOTHYROIDISM (ACQUIRED): ICD-10-CM

## 2021-11-16 DIAGNOSIS — E78.2 MIXED HYPERLIPIDEMIA: ICD-10-CM

## 2021-11-16 DIAGNOSIS — G89.29 OTHER CHRONIC PAIN: ICD-10-CM

## 2021-11-16 PROCEDURE — 99214 OFFICE O/P EST MOD 30 MIN: CPT | Performed by: INTERNAL MEDICINE

## 2021-11-16 RX ORDER — FLUTICASONE PROPIONATE 0.05 MG/G
OINTMENT TOPICAL 2 TIMES DAILY
Qty: 60 G | Refills: 3 | Status: SHIPPED | OUTPATIENT
Start: 2021-11-16 | End: 2022-01-05 | Stop reason: ALTCHOICE

## 2021-11-16 RX ORDER — PRAVASTATIN SODIUM 40 MG
40 TABLET ORAL DAILY
Qty: 90 TABLET | Refills: 0 | Status: SHIPPED | OUTPATIENT
Start: 2021-11-16 | End: 2022-03-20

## 2021-11-16 RX ORDER — OMEPRAZOLE 40 MG/1
40 CAPSULE, DELAYED RELEASE ORAL DAILY
Qty: 90 CAPSULE | Refills: 4 | Status: SHIPPED | OUTPATIENT
Start: 2021-11-16 | End: 2023-01-26

## 2021-11-16 RX ORDER — BUPRENORPHINE 10 UG/H
1 PATCH TRANSDERMAL
Qty: 4 PATCH | Refills: 0 | Status: SHIPPED | OUTPATIENT
Start: 2021-11-16 | End: 2021-12-07 | Stop reason: SDUPTHER

## 2021-11-16 RX ORDER — LEVOTHYROXINE SODIUM 0.05 MG/1
50 TABLET ORAL DAILY
Qty: 90 TABLET | Refills: 4 | Status: SHIPPED | OUTPATIENT
Start: 2021-11-16 | End: 2023-01-03

## 2021-11-16 NOTE — PROGRESS NOTES
"Chief Complaint  Hyperlipidemia, Hypertension, and Hypothyroidism    Subjective          Ashli León presents to [facility]  Here for follow up HTN, HLD, and hypothyroidism. Doing well overall. She has had a few falls over the last year of note, has a walker and rolator at home that helps with her balance. Hasn't had full labs in a few months and wants to get those done today. Also needs refills on her medications. No new problems. BP has been under great control. She is otherwise without many complaints today. ROS otherwise negative.       Objective   Vital Signs:   /72 (BP Location: Left arm, Patient Position: Sitting, Cuff Size: Large Adult)   Pulse 66   Temp 96.8 °F (36 °C) (Temporal)   Resp 16   Ht 161.3 cm (63.5\")   Wt 60.8 kg (134 lb)   SpO2 95%   BMI 23.36 kg/m²     Physical Exam  Vitals and nursing note reviewed.   Constitutional:       General: She is not in acute distress.     Appearance: Normal appearance.   HENT:      Head: Normocephalic and atraumatic.      Right Ear: Tympanic membrane and ear canal normal.      Left Ear: Tympanic membrane and ear canal normal.      Nose: Nose normal. No congestion.      Mouth/Throat:      Mouth: Mucous membranes are moist.      Pharynx: No oropharyngeal exudate or posterior oropharyngeal erythema.   Eyes:      Extraocular Movements: Extraocular movements intact.      Conjunctiva/sclera: Conjunctivae normal.      Pupils: Pupils are equal, round, and reactive to light.   Cardiovascular:      Rate and Rhythm: Normal rate and regular rhythm.      Pulses: Normal pulses.      Heart sounds: Normal heart sounds. No murmur heard.      Pulmonary:      Effort: Pulmonary effort is normal. No respiratory distress.      Breath sounds: Normal breath sounds. No wheezing or rales.   Abdominal:      General: Abdomen is flat. Bowel sounds are normal. There is no distension.      Palpations: Abdomen is soft.      " Tenderness: There is no abdominal tenderness.   Musculoskeletal:      Cervical back: Normal range of motion and neck supple. No rigidity.   Lymphadenopathy:      Cervical: No cervical adenopathy.   Skin:     General: Skin is warm.      Capillary Refill: Capillary refill takes less than 2 seconds.   Neurological:      General: No focal deficit present.      Mental Status: She is alert.   Psychiatric:         Mood and Affect: Mood normal.        Result Review :   The following data was reviewed by: Arnaud Monson MD on 11/16/2021:  Common labs    Common Labsle 11/24/20 11/24/20 5/25/21 5/25/21    1109 1109 0041 [id number]   Glucose  128 (A)  109 (A)   BUN  8  19   Creatinine  0.76  0.78   eGFR Non African Am  73  70   Sodium  129 (A)  137   Potassium  4.8  4.0   Chloride  93 (A)  100   Calcium  10.1  9.8   Albumin  3.40 (A)  4.50   Total Bilirubin  0.4  0.7   Alkaline Phosphatase  73  47   AST (SGOT)  21  12   ALT (SGPT)  16  11   WBC 8.02  7.92    Hemoglobin 10.1 (A)  11.5 (A)    Hematocrit 31.3 (A)  33.9 (A)    Platelets 438  258    (A) Abnormal value       Comments are available for some flowsheets but are not being displayed.           Data reviewed: n.a          Assessment and Plan     Ashli León is a 86 y.o. female with HTN, HLD, hypothyroidism who presents for f/u. Will get labs today and re-eval meds as necessary. Otherwise plan to continue current meds. Will call with labs once back and make changes as needed.      Diagnoses and all orders for this visit:    1. Primary hypertension (Primary)  -     CBC & Differential  -     TSH  -     Comprehensive Metabolic Panel  -     Lipid Panel With / Chol / HDL Ratio    2. Mixed hyperlipidemia  -     CBC & Differential  -     TSH  -     Comprehensive Metabolic Panel  -     Lipid Panel With / Chol / HDL Ratio    3. Hypothyroidism (acquired)  -     CBC & Differential  -     TSH  -     Comprehensive Metabolic Panel  -     Lipid Panel With / Chol / HDL Ratio    4.  Other chronic pain    Other orders  -     pravastatin (PRAVACHOL) 40 MG tablet; Take 1 tablet by mouth Daily.  Dispense: 90 tablet; Refill: 0  -     levothyroxine (Euthyrox) 50 MCG tablet; Take 1 tablet by mouth Daily.  Dispense: 90 tablet; Refill: 4  -     omeprazole (priLOSEC) 40 MG capsule; Take 1 capsule by mouth Daily.  Dispense: 90 capsule; Refill: 4  -     fluticasone (CUTIVATE) 0.005 % ointment; Apply  topically to the appropriate area as directed 2 (Two) Times a Day.  Dispense: 60 g; Refill: 3      I spent 30 minutes caring for Ashli on this date of service. This time includes time spent by me in the following activities:preparing for the visit, reviewing tests, obtaining and/or reviewing a separately obtained history, performing a medically appropriate examination and/or evaluation , counseling and educating the patient/family/caregiver, ordering medications, tests, or procedures, referring and communicating with other health care professionals  and independently interpreting results and communicating that information with the patient/family/caregiver  Follow Up   Return in about 6 months (around 5/16/2022).  Patient was given instructions and counseling regarding her condition or for health maintenance advice. Please see specific information pulled into the AVS if appropriate.     I saw and reviewed the patient with the resident.  We discussed the plan and agree with the above documentation and recommendations.  She doing pretty well.  Still some balance issues which we talked about balance exercises.  She has appropriate occupational devices at home for ambulation.  Chronic pain who sees pain management and on a buprenorphine patch weekly.  Ambien prescription refilled.  She has been on this for years obviously would prefer not to prescribe that for insomnia but at this point probably not any luck discontinuing it.  Hypertension hyperlipidemia.  Recheck lab work.  Hypothyroidism recheck a TSH.  She  refused vaccinations

## 2021-11-16 NOTE — TELEPHONE ENCOUNTER
Medication Refill Request    Date of phone call: 11/16/2021    Medication being requested: buprenorphine 10 mcg sig: APPLY ONE PATCH TO THE SKIN ONCE WEEKLY  Qty: 4    Date of last visit: 10/07/2021    Date of last refill:     JUDY up to date?:     Next Follow up?: 12/07/2021    Any new pertinent information? (i.e, new medication allergies, new use of medications, change in patient's health or condition, non-compliance or inconsistency with prescribing agreement?):

## 2021-11-17 LAB
ALBUMIN SERPL-MCNC: 4.4 G/DL (ref 3.6–4.6)
ALBUMIN/GLOB SERPL: 1.6 {RATIO} (ref 1.2–2.2)
ALP SERPL-CCNC: 49 IU/L (ref 44–121)
ALT SERPL-CCNC: 12 IU/L (ref 0–32)
AST SERPL-CCNC: 18 IU/L (ref 0–40)
BASOPHILS # BLD AUTO: 0.1 X10E3/UL (ref 0–0.2)
BASOPHILS NFR BLD AUTO: 1 %
BILIRUB SERPL-MCNC: 1.2 MG/DL (ref 0–1.2)
BUN SERPL-MCNC: 12 MG/DL (ref 8–27)
BUN/CREAT SERPL: 13 (ref 12–28)
CALCIUM SERPL-MCNC: 9.4 MG/DL (ref 8.7–10.3)
CHLORIDE SERPL-SCNC: 99 MMOL/L (ref 96–106)
CHOLEST SERPL-MCNC: 188 MG/DL (ref 100–199)
CHOLEST/HDLC SERPL: 4.3 RATIO (ref 0–4.4)
CO2 SERPL-SCNC: 25 MMOL/L (ref 20–29)
CREAT SERPL-MCNC: 0.96 MG/DL (ref 0.57–1)
EOSINOPHIL # BLD AUTO: 0.1 X10E3/UL (ref 0–0.4)
EOSINOPHIL NFR BLD AUTO: 1 %
ERYTHROCYTE [DISTWIDTH] IN BLOOD BY AUTOMATED COUNT: 11.9 % (ref 11.7–15.4)
GLOBULIN SER CALC-MCNC: 2.7 G/DL (ref 1.5–4.5)
GLUCOSE SERPL-MCNC: 106 MG/DL (ref 65–99)
HCT VFR BLD AUTO: 33.9 % (ref 34–46.6)
HDLC SERPL-MCNC: 44 MG/DL
HGB BLD-MCNC: 11.3 G/DL (ref 11.1–15.9)
IMM GRANULOCYTES # BLD AUTO: 0 X10E3/UL (ref 0–0.1)
IMM GRANULOCYTES NFR BLD AUTO: 0 %
LDLC SERPL CALC-MCNC: 93 MG/DL (ref 0–99)
LYMPHOCYTES # BLD AUTO: 2.3 X10E3/UL (ref 0.7–3.1)
LYMPHOCYTES NFR BLD AUTO: 30 %
MCH RBC QN AUTO: 30.6 PG (ref 26.6–33)
MCHC RBC AUTO-ENTMCNC: 33.3 G/DL (ref 31.5–35.7)
MCV RBC AUTO: 92 FL (ref 79–97)
MONOCYTES # BLD AUTO: 0.6 X10E3/UL (ref 0.1–0.9)
MONOCYTES NFR BLD AUTO: 7 %
NEUTROPHILS # BLD AUTO: 4.6 X10E3/UL (ref 1.4–7)
NEUTROPHILS NFR BLD AUTO: 61 %
PLATELET # BLD AUTO: 253 X10E3/UL (ref 150–450)
POTASSIUM SERPL-SCNC: 4.5 MMOL/L (ref 3.5–5.2)
PROT SERPL-MCNC: 7.1 G/DL (ref 6–8.5)
RBC # BLD AUTO: 3.69 X10E6/UL (ref 3.77–5.28)
SODIUM SERPL-SCNC: 138 MMOL/L (ref 134–144)
TRIGL SERPL-MCNC: 307 MG/DL (ref 0–149)
TSH SERPL DL<=0.005 MIU/L-ACNC: 1.31 UIU/ML (ref 0.45–4.5)
VLDLC SERPL CALC-MCNC: 51 MG/DL (ref 5–40)
WBC # BLD AUTO: 7.7 X10E3/UL (ref 3.4–10.8)

## 2021-12-07 ENCOUNTER — OFFICE VISIT (OUTPATIENT)
Dept: PAIN MEDICINE | Facility: CLINIC | Age: 86
End: 2021-12-07

## 2021-12-07 VITALS
BODY MASS INDEX: 22.43 KG/M2 | TEMPERATURE: 97.5 F | HEART RATE: 87 BPM | OXYGEN SATURATION: 97 % | DIASTOLIC BLOOD PRESSURE: 85 MMHG | RESPIRATION RATE: 16 BRPM | WEIGHT: 131.4 LBS | SYSTOLIC BLOOD PRESSURE: 157 MMHG | HEIGHT: 64 IN

## 2021-12-07 DIAGNOSIS — M54.41 CHRONIC BILATERAL LOW BACK PAIN WITH BILATERAL SCIATICA: ICD-10-CM

## 2021-12-07 DIAGNOSIS — G89.29 CHRONIC BILATERAL LOW BACK PAIN WITH BILATERAL SCIATICA: ICD-10-CM

## 2021-12-07 DIAGNOSIS — M17.11 ARTHRITIS OF RIGHT KNEE: ICD-10-CM

## 2021-12-07 DIAGNOSIS — Z79.899 ENCOUNTER FOR LONG-TERM CURRENT USE OF HIGH RISK MEDICATION: ICD-10-CM

## 2021-12-07 DIAGNOSIS — G89.29 CHRONIC RIGHT HIP PAIN: ICD-10-CM

## 2021-12-07 DIAGNOSIS — M54.42 CHRONIC BILATERAL LOW BACK PAIN WITH BILATERAL SCIATICA: ICD-10-CM

## 2021-12-07 DIAGNOSIS — G89.29 OTHER CHRONIC PAIN: Primary | ICD-10-CM

## 2021-12-07 DIAGNOSIS — M17.12 ARTHRITIS OF LEFT KNEE: ICD-10-CM

## 2021-12-07 DIAGNOSIS — M25.551 CHRONIC RIGHT HIP PAIN: ICD-10-CM

## 2021-12-07 PROCEDURE — 80305 DRUG TEST PRSMV DIR OPT OBS: CPT | Performed by: NURSE PRACTITIONER

## 2021-12-07 PROCEDURE — 99214 OFFICE O/P EST MOD 30 MIN: CPT | Performed by: NURSE PRACTITIONER

## 2021-12-07 RX ORDER — BUPRENORPHINE 10 UG/H
1 PATCH TRANSDERMAL
Qty: 4 PATCH | Refills: 0 | Status: SHIPPED | OUTPATIENT
Start: 2021-12-07 | End: 2022-01-10

## 2021-12-07 NOTE — PROGRESS NOTES
CHIEF COMPLAINT  F/U back pain.   Pt states her pain level has stayed the same.     Subjective   Ashli León is a 86 y.o. female  who presents for follow-up.  She has a history of back pain.  Today her pain is 5/10VAS in severity. She continues with Butrans 10 mcg/hr patch. This medication has been much more helpful for her pain compared to the Norco she was previously on.  She denies any side effects including somnolence or constipation.     Patient preliminary UDS positive for both BUP and OPI today.  Patient states that she took an old Norco 7.5/325 recently when her pain was flared. Discussed with patient that she absolutely can not take additional opioids with her butrans patch.  Discussed that the Norco 7.5/325 was previously discontinued and this is a breach of her contract. She states this will not happen again and she has no Norco 7.5/325 left at this point. Discussed that any further abnormal UDS would result in discontinuation of her opioid.     Patient remained masked during entire encounter. No cough present. I donned a mask and eye protection throughout entire visit. Prior to donning mask and eye protection, hand hygiene was performed, as well as when it was doffed.  I was closer than 6 feet, but not for an extended period of time. No obvious exposure to any bodily fluids.    Back Pain  This is a chronic problem. The current episode started more than 1 year ago. The problem occurs daily. The problem has been waxing and waning since onset. The pain is present in the lumbar spine. The pain radiates to the left thigh and right thigh. The pain is at a severity of 5/10. The pain is moderate. The symptoms are aggravated by position, standing, sitting and bending. Pertinent negatives include no abdominal pain, chest pain, dysuria, fever, headaches, numbness or weakness. Risk factors include sedentary lifestyle. She has tried analgesics, NSAIDs, ice and heat (injections) for the symptoms. The treatment  provided moderate relief.   Knee Pain   The pain is present in the left knee. The pain is at a severity of 5/10. The pain is moderate. The pain has been worsening since onset. Pertinent negatives include no numbness. The symptoms are aggravated by weight bearing and movement. She has tried NSAIDs and acetaminophen (injections) for the symptoms. The treatment provided moderate relief.      PEG Assessment   What number best describes your pain on average in the past week?8  What number best describes how, during the past week, pain has interfered with your enjoyment of life?8  What number best describes how, during the past week, pain has interfered with your general activity?  8    The following portions of the patient's history were reviewed and updated as appropriate: allergies, current medications, past family history, past medical history, past social history, past surgical history and problem list.    Review of Systems   Constitutional: Negative for activity change, fatigue and fever.   HENT: Negative for congestion.    Eyes: Negative for visual disturbance.   Respiratory: Negative for cough and chest tightness.    Cardiovascular: Negative for chest pain.   Gastrointestinal: Negative for abdominal pain, constipation and diarrhea.   Genitourinary: Negative for difficulty urinating and dysuria.   Musculoskeletal: Positive for back pain.   Neurological: Negative for dizziness, weakness, light-headedness, numbness and headaches.   Psychiatric/Behavioral: Positive for agitation. Negative for sleep disturbance and suicidal ideas. The patient is not nervous/anxious.      --  The aforementioned information the Chief Complaint section and above subjective data including any HPI data, and also the Review of Systems data, has been personally reviewed and affirmed.  --    Office visit from 10/7/2021 with TANIA Tracy reviewed.  Patient has a history of chronic back and joint pain.  She had recurrent complaint of  "pain medication not working as well or lasting as long.  She completed a PGT which showed she was an abnormal metabolizer of CYP 2 D6.  She was initially changed from hydrocodone to Belbuca which was cost prohibitive and caused side effects including diarrhea as well as difficulty with managing the product with the arthritis in her hands.  She was subsequently put back on hydrocodone temporarily which she began requesting dose adjustments of the medication.  Opted to try Butrans patch.  She was started on 5 mcg/hour patch weekly and initially reported significant benefit with the patch this was increased to 10 mcg/hour as she felt like they were \"quite there\".  She completed a L3-4 LESI on 9/15/2020 as well as a left knee injection with moderate relief that only lasted 1 to 2 weeks.  She is worsening bilateral knee pain with history of right TKA.  History of ORIF of the right femoral shaft fracture on 10/11/2020. Continue Butrans with follow-up in 2 months.  Left knee injection performed in office.        Vitals:    12/07/21 1333   BP: 157/85   Pulse: 87   Resp: 16   Temp: 97.5 °F (36.4 °C)   SpO2: 97%   Weight: 59.6 kg (131 lb 6.4 oz)   Height: 161.3 cm (63.5\")   PainSc:   5   PainLoc: Back     Objective   Physical Exam  Vitals and nursing note reviewed.   Constitutional:       Appearance: Normal appearance. She is well-developed.   Eyes:      General: Lids are normal.   Cardiovascular:      Rate and Rhythm: Normal rate.   Pulmonary:      Effort: Pulmonary effort is normal.   Musculoskeletal:      Lumbar back: Tenderness present.      Right hip: Tenderness present.      Right knee: Tenderness present.      Left knee: Tenderness present.   Neurological:      Mental Status: She is alert and oriented to person, place, and time.   Psychiatric:         Speech: Speech normal.         Behavior: Behavior normal.         Judgment: Judgment normal.       Assessment/Plan   Diagnoses and all orders for this visit:    1. Other " chronic pain (Primary)    2. Chronic bilateral low back pain with bilateral sciatica    3. Chronic right hip pain    4. Arthritis of left knee    5. Arthritis of right knee    6. Encounter for long-term current use of high risk medication    Other orders  -     Buprenorphine 10 MCG/HR patch weekly; Apply 1 patch topically to the appropriate area as directed Every 7 (Seven) Days. DNF 12/14/2021  Dispense: 4 patch; Refill: 0      --- PGT results from 12/10/2020 reviewed.  Patient is a poor metabolizer of CYP 2D6.  --- The patient signed an updated copy of the controlled substance agreement on 12/7/2021  --- Routine UDS in office today as part of monitoring requirements for controlled substances.  The specimen was viewed and the immunoassay result reviewed and is +BUP, +OPI.  This specimen will be sent to [facility] for confirmation.   --- Refill Butrans 10 mcg/hr patch. DNF 12/14/2021 applied. Patient appears stable with current regimen. No adverse effects. Regarding continuation of opioids, there is no evidence of aberrant behavior or any red flags.  The patient continues with appropriate response to opioid therapy. ADL's remain intact by self.     --- Follow-up 2 months or sooner if needed.      JUDY REPORT  As part of the patient's treatment plan, I am prescribing controlled substances. The patient has been made aware of appropriate use of such medications, including potential risk of somnolence, limited ability to drive and/or work safely, and the potential for dependence or overdose. It has also bee made clear that these medications are for use by this patient only, without concomitant use of alcohol or other substances unless prescribed.     Patient has completed prescribing agreement detailing terms of continued prescribing of controlled substances, including monitoring JUDY reports, urine drug screening, and pill counts if necessary. The patient is aware that inappropriate use will results in  cessation of prescribing such medications.    As the clinician, I personally reviewed the JUDY from 12/7/2021 while the patient was in the office today.    History and physical exam exhibit continued safe and appropriate use of controlled substances.    Dictated utilizing Dragon dictation.     This document is intended for medical expert use only. Reading of this document by patients and/or patient's family without participating medical staff guidance may result in misinterpretation and unintended morbidity.   Any interpretation of such data is the responsibility of the patient and/or family member responsible for the patient in concert with their primary or specialist providers, not to be left for sources of online searches such as [employer], [employer] or similar queries. Relying on these approaches to knowledge may result in misinterpretation, misguided goals of care and even death should patients or family members try recommendations outside of the realm of professional medical care in a supervised way.

## 2021-12-08 LAB
BUPRENORPHINE SERPL-MCNC: POSITIVE NG/ML
POC AMPHETAMINES: NEGATIVE
POC BARBITURATES: NEGATIVE
POC BENZODIAZEPHINES: NEGATIVE
POC COCAINE: NEGATIVE
POC METHADONE: NEGATIVE
POC METHAMPHETAMINE SCREEN URINE: NEGATIVE
POC OPIATES: POSITIVE
POC OXYCODONE: NEGATIVE
POC PHENCYCLIDINE: NEGATIVE
POC PROPOXYPHENE: NEGATIVE
POC THC: NEGATIVE
POC TRICYCLIC ANTIDEPRESSANTS: NEGATIVE

## 2021-12-17 ENCOUNTER — APPOINTMENT (OUTPATIENT)
Dept: GENERAL RADIOLOGY | Facility: HOSPITAL | Age: 86
End: 2021-12-17

## 2021-12-17 ENCOUNTER — HOSPITAL ENCOUNTER (EMERGENCY)
Facility: HOSPITAL | Age: 86
Discharge: HOME | End: 2021-12-17
Attending: EMERGENCY MEDICINE | Admitting: EMERGENCY MEDICINE

## 2021-12-17 VITALS
HEART RATE: 59 BPM | RESPIRATION RATE: 16 BRPM | BODY MASS INDEX: 24.24 KG/M2 | DIASTOLIC BLOOD PRESSURE: 76 MMHG | OXYGEN SATURATION: 100 % | HEIGHT: 64 IN | SYSTOLIC BLOOD PRESSURE: 145 MMHG | TEMPERATURE: 97.6 F | WEIGHT: 142 LBS

## 2021-12-17 DIAGNOSIS — G89.29 CHRONIC RIGHT HIP PAIN: ICD-10-CM

## 2021-12-17 DIAGNOSIS — M25.551 CHRONIC RIGHT HIP PAIN: ICD-10-CM
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
## 2021-12-17 PROCEDURE — 99282 EMERGENCY DEPT VISIT SF MDM: CPT | Performed by: EMERGENCY MEDICINE

## 2021-12-17 PROCEDURE — 99283 EMERGENCY DEPT VISIT LOW MDM: CPT

## 2021-12-17 PROCEDURE — 73502 X-RAY EXAM HIP UNI 2-3 VIEWS: CPT

## 2021-12-18 NOTE — ED PROVIDER NOTES
Subjective   History of Present Illness  86-year-old female presents to the ER concerned that she might of hurt her right hip when she fell tonight.  She says that she got her feet tangled up when she was walking at home and somewhat tripped and fell to the ground but not very hard.  She says she has a history of a broken femur and hip replacement on the right and she always has pain in it and she was concerned maybe she injured it because it hurts.  She denies having worse pain than prior to the fall.  There is no syncope, no head injury and no loss of consciousness.  She denies any neck pain or back pain  Review of Systems   Musculoskeletal: Positive for arthralgias. Negative for back pain and neck pain.       Past Medical History:   Diagnosis Date   • Acid reflux    • Back pain    • Colitis     history of   • Diarrhea    • Disease of thyroid gland    • Diverticular disease    • Glaucoma    • Gout     - right big toe   • High blood pressure    • High cholesterol    • History of vertebral compression fracture     multiple   • Hyponatremia    • Knee pain    • Low back pain    • Macular degeneration    • Mitral regurgitation 06/2016    Mild to moderate per 2-D echocardiogram   • Peripheral neuropathy    • Primary osteoarthritis of both knees 4/1/2016   • Renal lesion     on mri   • Rheumatoid arthritis (HCC)        Allergies   Allergen Reactions   • Neomycin Swelling   • Statins Myalgia and Swelling     Not all statins  Not all statins       Past Surgical History:   Procedure Laterality Date   • APPENDECTOMY  1951   • CATARACT EXTRACTION Bilateral    • FEMUR OPEN REDUCTION INTERNAL FIXATION Right 10/11/2020    Procedure: OPEN REDUCTION INTERNAL FIXATION OF RIGHT FEMUR FRACTURE;  Surgeon: Mac Frances Jr., MD;  Location: [facility];  Service: Orthopedics;  Laterality: Right;   • FEMUR SURGERY Right     had fracture and has rajesh    • HEMORRHOIDECTOMY     • HIP HEMIARTHROPLASTY Right 1/3/2017    Procedure: HIP  HEMIARTHROPLASTY with IM rajesh and 2 screw hardware removal antibiotic cement;  Surgeon: Mirza Whittington MD;  Location:  [facility];  Service:    • HIP SURGERY Right 1999    hx fracture   • LEG SURGERY Right 12/1/2017    Procedure: GLUTEAL TENDON REPAIR-minimus and medius;  Surgeon: Mirza Whittington MD;  Location:  [facility] OR;  Service:    • TOTAL KNEE ARTHROPLASTY Right 6/22/2016    Procedure: RT TOTAL KNEE ARTHROPLASTY WITH FINN NAVIGATION;  Surgeon: Adrian Swenson MD;  [facility] MAIN [facility];  Service:        Family History   Problem Relation Age of Onset   • Heart attack Father        Social History     Socioeconomic History   • Marital status:    Tobacco Use   • Smoking status: Never Smoker   • Smokeless tobacco: Never Used   Vaping Use   • Vaping Use: Never used   Substance and Sexual Activity   • Alcohol use: Yes     Alcohol/week: 3.0 standard drinks     Types: 3 Shots of liquor per week     Comment: social use 1 weekly   • Drug use: No     Types: Oxycodone   • Sexual activity: Defer           Objective   Physical Exam  INITIAL VITAL SIGNS: Reviewed by me.  Pulse ox normal  GENERAL: Alert. Well developed and well nourished. No respiratory distress.  HEAD: Normocephalic.   EYES: No conjunctival injection.  ENT: Oral mucosa is moist.  NECK: Supple. Full range of motion.  There is no pain to palpation of the neck in the midline,  BACK: No pain to palpation in the back of the midline or otherwise.  RESPIRATORY: Non-labored respirations.  EXTREMITIES: No deformity.  Full range of motion right hip, no pain on lateral compression.  Neurovascularly intact distal to the hip.  SKIN: Warm and dry. No rashes. No diaphoresis.  NEUROLOGIC: Alert. Normal gait    XR Hip With or Without Pelvis 2 - 3 View Right    Result Date: 12/17/2021  CR Hip Uni Comp Min 2 Vws RT INDICATION: Right hip pain after falling today. History of right hip replacement COMPARISON: 2/18/2019 FINDINGS: AP and frog-leg lateral view(s) of the right  hip.  No fracture or dislocation. No bone erosion or destruction. There is a right hip prosthesis present. There is also a long metal plate attached to the mid and distal femur     No acute findings Signer Name: Armando Arriaga MD  Signed: 12/17/2021 10:43 PM  Workstation Name: [email or web address]  [facility]      Procedures           ED Course  ED Course as of 12/17/21 2251   Fri Dec 17, 2021   0499 Patient with mechanical fall here, no significant injury, seems to be here more out of concern that she might have injured her right hip in which she has chronic pain and has had a hip replacement femur fracture on that side in the past.  On exam she has no significant pain, is neurovascularly intact distal to the hip and she has a full range of motion of the hip without notable pain.  X-ray shows no acute findings.  Discharge home. [RO]      ED Course User Index  [RO] Nitish Larios MD                                                 [facility]    Final diagnoses:   Accident due to mechanical fall without injury, initial encounter   Chronic right hip pain       ED Disposition  ED Disposition     ED Disposition Condition Comment    Discharge Arnaud Santoyo MD (Tony)  [address] [address]  [phone]    Call   To schedule follow-up appointment         Medication List      No changes were made to your prescriptions during this visit.          Nitish Larios MD  12/17/21 2251

## 2021-12-18 NOTE — DISCHARGE INSTRUCTIONS
Your x-ray today showed no new findings.  Please follow-up with your primary care physician as needed.  Return to the emergency room if you develop chest pain, difficulty breathing, severe abdominal pain or for any other concerns.

## 2021-12-21 ENCOUNTER — PATIENT OUTREACH (OUTPATIENT)
Dept: CASE MANAGEMENT | Facility: OTHER | Age: 86
End: 2021-12-21

## 2021-12-21 NOTE — OUTREACH NOTE
Ambulatory Case Management Note    General & Health Literacy Assessment    Questions/Answers      Most Recent Value   Assessment Completed With Patient   Living Arrangement Alone   Type of Residence Private Residence   Equiptment Used at Home Walker        Care Evaluation    Questions/Answers      Most Recent Value   Suggested Appointments Other (See Comment)  [Patient states she has an appointment on 1/26/21 with cardiology. ]   Other Patient Education/Resources  [facility] [facility] Nurse Call Line  [AC recommended patient remain hydrated and eat 3 balanced meals each day. Patient mentioned dizziness occurs occassionally but is not as severe as when she went to ED.]   24/7 Nurse Call Line Education Method Verbal      Patient Outreach    Introduced self, explained ACM RN role and provided contact information. Spoke with patient regarding ED visit due to fall. Patient does not recall going to ED and states she may have had a TIA. Patient states she is still experiencing mild dizziness but not as severe. She lives alone and walks with a walker. Patient prepares own meals but forgets to sit and eat at meal times. ACM recommended patient strive to drink water regularly and eat 3 meals each day. Patient verbalized understanding. Patient has appointment on 1/26/21 with cardiology scheduled. Follow up review scheduled in 2 weeks for continued assessment and review of needs.     There are no recently modified care plans to display for this patient.      Dorys Garza RN  Ambulatory Case Management    12/21/2021, 16:47 EST

## 2021-12-30 ENCOUNTER — APPOINTMENT (OUTPATIENT)
Dept: CT IMAGING | Facility: HOSPITAL | Age: 86
End: 2021-12-30

## 2021-12-30 ENCOUNTER — HOSPITAL ENCOUNTER (EMERGENCY)
Facility: HOSPITAL | Age: 86
Discharge: HOME | End: 2021-12-30
Attending: EMERGENCY MEDICINE | Admitting: EMERGENCY MEDICINE

## 2021-12-30 VITALS
OXYGEN SATURATION: 96 % | TEMPERATURE: 98 F | BODY MASS INDEX: 23.5 KG/M2 | WEIGHT: 132.6 LBS | HEART RATE: 79 BPM | RESPIRATION RATE: 18 BRPM | SYSTOLIC BLOOD PRESSURE: 174 MMHG | DIASTOLIC BLOOD PRESSURE: 82 MMHG | HEIGHT: 63 IN

## 2021-12-30 DIAGNOSIS — R55 SYNCOPE, UNSPECIFIED SYNCOPE TYPE: Primary | ICD-10-CM

## 2021-12-30 DIAGNOSIS — S60.222A CONTUSION OF LEFT HAND, INITIAL ENCOUNTER: ICD-10-CM

## 2021-12-30 DIAGNOSIS — S00.83XA CHIN CONTUSION, INITIAL ENCOUNTER: ICD-10-CM

## 2021-12-30 LAB
ALBUMIN SERPL-MCNC: 4.4 G/DL (ref 3.5–5.2)
ALBUMIN/GLOB SERPL: 1.6 G/DL
ALP SERPL-CCNC: 51 U/L (ref 39–117)
ALT SERPL W P-5'-P-CCNC: 12 U/L (ref 1–33)
ANION GAP SERPL CALCULATED.3IONS-SCNC: 13.1 MMOL/L (ref 5–15)
AST SERPL-CCNC: 15 U/L (ref 1–32)
BACTERIA UR QL AUTO: NORMAL /HPF
BASOPHILS # BLD AUTO: 0.04 10*3/MM3 (ref 0–0.2)
BASOPHILS NFR BLD AUTO: 0.4 % (ref 0–1.5)
BILIRUB SERPL-MCNC: 1.1 MG/DL (ref 0–1.2)
BILIRUB UR QL STRIP: NEGATIVE
BUN SERPL-MCNC: 21 MG/DL (ref 8–23)
BUN/CREAT SERPL: 21.6 (ref 7–25)
CALCIUM SPEC-SCNC: 9.5 MG/DL (ref 8.6–10.5)
CHLORIDE SERPL-SCNC: 96 MMOL/L (ref 98–107)
CLARITY UR: CLEAR
CO2 SERPL-SCNC: 24.9 MMOL/L (ref 22–29)
COLOR UR: YELLOW
CREAT SERPL-MCNC: 0.97 MG/DL (ref 0.57–1)
DEPRECATED RDW RBC AUTO: 39.8 FL (ref 37–54)
EOSINOPHIL # BLD AUTO: 0.03 10*3/MM3 (ref 0–0.4)
EOSINOPHIL NFR BLD AUTO: 0.3 % (ref 0.3–6.2)
ERYTHROCYTE [DISTWIDTH] IN BLOOD BY AUTOMATED COUNT: 11.6 % (ref 12.3–15.4)
GFR SERPL CREATININE-BSD FRML MDRD: 55 ML/MIN/1.73
GLOBULIN UR ELPH-MCNC: 2.8 GM/DL
GLUCOSE SERPL-MCNC: 117 MG/DL (ref 65–99)
GLUCOSE UR STRIP-MCNC: NEGATIVE MG/DL
HCT VFR BLD AUTO: 34.2 % (ref 34–46.6)
HGB BLD-MCNC: 11.2 G/DL (ref 12–15.9)
HGB UR QL STRIP.AUTO: ABNORMAL
HYALINE CASTS UR QL AUTO: NORMAL /LPF
IMM GRANULOCYTES # BLD AUTO: 0.02 10*3/MM3 (ref 0–0.05)
IMM GRANULOCYTES NFR BLD AUTO: 0.2 % (ref 0–0.5)
KETONES UR QL STRIP: NEGATIVE
LEUKOCYTE ESTERASE UR QL STRIP.AUTO: NEGATIVE
LYMPHOCYTES # BLD AUTO: 1.55 10*3/MM3 (ref 0.7–3.1)
LYMPHOCYTES NFR BLD AUTO: 16.8 % (ref 19.6–45.3)
MCH RBC QN AUTO: 30.1 PG (ref 26.6–33)
MCHC RBC AUTO-ENTMCNC: 32.7 G/DL (ref 31.5–35.7)
MCV RBC AUTO: 91.9 FL (ref 79–97)
MONOCYTES # BLD AUTO: 0.54 10*3/MM3 (ref 0.1–0.9)
MONOCYTES NFR BLD AUTO: 5.9 % (ref 5–12)
NEUTROPHILS NFR BLD AUTO: 7.02 10*3/MM3 (ref 1.7–7)
NEUTROPHILS NFR BLD AUTO: 76.4 % (ref 42.7–76)
NITRITE UR QL STRIP: NEGATIVE
PH UR STRIP.AUTO: 7 [PH] (ref 4.5–8)
PLATELET # BLD AUTO: 270 10*3/MM3 (ref 140–450)
PMV BLD AUTO: 9.1 FL (ref 6–12)
POTASSIUM SERPL-SCNC: 4.4 MMOL/L (ref 3.5–5.2)
PROCALCITONIN SERPL-MCNC: 0.04 NG/ML (ref 0–0.25)
PROT SERPL-MCNC: 7.2 G/DL (ref 6–8.5)
PROT UR QL STRIP: NEGATIVE
RBC # BLD AUTO: 3.72 10*6/MM3 (ref 3.77–5.28)
RBC # UR STRIP: NORMAL /HPF
REF LAB TEST METHOD: NORMAL
SODIUM SERPL-SCNC: 134 MMOL/L (ref 136–145)
SP GR UR STRIP: 1.01 (ref 1–1.03)
SQUAMOUS #/AREA URNS HPF: NORMAL /HPF
TROPONIN T SERPL-MCNC: <0.01 NG/ML (ref 0–0.03)
UROBILINOGEN UR QL STRIP: ABNORMAL
WBC # UR STRIP: NORMAL /HPF
WBC NRBC COR # BLD: 9.2 10*3/MM3 (ref 3.4–10.8)

## 2021-12-30 PROCEDURE — 80053 COMPREHEN METABOLIC PANEL: CPT | Performed by: EMERGENCY MEDICINE

## 2021-12-30 PROCEDURE — 99283 EMERGENCY DEPT VISIT LOW MDM: CPT | Performed by: EMERGENCY MEDICINE

## 2021-12-30 PROCEDURE — 70450 CT HEAD/BRAIN W/O DYE: CPT

## 2021-12-30 PROCEDURE — 93005 ELECTROCARDIOGRAM TRACING: CPT | Performed by: EMERGENCY MEDICINE

## 2021-12-30 PROCEDURE — 99283 EMERGENCY DEPT VISIT LOW MDM: CPT

## 2021-12-30 PROCEDURE — 81001 URINALYSIS AUTO W/SCOPE: CPT | Performed by: EMERGENCY MEDICINE

## 2021-12-30 PROCEDURE — 84145 PROCALCITONIN (PCT): CPT | Performed by: EMERGENCY MEDICINE

## 2021-12-30 PROCEDURE — 85025 COMPLETE CBC W/AUTO DIFF WBC: CPT | Performed by: EMERGENCY MEDICINE

## 2021-12-30 PROCEDURE — 70486 CT MAXILLOFACIAL W/O DYE: CPT

## 2021-12-30 PROCEDURE — 93010 ELECTROCARDIOGRAM REPORT: CPT | Performed by: INTERNAL MEDICINE

## 2021-12-30 PROCEDURE — 84484 ASSAY OF TROPONIN QUANT: CPT | Performed by: EMERGENCY MEDICINE

## 2021-12-30 RX ORDER — SODIUM CHLORIDE 0.9 % (FLUSH) 0.9 %
10 SYRINGE (ML) INJECTION AS NEEDED
Status: DISCONTINUED | OUTPATIENT
Start: 2021-12-30 | End: 2021-12-30 | Stop reason: HOSPADM

## 2021-12-31 LAB — QT INTERVAL: 440 MS

## 2022-01-05 ENCOUNTER — TELEPHONE (OUTPATIENT)
Dept: PAIN MEDICINE | Facility: CLINIC | Age: 87
End: 2022-01-05

## 2022-01-05 ENCOUNTER — OFFICE VISIT (OUTPATIENT)
Dept: INTERNAL MEDICINE | Facility: CLINIC | Age: 87
End: 2022-01-05

## 2022-01-05 VITALS
DIASTOLIC BLOOD PRESSURE: 88 MMHG | SYSTOLIC BLOOD PRESSURE: 154 MMHG | HEIGHT: 63 IN | WEIGHT: 133 LBS | BODY MASS INDEX: 23.57 KG/M2 | RESPIRATION RATE: 16 BRPM | OXYGEN SATURATION: 98 % | HEART RATE: 74 BPM | TEMPERATURE: 96.9 F

## 2022-01-05 DIAGNOSIS — I10 PRIMARY HYPERTENSION: Primary | ICD-10-CM

## 2022-01-05 DIAGNOSIS — G89.29 OTHER CHRONIC PAIN: ICD-10-CM

## 2022-01-05 DIAGNOSIS — R55 SYNCOPE, UNSPECIFIED SYNCOPE TYPE: ICD-10-CM

## 2022-01-05 PROCEDURE — 99214 OFFICE O/P EST MOD 30 MIN: CPT | Performed by: INTERNAL MEDICINE

## 2022-01-05 RX ORDER — CLOTRIMAZOLE AND BETAMETHASONE DIPROPIONATE 10; .64 MG/G; MG/G
1 CREAM TOPICAL 2 TIMES DAILY
Qty: 60 G | Refills: 2 | Status: SHIPPED | OUTPATIENT
Start: 2022-01-05

## 2022-01-05 NOTE — PROGRESS NOTES
"Chief Complaint  Hospital Follow Up Visit    Subjective          Ashli León presents to [facility]  History of Present Illness  Sound like she has had 2 episodes of syncope and was seen in the ER most recently on December 30.  Essentially negative work-up.  CT scan of the brain shows some chronic vascular disease but nothing acute.  To me the most obvious concern would be the pain patch and the Ambien as a source of this episodes.  It sounds like she changed to the pain patch about 3 months ago.  These 2 episodes occurred in December that took her to the emergency department.  She does not have any recollection of the falls.  There is no seizure activity known, no incontinence of bowel or bladder.  Objective   Vital Signs:   /88   Pulse 74   Temp 96.9 °F (36.1 °C) (Temporal)   Resp 16   Ht 160 cm (63\")   Wt 60.3 kg (133 lb)   SpO2 98%   BMI 23.56 kg/m²     Physical Exam  Vitals and nursing note reviewed.   Constitutional:       Appearance: Normal appearance.   HENT:      Head: Normocephalic and atraumatic.   Cardiovascular:      Rate and Rhythm: Normal rate and regular rhythm.   Pulmonary:      Effort: Pulmonary effort is normal.      Breath sounds: Normal breath sounds.   Abdominal:      General: Abdomen is flat.      Palpations: Abdomen is soft.   Musculoskeletal:         General: No swelling or deformity.      Cervical back: Neck supple.      Right lower leg: No edema.      Left lower leg: No edema.   Skin:     General: Skin is warm.      Capillary Refill: Capillary refill takes less than 2 seconds.      Findings: No rash.   Neurological:      General: No focal deficit present.      Mental Status: She is alert and oriented to person, place, and time.        Result Review : ER notes                Assessment and Plan syncopal episode to me most likely related to pain patch and/or combination with Ambien.  I did recommend she not use the Ambien " although does not sound like that is going to happen.  I will do a message to her pain management people about another alternative to the pain patch.  She was taking hydrocodone previously but did not take it regularly so tender to get behind on her pain management.  She is going to see cardiology, I offered to order an echocardiogram and stress testing but she wants to wait I think.  I think is probably less likely going to .  I do not think neurology is going to do a lot for her ,but if she wants to see them she can certainly do that.  Follow-up if problems or in 4 weeks or pending cardiology evaluation.    Follow Up   Return in about 3 months (around 4/5/2022).  Patient was given instructions and counseling regarding her condition or for health maintenance advice. Please see specific information pulled into the AVS if appropriate.

## 2022-01-05 NOTE — TELEPHONE ENCOUNTER
Please contact patient and move her next office visit up as she has had recent syncopal episodes that her PCP thinks may be related to her butrans patch and use of ambien.

## 2022-01-07 ENCOUNTER — PATIENT OUTREACH (OUTPATIENT)
Dept: CASE MANAGEMENT | Facility: OTHER | Age: 87
End: 2022-01-07

## 2022-01-07 NOTE — OUTREACH NOTE
Ambulatory Case Management Note    General & Health Literacy Assessment    Questions/Answers      Most Recent Value   Difficulty Keeping Appointments Yes  [Usually uses [employer] for transportation but not  available on 1/10/22. ]   Chronic Pain Yes          Care Plan: Community Resources - Transportation   Updates made since 1/7/2022 12:00 AM      Problem: LACK OF TRANSPORTATION       Goal: Establish Reliable Transportation       Task: Discuss current transportation plan with patient Completed 1/7/2022   Responsible User: Dorys Garza RN   Note:    Patient uses obtains rides from [employer], but the service is not available on 1/10/22.     Task: Assign community resources for transportation assistance Completed 1/7/2022   Responsible User: Dorys Garza, RN   Note:    Patient looking for another transportation method. Will return call if transportation is needed for ACM to apply to 2-1-1 Ride for patient.        Patient Outreach    Introduced self, explained ACM RN role and provided contact information. Spoke with patient regarding health and wellness. Patient states she has not had any more syncope episodes. Patient states she has not changed her routine at this time. She has an appointment to the pain management clinic on 1/10/22 to adjust her pain medications and usually uses [employer] for transportation. However, they are not available. ACM offered to apply for 2-1-1 Ride for patient. Patient would like to call one more possible contact before applying. Patient will return call to [facility] if 2-1-1 Ride application needed. Follow up scheduled in 2 weeks.     Dorys Garza RN  Ambulatory Case Management    1/7/2022, 13:59 EST

## 2022-01-10 ENCOUNTER — OFFICE VISIT (OUTPATIENT)
Dept: PAIN MEDICINE | Facility: CLINIC | Age: 87
End: 2022-01-10

## 2022-01-10 VITALS
BODY MASS INDEX: 23.5 KG/M2 | HEIGHT: 63 IN | OXYGEN SATURATION: 97 % | RESPIRATION RATE: 16 BRPM | HEART RATE: 109 BPM | TEMPERATURE: 96.5 F | WEIGHT: 132.6 LBS | SYSTOLIC BLOOD PRESSURE: 139 MMHG | DIASTOLIC BLOOD PRESSURE: 78 MMHG

## 2022-01-10 DIAGNOSIS — M54.42 CHRONIC BILATERAL LOW BACK PAIN WITH BILATERAL SCIATICA: Primary | ICD-10-CM

## 2022-01-10 DIAGNOSIS — M17.11 ARTHRITIS OF RIGHT KNEE: ICD-10-CM

## 2022-01-10 DIAGNOSIS — Z79.899 ENCOUNTER FOR LONG-TERM CURRENT USE OF HIGH RISK MEDICATION: ICD-10-CM

## 2022-01-10 DIAGNOSIS — G89.29 CHRONIC RIGHT HIP PAIN: ICD-10-CM

## 2022-01-10 DIAGNOSIS — M25.551 CHRONIC RIGHT HIP PAIN: ICD-10-CM

## 2022-01-10 DIAGNOSIS — M54.41 CHRONIC BILATERAL LOW BACK PAIN WITH BILATERAL SCIATICA: Primary | ICD-10-CM

## 2022-01-10 DIAGNOSIS — M17.12 ARTHRITIS OF LEFT KNEE: ICD-10-CM

## 2022-01-10 DIAGNOSIS — G89.29 OTHER CHRONIC PAIN: ICD-10-CM

## 2022-01-10 DIAGNOSIS — G89.29 CHRONIC BILATERAL LOW BACK PAIN WITH BILATERAL SCIATICA: Primary | ICD-10-CM

## 2022-01-10 PROCEDURE — 99214 OFFICE O/P EST MOD 30 MIN: CPT | Performed by: NURSE PRACTITIONER

## 2022-01-10 RX ORDER — BUPRENORPHINE 5 UG/H
1 PATCH TRANSDERMAL WEEKLY
Qty: 4 PATCH | Refills: 0 | Status: SHIPPED | OUTPATIENT
Start: 2022-01-10 | End: 2022-02-07 | Stop reason: SDUPTHER

## 2022-01-10 NOTE — PROGRESS NOTES
CHIEF COMPLAINT  F/U back .  Pt states her pain level has remained the same.     Subjective   Ashli León is a 86 y.o. female  who presents for follow-up.  She has a history of back and joint pain.  Patient presents with her son who is helpful in providing history. Today her pain is 5/10VAS in severity.     She continues with Butrans 10 mcg patch every 7 days. Discussed with patient and son that I am concerned about her recent falls and syncopal episodes.  Discussed that we can not rule out her butrans or ambien contributing to these events. Patient does not recall Dr. Monson recommending that she stop her ambien.  I do recommend that she discuss with Dr. Monson regarding weaning off of the ambien.     I am also going to decrease her Butrans to 5 mcg patch every 7 days.  Discussed with both patient and  that likely her pain relief will not stay at the level it is currently, but we also can not continue the Butrans at her current dose if there is a risk that it is contributing to these episodes.  Both patient and son state understanding.     Patient remained masked during entire encounter. No cough present. I donned a mask and eye protection throughout entire visit. Prior to donning mask and eye protection, hand hygiene was performed, as well as when it was doffed.  I was closer than 6 feet, but not for an extended period of time. No obvious exposure to any bodily fluids.    Back Pain  This is a chronic problem. The current episode started more than 1 year ago. The problem occurs daily. The problem has been waxing and waning since onset. The pain is present in the lumbar spine. The pain radiates to the left thigh and right thigh. The pain is at a severity of 5/10. The pain is moderate. The symptoms are aggravated by position, standing, sitting and bending. Associated symptoms include headaches. Pertinent negatives include no abdominal pain, chest pain, dysuria, fever, numbness or weakness. Risk factors  include sedentary lifestyle. She has tried analgesics, NSAIDs, ice and heat (injections, Butrans) for the symptoms. The treatment provided moderate relief.   Knee Pain   The pain is present in the left knee. The pain is at a severity of 5/10. The pain is moderate. The pain has been worsening since onset. Pertinent negatives include no numbness. The symptoms are aggravated by weight bearing and movement. She has tried NSAIDs and acetaminophen (injections, Butrans) for the symptoms. The treatment provided moderate relief.      PEG Assessment   What number best describes your pain on average in the past week?8  What number best describes how, during the past week, pain has interfered with your enjoyment of life?8  What number best describes how, during the past week, pain has interfered with your general activity?  8    The following portions of the patient's history were reviewed and updated as appropriate: allergies, current medications, past family history, past medical history, past social history, past surgical history and problem list.    Review of Systems   Constitutional: Negative for activity change, fatigue and fever.   HENT: Negative for congestion.    Eyes: Negative for visual disturbance.   Respiratory: Negative for cough and chest tightness.    Cardiovascular: Negative for chest pain.   Gastrointestinal: Negative for abdominal pain, constipation and diarrhea.   Genitourinary: Negative for difficulty urinating and dysuria.   Musculoskeletal: Positive for back pain.   Neurological: Positive for light-headedness and headaches. Negative for dizziness, weakness and numbness.   Psychiatric/Behavioral: Negative for agitation, sleep disturbance and suicidal ideas. The patient is not nervous/anxious.      --  The aforementioned information the Chief Complaint section and above subjective data including any HPI data, and also the Review of Systems data, has been personally reviewed and affirmed.  --    ED visit  "from 12/30/2021 reviewed.  Patient presents because of possible syncopal episode.  Patient thinks that she may have passed out while she was in her kitchen at some point.  Patient neurologically appropriate without any sign of confusion or cognitive deficit.  Recommend follow-up with PCP in the near future and be sure she always uses walker to prevent future falls.    Office visit from 1/5/2022 with Dr. Monson reviewed.  Patient has had 2 episodes of syncope with an essentially negative work-up in the ED.  Most obvious concern would be the pain patch and the Ambien as a source of these episodes.  Patient has no recollection of these falls, no known seizure activity, no incontinence of bowel or bladder with these falls.  \"I did recommend patient stop using Ambien although does not sound like that is going to happen\".  Patient is going to see cardiology and neurology, follow-up in 4 weeks or pending cardiology evaluation.    Vitals:    01/10/22 1143   BP: 139/78   Pulse: 109   Resp: 16   Temp: 96.5 °F (35.8 °C)   SpO2: 97%   Weight: 60.1 kg (132 lb 9.6 oz)   Height: 160 cm (63\")   PainSc:   5   PainLoc: Back     Objective   Physical Exam  Vitals and nursing note reviewed.   Constitutional:       Appearance: Normal appearance. She is well-developed.   Eyes:      General: Lids are normal.   Cardiovascular:      Rate and Rhythm: Normal rate.   Pulmonary:      Effort: Pulmonary effort is normal.   Musculoskeletal:      Cervical back: Normal range of motion.      Lumbar back: Tenderness and bony tenderness present.      Right knee: Decreased range of motion. Tenderness present.      Left knee: Decreased range of motion. Tenderness present.   Neurological:      Mental Status: She is alert and oriented to person, place, and time.      Gait: Gait abnormal (rolator).   Psychiatric:         Attention and Perception: Attention normal.         Mood and Affect: Mood normal.         Speech: Speech normal.         Behavior: Behavior " normal.         Judgment: Judgment normal.       Assessment/Plan   Diagnoses and all orders for this visit:    1. Chronic bilateral low back pain with bilateral sciatica (Primary)    2. Chronic right hip pain    3. Encounter for long-term current use of high risk medication    4. Arthritis of right knee    5. Arthritis of left knee    6. Other chronic pain      --- PGT results from 12/10/2020 reviewed.  Patient is a poor metabolizer of CYP 2D6.  --- The urine drug screen confirmation from 12/7/2021 has been reviewed and the result is ABNORMAL (patient educated during office visit on 12/7/2021) based on patient history and JUDY report  --- CSA updated 12/7/2021  --- Decrease Butrans to 5 mcg patch.   --- Encourage that she discuss weaning her ambien with Dr. Monson  --- Follow-up 1 month or sooner if needed (plan for monthly follow ups for the time being).      JUDY REPORT  As part of the patient's treatment plan, I am prescribing controlled substances. The patient has been made aware of appropriate use of such medications, including potential risk of somnolence, limited ability to drive and/or work safely, and the potential for dependence or overdose. It has also bee made clear that these medications are for use by this patient only, without concomitant use of alcohol or other substances unless prescribed.     Patient has completed prescribing agreement detailing terms of continued prescribing of controlled substances, including monitoring JUDY reports, urine drug screening, and pill counts if necessary. The patient is aware that inappropriate use will results in cessation of prescribing such medications.    As the clinician, I personally reviewed the JUDY from 1/10/2022 while the patient was in the office today.    History and physical exam exhibit continued safe and appropriate use of controlled substances.    Dictated utilizing Dragon dictation.     This document is intended for medical expert use only.  Reading of this document by patients and/or patient's family without participating medical staff guidance may result in misinterpretation and unintended morbidity.   Any interpretation of such data is the responsibility of the patient and/or family member responsible for the patient in concert with their primary or specialist providers, not to be left for sources of online searches such as [employer], [employer] or similar queries. Relying on these approaches to knowledge may result in misinterpretation, misguided goals of care and even death should patients or family members try recommendations outside of the realm of professional medical care in a supervised way.

## 2022-01-21 ENCOUNTER — PATIENT OUTREACH (OUTPATIENT)
Dept: CASE MANAGEMENT | Facility: OTHER | Age: 87
End: 2022-01-21

## 2022-01-21 NOTE — OUTREACH NOTE
Ambulatory Case Management Note    Care Evaluation    Questions/Answers      Most Recent Value   Annual Wellness Visit:  Patient Will Schedule   AWV Materials Send Materials   Other Patient Education/Resources  --  [Patient plans to continue to use Wheels for future transportation. ]   Advanced Directives: Patient Has      SDOH updated and reviewed with the patient during this program:     Food Insecurity: Food Insecurity Present   • Worried About Running Out of Food in the Last Year: Sometimes true   • Ran Out of Food in the Last Year: Sometimes true       Transportation Needs: Unmet Transportation Needs   • Lack of Transportation (Medical): Yes   • Lack of Transportation (Non-Medical): No     Care Plan: Community Resources - Transportation   Updates made since 1/21/2022 12:00 AM      Problem: RELIABLE FOOD SOURCE       Goal: Establish Options for Affordable Food Sources       Task: Refer patient to food insecurity resources from resource directory    Responsible User: Dorys Garza RN   Note:    Patient would like to apply to Meals on Wheels     Task: Discuss available support programs for affordable food Completed 1/21/2022   Responsible User: Dorys Garza, RN   Note:    Patient states she currently has at home assistance weekly and light groceries are purchased at that time. Patient would like more reliable sources of food.      Patient Outreach    Spoke with patient regarding health and wellness. Patient states she was able to secure a ride to her pain management appointment. She hopes Wheels continues to be reliable. She is comfortable with their drivers and is not interested in changing transportation companies at this time. Patient states she had a home aide that visits weekly and brings basic groceries. Patient would like more reliable meals daily. It is hard to stand and cook. [facility] to provide patient Meals on Wheels line to apply for service. Follow up scheduled next week.   Care Coordination    Call  placed to [facility]. Left message with Kori Gray, [employer] Whe[employer] associate [phone]. Updated patient with [facility] number and information. Follow up scheduled next week.     Dorys Garza RN  Ambulatory Case Management    1/21/2022, 12:34 EST

## 2022-01-26 ENCOUNTER — OFFICE VISIT (OUTPATIENT)
Dept: CARDIOLOGY | Facility: CLINIC | Age: 87
End: 2022-01-26

## 2022-01-26 VITALS
HEIGHT: 63 IN | SYSTOLIC BLOOD PRESSURE: 148 MMHG | DIASTOLIC BLOOD PRESSURE: 80 MMHG | BODY MASS INDEX: 23.21 KG/M2 | WEIGHT: 131 LBS | HEART RATE: 68 BPM

## 2022-01-26 DIAGNOSIS — G89.29 OTHER CHRONIC PAIN: ICD-10-CM

## 2022-01-26 DIAGNOSIS — R55 SYNCOPE AND COLLAPSE: Primary | ICD-10-CM

## 2022-01-26 DIAGNOSIS — R41.3 AMNESIA: ICD-10-CM

## 2022-01-26 PROCEDURE — 93000 ELECTROCARDIOGRAM COMPLETE: CPT | Performed by: INTERNAL MEDICINE

## 2022-01-26 PROCEDURE — 99203 OFFICE O/P NEW LOW 30 MIN: CPT | Performed by: INTERNAL MEDICINE

## 2022-01-26 NOTE — PROGRESS NOTES
Subjective:     Encounter Date:01/26/22      Patient ID: Ashli León is a 86 y.o. female.    Chief Complaint:  History of Present Illness    Dear Dr. Monson,    I had the pleasure of seeing this patient in the office today for initial evaluation and consultation.  I appreciate that you sent her in to see us.  They come in today to be seen for periods of syncope and amnesia.    Patient was seen in the emergency room at [facility].  She had an episode of possible syncope.  Patient did not remember any of the details.  Patient was in her kitchen and that was the last thing she remembered.  The next morning she woke up in bed with some soreness on her left shin and some bruising on her left hand when she went into the kitchen some things were disrupted on the countertop.  She did not have any recollection of the rest of the evening or how she got to bed.  Evaluation in the emergency room was unremarkable.  She was scheduled see neurology, pain medicine yourself.    When she came in to be evaluated in your office you work with pain medicine and her pain meds were decreased.    Is now been about 3 weeks since the decrease her pain medicine and she feels great.  She states that she is feeling very foggy, very cloudy, and weak when she was on the higher dose of pain medicine she feels so much better now.  She has not had any issues or symptoms since the decline and the level of analgesia.    No chest pain or chest discomfort.  No shortness of breath.  No palpitations or tachycardia.  No presyncope or syncope.    The following portions of the patient's history were reviewed and updated as appropriate: allergies, current medications, past family history, past medical history, past social history, past surgical history and problem list.      ECG 12 Lead    Date/Time: 1/26/2022 1:24 PM  Performed by: Nnamdi Moise III, MD  Authorized by: Nnamdi Moise III, MD   Comparison: compared with previous ECG   Similar to  "previous ECG  Rhythm: sinus rhythm  Rate: normal  Conduction: conduction normal  ST Segments: ST segments normal  T Waves: T waves normal  QRS axis: normal  Other: no other findings    Clinical impression: normal ECG               Objective:     Vitals:    01/26/22 1259   BP: 148/80   Pulse: 68   Weight: 59.4 kg (131 lb)   Height: 160 cm (63\")     Body mass index is 23.21 kg/m².      Vitals reviewed.   Constitutional:       General: Not in acute distress.     Appearance: Well-developed. Not diaphoretic.   Eyes:      General:         Right eye: No discharge.         Left eye: No discharge.      Conjunctiva/sclera: Conjunctivae normal.      Pupils: Pupils are equal, round, and reactive to light.   HENT:      Head: Normocephalic and atraumatic.      Nose: Nose normal.   Neck:      Thyroid: No thyromegaly.      Trachea: No tracheal deviation.      Lymphadenopathy: No cervical adenopathy.   Pulmonary:      Effort: Pulmonary effort is normal. No respiratory distress.      Breath sounds: Normal breath sounds. No stridor.   Chest:      Chest wall: Not tender to palpatation.   Cardiovascular:      Normal rate. Regular rhythm.      Murmurs: There is no murmur.      . No S3 gallop. No click. No rub.   Pulses:     Intact distal pulses.   Edema:     Peripheral edema absent.   Abdominal:      General: Bowel sounds are normal. There is no distension.      Palpations: Abdomen is soft. There is no abdominal mass.      Tenderness: There is no abdominal tenderness. There is no guarding or rebound.   Musculoskeletal: Normal range of motion.         General: No tenderness or deformity.      Cervical back: Normal range of motion and neck supple. Skin:     General: Skin is warm and dry.      Findings: No erythema or rash.   Neurological:      Mental Status: Alert and oriented to person, place, and time.   Psychiatric:         Thought Content: Thought content normal.         Data and records reviewed:     Lab Results   Component Value Date "    GLUCOSE 117 (H) 12/30/2021    BUN 21 12/30/2021    CREATININE 0.97 12/30/2021    EGFRIFNONA 55 (L) 12/30/2021    EGFRIFAFRI 62 11/16/2021    BCR 21.6 12/30/2021    K 4.4 12/30/2021    CO2 24.9 12/30/2021    CALCIUM 9.5 12/30/2021    PROTENTOTREF 7.1 11/16/2021    ALBUMIN 4.40 12/30/2021    LABIL2 1.6 11/16/2021    AST 15 12/30/2021    ALT 12 12/30/2021     No results found for: CHOL  Lab Results   Component Value Date    TRIG 307 (H) 11/16/2021     Lab Results   Component Value Date    HDL 44 11/16/2021     Lab Results   Component Value Date    LDL 93 11/16/2021     Lab Results   Component Value Date    VLDL 51 (H) 11/16/2021     No results found for: LDLHDL  CBC    CBC 5/25/21 11/16/21 12/30/21   WBC 7.92 7.7 9.20   RBC 3.67 (A) 3.69 (A) 3.72 (A)   Hemoglobin 11.5 (A) 11.3 11.2 (A)   Hematocrit 33.9 (A) 33.9 (A) 34.2   MCV 92.4 92 91.9   MCH 31.3 30.6 30.1   MCHC 33.9 33.3 32.7   RDW 11.9 (A) 11.9 11.6 (A)   Platelets 258 253 270   (A) Abnormal value            CT Head Without Contrast    Result Date: 12/30/2021  Cortical volume loss and periventricular white matter changes likely sequela of advanced age and chronic microvascular disease. No acute findings. No acute maxillofacial injury. Left upper molar periodontal disease. Unerupted left upper central incisor. Signer Name: SIDRA Dumas MD  Signed: 12/30/2021 2:25 PM  Workstation Name: [email or web address]  [facility]    CT Facial Bones Without Contrast    Result Date: 12/30/2021  Cortical volume loss and periventricular white matter changes likely sequela of advanced age and chronic microvascular disease. No acute findings. No acute maxillofacial injury. Left upper molar periodontal disease. Unerupted left upper central incisor. Signer Name: SIDRA Dumas MD  Signed: 12/30/2021 2:25 PM  Workstation Name: [id number]  [facility]    XR Hip With or Without Pelvis 2 - 3 View Right    Result Date: 12/17/2021  No acute findings  Signer Name: Armando Arriaga MD  Signed: 12/17/2021 10:43 PM  Workstation Name: [email or web address]  [facility]    Results for orders placed during the hospital encounter of 06/20/16    Adult transthoracic echo complete with contrast    Interpretation Summary  · Left ventricular function is normal. Calculated EF = 73.4%. Estimated EF was in agreement with the calculated EF. Normal left ventricular cavity size noted. All left ventricular wall segments contract normally. Left ventricular wall thickness is consistent with mild concentric hypertrophy. Left ventricular diastolic function was unable to be assessed.  · Mild-to-moderate mitral valve regurgitation is present.          Assessment:          Diagnosis Plan   1. Syncope and collapse  ECG 12 Lead   2. Amnesia  ECG 12 Lead   3. Other chronic pain  ECG 12 Lead          Plan:       1.  Possible syncope-with prolonged episode of possible amnesia, negative emergency room eval, since the decline in her pain meds she feels markedly better and is no longer having symptoms.  She does not meet guideline recommendations for any additional cardiac testing at this time    Thank you very much for allowing us to participate in the care of this pleasant patient.  Please don't hesitate to call if I can be of assistance in any way.      Current Outpatient Medications:   •  alendronate (FOSAMAX) 35 MG tablet, Take 35 mg by mouth Every 7 (Seven) Days., Disp: , Rfl:   •  Buprenorphine (BUTRANS) 5 MCG/HR patch weekly transdermal patch, Place 1 patch on the skin as directed by provider 1 (One) Time Per Week., Disp: 4 patch, Rfl: 0  •  clotrimazole-betamethasone (Lotrisone) 1-0.05 % cream, Apply 1 application topically to the appropriate area as directed 2 (Two) Times a Day., Disp: 60 g, Rfl: 2  •  docusate sodium 100 MG capsule, Take 100 mg by mouth 2 (Two) Times a Day. (Patient taking differently: Take 100 mg by mouth As Needed.), Disp:  , Rfl:   •  doxepin (SINEquan) 25  MG capsule, Take 1 capsule by mouth Every Night., Disp: 90 capsule, Rfl: 1  •  ferrous sulfate 325 (65 FE) MG EC tablet, Take 325 mg by mouth Daily With Breakfast., Disp: , Rfl: 0  •  latanoprost (XALATAN) 0.005 % ophthalmic solution, INSTILL 1 DROP INTO AFFECTED EYE IN THE EVENING, Disp: , Rfl:   •  levothyroxine (Euthyrox) 50 MCG tablet, Take 1 tablet by mouth Daily., Disp: 90 tablet, Rfl: 4  •  metoprolol tartrate (LOPRESSOR) 25 MG tablet, Take 1 tablet by mouth 2 (Two) Times a Day., Disp: 180 tablet, Rfl: 2  •  Multiple Vitamins-Minerals (ICAPS AREDS 2) capsule, Take 2 capsules by mouth Daily., Disp: , Rfl:   •  omeprazole (priLOSEC) 40 MG capsule, Take 1 capsule by mouth Daily., Disp: 90 capsule, Rfl: 4  •  pravastatin (PRAVACHOL) 40 MG tablet, Take 1 tablet by mouth Daily., Disp: 90 tablet, Rfl: 0  •  vitamin B-12 (VITAMIN B-12) 1000 MCG tablet, Take 1 tablet by mouth Daily., Disp:  , Rfl:   •  vitamin E 400 UNIT capsule, Take 400 Units by mouth Daily., Disp: , Rfl:   •  zolpidem (AMBIEN) 10 MG tablet, Take 1 tablet by mouth At Night As Needed for Sleep., Disp: 90 tablet, Rfl: 1         No follow-ups on file.

## 2022-02-01 ENCOUNTER — PATIENT OUTREACH (OUTPATIENT)
Dept: CASE MANAGEMENT | Facility: OTHER | Age: 87
End: 2022-02-01

## 2022-02-01 NOTE — OUTREACH NOTE
Ambulatory Case Management Note    Patient Outreach    Introduced self, explained ACM RN role and provided contact information. Spoke with patient regarding Meals on Wheels referral. Patient states she spoke with Kori at [facility]. Patient has food delivery scheduled for Thursday. Follow up for continued assessment and social determinants scheduled next week.     Care Plan: Community Resources - Transportation   Updates made since 2/1/2022 12:00 AM      Problem: RELIABLE FOOD SOURCE       Goal: Establish Options for Affordable Food Sources       Task: Refer patient to food insecurity resources from resource directory Completed 2/1/2022   Responsible User: Dorys Garza, RN   Note:    Patient would like to apply to Meals on Wheels. Kori from [facility] contacted patient. Patient has first delivery scheduled for 2/3/22.          Dorys Garza RN  Ambulatory Case Management    2/1/2022, 16:25 EST

## 2022-02-07 ENCOUNTER — OFFICE VISIT (OUTPATIENT)
Dept: PAIN MEDICINE | Facility: CLINIC | Age: 87
End: 2022-02-07

## 2022-02-07 VITALS
BODY MASS INDEX: 23.88 KG/M2 | RESPIRATION RATE: 18 BRPM | HEART RATE: 75 BPM | SYSTOLIC BLOOD PRESSURE: 152 MMHG | HEIGHT: 63 IN | TEMPERATURE: 96.8 F | OXYGEN SATURATION: 95 % | DIASTOLIC BLOOD PRESSURE: 70 MMHG | WEIGHT: 134.8 LBS

## 2022-02-07 DIAGNOSIS — M17.12 ARTHRITIS OF LEFT KNEE: ICD-10-CM

## 2022-02-07 DIAGNOSIS — G89.29 OTHER CHRONIC PAIN: ICD-10-CM

## 2022-02-07 DIAGNOSIS — M54.41 CHRONIC BILATERAL LOW BACK PAIN WITH BILATERAL SCIATICA: Primary | ICD-10-CM

## 2022-02-07 DIAGNOSIS — G89.29 CHRONIC PAIN OF LEFT KNEE: ICD-10-CM

## 2022-02-07 DIAGNOSIS — M17.11 ARTHRITIS OF RIGHT KNEE: ICD-10-CM

## 2022-02-07 DIAGNOSIS — M25.551 CHRONIC RIGHT HIP PAIN: ICD-10-CM

## 2022-02-07 DIAGNOSIS — G89.29 CHRONIC RIGHT HIP PAIN: ICD-10-CM

## 2022-02-07 DIAGNOSIS — Z79.899 ENCOUNTER FOR LONG-TERM CURRENT USE OF HIGH RISK MEDICATION: ICD-10-CM

## 2022-02-07 DIAGNOSIS — G89.29 CHRONIC BILATERAL LOW BACK PAIN WITH BILATERAL SCIATICA: Primary | ICD-10-CM

## 2022-02-07 DIAGNOSIS — M54.42 CHRONIC BILATERAL LOW BACK PAIN WITH BILATERAL SCIATICA: Primary | ICD-10-CM

## 2022-02-07 DIAGNOSIS — M25.562 CHRONIC PAIN OF LEFT KNEE: ICD-10-CM

## 2022-02-07 PROCEDURE — 99214 OFFICE O/P EST MOD 30 MIN: CPT | Performed by: NURSE PRACTITIONER

## 2022-02-07 PROCEDURE — 20610 DRAIN/INJ JOINT/BURSA W/O US: CPT | Performed by: NURSE PRACTITIONER

## 2022-02-07 RX ORDER — BUPRENORPHINE 5 UG/H
1 PATCH TRANSDERMAL WEEKLY
Qty: 4 PATCH | Refills: 0 | Status: SHIPPED | OUTPATIENT
Start: 2022-02-07 | End: 2022-03-08 | Stop reason: ALTCHOICE

## 2022-02-07 RX ORDER — METHYLPREDNISOLONE ACETATE 40 MG/ML
40 INJECTION, SUSPENSION INTRA-ARTICULAR; INTRALESIONAL; INTRAMUSCULAR; SOFT TISSUE
Status: DISCONTINUED | OUTPATIENT
Start: 2022-02-07 | End: 2022-02-07 | Stop reason: HOSPADM

## 2022-02-07 RX ADMIN — METHYLPREDNISOLONE ACETATE 40 MG: 40 INJECTION, SUSPENSION INTRA-ARTICULAR; INTRALESIONAL; INTRAMUSCULAR; SOFT TISSUE at 14:18

## 2022-02-07 NOTE — PROGRESS NOTES
"CHIEF COMPLAINT  Follow-up for back pain.    Subjective   Ashli León is a 86 y.o. female  who presents for follow-up.  She has a history of back and joint pain.  Today her pain is 4/10VAS in severity. At her last office visit we decreased her Butrans patch from 10mcg patch every 7 days to 5 mcg patch every 7 days due to concern of falls related to polypharmacy.  She has had no more dizziness or falls since decreasing her patch.  She states \"it doesn't quite handle the pain like the others did, but I'd rather stay what I'm on\". She denies any side effects.     Discussed that we have no plans to increase her patch dosage as this is helping her pain and she is no longer experiencing side effects.     Her left knee pain has worsened and she asks if she can have a steroid injection today.  Last injection was in October 2021.     Patient remained masked during entire encounter. No cough present. I donned a mask and eye protection throughout entire visit. Prior to donning mask and eye protection, hand hygiene was performed, as well as when it was doffed.  I was closer than 6 feet, but not for an extended period of time. No obvious exposure to any bodily fluids.    Back Pain  This is a chronic problem. The current episode started more than 1 year ago. The problem occurs daily. The problem has been waxing and waning since onset. The pain is present in the lumbar spine. The pain radiates to the left thigh and right thigh. The pain is at a severity of 4/10. The pain is moderate. The symptoms are aggravated by position, standing, sitting and bending. Associated symptoms include headaches, numbness (right foot) and weakness. Pertinent negatives include no abdominal pain, chest pain, dysuria or fever. Risk factors include sedentary lifestyle. She has tried analgesics, NSAIDs, ice and heat (injections, Butrans) for the symptoms. The treatment provided moderate relief.   Knee Pain   The pain is present in the left knee. The " "pain is at a severity of 4/10. The pain is moderate. The pain has been worsening since onset. Associated symptoms include numbness (right foot). The symptoms are aggravated by weight bearing and movement. She has tried NSAIDs and acetaminophen (injections, Butrans) for the symptoms. The treatment provided moderate relief.      PEG Assessment   What number best describes your pain on average in the past week?5  What number best describes how, during the past week, pain has interfered with your enjoyment of life?10  What number best describes how, during the past week, pain has interfered with your general activity? 7    The following portions of the patient's history were reviewed and updated as appropriate: allergies, current medications, past family history, past medical history, past social history, past surgical history and problem list.    Review of Systems   Constitutional: Positive for fatigue. Negative for fever.   HENT: Negative for congestion.    Eyes: Negative for visual disturbance.   Respiratory: Negative for shortness of breath.    Cardiovascular: Negative for chest pain.   Gastrointestinal: Negative for abdominal pain, constipation and diarrhea.   Genitourinary: Negative for difficulty urinating and dysuria.   Musculoskeletal: Positive for back pain.   Neurological: Positive for weakness, numbness (right foot) and headaches.   Psychiatric/Behavioral: Negative for sleep disturbance and suicidal ideas. The patient is nervous/anxious.      --  The aforementioned information the Chief Complaint section and above subjective data including any HPI data, and also the Review of Systems data, has been personally reviewed and affirmed.  --    Vitals:    02/07/22 1347   BP: 152/70   Pulse: 75   Resp: 18   Temp: 96.8 °F (36 °C)   SpO2: 95%   Weight: 61.1 kg (134 lb 12.8 oz)   Height: 160 cm (63\")   PainSc:   4   PainLoc: Back     Objective   Physical Exam  Vitals and nursing note reviewed.   Constitutional:       " Appearance: Normal appearance. She is well-developed.   Eyes:      General: Lids are normal.   Cardiovascular:      Rate and Rhythm: Normal rate.   Pulmonary:      Effort: Pulmonary effort is normal.   Musculoskeletal:      Cervical back: Normal range of motion.      Lumbar back: Tenderness present. Decreased range of motion.      Right knee: Swelling present. Decreased range of motion.      Left knee: Swelling and crepitus present. Decreased range of motion. Tenderness present.   Neurological:      Mental Status: She is alert and oriented to person, place, and time.   Psychiatric:         Attention and Perception: Attention normal.         Mood and Affect: Mood normal.         Speech: Speech normal.         Behavior: Behavior normal.         Judgment: Judgment normal.       Large Joint Arthrocentesis: L knee  Date/Time: 2/7/2022 2:18 PM  Consent given by: patient  Site marked: site marked  Timeout: Immediately prior to procedure a time out was called to verify the correct patient, procedure, equipment, support staff and site/side marked as required   Supporting Documentation  Indications: pain and diagnostic evaluation   Procedure Details  Location: knee - L knee  Preparation: Patient was prepped and draped in the usual sterile fashion  Needle size: 22 G  Approach: lateral  Medications administered: 40 mg methylPREDNISolone acetate 40 MG/ML (Bupivacaine 0.25% 4mL NDC: 55150-167-10 Batch: [id number] Exp: 10/2023)  Patient tolerance: patient tolerated the procedure well with no immediate complications        Assessment/Plan   Diagnoses and all orders for this visit:    1. Chronic bilateral low back pain with bilateral sciatica (Primary)    2. Chronic right hip pain    3. Encounter for long-term current use of high risk medication    4. Arthritis of right knee    5. Chronic pain of left knee    6. Other chronic pain    7. Arthritis of left knee    Other orders  -     Buprenorphine (BUTRANS) 5 MCG/HR patch weekly  transdermal patch; Place 1 patch on the skin as directed by provider 1 (One) Time Per Week.  Dispense: 4 patch; Refill: 0      --- The urine drug screen confirmation from 12/7/2021 has been reviewed and the result is appropriate based on patient history and JUDY report  --- CSA updated 12/7/2021  --- Refill Butrans 5 mcg/hr patch. Patient appears stable with current regimen. No adverse effects. Regarding continuation of opioids, there is no evidence of aberrant behavior or any red flags.  The patient continues with appropriate response to opioid therapy. ADL's remain intact by self.   --- Follow-up 1 month or sooner if needed. Discussed that if she continues to do well at her current dose with no side effects can extend visits back to 2 month f/u after next visit.      JUDY REPORT  As part of the patient's treatment plan, I am prescribing controlled substances. The patient has been made aware of appropriate use of such medications, including potential risk of somnolence, limited ability to drive and/or work safely, and the potential for dependence or overdose. It has also bee made clear that these medications are for use by this patient only, without concomitant use of alcohol or other substances unless prescribed.     Patient has completed prescribing agreement detailing terms of continued prescribing of controlled substances, including monitoring JUDY reports, urine drug screening, and pill counts if necessary. The patient is aware that inappropriate use will results in cessation of prescribing such medications.    As the clinician, I personally reviewed the JUDY from 2/7/2022 while the patient was in the office today.    History and physical exam exhibit continued safe and appropriate use of controlled substances.    Dictated utilizing Dragon dictation.     This document is intended for medical expert use only. Reading of this document by patients and/or patient's family without participating medical  staff guidance may result in misinterpretation and unintended morbidity.   Any interpretation of such data is the responsibility of the patient and/or family member responsible for the patient in concert with their primary or specialist providers, not to be left for sources of online searches such as [employer], [employer] or similar queries. Relying on these approaches to knowledge may result in misinterpretation, misguided goals of care and even death should patients or family members try recommendations outside of the realm of professional medical care in a supervised way.

## 2022-02-11 ENCOUNTER — PATIENT OUTREACH (OUTPATIENT)
Dept: CASE MANAGEMENT | Facility: OTHER | Age: 87
End: 2022-02-11

## 2022-02-11 NOTE — OUTREACH NOTE
Ambulatory Case Management Note    SDOH updated and reviewed with the patient during this program:     Financial Resource Strain: High Risk   • Difficulty of Paying Living Expenses: Hard       Food Insecurity: Food Insecurity Present   • Worried About Running Out of Food in the Last Year: Sometimes true   • Ran Out of Food in the Last Year: Sometimes true       Transportation Needs: Unmet Transportation Needs   • Lack of Transportation (Medical): Yes   • Lack of Transportation (Non-Medical): No     Care Evaluation    Questions/Answers      Most Recent Value   Other Patient Education/Resources  Social   Social Resources Education Method --  [Medicaid application line provided: [phone]. ]   Medication Adherence Financial reason  [Patient has increased cost with new pain patch medication: Butrans. ]          Patient Outreach    Introduced self, explained ACM RN role and provided contact information. Spoke with patient regarding health and wellness. Patient is receiving her food from [employer]. Reviewed financial difficulties regarding social determinants. Patient reports her medication cost has increased and her heating bill is difficult to pay. [facility] recommended patient apply for Medicaid for further assistance. Line provided to patient. Follow up scheduled next week. Patient has engagement this afternoon and will not be able to call until next week.     There are no recently modified care plans to display for this patient.      Dorys Garza RN  Ambulatory Case Management    2/11/2022, 14:31 EST

## 2022-02-16 ENCOUNTER — NURSE TRIAGE (OUTPATIENT)
Dept: CALL CENTER | Facility: HOSPITAL | Age: 87
End: 2022-02-16

## 2022-02-16 NOTE — TELEPHONE ENCOUNTER
"States her doctor will take at least a week to get in to see so will go to urgent care tomorrow.   Reason for Disposition  • No prior tetanus shots (or is not fully vaccinated)    Additional Information  • Negative: [1] Major bleeding (e.g., actively dripping or spurting) AND [2] can't be stopped  • Negative: Amputation  • Negative: Shock suspected (e.g., cold/pale/clammy skin, too weak to stand, low BP, rapid pulse)  • Negative: [1] Knife wound (or other possibly deep cut) AND [2] to chest, abdomen, back, neck, or head  • Negative: [1] Self-injury (e.g., cutting, self-harm) AND AND [2] suicidal or out-of-control  • Negative: Sounds like a life-threatening emergency to the triager  • Negative: [1] Animal bite AND [2] broken skin  • Negative: [1] Human bite AND [2] broken skin  • Negative: [1] Bleeding AND [2] won't stop after 10 minutes of direct pressure (using correct technique)  • Negative: Skin is split open or gaping (or length > 1/2 inch or 12 mm on the skin, 1/4 inch or 6 mm on the face)  • Negative: [1] Deep cut AND [2] can see bone or tendons  • Negative: Cut over knuckle (MCP joint)  • Negative: Sensation of something in the wound (i.e., retained object in wound)  • Negative: [1] Dirt in the wound AND [2] not removed with 15 minutes of scrubbing  • Negative: Wound causes numbness (i.e., loss of sensation)  • Negative: Wound causes weakness (i.e., decreased ability to move hand, finger, toe)  • Negative: [1] SEVERE pain AND [2] not improved 2 hours after pain medicine  • Negative: [1] Looks infected AND [2] large red area (>2 inches or 5 cm) or streak  • Negative: [1] Fever AND [2] spreading red area or streak  • Negative: Suspicious history for the injury  • Negative: [1] Looks infected (spreading redness, pus) AND [2] no fever    Answer Assessment - Initial Assessment Questions  1. APPEARANCE of INJURY: \"What does the injury look like?\"       Skin tear on right leg and redness in 1 inch diameter all " "around it  2. SIZE: \"How large is the cut?\"       1 inch  3. BLEEDING: \"Is it bleeding now?\" If Yes, ask: \"Is it difficult to stop?\"       No longer  4. LOCATION: \"Where is the injury located?\"       Between knee and ankle on right leg  5. ONSET: \"How long ago did the injury occur?\"       2/14  6. MECHANISM: \"Tell me how it happened.\"       Opened over door on leg  7. TETANUS: \"When was the last tetanus booster?\"      She cannnot remember the last one she had, states is not up to date  8. PREGNANCY: \"Is there any chance you are pregnant?\" \"When was your last menstrual period?\"      na    Protocols used: CUTS AND LACERATIONS-ADULT-AH      "

## 2022-02-17 ENCOUNTER — PATIENT OUTREACH (OUTPATIENT)
Dept: CASE MANAGEMENT | Facility: OTHER | Age: 87
End: 2022-02-17

## 2022-02-17 NOTE — OUTREACH NOTE
Ambulatory Case Management Note    Patient Outreach    Introduced self, explained ACM RN role and provided contact information. Spoke with patient regarding health and wellness. Patient states she recent came from the Urgent Care today due to a skinned leg. Her wound was cleaned and she received Clindamycin. Patient picked up on the way home and is taking as directed. Patient has not called the Medicaid line  Yet and plans to do so tomorrow. Follow up scheduled next week to assist patient with application process needs.     There are no recently modified care plans to display for this patient.      Dorys Garza RN  Ambulatory Case Management    2/17/2022, 15:56 EST

## 2022-02-25 ENCOUNTER — PATIENT OUTREACH (OUTPATIENT)
Dept: CASE MANAGEMENT | Facility: OTHER | Age: 87
End: 2022-02-25

## 2022-02-25 NOTE — OUTREACH NOTE
Ambulatory Case Management Note  Patient Outreach  RN-ACM patient outreach. Talked with patient; needs assistance with heating and medical expenses. Patient left message with Medicaid telephone line and has not received call back.  RN-ACM assisted in 3 way phone call with Medicaid [phone] regarding services and received information of patient not eligible for assistance. Reviewed with patient contact information regarding community resources and contact information .  Patient verbalized understanding.   Care Coordination  Care coordination with [facility] [phone] regarding services and left voicemail with patient contact information (per patient permission) for call back regarding services. Patient verbalized understanding.   Care Coordination  [facility] Talked with Niyah mcallister will follow up with patient regarding services. Patient contact information provided per patient permission. Patient verbalized understanding. Will continue outreach with patient regarding services and RN-ACM contact information.  Patient verbalized understanding.    Lucía Swenson RN  Ambulatory Case Management    2/25/2022, 09:26 EST

## 2022-03-02 ENCOUNTER — PATIENT OUTREACH (OUTPATIENT)
Dept: CASE MANAGEMENT | Facility: OTHER | Age: 87
End: 2022-03-02

## 2022-03-02 NOTE — OUTREACH NOTE
Ambulatory Case Management Note  Patient Outreach  RN-ACM patient outreach. Talked with patient regarding community resources with financial assistance with bills of electric; gas; water and ambulance bill. . Resources provided and 3 way phone calls made. Patient verbalized understanding and has contact information of community resources. Patient confirms 3/8/22 with pain medicine and uses Wheels for transportation for appointment. Will continue patient outreach for assistance.   Care Coordination  Care Coordination with [facility] [phone]. Left voicemail with patient contact information provided per patient permission. .    Care Coordination  Care Coordination with [place] [phone] in 3 way phone call  with Pat. Discussed community resources for assistance and states patient not eligible at this time for assistance.  Patient verbalized understanding.   Care Coordination  Care Coordination with [facility] [phone] regarding community resources and received information. Information provided to patient and patient verbalized understanding.   Care Coordination  Care Coordination with [facility] [phone] in 3 way phone call with patient. She states patient not eligible for assistance. Patient verbalized understanding.   Lucía Swenson RN  Ambulatory Case Managemen    3/2/2022, 13:34 EST

## 2022-03-08 ENCOUNTER — OFFICE VISIT (OUTPATIENT)
Dept: PAIN MEDICINE | Facility: CLINIC | Age: 87
End: 2022-03-08

## 2022-03-08 VITALS
OXYGEN SATURATION: 99 % | HEART RATE: 69 BPM | TEMPERATURE: 97.2 F | SYSTOLIC BLOOD PRESSURE: 159 MMHG | RESPIRATION RATE: 12 BRPM | DIASTOLIC BLOOD PRESSURE: 83 MMHG | HEIGHT: 63 IN | WEIGHT: 135 LBS | BODY MASS INDEX: 23.92 KG/M2

## 2022-03-08 DIAGNOSIS — M25.551 CHRONIC RIGHT HIP PAIN: ICD-10-CM

## 2022-03-08 DIAGNOSIS — G89.29 OTHER CHRONIC PAIN: Primary | ICD-10-CM

## 2022-03-08 DIAGNOSIS — M54.42 CHRONIC BILATERAL LOW BACK PAIN WITH BILATERAL SCIATICA: ICD-10-CM

## 2022-03-08 DIAGNOSIS — G89.29 CHRONIC RIGHT HIP PAIN: ICD-10-CM

## 2022-03-08 DIAGNOSIS — Z79.899 ENCOUNTER FOR LONG-TERM CURRENT USE OF HIGH RISK MEDICATION: ICD-10-CM

## 2022-03-08 DIAGNOSIS — M54.41 CHRONIC BILATERAL LOW BACK PAIN WITH BILATERAL SCIATICA: ICD-10-CM

## 2022-03-08 DIAGNOSIS — G89.29 CHRONIC BILATERAL LOW BACK PAIN WITH BILATERAL SCIATICA: ICD-10-CM

## 2022-03-08 DIAGNOSIS — M17.12 OSTEOARTHRITIS OF LEFT KNEE, UNSPECIFIED OSTEOARTHRITIS TYPE: ICD-10-CM

## 2022-03-08 PROCEDURE — 99214 OFFICE O/P EST MOD 30 MIN: CPT | Performed by: NURSE PRACTITIONER

## 2022-03-08 RX ORDER — BUPRENORPHINE 7.5 UG/H
1 PATCH TRANSDERMAL
Qty: 4 PATCH | Refills: 0 | Status: SHIPPED | OUTPATIENT
Start: 2022-03-08 | End: 2022-04-08 | Stop reason: SDUPTHER

## 2022-03-08 NOTE — PROGRESS NOTES
CHIEF COMPLAINT  FOLLOW UP BACK PAIN .       Subjective   Ashli León is a 86 y.o. female  who presents for follow-up.  She has a history of chronic back and hip pain.    C/o low back and left knee pain. Pain today 5/10 in severity (right hip and low back).  currently utilizing Butrans 5mcg patch every 7 days.   Butrans was decreased from 10 mcg down to 5 mcg 2 months ago.  This was due to concern for falls related possibly to polypharmacy.  Patient reports that her pain is not nearly as well controlled as it was with the 10mcg patch. She denies any recent issues with falls/balance     On 9/15/2020 the patient completed an L3-L4 LESI as well as a left knee injection with fluoroscopic guidance. She reports moderate relief but only helped for about a week or two.        Worsening bilateral knee pain.  History right TKA.  Left knee injection 2/7/2021 - no benefit      ORIF of the right femoral shaft fracture on 10/11/2020.       Reviewed the result of the PGT test from 12/10/2020.  Patient is a poor metabolizer of the CYP2D6.   Belbuca which she did not tolerate due to price, side effects (diarrhea), and difficulty managing the product with the arthritis in her hands.    Patient remained masked during entire encounter. No cough present. I donned a mask and eye protection throughout entire visit. Prior to donning mask and eye protection, hand hygiene was performed, as well as when it was doffed.  I was closer than 6 feet, but not for an extended period of time. No obvious exposure to any bodily fluids.    Back Pain  This is a chronic problem. The current episode started more than 1 year ago. The problem occurs daily. The problem has been waxing and waning since onset. The pain is present in the lumbar spine. The pain radiates to the left thigh and right thigh. The pain is at a severity of 5/10. The pain is moderate. The symptoms are aggravated by position, standing, sitting and bending. Pertinent negatives include  no abdominal pain, chest pain, dysuria, fever, headaches, numbness or weakness. Risk factors include sedentary lifestyle. She has tried analgesics, NSAIDs, ice and heat (injections) for the symptoms. The treatment provided moderate relief.   Knee Pain   The pain is present in the left knee. The pain is at a severity of 5/10. The pain is moderate. The pain has been worsening since onset. Pertinent negatives include no numbness. The symptoms are aggravated by weight bearing and movement. She has tried NSAIDs and acetaminophen (injections) for the symptoms. The treatment provided moderate relief.        PEG Assessment   What number best describes your pain on average in the past week?9  What number best describes how, during the past week, pain has interfered with your enjoyment of life?9  What number best describes how, during the past week, pain has interfered with your general activity?  9      The following portions of the patient's history were reviewed and updated as appropriate: allergies, current medications, past family history, past medical history, past social history, past surgical history and problem list.    Review of Systems   Constitutional: Positive for fatigue. Negative for activity change and fever.   HENT: Negative for congestion.    Respiratory: Negative for cough and chest tightness.    Cardiovascular: Negative for chest pain.   Gastrointestinal: Negative for abdominal pain, constipation and diarrhea.   Genitourinary: Negative for difficulty urinating and dysuria.   Musculoskeletal: Positive for back pain.   Neurological: Negative for dizziness, weakness, light-headedness, numbness and headaches.   Psychiatric/Behavioral: Positive for agitation and sleep disturbance. Negative for suicidal ideas. The patient is nervous/anxious.      I have reviewed and confirmed the accuracy of the ROS as documented by the MA/WON/RN TANIA Tracy    Vitals:    03/08/22 1401   BP: 159/83   Pulse: 69  "  Resp: 12   Temp: 97.2 °F (36.2 °C)   SpO2: 99%   Weight: 61.2 kg (135 lb)   Height: 160 cm (63\")   PainSc:   5   PainLoc: Back  Comment: right hip         Objective   Physical Exam  Vitals and nursing note reviewed.   Constitutional:       General: She is not in acute distress.     Appearance: Normal appearance. She is not ill-appearing.   Pulmonary:      Effort: Pulmonary effort is normal. No respiratory distress.   Musculoskeletal:      Lumbar back: Tenderness present.      Right hip: Tenderness present.      Right knee: Crepitus present. Tenderness present.      Left knee: Crepitus present. Tenderness present.   Skin:     General: Skin is warm and dry.   Neurological:      Mental Status: She is alert and oriented to person, place, and time.      Gait: Gait abnormal (slowed).   Psychiatric:         Mood and Affect: Mood normal.         Behavior: Behavior normal.           Assessment/Plan   Diagnoses and all orders for this visit:    1. Other chronic pain (Primary)    2. Chronic bilateral low back pain with bilateral sciatica    3. Chronic right hip pain    4. Encounter for long-term current use of high risk medication    5. Osteoarthritis of left knee, unspecified osteoarthritis type  -     Large Joint Arthrocentesis    Other orders  -     Buprenorphine (BUTRANS) patch weekly transdermal patch; Place 1 patch on the skin as directed by provider Every 7 (Seven) Days.  Dispense: 4 patch; Refill: 0      --- Refill Butrans. Change to 7.5 mcg patch. Patient appears stable with current regimen. No adverse effects. Regarding continuation of opioids, there is no evidence of aberrant behavior or any red flags.  The patient continues with appropriate response to opioid therapy. ADL's remain intact by self.   --- The urine drug screen confirmation from 12/7/2021 has been reviewed and the result is appropriate based on patient history and JUDY report  --- The patient signed an updated copy of the controlled substance " agreement on 12/7/2021  --- Left knee gel/hylauronate injection   --- Follow-up 1 month            JUDY REPORT  As part of the patient's treatment plan, I am prescribing controlled substances. The patient has been made aware of appropriate use of such medications, including potential risk of somnolence, limited ability to drive and/or work safely, and the potential for dependence or overdose. It has also bee made clear that these medications are for use by this patient only, without concomitant use of alcohol or other substances unless prescribed.     Patient has completed prescribing agreement detailing terms of continued prescribing of controlled substances, including monitoring JUDY reports, urine drug screening, and pill counts if necessary. The patient is aware that inappropriate use will results in cessation of prescribing such medications.    As the clinician, I personally reviewed the JUDY from 3/8/2022 while the patient was in the office today.    History and physical exam exhibit continued safe and appropriate use of controlled substances.     Dictated utilizing Dragon dictation.     This document is intended for medical expert use only. Reading of this document by patients and/or patient's family without participating medical staff guidance may result in misinterpretation and unintended morbidity.   Any interpretation of such data is the responsibility of the patient and/or family member responsible for the patient in concert with their primary or specialist providers, not to be left for sources of online searches such as [employer], [employer] or similar queries. Relying on these approaches to knowledge may result in misinterpretation, misguided goals of care and even death should patients or family members try recommendations outside of the realm of professional medical care in a supervised way.

## 2022-03-09 ENCOUNTER — PATIENT OUTREACH (OUTPATIENT)
Dept: CASE MANAGEMENT | Facility: OTHER | Age: 87
End: 2022-03-09

## 2022-03-09 NOTE — OUTREACH NOTE
AMBULATORY CASE MANAGEMENT NOTE    Name and Relationship of Patient/Support Person: Ashli León - Self  Patient Outreach  Rn_ACM patient outreach. Talked with patient. Patient states to have pain to back ;  confirms 3/8/22 pain medicine; received recommendations and will be obtaining pain patch tomorrow.   She states has received information from [facility]/ [place]  that ambulance bill has been paid and will follow up with her. Patient stated to be very happy with assistance with bill and stated to appreciate RN-ACM assistance. Reviewed with patient education; 24/7 Nurse Line Telephone number and community resources. Patient verbalized understanding. No further questions or concerns voiced at this time.       KADEN CABRAL  Ambulatory Case Management    3/9/2022, 16:01 EST

## 2022-03-19 RX ORDER — DOXEPIN HYDROCHLORIDE 25 MG/1
CAPSULE ORAL
Qty: 90 CAPSULE | Refills: 4 | Status: SHIPPED | OUTPATIENT
Start: 2022-03-19 | End: 2022-11-17 | Stop reason: SDUPTHER

## 2022-03-20 RX ORDER — PRAVASTATIN SODIUM 40 MG
TABLET ORAL
Qty: 90 TABLET | Refills: 4 | Status: SHIPPED | OUTPATIENT
Start: 2022-03-20

## 2022-03-31 ENCOUNTER — PATIENT OUTREACH (OUTPATIENT)
Dept: CASE MANAGEMENT | Facility: OTHER | Age: 87
End: 2022-03-31

## 2022-03-31 NOTE — OUTREACH NOTE
AMBULATORY CASE MANAGEMENT NOTE    Name and Relationship of Patient/Support Person: BurtonAshli Self    Patient Outreach    Introduced self, explained ACM RN role and provided contact information. Patient states the [facility] was supposed to cover her ambulance bill. She just got a second notice of $1049.12.     Care Coordination    Message left at [facility] for Niyah for an update on patient's ambulance bill. [phone].     Care Coordination    Called billing company for ambulance bill and spoke with Ema. Patient has two outstanding ambulance rides. One: $1049.12 denied by Medicare. The second $96.17 currently being sent to her supplement plan before being sent to patient. No payments have been made to either statement. Follow up outreach scheduled for tomorrow.         Education Documentation  No documentation found.        MARTI SEVILLA  Ambulatory Case Management    3/31/2022, 16:44 EDT

## 2022-04-01 ENCOUNTER — PATIENT OUTREACH (OUTPATIENT)
Dept: CASE MANAGEMENT | Facility: OTHER | Age: 87
End: 2022-04-01

## 2022-04-01 NOTE — OUTREACH NOTE
AMBULATORY CASE MANAGEMENT NOTE    Name and Relationship of Patient/Support Person: Ashli León - Self    Care Coordination    Spoke with Niyah Salamanca. She is preparing to complete an application for patient to the [facility]. It will be a one time offer and can cover any outstanding medical bills. Niyah to call patient today and schedule a time, hopefully tomorrow, patient can complete the application.     Patient Outreach    ACM called patient and advised her to gather all medical bills that are outstanding for the application today. Patient verbalized understanding. ACM also recommended patient watch the phone and answer for Niyah's call today. Follow up scheduled next week to review patient's needs.     Education Documentation  No documentation found.        MARTI SEVILLA  Ambulatory Case Management    4/1/2022, 10:45 EDT

## 2022-04-08 ENCOUNTER — OFFICE VISIT (OUTPATIENT)
Dept: PAIN MEDICINE | Facility: CLINIC | Age: 87
End: 2022-04-08

## 2022-04-08 VITALS
TEMPERATURE: 96.6 F | OXYGEN SATURATION: 94 % | HEART RATE: 60 BPM | HEIGHT: 63 IN | DIASTOLIC BLOOD PRESSURE: 57 MMHG | WEIGHT: 133 LBS | BODY MASS INDEX: 23.57 KG/M2 | SYSTOLIC BLOOD PRESSURE: 153 MMHG

## 2022-04-08 DIAGNOSIS — Z79.899 ENCOUNTER FOR LONG-TERM CURRENT USE OF HIGH RISK MEDICATION: ICD-10-CM

## 2022-04-08 DIAGNOSIS — M25.551 CHRONIC RIGHT HIP PAIN: ICD-10-CM

## 2022-04-08 DIAGNOSIS — M17.11 ARTHRITIS OF RIGHT KNEE: ICD-10-CM

## 2022-04-08 DIAGNOSIS — G89.29 CHRONIC BILATERAL LOW BACK PAIN WITH BILATERAL SCIATICA: ICD-10-CM

## 2022-04-08 DIAGNOSIS — M54.41 CHRONIC BILATERAL LOW BACK PAIN WITH BILATERAL SCIATICA: ICD-10-CM

## 2022-04-08 DIAGNOSIS — G89.29 OTHER CHRONIC PAIN: Primary | ICD-10-CM

## 2022-04-08 DIAGNOSIS — M54.42 CHRONIC BILATERAL LOW BACK PAIN WITH BILATERAL SCIATICA: ICD-10-CM

## 2022-04-08 DIAGNOSIS — G89.29 CHRONIC RIGHT HIP PAIN: ICD-10-CM

## 2022-04-08 DIAGNOSIS — M17.12 OSTEOARTHRITIS OF LEFT KNEE, UNSPECIFIED OSTEOARTHRITIS TYPE: ICD-10-CM

## 2022-04-08 PROCEDURE — 99214 OFFICE O/P EST MOD 30 MIN: CPT | Performed by: NURSE PRACTITIONER

## 2022-04-08 PROCEDURE — 20610 DRAIN/INJ JOINT/BURSA W/O US: CPT | Performed by: NURSE PRACTITIONER

## 2022-04-08 RX ORDER — BUPRENORPHINE 7.5 UG/H
1 PATCH TRANSDERMAL
Qty: 4 PATCH | Refills: 1 | Status: SHIPPED | OUTPATIENT
Start: 2022-04-08 | End: 2022-06-03 | Stop reason: SDUPTHER

## 2022-04-08 NOTE — PROGRESS NOTES
CHIEF COMPLAINT  F/U back and hip pain- patient states that her pain has remained the same since her last visit.     Subjective   Ashli León is a 86 y.o. female  who presents for follow-up.  She has a history of chronic pain of the back, hip, knees.    C/o low back and left knee pain. Pain today 4/10 in severity (right hip and low back).  Currently utilizing Butrans 7.5mcg patch every 7 days.  Helping. Was happier with the 10 mcg patch. Denies adverse reaction.       Butrans was decreased from 10 mcg a few months ago.  This was due to concern for falls related possibly to polypharmacy. She denies any recent issues with falls/balance.      On 9/15/2020 the patient completed an L3-L4 LESI as well as a left knee injection with fluoroscopic guidance. She reports moderate relief but only helped for about a week or two.        Worsening bilateral knee pain.  History right TKA.  Left knee injection 2/7/2021 - no benefit. We discussed hylauronate injection last visit, she wishes to proceed today.      ORIF of the right femoral shaft fracture on 10/11/2020.       Reviewed the result of the PGT test from 12/10/2020.  Patient is a poor metabolizer of the CYP2D6.   Belbuca which she did not tolerate due to price, side effects (diarrhea), and difficulty managing the product with the arthritis in her hands.    Patient remained masked during entire encounter. No cough present. I donned a mask and eye protection throughout entire visit. Prior to donning mask and eye protection, hand hygiene was performed, as well as when it was doffed.  I was closer than 6 feet, but not for an extended period of time. No obvious exposure to any bodily fluids.    Back Pain  This is a chronic problem. The current episode started more than 1 year ago. The problem occurs daily. The problem has been waxing and waning since onset. The pain is present in the lumbar spine. The pain radiates to the left thigh and right thigh. The pain is at a severity  of 4/10. The pain is moderate. The symptoms are aggravated by position, standing, sitting and bending. Associated symptoms include weakness. Pertinent negatives include no abdominal pain, chest pain, dysuria, fever, headaches or numbness. Risk factors include sedentary lifestyle. She has tried analgesics, NSAIDs, ice and heat (injections) for the symptoms. The treatment provided moderate relief.   Knee Pain   The pain is present in the left knee and right knee. The pain is at a severity of 4/10. The pain is moderate. The pain has been worsening since onset. Pertinent negatives include no numbness. The symptoms are aggravated by weight bearing and movement. She has tried NSAIDs and acetaminophen (injections) for the symptoms. The treatment provided moderate relief.      PEG Assessment   What number best describes your pain on average in the past week?3  What number best describes how, during the past week, pain has interfered with your enjoyment of life?8  What number best describes how, during the past week, pain has interfered with your general activity?  8      The following portions of the patient's history were reviewed and updated as appropriate: allergies, current medications, past family history, past medical history, past social history, past surgical history and problem list.    Review of Systems   Constitutional: Positive for activity change (decreased) and fatigue. Negative for chills and fever.   HENT: Negative for congestion.    Eyes: Negative for visual disturbance.   Respiratory: Negative for chest tightness and shortness of breath.    Cardiovascular: Negative for chest pain.   Gastrointestinal: Negative for abdominal pain, constipation and diarrhea.   Genitourinary: Negative for difficulty urinating, dyspareunia and dysuria.   Musculoskeletal: Positive for back pain.   Neurological: Positive for weakness. Negative for dizziness, light-headedness, numbness and headaches.   Psychiatric/Behavioral:  "Positive for agitation and sleep disturbance. The patient is nervous/anxious.      I have reviewed and confirmed the accuracy of the ROS as documented by the MA/LPN/RN TANIA Tracy    Vitals:    04/08/22 1302   BP: 153/57   Pulse: 60   Temp: 96.6 °F (35.9 °C)   SpO2: 94%   Weight: 60.3 kg (133 lb)   Height: 160 cm (63\")   PainSc:   4   PainLoc: Back     Objective   Physical Exam    Large Joint Arthrocentesis: L knee  Date/Time: 4/8/2022 2:05 PM  Consent given by: patient  Site marked: site marked  Timeout: Immediately prior to procedure a time out was called to verify the correct patient, procedure, equipment, support staff and site/side marked as required   Supporting Documentation  Indications: pain and diagnostic evaluation   Procedure Details  Location: knee - L knee  Needle size: 20 G  Approach: lateral  Medications administered: 30 mg Cross-Linked Hyaluronate 30 MG/3ML (2.5 cc lidocaine injected into the joint first)  Patient tolerance: patient tolerated the procedure well with no immediate complications      Lidocaine 1% NDC 9847-4229-79, LOT [id number], EXP 08/01/2023      Assessment/Plan   Diagnoses and all orders for this visit:    1. Other chronic pain (Primary)    2. Chronic bilateral low back pain with bilateral sciatica    3. Chronic right hip pain    4. Osteoarthritis of left knee, unspecified osteoarthritis type    5. Arthritis of right knee    6. Encounter for long-term current use of high risk medication      --- Left knee Gel One Injection in office today.  If knee pain not improving we will refer her to ortho.   --- Refill Butrans. Keeping at 7.5mcg dose for now. If she continues to avoid falls we may consider going back to the 10 mcg dose in the future.  Patient appears stable with current regimen. No adverse effects. Regarding continuation of opioids, there is no evidence of aberrant behavior or any red flags.  The patient continues with appropriate response to opioid therapy. ADL's " remain intact by self.   --- The urine drug screen confirmation from 12/7/2021 has been reviewed and the result is appropriate based on patient history and JUDY report  --- The patient signed an updated copy of the controlled substance agreement on 12/7/2021  --- Follow-up 2 months            JUDY REPORT  As part of the patient's treatment plan, I am prescribing controlled substances. The patient has been made aware of appropriate use of such medications, including potential risk of somnolence, limited ability to drive and/or work safely, and the potential for dependence or overdose. It has also bee made clear that these medications are for use by this patient only, without concomitant use of alcohol or other substances unless prescribed.     Patient has completed prescribing agreement detailing terms of continued prescribing of controlled substances, including monitoring JUDY reports, urine drug screening, and pill counts if necessary. The patient is aware that inappropriate use will results in cessation of prescribing such medications.    As the clinician, I personally reviewed the JUDY from 4/8/2022 while the patient was in the office today.    History and physical exam exhibit continued safe and appropriate use of controlled substances.     Dictated utilizing Dragon dictation.     This document is intended for medical expert use only. Reading of this document by patients and/or patient's family without participating medical staff guidance may result in misinterpretation and unintended morbidity.   Any interpretation of such data is the responsibility of the patient and/or family member responsible for the patient in concert with their primary or specialist providers, not to be left for sources of online searches such as [employer], [employer] or similar queries. Relying on these approaches to knowledge may result in misinterpretation, misguided goals of care and even death should patients or family members try  recommendations outside of the realm of professional medical care in a supervised way.

## 2022-04-13 ENCOUNTER — PATIENT OUTREACH (OUTPATIENT)
Dept: CASE MANAGEMENT | Facility: OTHER | Age: 87
End: 2022-04-13

## 2022-04-13 NOTE — OUTREACH NOTE
AMBULATORY CASE MANAGEMENT NOTE    Name and Relationship of Patient/Support Person: Ashli León    Patient Outreach    Spoke with patient. She has completed application for [employer] funding. She did have question as if her pain patch prescription could be covered in the application. She spent $170 on a month of pain patches. Her [employer] card did not work to discount.     Care Coordination    Message left for Niyah to return call with question regarding patient's prescription. Follow up scheduled next week.     Education Documentation  No documentation found.        MARTI SEVILLA  Ambulatory Case Management    4/13/2022, 17:18 EDT

## 2022-04-21 ENCOUNTER — PATIENT OUTREACH (OUTPATIENT)
Dept: CASE MANAGEMENT | Facility: OTHER | Age: 87
End: 2022-04-21

## 2022-04-21 NOTE — OUTREACH NOTE
AMBULATORY CASE MANAGEMENT NOTE    Name and Relationship of Patient/Support Person: Ashli León - Self    Patient Outreach    Introduced self, explained ACM RN role and provided contact information. No new needs at this time. Patient transitioned to monitoring status.     Education Documentation  No documentation found.        MARTI SEVILLA  Ambulatory Case Management    4/21/2022, 15:04 EDT

## 2022-05-02 DIAGNOSIS — G47.09 OTHER INSOMNIA: ICD-10-CM

## 2022-05-02 RX ORDER — ZOLPIDEM TARTRATE 10 MG/1
TABLET ORAL
Qty: 90 TABLET | Refills: 0 | Status: SHIPPED | OUTPATIENT
Start: 2022-05-02 | End: 2022-08-05

## 2022-06-03 ENCOUNTER — OFFICE VISIT (OUTPATIENT)
Dept: PAIN MEDICINE | Facility: CLINIC | Age: 87
End: 2022-06-03

## 2022-06-03 VITALS
DIASTOLIC BLOOD PRESSURE: 75 MMHG | WEIGHT: 136 LBS | BODY MASS INDEX: 24.1 KG/M2 | SYSTOLIC BLOOD PRESSURE: 172 MMHG | HEIGHT: 63 IN | TEMPERATURE: 96.8 F | HEART RATE: 64 BPM | OXYGEN SATURATION: 99 %

## 2022-06-03 DIAGNOSIS — G89.29 CHRONIC BILATERAL LOW BACK PAIN WITH BILATERAL SCIATICA: ICD-10-CM

## 2022-06-03 DIAGNOSIS — G89.29 OTHER CHRONIC PAIN: Primary | ICD-10-CM

## 2022-06-03 DIAGNOSIS — M17.12 OSTEOARTHRITIS OF LEFT KNEE, UNSPECIFIED OSTEOARTHRITIS TYPE: ICD-10-CM

## 2022-06-03 DIAGNOSIS — M25.551 CHRONIC RIGHT HIP PAIN: ICD-10-CM

## 2022-06-03 DIAGNOSIS — Z79.899 ENCOUNTER FOR LONG-TERM CURRENT USE OF HIGH RISK MEDICATION: ICD-10-CM

## 2022-06-03 DIAGNOSIS — M54.42 CHRONIC BILATERAL LOW BACK PAIN WITH BILATERAL SCIATICA: ICD-10-CM

## 2022-06-03 DIAGNOSIS — M54.41 CHRONIC BILATERAL LOW BACK PAIN WITH BILATERAL SCIATICA: ICD-10-CM

## 2022-06-03 DIAGNOSIS — G89.29 CHRONIC RIGHT HIP PAIN: ICD-10-CM

## 2022-06-03 DIAGNOSIS — M17.11 ARTHRITIS OF RIGHT KNEE: ICD-10-CM

## 2022-06-03 LAB
BUPRENORPHINE SERPL-MCNC: POSITIVE NG/ML
POC AMPHETAMINES: NEGATIVE
POC BARBITURATES: NEGATIVE
POC BENZODIAZEPHINES: NEGATIVE
POC COCAINE: NEGATIVE
POC METHADONE: NEGATIVE
POC METHAMPHETAMINE SCREEN URINE: NEGATIVE
POC OPIATES: NEGATIVE
POC OXYCODONE: NEGATIVE
POC PHENCYCLIDINE: NEGATIVE
POC PROPOXYPHENE: NEGATIVE
POC THC: NEGATIVE
POC TRICYCLIC ANTIDEPRESSANTS: NEGATIVE

## 2022-06-03 PROCEDURE — 99214 OFFICE O/P EST MOD 30 MIN: CPT | Performed by: NURSE PRACTITIONER

## 2022-06-03 PROCEDURE — 80305 DRUG TEST PRSMV DIR OPT OBS: CPT | Performed by: NURSE PRACTITIONER

## 2022-06-03 RX ORDER — BUPRENORPHINE 7.5 UG/H
1 PATCH TRANSDERMAL
Qty: 4 PATCH | Refills: 1 | Status: SHIPPED | OUTPATIENT
Start: 2022-06-03 | End: 2022-06-28 | Stop reason: SDUPTHER

## 2022-06-03 NOTE — PROGRESS NOTES
CHIEF COMPLAINT  F/U back pain- patient states that her pain has reamined the same since her last visit. She had a gel injection at her last visit. She states it last about a month.     Subjective   Ashli León is a 86 y.o. female  who presents for follow-up.  She has a history of back, hip, and knee pain. She had a gel injection at last office visit (4/8/22) and reports relief for about 1 month. Pain is starting to gradually return.     Today her pain is 4/10VAS. Pain is located right knee, lower back, and right hip. Currently on Butrans 7.5 mcg. Reports that this provides 70% pain relief but that she is unable to afford the copay.    Butrans was decreased from 10 mcg a few months ago.  This was due to concern for falls related possibly to polypharmacy. She denies any recent issues with falls/balance.      Worsening bilateral knee pain.  History right TKA.  Left knee injection 2/7/2021 - no benefit. Had gel injection on 4/8/22.     ORIF of the right femoral shaft fracture on 10/11/2020.       Procedures:  9/15/2020 - L3-L4 LESI and left knee injection with fluoroscopic guidance - reported moderate relief x 1 to 2 weeks       Reviewed the result of the PGT test from 12/10/2020.  Patient is a poor metabolizer of the CYP2D6.  Belbuca which she did not tolerate due to price, side effects (diarrhea), and difficulty managing the product with the arthritis in her hands.    Patient remained masked during entire encounter. No cough present. I donned a mask and eye protection throughout entire visit. Prior to donning mask and eye protection, hand hygiene was performed, as well as when it was doffed.  I was closer than 6 feet, but not for an extended period of time. No obvious exposure to any bodily fluids.    Back Pain  This is a chronic problem. The current episode started more than 1 year ago. The problem occurs daily. The problem has been waxing and waning since onset. The pain is present in the lumbar spine. The  quality of the pain is described as aching (occasional sharp/shooting pain). The pain radiates to the left thigh and right thigh. The pain is at a severity of 4/10. The pain is moderate. The symptoms are aggravated by position, standing, sitting and bending. Pertinent negatives include no abdominal pain, chest pain, dysuria, fever, headaches, numbness or weakness. Risk factors include sedentary lifestyle. She has tried analgesics, NSAIDs, ice and heat (injections) for the symptoms. The treatment provided moderate relief.   Knee Pain   The pain is present in the left knee and right knee. The pain is at a severity of 4/10. The pain is moderate. The pain has been worsening since onset. Pertinent negatives include no numbness. The symptoms are aggravated by weight bearing and movement. She has tried NSAIDs and acetaminophen (injections) for the symptoms. The treatment provided moderate relief.      PEG Assessment   What number best describes your pain on average in the past week?5  What number best describes how, during the past week, pain has interfered with your enjoyment of life?8  What number best describes how, during the past week, pain has interfered with your general activity?  8    The following portions of the patient's history were reviewed and updated as appropriate: allergies, current medications, past family history, past medical history, past social history, past surgical history and problem list.    Review of Systems   Constitutional: Positive for fatigue. Negative for activity change, chills and fever.   HENT: Positive for hearing loss. Negative for congestion.    Eyes: Negative for visual disturbance.   Respiratory: Negative for chest tightness and shortness of breath.    Cardiovascular: Negative for chest pain.   Gastrointestinal: Negative for abdominal pain, constipation and diarrhea.   Genitourinary: Negative for difficulty urinating, dyspareunia and dysuria.   Musculoskeletal: Positive for back  "pain.   Neurological: Negative for dizziness, weakness, light-headedness, numbness and headaches.   Psychiatric/Behavioral: Positive for agitation and sleep disturbance. The patient is not nervous/anxious.      I have reviewed and confirmed the accuracy of the ROS as documented by the MA/LPN/RN TANIA Tracy    Vitals:    06/03/22 1339   BP: 172/75   Pulse: 64   Temp: 96.8 °F (36 °C)   SpO2: 99%   Weight: 61.7 kg (136 lb)   Height: 160 cm (63\")   PainSc:   4   PainLoc: Back  Comment: and knee     Objective   Physical Exam  Vitals and nursing note reviewed.   Constitutional:       General: She is not in acute distress.     Appearance: Normal appearance. She is not ill-appearing.   Pulmonary:      Effort: Pulmonary effort is normal. No respiratory distress.   Musculoskeletal:      Lumbar back: Tenderness present.      Right hip: Tenderness present.      Right knee: Crepitus present. Tenderness present.      Left knee: Crepitus present. Tenderness present.   Skin:     General: Skin is warm and dry.   Neurological:      Mental Status: She is alert and oriented to person, place, and time.      Gait: Gait abnormal (slowed).   Psychiatric:         Mood and Affect: Mood normal.         Behavior: Behavior normal.         Assessment & Plan   Diagnoses and all orders for this visit:    1. Other chronic pain (Primary)  -     Urine Drug Screen Confirmation - Urine, Clean Catch; Future  -     POC Urine Drug Screen, Triage    2. Chronic bilateral low back pain with bilateral sciatica  -     Urine Drug Screen Confirmation - Urine, Clean Catch; Future  -     POC Urine Drug Screen, Triage    3. Chronic right hip pain  -     Urine Drug Screen Confirmation - Urine, Clean Catch; Future  -     POC Urine Drug Screen, Triage    4. Osteoarthritis of left knee, unspecified osteoarthritis type  -     Urine Drug Screen Confirmation - Urine, Clean Catch; Future  -     POC Urine Drug Screen, Triage    5. Arthritis of right knee  -     " Urine Drug Screen Confirmation - Urine, Clean Catch; Future  -     POC Urine Drug Screen, Triage    6. Encounter for long-term current use of high risk medication  -     Urine Drug Screen Confirmation - Urine, Clean Catch; Future  -     POC Urine Drug Screen, Triage    Other orders  -     Buprenorphine (BUTRANS) patch weekly transdermal patch; Place 1 patch on the skin as directed by provider Every 7 (Seven) Days.  Dispense: 4 patch; Refill: 1      --- Declines orthopedic referral today   --- Refill Butrans. She will pay for this month, says it will be cost prohibitive long term.  Patient appears stable with current regimen. No adverse effects. Regarding continuation of opioids, there is no evidence of aberrant behavior or any red flags.  The patient continues with appropriate response to opioid therapy. ADL's remain intact by self.   --- Routine UDS in office today as part of monitoring requirements for controlled substances.  The specimen was viewed and the immunoassay result reviewed and is + BUP (appropriate). This specimen will be sent to [facility] laboratory for confirmation.     --- The patient signed an updated copy of the controlled substance agreement on 12/7/2021.  --- Follow-up 2 months      JUDY REPORT  As part of the patient's treatment plan, I am prescribing controlled substances. The patient has been made aware of appropriate use of such medications, including potential risk of somnolence, limited ability to drive and/or work safely, and the potential for dependence or overdose. It has also bee made clear that these medications are for use by this patient only, without concomitant use of alcohol or other substances unless prescribed.     Patient has completed prescribing agreement detailing terms of continued prescribing of controlled substances, including monitoring JUDY reports, urine drug screening, and pill counts if necessary. The patient is aware that inappropriate use will results in  cessation of prescribing such medications.    As the clinician, I personally reviewed the JUDY from 6/3/22 while the patient was in the office today.    History and physical exam exhibit continued safe and appropriate use of controlled substances.    Dictated utilizing Dragon dictation.     This document is intended for medical expert use only. Reading of this document by patients and/or patient's family without participating medical staff guidance may result in misinterpretation and unintended morbidity.   Any interpretation of such data is the responsibility of the patient and/or family member responsible for the patient in concert with their primary or specialist providers, not to be left for sources of online searches such as [employer], [employer] or similar queries. Relying on these approaches to knowledge may result in misinterpretation, misguided goals of care and even death should patients or family members try recommendations outside of the realm of professional medical care in a supervised way.

## 2022-06-28 NOTE — TELEPHONE ENCOUNTER
Medication Refill Request    Date of phone call: 6/28/2022    Medication being requested: Butrans sig: apply 1 patch q 7 days  Qty: 4    Date of last visit: 6/3/2022    Date of last refill: 6/3/2022    JUDY up to date?: yes    Next Follow up?: 8/3/2022    Any new pertinent information? (i.e, new medication allergies, new use of medications, change in patient's health or condition, non-compliance or inconsistency with prescribing agreement?):

## 2022-06-29 RX ORDER — BUPRENORPHINE 7.5 UG/H
1 PATCH TRANSDERMAL
Qty: 4 PATCH | Refills: 1 | Status: SHIPPED | OUTPATIENT
Start: 2022-06-29 | End: 2022-08-03 | Stop reason: SDUPTHER

## 2022-06-29 NOTE — TELEPHONE ENCOUNTER
Please check with patient to see if she still wishes to change to a different medication due to cost.

## 2022-06-29 NOTE — TELEPHONE ENCOUNTER
She stated that the last time it was $82 so she would like you to send it so she can see the cost this time. If it remains in the $82 range she can afford it.

## 2022-07-05 ENCOUNTER — TELEPHONE (OUTPATIENT)
Dept: PAIN MEDICINE | Facility: CLINIC | Age: 87
End: 2022-07-05

## 2022-07-05 NOTE — TELEPHONE ENCOUNTER
Per [facility] pharmacy they are currently out of Butrans patches and will not have any available until tomorrow. [facility] has called other [facility] and [facility] pharmacies and they do not have any as well .

## 2022-07-12 ENCOUNTER — OFFICE VISIT (OUTPATIENT)
Dept: ORTHOPEDIC SURGERY | Facility: CLINIC | Age: 87
End: 2022-07-12

## 2022-07-12 VITALS
DIASTOLIC BLOOD PRESSURE: 93 MMHG | HEART RATE: 69 BPM | HEIGHT: 63 IN | SYSTOLIC BLOOD PRESSURE: 137 MMHG | BODY MASS INDEX: 23.64 KG/M2 | WEIGHT: 133.4 LBS

## 2022-07-12 DIAGNOSIS — M70.21 OLECRANON BURSITIS, RIGHT ELBOW: Primary | ICD-10-CM

## 2022-07-12 PROCEDURE — 20605 DRAIN/INJ JOINT/BURSA W/O US: CPT | Performed by: NURSE PRACTITIONER

## 2022-07-12 PROCEDURE — 99213 OFFICE O/P EST LOW 20 MIN: CPT | Performed by: NURSE PRACTITIONER

## 2022-07-12 PROCEDURE — 73080 X-RAY EXAM OF ELBOW: CPT | Performed by: NURSE PRACTITIONER

## 2022-07-12 RX ADMIN — LIDOCAINE HYDROCHLORIDE 2 ML: 10 INJECTION, SOLUTION INFILTRATION; PERINEURAL at 14:02

## 2022-07-12 RX ADMIN — TRIAMCINOLONE ACETONIDE 40 MG: 40 INJECTION, SUSPENSION INTRA-ARTICULAR; INTRAMUSCULAR at 14:02

## 2022-07-12 NOTE — PROGRESS NOTES
Subjective:     Patient ID: Ashli León is a 87 y.o. female.    Chief Complaint:  Right elbow swelling, new patient to examiner  History of Present Illness  Ashli León Presents to clinic with son for evaluation of the right elbow.  She fell recently striking the elbow and over the last 2 or so weeks is noted significant swelling over the olecranon.  Presented to primary care was encouraged to follow-up in our office for evaluation.  Rates discomfort a 3 out of a 10 described as throbbing in nature.  She does have range of motion but does experience stiffness and tightness with extension and flexion of the elbow.  Denies any prior similar symptoms in past.  Pain made worse with sitting with resting the upper extremity on any hard surface.  Has tried medications without any significant symptom relief.  Denies any presence of numbness or tingling of the right upper extremity.  She is right-hand dominant.  Denies any other concerns present at this time.    Social History     Occupational History   • Not on file   Tobacco Use   • Smoking status: Never Smoker   • Smokeless tobacco: Never Used   Vaping Use   • Vaping Use: Never used   Substance and Sexual Activity   • Alcohol use: Yes     Alcohol/week: 3.0 standard drinks     Types: 3 Shots of liquor per week     Comment: social use 1 weekly   • Drug use: No     Types: Oxycodone   • Sexual activity: Defer      Past Medical History:   Diagnosis Date   • Acid reflux    • Back pain    • Colitis     history of   • Diarrhea    • Disease of thyroid gland    • Diverticular disease    • Glaucoma    • Gout     - right big toe   • High blood pressure    • High cholesterol    • History of vertebral compression fracture     multiple   • Hyponatremia    • Knee pain    • Low back pain    • Macular degeneration    • Mitral regurgitation 06/2016    Mild to moderate per 2-D echocardiogram   • Peripheral neuropathy    • Primary osteoarthritis of both knees 4/1/2016   • Renal  "lesion     on mri   • Rheumatoid arthritis (HCC)      Past Surgical History:   Procedure Laterality Date   • APPENDECTOMY  1951   • CATARACT EXTRACTION Bilateral    • FEMUR OPEN REDUCTION INTERNAL FIXATION Right 10/11/2020    Procedure: OPEN REDUCTION INTERNAL FIXATION OF RIGHT FEMUR FRACTURE;  Surgeon: Mac Frances Jr., MD;  Location: [facility];  Service: Orthopedics;  Laterality: Right;   • FEMUR SURGERY Right     had fracture and has rajesh    • HEMORRHOIDECTOMY     • HIP HEMIARTHROPLASTY Right 1/3/2017    Procedure: HIP HEMIARTHROPLASTY with IM rajesh and 2 screw hardware removal antibiotic cement;  Surgeon: Mirza Whittington MD;  Location:  [facility] OR;  Service:    • HIP SURGERY Right 1999    hx fracture   • LEG SURGERY Right 12/1/2017    Procedure: GLUTEAL TENDON REPAIR-minimus and medius;  Surgeon: Mirza Whittington MD;  Location: [facility] OR;  Service:    • TOTAL KNEE ARTHROPLASTY Right 6/22/2016    Procedure: RT TOTAL KNEE ARTHROPLASTY WITH FINN NAVIGATION;  Surgeon: Adrian Swenson MD;  Location: [facility] OR;  Service:        Family History   Problem Relation Age of Onset   • Heart attack Father 73   • Diabetes Son                Objective:  Physical Exam    Vital signs reviewed.   General: No acute distress.  Eyes: conjunctiva clear; pupils equally round and reactive  ENT: external ears and nose atraumatic; oropharynx clear  CV: no peripheral edema  Resp: normal respiratory effort  Skin: no rashes or wounds; normal turgor  Psych: mood and affect appropriate; recent and remote memory intact    Vitals:    07/12/22 1341   BP: 137/93   Pulse: 69   Weight: 60.5 kg (133 lb 6.4 oz)   Height: 160 cm (63\")         07/12/22  1341   Weight: 60.5 kg (133 lb 6.4 oz)     Body mass index is 23.63 kg/m².      Ortho Exam     Right elbow examined:  Moderate to severe swelling olecranon bursa  Positive sensation light touch all distributions of the right upper extremity  Flex/extend all digits right hand  Negative Tinel's at " cubital tunnel  Extension of the elbow 0 degrees, flexion 120 degrees with tightness and stiffness with motion  Supination and pronation intact with 4 out of 5 strength against resistance  2+ distal radius pulse      Imaging:  Right Elbow X-Ray  Indication: Pain  Views: AP and Lateral views    Findings:  No fracture  No bony lesion  Normal soft tissues  Normal joint spaces  Olecranon bursitis with effusion arthritis of the right elbow no evidence of acute fracture dislocation or other acute osseous abnormality identified on imaging  No prior studies were available for comparison.    Assessment:        1. Olecranon bursitis, right elbow           Plan:  Medium Joint Arthrocentesis: R olecranon bursa  Date/Time: 7/12/2022 2:02 PM  Consent given by: patient  Site marked: site marked  Timeout: Immediately prior to procedure a time out was called to verify the correct patient, procedure, equipment, support staff and site/side marked as required   Supporting Documentation  Indications: pain   Procedure Details  Location: elbow - R olecranon bursa  Preparation: Patient was prepped and draped in the usual sterile fashion  Needle size: 22 G  Approach: posterior  Medications administered: 2 mL lidocaine 1 %; 40 mg triamcinolone acetonide 40 MG/ML  Aspirate amount: 15 mL  Aspirate: blood-tinged and serous  Patient tolerance: patient tolerated the procedure well with no immediate complications          1.  Discussed plan of care with patient and son.  Discussed treatment options including aspiration of the bursa and corticosteroid injection which she does wish to proceed with.  Discussed application of ice at injection site this evening provided with Ace bandage for the next 3 days I do recommend she keep on except to shower.  We will plan to see her back in clinic in 2 to 3 weeks to reevaluate.  Encouraged to call with any questions concerns she has between now and follow-up.  All questions answered.  Orders:  Orders Placed  This Encounter   Procedures   • Medium Joint Arthrocentesis: R olecranon bursa   • XR Elbow 3+ View Right     No orders of the defined types were placed in this encounter.          I ordered and reviewed the JUDY today.     Dragon Dictation utilized.

## 2022-07-12 NOTE — PROGRESS NOTES
Presents to clinic with son for evaluation of the right elbow. She fell recently striking the elbow and over the last 2 or so weeks is noted significant swelling over the olecranon. Presented to primary care was encouraged to follow-up in our office for evaluation. Rates discomfort a 3 out of a 10 described as throbbing in nature. She does have range of motion but does experience stiffness and tightness with extension and flexion of the elbow. Denies any prior similar symptoms in past. Pain made worse with sitting with resting the upper extremity on any hard surface. Has tried medications without any significant symptom relief. Denies any presence of numbness or tingling of the right upper extremity. She is right-hand dominant. Denies any other concerns present at this time.    Right elbow examined:  Moderate to severe swelling olecranon bursa  Positive sensation light touch all distributions of the right upper extremity  Flex/extend all digits right hand  Negative Tinel's at cubital tunnel  Extension of the elbow 0 degrees, flexion 120 degrees with tightness and stiffness with motion  Supination and pronation intact with 4 out of 5 strength against resistance  2+ distal radius pulse    Plan:  1. Discussed plan of care with patient and son. Discussed treatment options including aspiration of the bursa and corticosteroid injection which she does wish to proceed with. Discussed application of ice at injection site this evening provided with Ace bandage for the next 3 days I do recommend she keep on except to shower. We will plan to see her back in clinic in 2 to 3 weeks to reevaluate. Encouraged to call with any questions concerns she has between now and follow-up. All questions answered.

## 2022-07-14 PROBLEM — M70.21 OLECRANON BURSITIS, RIGHT ELBOW: Status: ACTIVE | Noted: 2022-07-14

## 2022-07-14 RX ORDER — TRIAMCINOLONE ACETONIDE 40 MG/ML
40 INJECTION, SUSPENSION INTRA-ARTICULAR; INTRAMUSCULAR
Status: COMPLETED | OUTPATIENT
Start: 2022-07-12 | End: 2022-07-12

## 2022-07-14 RX ORDER — LIDOCAINE HYDROCHLORIDE 10 MG/ML
2 INJECTION, SOLUTION INFILTRATION; PERINEURAL
Status: COMPLETED | OUTPATIENT
Start: 2022-07-12 | End: 2022-07-12

## 2022-07-26 ENCOUNTER — OFFICE VISIT (OUTPATIENT)
Dept: ORTHOPEDIC SURGERY | Facility: CLINIC | Age: 87
End: 2022-07-26

## 2022-07-26 VITALS — BODY MASS INDEX: 23.57 KG/M2 | HEIGHT: 63 IN | WEIGHT: 133 LBS

## 2022-07-26 DIAGNOSIS — M70.21 OLECRANON BURSITIS, RIGHT ELBOW: Primary | ICD-10-CM

## 2022-07-26 PROCEDURE — 99214 OFFICE O/P EST MOD 30 MIN: CPT | Performed by: NURSE PRACTITIONER

## 2022-07-26 RX ORDER — METHYLPREDNISOLONE 4 MG/1
TABLET ORAL
Qty: 21 TABLET | Refills: 0 | Status: SHIPPED | OUTPATIENT
Start: 2022-07-26 | End: 2022-08-05

## 2022-07-26 NOTE — PROGRESS NOTES
Returns to clinic with son for follow-up of her right upper extremity. Has noted reduction of swelling however still notes slight swelling at the olecranon. She has avoided putting her arm down on the hard surfaces. Denies any issues with extension or flexion and does note overall improvement. At last visit did undergo aspiration of the bursa sac with corticosteroid injection. She did continue with the Ace bandage as instructed. Denies other concerns present time.    Plan:  1. Discussed plan of care with patient. Discussed treatment options including oral Medrol dose pack which she has been able to tolerate in the past versus again repeating aspiration. We will hold off on repeating aspiration start Medrol Dosepak. Do recommend she switch to heat application to help the body reabsorb the bloody fluid at the bursa sac. Plan to see her back in clinic if she is not improving. All questions answered.

## 2022-07-26 NOTE — PROGRESS NOTES
Subjective:     Patient ID: Ashli León is a 87 y.o. female.    Chief Complaint:  Follow-up olecranon bursitis, right  Corticosteroid injection after aspiration 7/12/2022 right elbow  History of Present Illness  Ashli León Returns to clinic with son for follow-up of her right upper extremity.  Has noted reduction of swelling however still notes slight swelling at the olecranon.  She has avoided putting her arm down on the hard surfaces.  Denies any issues with extension or flexion and does note overall improvement.  At last visit did undergo aspiration of the bursa sac with corticosteroid injection.  She did continue with the Ace bandage as instructed.  Denies other concerns present time.    Social History     Occupational History   • Not on file   Tobacco Use   • Smoking status: Never Smoker   • Smokeless tobacco: Never Used   Vaping Use   • Vaping Use: Never used   Substance and Sexual Activity   • Alcohol use: Yes     Alcohol/week: 3.0 standard drinks     Types: 3 Shots of liquor per week     Comment: social use 1 weekly   • Drug use: No     Types: Oxycodone   • Sexual activity: Defer      Past Medical History:   Diagnosis Date   • Acid reflux    • Back pain    • Colitis     history of   • Diarrhea    • Disease of thyroid gland    • Diverticular disease    • Glaucoma    • Gout     - right big toe   • High blood pressure    • High cholesterol    • History of vertebral compression fracture     multiple   • Hyponatremia    • Knee pain    • Low back pain    • Macular degeneration    • Mitral regurgitation 06/2016    Mild to moderate per 2-D echocardiogram   • Peripheral neuropathy    • Primary osteoarthritis of both knees 4/1/2016   • Renal lesion     on mri   • Rheumatoid arthritis (HCC)      Past Surgical History:   Procedure Laterality Date   • APPENDECTOMY  1951   • CATARACT EXTRACTION Bilateral    • FEMUR OPEN REDUCTION INTERNAL FIXATION Right 10/11/2020    Procedure: OPEN REDUCTION INTERNAL FIXATION  "OF RIGHT FEMUR FRACTURE;  Surgeon: Mac Frances Jr., MD;  Location: [facility] OR;  Service: Orthopedics;  Laterality: Right;   • FEMUR SURGERY Right     had fracture and has rajesh    • HEMORRHOIDECTOMY     • HIP HEMIARTHROPLASTY Right 1/3/2017    Procedure: HIP HEMIARTHROPLASTY with IM rajesh and 2 screw hardware removal antibiotic cement;  Surgeon: Mirza Whittington MD;  Location:  [facility] OR;  Service:    • HIP SURGERY Right 1999    hx fracture   • LEG SURGERY Right 12/1/2017    Procedure: GLUTEAL TENDON REPAIR-minimus and medius;  Surgeon: Mirza Whittington MD;  Location:  [facility] OR;  Service:    • TOTAL KNEE ARTHROPLASTY Right 6/22/2016    Procedure: RT TOTAL KNEE ARTHROPLASTY WITH FINN NAVIGATION;  Surgeon: Adrian Swenson MD;  Location: [facility] OR;  Service:        Family History   Problem Relation Age of Onset   • Heart attack Father 73   • Diabetes Son                Objective:  Physical Exam    General: No acute distress.  Eyes: conjunctiva clear; pupils equally round and reactive  ENT: external ears and nose atraumatic; oropharynx clear  CV: no peripheral edema  Resp: normal respiratory effort  Skin: no rashes or wounds; normal turgor  Psych: mood and affect appropriate; recent and remote memory intact    Vitals:    07/26/22 1454   Weight: 60.3 kg (133 lb)   Height: 160 cm (63\")         07/26/22  1454   Weight: 60.3 kg (133 lb)     Body mass index is 23.56 kg/m².      Right Elbow Exam     Tenderness   The patient is experiencing tenderness in the olecranon fossa.     Range of Motion   Extension: 0   Flexion: 120   Pronation: 0   Supination: 120     Muscle Strength   Pronation:  4/5   Supination:  4/5     Tests   Varus: negative  Valgus: negative  Tinel's sign (cubital tunnel): negative    Other   Erythema: absent  Sensation: normal  Pulse: present           Assessment:        1. Olecranon bursitis, right elbow           Plan:  1.  Discussed plan of care with patient.  Discussed treatment options including oral " Medrol dose pack which she has been able to tolerate in the past versus again repeating aspiration.  We will hold off on repeating aspiration start Medrol Dosepak.  Do recommend she switch to heat application to help the body reabsorb the bloody fluid at the bursa sac.  Plan to see her back in clinic if she is not improving.  All questions answered.  Orders:  No orders of the defined types were placed in this encounter.    New Medications Ordered This Visit   Medications   • methylPREDNISolone (MEDROL) 4 MG dose pack     Sig: Use as directed by package instructions     Dispense:  21 tablet     Refill:  0         Dragon dictation utilized.

## 2022-07-30 DIAGNOSIS — G47.09 OTHER INSOMNIA: ICD-10-CM

## 2022-07-31 RX ORDER — ZOLPIDEM TARTRATE 10 MG/1
TABLET ORAL
Qty: 90 TABLET | OUTPATIENT
Start: 2022-07-31

## 2022-08-03 ENCOUNTER — OFFICE VISIT (OUTPATIENT)
Dept: PAIN MEDICINE | Facility: CLINIC | Age: 87
End: 2022-08-03

## 2022-08-03 VITALS
OXYGEN SATURATION: 97 % | DIASTOLIC BLOOD PRESSURE: 73 MMHG | WEIGHT: 131.8 LBS | HEIGHT: 63 IN | HEART RATE: 58 BPM | RESPIRATION RATE: 12 BRPM | TEMPERATURE: 97.5 F | SYSTOLIC BLOOD PRESSURE: 150 MMHG | BODY MASS INDEX: 23.35 KG/M2

## 2022-08-03 DIAGNOSIS — M17.12 OSTEOARTHRITIS OF LEFT KNEE, UNSPECIFIED OSTEOARTHRITIS TYPE: ICD-10-CM

## 2022-08-03 DIAGNOSIS — G89.29 CHRONIC RIGHT HIP PAIN: ICD-10-CM

## 2022-08-03 DIAGNOSIS — Z79.899 ENCOUNTER FOR LONG-TERM CURRENT USE OF HIGH RISK MEDICATION: ICD-10-CM

## 2022-08-03 DIAGNOSIS — M25.551 CHRONIC RIGHT HIP PAIN: ICD-10-CM

## 2022-08-03 DIAGNOSIS — M54.42 CHRONIC BILATERAL LOW BACK PAIN WITH BILATERAL SCIATICA: ICD-10-CM

## 2022-08-03 DIAGNOSIS — M54.41 CHRONIC BILATERAL LOW BACK PAIN WITH BILATERAL SCIATICA: ICD-10-CM

## 2022-08-03 DIAGNOSIS — G89.29 CHRONIC BILATERAL LOW BACK PAIN WITH BILATERAL SCIATICA: ICD-10-CM

## 2022-08-03 DIAGNOSIS — G89.29 OTHER CHRONIC PAIN: Primary | ICD-10-CM

## 2022-08-03 PROCEDURE — 99214 OFFICE O/P EST MOD 30 MIN: CPT | Performed by: NURSE PRACTITIONER

## 2022-08-03 RX ORDER — BUPRENORPHINE 7.5 UG/H
1 PATCH TRANSDERMAL
Qty: 4 PATCH | Refills: 1 | Status: SHIPPED | OUTPATIENT
Start: 2022-08-03 | End: 2022-10-04 | Stop reason: SDUPTHER

## 2022-08-03 NOTE — PROGRESS NOTES
CHIEF COMPLAINT  Back Pain    Subjective   Ashli León is a 87 y.o. female  who presents for follow-up.  She has a history of back pain.    Today her pain is 4/10VAS. Pain is located right knee, lower back, and right hip. Currently on Butrans 7.5 mcg. Reports that this provides 70% pain relief but that she is unable to afford the copay.     Butrans was decreased from 10 mcg a few months ago.  This was due to concern for falls related possibly to polypharmacy. She denies any recent issues with falls/balance with the 7.5 mcg dose      Procedures:  Left knee gel injection 4/8/2022 -- relief x 1 month   Left knee steroid injection 2/7/2021 - no benefit.  9/15/2020 - L3-L4 LESI and left knee injection with fluoroscopic guidance - reported moderate relief x 1 to 2 weeks       Patient remained masked during entire encounter. No cough present. I donned a mask and eye protection throughout entire visit. Prior to donning mask and eye protection, hand hygiene was performed, as well as when it was doffed.  I was closer than 6 feet, but not for an extended period of time. No obvious exposure to any bodily fluids.    Back Pain  This is a chronic problem. The current episode started more than 1 year ago. The problem occurs daily. The problem has been waxing and waning since onset. The pain is present in the lumbar spine. The quality of the pain is described as aching (occasional sharp/shooting pain). The pain radiates to the left thigh and right thigh. The pain is at a severity of 4/10. The pain is moderate. The symptoms are aggravated by position, standing, sitting and bending. Pertinent negatives include no abdominal pain, chest pain, dysuria, fever, headaches, numbness or weakness. Risk factors include sedentary lifestyle. She has tried analgesics, NSAIDs, ice and heat (injections) for the symptoms. The treatment provided moderate relief.   Knee Pain   The pain is present in the left knee and right knee. The pain is at a  "severity of 4/10. The pain is moderate. The pain has been worsening since onset. Pertinent negatives include no numbness. The symptoms are aggravated by weight bearing and movement. She has tried NSAIDs and acetaminophen (injections) for the symptoms. The treatment provided moderate relief.     PEG Assessment   What number best describes your pain on average in the past week?10  What number best describes how, during the past week, pain has interfered with your enjoyment of life?10  What number best describes how, during the past week, pain has interfered with your general activity?  10    The following portions of the patient's history were reviewed and updated as appropriate: allergies, current medications, past family history, past medical history, past social history, past surgical history and problem list.    Review of Systems   Constitutional: Negative for activity change (less), fatigue and fever.   Eyes: Negative for visual disturbance.   Respiratory: Negative for cough and chest tightness.    Cardiovascular: Negative for chest pain.   Gastrointestinal: Negative for abdominal pain, constipation and diarrhea.   Genitourinary: Negative for difficulty urinating and dysuria.   Musculoskeletal: Positive for back pain.   Neurological: Negative for dizziness, weakness, light-headedness, numbness and headaches.   Psychiatric/Behavioral: Positive for agitation and sleep disturbance. Negative for suicidal ideas. The patient is nervous/anxious.      I have reviewed and confirmed the accuracy of the ROS as documented by the MA/WON/RN TANIA Tracy    Vitals:    08/03/22 1357   BP: 150/73   BP Location: Right arm   Patient Position: Sitting   Pulse: 58   Resp: 12   Temp: 97.5 °F (36.4 °C)   SpO2: 97%   Weight: 59.8 kg (131 lb 12.8 oz)   Height: 160 cm (63\")   PainSc:   4   PainLoc: Back     Objective   Physical Exam  Vitals and nursing note reviewed.   Constitutional:       General: She is not in acute " distress.     Appearance: Normal appearance. She is not ill-appearing.   Pulmonary:      Effort: Pulmonary effort is normal. No respiratory distress.   Musculoskeletal:      Lumbar back: Tenderness present.      Right hip: Tenderness present.      Right knee: Crepitus present. Tenderness present.      Left knee: Crepitus present. Tenderness present.   Skin:     General: Skin is warm and dry.   Neurological:      Mental Status: She is alert and oriented to person, place, and time.      Gait: Gait abnormal (slowed).   Psychiatric:         Mood and Affect: Mood normal.         Behavior: Behavior normal.       Assessment & Plan   Diagnoses and all orders for this visit:    1. Other chronic pain (Primary)    2. Chronic bilateral low back pain with bilateral sciatica    3. Chronic right hip pain    4. Osteoarthritis of left knee, unspecified osteoarthritis type    5. Encounter for long-term current use of high risk medication    Other orders  -     Buprenorphine (BUTRANS) patch weekly transdermal patch; Place 1 patch on the skin as directed by provider Every 7 (Seven) Days. dnf 8/27/2022  Dispense: 4 patch; Refill: 1      --- Continue Butrans. Patient appears stable with current regimen. No adverse effects. Regarding continuation of opioids, there is no evidence of aberrant behavior or any red flags.  The patient continues with appropriate response to opioid therapy. ADL's remain intact by self.   --- The urine drug screen confirmation from 6/3/2022 has been reviewed and the result is appropriate based on patient history and JUDY report  --- The patient signed an updated copy of the controlled substance agreement on 12/7/2021  --- Follow-up 2 months        JUDY REPORT  As part of the patient's treatment plan, I am prescribing controlled substances. The patient has been made aware of appropriate use of such medications, including potential risk of somnolence, limited ability to drive and/or work safely, and the  potential for dependence or overdose. It has also been made clear that these medications are for use by this patient only, without concomitant use of alcohol or other substances unless prescribed.     Patient has completed prescribing agreement detailing terms of continued prescribing of controlled substances, including monitoring JUDY reports, urine drug screening, and pill counts if necessary. The patient is aware that inappropriate use will results in cessation of prescribing such medications.    As the clinician, I personally reviewed the JUDY from 8/3/2022 while the patient was in the office today.    History and physical exam exhibit continued safe and appropriate use of controlled substances.     Dictated utilizing Dragon dictation.     This document is intended for medical expert use only. Reading of this document by patients and/or patient's family without participating medical staff guidance may result in misinterpretation and unintended morbidity.   Any interpretation of such data is the responsibility of the patient and/or family member responsible for the patient in concert with their primary or specialist providers, not to be left for sources of online searches such as [employer], [employer] or similar queries. Relying on these approaches to knowledge may result in misinterpretation, misguided goals of care and even death should patients or family members try recommendations outside of the realm of professional medical care in a supervised way.

## 2022-08-05 ENCOUNTER — OFFICE VISIT (OUTPATIENT)
Dept: FAMILY MEDICINE CLINIC | Facility: CLINIC | Age: 87
End: 2022-08-05

## 2022-08-05 VITALS
SYSTOLIC BLOOD PRESSURE: 140 MMHG | OXYGEN SATURATION: 100 % | RESPIRATION RATE: 16 BRPM | WEIGHT: 131 LBS | HEIGHT: 63 IN | BODY MASS INDEX: 23.21 KG/M2 | HEART RATE: 61 BPM | DIASTOLIC BLOOD PRESSURE: 70 MMHG | TEMPERATURE: 97.3 F

## 2022-08-05 DIAGNOSIS — G47.09 OTHER INSOMNIA: ICD-10-CM

## 2022-08-05 DIAGNOSIS — Z76.89 ESTABLISHING CARE WITH NEW DOCTOR, ENCOUNTER FOR: Primary | ICD-10-CM

## 2022-08-05 PROCEDURE — 99214 OFFICE O/P EST MOD 30 MIN: CPT | Performed by: PHYSICIAN ASSISTANT

## 2022-08-05 RX ORDER — ZOLPIDEM TARTRATE 10 MG/1
10 TABLET ORAL NIGHTLY PRN
Qty: 30 TABLET | Refills: 2 | Status: SHIPPED | OUTPATIENT
Start: 2022-08-05 | End: 2022-08-26

## 2022-08-05 NOTE — PROGRESS NOTES
"Chief Complaint  Insomnia (Med refill)    Subjective          Ashli León presents to [facility]  History of Present Illness  Ashli,\"Duyen\", is an 87 year old female who presents to establish as new patient with son, \"Janes\" Adrian\", with insomnia management at office visit today.  I have reviewed her health history information with them at office visit today.  States she has been taking her Ambien medication until recently.  States she has been out of the medication for the past 3 nights.  Her appetite has not been as good.  States she lives alone and cooks for herself.   is hard to cook for herself.  She tries to eat something for breakfast and then will snack throughout the day.  Trying to drink more water.  Bowel movements have been daily without dark black tarry stools.  Duyen states she had COVID in 2020.  She is non fasting today.  States overall she is doing well.  Has seen Dr. Montana in [place] for her eye exams.  This has been recent.  Overall she is feeling well at office visit today.  Duyen denied any fevers, chills, upper respiratory symptoms, chest pain, shortness of air, cough, wheezing, dizziness, abdominal pain, GI upset or swelling of ankles.  Currently seeing pain management for her chronic back pain and hip pain.    Review of Systems   Respiratory: Negative for cough, shortness of breath and wheezing.    Cardiovascular: Negative for chest pain, palpitations and leg swelling.   Neurological: Negative for dizziness and light-headedness.   Psychiatric/Behavioral: Positive for sleep disturbance. Negative for suicidal ideas.   All other systems reviewed and are negative.    Objective   Vital Signs:   /70   Pulse 61   Temp 97.3 °F (36.3 °C)   Resp 16   Ht 160 cm (63\")   Wt 59.4 kg (131 lb)   SpO2 100%   BMI 23.21 kg/m²     Physical Exam  Vitals and nursing note reviewed.   Constitutional:       Appearance: Normal appearance. She is " well-developed, well-groomed and normal weight.      Interventions: Face mask in place.      Comments: She is ambulating with walker.  Son is in exam room wearing facial mask.   HENT:      Head: Normocephalic and atraumatic.   Neck:      Thyroid: No thyroid mass, thyromegaly or thyroid tenderness.      Vascular: No carotid bruit.      Trachea: Trachea and phonation normal. No tracheal tenderness.   Cardiovascular:      Rate and Rhythm: Normal rate and regular rhythm.      Pulses: Normal pulses.      Heart sounds: Normal heart sounds, S1 normal and S2 normal. No murmur heard.  Pulmonary:      Effort: Pulmonary effort is normal.      Breath sounds: Normal breath sounds and air entry.   Abdominal:      General: Bowel sounds are normal.      Palpations: Abdomen is soft. There is no hepatomegaly.      Tenderness: There is no abdominal tenderness. There is no right CVA tenderness, left CVA tenderness, guarding or rebound. Negative signs include Gallo's sign, Rovsing's sign, McBurney's sign, psoas sign and obturator sign.   Musculoskeletal:      Cervical back: Neck supple.      Right lower leg: No edema.      Left lower leg: No edema.   Skin:     General: Skin is warm and dry.      Capillary Refill: Capillary refill takes less than 2 seconds.   Neurological:      Mental Status: She is alert and oriented to person, place, and time.   Psychiatric:         Attention and Perception: Attention and perception normal.         Mood and Affect: Mood and affect normal.         Speech: Speech normal.         Behavior: Behavior normal. Behavior is cooperative.         Thought Content: Thought content normal.         Cognition and Memory: Cognition and memory normal.         Judgment: Judgment normal.     I wore a facial mask during this patient encounter.  Patient also wearing a surgical mask.  Hand hygiene performed before and after seeing the patient.     Result Review :                 Assessment and Plan    Diagnoses and all  "orders for this visit:    1. Establishing care with new doctor, encounter for (Primary)    2. Other insomnia  -     zolpidem (AMBIEN) 10 MG tablet; Take 1 tablet by mouth At Night As Needed for Sleep. for sleep  Dispense: 30 tablet; Refill: 2    Ashli,\"Duyen\", was seen in office today with son for establishing care with new provider with insomnia management and establishing care with new provider.  I have discussed with Duyen is weaning off of her doxepin medication.  I am concerned of her taking the 2 sleep aids together may cause increased sedation and dizziness.  She states she does not takes the doxepin medication very often.  States she only takes the Ambien at night currently.  She has signed the appropriate agreement and consent forms for the medication.  We will collect urine drug screen at next office visit.  See below for Judy information.  Have sent Ambien refill of her medication to pharmacy.  Duyen will schedule an office reevaluation with annual Medicare wellness exam with fasting labs within the month.      Follow Up   Return same day labs, for Medicare Wellness.  Patient was given instructions and counseling regarding her condition or for health maintenance advice. Please see specific information pulled into the AVS if appropriate.       As part of this patient's treatment plan I am prescribing controlled substances.  The patient has been made aware of appropriate use of such medications, including potential risk of somnolence. Limited ability to drive and/or work safely, and potential for dependence or overdose.  It has also been made clear that these medications are for use by this patient only, without concomitant use of alcohol or other substances unless prescribed.  JUDY report has been reviewed and scanned into the patient's chart.  Judy number is [id number] dated August 3, 2022.    [facility]  [address] [place] [address] " [facility] [id number]  Dept: [phone]  Dept Fax: [phone]  Loc: [phone]  Loc Fax: [phone]

## 2022-08-07 NOTE — PROGRESS NOTES
I, Dr. Silvestre Lott, have reviewed the notes, assessments, and/or procedures performed by Nicolette WEN, that occurred within our practice at [facility] location, I concur with her documentation of Ashli León. I have a current collaborative medical agreement with Nicolette WEN.

## 2022-08-26 ENCOUNTER — OFFICE VISIT (OUTPATIENT)
Dept: FAMILY MEDICINE CLINIC | Facility: CLINIC | Age: 87
End: 2022-08-26

## 2022-08-26 VITALS
TEMPERATURE: 97.7 F | HEIGHT: 63 IN | DIASTOLIC BLOOD PRESSURE: 90 MMHG | OXYGEN SATURATION: 97 % | BODY MASS INDEX: 23.21 KG/M2 | SYSTOLIC BLOOD PRESSURE: 150 MMHG | WEIGHT: 131 LBS | RESPIRATION RATE: 16 BRPM | HEART RATE: 120 BPM

## 2022-08-26 DIAGNOSIS — M79.642 LEFT HAND PAIN: ICD-10-CM

## 2022-08-26 DIAGNOSIS — G47.09 OTHER INSOMNIA: ICD-10-CM

## 2022-08-26 DIAGNOSIS — E78.2 MIXED HYPERLIPIDEMIA: ICD-10-CM

## 2022-08-26 DIAGNOSIS — E03.9 HYPOTHYROIDISM (ACQUIRED): ICD-10-CM
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
## 2022-08-26 DIAGNOSIS — Z00.00 MEDICARE ANNUAL WELLNESS VISIT, SUBSEQUENT: Primary | ICD-10-CM

## 2022-08-26 PROCEDURE — G0439 PPPS, SUBSEQ VISIT: HCPCS | Performed by: PHYSICIAN ASSISTANT

## 2022-08-26 PROCEDURE — 1170F FXNL STATUS ASSESSED: CPT | Performed by: PHYSICIAN ASSISTANT

## 2022-08-26 PROCEDURE — 99213 OFFICE O/P EST LOW 20 MIN: CPT | Performed by: PHYSICIAN ASSISTANT

## 2022-08-26 PROCEDURE — 1125F AMNT PAIN NOTED PAIN PRSNT: CPT | Performed by: PHYSICIAN ASSISTANT

## 2022-08-26 PROCEDURE — 1160F RVW MEDS BY RX/DR IN RCRD: CPT | Performed by: PHYSICIAN ASSISTANT

## 2022-08-26 NOTE — PROGRESS NOTES
The ABCs of the Annual Wellness Visit  Subsequent Medicare Wellness Visit    Chief Complaint   Patient presents with   • Medicare Wellness-subsequent      Subjective    History of Present Illness:  Ashli León is a 87 y.o. female who presents for a Subsequent Medicare Wellness Visit, hyperlipidemia, hypothyroidism and insomnia management.  She is at office visit today with her son, Sophia Geller states she thinks she might have hurt her hand or possibly fall last week.  States she has been taking her Ambien for sleep.  She thinks she might have gotten during the night and injured herself.  She does not remember.  She woke up 1 morning with her left hand swollen and painful.  States her hand is still swollen today.  Had some bruising but that has resolved.  Does not want to take the Ambien anymore.  Has been taking her doxepin for sleep.  States she is able to move her hand without difficulty.  States it is slightly painful.  Did not have her blood pressure medications this morning.  She is fasting.  Diet has been normal.  She denied any fevers, chills, upper respiratory symptoms, chest pain, shortness of air, wheezing, cough, abdominal pain, urinary symptoms, GI symptoms or swelling of ankles.  Bowel movements have been normal without dark black tarry stools.  She has some leg cramps off and on.  Unsure of the amount of water she drinks.    The following portions of the patient's history were reviewed and   updated as appropriate:   She  has a past medical history of Acid reflux, Back pain, Colitis, Diarrhea, Disease of thyroid gland, Diverticular disease, Glaucoma, Gout, High blood pressure, High cholesterol, History of vertebral compression fracture, Hyponatremia, Knee pain, Low back pain, Macular degeneration, Mitral regurgitation (06/2016), Peripheral neuropathy, Primary osteoarthritis of both knees (4/1/2016), Renal lesion, and Rheumatoid arthritis (HCC).  She does not have any pertinent problems on  file.  She  has a past surgical history that includes Appendectomy (1951); Femur Surgery (Right); Hip surgery (Right, 1999); Cataract extraction (Bilateral); Hemorrhoid surgery; Total knee arthroplasty (Right, 6/22/2016); Hip hemiArthroplasty (Right, 1/3/2017); Leg Surgery (Right, 12/1/2017); and Femur Open Reduction Internal Fixation (Right, 10/11/2020).  Her family history includes Alzheimer's disease in her mother and sister; Diabetes in her son; Heart attack (age of onset: 73) in her father.  She  reports that she has never smoked. She has never used smokeless tobacco. She reports current alcohol use of about 3.0 standard drinks of alcohol per week. She reports that she does not use drugs.  Current Outpatient Medications   Medication Sig Dispense Refill   • alendronate (FOSAMAX) 35 MG tablet Take 35 mg by mouth Every 7 (Seven) Days.     • Buprenorphine (BUTRANS) patch weekly transdermal patch Place 1 patch on the skin as directed by provider Every 7 (Seven) Days. dnf 8/27/2022 4 patch 1   • clotrimazole-betamethasone (Lotrisone) 1-0.05 % cream Apply 1 application topically to the appropriate area as directed 2 (Two) Times a Day. 60 g 2   • docusate sodium 100 MG capsule Take 100 mg by mouth 2 (Two) Times a Day. (Patient taking differently: Take 100 mg by mouth As Needed.)     • doxepin (SINEquan) 25 MG capsule TAKE 1 CAPSULE BY MOUTH ONCE DAILY AT NIGHT 90 capsule 4   • latanoprost (XALATAN) 0.005 % ophthalmic solution INSTILL 1 DROP INTO AFFECTED EYE IN THE EVENING     • levothyroxine (Euthyrox) 50 MCG tablet Take 1 tablet by mouth Daily. 90 tablet 4   • metoprolol tartrate (LOPRESSOR) 25 MG tablet Take 1 tablet by mouth 2 (Two) Times a Day. (Patient taking differently: Take 25 mg by mouth Daily.) 180 tablet 2   • Multiple Vitamins-Minerals (ICAPS AREDS 2) capsule Take 2 capsules by mouth Daily.     • mupirocin (BACTROBAN) 2 % ointment Apply 1 application topically to the appropriate area as directed 3 (Three)  Times a Day As Needed.     • omeprazole (priLOSEC) 40 MG capsule Take 1 capsule by mouth Daily. 90 capsule 4   • pravastatin (PRAVACHOL) 40 MG tablet TAKE ONE TABLET BY MOUTH DAILY 90 tablet 4   • vitamin B-12 (VITAMIN B-12) 1000 MCG tablet Take 1 tablet by mouth Daily.     • vitamin E 400 UNIT capsule Take 400 Units by mouth Daily.     • ferrous sulfate 325 (65 FE) MG EC tablet Take 325 mg by mouth Daily With Breakfast.  0     No current facility-administered medications for this visit.     Current Outpatient Medications on File Prior to Visit   Medication Sig   • alendronate (FOSAMAX) 35 MG tablet Take 35 mg by mouth Every 7 (Seven) Days.   • Buprenorphine (BUTRANS) patch weekly transdermal patch Place 1 patch on the skin as directed by provider Every 7 (Seven) Days. dnf 8/27/2022   • clotrimazole-betamethasone (Lotrisone) 1-0.05 % cream Apply 1 application topically to the appropriate area as directed 2 (Two) Times a Day.   • docusate sodium 100 MG capsule Take 100 mg by mouth 2 (Two) Times a Day. (Patient taking differently: Take 100 mg by mouth As Needed.)   • doxepin (SINEquan) 25 MG capsule TAKE 1 CAPSULE BY MOUTH ONCE DAILY AT NIGHT   • latanoprost (XALATAN) 0.005 % ophthalmic solution INSTILL 1 DROP INTO AFFECTED EYE IN THE EVENING   • levothyroxine (Euthyrox) 50 MCG tablet Take 1 tablet by mouth Daily.   • metoprolol tartrate (LOPRESSOR) 25 MG tablet Take 1 tablet by mouth 2 (Two) Times a Day. (Patient taking differently: Take 25 mg by mouth Daily.)   • Multiple Vitamins-Minerals (ICAPS AREDS 2) capsule Take 2 capsules by mouth Daily.   • mupirocin (BACTROBAN) 2 % ointment Apply 1 application topically to the appropriate area as directed 3 (Three) Times a Day As Needed.   • omeprazole (priLOSEC) 40 MG capsule Take 1 capsule by mouth Daily.   • pravastatin (PRAVACHOL) 40 MG tablet TAKE ONE TABLET BY MOUTH DAILY   • vitamin B-12 (VITAMIN B-12) 1000 MCG tablet Take 1 tablet by mouth Daily.   • vitamin E 400  UNIT capsule Take 400 Units by mouth Daily.   • [DISCONTINUED] zolpidem (AMBIEN) 10 MG tablet Take 1 tablet by mouth At Night As Needed for Sleep. for sleep   • ferrous sulfate 325 (65 FE) MG EC tablet Take 325 mg by mouth Daily With Breakfast.     No current facility-administered medications on file prior to visit.     She is allergic to neomycin and statins..    Compared to one year ago, the patient feels her physical   health is worse.    Compared to one year ago, the patient feels her mental   health is the same.    Recent Hospitalizations:  She was not admitted to the hospital during the last year.       Current Medical Providers:  Patient Care Team:  Nicolette Rubio PA-C as PCP - General (Family Medicine)    Outpatient Medications Prior to Visit   Medication Sig Dispense Refill   • alendronate (FOSAMAX) 35 MG tablet Take 35 mg by mouth Every 7 (Seven) Days.     • Buprenorphine (BUTRANS) patch weekly transdermal patch Place 1 patch on the skin as directed by provider Every 7 (Seven) Days. dnf 8/27/2022 4 patch 1   • clotrimazole-betamethasone (Lotrisone) 1-0.05 % cream Apply 1 application topically to the appropriate area as directed 2 (Two) Times a Day. 60 g 2   • docusate sodium 100 MG capsule Take 100 mg by mouth 2 (Two) Times a Day. (Patient taking differently: Take 100 mg by mouth As Needed.)     • doxepin (SINEquan) 25 MG capsule TAKE 1 CAPSULE BY MOUTH ONCE DAILY AT NIGHT 90 capsule 4   • latanoprost (XALATAN) 0.005 % ophthalmic solution INSTILL 1 DROP INTO AFFECTED EYE IN THE EVENING     • levothyroxine (Euthyrox) 50 MCG tablet Take 1 tablet by mouth Daily. 90 tablet 4   • metoprolol tartrate (LOPRESSOR) 25 MG tablet Take 1 tablet by mouth 2 (Two) Times a Day. (Patient taking differently: Take 25 mg by mouth Daily.) 180 tablet 2   • Multiple Vitamins-Minerals (ICAPS AREDS 2) capsule Take 2 capsules by mouth Daily.     • mupirocin (BACTROBAN) 2 % ointment Apply 1 application topically to the  appropriate area as directed 3 (Three) Times a Day As Needed.     • omeprazole (priLOSEC) 40 MG capsule Take 1 capsule by mouth Daily. 90 capsule 4   • pravastatin (PRAVACHOL) 40 MG tablet TAKE ONE TABLET BY MOUTH DAILY 90 tablet 4   • vitamin B-12 (VITAMIN B-12) 1000 MCG tablet Take 1 tablet by mouth Daily.     • vitamin E 400 UNIT capsule Take 400 Units by mouth Daily.     • zolpidem (AMBIEN) 10 MG tablet Take 1 tablet by mouth At Night As Needed for Sleep. for sleep 30 tablet 2   • ferrous sulfate 325 (65 FE) MG EC tablet Take 325 mg by mouth Daily With Breakfast.  0     No facility-administered medications prior to visit.       Opioid medication/s are on active medication list.  and I have evaluated her active treatment plan and pain score trends (see table).  Vitals:    08/26/22 1034   PainSc:   4   PainLoc: Generalized     I have reviewed the chart for potential of high risk medication and harmful drug interactions in the elderly.            Aspirin is not on active medication list.  Aspirin use is not indicated based on review of current medical condition/s. Risk of harm outweighs potential benefits.  .    Patient Active Problem List   Diagnosis   • History of hip surgery   • Primary osteoarthritis of both knees   • Arthritis of right knee   • Aftercare following right knee joint replacement surgery   • Status post total right knee replacement   • History of knee replacement, total   • History of total knee arthroplasty   • Chronic pain of left knee   • Arthritis of left knee   • Closed right hip fracture (HCC)   • Femoral neck fracture (HCC)   • Tear of right gluteus minimus tendon   • Chronic bilateral low back pain with bilateral sciatica   • Chronic right hip pain   • Sacroiliac joint dysfunction   • Encounter for long-term current use of high risk medication   • Pain of left hip joint   • Other chronic pain   • Bilateral low back pain with bilateral sciatica   • Closed fracture of shaft of right femur  "(HCC)   • Hypothyroidism (acquired)   • Constipation   • Anemia   • HTN (hypertension)   • HLD (hyperlipidemia)   • Osteoporosis   • B12 deficiency   • Degenerative disease of nervous system, unspecified (HCC)   • Syncope and collapse   • Amnesia   • Olecranon bursitis, right elbow   • Multiple myeloma (HCC)   • Spinal stenosis, unspecified region other than cervical   • Esophageal reflux   • Medicare annual wellness visit, subsequent     Advance Care Planning  Advance Directive is on file.  ACP discussion was held with the patient during this visit. Patient has an advance directive in EMR which is still valid.     Review of Systems   Musculoskeletal: Positive for arthralgias (left hand pain).   Psychiatric/Behavioral: Positive for sleep disturbance.   All other systems reviewed and are negative.       Objective    Vitals:    08/26/22 1034   BP: 150/90   Pulse: 120   Resp: 16   Temp: 97.7 °F (36.5 °C)   SpO2: 97%   Weight: 59.4 kg (131 lb)   Height: 160 cm (63\")   PainSc:   4   PainLoc: Generalized     Estimated body mass index is 23.21 kg/m² as calculated from the following:    Height as of this encounter: 160 cm (63\").    Weight as of this encounter: 59.4 kg (131 lb).    BMI is within normal parameters. No other follow-up for BMI required.      Does the patient have evidence of cognitive impairment? No    Physical Exam  Vitals and nursing note reviewed.   Constitutional:       Appearance: Normal appearance. She is well-developed, well-groomed and overweight.      Interventions: Face mask in place.      Comments: She is ambulating with a walker.  Her son is in exam room wearing facial mask   HENT:      Head: Normocephalic and atraumatic.      Jaw: There is normal jaw occlusion.      Right Ear: Hearing, tympanic membrane, ear canal and external ear normal.      Left Ear: Hearing, tympanic membrane, ear canal and external ear normal.      Nose: Nose normal.      Right Sinus: No maxillary sinus tenderness or frontal " sinus tenderness.      Left Sinus: No maxillary sinus tenderness or frontal sinus tenderness.      Mouth/Throat:      Lips: Pink.      Mouth: Mucous membranes are moist.      Dentition: Normal dentition.      Tongue: No lesions.      Pharynx: Oropharynx is clear. Uvula midline.      Tonsils: No tonsillar exudate.   Eyes:      General: Lids are normal.      Conjunctiva/sclera: Conjunctivae normal.      Pupils: Pupils are equal, round, and reactive to light.   Neck:      Thyroid: No thyroid mass, thyromegaly or thyroid tenderness.      Vascular: No carotid bruit.      Trachea: Trachea and phonation normal. No tracheal tenderness.   Cardiovascular:      Rate and Rhythm: Normal rate and regular rhythm.      Pulses: Normal pulses.      Heart sounds: Normal heart sounds, S1 normal and S2 normal. No murmur heard.  Pulmonary:      Effort: Pulmonary effort is normal.      Breath sounds: Normal breath sounds and air entry.   Abdominal:      General: Bowel sounds are normal.      Palpations: Abdomen is soft.      Tenderness: There is no abdominal tenderness. There is no right CVA tenderness, left CVA tenderness, guarding or rebound. Negative signs include Gallo's sign, Rovsing's sign, McBurney's sign, psoas sign and obturator sign.   Musculoskeletal:      Left hand: Swelling, tenderness and bony tenderness present. Normal range of motion. Normal strength. Normal sensation. There is no disruption of two-point discrimination. Normal capillary refill. Normal pulse.        Arms:       Cervical back: Neck supple.      Right lower leg: No edema.      Left lower leg: No edema.   Lymphadenopathy:      Cervical: No cervical adenopathy.      Right cervical: No superficial, deep or posterior cervical adenopathy.     Left cervical: No superficial, deep or posterior cervical adenopathy.   Skin:     General: Skin is warm and dry.      Capillary Refill: Capillary refill takes less than 2 seconds.   Neurological:      Mental Status: She is  alert and oriented to person, place, and time.      Deep Tendon Reflexes:      Reflex Scores:       Patellar reflexes are 2+ on the right side and 2+ on the left side.  Psychiatric:         Attention and Perception: Attention and perception normal.         Mood and Affect: Mood and affect normal.         Speech: Speech normal.         Behavior: Behavior is cooperative.         Thought Content: Thought content normal.         Cognition and Memory: Cognition and memory normal.         Judgment: Judgment normal.     I wore a facial mask during this patient encounter.  Patient also wearing a surgical mask.  Hand hygiene performed before and after seeing the patient.          HEALTH RISK ASSESSMENT    Smoking Status:  Social History     Tobacco Use   Smoking Status Never Smoker   Smokeless Tobacco Never Used     Alcohol Consumption:  Social History     Substance and Sexual Activity   Alcohol Use Yes   • Alcohol/week: 3.0 standard drinks   • Types: 3 Shots of liquor per week    Comment: social use 1 weekly     Fall Risk Screen:    STEADI Fall Risk Assessment was completed, and patient is at LOW risk for falls.Assessment completed on:8/26/2022    Depression Screening:  PHQ-2/PHQ-9 Depression Screening 8/26/2022   Retired PHQ-9 Total Score -   Retired Total Score -   Little Interest or Pleasure in Doing Things 0-->not at all   Feeling Down, Depressed or Hopeless 1-->several days   PHQ-9: Brief Depression Severity Measure Score 1       Health Habits and Functional and Cognitive Screening:  Functional & Cognitive Status 8/26/2022   Do you have difficulty preparing food and eating? No   Do you have difficulty bathing yourself, getting dressed or grooming yourself? No   Do you have difficulty using the toilet? No   Do you have difficulty moving around from place to place? No   Do you have trouble with steps or getting out of a bed or a chair? No   Current Diet Unhealthy Diet   Dental Exam Up to date   Eye Exam Up to date    Exercise (times per week) 0 times per week   Current Exercises Include No Regular Exercise   Do you need help using the phone?  No   Are you deaf or do you have serious difficulty hearing?  Yes   Do you need help with transportation? Yes   Do you need help shopping? Yes   Do you need help preparing meals?  No   Do you need help with housework?  Yes   Do you need help with laundry? No   Do you need help taking your medications? No   Do you need help managing money? No   Do you ever drive or ride in a car without wearing a seat belt? No   Have you felt unusual stress, anger or loneliness in the last month? Yes   Who do you live with? Alone   If you need help, do you have trouble finding someone available to you? No   Have you been bothered in the last four weeks by sexual problems? No   Do you have difficulty concentrating, remembering or making decisions? No       Age-appropriate Screening Schedule:  Refer to the list below for future screening recommendations based on patient's age, sex and/or medical conditions. Orders for these recommended tests are listed in the plan section. The patient has been provided with a written plan.    Health Maintenance   Topic Date Due   • DXA SCAN  09/09/2022 (Originally 1935)   • ZOSTER VACCINE (1 of 2) 09/09/2022 (Originally [birth year]/1986)   • INFLUENZA VACCINE  10/01/2022   • LIPID PANEL  11/16/2022   • TDAP/TD VACCINES (2 - Td or Tdap) 02/17/2032   • MAMMOGRAM  Discontinued              Assessment & Plan   CMS Preventative Services Quick Reference  Risk Factors Identified During Encounter  Cardiovascular Disease  Fall Risk-High or Moderate  The above risks/problems have been discussed with the patient.  Follow up actions/plans if indicated are seen below in the Assessment/Plan Section.  Pertinent information has been shared with the patient in the After Visit Summary.    Diagnoses and all orders for this visit:    1. Medicare annual wellness visit, subsequent (Primary)  -      CBC & Differential  -     Comprehensive Metabolic Panel    2. Hypothyroidism (acquired)  -     Thyroid Panel With TSH    3. Mixed hyperlipidemia  -     CBC & Differential  -     Comprehensive Metabolic Panel  -     Lipid Panel With LDL / HDL Ratio    4. Other insomnia    5. Left hand pain  -     XR Hand 3+ View Left    6. Fall, initial encounter  -     XR Hand 3+ View Left    1.  Annual Medicare wellness exam with hyperlipidemia: She is fasting will have a CBC, CMP, and a lipid profile collected today.  She will be notified of test results when completed.  Continue current medication not at home as directed.  2.  Chronic and stable hypothyroidism: I will check thyroid profile TSH today.  They will be notified of test results and any medication changes.  Plan to follow-up in 6 months.  3.  Chronic insomnia: I have asked her to stop the Ambien medication due to recent possible fall/injury.  She will continue the doxepin for now.  I will hold any controlled sleep aids.  4.  New left hand pain status post fall.  She suspects she might have fallen while on the Ambien medication last week.  Will image left hand x-ray at [facility] today to rule out fracture.  She will be notified of test results when completed.    Follow Up:   Return in about 6 months (around 2/26/2023), or HTN sleep and chol same day labs.     An After Visit Summary and PPPS were made available to the patient.                     Patient Counseling:  --Nutrition: Stressed importance of moderation in sodium/caffeine intake, saturated fat and cholesterol.  Discussed caloric balance, sufficient intake of fresh fruits, vegetables, fiber,   calcium, iron.  --Discussed the new recommendation against daily use of baby aspirin for primary prevention in low risk patients.  --Exercise: Stressed the importance of regular exercise by incorporating into daily routine.    --Substance Abuse: Discussed cessation/primary prevention of tobacco, alcohol, or other  drug use; driving or other dangerous activities under the influence.    --Dental health: Discussed importance of regular tooth brushing, flossing, and dental visits.  -- Suggested having eyes and vision checked if needed or past due.  --Immunizations reviewed.  --Discussed benefits of screening colonoscopy.      [facility]  [address] [place] [id number]  Dept: [phone]  Dept Fax: [phone]  Loc: [phone]  Loc Fax: [phone]

## 2022-08-27 LAB
ALBUMIN SERPL-MCNC: 4.5 G/DL (ref 3.6–4.6)
ALBUMIN/GLOB SERPL: 1.6 {RATIO} (ref 1.2–2.2)
ALP SERPL-CCNC: 83 IU/L (ref 44–121)
ALT SERPL-CCNC: 24 IU/L (ref 0–32)
AST SERPL-CCNC: 21 IU/L (ref 0–40)
BASOPHILS # BLD AUTO: 0.1 X10E3/UL (ref 0–0.2)
BASOPHILS NFR BLD AUTO: 1 %
BILIRUB SERPL-MCNC: 1.2 MG/DL (ref 0–1.2)
BUN SERPL-MCNC: 16 MG/DL (ref 8–27)
BUN/CREAT SERPL: 16 (ref 12–28)
CALCIUM SERPL-MCNC: 10.7 MG/DL (ref 8.7–10.3)
CHLORIDE SERPL-SCNC: 92 MMOL/L (ref 96–106)
CHOLEST SERPL-MCNC: 178 MG/DL (ref 100–199)
CO2 SERPL-SCNC: 24 MMOL/L (ref 20–29)
CREAT SERPL-MCNC: 1.01 MG/DL (ref 0.57–1)
EGFRCR-CYS SERPLBLD CKD-EPI 2021: 54 ML/MIN/1.73
EOSINOPHIL # BLD AUTO: 0.1 X10E3/UL (ref 0–0.4)
EOSINOPHIL NFR BLD AUTO: 1 %
ERYTHROCYTE [DISTWIDTH] IN BLOOD BY AUTOMATED COUNT: 12 % (ref 11.7–15.4)
FT4I SERPL CALC-MCNC: 2.6 (ref 1.2–4.9)
GLOBULIN SER CALC-MCNC: 2.8 G/DL (ref 1.5–4.5)
GLUCOSE SERPL-MCNC: 111 MG/DL (ref 65–99)
HCT VFR BLD AUTO: 36.2 % (ref 34–46.6)
HDLC SERPL-MCNC: 48 MG/DL
HGB BLD-MCNC: 11.8 G/DL (ref 11.1–15.9)
IMM GRANULOCYTES # BLD AUTO: 0.1 X10E3/UL (ref 0–0.1)
IMM GRANULOCYTES NFR BLD AUTO: 1 %
LDLC SERPL CALC-MCNC: 92 MG/DL (ref 0–99)
LDLC/HDLC SERPL: 1.9 RATIO (ref 0–3.2)
LYMPHOCYTES # BLD AUTO: 2.1 X10E3/UL (ref 0.7–3.1)
LYMPHOCYTES NFR BLD AUTO: 27 %
MCH RBC QN AUTO: 30.2 PG (ref 26.6–33)
MCHC RBC AUTO-ENTMCNC: 32.6 G/DL (ref 31.5–35.7)
MCV RBC AUTO: 93 FL (ref 79–97)
MONOCYTES # BLD AUTO: 0.8 X10E3/UL (ref 0.1–0.9)
MONOCYTES NFR BLD AUTO: 10 %
NEUTROPHILS # BLD AUTO: 4.9 X10E3/UL (ref 1.4–7)
NEUTROPHILS NFR BLD AUTO: 60 %
PLATELET # BLD AUTO: 394 X10E3/UL (ref 150–450)
POTASSIUM SERPL-SCNC: 4.5 MMOL/L (ref 3.5–5.2)
PROT SERPL-MCNC: 7.3 G/DL (ref 6–8.5)
RBC # BLD AUTO: 3.91 X10E6/UL (ref 3.77–5.28)
SODIUM SERPL-SCNC: 134 MMOL/L (ref 134–144)
T3RU NFR SERPL: 33 % (ref 24–39)
T4 SERPL-MCNC: 7.8 UG/DL (ref 4.5–12)
TRIGL SERPL-MCNC: 228 MG/DL (ref 0–149)
TSH SERPL DL<=0.005 MIU/L-ACNC: 1.98 UIU/ML (ref 0.45–4.5)
VLDLC SERPL CALC-MCNC: 38 MG/DL (ref 5–40)
WBC # BLD AUTO: 8 X10E3/UL (ref 3.4–10.8)

## 2022-08-30 DIAGNOSIS — E78.2 MIXED HYPERLIPIDEMIA: Primary | ICD-10-CM

## 2022-08-30 DIAGNOSIS — R73.03 PREDIABETES: ICD-10-CM

## 2022-08-30 LAB
HBA1C MFR BLD: 6.1 % (ref 4.8–5.6)
Lab: NORMAL
WRITTEN AUTHORIZATION: NORMAL

## 2022-09-12 ENCOUNTER — APPOINTMENT (OUTPATIENT)
Dept: CT IMAGING | Facility: HOSPITAL | Age: 87
End: 2022-09-12

## 2022-09-12 ENCOUNTER — HOSPITAL ENCOUNTER (EMERGENCY)
Facility: HOSPITAL | Age: 87
Discharge: HOME | End: 2022-09-12
Attending: EMERGENCY MEDICINE | Admitting: EMERGENCY MEDICINE

## 2022-09-12 ENCOUNTER — APPOINTMENT (OUTPATIENT)
Dept: GENERAL RADIOLOGY | Facility: HOSPITAL | Age: 87
End: 2022-09-12

## 2022-09-12 VITALS
TEMPERATURE: 98.7 F | RESPIRATION RATE: 17 BRPM | WEIGHT: 133 LBS | OXYGEN SATURATION: 98 % | HEART RATE: 83 BPM | SYSTOLIC BLOOD PRESSURE: 148 MMHG | BODY MASS INDEX: 23.57 KG/M2 | HEIGHT: 63 IN | DIASTOLIC BLOOD PRESSURE: 93 MMHG

## 2022-09-12 DIAGNOSIS — R00.2 PALPITATIONS: Primary | ICD-10-CM

## 2022-09-12 DIAGNOSIS — U07.1 COVID-19: ICD-10-CM

## 2022-09-12 LAB
ALBUMIN SERPL-MCNC: 4.1 G/DL (ref 3.5–5.2)
ALBUMIN/GLOB SERPL: 1.2 G/DL
ALP SERPL-CCNC: 57 U/L (ref 39–117)
ALT SERPL W P-5'-P-CCNC: 11 U/L (ref 1–33)
ANION GAP SERPL CALCULATED.3IONS-SCNC: 11.1 MMOL/L (ref 5–15)
APTT PPP: 28.9 SECONDS (ref 24.3–38.1)
AST SERPL-CCNC: 14 U/L (ref 1–32)
BACTERIA UR QL AUTO: ABNORMAL /HPF
BASOPHILS # BLD AUTO: 0.04 10*3/MM3 (ref 0–0.2)
BASOPHILS NFR BLD AUTO: 0.5 % (ref 0–1.5)
BILIRUB SERPL-MCNC: 0.5 MG/DL (ref 0–1.2)
BILIRUB UR QL STRIP: NEGATIVE
BUN SERPL-MCNC: 16 MG/DL (ref 8–23)
BUN/CREAT SERPL: 17.8 (ref 7–25)
CALCIUM SPEC-SCNC: 9.6 MG/DL (ref 8.6–10.5)
CHLORIDE SERPL-SCNC: 98 MMOL/L (ref 98–107)
CLARITY UR: CLEAR
CO2 SERPL-SCNC: 24.9 MMOL/L (ref 22–29)
COLOR UR: YELLOW
CREAT SERPL-MCNC: 0.9 MG/DL (ref 0.57–1)
D DIMER PPP FEU-MCNC: 0.77 MCGFEU/ML (ref 0–0.46)
DEPRECATED RDW RBC AUTO: 41.6 FL (ref 37–54)
EGFRCR SERPLBLD CKD-EPI 2021: 62.4 ML/MIN/1.73
EOSINOPHIL # BLD AUTO: 0.08 10*3/MM3 (ref 0–0.4)
EOSINOPHIL NFR BLD AUTO: 1 % (ref 0.3–6.2)
ERYTHROCYTE [DISTWIDTH] IN BLOOD BY AUTOMATED COUNT: 11.9 % (ref 12.3–15.4)
FLUAV RNA RESP QL NAA+PROBE: NOT DETECTED
FLUBV RNA RESP QL NAA+PROBE: NOT DETECTED
GLOBULIN UR ELPH-MCNC: 3.3 GM/DL
GLUCOSE SERPL-MCNC: 97 MG/DL (ref 65–99)
GLUCOSE UR STRIP-MCNC: NEGATIVE MG/DL
HCT VFR BLD AUTO: 35.9 % (ref 34–46.6)
HGB BLD-MCNC: 11.5 G/DL (ref 12–15.9)
HGB UR QL STRIP.AUTO: ABNORMAL
HYALINE CASTS UR QL AUTO: ABNORMAL /LPF
IMM GRANULOCYTES # BLD AUTO: 0.03 10*3/MM3 (ref 0–0.05)
IMM GRANULOCYTES NFR BLD AUTO: 0.4 % (ref 0–0.5)
INR PPP: 0.95 (ref 0.9–1.1)
KETONES UR QL STRIP: NEGATIVE
LEUKOCYTE ESTERASE UR QL STRIP.AUTO: NEGATIVE
LYMPHOCYTES # BLD AUTO: 1.88 10*3/MM3 (ref 0.7–3.1)
LYMPHOCYTES NFR BLD AUTO: 23.8 % (ref 19.6–45.3)
MAGNESIUM SERPL-MCNC: 1.7 MG/DL (ref 1.6–2.4)
MCH RBC QN AUTO: 30.4 PG (ref 26.6–33)
MCHC RBC AUTO-ENTMCNC: 32 G/DL (ref 31.5–35.7)
MCV RBC AUTO: 95 FL (ref 79–97)
MONOCYTES # BLD AUTO: 0.61 10*3/MM3 (ref 0.1–0.9)
MONOCYTES NFR BLD AUTO: 7.7 % (ref 5–12)
NEUTROPHILS NFR BLD AUTO: 5.27 10*3/MM3 (ref 1.7–7)
NEUTROPHILS NFR BLD AUTO: 66.6 % (ref 42.7–76)
NITRITE UR QL STRIP: NEGATIVE
NT-PROBNP SERPL-MCNC: 242.2 PG/ML (ref 0–1800)
PH UR STRIP.AUTO: 6.5 [PH] (ref 4.5–8)
PLATELET # BLD AUTO: 298 10*3/MM3 (ref 140–450)
PMV BLD AUTO: 9.1 FL (ref 6–12)
POTASSIUM SERPL-SCNC: 4.5 MMOL/L (ref 3.5–5.2)
PROT SERPL-MCNC: 7.4 G/DL (ref 6–8.5)
PROT UR QL STRIP: NEGATIVE
PROTHROMBIN TIME: 12.8 SECONDS (ref 12.1–15)
QT INTERVAL: 378 MS
QT INTERVAL: 462 MS
RBC # BLD AUTO: 3.78 10*6/MM3 (ref 3.77–5.28)
RBC # UR STRIP: ABNORMAL /HPF
REF LAB TEST METHOD: ABNORMAL
SARS-COV-2 RNA RESP QL NAA+PROBE: DETECTED
SODIUM SERPL-SCNC: 134 MMOL/L (ref 136–145)
SP GR UR STRIP: 1.01 (ref 1–1.03)
SQUAMOUS #/AREA URNS HPF: ABNORMAL /HPF
TROPONIN T SERPL-MCNC: <0.01 NG/ML (ref 0–0.03)
TROPONIN T SERPL-MCNC: <0.01 NG/ML (ref 0–0.03)
TSH SERPL DL<=0.05 MIU/L-ACNC: 1.07 UIU/ML (ref 0.27–4.2)
UROBILINOGEN UR QL STRIP: ABNORMAL
WBC # UR STRIP: ABNORMAL /HPF
WBC NRBC COR # BLD: 7.91 10*3/MM3 (ref 3.4–10.8)

## 2022-09-12 PROCEDURE — 85730 THROMBOPLASTIN TIME PARTIAL: CPT | Performed by: EMERGENCY MEDICINE

## 2022-09-12 PROCEDURE — 84443 ASSAY THYROID STIM HORMONE: CPT | Performed by: EMERGENCY MEDICINE

## 2022-09-12 PROCEDURE — 85025 COMPLETE CBC W/AUTO DIFF WBC: CPT | Performed by: EMERGENCY MEDICINE

## 2022-09-12 PROCEDURE — 85379 FIBRIN DEGRADATION QUANT: CPT | Performed by: EMERGENCY MEDICINE

## 2022-09-12 PROCEDURE — 85610 PROTHROMBIN TIME: CPT | Performed by: EMERGENCY MEDICINE

## 2022-09-12 PROCEDURE — 0 IOPAMIDOL PER 1 ML: Performed by: EMERGENCY MEDICINE

## 2022-09-12 PROCEDURE — 93005 ELECTROCARDIOGRAM TRACING: CPT | Performed by: EMERGENCY MEDICINE

## 2022-09-12 PROCEDURE — 83880 ASSAY OF NATRIURETIC PEPTIDE: CPT | Performed by: EMERGENCY MEDICINE

## 2022-09-12 PROCEDURE — 83735 ASSAY OF MAGNESIUM: CPT | Performed by: EMERGENCY MEDICINE

## 2022-09-12 PROCEDURE — 99284 EMERGENCY DEPT VISIT MOD MDM: CPT

## 2022-09-12 PROCEDURE — 87636 SARSCOV2 & INF A&B AMP PRB: CPT | Performed by: EMERGENCY MEDICINE

## 2022-09-12 PROCEDURE — 80053 COMPREHEN METABOLIC PANEL: CPT | Performed by: EMERGENCY MEDICINE

## 2022-09-12 PROCEDURE — 71045 X-RAY EXAM CHEST 1 VIEW: CPT

## 2022-09-12 PROCEDURE — 81001 URINALYSIS AUTO W/SCOPE: CPT | Performed by: EMERGENCY MEDICINE

## 2022-09-12 PROCEDURE — 71275 CT ANGIOGRAPHY CHEST: CPT

## 2022-09-12 PROCEDURE — 36415 COLL VENOUS BLD VENIPUNCTURE: CPT

## 2022-09-12 PROCEDURE — 84484 ASSAY OF TROPONIN QUANT: CPT | Performed by: EMERGENCY MEDICINE

## 2022-09-12 PROCEDURE — 93010 ELECTROCARDIOGRAM REPORT: CPT | Performed by: INTERNAL MEDICINE

## 2022-09-12 RX ADMIN — IOPAMIDOL 100 ML: 755 INJECTION, SOLUTION INTRAVENOUS at 14:54

## 2022-09-12 NOTE — DISCHARGE INSTRUCTIONS
Rest.  Drink plenty of fluids.  Follow-up with Nicolette Rubio after the Holter monitor.  Return to the emergency department if there is chest pain, shortness of breath, worse in any way at all.

## 2022-09-12 NOTE — ED PROVIDER NOTES
"Subjective   PIT    This is an 87 year old female with the chief complaint of a \"fluttering\" in her chest that feels \"smothering.\"  She also describes it as pressure. This has been going on for the past month and a half, but today she decided it was long enough and called EMS.  She denies any pain or shortness of breath despite her complaint of \"smothering\".  The patient has not had any nausea, vomiting, diarrhea, fever, or chills.  She has not seen a cardiologist about this.  The episodes last only a minute or two at a time.  The patient denies any history of this beginning in the past.  The patient denies any other precipitating, aggravating, or alleviating factors than those listed above.          Review of Systems   Respiratory: Positive for chest tightness.    Cardiovascular: Positive for chest pain.   All other systems reviewed and are negative.      Past Medical History:   Diagnosis Date   • Acid reflux    • Back pain    • Colitis     history of   • Diarrhea    • Disease of thyroid gland    • Diverticular disease    • Glaucoma    • Gout     - right big toe   • High blood pressure    • High cholesterol    • History of vertebral compression fracture     multiple   • Hyponatremia    • Knee pain    • Low back pain    • Macular degeneration    • Mitral regurgitation 06/2016    Mild to moderate per 2-D echocardiogram   • Peripheral neuropathy    • Primary osteoarthritis of both knees 4/1/2016   • Renal lesion     on mri   • Rheumatoid arthritis (HCC)        Allergies   Allergen Reactions   • Neomycin Swelling   • Statins Myalgia and Swelling     Not all statins  Not all statins       Past Surgical History:   Procedure Laterality Date   • APPENDECTOMY  1951   • CATARACT EXTRACTION Bilateral    • FEMUR OPEN REDUCTION INTERNAL FIXATION Right 10/11/2020    Procedure: OPEN REDUCTION INTERNAL FIXATION OF RIGHT FEMUR FRACTURE;  Surgeon: Mac Frances Jr., MD;  Location: [facility];  Service: Orthopedics;  Laterality: " Right;   • FEMUR SURGERY Right     had fracture and has rajesh    • HEMORRHOIDECTOMY     • HIP HEMIARTHROPLASTY Right 1/3/2017    Procedure: HIP HEMIARTHROPLASTY with IM rajesh and 2 screw hardware removal antibiotic cement;  Surgeon: Mirza Whittington MD;  Location:  [facility];  Service:    • HIP SURGERY Right 1999    hx fracture   • LEG SURGERY Right 12/1/2017    Procedure: GLUTEAL TENDON REPAIR-minimus and medius;  Surgeon: Mirza Whittington MD;  Location:  [facility];  Service:    • TOTAL KNEE ARTHROPLASTY Right 6/22/2016    Procedure: RT TOTAL KNEE ARTHROPLASTY WITH FINN NAVIGATION;  Surgeon: Adrian Swenson MD;  Location:  [facility];  Service:        Family History   Problem Relation Age of Onset   • Alzheimer's disease Mother    • Heart attack Father 73   • Alzheimer's disease Sister    • Diabetes Son        Social History     Socioeconomic History   • Marital status:    Tobacco Use   • Smoking status: Never Smoker   • Smokeless tobacco: Never Used   Vaping Use   • Vaping Use: Never used   Substance and Sexual Activity   • Alcohol use: Yes     Alcohol/week: 3.0 standard drinks     Types: 3 Shots of liquor per week     Comment: social use 1 weekly   • Drug use: No     Types: Oxycodone   • Sexual activity: Defer           Objective   Physical Exam  Vitals and nursing note reviewed.   Constitutional:       Appearance: Normal appearance. She is normal weight.   HENT:      Head: Normocephalic and atraumatic.      Nose: Nose normal.      Mouth/Throat:      Mouth: Mucous membranes are moist.      Pharynx: Oropharynx is clear.   Eyes:      Extraocular Movements: Extraocular movements intact.      Conjunctiva/sclera: Conjunctivae normal.      Pupils: Pupils are equal, round, and reactive to light.   Cardiovascular:      Rate and Rhythm: Normal rate and regular rhythm.      Pulses: Normal pulses.      Heart sounds: Normal heart sounds.   Pulmonary:      Effort: Pulmonary effort is normal.      Breath sounds: Normal breath  sounds.   Abdominal:      General: Abdomen is flat. Bowel sounds are normal.      Palpations: Abdomen is soft.   Musculoskeletal:         General: Normal range of motion.      Cervical back: Normal range of motion and neck supple.   Skin:     General: Skin is warm and dry.      Capillary Refill: Capillary refill takes less than 2 seconds.   Neurological:      General: No focal deficit present.      Mental Status: She is alert and oriented to person, place, and time. Mental status is at baseline.   Psychiatric:         Mood and Affect: Mood normal.         Behavior: Behavior normal.         Thought Content: Thought content normal.         Judgment: Judgment normal.         ECG 12 Lead      Date/Time: 9/12/2022 1:11 PM  Performed by: Nery Adkins MD  Authorized by: Nery Adkins MD   Interpreted by physician  Comparison: compared with previous ECG from 1/26/2022  Similar to previous ECG  Comparison to previous ECG: The morphology is essentially the same as the old EKG, but the amplitude of the T waves is much smaller.  Rhythm: sinus bradycardia  Rate: bradycardic  BPM: 40  Q waves: III and aVF  Clinical impression: non-specific ECG    ECG 12 Lead      Date/Time: 9/12/2022 1:18 PM  Performed by: Nery Adkins MD  Authorized by: Nery Adkins MD   Interpreted by physician  Comparison: compared with previous ECG from 9/12/2022  Comparison to previous ECG: This EKG had the amplitude increased to better delineate the rhythm.  There is now a definite sinus rhythm but increased artifact.  The morphology is unchanged.                   ED Course  ED Course as of 09/13/22 0750   Mon Sep 12, 2022   1440 BNP  Normal [LC]   1440 Troponin  Normal [LC]   1440 Magnesium [LC]   1440 D-dimer, Quantitative(!)  Elevated D-dimer [LC]   1440 CBC & Differential(!)  Minimal anemia [LC]   1440 TSH [LC]   1440 Comprehensive Metabolic Panel(!)  Normal [LC]   1440 XR Chest 1 View  Normal [LC]   1644 The repeat  troponin is normal. [RC]      ED Course User Index  [LC] Nery Adkins MD  [RC] Luis Cervantes MD                                           MDM  Number of Diagnoses or Management Options     Amount and/or Complexity of Data Reviewed  Clinical lab tests: reviewed and ordered  Tests in the radiology section of CPT®: reviewed and ordered  Tests in the medicine section of CPT®: reviewed and ordered    Risk of Complications, Morbidity, and/or Mortality  Presenting problems: high  Diagnostic procedures: moderate  Management options: high  General comments: My differential diagnosis for palpitations includes but is not limited to sinus tachycardia, SVT, PACs, atrial fibrillation, atrial flutter, PVCs, V. fib, V. tach, ACS, toxins, drug abuse, electrolyte abnormalities and anxiety attacks.      Patient Progress  Patient progress: stable      Final diagnoses:   Palpitations   COVID-19       ED Disposition  ED Disposition     ED Disposition   Discharge    Condition   Stable    Comment   --             Nicolette Rubio PA-C  [address] [facility] [address]  [phone]    In 1 week           Medication List      Changed    docusate sodium 100 MG capsule  Take 100 mg by mouth 2 (Two) Times a Day.  What changed:   · when to take this  · reasons to take this     metoprolol tartrate 25 MG tablet  Commonly known as: LOPRESSOR  Take 1 tablet by mouth 2 (Two) Times a Day.  What changed: when to take this             Nery Adkins MD  09/12/22 7487       Nery Adkins MD  09/13/22 8832

## 2022-09-12 NOTE — ED PROVIDER NOTES
Subjective   PIT    History of Present Illness    Review of Systems    Past Medical History:   Diagnosis Date   • Acid reflux    • Back pain    • Colitis     history of   • Diarrhea    • Disease of thyroid gland    • Diverticular disease    • Glaucoma    • Gout     - right big toe   • High blood pressure    • High cholesterol    • History of vertebral compression fracture     multiple   • Hyponatremia    • Knee pain    • Low back pain    • Macular degeneration    • Mitral regurgitation 06/2016    Mild to moderate per 2-D echocardiogram   • Peripheral neuropathy    • Primary osteoarthritis of both knees 4/1/2016   • Renal lesion     on mri   • Rheumatoid arthritis (HCC)        Allergies   Allergen Reactions   • Neomycin Swelling   • Statins Myalgia and Swelling     Not all statins  Not all statins       Past Surgical History:   Procedure Laterality Date   • APPENDECTOMY  1951   • CATARACT EXTRACTION Bilateral    • FEMUR OPEN REDUCTION INTERNAL FIXATION Right 10/11/2020    Procedure: OPEN REDUCTION INTERNAL FIXATION OF RIGHT FEMUR FRACTURE;  Surgeon: Mac Frances Jr., MD;  Location: [facility];  Service: Orthopedics;  Laterality: Right;   • FEMUR SURGERY Right     had fracture and has rajesh    • HEMORRHOIDECTOMY     • HIP HEMIARTHROPLASTY Right 1/3/2017    Procedure: HIP HEMIARTHROPLASTY with IM rajesh and 2 screw hardware removal antibiotic cement;  Surgeon: Mirza Whittington MD;  Location: [facility] OR;  Service:    • HIP SURGERY Right 1999    hx fracture   • LEG SURGERY Right 12/1/2017    Procedure: GLUTEAL TENDON REPAIR-minimus and medius;  Surgeon: Mirza Whittington MD;  Location: [facility] OR;  Service:    • TOTAL KNEE ARTHROPLASTY Right 6/22/2016    Procedure: RT TOTAL KNEE ARTHROPLASTY WITH FINN NAVIGATION;  Surgeon: Adrian Swenson MD;  Location: [facility] OR;  Service:        Family History   Problem Relation Age of Onset   • Alzheimer's disease Mother    • Heart attack Father 73   • Alzheimer's disease Sister    •  Diabetes Son        Social History     Socioeconomic History   • Marital status:    Tobacco Use   • Smoking status: Never Smoker   • Smokeless tobacco: Never Used   Vaping Use   • Vaping Use: Never used   Substance and Sexual Activity   • Alcohol use: Yes     Alcohol/week: 3.0 standard drinks     Types: 3 Shots of liquor per week     Comment: social use 1 weekly   • Drug use: No     Types: Oxycodone   • Sexual activity: Defer           Objective   Physical Exam    Procedures           ED Course  ED Course as of 09/12/22 1651   Mon Sep 12, 2022   1440 BNP  Normal [LC]   1440 Troponin  Normal [LC]   1440 Magnesium [LC]   1440 D-dimer, Quantitative(!)  Elevated D-dimer [LC]   1440 CBC & Differential(!)  Minimal anemia [LC]   1440 TSH [LC]   1440 Comprehensive Metabolic Panel(!)  Normal [LC]   1440 XR Chest 1 View  Normal [LC]   1644 The repeat troponin is normal. [RC]      ED Course User Index  [LC] Nery Adkins MD  [RC] Luis Cervantes MD      16:51 EDT, 09/12/22:  The patient was reassessed.  She has no complaints.  Her vital signs reviewed and are stable.  Her saturations are 96% on room air.    16:52 EDT, 09/12/22:  The patient's diagnosis of COVID-19 and palpitations was discussed with her.  The patient has been symptomatic for 2 weeks.  She is out of the window for Paxil COVID.  The patient will be fitted with a Holter monitor.  She should follow-up with Dr. Pineda after the Holter monitor.  The patient should return to the emergency department if there is chest pain, shortness of breath, worse in any way at all.  All the patient's question were answered she will be discharged in good condition.                                       MDM    Final diagnoses:   Palpitations   COVID-19       ED Disposition  ED Disposition     None          No follow-up provider specified.       Medication List      No changes were made to your prescriptions during this visit.          Luis Cervantes,  MD  09/13/22 0057

## 2022-09-14 ENCOUNTER — HOSPITAL ENCOUNTER (OUTPATIENT)
Dept: CARDIOLOGY | Facility: HOSPITAL | Age: 87
Discharge: HOME | End: 2022-09-14
Admitting: EMERGENCY MEDICINE

## 2022-09-14 DIAGNOSIS — R00.2 PALPITATIONS: ICD-10-CM

## 2022-09-14 PROCEDURE — 93226 XTRNL ECG REC<48 HR SCAN A/R: CPT

## 2022-09-14 PROCEDURE — 93225 XTRNL ECG REC<48 HRS REC: CPT

## 2022-09-19 LAB
MAXIMAL PREDICTED HEART RATE: 134 BPM
STRESS TARGET HR: 114 BPM

## 2022-09-19 PROCEDURE — 93227 XTRNL ECG REC<48 HR R&I: CPT | Performed by: INTERNAL MEDICINE

## 2022-09-23 DIAGNOSIS — E03.9 HYPOTHYROIDISM (ACQUIRED): ICD-10-CM

## 2022-09-23 DIAGNOSIS — E78.2 MIXED HYPERLIPIDEMIA: Primary | ICD-10-CM

## 2022-09-23 DIAGNOSIS — R73.03 PREDIABETES: ICD-10-CM

## 2022-10-04 ENCOUNTER — OFFICE VISIT (OUTPATIENT)
Dept: PAIN MEDICINE | Facility: CLINIC | Age: 87
End: 2022-10-04

## 2022-10-04 VITALS
BODY MASS INDEX: 23.67 KG/M2 | DIASTOLIC BLOOD PRESSURE: 83 MMHG | WEIGHT: 133.6 LBS | RESPIRATION RATE: 18 BRPM | SYSTOLIC BLOOD PRESSURE: 137 MMHG | HEIGHT: 63 IN | HEART RATE: 77 BPM | OXYGEN SATURATION: 94 %

## 2022-10-04 DIAGNOSIS — M54.41 CHRONIC BILATERAL LOW BACK PAIN WITH BILATERAL SCIATICA: ICD-10-CM

## 2022-10-04 DIAGNOSIS — M25.551 CHRONIC RIGHT HIP PAIN: ICD-10-CM

## 2022-10-04 DIAGNOSIS — G89.29 CHRONIC RIGHT HIP PAIN: ICD-10-CM

## 2022-10-04 DIAGNOSIS — M17.12 OSTEOARTHRITIS OF LEFT KNEE, UNSPECIFIED OSTEOARTHRITIS TYPE: ICD-10-CM

## 2022-10-04 DIAGNOSIS — Z79.899 ENCOUNTER FOR LONG-TERM CURRENT USE OF HIGH RISK MEDICATION: ICD-10-CM

## 2022-10-04 DIAGNOSIS — M54.42 CHRONIC BILATERAL LOW BACK PAIN WITH BILATERAL SCIATICA: ICD-10-CM

## 2022-10-04 DIAGNOSIS — G89.29 CHRONIC BILATERAL LOW BACK PAIN WITH BILATERAL SCIATICA: ICD-10-CM

## 2022-10-04 DIAGNOSIS — G89.29 OTHER CHRONIC PAIN: Primary | ICD-10-CM

## 2022-10-04 PROCEDURE — 99214 OFFICE O/P EST MOD 30 MIN: CPT | Performed by: NURSE PRACTITIONER

## 2022-10-04 RX ORDER — BUPRENORPHINE 7.5 UG/H
1 PATCH TRANSDERMAL
Qty: 4 PATCH | Refills: 1 | Status: SHIPPED | OUTPATIENT
Start: 2022-10-04 | End: 2022-12-14 | Stop reason: SDUPTHER

## 2022-10-04 NOTE — PROGRESS NOTES
CHIEF COMPLAINT  Follow-up for back and knee pain.    Subjective   Ashli León is a 87 y.o. female  who presents for follow-up.  She has a history of back pain, joint pain. Today her pain is 3/10VAS. Pain is located right knee, lower back, and right hip. Currently on Butrans 7.5 mcg. Reports that this provides 50-70% pain relief, she denies adverse reactions.  Helps pain at least moderately.     Procedures:  Left knee gel injection 4/8/2022 -- relief x 1 month   Left knee steroid injection 2/7/2021 - no benefit.  9/15/2020 - L3-L4 LESI and left knee injection with fluoroscopic guidance - reported moderate relief x 1 to 2 weeks       Back Pain  This is a chronic problem. The current episode started more than 1 year ago. The problem occurs daily. The problem has been waxing and waning since onset. The pain is present in the lumbar spine. The quality of the pain is described as aching (occasional sharp/shooting pain). The pain radiates to the left thigh and right thigh. The pain is at a severity of 3/10. The pain is moderate. The symptoms are aggravated by position, standing, sitting and bending. Associated symptoms include weakness. Pertinent negatives include no abdominal pain, chest pain, dysuria, fever, headaches or numbness. Risk factors include sedentary lifestyle. She has tried analgesics, NSAIDs, ice and heat (injections) for the symptoms. The treatment provided moderate relief.   Knee Pain   The pain is present in the left knee and right knee. The pain is at a severity of 3/10. The pain is moderate. The pain has been worsening since onset. Pertinent negatives include no numbness. The symptoms are aggravated by weight bearing and movement. She has tried NSAIDs and acetaminophen (injections) for the symptoms. The treatment provided moderate relief.     PEG Assessment   What number best describes your pain on average in the past week?3  What number best describes how, during the past week, pain has  "interfered with your enjoyment of life?8  What number best describes how, during the past week, pain has interfered with your general activity?  8    The following portions of the patient's history were reviewed and updated as appropriate: allergies, current medications, past family history, past medical history, past social history, past surgical history and problem list.    Review of Systems   Constitutional: Negative for fatigue and fever.   HENT: Negative for congestion.    Eyes: Negative for visual disturbance.   Respiratory: Negative for shortness of breath.    Cardiovascular: Negative for chest pain.   Gastrointestinal: Negative for abdominal pain, constipation and diarrhea.   Genitourinary: Negative for difficulty urinating and dysuria.   Musculoskeletal: Positive for arthralgias (left knee) and back pain.   Neurological: Positive for weakness. Negative for numbness and headaches.   Psychiatric/Behavioral: Positive for sleep disturbance. Negative for suicidal ideas. The patient is not nervous/anxious.      I have reviewed and confirmed the accuracy of the ROS as documented by the MA/LPN/RN TANIA Tracy    Vitals:    10/04/22 1323   BP: 137/83   Pulse: 77   Resp: 18   SpO2: 94%   Weight: 60.6 kg (133 lb 9.6 oz)   Height: 160 cm (63\")   PainSc:   3   PainLoc: Knee     Objective   Physical Exam  Vitals and nursing note reviewed.   Constitutional:       General: She is not in acute distress.     Appearance: Normal appearance. She is not ill-appearing.   Pulmonary:      Effort: Pulmonary effort is normal. No respiratory distress.   Musculoskeletal:      Lumbar back: Tenderness present.      Right hip: Tenderness present.      Right knee: Crepitus present. Tenderness present.      Left knee: Crepitus present. Tenderness present.   Skin:     General: Skin is warm and dry.   Neurological:      Mental Status: She is alert and oriented to person, place, and time.      Gait: Gait abnormal (slowed). "   Psychiatric:         Mood and Affect: Mood normal.         Behavior: Behavior normal.       Assessment & Plan   Diagnoses and all orders for this visit:    1. Other chronic pain (Primary)    2. Chronic bilateral low back pain with bilateral sciatica    3. Chronic right hip pain    4. Osteoarthritis of left knee, unspecified osteoarthritis type    5. Encounter for long-term current use of high risk medication    Other orders  -     Buprenorphine (BUTRANS) patch weekly transdermal patch; Place 1 patch on the skin as directed by provider Every 7 (Seven) Days.  Dispense: 4 patch; Refill: 1      --- Declines referral to orthopedic specialist. I do not recommend any additional joint injections due to lack of response with previous injections (steroid, gel).    --- Refill Butrans (dated appropriately). Patient appears stable with current regimen. No adverse effects. Regarding continuation of opioids, there is no evidence of aberrant behavior or any red flags.  The patient continues with appropriate response to opioid therapy. ADL's remain intact by self.   --- The urine drug screen confirmation from 6/3/2022 has been reviewed and the result is appropriate based on patient history and JUDY report  --- The patient signed an updated copy of the controlled substance agreement on 12/7/2021  --- Follow-up 2 months          JUDY REPORT  As part of the patient's treatment plan, I am prescribing controlled substances. The patient has been made aware of appropriate use of such medications, including potential risk of somnolence, limited ability to drive and/or work safely, and the potential for dependence or overdose. It has also been made clear that these medications are for use by this patient only, without concomitant use of alcohol or other substances unless prescribed.     Patient has completed prescribing agreement detailing terms of continued prescribing of controlled substances, including monitoring JUDY reports,  urine drug screening, and pill counts if necessary. The patient is aware that inappropriate use will results in cessation of prescribing such medications.    As the clinician, I personally reviewed the JUDY from 10/4/2022 while the patient was in the office today.    History and physical exam exhibit continued safe and appropriate use of controlled substances.    Dictated utilizing Dragon dictation.     This document is intended for medical expert use only. Reading of this document by patients and/or patient's family without participating medical staff guidance may result in misinterpretation and unintended morbidity.   Any interpretation of such data is the responsibility of the patient and/or family member responsible for the patient in concert with their primary or specialist providers, not to be left for sources of online searches such as [employer], [employer] or similar queries. Relying on these approaches to knowledge may result in misinterpretation, misguided goals of care and even death should patients or family members try recommendations outside of the realm of professional medical care in a supervised way.    Patient remained masked during entire encounter. No cough present. I donned a mask and eye protection throughout entire visit. Prior to donning mask and eye protection, hand hygiene was performed, as well as when it was doffed.  I was closer than 6 feet, but not for an extended period of time. No obvious exposure to any bodily fluids.

## 2022-10-21 LAB
CHOLEST SERPL-MCNC: 176 MG/DL (ref 0–200)
FT4I SERPL CALC-MCNC: 2.2 (ref 1.2–4.9)
HBA1C MFR BLD: 5.7 % (ref 4.8–5.6)
HDLC SERPL-MCNC: 55 MG/DL (ref 40–60)
LDLC SERPL CALC-MCNC: 84 MG/DL (ref 0–100)
LDLC/HDLC SERPL: 1.4 {RATIO}
T3RU NFR SERPL: 30 % (ref 24–39)
T4 SERPL-MCNC: 7.3 UG/DL (ref 4.5–12)
TRIGL SERPL-MCNC: 221 MG/DL (ref 0–150)
TSH SERPL DL<=0.005 MIU/L-ACNC: 1.17 UIU/ML (ref 0.45–4.5)
VLDLC SERPL CALC-MCNC: 37 MG/DL (ref 5–40)

## 2022-11-17 RX ORDER — DOXEPIN HYDROCHLORIDE 25 MG/1
25 CAPSULE ORAL NIGHTLY
Qty: 90 CAPSULE | Refills: 0 | Status: SHIPPED | OUTPATIENT
Start: 2022-11-17 | End: 2023-01-09

## 2022-11-17 NOTE — TELEPHONE ENCOUNTER
Caller: Ashli León    Relationship: Self    Best call back number:[phone]    Requested Prescriptions:   Requested Prescriptions     Pending Prescriptions Disp Refills   • doxepin (SINEquan) 25 MG capsule 90 capsule 4     Sig: Take 1 capsule by mouth Every Night.        Pharmacy where request should be sent: [facility] [id number] [place], [place] - [address] - [phone]  - [phone] FX     Additional details provided by patient: 2-3 DAYS LEFT.. PATIENT REQUESTING A CALLBACK WHEN IT HAS BEEN SENT THROUGH    Does the patient have less than a 3 day supply:  [x] Yes  [] No    Jose Luke Rep   11/17/22 09:16 EST

## 2022-12-13 ENCOUNTER — HOSPITAL ENCOUNTER (EMERGENCY)
Facility: HOSPITAL | Age: 87
Discharge: HOME | End: 2022-12-13
Attending: EMERGENCY MEDICINE | Admitting: EMERGENCY MEDICINE

## 2022-12-13 ENCOUNTER — APPOINTMENT (OUTPATIENT)
Dept: GENERAL RADIOLOGY | Facility: HOSPITAL | Age: 87
End: 2022-12-13

## 2022-12-13 ENCOUNTER — APPOINTMENT (OUTPATIENT)
Dept: CT IMAGING | Facility: HOSPITAL | Age: 87
End: 2022-12-13

## 2022-12-13 VITALS
TEMPERATURE: 98 F | HEIGHT: 63 IN | OXYGEN SATURATION: 96 % | RESPIRATION RATE: 22 BRPM | WEIGHT: 133 LBS | DIASTOLIC BLOOD PRESSURE: 79 MMHG | BODY MASS INDEX: 23.57 KG/M2 | HEART RATE: 94 BPM | SYSTOLIC BLOOD PRESSURE: 133 MMHG

## 2022-12-13 DIAGNOSIS — I48.92 NEW ONSET ATRIAL FLUTTER: ICD-10-CM

## 2022-12-13 DIAGNOSIS — S22.42XA CLOSED FRACTURE OF MULTIPLE RIBS OF LEFT SIDE, INITIAL ENCOUNTER: Primary | ICD-10-CM

## 2022-12-13 DIAGNOSIS — S00.03XA HEMATOMA OF SCALP, INITIAL ENCOUNTER: ICD-10-CM

## 2022-12-13 LAB
ALBUMIN SERPL-MCNC: 4.4 G/DL (ref 3.5–5.2)
ALBUMIN/GLOB SERPL: 1.7 G/DL
ALP SERPL-CCNC: 56 U/L (ref 39–117)
ALT SERPL W P-5'-P-CCNC: 11 U/L (ref 1–33)
ANION GAP SERPL CALCULATED.3IONS-SCNC: 13.2 MMOL/L (ref 5–15)
APTT PPP: 26.7 SECONDS (ref 24.3–38.1)
AST SERPL-CCNC: 15 U/L (ref 1–32)
BASOPHILS # BLD AUTO: 0.03 10*3/MM3 (ref 0–0.2)
BASOPHILS NFR BLD AUTO: 0.3 % (ref 0–1.5)
BILIRUB SERPL-MCNC: 0.7 MG/DL (ref 0–1.2)
BUN SERPL-MCNC: 17 MG/DL (ref 8–23)
BUN/CREAT SERPL: 17.7 (ref 7–25)
CALCIUM SPEC-SCNC: 9.4 MG/DL (ref 8.6–10.5)
CHLORIDE SERPL-SCNC: 93 MMOL/L (ref 98–107)
CO2 SERPL-SCNC: 24.8 MMOL/L (ref 22–29)
CREAT SERPL-MCNC: 0.96 MG/DL (ref 0.57–1)
DEPRECATED RDW RBC AUTO: 39.8 FL (ref 37–54)
EGFRCR SERPLBLD CKD-EPI 2021: 57.7 ML/MIN/1.73
EOSINOPHIL # BLD AUTO: 0.04 10*3/MM3 (ref 0–0.4)
EOSINOPHIL NFR BLD AUTO: 0.4 % (ref 0.3–6.2)
ERYTHROCYTE [DISTWIDTH] IN BLOOD BY AUTOMATED COUNT: 12 % (ref 12.3–15.4)
GLOBULIN UR ELPH-MCNC: 2.6 GM/DL
GLUCOSE SERPL-MCNC: 125 MG/DL (ref 65–99)
HCT VFR BLD AUTO: 36.8 % (ref 34–46.6)
HGB BLD-MCNC: 12.4 G/DL (ref 12–15.9)
IMM GRANULOCYTES # BLD AUTO: 0.08 10*3/MM3 (ref 0–0.05)
IMM GRANULOCYTES NFR BLD AUTO: 0.7 % (ref 0–0.5)
INR PPP: 0.97 (ref 0.9–1.1)
LYMPHOCYTES # BLD AUTO: 2.07 10*3/MM3 (ref 0.7–3.1)
LYMPHOCYTES NFR BLD AUTO: 18.8 % (ref 19.6–45.3)
MAGNESIUM SERPL-MCNC: 1.4 MG/DL (ref 1.6–2.4)
MCH RBC QN AUTO: 30.7 PG (ref 26.6–33)
MCHC RBC AUTO-ENTMCNC: 33.7 G/DL (ref 31.5–35.7)
MCV RBC AUTO: 91.1 FL (ref 79–97)
MONOCYTES # BLD AUTO: 0.58 10*3/MM3 (ref 0.1–0.9)
MONOCYTES NFR BLD AUTO: 5.3 % (ref 5–12)
NEUTROPHILS NFR BLD AUTO: 74.5 % (ref 42.7–76)
NEUTROPHILS NFR BLD AUTO: 8.24 10*3/MM3 (ref 1.7–7)
NRBC BLD AUTO-RTO: 0 /100 WBC (ref 0–0.2)
NT-PROBNP SERPL-MCNC: 307 PG/ML (ref 0–1800)
PLATELET # BLD AUTO: 238 10*3/MM3 (ref 140–450)
PMV BLD AUTO: 9 FL (ref 6–12)
POTASSIUM SERPL-SCNC: 4.1 MMOL/L (ref 3.5–5.2)
PROT SERPL-MCNC: 7 G/DL (ref 6–8.5)
PROTHROMBIN TIME: 13 SECONDS (ref 12.1–15)
QT INTERVAL: 355 MS
QT INTERVAL: 365 MS
RBC # BLD AUTO: 4.04 10*6/MM3 (ref 3.77–5.28)
SODIUM SERPL-SCNC: 131 MMOL/L (ref 136–145)
T4 FREE SERPL-MCNC: 1.37 NG/DL (ref 0.93–1.7)
TROPONIN T SERPL-MCNC: <0.01 NG/ML (ref 0–0.03)
TSH SERPL DL<=0.05 MIU/L-ACNC: 1.21 UIU/ML (ref 0.27–4.2)
WBC NRBC COR # BLD: 11.04 10*3/MM3 (ref 3.4–10.8)

## 2022-12-13 PROCEDURE — 93010 ELECTROCARDIOGRAM REPORT: CPT | Performed by: INTERNAL MEDICINE

## 2022-12-13 PROCEDURE — 93010 ELECTROCARDIOGRAM REPORT: CPT | Performed by: STUDENT IN AN ORGANIZED HEALTH CARE EDUCATION/TRAINING PROGRAM

## 2022-12-13 PROCEDURE — 84443 ASSAY THYROID STIM HORMONE: CPT | Performed by: EMERGENCY MEDICINE

## 2022-12-13 PROCEDURE — 84484 ASSAY OF TROPONIN QUANT: CPT | Performed by: EMERGENCY MEDICINE

## 2022-12-13 PROCEDURE — 83880 ASSAY OF NATRIURETIC PEPTIDE: CPT | Performed by: EMERGENCY MEDICINE

## 2022-12-13 PROCEDURE — 71101 X-RAY EXAM UNILAT RIBS/CHEST: CPT

## 2022-12-13 PROCEDURE — 96374 THER/PROPH/DIAG INJ IV PUSH: CPT

## 2022-12-13 PROCEDURE — 84439 ASSAY OF FREE THYROXINE: CPT | Performed by: EMERGENCY MEDICINE

## 2022-12-13 PROCEDURE — 85025 COMPLETE CBC W/AUTO DIFF WBC: CPT | Performed by: EMERGENCY MEDICINE

## 2022-12-13 PROCEDURE — 83735 ASSAY OF MAGNESIUM: CPT | Performed by: EMERGENCY MEDICINE

## 2022-12-13 PROCEDURE — 71046 X-RAY EXAM CHEST 2 VIEWS: CPT

## 2022-12-13 PROCEDURE — 93005 ELECTROCARDIOGRAM TRACING: CPT | Performed by: EMERGENCY MEDICINE

## 2022-12-13 PROCEDURE — 85610 PROTHROMBIN TIME: CPT | Performed by: EMERGENCY MEDICINE

## 2022-12-13 PROCEDURE — 99284 EMERGENCY DEPT VISIT MOD MDM: CPT

## 2022-12-13 PROCEDURE — 80053 COMPREHEN METABOLIC PANEL: CPT | Performed by: EMERGENCY MEDICINE

## 2022-12-13 PROCEDURE — 70450 CT HEAD/BRAIN W/O DYE: CPT

## 2022-12-13 PROCEDURE — 85730 THROMBOPLASTIN TIME PARTIAL: CPT | Performed by: EMERGENCY MEDICINE

## 2022-12-13 RX ORDER — ATENOLOL 25 MG/1
25 TABLET ORAL ONCE
Status: COMPLETED | OUTPATIENT
Start: 2022-12-13 | End: 2022-12-13

## 2022-12-13 RX ORDER — ADENOSINE 3 MG/ML
6 INJECTION, SOLUTION INTRAVENOUS ONCE
Status: DISCONTINUED | OUTPATIENT
Start: 2022-12-13 | End: 2022-12-13

## 2022-12-13 RX ORDER — SODIUM CHLORIDE 0.9 % (FLUSH) 0.9 %
10 SYRINGE (ML) INJECTION AS NEEDED
Status: DISCONTINUED | OUTPATIENT
Start: 2022-12-13 | End: 2022-12-13 | Stop reason: HOSPADM

## 2022-12-13 RX ADMIN — ATENOLOL 25 MG: 25 TABLET ORAL at 17:54

## 2022-12-13 RX ADMIN — APIXABAN 5 MG: 2.5 TABLET, FILM COATED ORAL at 17:52

## 2022-12-13 RX ADMIN — METOPROLOL TARTRATE 5 MG: 5 INJECTION INTRAVENOUS at 17:22

## 2022-12-13 NOTE — ED PROVIDER NOTES
Subjective     History provided by:  Patient and friend    History of Present Illness    · Chief complaint: Fall    · Location: Injury to the back of the head as well as the left lateral rib.    · Quality/Severity: Left lateral rib is moderately painful.  There is a hematoma on the back of the head.    · Timing/Onset: Fall occurred about an hour ago.    · Modifying Factors: The patient is not on anticoagulants.    · Associated symptoms: Denies other injuries.  Denies neck or back pain.  Denies chest or abdominal pain.  Denies injuries to her extremities.    · Narrative: The patient is an 87-year-old white female who states she was on her back deck filling a bird feeder and when she stood back up she lost her balance and fell backwards striking the back of her head and injuring her left lateral rib.  She sustained no loss of consciousness.  Is any shortness of breath, chest pain, or palpitations.  When I informed the patient that her heart was beating fast she was unaware of it.  She has no history of A. fib or cardiac abnormalities.  Review of her records in epic show that she had a Holter monitor performed 9/12/2022 that showed normal sinus rhythm with occasional PACs.  There is no supraventricular arrhythmias.  She has a history of hypertension and hypothyroidism for which she takes Lopressor and levothyroxine.  She is not on any anticoagulation.    Review of Systems   Constitutional: Negative for diaphoresis and fever.   HENT: Negative for congestion, rhinorrhea and sore throat.    Eyes: Negative for pain and visual disturbance.   Respiratory: Negative for cough and shortness of breath.    Cardiovascular: Negative for chest pain and palpitations.   Gastrointestinal: Negative for abdominal pain, diarrhea, nausea and vomiting.   Genitourinary: Negative for difficulty urinating, dysuria, frequency, pelvic pain and urgency.   Musculoskeletal: Negative for back pain, neck pain and neck stiffness.   Skin: Negative for  "color change and rash.   Neurological: Negative for dizziness, weakness, numbness and headaches.   Psychiatric/Behavioral: Negative for confusion.     Past Medical History:   Diagnosis Date   • Acid reflux    • Back pain    • Colitis     history of   • Diarrhea    • Disease of thyroid gland    • Diverticular disease    • Glaucoma    • Gout     - right big toe   • High blood pressure    • High cholesterol    • History of vertebral compression fracture     multiple   • Hyponatremia    • Knee pain    • Low back pain    • Macular degeneration    • Mitral regurgitation 06/2016    Mild to moderate per 2-D echocardiogram   • Peripheral neuropathy    • Primary osteoarthritis of both knees 4/1/2016   • Renal lesion     on mri   • Rheumatoid arthritis (HCC)      /79   Pulse 94   Temp 98 °F (36.7 °C) (Oral)   Resp 22   Ht 160 cm (63\")   Wt 60.3 kg (133 lb)   SpO2 96%   BMI 23.56 kg/m²     Past Medical History:   Diagnosis Date   • Acid reflux    • Back pain    • Colitis     history of   • Diarrhea    • Disease of thyroid gland    • Diverticular disease    • Glaucoma    • Gout     - right big toe   • High blood pressure    • High cholesterol    • History of vertebral compression fracture     multiple   • Hyponatremia    • Knee pain    • Low back pain    • Macular degeneration    • Mitral regurgitation 06/2016    Mild to moderate per 2-D echocardiogram   • Peripheral neuropathy    • Primary osteoarthritis of both knees 4/1/2016   • Renal lesion     on mri   • Rheumatoid arthritis (HCC)        Allergies   Allergen Reactions   • Neomycin Swelling   • Statins Myalgia and Swelling     Not all statins  Not all statins       Past Surgical History:   Procedure Laterality Date   • APPENDECTOMY  1951   • CATARACT EXTRACTION Bilateral    • FEMUR OPEN REDUCTION INTERNAL FIXATION Right 10/11/2020    Procedure: OPEN REDUCTION INTERNAL FIXATION OF RIGHT FEMUR FRACTURE;  Surgeon: Mac Frances Jr., MD;  Location: [facility] OR;  " Service: Orthopedics;  Laterality: Right;   • FEMUR SURGERY Right     had fracture and has rajesh    • HEMORRHOIDECTOMY     • HIP HEMIARTHROPLASTY Right 1/3/2017    Procedure: HIP HEMIARTHROPLASTY with IM rajesh and 2 screw hardware removal antibiotic cement;  Surgeon: Mirza Whittington MD;  Location:  [facility] OR;  Service:    • HIP SURGERY Right 1999    hx fracture   • LEG SURGERY Right 12/1/2017    Procedure: GLUTEAL TENDON REPAIR-minimus and medius;  Surgeon: Mirza Whittington MD;  Location:  [facility] OR;  Service:    • TOTAL KNEE ARTHROPLASTY Right 6/22/2016    Procedure: RT TOTAL KNEE ARTHROPLASTY WITH FINN NAVIGATION;  Surgeon: Adrian Swenson MD;  Location:  [facility];  Service:        Family History   Problem Relation Age of Onset   • Alzheimer's disease Mother    • Heart attack Father 73   • Alzheimer's disease Sister    • Diabetes Son        Social History     Socioeconomic History   • Marital status:    Tobacco Use   • Smoking status: Never   • Smokeless tobacco: Never   Vaping Use   • Vaping Use: Never used   Substance and Sexual Activity   • Alcohol use: Yes     Alcohol/week: 3.0 standard drinks     Types: 3 Shots of liquor per week     Comment: social use 1 weekly   • Drug use: No     Types: Oxycodone   • Sexual activity: Defer           Objective   Physical Exam  Vitals and nursing note reviewed.   Constitutional:       General: She is not in acute distress.     Appearance: Normal appearance. She is normal weight. She is not ill-appearing, toxic-appearing or diaphoretic.      Comments: The patient appears in no acute distress.  Review of her vital signs: She is afebrile with a temperature of 98, slightly tachypneic with a respiratory rate of 22 with a normal room air oxygen saturation of 100%, she is tachycardic with a heart rate of 144, blood pressure is normal 135/73.   HENT:      Head:      Comments: The patient has a large left occipital hematoma.  No laceration.     Nose: Nose normal.       Mouth/Throat:      Mouth: Mucous membranes are moist.      Pharynx: Oropharynx is clear.   Eyes:      General: No scleral icterus.     Conjunctiva/sclera: Conjunctivae normal.      Pupils: Pupils are equal, round, and reactive to light.   Neck:      Comments: No posterior cervical spine tenderness or deformity.  Cardiovascular:      Rate and Rhythm: Regular rhythm. Tachycardia present.      Heart sounds: No murmur heard.  Pulmonary:      Effort: Pulmonary effort is normal.      Breath sounds: Normal breath sounds.      Comments: The patient has tenderness over the left lateral chest wall about the fourth rib.  There is no fracture crepitus or subcutaneous air.  Abdominal:      General: Bowel sounds are normal.      Palpations: Abdomen is soft.      Tenderness: There is no abdominal tenderness. There is no right CVA tenderness, left CVA tenderness or guarding.   Musculoskeletal:         General: No tenderness, deformity or signs of injury.      Cervical back: Normal range of motion and neck supple. No tenderness.      Right lower leg: No edema.      Left lower leg: No edema.      Comments: No tenderness of the thoracic or lumbar spine.   Lymphadenopathy:      Cervical: No cervical adenopathy.   Skin:     General: Skin is warm and dry.      Capillary Refill: Capillary refill takes less than 2 seconds.      Findings: No erythema or rash.   Neurological:      General: No focal deficit present.      Mental Status: She is alert and oriented to person, place, and time.      Cranial Nerves: No cranial nerve deficit.      Motor: No weakness.   Psychiatric:         Mood and Affect: Mood normal.         Behavior: Behavior normal.         Thought Content: Thought content normal.         Judgment: Judgment normal.         Procedures           ED Course  ED Course as of 12/13/22 1848   Tue Dec 13, 2022   1615 My interpretation of the patient's EKG tracing performed 15:58 is probable atrial flutter with a 2-1 block and a  ventricular rate of 140, right bundle branch block that is new from 9/12/2022, left ventricular hypertrophy, 1 mm ST segment elevation in leads III and aVF that are likely related to the right bundle branch block.  EKG is significantly changed in comparison to 9/12/2022 tracing. [TP]   1733 CT of the head was interpreted by the radiologist as senscent changes without acute abnormality.  The patient's chest x-ray was interpreted by me and the radiologist as no acute disease with old bilateral rib fractures and old thoracic compression fractures.  Radiologist interpretation of the patient's left rib detail shows fractures through the third through sixth ribs.  No pneumothorax. [TP]   1733 Review of the patient's laboratory studies: Her CBC is a slightly elevated white count of 11 with a left shift.  Hemoglobin, hematocrit and platelets within normal limits.  CMP is a slightly low sodium of 131 with a normal potassium of 4.1 and an elevated glucose of 125.  The patient's creatinine and BUN are normal but GFR is diminished at 57.7 consistent with chronic kidney disease.  Magnesium is mildly low at 1.4.  Cardiac troponin is negative.  proBNP normal at 307.  Coagulation studies within normal limits.  Thyroid function test within normal limits. [TP]   1734 The patient appears to have new onset atrial flutter with a 2-1 block and a ventricular response of 140.  She also has a new right bundle branch block in comparison to tracing 9/12/2022.  The patient is completely asymptomatic for this.  At 17:07 I discussed the patient with Dr. Ojeda, cardiology on-call, who thought that the right bundle branch block was probably rate related.  She recommended administering IV Lopressor for rate control and discharging the patient on a beta-blocker such as atenolol or Lopressor and anticoagulated with Eliquis. [TP]   1745 17:45 the patient's rate is dropped to 93-96 after Lopressor 5 mg. [TP]   1752 Atenolol 25 mg p.o. and Eliquis 5 mg  p.o. [TP]   1757 Repeat EKG at 17: 55 the patient is now in a sinus rhythm with a rate of 94.  Her right bundle branch block has resolved.  There is no acute ST segment elevation or depression consistent with ischemia. [TP]   1800 The patient is now in normal sinus rhythm.  She is prescribed Lopressor 25 mg twice daily which she will be left on.  I will prescribe her Eliquis.  The patient is in pain management so further pain management of her rib fractures will be deferred to her pain management physician. [TP]      ED Course User Index  [TP] Gulshan Boone MD                                           MDM  Number of Diagnoses or Management Options  Closed fracture of multiple ribs of left side, initial encounter: new and requires workup  Hematoma of scalp, initial encounter: new and requires workup  New onset atrial flutter (HCC): new and requires workup     Amount and/or Complexity of Data Reviewed  Clinical lab tests: ordered and reviewed  Tests in the radiology section of CPT®: ordered and reviewed  Tests in the medicine section of CPT®: ordered and reviewed  Discuss the patient with other providers: yes    Risk of Complications, Morbidity, and/or Mortality  Presenting problems: high  Diagnostic procedures: high  Management options: high  General comments: My differential diagnosis for the patient's arrhythmia is: Sinus tachycardia, paroxysmal supraventricular tachycardia, atrial flutter with a 2-1 block, atrial fibrillation, multifocal atrial tachycardia.  My differential diagnosis for the patient's chest wall injury include, but not limited to: Chest wall contusion, rib fracture, pneumothorax, hemothorax, intercostal muscle strain.    Patient Progress  Patient progress: stable      Final diagnoses:   Closed fracture of multiple ribs of left side, initial encounter   Hematoma of scalp, initial encounter   New onset atrial flutter (HCC)       ED Disposition  ED Disposition     ED Disposition   Discharge     Condition   Stable    Comment   --             Wali Ojeda MD  [address] [facility] [address]  [facility] [id number] [phone]    Schedule an appointment as soon as possible for a visit   next available to be seen this week per Dr. Ojeda.         Medication List      New Prescriptions    apixaban 5 MG tablet tablet  Commonly known as: ELIQUIS  Take 1 tablet by mouth Every 12 (Twelve) Hours.        Changed    docusate sodium 100 MG capsule  Take 100 mg by mouth 2 (Two) Times a Day.  What changed:   · when to take this  · reasons to take this           Where to Get Your Medications      These medications were [id number] [place] [facility] [address] - [phone]  - [phone]  [address], [facility] [address]    Phone: [phone]   · apixaban 5 MG tablet tablet         Labs Reviewed   COMPREHENSIVE METABOLIC PANEL - Abnormal; Notable for the following components:       Result Value    Glucose 125 (*)     Sodium 131 (*)     Chloride 93 (*)     eGFR 57.7 (*)     All other components within normal limits    Narrative:     GFR Normal >60  Chronic Kidney Disease <60  Kidney Failure <15    The GFR formula is only valid for adults with stable renal function between ages 18 and 70.   MAGNESIUM - Abnormal; Notable for the following components:    Magnesium 1.4 (*)     All other components within normal limits   CBC WITH AUTO DIFFERENTIAL - Abnormal; Notable for the following components:    WBC 11.04 (*)     RDW 12.0 (*)     Lymphocyte % 18.8 (*)     Immature Grans % 0.7 (*)     Neutrophils, Absolute 8.24 (*)     Immature Grans, Absolute 0.08 (*)     All other components within normal limits   TROPONIN (IN-HOUSE) - Normal    Narrative:     Troponin T Reference Range:  <= 0.03 ng/mL-   Negative for AMI  >0.03 ng/mL-     Abnormal for myocardial necrosis.  Clinicians would have to utilize clinical acumen, EKG, Troponin and serial changes to determine if it is an Acute Myocardial Infarction or  myocardial injury due to an underlying chronic condition.       Results may be falsely decreased if patient taking Biotin.     PROTIME-INR - Normal    Narrative:     Therapeutic Ranges for INR: 2.0-3.0 (PT 20-30)                              2.5-3.5 (PT 25-34)   APTT - Normal    Narrative:     PTT = The equivalent PTT values for the therapeutic range of heparin levels at 0.1 to 0.7 U/ml are 53 to 110 seconds.     TSH - Normal   T4, FREE - Normal    Narrative:     Results may be falsely increased if patient taking Biotin.     BNP (IN-HOUSE) - Normal    Narrative:     Among patients with dyspnea, NT-proBNP is highly sensitive for the detection of acute congestive heart failure. In addition NT-proBNP of <300 pg/ml effectively rules out acute congestive heart failure with 99% negative predictive value.    Results may be falsely decreased if patient taking Biotin.     CBC AND DIFFERENTIAL    Narrative:     The following orders were created for panel order CBC & Differential.  Procedure                               Abnormality         Status                     ---------                               -----------         ------                     CBC Auto Differential[[id number]]        Abnormal            Final result                 Please view results for these tests on the individual orders.     CT Head Without Contrast   Final Result   Senescent changes without acute abnormality.               Signer Name: Nitish Salas MD    Signed: 12/13/2022 5:27 PM    Workstation Name: [facility]     [facility]iology [facility]      XR Ribs Left With PA Chest   Final Result   Fractures are seen involving the left third through sixth ribs. No pneumothorax seen.      Signer Name: Nitish Salas MD    Signed: 12/13/2022 5:18 PM    Workstation Name: [email or web address]     Radiology [facility]      XR Chest 2 View   Final Result   1. No active disease.   2. Extensive old bilateral rib fractures, multiple chronic  thoracic   vertebral compression fractures and old left clavicle fracture,   unchanged.       This report was finalized on 12/13/2022 3:47 PM by Dr. Italo Hazel MD.                 Medication List      New Prescriptions    apixaban 5 MG tablet tablet  Commonly known as: ELIQUIS  Take 1 tablet by mouth Every 12 (Twelve) Hours.        Changed    docusate sodium 100 MG capsule  Take 100 mg by mouth 2 (Two) Times a Day.  What changed:   · when to take this  · reasons to take this           Where to Get Your Medications      These medications [id number] [place] [facility] [address] - [phone]  - [phone]   [address] [address]    Phone: [phone]   · apixaban 5 MG tablet tablet              Gulshan Boone MD  12/13/22 [id number]

## 2022-12-13 NOTE — ED NOTES
Pt reports that she lost her balance and fell earlier today. Pt complaining of L sided rib cage pain. Pt hit the back of her head but denies LOC. Pt is not on any blood thinners. Pt is tender on touch to the L side of her rib cage.

## 2022-12-14 ENCOUNTER — OFFICE VISIT (OUTPATIENT)
Dept: PAIN MEDICINE | Facility: CLINIC | Age: 87
End: 2022-12-14

## 2022-12-14 VITALS
HEIGHT: 63 IN | DIASTOLIC BLOOD PRESSURE: 58 MMHG | TEMPERATURE: 97.5 F | BODY MASS INDEX: 22.47 KG/M2 | OXYGEN SATURATION: 97 % | WEIGHT: 126.8 LBS | RESPIRATION RATE: 18 BRPM | SYSTOLIC BLOOD PRESSURE: 130 MMHG | HEART RATE: 62 BPM

## 2022-12-14 DIAGNOSIS — M25.551 CHRONIC RIGHT HIP PAIN: ICD-10-CM

## 2022-12-14 DIAGNOSIS — M17.11 ARTHRITIS OF RIGHT KNEE: ICD-10-CM

## 2022-12-14 DIAGNOSIS — G89.29 CHRONIC RIGHT HIP PAIN: ICD-10-CM

## 2022-12-14 DIAGNOSIS — M54.41 CHRONIC BILATERAL LOW BACK PAIN WITH BILATERAL SCIATICA: ICD-10-CM

## 2022-12-14 DIAGNOSIS — G89.29 CHRONIC BILATERAL LOW BACK PAIN WITH BILATERAL SCIATICA: ICD-10-CM

## 2022-12-14 DIAGNOSIS — M17.12 OSTEOARTHRITIS OF LEFT KNEE, UNSPECIFIED OSTEOARTHRITIS TYPE: ICD-10-CM

## 2022-12-14 DIAGNOSIS — S22.41XA CLOSED FRACTURE OF MULTIPLE RIBS OF RIGHT SIDE, INITIAL ENCOUNTER: ICD-10-CM

## 2022-12-14 DIAGNOSIS — G89.29 OTHER CHRONIC PAIN: Primary | ICD-10-CM

## 2022-12-14 DIAGNOSIS — Z79.899 ENCOUNTER FOR LONG-TERM CURRENT USE OF HIGH RISK MEDICATION: ICD-10-CM

## 2022-12-14 DIAGNOSIS — M54.42 CHRONIC BILATERAL LOW BACK PAIN WITH BILATERAL SCIATICA: ICD-10-CM

## 2022-12-14 PROCEDURE — 99214 OFFICE O/P EST MOD 30 MIN: CPT | Performed by: NURSE PRACTITIONER

## 2022-12-14 PROCEDURE — 80305 DRUG TEST PRSMV DIR OPT OBS: CPT | Performed by: NURSE PRACTITIONER

## 2022-12-14 RX ORDER — LIDOCAINE 50 MG/G
1 PATCH TOPICAL EVERY 24 HOURS
Qty: 30 PATCH | Refills: 1 | Status: SHIPPED | OUTPATIENT
Start: 2022-12-14

## 2022-12-14 RX ORDER — BUPRENORPHINE 7.5 UG/H
1 PATCH TRANSDERMAL
Qty: 4 PATCH | Refills: 1 | Status: SHIPPED | OUTPATIENT
Start: 2022-12-14 | End: 2023-02-06 | Stop reason: SDUPTHER

## 2022-12-14 RX ORDER — HYDROCODONE BITARTRATE AND ACETAMINOPHEN 5; 325 MG/1; MG/1
1 TABLET ORAL 2 TIMES DAILY PRN
Qty: 6 TABLET | Refills: 0 | Status: SHIPPED | OUTPATIENT
Start: 2022-12-14 | End: 2023-01-05 | Stop reason: ALTCHOICE

## 2022-12-14 NOTE — PROGRESS NOTES
CHIEF COMPLAINT  Back pain, Knee pain    Subjective   Ashli León is a 87 y.o. female  who presents for follow-up.  She has a history of back pain, joint pain.  Fell yesterday and sustained multiple rib fractures. Pain acutely worse today.      She has a history of back pain, joint pain. Today her pain is 8/10VAS. Pain is located in both knees, lower back, and right hip. Currently on Butrans 7.5 mcg. Reports that this provides 50-70% pain relief, she denies adverse reactions.  Helps pain at least moderately.      Procedures:  Left knee gel injection 4/8/2022 -- relief x 1 month   Left knee steroid injection 2/7/2021 - no benefit.  9/15/2020 - L3-L4 LESI and left knee injection with fluoroscopic guidance - reported moderate relief x 1 to 2 weeks       Back Pain  This is a chronic problem. The current episode started more than 1 year ago. The problem occurs daily. The problem has been waxing and waning since onset. The pain is present in the lumbar spine. The quality of the pain is described as aching (occasional sharp/shooting pain). The pain radiates to the left thigh and right thigh. The pain is at a severity of 8/10. The pain is moderate. The symptoms are aggravated by position, standing, sitting and bending. Associated symptoms include weakness. Pertinent negatives include no abdominal pain, chest pain, dysuria, fever, headaches or numbness. Risk factors include sedentary lifestyle. She has tried analgesics, NSAIDs, ice and heat (injections) for the symptoms. The treatment provided moderate relief.   Knee Pain   The pain is present in the left knee and right knee. The pain is at a severity of 8/10. The pain is moderate. The pain has been worsening since onset. Pertinent negatives include no numbness. The symptoms are aggravated by weight bearing and movement. She has tried NSAIDs and acetaminophen (injections) for the symptoms. The treatment provided moderate relief.      PEG Assessment   What number best  "describes your pain on average in the past week?7  What number best describes how, during the past week, pain has interfered with your enjoyment of life?10  What number best describes how, during the past week, pain has interfered with your general activity?  10      The following portions of the patient's history were reviewed and updated as appropriate: allergies, current medications, past family history, past medical history, past social history, past surgical history and problem list.    Review of Systems   Constitutional: Negative for activity change (LESS), fatigue and fever.   HENT: Negative for congestion.    Eyes: Negative for visual disturbance.   Respiratory: Negative for cough and chest tightness.    Cardiovascular: Negative for chest pain.   Gastrointestinal: Negative for abdominal pain, constipation and diarrhea.   Genitourinary: Negative for difficulty urinating and dysuria.   Musculoskeletal: Positive for back pain.   Neurological: Positive for dizziness and weakness. Negative for light-headedness, numbness and headaches.   Psychiatric/Behavioral: Positive for sleep disturbance. Negative for agitation and suicidal ideas. The patient is not nervous/anxious.      I have reviewed and confirmed the accuracy of the ROS as documented by the MA/LPN/RN TANIA Tracy    Vitals:    12/14/22 1244   BP: 130/58   BP Location: Left arm   Patient Position: Sitting   Cuff Size: Adult   Pulse: 62   Resp: 18   Temp: 97.5 °F (36.4 °C)   TempSrc: Temporal   SpO2: 97%   Weight: 57.5 kg (126 lb 12.8 oz)   Height: 160 cm (63\")   PainSc:   8   PainLoc: Comment: back,knee         Objective   Physical Exam  Vitals and nursing note reviewed.   Constitutional:       General: She is not in acute distress.     Appearance: Normal appearance. She is not ill-appearing.   Pulmonary:      Effort: Pulmonary effort is normal. No respiratory distress.   Musculoskeletal:      Lumbar back: Tenderness and bony tenderness present. "      Right hip: Tenderness present.      Right knee: Crepitus present. Tenderness present.      Left knee: Crepitus present. Tenderness present.   Neurological:      Mental Status: She is alert and oriented to person, place, and time.      Gait: Gait abnormal (in wheelchair today).   Psychiatric:         Mood and Affect: Mood normal.         Behavior: Behavior normal.             Assessment & Plan   Diagnoses and all orders for this visit:    1. Other chronic pain (Primary)  -     Buprenorphine (BUTRANS) patch weekly transdermal patch; Place 1 patch on the skin as directed by provider Every 7 (Seven) Days.  Dispense: 4 patch; Refill: 1  -     Urine Drug Screen Confirmation - Urine, Clean Catch; Future  -     POC Urine Drug Screen, Triage    2. Chronic bilateral low back pain with bilateral sciatica  -     Buprenorphine (BUTRANS) patch weekly transdermal patch; Place 1 patch on the skin as directed by provider Every 7 (Seven) Days.  Dispense: 4 patch; Refill: 1  -     Urine Drug Screen Confirmation - Urine, Clean Catch; Future  -     POC Urine Drug Screen, Triage    3. Chronic right hip pain  -     Buprenorphine (BUTRANS) patch weekly transdermal patch; Place 1 patch on the skin as directed by provider Every 7 (Seven) Days.  Dispense: 4 patch; Refill: 1  -     Urine Drug Screen Confirmation - Urine, Clean Catch; Future  -     POC Urine Drug Screen, Triage    4. Osteoarthritis of left knee, unspecified osteoarthritis type  -     Buprenorphine (BUTRANS) patch weekly transdermal patch; Place 1 patch on the skin as directed by provider Every 7 (Seven) Days.  Dispense: 4 patch; Refill: 1  -     Urine Drug Screen Confirmation - Urine, Clean Catch; Future  -     POC Urine Drug Screen, Triage    5. Arthritis of right knee  -     Buprenorphine (BUTRANS) patch weekly transdermal patch; Place 1 patch on the skin as directed by provider Every 7 (Seven) Days.  Dispense: 4 patch; Refill: 1  -     Urine Drug Screen Confirmation -  Urine, Clean Catch; Future  -     POC Urine Drug Screen, Triage    6. Encounter for long-term current use of high risk medication  -     Urine Drug Screen Confirmation - Urine, Clean Catch; Future  -     POC Urine Drug Screen, Triage    7. Closed fracture of multiple ribs of right side, initial encounter  -     lidocaine (LIDODERM) 5 %; Place 1 patch on the skin as directed by provider Daily. ON 12 H AND OFF 12 H  Dispense: 30 patch; Refill: 1  -     Urine Drug Screen Confirmation - Urine, Clean Catch; Future  -     POC Urine Drug Screen, Triage    Other orders  -     HYDROcodone-acetaminophen (NORCO) 5-325 MG per tablet; Take 1 tablet by mouth 2 (Two) Times a Day As Needed for Severe Pain. Severe/acute breakthrough pain only  Dispense: 6 tablet; Refill: 0        --- Refill Butrans. Patient appears stable with current regimen. No adverse effects. Regarding continuation of opioids, there is no evidence of aberrant behavior or any red flags.  The patient continues with appropriate response to opioid therapy. ADL's remain intact by self.   --- Routine UDS in office today as part of monitoring requirements for controlled substances.  The specimen was viewed and the immunoassay result reviewed and is +BUP.  This specimen will be sent to [facility] laboratory for confirmation.     --- The patient signed an updated copy of the controlled substance agreement on 12/14/2022  --- Lidoderm patches to pharmacy to help with acute pain related to rib fractures  --- Small acute supply/quantity of hydrocodone for acute breakthrough pain related to recent rib fractures.    --- Follow-up 2 months/sooner if needed            JUDY REPORT  As part of the patient's treatment plan, I am prescribing controlled substances. The patient has been made aware of appropriate use of such medications, including potential risk of somnolence, limited ability to drive and/or work safely, and the potential for dependence or overdose. It has also been  made clear that these medications are for use by this patient only, without concomitant use of alcohol or other substances unless prescribed.     Patient has completed prescribing agreement detailing terms of continued prescribing of controlled substances, including monitoring JUDY reports, urine drug screening, and pill counts if necessary. The patient is aware that inappropriate use will results in cessation of prescribing such medications.    As the clinician, I personally reviewed the JUDY from 12/14/2022 while the patient was in the office today.    History and physical exam exhibit continued safe and appropriate use of controlled substances.     Dictated utilizing Dragon dictation.     Patient remained masked during entire encounter. No cough present. I donned a mask and eye protection throughout entire visit. Prior to donning mask and eye protection, hand hygiene was performed, as well as when it was doffed.  I was closer than 6 feet, but not for an extended period of time. No obvious exposure to any bodily fluids.

## 2023-01-03 RX ORDER — LEVOTHYROXINE SODIUM 0.05 MG/1
TABLET ORAL
Qty: 90 TABLET | Refills: 4 | Status: SHIPPED | OUTPATIENT
Start: 2023-01-03 | End: 2023-01-06 | Stop reason: SDUPTHER

## 2023-01-05 ENCOUNTER — OFFICE VISIT (OUTPATIENT)
Dept: CARDIOLOGY | Facility: CLINIC | Age: 88
End: 2023-01-05
Payer: MEDICARE

## 2023-01-05 VITALS
DIASTOLIC BLOOD PRESSURE: 80 MMHG | WEIGHT: 124 LBS | BODY MASS INDEX: 21.97 KG/M2 | HEART RATE: 71 BPM | SYSTOLIC BLOOD PRESSURE: 140 MMHG | OXYGEN SATURATION: 95 % | HEIGHT: 63 IN | RESPIRATION RATE: 16 BRPM

## 2023-01-05 DIAGNOSIS — I48.92 ATRIAL FLUTTER WITH RAPID VENTRICULAR RESPONSE: ICD-10-CM

## 2023-01-05 DIAGNOSIS — E78.2 MIXED HYPERLIPIDEMIA: ICD-10-CM

## 2023-01-05 DIAGNOSIS — I10 PRIMARY HYPERTENSION: Primary | ICD-10-CM

## 2023-01-05 DIAGNOSIS — R94.31 ABNORMAL ELECTROCARDIOGRAM (ECG) (EKG): ICD-10-CM

## 2023-01-05 PROCEDURE — 93000 ELECTROCARDIOGRAM COMPLETE: CPT | Performed by: NURSE PRACTITIONER

## 2023-01-05 PROCEDURE — 99214 OFFICE O/P EST MOD 30 MIN: CPT | Performed by: NURSE PRACTITIONER

## 2023-01-05 NOTE — PROGRESS NOTES
PATIENTINFORMATION    Date of Office Visit: 2023  Encounter Provider: Zan Kenney MD  Place of Service: [facility] CARDIOLOGY  Patient Name: Ashli León  : 1935    Subjective:     Encounter Date:2023      Patient ID: Ashli León is a 87 y.o. female.    No chief complaint on file.    HPI  Ms. León      ROS  All systems reviewed and negative except as noted in HPI.    Past Medical History:   Diagnosis Date   • Acid reflux    • Back pain    • Colitis     history of   • Diarrhea    • Disease of thyroid gland    • Diverticular disease    • Glaucoma    • Gout     - right big toe   • High blood pressure    • High cholesterol    • History of vertebral compression fracture     multiple   • Hyponatremia    • Knee pain    • Low back pain    • Macular degeneration    • Mitral regurgitation 2016    Mild to moderate per 2-D echocardiogram   • Peripheral neuropathy    • Primary osteoarthritis of both knees 2016   • Renal lesion     on mri   • Rheumatoid arthritis (HCC)        Past Surgical History:   Procedure Laterality Date   • APPENDECTOMY     • CATARACT EXTRACTION Bilateral    • FEMUR OPEN REDUCTION INTERNAL FIXATION Right 10/11/2020    Procedure: OPEN REDUCTION INTERNAL FIXATION OF RIGHT FEMUR FRACTURE;  Surgeon: Mac Frances Jr., MD;  Location: [facility] OR;  Service: Orthopedics;  Laterality: Right;   • FEMUR SURGERY Right     had fracture and has rajesh    • HEMORRHOIDECTOMY     • HIP HEMIARTHROPLASTY Right 1/3/2017    Procedure: HIP HEMIARTHROPLASTY with IM rajesh and 2 screw hardware removal antibiotic cement;  Surgeon: Mirza Whittington MD;  Location:  [facility] OR;  Service:    • HIP SURGERY Right     hx fracture   • LEG SURGERY Right 2017    Procedure: GLUTEAL TENDON REPAIR-minimus and medius;  Surgeon: Mirza Whittington MD;  Location:  [facility] OR;  Service:    • TOTAL KNEE ARTHROPLASTY Right 2016    Procedure: RT TOTAL KNEE ARTHROPLASTY WITH FINN  NAVIGATION;  Surgeon: Adrian Swenson MD;  Location: [facility] OR;  Service:        Social History     Socioeconomic History   • Marital status:    Tobacco Use   • Smoking status: Never   • Smokeless tobacco: Never   Vaping Use   • Vaping Use: Never used   Substance and Sexual Activity   • Alcohol use: Yes     Alcohol/week: 3.0 standard drinks     Types: 3 Shots of liquor per week     Comment: social use 1 weekly   • Drug use: No     Types: Oxycodone   • Sexual activity: Defer       Family History   Problem Relation Age of Onset   • Alzheimer's disease Mother    • Heart attack Father 73   • Alzheimer's disease Sister    • Diabetes Son            ECG 12 Lead    Date/Time: 1/5/2023 9:32 AM  Performed by: Zan Kenney MD  Authorized by: Zan Kenney MD   Comparison: compared with previous ECG   Rhythm: sinus rhythm  Rate: normal  Conduction: conduction normal  ST Segments: ST segments normal  T Waves: T waves normal  QRS axis: normal  Other: no other findings    Clinical impression: normal ECG               Objective:     There were no vitals taken for this visit. There is no height or weight on file to calculate BMI.     Constitutional:       General: Not in acute distress.     Appearance: Well-developed. Not diaphoretic.   Eyes:      Pupils: Pupils are equal, round, and reactive to light.   HENT:      Head: Normocephalic and atraumatic.   Neck:      Thyroid: No thyromegaly.   Pulmonary:      Effort: Pulmonary effort is normal. No respiratory distress.      Breath sounds: Normal breath sounds. No wheezing. No rales.   Chest:      Chest wall: Not tender to palpatation.   Cardiovascular:      Normal rate. Regular rhythm.      No gallop.   Pulses:     Intact distal pulses.   Edema:     Peripheral edema absent.   Abdominal:      General: Bowel sounds are normal. There is no distension.      Palpations: Abdomen is soft.      Tenderness: There is no guarding.   Musculoskeletal: Normal range of  motion.         General: No deformity.      Cervical back: Normal range of motion and neck supple. Skin:     General: Skin is warm and dry.      Findings: No rash.   Neurological:      Mental Status: Alert and oriented to person, place, and time.      Cranial Nerves: No cranial nerve deficit.      Deep Tendon Reflexes: Reflexes are normal and symmetric.   Psychiatric:         Judgment: Judgment normal.         Review Of Data: I have reviewed pertinent recent labs, images and documents and pertinent findings included in HPI or assessment below.    Lipid Panel    Lipid Panel 8/26/22 10/20/22   Total Cholesterol 178 176   Triglycerides 228 (A) 221 (A)   HDL Cholesterol 48 55   VLDL Cholesterol 38 37   LDL Cholesterol  92 84   LDL/HDL Ratio 1.9 1.40   (A) Abnormal value       Comments are available for some flowsheets but are not being displayed.               Assessment/Plan:           No diagnosis found.    Diagnosis and plan of care discussed with patient and verbalized understanding.            Your medication list          Accurate as of January 5, 2023  9:32 AM. If you have any questions, ask your nurse or doctor.            CHANGE how you take these medications      Instructions Last Dose Given Next Dose Due   docusate sodium 100 MG capsule  What changed:   · when to take this  · reasons to take this      Take 100 mg by mouth 2 (Two) Times a Day.          CONTINUE taking these medications      Instructions Last Dose Given Next Dose Due   alendronate 35 MG tablet  Commonly known as: FOSAMAX      Take 35 mg by mouth Every 7 (Seven) Days.       apixaban 5 MG tablet tablet  Commonly known as: ELIQUIS      Take 1 tablet by mouth Every 12 (Twelve) Hours.       azithromycin 250 MG tablet  Commonly known as: ZITHROMAX      Take 2 tablets the first day, then 1 tablet daily for 4 days.       Buprenorphine patch weekly transdermal patch  Commonly known as: BUTRANS      Place 1 patch on the skin as directed by provider Every  7 (Seven) Days.       clotrimazole-betamethasone 1-0.05 % cream  Commonly known as: Lotrisone      Apply 1 application topically to the appropriate area as directed 2 (Two) Times a Day.       cyanocobalamin 1000 MCG tablet  Commonly known as: VITAMIN B-12      Take 1 tablet by mouth Daily.       doxepin 25 MG capsule  Commonly known as: SINEquan      Take 1 capsule by mouth Every Night.       ferrous sulfate 325 (65 FE) MG EC tablet      Take 325 mg by mouth Daily With Breakfast.       HYDROcodone-acetaminophen 5-325 MG per tablet  Commonly known as: NORCO      Take 1 tablet by mouth 2 (Two) Times a Day As Needed for Severe Pain. Severe/acute breakthrough pain only       ICaps Areds 2 capsule      Take 2 capsules by mouth Daily.       latanoprost 0.005 % ophthalmic solution  Commonly known as: XALATAN      INSTILL 1 DROP INTO AFFECTED EYE IN THE EVENING       levothyroxine 50 MCG tablet  Commonly known as: SYNTHROID, LEVOTHROID      TAKE ONE TABLET BY MOUTH DAILY       lidocaine 5 %  Commonly known as: LIDODERM      Place 1 patch on the skin as directed by provider Daily. ON 12 H AND OFF 12 H       methylPREDNISolone 4 MG dose pack  Commonly known as: MEDROL      Take as directed on package instructions.       metoprolol tartrate 25 MG tablet  Commonly known as: LOPRESSOR      Take 1 tablet by mouth twice daily       mupirocin 2 % ointment  Commonly known as: BACTROBAN      Apply 1 application topically to the appropriate area as directed 3 (Three) Times a Day As Needed.       omeprazole 40 MG capsule  Commonly known as: priLOSEC      Take 1 capsule by mouth Daily.       pravastatin 40 MG tablet  Commonly known as: PRAVACHOL      TAKE ONE TABLET BY MOUTH DAILY       vitamin E 400 UNIT capsule      Take 400 Units by mouth Daily.                  Zna Kenney MD  01/05/23  09:32 EST

## 2023-01-05 NOTE — PROGRESS NOTES
Date of Office Visit: 2023  Encounter Provider: TANIA Vazquez  Place of Service: [facility] CARDIOLOGY  Patient Name: Ashli León  :1935  Primary Cardiologist: Dr. Moise    CC:  ER follow up    Dear Nicolette    HPI: Ashli León is a pleasant 87 y.o. female who presents 2023 for cardiac follow up. She is a new patient to me and I have reviewed her past medical records,  She is a patient of Dr. Moise and was seen in initial consultation 2022 for periods of syncope and amnesia.     Patient was seen in the emergency room at [facility] in 2021.  She had an episode of possible syncope.  Patient did not remember any of the details.  Patient was in her kitchen and that was the last thing she remembered.  The next morning she woke up in bed with some soreness on her left shin and some bruising on her left hand when she went into the kitchen some things were disrupted on the countertop.  She did not have any recollection of the rest of the evening or how she got to bed.  Evaluation in the emergency room was unremarkable.  She was scheduled see neurology, pain medicine yourself. When she came in to be evaluated in your office you work with pain medicine and her pain meds were decreased.     It had been about 3 weeks since the decrease her pain medicine when she saw Dr. Moise for consultation and she felt great.  She stated that she was feeling very foggy, very cloudy, and weak when she was on the higher dose of pain medicine and felt better with the lower dose.  She has not had any issues or symptoms since the decline and the level of analgesia.     She presented to the ED in 2022 with feelings of fluttering and \"smothering\" that had been going on for approximately a month and a half.  Her work-up was benign.  She was sent home with a 48-hour Holter as follows:    She had a 48 hour Holter monitor in 2022 that was normal.   Indications for this exam include palpitations. Total beats:  101038. Average HR:  77. Min HR:  54. Max HR:  143.     Patient diary submitted. No symptoms reported during the monitoring period. No complications noted. The predominant rhythm noted during the testing period was sinus rhythm. Premature atrial contractions occured rarely. There was no evidence of atrial arrhythmias. There were no episodes of supraventricular tachycardia. Premature ventricular contractions occured rarely. There were no episodes of ventricular tachycardia. Sinoatrial node conduction was normal. No atrioventricular block noted.    On 12/13/2022 she presented to the ED and stated she was on her back deck filling a bird feeder and when she stood back up she lost her balance and fell backwards striking the back of her head and injuring her left lateral rib.  She sustained no loss of consciousness.  Is any shortness of breath, chest pain, or palpitations.  The ED physician informed the patient that her heart was beating fast,  she was unaware of it.  She has no history of A. fib or cardiac abnormalities.  Review of her records in epic show that she had a Holter monitor performed 9/12/2022 that showed normal sinus rhythm with occasional PACs.  There is no supraventricular arrhythmias.  She has a history of hypertension and hypothyroidism for which she takes Lopressor and levothyroxine.  She is not on any anticoagulation.    She was found to be in atrial flutter 2: 1 block.  Her heart rate was 144 with a new right bundle branch block and further EKG changes.  Her chest x-ray did show rib fractures on the left from 3-6.  She had old healed fractures as well.  Dr. Ojeda is on-call and recommended IV Lopressor 5 mg.  She did respond well to that heart rate decreased to 95 bpm and she was also given atenolol 25 mg p.o.  She reverted to sinus rhythm.  Repeat EKG showed sinus rhythm and resolution of her right bundle branch block.  She was started on  Eliquis 5 mg BID and Lopressor 25 mg twice daily and instructed to follow-up in our office.    She presents today in follow-up.  She denies any palpitations, shortness of breath, lower extremity edema.  She denies any dizziness, lightheadedness, syncope or presyncopal episodes.  She denies any chest pain, pressure or fatigue.  She remains in sinus rhythm heart rate in the 70s.  We had a long discussion about atrial flutter and the reasons for her treatment and anticoagulation.  She states she had been very stressed as she lost her only son about 3 weeks ago.  She was taking the 5 mg of Eliquis but she was only taking it once a day and not twice a day.  She was taking her Lopressor 25 mg twice a day.  Based on her age and weight, she actually should be on Eliquis 2.5 mg twice daily.  I have made that switch and given her some samples and a 30-day free card.  Her prescription cost $400, I have asked her daughter-in-law to call her insurance to see which is preferred NOAC.  If neither are preferred we will try patient assistance as I do not feel that warfarin would be a good option for her.  She is very unsteady on her feet and has had several previous falls and fractures.    Past Medical History:   Diagnosis Date   • Acid reflux    • Back pain    • Colitis     history of   • Diarrhea    • Disease of thyroid gland    • Diverticular disease    • Glaucoma    • Gout     - right big toe   • High blood pressure    • High cholesterol    • History of vertebral compression fracture     multiple   • Hyponatremia    • Knee pain    • Low back pain    • Macular degeneration    • Mitral regurgitation 06/2016    Mild to moderate per 2-D echocardiogram   • Peripheral neuropathy    • Primary osteoarthritis of both knees 4/1/2016   • Renal lesion     on mri   • Rheumatoid arthritis (HCC)        Past Surgical History:   Procedure Laterality Date   • APPENDECTOMY  1951   • CATARACT EXTRACTION Bilateral    • FEMUR OPEN REDUCTION INTERNAL  FIXATION Right 10/11/2020    Procedure: OPEN REDUCTION INTERNAL FIXATION OF RIGHT FEMUR FRACTURE;  Surgeon: Mac Frances Jr., MD;  Location: [facility] OR;  Service: Orthopedics;  Laterality: Right;   • FEMUR SURGERY Right     had fracture and has rajesh    • HEMORRHOIDECTOMY     • HIP HEMIARTHROPLASTY Right 1/3/2017    Procedure: HIP HEMIARTHROPLASTY with IM rajesh and 2 screw hardware removal antibiotic cement;  Surgeon: Mirza Whittington MD;  Location:  [facility] OR;  Service:    • HIP SURGERY Right 1999    hx fracture   • LEG SURGERY Right 12/1/2017    Procedure: GLUTEAL TENDON REPAIR-minimus and medius;  Surgeon: Mirza Whittington MD;  Location:  [facility] OR;  Service:    • TOTAL KNEE ARTHROPLASTY Right 6/22/2016    Procedure: RT TOTAL KNEE ARTHROPLASTY WITH FINN NAVIGATION;  Surgeon: Adrian Swenson MD;  Location: [facility] MAIN OR;  Service:        Social History     Socioeconomic History   • Marital status:    Tobacco Use   • Smoking status: Never   • Smokeless tobacco: Never   • Tobacco comments:     Daily caffiene   Vaping Use   • Vaping Use: Never used   Substance and Sexual Activity   • Alcohol use: Yes     Alcohol/week: 3.0 standard drinks     Types: 3 Shots of liquor per week     Comment: social use 1 weekly   • Drug use: No     Types: Oxycodone   • Sexual activity: Defer       Family History   Problem Relation Age of Onset   • Alzheimer's disease Mother    • Heart attack Father 73   • Alzheimer's disease Sister    • Diabetes Son        The following portion of the patient's history were reviewed and updated as appropriate: past medical history, past surgical history, past social history, past family history, allergies, current medications, and problem list.    Review of Systems   Constitutional: Negative for diaphoresis, fever and malaise/fatigue.   HENT: Negative for congestion, hearing loss, hoarse voice, nosebleeds and sore throat.    Eyes: Negative for photophobia, vision loss in left eye, vision loss in  right eye and visual disturbance.   Cardiovascular: Negative for chest pain, dyspnea on exertion, irregular heartbeat, leg swelling, near-syncope, orthopnea, palpitations, paroxysmal nocturnal dyspnea and syncope.   Respiratory: Negative for cough, hemoptysis, shortness of breath, sleep disturbances due to breathing, snoring, sputum production and wheezing.    Endocrine: Negative for cold intolerance, heat intolerance, polydipsia, polyphagia and polyuria.   Hematologic/Lymphatic: Negative for bleeding problem. Does not bruise/bleed easily.   Skin: Negative for color change, dry skin, poor wound healing, rash and suspicious lesions.   Musculoskeletal: Positive for falls. Negative for arthritis, back pain, gout, joint pain, joint swelling, muscle cramps, muscle weakness and myalgias.   Gastrointestinal: Negative for bloating, abdominal pain, constipation, diarrhea, dysphagia, melena, nausea and vomiting.   Neurological: Positive for loss of balance and weakness. Negative for excessive daytime sleepiness, dizziness, headaches, light-headedness, numbness, paresthesias, seizures and vertigo.   Psychiatric/Behavioral: Negative for depression, memory loss and substance abuse. The patient is not nervous/anxious.        Allergies   Allergen Reactions   • Neomycin Swelling   • Statins Myalgia and Swelling     Not all statins  Not all statins         Current Outpatient Medications:   •  apixaban (ELIQUIS) 5 MG tablet tablet, Take 1 tablet by mouth Every 12 (Twelve) Hours., Disp: 60 tablet, Rfl: 0  •  Buprenorphine (BUTRANS) patch weekly transdermal patch, Place 1 patch on the skin as directed by provider Every 7 (Seven) Days., Disp: 4 patch, Rfl: 1  •  clotrimazole-betamethasone (Lotrisone) 1-0.05 % cream, Apply 1 application topically to the appropriate area as directed 2 (Two) Times a Day., Disp: 60 g, Rfl: 2  •  docusate sodium 100 MG capsule, Take 100 mg by mouth 2 (Two) Times a Day. (Patient taking differently: Take 100  mg by mouth As Needed.), Disp:  , Rfl:   •  doxepin (SINEquan) 25 MG capsule, Take 1 capsule by mouth Every Night., Disp: 90 capsule, Rfl: 0  •  latanoprost (XALATAN) 0.005 % ophthalmic solution, INSTILL 1 DROP INTO AFFECTED EYE IN THE EVENING, Disp: , Rfl:   •  levothyroxine (SYNTHROID, LEVOTHROID) 50 MCG tablet, TAKE ONE TABLET BY MOUTH DAILY, Disp: 90 tablet, Rfl: 4  •  lidocaine (LIDODERM) 5 %, Place 1 patch on the skin as directed by provider Daily. ON 12 H AND OFF 12 H, Disp: 30 patch, Rfl: 1  •  metoprolol tartrate (LOPRESSOR) 25 MG tablet, Take 1 tablet by mouth twice daily, Disp: 180 tablet, Rfl: 0  •  Multiple Vitamins-Minerals (ICAPS AREDS 2) capsule, Take 2 capsules by mouth Daily., Disp: , Rfl:   •  mupirocin (BACTROBAN) 2 % ointment, Apply 1 application topically to the appropriate area as directed 3 (Three) Times a Day As Needed., Disp: , Rfl:   •  omeprazole (priLOSEC) 40 MG capsule, Take 1 capsule by mouth Daily., Disp: 90 capsule, Rfl: 4  •  pravastatin (PRAVACHOL) 40 MG tablet, TAKE ONE TABLET BY MOUTH DAILY, Disp: 90 tablet, Rfl: 4  •  vitamin B-12 (VITAMIN B-12) 1000 MCG tablet, Take 1 tablet by mouth Daily., Disp:  , Rfl:   •  vitamin E 400 UNIT capsule, Take 400 Units by mouth Daily., Disp: , Rfl:         Objective:     Vitals:    01/05/23 1401   BP: 140/80   Pulse: 71   Resp: 16   SpO2: 95%   Weight: 56.2 kg (124 lb)   Height: 160 cm (63\")     Body mass index is 21.97 kg/m².      Vitals reviewed.   Constitutional:       General: Not in acute distress.     Appearance: Well-developed. Frail.   Eyes:      General:         Right eye: No discharge.         Left eye: No discharge.      Conjunctiva/sclera: Conjunctivae normal.   HENT:      Head: Normocephalic and atraumatic.      Right Ear: External ear normal.      Left Ear: External ear normal.      Nose: Nose normal.   Neck:      Thyroid: No thyromegaly.      Vascular: No JVD.      Trachea: No tracheal deviation.      Lymphadenopathy: No cervical  adenopathy.   Pulmonary:      Effort: Pulmonary effort is normal. No respiratory distress.      Breath sounds: Normal breath sounds. No wheezing. No rales.   Chest:      Chest wall: Not tender to palpatation.   Cardiovascular:      Normal rate. Regular rhythm.      No gallop.   Pulses:     Intact distal pulses.   Edema:     Peripheral edema absent.   Abdominal:      General: There is no distension.      Palpations: Abdomen is soft.      Tenderness: There is no abdominal tenderness.   Musculoskeletal: Normal range of motion.         General: No tenderness or deformity.      Cervical back: Normal range of motion and neck supple. Skin:     General: Skin is warm and dry.      Findings: No erythema or rash.   Neurological:      Mental Status: Alert and oriented to person, place, and time.      Coordination: Coordination normal.   Psychiatric:         Attention and Perception: Attention normal.         Behavior: Behavior normal.         Thought Content: Thought content normal.         Cognition and Memory: Cognition normal.         Judgment: Judgment normal.               ECG 12 Lead    Date/Time: 1/5/2023 2:22 PM  Performed by: Nicolette Crowley APRN  Authorized by: Nicolette Crowley APRN   Comparison: compared with previous ECG from 12/13/2022  Similar to previous ECG  Rhythm: sinus rhythm  Rate: normal  Conduction: 1st degree AV block  Q waves: III, aVR and V3    ST Segments: ST segments normal  T Waves: T waves normal  QRS axis: left  Other findings: left ventricular hypertrophy    Clinical impression: abnormal EKG              Assessment:       Diagnosis Plan   1. Primary hypertension        2. Mixed hyperlipidemia        3. Atrial flutter with rapid ventricular response (HCC)               Plan:       1.  Hypertension-stable    2.  Hyperlipidemia-continue lipid-lowering therapy.  She is currently on pravastatin 40 mg daily.    3.  Atrial flutter with RVR-new diagnosis.  She was seen in the ED in mid December.  She  required 1 dose of IV metoprolol and was given 25 mg of atenolol.  She did convert to sinus rhythm.  She was then dismissed back to her home on metoprolol tartrate 25 mg twice daily and Eliquis 5 mg twice daily.  She was only taking the Eliquis once a day.  Her dose should be 2.5 mg twice daily based on her age and weight.  I have made that change.  It is very expensive, I have asked her daughter-in-law to call her insurance to see which is preferred NOAC.  We will adjust accordingly.  We will check an echo and a ZIO.    Stop Eliquis 5 mg and titrate down to 2.5 mg twice daily based on age and weight.  Check echo and ZIO    As always, it has been a pleasure to participate in your patient's care. Thank you.       Sincerely,       TANIA Vazquez      Current Outpatient Medications:   •  Buprenorphine (BUTRANS) patch weekly transdermal patch, Place 1 patch on the skin as directed by provider Every 7 (Seven) Days., Disp: 4 patch, Rfl: 1  •  clotrimazole-betamethasone (Lotrisone) 1-0.05 % cream, Apply 1 application topically to the appropriate area as directed 2 (Two) Times a Day., Disp: 60 g, Rfl: 2  •  docusate sodium 100 MG capsule, Take 100 mg by mouth 2 (Two) Times a Day. (Patient taking differently: Take 100 mg by mouth As Needed.), Disp:  , Rfl:   •  doxepin (SINEquan) 25 MG capsule, Take 1 capsule by mouth Every Night., Disp: 90 capsule, Rfl: 0  •  latanoprost (XALATAN) 0.005 % ophthalmic solution, INSTILL 1 DROP INTO AFFECTED EYE IN THE EVENING, Disp: , Rfl:   •  levothyroxine (SYNTHROID, LEVOTHROID) 50 MCG tablet, TAKE ONE TABLET BY MOUTH DAILY, Disp: 90 tablet, Rfl: 4  •  lidocaine (LIDODERM) 5 %, Place 1 patch on the skin as directed by provider Daily. ON 12 H AND OFF 12 H, Disp: 30 patch, Rfl: 1  •  metoprolol tartrate (LOPRESSOR) 25 MG tablet, Take 1 tablet by mouth twice daily, Disp: 180 tablet, Rfl: 0  •  Multiple Vitamins-Minerals (ICAPS AREDS 2) capsule, Take 2 capsules by mouth Daily., Disp: , Rfl:    •  mupirocin (BACTROBAN) 2 % ointment, Apply 1 application topically to the appropriate area as directed 3 (Three) Times a Day As Needed., Disp: , Rfl:   •  omeprazole (priLOSEC) 40 MG capsule, Take 1 capsule by mouth Daily., Disp: 90 capsule, Rfl: 4  •  pravastatin (PRAVACHOL) 40 MG tablet, TAKE ONE TABLET BY MOUTH DAILY, Disp: 90 tablet, Rfl: 4  •  vitamin B-12 (VITAMIN B-12) 1000 MCG tablet, Take 1 tablet by mouth Daily., Disp:  , Rfl:   •  vitamin E 400 UNIT capsule, Take 400 Units by mouth Daily., Disp: , Rfl:   •  apixaban (ELIQUIS) 2.5 MG tablet tablet, Take 1 tablet by mouth 2 (Two) Times a Day., Disp: 60 tablet, Rfl: 6      Dictated utilizing Dragon dictation

## 2023-01-06 RX ORDER — LEVOTHYROXINE SODIUM 0.05 MG/1
50 TABLET ORAL DAILY
Qty: 90 TABLET | Refills: 0 | Status: SHIPPED | OUTPATIENT
Start: 2023-01-06

## 2023-01-09 RX ORDER — DOXEPIN HYDROCHLORIDE 25 MG/1
CAPSULE ORAL
Qty: 90 CAPSULE | Refills: 0 | Status: SHIPPED | OUTPATIENT
Start: 2023-01-09 | End: 2023-03-03

## 2023-01-26 ENCOUNTER — PATIENT OUTREACH (OUTPATIENT)
Dept: CASE MANAGEMENT | Facility: OTHER | Age: 88
End: 2023-01-26
Payer: MEDICARE

## 2023-01-26 RX ORDER — OMEPRAZOLE 40 MG/1
CAPSULE, DELAYED RELEASE ORAL
Qty: 90 CAPSULE | Refills: 1 | Status: SHIPPED | OUTPATIENT
Start: 2023-01-26

## 2023-02-02 ENCOUNTER — HOSPITAL ENCOUNTER (OUTPATIENT)
Dept: CARDIOLOGY | Facility: HOSPITAL | Age: 88
Discharge: HOME | End: 2023-02-02
Payer: MEDICARE

## 2023-02-02 VITALS
HEIGHT: 63 IN | HEART RATE: 60 BPM | DIASTOLIC BLOOD PRESSURE: 80 MMHG | WEIGHT: 124 LBS | BODY MASS INDEX: 21.97 KG/M2 | SYSTOLIC BLOOD PRESSURE: 140 MMHG

## 2023-02-02 DIAGNOSIS — I48.92 ATRIAL FLUTTER WITH RAPID VENTRICULAR RESPONSE: ICD-10-CM

## 2023-02-02 DIAGNOSIS — R94.31 ABNORMAL ELECTROCARDIOGRAM (ECG) (EKG): ICD-10-CM

## 2023-02-02 LAB
AORTIC ARCH: 2.4 CM
ASCENDING AORTA: 3.4 CM
BH CV ECHO MEAS - ACS: 1.61 CM
BH CV ECHO MEAS - AO MAX PG: 5.9 MMHG
BH CV ECHO MEAS - AO MEAN PG: 3.2 MMHG
BH CV ECHO MEAS - AO ROOT DIAM: 3.7 CM
BH CV ECHO MEAS - AO V2 MAX: 121.5 CM/SEC
BH CV ECHO MEAS - AO V2 VTI: 18.8 CM
BH CV ECHO MEAS - AVA(I,D): 1.76 CM2
BH CV ECHO MEAS - EDV(CUBED): 28.2 ML
BH CV ECHO MEAS - EDV(MOD-SP2): 27 ML
BH CV ECHO MEAS - EDV(MOD-SP4): 46 ML
BH CV ECHO MEAS - EF(MOD-BP): 62.1 %
BH CV ECHO MEAS - EF(MOD-SP2): 63 %
BH CV ECHO MEAS - EF(MOD-SP4): 60.9 %
BH CV ECHO MEAS - ESV(CUBED): 8 ML
BH CV ECHO MEAS - ESV(MOD-SP2): 10 ML
BH CV ECHO MEAS - ESV(MOD-SP4): 18 ML
BH CV ECHO MEAS - FS: 34.2 %
BH CV ECHO MEAS - IVS/LVPW: 0.94 CM
BH CV ECHO MEAS - IVSD: 0.98 CM
BH CV ECHO MEAS - LV DIASTOLIC VOL/BSA (35-75): 29.1 CM2
BH CV ECHO MEAS - LV MASS(C)D: 85.3 GRAMS
BH CV ECHO MEAS - LV MAX PG: 2.4 MMHG
BH CV ECHO MEAS - LV MEAN PG: 1.2 MMHG
BH CV ECHO MEAS - LV SYSTOLIC VOL/BSA (12-30): 11.4 CM2
BH CV ECHO MEAS - LV V1 MAX: 77.5 CM/SEC
BH CV ECHO MEAS - LV V1 VTI: 10.7 CM
BH CV ECHO MEAS - LVIDD: 3 CM
BH CV ECHO MEAS - LVIDS: 2 CM
BH CV ECHO MEAS - LVOT AREA: 3.1 CM2
BH CV ECHO MEAS - LVOT DIAM: 1.99 CM
BH CV ECHO MEAS - LVPWD: 1.04 CM
BH CV ECHO MEAS - PA ACC TIME: 0.15 SEC
BH CV ECHO MEAS - PA PR(ACCEL): 10.1 MMHG
BH CV ECHO MEAS - PA V2 MAX: 110.7 CM/SEC
BH CV ECHO MEAS - QP/QS: 1.23
BH CV ECHO MEAS - RAP SYSTOLE: 3 MMHG
BH CV ECHO MEAS - RV MAX PG: 3.5 MMHG
BH CV ECHO MEAS - RV V1 MAX: 93.8 CM/SEC
BH CV ECHO MEAS - RV V1 VTI: 14.7 CM
BH CV ECHO MEAS - RVOT DIAM: 1.88 CM
BH CV ECHO MEAS - RVSP: 21 MMHG
BH CV ECHO MEAS - SI(MOD-SP2): 10.8 ML/M2
BH CV ECHO MEAS - SI(MOD-SP4): 17.7 ML/M2
BH CV ECHO MEAS - SV(LVOT): 33.1 ML
BH CV ECHO MEAS - SV(MOD-SP2): 17 ML
BH CV ECHO MEAS - SV(MOD-SP4): 28 ML
BH CV ECHO MEAS - SV(RVOT): 40.6 ML
BH CV ECHO MEAS - TAPSE (>1.6): 1.33 CM
BH CV ECHO MEAS - TR MAX PG: 18.3 MMHG
BH CV ECHO MEAS - TR MAX VEL: 213.9 CM/SEC
BH CV XLRA - RV BASE: 1.46 CM
BH CV XLRA - RV LENGTH: 5.5 CM
BH CV XLRA - RV MID: 1.28 CM
LEFT ATRIUM VOLUME INDEX: 12.7 ML/M2
MAXIMAL PREDICTED HEART RATE: 134 BPM
SINUS: 2.6 CM
STJ: 2.31 CM
STRESS TARGET HR: 114 BPM

## 2023-02-02 PROCEDURE — 93246 EXT ECG>7D<15D RECORDING: CPT

## 2023-02-02 PROCEDURE — 93306 TTE W/DOPPLER COMPLETE: CPT | Performed by: INTERNAL MEDICINE

## 2023-02-02 PROCEDURE — 93306 TTE W/DOPPLER COMPLETE: CPT

## 2023-02-02 NOTE — PROGRESS NOTES
Reviewed results and recommendations with Ashli León.  Patient verbalized understanding of results and recommendations.    Thank you,  Akilah SILVERIO RN  Triage Nurse [facility] [place]

## 2023-02-02 NOTE — PROGRESS NOTES
Echo has not changed since 2016 except in 2016 it showed mild to mod MV regurgitation that is not present now.  That is good!.  Will await Monitor results.

## 2023-02-06 DIAGNOSIS — G89.29 OTHER CHRONIC PAIN: ICD-10-CM

## 2023-02-06 DIAGNOSIS — M17.11 ARTHRITIS OF RIGHT KNEE: ICD-10-CM

## 2023-02-06 DIAGNOSIS — G89.29 CHRONIC BILATERAL LOW BACK PAIN WITH BILATERAL SCIATICA: ICD-10-CM

## 2023-02-06 DIAGNOSIS — M54.42 CHRONIC BILATERAL LOW BACK PAIN WITH BILATERAL SCIATICA: ICD-10-CM

## 2023-02-06 DIAGNOSIS — M25.551 CHRONIC RIGHT HIP PAIN: ICD-10-CM

## 2023-02-06 DIAGNOSIS — M54.41 CHRONIC BILATERAL LOW BACK PAIN WITH BILATERAL SCIATICA: ICD-10-CM

## 2023-02-06 DIAGNOSIS — M17.12 OSTEOARTHRITIS OF LEFT KNEE, UNSPECIFIED OSTEOARTHRITIS TYPE: ICD-10-CM

## 2023-02-06 DIAGNOSIS — G89.29 CHRONIC RIGHT HIP PAIN: ICD-10-CM

## 2023-02-06 NOTE — TELEPHONE ENCOUNTER
Medication Refill Request    Date of phone call: 23    Medication being requested: Butrans    si patch on skin q 7 days   Qty: 4    Date of last visit: 22    Date of last refill:     JUDY up to date?:     Next Follow up?: 23    Any new pertinent information? (i.e, new medication allergies, new use of medications, change in patient's health or condition, non-compliance or inconsistency with prescribing agreement?): patient states that her only child who brought her to her app has passed away so she cant come in any sooner than the .

## 2023-02-07 RX ORDER — BUPRENORPHINE 7.5 UG/H
1 PATCH TRANSDERMAL
Qty: 4 PATCH | Refills: 0 | Status: SHIPPED | OUTPATIENT
Start: 2023-02-07 | End: 2023-02-22 | Stop reason: ALTCHOICE

## 2023-02-16 DIAGNOSIS — E78.2 MIXED HYPERLIPIDEMIA: Primary | ICD-10-CM

## 2023-02-16 DIAGNOSIS — E03.9 HYPOTHYROIDISM (ACQUIRED): ICD-10-CM

## 2023-02-16 DIAGNOSIS — R73.03 PREDIABETES: ICD-10-CM

## 2023-02-16 DIAGNOSIS — E78.2 MIXED HYPERLIPIDEMIA: ICD-10-CM

## 2023-02-22 ENCOUNTER — OFFICE VISIT (OUTPATIENT)
Dept: PAIN MEDICINE | Facility: CLINIC | Age: 88
End: 2023-02-22
Payer: MEDICARE

## 2023-02-22 VITALS
WEIGHT: 124.8 LBS | HEART RATE: 80 BPM | OXYGEN SATURATION: 98 % | SYSTOLIC BLOOD PRESSURE: 125 MMHG | DIASTOLIC BLOOD PRESSURE: 69 MMHG | HEIGHT: 63 IN | BODY MASS INDEX: 22.11 KG/M2 | TEMPERATURE: 98.2 F

## 2023-02-22 DIAGNOSIS — G89.29 OTHER CHRONIC PAIN: Primary | ICD-10-CM

## 2023-02-22 DIAGNOSIS — M25.551 CHRONIC RIGHT HIP PAIN: ICD-10-CM

## 2023-02-22 DIAGNOSIS — G89.29 CHRONIC PAIN OF LEFT KNEE: ICD-10-CM

## 2023-02-22 DIAGNOSIS — M25.562 CHRONIC PAIN OF LEFT KNEE: ICD-10-CM

## 2023-02-22 DIAGNOSIS — G89.29 CHRONIC BILATERAL LOW BACK PAIN WITH BILATERAL SCIATICA: Primary | ICD-10-CM

## 2023-02-22 DIAGNOSIS — M54.41 CHRONIC BILATERAL LOW BACK PAIN WITH BILATERAL SCIATICA: ICD-10-CM

## 2023-02-22 DIAGNOSIS — G89.29 CHRONIC RIGHT HIP PAIN: ICD-10-CM

## 2023-02-22 DIAGNOSIS — M54.42 CHRONIC BILATERAL LOW BACK PAIN WITH BILATERAL SCIATICA: ICD-10-CM

## 2023-02-22 DIAGNOSIS — M54.41 CHRONIC BILATERAL LOW BACK PAIN WITH BILATERAL SCIATICA: Primary | ICD-10-CM

## 2023-02-22 DIAGNOSIS — Z79.899 ENCOUNTER FOR LONG-TERM CURRENT USE OF HIGH RISK MEDICATION: ICD-10-CM

## 2023-02-22 DIAGNOSIS — M54.42 CHRONIC BILATERAL LOW BACK PAIN WITH BILATERAL SCIATICA: Primary | ICD-10-CM

## 2023-02-22 DIAGNOSIS — G89.29 CHRONIC BILATERAL LOW BACK PAIN WITH BILATERAL SCIATICA: ICD-10-CM

## 2023-02-22 PROCEDURE — 20610 DRAIN/INJ JOINT/BURSA W/O US: CPT | Performed by: NURSE PRACTITIONER

## 2023-02-22 PROCEDURE — 99214 OFFICE O/P EST MOD 30 MIN: CPT | Performed by: NURSE PRACTITIONER

## 2023-02-22 RX ORDER — METHYLPREDNISOLONE ACETATE 40 MG/ML
40 INJECTION, SUSPENSION INTRA-ARTICULAR; INTRALESIONAL; INTRAMUSCULAR; SOFT TISSUE
Status: DISCONTINUED | OUTPATIENT
Start: 2023-02-22 | End: 2023-02-22 | Stop reason: HOSPADM

## 2023-02-22 RX ORDER — BUPIVACAINE HYDROCHLORIDE 5 MG/ML
3 INJECTION, SOLUTION EPIDURAL; INTRACAUDAL
Status: DISCONTINUED | OUTPATIENT
Start: 2023-02-22 | End: 2023-02-22 | Stop reason: HOSPADM

## 2023-02-22 RX ORDER — METHADONE HYDROCHLORIDE 5 MG/1
2.5 TABLET ORAL 2 TIMES DAILY
Qty: 30 TABLET | Refills: 0 | Status: SHIPPED | OUTPATIENT
Start: 2023-02-22 | End: 2023-04-05 | Stop reason: ALTCHOICE

## 2023-02-22 RX ADMIN — METHYLPREDNISOLONE ACETATE 40 MG: 40 INJECTION, SUSPENSION INTRA-ARTICULAR; INTRALESIONAL; INTRAMUSCULAR; SOFT TISSUE at 14:20

## 2023-02-22 RX ADMIN — BUPIVACAINE HYDROCHLORIDE 3 ML: 5 INJECTION, SOLUTION EPIDURAL; INTRACAUDAL at 14:20

## 2023-02-22 NOTE — PROGRESS NOTES
CHIEF COMPLAINT  F/U back and knee pain- patient states that her pain has remained the same since her last visit.     Subjective   Ashli León is a 87 y.o. female  who presents for follow-up.  She has a history of back pain, knee pain, hip pain.  Today her pain is 5/10VAS. Currently on Butrans 7.5 mcg. Reports that this provides 50-70% pain relief, she denies adverse reactions.  Helps pain at least moderately.  Unfortunately the medication has become cost prohibitive.  She is asking about additional options.  Previously failed Belbuca.  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       Procedures:  Left knee gel injection 4/8/2022 -- relief x 1 month   Left knee steroid injection 2/7/2021 - no benefit.  9/15/2020 - L3-L4 LESI and left knee injection with fluoroscopic guidance - reported moderate relief x 1 to 2 weeks       Back Pain  This is a chronic problem. The current episode started more than 1 year ago. The problem occurs daily. The problem has been waxing and waning since onset. The pain is present in the lumbar spine. The quality of the pain is described as aching (occasional sharp/shooting pain). The pain radiates to the left thigh and right thigh. The pain is at a severity of 5/10. The pain is moderate. The symptoms are aggravated by position, standing, sitting and bending. Pertinent negatives include no abdominal pain, chest pain, dysuria, fever, headaches, numbness or weakness. Risk factors include sedentary lifestyle. She has tried analgesics, NSAIDs, ice and heat (injections) for the symptoms. The treatment provided moderate relief.   Knee Pain   The pain is present in the left knee and right knee. The pain is at a severity of 5/10. The pain is moderate. The pain has been worsening since onset. Pertinent negatives include no numbness. The symptoms are aggravated by weight bearing and movement. She has tried NSAIDs and acetaminophen (injections) for the symptoms. The treatment provided  "moderate relief.     PEG Assessment   What number best describes your pain on average in the past week?4  What number best describes how, during the past week, pain has interfered with your enjoyment of life?8  What number best describes how, during the past week, pain has interfered with your general activity?  8    The following portions of the patient's history were reviewed and updated as appropriate: allergies, current medications, past family history, past medical history, past social history, past surgical history and problem list.    Review of Systems   Constitutional: Positive for activity change (decreased) and fatigue. Negative for chills and fever.   HENT: Negative for congestion.    Eyes: Negative for visual disturbance.   Respiratory: Negative for chest tightness and shortness of breath.    Cardiovascular: Negative for chest pain.   Gastrointestinal: Negative for abdominal pain, constipation and diarrhea.   Genitourinary: Negative for difficulty urinating, dyspareunia and dysuria.   Musculoskeletal: Positive for back pain.   Neurological: Negative for dizziness, weakness, light-headedness, numbness and headaches.   Psychiatric/Behavioral: Positive for agitation and sleep disturbance. The patient is nervous/anxious.      I have reviewed and confirmed the accuracy of the ROS as documented by the MA/LPN/RN TANIA Tracy    Vitals:    02/22/23 1352   BP: 125/69   Pulse: 80   Temp: 98.2 °F (36.8 °C)   SpO2: 98%   Weight: 56.6 kg (124 lb 12.8 oz)   Height: 160 cm (63\")   PainSc:   5   PainLoc: Knee  Comment: left         Objective   Physical Exam  Vitals and nursing note reviewed.   Constitutional:       General: She is not in acute distress.     Appearance: Normal appearance. She is not ill-appearing.   Pulmonary:      Effort: Pulmonary effort is normal. No respiratory distress.   Musculoskeletal:      Lumbar back: Tenderness and bony tenderness present.      Right knee: Crepitus present. " Tenderness present.      Left knee: Crepitus present. Decreased range of motion. Tenderness present.   Neurological:      Mental Status: She is alert and oriented to person, place, and time.      Gait: Gait abnormal (in wheelchair today).   Psychiatric:         Mood and Affect: Mood normal.         Behavior: Behavior normal.         Large Joint Arthrocentesis: L knee  Date/Time: 2/22/2023 2:20 PM  Consent given by: patient  Site marked: site marked  Timeout: Immediately prior to procedure a time out was called to verify the correct patient, procedure, equipment, support staff and site/side marked as required   Supporting Documentation  Indications: pain and diagnostic evaluation   Procedure Details  Location: knee - L knee  Preparation: Patient was prepped and draped in the usual sterile fashion  Needle size: 25 G  Approach: lateral  Medications administered: 40 mg methylPREDNISolone acetate 40 MG/ML; 3 mL bupivacaine (PF) 0.5 %  Patient tolerance: patient tolerated the procedure well with no immediate complications        Assessment & Plan   Diagnoses and all orders for this visit:    1. Other chronic pain (Primary)    2. Chronic bilateral low back pain with bilateral sciatica    3. Chronic right hip pain    4. Chronic pain of left knee  -     Large Joint Arthrocentesis: L knee    5. Encounter for long-term current use of high risk medication      --- Discussed plan with Dr. Bustos.  --- D/c Jelly (cost). Trial Methadone 2.5 mg bid.  We can increase to 5 mg bid in the future if needed.  Discussed medication with the patient.  Included in this discussion was the potential for side effects and adverse events.  Patient verbalized understanding and wished to proceed.  Prescription will be sent to pharmacy.  --- The urine drug screen confirmation from 12/14/2022 has been reviewed and the result is appropriate based on patient history and JUDY report  --- The patient signed an updated copy of the controlled  substance agreement on 12/14/2022  --- left knee intra-articular joint injection. Will not plan to repeat if this does not help  --- refer to ortho if knee not improving  --- Follow-up 2 months          JUDY REPORT  As part of the patient's treatment plan, I am prescribing controlled substances. The patient has been made aware of appropriate use of such medications, including potential risk of somnolence, limited ability to drive and/or work safely, and the potential for dependence or overdose. It has also been made clear that these medications are for use by this patient only, without concomitant use of alcohol or other substances unless prescribed.     Patient has completed prescribing agreement detailing terms of continued prescribing of controlled substances, including monitoring JUDY reports, urine drug screening, and pill counts if necessary. The patient is aware that inappropriate use will results in cessation of prescribing such medications.    As the clinician, I personally reviewed the JUDY from 2/22/2023 while the patient was in the office today.    History and physical exam exhibit continued safe and appropriate use of controlled substances.    Dictated utilizing Dragon dictation.     Patient remained masked during entire encounter. No cough present. I donned a mask and eye protection throughout entire visit. Prior to donning mask and eye protection, hand hygiene was performed, as well as when it was doffed.  I was closer than 6 feet, but not for an extended period of time. No obvious exposure to any bodily fluids.

## 2023-02-23 LAB
MAXIMAL PREDICTED HEART RATE: 134 BPM
STRESS TARGET HR: 114 BPM

## 2023-02-23 PROCEDURE — 93248 EXT ECG>7D<15D REV&INTERPJ: CPT | Performed by: INTERNAL MEDICINE

## 2023-02-23 NOTE — TELEPHONE ENCOUNTER
Rx Refill Note  Requested Prescriptions     Pending Prescriptions Disp Refills   • apixaban (ELIQUIS) 2.5 MG tablet tablet 180 tablet 3     Sig: Take 1 tablet by mouth 2 (Two) Times a Day.      Last office visit with prescribing clinician: 1/5/2023   Last telemedicine visit with prescribing clinician: Visit date not found   Next office visit with prescribing clinician: Visit date not found                         Would you like a call back once the refill request has been completed: [] Yes [] No    If the office needs to give you a call back, can they leave a voicemail: [] Yes [] No    Connie Flores MA  02/23/23, 14:02 EST

## 2023-02-24 NOTE — PROGRESS NOTES
Spoke to patient and her family member with results.  They stopped by the office for paperwork.  She will remain on the metoprolol and the Eliquis.  She is not having any symptoms at this time.  We will follow-up in 4 to 6 months or sooner if needed.

## 2023-02-25 LAB
ALBUMIN SERPL-MCNC: 4.2 G/DL (ref 3.5–5.2)
ALBUMIN/GLOB SERPL: 1.4 G/DL
ALP SERPL-CCNC: 51 U/L (ref 39–117)
ALT SERPL-CCNC: 11 U/L (ref 1–33)
AST SERPL-CCNC: 14 U/L (ref 1–32)
BASOPHILS # BLD AUTO: 0.06 10*3/MM3 (ref 0–0.2)
BASOPHILS NFR BLD AUTO: 0.7 % (ref 0–1.5)
BILIRUB SERPL-MCNC: 0.9 MG/DL (ref 0–1.2)
BUN SERPL-MCNC: 20 MG/DL (ref 8–23)
BUN/CREAT SERPL: 22 (ref 7–25)
CALCIUM SERPL-MCNC: 9.7 MG/DL (ref 8.6–10.5)
CHLORIDE SERPL-SCNC: 101 MMOL/L (ref 98–107)
CHOLEST SERPL-MCNC: 181 MG/DL (ref 0–200)
CO2 SERPL-SCNC: 28.2 MMOL/L (ref 22–29)
CREAT SERPL-MCNC: 0.91 MG/DL (ref 0.57–1)
EGFRCR SERPLBLD CKD-EPI 2021: 61.6 ML/MIN/1.73
EOSINOPHIL # BLD AUTO: 0.06 10*3/MM3 (ref 0–0.4)
EOSINOPHIL NFR BLD AUTO: 0.7 % (ref 0.3–6.2)
ERYTHROCYTE [DISTWIDTH] IN BLOOD BY AUTOMATED COUNT: 12.4 % (ref 12.3–15.4)
GLOBULIN SER CALC-MCNC: 2.9 GM/DL
GLUCOSE SERPL-MCNC: 90 MG/DL (ref 65–99)
HBA1C MFR BLD: 5.6 % (ref 4.8–5.6)
HCT VFR BLD AUTO: 32 % (ref 34–46.6)
HDLC SERPL-MCNC: 61 MG/DL (ref 40–60)
HGB BLD-MCNC: 11.2 G/DL (ref 12–15.9)
IMM GRANULOCYTES # BLD AUTO: 0.03 10*3/MM3 (ref 0–0.05)
IMM GRANULOCYTES NFR BLD AUTO: 0.3 % (ref 0–0.5)
LDLC SERPL CALC-MCNC: 79 MG/DL (ref 0–100)
LYMPHOCYTES # BLD AUTO: 3.63 10*3/MM3 (ref 0.7–3.1)
LYMPHOCYTES NFR BLD AUTO: 40.6 % (ref 19.6–45.3)
MCH RBC QN AUTO: 32.7 PG (ref 26.6–33)
MCHC RBC AUTO-ENTMCNC: 35 G/DL (ref 31.5–35.7)
MCV RBC AUTO: 93.3 FL (ref 79–97)
MONOCYTES # BLD AUTO: 0.83 10*3/MM3 (ref 0.1–0.9)
MONOCYTES NFR BLD AUTO: 9.3 % (ref 5–12)
NEUTROPHILS # BLD AUTO: 4.33 10*3/MM3 (ref 1.7–7)
NEUTROPHILS NFR BLD AUTO: 48.4 % (ref 42.7–76)
NRBC BLD AUTO-RTO: 0 /100 WBC (ref 0–0.2)
PLATELET # BLD AUTO: 294 10*3/MM3 (ref 140–450)
POTASSIUM SERPL-SCNC: 4.1 MMOL/L (ref 3.5–5.2)
PROT SERPL-MCNC: 7.1 G/DL (ref 6–8.5)
RBC # BLD AUTO: 3.43 10*6/MM3 (ref 3.77–5.28)
SODIUM SERPL-SCNC: 140 MMOL/L (ref 136–145)
TRIGL SERPL-MCNC: 253 MG/DL (ref 0–150)
TSH SERPL DL<=0.005 MIU/L-ACNC: 1.49 UIU/ML (ref 0.27–4.2)
VLDLC SERPL CALC-MCNC: 41 MG/DL (ref 5–40)
WBC # BLD AUTO: 8.94 10*3/MM3 (ref 3.4–10.8)

## 2023-02-27 PROBLEM — F51.01 PRIMARY INSOMNIA: Status: ACTIVE | Noted: 2023-02-27

## 2023-03-03 ENCOUNTER — OFFICE VISIT (OUTPATIENT)
Dept: FAMILY MEDICINE CLINIC | Facility: CLINIC | Age: 88
End: 2023-03-03
Payer: MEDICARE

## 2023-03-03 VITALS
RESPIRATION RATE: 16 BRPM | OXYGEN SATURATION: 100 % | WEIGHT: 128 LBS | DIASTOLIC BLOOD PRESSURE: 80 MMHG | HEART RATE: 69 BPM | BODY MASS INDEX: 22.68 KG/M2 | TEMPERATURE: 97.5 F | SYSTOLIC BLOOD PRESSURE: 120 MMHG | HEIGHT: 63 IN

## 2023-03-03 DIAGNOSIS — I10 HYPERTENSION, ESSENTIAL: ICD-10-CM

## 2023-03-03 DIAGNOSIS — E03.9 HYPOTHYROIDISM (ACQUIRED): ICD-10-CM

## 2023-03-03 DIAGNOSIS — E78.2 MIXED HYPERLIPIDEMIA: Primary | ICD-10-CM

## 2023-03-03 DIAGNOSIS — F51.01 PRIMARY INSOMNIA: ICD-10-CM

## 2023-03-03 DIAGNOSIS — F43.21 GRIEF: ICD-10-CM

## 2023-03-03 PROCEDURE — 99214 OFFICE O/P EST MOD 30 MIN: CPT | Performed by: PHYSICIAN ASSISTANT

## 2023-03-03 RX ORDER — TRAZODONE HYDROCHLORIDE 50 MG/1
50 TABLET ORAL NIGHTLY
Qty: 30 TABLET | Refills: 1 | Status: SHIPPED | OUTPATIENT
Start: 2023-03-03

## 2023-03-03 NOTE — PROGRESS NOTES
"Chief Complaint  Insomnia (Doxepin not helping with sleep/ recently lost child in December), Hypertension (/), and Hypothyroidism (/)    Subjective          Ashli León presents to [facility]  History of Present Illness  Ashli,\"Duyen\", is a 87 year old female who presents insomnia, hypertension and hypothyroidism management.  Duyen states her son passed away in early December  of a heart attack.  States this has been very hard for her.  States she relied on him to help with everything.  States he would visit her or call daily.  Her sleep has been decreased to restless.  Has been taking the doxepin without relief.  Does not want Ambien due to having side effects in past.  She has a close friend helping her out currently.  Recently saw cardiology and has been placed on Eliquis for A-fib.  States she is doing well with the medication.  Diet has not been as healthy.  States sometimes she does not feel like eating.  She has been drinking some boost/Ensure.  Denied any suicidal or homicidal ideation.  Denied any chest pain, shortness of air, cough, wheezing, dizziness, headache or swelling of ankles.  Has been taking her medications as directed.  Bowel movements have been normal without tarry stools.  Review of Systems   Constitutional: Negative.    HENT: Negative.    Eyes: Negative.    Respiratory: Negative for cough, shortness of breath and wheezing.    Cardiovascular: Negative.  Negative for chest pain, palpitations and leg swelling.   Gastrointestinal: Negative.    Endocrine: Negative.    Genitourinary: Negative.    Neurological: Negative.    Psychiatric/Behavioral: Positive for sleep disturbance. Negative for suicidal ideas.   All other systems reviewed and are negative.    Objective   Vital Signs:   /80 (BP Location: Left arm, Patient Position: Sitting, Cuff Size: Adult)   Pulse 69   Temp 97.5 °F (36.4 °C)   Resp 16   Ht 160 cm (63\")   Wt 58.1 kg (128 lb)   SpO2 100% "   BMI 22.67 kg/m²     Physical Exam  Vitals and nursing note reviewed.   Constitutional:       Appearance: Normal appearance. She is well-developed, well-groomed and normal weight.      Interventions: Face mask in place.   HENT:      Head: Normocephalic and atraumatic.   Neck:      Thyroid: No thyroid mass, thyromegaly or thyroid tenderness.      Vascular: No carotid bruit.      Trachea: Trachea and phonation normal. No tracheal tenderness.   Cardiovascular:      Rate and Rhythm: Normal rate and regular rhythm.      Pulses: Normal pulses.      Heart sounds: Normal heart sounds, S1 normal and S2 normal. No murmur heard.  Pulmonary:      Effort: Pulmonary effort is normal.      Breath sounds: Normal breath sounds and air entry.   Abdominal:      General: Bowel sounds are normal.      Palpations: Abdomen is soft. There is no hepatomegaly.      Tenderness: There is no abdominal tenderness.   Musculoskeletal:      Cervical back: Neck supple.      Right lower leg: No edema.      Left lower leg: No edema.   Skin:     General: Skin is warm and dry.      Capillary Refill: Capillary refill takes less than 2 seconds.   Neurological:      Mental Status: She is alert and oriented to person, place, and time.   Psychiatric:         Attention and Perception: Attention and perception normal.         Mood and Affect: Mood and affect normal.         Speech: Speech normal.         Behavior: Behavior normal. Behavior is cooperative.         Thought Content: Thought content normal.         Cognition and Memory: Cognition and memory normal.         Judgment: Judgment normal.     I wore a facial mask during this patient encounter.  Patient also wearing a surgical mask.  Hand hygiene performed before and after seeing the patient.     Result Review :     CMP    CMP 9/12/22 12/13/22 2/24/23   Glucose 97 125 (A) 90   BUN 16 17 20   Creatinine 0.90 0.96 0.91   eGFR 62.4 57.7 (A)    Sodium 134 (A) 131 (A) 140   Potassium 4.5 4.1 4.1   Chloride  98 93 (A) 101   Calcium 9.6 9.4 9.7   Total Protein   7.1   Total Protein 7.4 7.0    Albumin 4.10 4.40 4.2   Globulin   2.9   Globulin 3.3 2.6    Total Bilirubin 0.5 0.7 0.9   Alkaline Phosphatase 57 56 51   AST (SGOT) 14 15 14   ALT (SGPT) 11 11 11   Albumin/Globulin Ratio 1.2 1.7    BUN/Creatinine Ratio 17.8 17.7 22.0   Anion Gap 11.1 13.2    (A) Abnormal value       Comments are available for some flowsheets but are not being displayed.           CBC w/diff    CBC w/Diff 9/12/22 12/13/22 2/24/23   WBC 7.91 11.04 (A) 8.94   RBC 3.78 4.04 3.43 (A)   Hemoglobin 11.5 (A) 12.4 11.2 (A)   Hematocrit 35.9 36.8 32.0 (A)   MCV 95.0 91.1 93.3   MCH 30.4 30.7 32.7   MCHC 32.0 33.7 35.0   RDW 11.9 (A) 12.0 (A) 12.4   Platelets 298 238 294   Neutrophil Rel % 66.6 74.5 48.4   Immature Granulocyte Rel % 0.4 0.7 (A)    Lymphocyte Rel % 23.8 18.8 (A) 40.6   Monocyte Rel % 7.7 5.3 9.3   Eosinophil Rel % 1.0 0.4 0.7   Basophil Rel % 0.5 0.3 0.7   (A) Abnormal value            Lipid Panel    Lipid Panel 8/26/22 10/20/22 2/24/23   Total Cholesterol 178 176 181   Triglycerides 228 (A) 221 (A) 253 (A)   HDL Cholesterol 48 55 61 (A)   VLDL Cholesterol 38 37 41 (A)   LDL Cholesterol  92 84 79   LDL/HDL Ratio 1.9 1.40    (A) Abnormal value       Comments are available for some flowsheets but are not being displayed.           TSH    TSH 10/20/22 12/13/22 2/24/23   TSH 1.170 1.210 1.490                     Assessment and Plan    Diagnoses and all orders for this visit:    1. Mixed hyperlipidemia (Primary)    2. Hypertension, essential    3. Hypothyroidism (acquired)    4. Primary insomnia  -     traZODone (DESYREL) 50 MG tablet; Take 1 tablet by mouth Every Night.  Dispense: 30 tablet; Refill: 1    5. Grief  -     traZODone (DESYREL) 50 MG tablet; Take 1 tablet by mouth Every Night.  Dispense: 30 tablet; Refill: 1    1.  Chronic and uncontrolled hyperlipidemia: I have reviewed above blood work with her at office visit today.  Triglycerides  are elevated.  She will try to eat better but decreasing sugar and carbohydrates in diet.  Plan to recheck in 6 months.  2.  Chronic and stable hypothyroidism: TSH were in normal range.  Continue current medication at home.  3.  Chronic uncontrolled primary insomnia with new grief: She will stop the doxepin medication.  We will try trazodone medication nightly.  I am hoping this will help with her sleep as well as grief of losing her son.  4.  Chronic and stable hypertension: Blood pressure was in therapeutic range.  Continue current medication at home as directed.    Follow Up   Return in about 6 months (around 9/3/2023), or same day labs, for Medicare Wellness.  Patient was given instructions and counseling regarding her condition or for health maintenance advice. Please see specific information pulled into the AVS if appropriate.     DAINA Bashir [facility]  [address] [place] [id number]  Dept: [phone]  Dept Fax: [phone]  Loc: [phone]  Loc Fax: [phone]

## 2023-04-05 ENCOUNTER — OFFICE VISIT (OUTPATIENT)
Dept: PAIN MEDICINE | Facility: CLINIC | Age: 88
End: 2023-04-05
Payer: MEDICARE

## 2023-04-05 VITALS
SYSTOLIC BLOOD PRESSURE: 153 MMHG | HEART RATE: 69 BPM | OXYGEN SATURATION: 98 % | TEMPERATURE: 98.6 F | WEIGHT: 122.8 LBS | BODY MASS INDEX: 21.76 KG/M2 | HEIGHT: 63 IN | DIASTOLIC BLOOD PRESSURE: 69 MMHG

## 2023-04-05 DIAGNOSIS — M54.41 CHRONIC BILATERAL LOW BACK PAIN WITH BILATERAL SCIATICA: ICD-10-CM

## 2023-04-05 DIAGNOSIS — M25.551 CHRONIC RIGHT HIP PAIN: ICD-10-CM

## 2023-04-05 DIAGNOSIS — G89.29 OTHER CHRONIC PAIN: Primary | ICD-10-CM

## 2023-04-05 DIAGNOSIS — Z79.899 ENCOUNTER FOR LONG-TERM CURRENT USE OF HIGH RISK MEDICATION: ICD-10-CM

## 2023-04-05 DIAGNOSIS — G89.29 CHRONIC RIGHT HIP PAIN: ICD-10-CM

## 2023-04-05 DIAGNOSIS — M54.42 CHRONIC BILATERAL LOW BACK PAIN WITH BILATERAL SCIATICA: ICD-10-CM

## 2023-04-05 DIAGNOSIS — G89.29 CHRONIC BILATERAL LOW BACK PAIN WITH BILATERAL SCIATICA: ICD-10-CM

## 2023-04-05 DIAGNOSIS — M17.12 OSTEOARTHRITIS OF LEFT KNEE, UNSPECIFIED OSTEOARTHRITIS TYPE: ICD-10-CM

## 2023-04-05 PROCEDURE — 1125F AMNT PAIN NOTED PAIN PRSNT: CPT | Performed by: NURSE PRACTITIONER

## 2023-04-05 PROCEDURE — 1160F RVW MEDS BY RX/DR IN RCRD: CPT | Performed by: NURSE PRACTITIONER

## 2023-04-05 PROCEDURE — 1159F MED LIST DOCD IN RCRD: CPT | Performed by: NURSE PRACTITIONER

## 2023-04-05 PROCEDURE — 99214 OFFICE O/P EST MOD 30 MIN: CPT | Performed by: NURSE PRACTITIONER

## 2023-04-05 RX ORDER — BUPRENORPHINE 7.5 UG/H
1 PATCH TRANSDERMAL
Qty: 4 PATCH | Refills: 1 | Status: SHIPPED | OUTPATIENT
Start: 2023-04-05

## 2023-04-05 NOTE — PROGRESS NOTES
CHIEF COMPLAINT  F/U back pain- patient states that her pain has worsened since she has started the methadone.     Subjective   Ashli León is a 87 y.o. female  who presents for follow-up.  She has a history of back pain, joint pain.  Today her pain is 7/10VAS (worsening). Butrans 7.5 mcg was continued last visit due to cost.  We changed her to methadone.  Currently taking 5 mg twice daily. She reports that she has been miserable since starting this, it has not helped her pain at all, she is not sleeping.  She prefers to go back to the patches despite the cost as her pain was quite stable and much more adequately controlled with this regimen.  She had no side effects with the regimen.        Previously failed Belbuca.  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       Procedures:  Left knee injection (steroid) 2/22/2023 -- no improvement   Left knee gel injection 4/8/2022 -- relief x 1 month   Left knee steroid injection 2/7/2021 - no benefit.  9/15/2020 - L3-L4 LESI and left knee injection with fluoroscopic guidance - reported moderate relief x 1 to 2 weeks        Back Pain  This is a chronic problem. The current episode started more than 1 year ago. The problem occurs daily. The problem has been gradually worsening since onset. The pain is present in the lumbar spine. The quality of the pain is described as aching (occasional sharp/shooting pain). The pain radiates to the left thigh and right thigh. The pain is at a severity of 7/10. The pain is moderate. The symptoms are aggravated by position, standing, sitting and bending. Associated symptoms include weakness. Pertinent negatives include no abdominal pain, chest pain, dysuria, fever, headaches or numbness. Risk factors include sedentary lifestyle. She has tried analgesics, NSAIDs, ice and heat (injections) for the symptoms. The treatment provided moderate relief.   Knee Pain   The pain is present in the left knee and right knee. The pain is at a  "severity of 7/10. The pain is moderate. The pain has been worsening since onset. Pertinent negatives include no numbness. The symptoms are aggravated by weight bearing and movement. She has tried NSAIDs and acetaminophen (injections) for the symptoms. The treatment provided moderate relief.      PEG Assessment   What number best describes your pain on average in the past week?6  What number best describes how, during the past week, pain has interfered with your enjoyment of life?9  What number best describes how, during the past week, pain has interfered with your general activity?  9    The following portions of the patient's history were reviewed and updated as appropriate: allergies, current medications, past family history, past medical history, past social history, past surgical history and problem list.    Review of Systems   Constitutional: Positive for fatigue. Negative for activity change, chills and fever.   HENT: Negative for congestion.    Eyes: Negative for visual disturbance.   Respiratory: Negative for chest tightness and shortness of breath.    Cardiovascular: Negative for chest pain.   Gastrointestinal: Negative for abdominal pain, constipation and diarrhea.   Genitourinary: Positive for frequency. Negative for difficulty urinating, dyspareunia and dysuria.   Musculoskeletal: Positive for back pain.   Neurological: Positive for weakness. Negative for dizziness, light-headedness, numbness and headaches.   Psychiatric/Behavioral: Positive for sleep disturbance. Negative for agitation. The patient is not nervous/anxious.      I have reviewed and confirmed the accuracy of the ROS as documented by the MA/WON/RN TANIA Tracy    Vitals:    04/05/23 1431   BP: 153/69   Pulse: 69   Temp: 98.6 °F (37 °C)   SpO2: 98%   Weight: 55.7 kg (122 lb 12.8 oz)   Height: 160 cm (63\")   PainSc:   7   PainLoc: Back         Objective   Physical Exam  Vitals and nursing note reviewed.   Constitutional:       " General: She is not in acute distress.     Appearance: Normal appearance. She is not ill-appearing.   Pulmonary:      Effort: Pulmonary effort is normal. No respiratory distress.   Musculoskeletal:      Lumbar back: Tenderness and bony tenderness present.      Right knee: Crepitus present. Tenderness present.      Left knee: Crepitus present. Decreased range of motion. Tenderness present.   Neurological:      Mental Status: She is alert and oriented to person, place, and time.      Gait: Gait abnormal (slowed).   Psychiatric:         Mood and Affect: Mood normal.         Behavior: Behavior normal.       Assessment & Plan   Diagnoses and all orders for this visit:    1. Other chronic pain (Primary)  -     Buprenorphine (Butrans) patch weekly transdermal patch; Place 1 patch on the skin as directed by provider Every 7 (Seven) Days.  Dispense: 4 patch; Refill: 1    2. Chronic right hip pain  -     Buprenorphine (Butrans) patch weekly transdermal patch; Place 1 patch on the skin as directed by provider Every 7 (Seven) Days.  Dispense: 4 patch; Refill: 1    3. Osteoarthritis of left knee, unspecified osteoarthritis type  -     Buprenorphine (Butrans) patch weekly transdermal patch; Place 1 patch on the skin as directed by provider Every 7 (Seven) Days.  Dispense: 4 patch; Refill: 1    4. Chronic bilateral low back pain with bilateral sciatica  -     Buprenorphine (Butrans) patch weekly transdermal patch; Place 1 patch on the skin as directed by provider Every 7 (Seven) Days.  Dispense: 4 patch; Refill: 1    5. Encounter for long-term current use of high risk medication  -     Buprenorphine (Butrans) patch weekly transdermal patch; Place 1 patch on the skin as directed by provider Every 7 (Seven) Days.  Dispense: 4 patch; Refill: 1      --- D/c Methadone.  --- Resume Butrans 7.5 mcg/hr patch. Patient appears stable with current regimen. No adverse effects. Regarding continuation of opioids, there is no evidence of  aberrant behavior or any red flags.  The patient continues with appropriate response to opioid therapy. ADL's remain intact by self.   --- Declines orthopedic referral   --- The urine drug screen confirmation from 12/14/2022 has been reviewed and the result is appropriate based on patient history and JUDY report  --- The patient signed an updated copy of the controlled substance agreement on 12/14/2022  --- Follow-up 2 months/sooner if needed            JUDY REPORT  As part of the patient's treatment plan, I am prescribing controlled substances. The patient has been made aware of appropriate use of such medications, including potential risk of somnolence, limited ability to drive and/or work safely, and the potential for dependence or overdose. It has also been made clear that these medications are for use by this patient only, without concomitant use of alcohol or other substances unless prescribed.     Patient has completed prescribing agreement detailing terms of continued prescribing of controlled substances, including monitoring JUDY reports, urine drug screening, and pill counts if necessary. The patient is aware that inappropriate use will results in cessation of prescribing such medications.    As the clinician, I personally reviewed the JUDY from 4/5/2023 while the patient was in the office today.    History and physical exam exhibit continued safe and appropriate use of controlled substances.     Dictated utilizing Dragon dictation.

## 2023-04-12 RX ORDER — PRAVASTATIN SODIUM 40 MG
TABLET ORAL
Qty: 90 TABLET | Refills: 4 | Status: SHIPPED | OUTPATIENT
Start: 2023-04-12

## 2023-05-03 NOTE — TELEPHONE ENCOUNTER
"  Caller: Ashli León \"BECKY\"    Relationship: Self    Best call back number: [phone]    Requested Prescriptions:   Requested Prescriptions     Pending Prescriptions Disp Refills   • metoprolol tartrate (LOPRESSOR) 25 MG tablet 180 tablet 0     Sig: Take 1 tablet by mouth 2 (Two) Times a Day.        Pharmacy where request should be sent: [facility] [address] [place] [address] [phone] [facility] [phone] FX     Last office visit with prescribing clinician: 3/3/2023   Last telemedicine visit with prescribing clinician: 9/11/2023   Next office visit with prescribing clinician: 9/18/2023       Does the patient have less than a 3 day supply:  [] Yes  [x] No      Jose Kolb Rep   05/03/23 09:33 EDT           "

## 2023-05-06 ENCOUNTER — HOSPITAL ENCOUNTER (EMERGENCY)
Facility: HOSPITAL | Age: 88
Discharge: HOME | End: 2023-05-06
Attending: EMERGENCY MEDICINE
Payer: MEDICARE

## 2023-05-06 ENCOUNTER — APPOINTMENT (OUTPATIENT)
Dept: GENERAL RADIOLOGY | Facility: HOSPITAL | Age: 88
End: 2023-05-06
Payer: MEDICARE

## 2023-05-06 VITALS
RESPIRATION RATE: 18 BRPM | DIASTOLIC BLOOD PRESSURE: 88 MMHG | SYSTOLIC BLOOD PRESSURE: 195 MMHG | WEIGHT: 118 LBS | HEIGHT: 64 IN | TEMPERATURE: 97.8 F | BODY MASS INDEX: 20.14 KG/M2 | OXYGEN SATURATION: 96 % | HEART RATE: 79 BPM

## 2023-05-06 DIAGNOSIS — M25.511 ACUTE PAIN OF RIGHT SHOULDER: Primary | ICD-10-CM

## 2023-05-06 DIAGNOSIS — F43.21 GRIEF: ICD-10-CM

## 2023-05-06 DIAGNOSIS — F51.01 PRIMARY INSOMNIA: ICD-10-CM

## 2023-05-06 PROCEDURE — 99283 EMERGENCY DEPT VISIT LOW MDM: CPT

## 2023-05-06 PROCEDURE — 73030 X-RAY EXAM OF SHOULDER: CPT

## 2023-05-06 RX ORDER — CLONIDINE HYDROCHLORIDE 0.1 MG/1
0.2 TABLET ORAL ONCE
Status: COMPLETED | OUTPATIENT
Start: 2023-05-06 | End: 2023-05-06

## 2023-05-06 RX ORDER — HYDROCODONE BITARTRATE AND ACETAMINOPHEN 5; 325 MG/1; MG/1
0.5 TABLET ORAL ONCE
Status: COMPLETED | OUTPATIENT
Start: 2023-05-06 | End: 2023-05-06

## 2023-05-06 RX ADMIN — HYDROCODONE BITARTRATE AND ACETAMINOPHEN 0.5 TABLET: 5; 325 TABLET ORAL at 11:53

## 2023-05-06 RX ADMIN — CLONIDINE HYDROCHLORIDE 0.2 MG: 0.1 TABLET ORAL at 11:53

## 2023-05-06 NOTE — ED PROVIDER NOTES
Subjective   History of Present Illness  87-year-old female past medical history significant for back pain colitis diarrhea diverticular disease and hip pain who is on pain patches for her chronic pain presents emergency room complaining of right shoulder pain.  Patient states that last night when she was getting out of the shower she started to slip and reached for a handle with her right arm catching herself however causing her to have sharp shoulder pain at that time.  Patient has continued to have pain and decreased range of motion since that time.  Patient states she did not fall and has no other injuries.  Patient has no other complaints        Review of Systems   Constitutional: Negative for activity change, appetite change, chills, diaphoresis, fatigue and fever.   HENT: Negative for congestion, rhinorrhea and sore throat.    Eyes: Negative for photophobia and visual disturbance.   Respiratory: Negative for cough and shortness of breath.    Cardiovascular: Negative for chest pain, palpitations and leg swelling.   Gastrointestinal: Negative for abdominal distention, abdominal pain, diarrhea, nausea and vomiting.   Genitourinary: Negative for dysuria and flank pain.   Musculoskeletal: Negative for arthralgias and back pain.        Right shoulder pain   Skin: Negative for rash.   Neurological: Negative for dizziness, weakness and headaches.   Psychiatric/Behavioral: Negative for agitation and behavioral problems.       Past Medical History:   Diagnosis Date   • Acid reflux    • Back pain    • Colitis     history of   • Diarrhea    • Disease of thyroid gland    • Diverticular disease    • Glaucoma    • Gout     - right big toe   • High blood pressure    • High cholesterol    • History of vertebral compression fracture     multiple   • Hyponatremia    • Knee pain    • Low back pain    • Macular degeneration    • Mitral regurgitation 06/2016    Mild to moderate per 2-D echocardiogram   • Peripheral neuropathy    •  Primary osteoarthritis of both knees 4/1/2016   • Renal lesion     on mri   • Rheumatoid arthritis        Allergies   Allergen Reactions   • Neomycin Swelling   • Statins Myalgia and Swelling     Not all statins  Not all statins       Past Surgical History:   Procedure Laterality Date   • APPENDECTOMY  1951   • CATARACT EXTRACTION Bilateral    • FEMUR OPEN REDUCTION INTERNAL FIXATION Right 10/11/2020    Procedure: OPEN REDUCTION INTERNAL FIXATION OF RIGHT FEMUR FRACTURE;  Surgeon: Mac Frances Jr., MD;  Location: [facility] OR;  Service: Orthopedics;  Laterality: Right;   • FEMUR SURGERY Right     had fracture and has rajesh    • HEMORRHOIDECTOMY     • HIP HEMIARTHROPLASTY Right 1/3/2017    Procedure: HIP HEMIARTHROPLASTY with IM rajesh and 2 screw hardware removal antibiotic cement;  Surgeon: Mirza Whittington MD;  Location:  [facility] OR;  Service:    • HIP SURGERY Right 1999    hx fracture   • LEG SURGERY Right 12/1/2017    Procedure: GLUTEAL TENDON REPAIR-minimus and medius;  Surgeon: Mirza Whittington MD;  Location:  [facility] OR;  Service:    • TOTAL KNEE ARTHROPLASTY Right 6/22/2016    Procedure: RT TOTAL KNEE ARTHROPLASTY WITH FINN NAVIGATION;  Surgeon: Adrian Swenson MD;  Location: [facility] MAIN OR;  Service:        Family History   Problem Relation Age of Onset   • Alzheimer's disease Mother    • Heart attack Father 73   • Alzheimer's disease Sister    • Diabetes Son    • Heart attack Son 58       Social History     Socioeconomic History   • Marital status:    Tobacco Use   • Smoking status: Never   • Smokeless tobacco: Never   • Tobacco comments:     Daily caffiene   Vaping Use   • Vaping Use: Never used   Substance and Sexual Activity   • Alcohol use: Yes     Alcohol/week: 3.0 standard drinks     Types: 3 Shots of liquor per week     Comment: social use 1 weekly   • Drug use: No     Types: Oxycodone   • Sexual activity: Defer           Objective   Physical Exam  Vitals and nursing note reviewed.   Constitutional:        General: She is not in acute distress.     Appearance: Normal appearance. She is not ill-appearing, toxic-appearing or diaphoretic.   HENT:      Head: Normocephalic and atraumatic.      Nose: Nose normal.      Mouth/Throat:      Mouth: Mucous membranes are moist.   Eyes:      Conjunctiva/sclera: Conjunctivae normal.   Cardiovascular:      Rate and Rhythm: Normal rate.      Pulses: Normal pulses.   Pulmonary:      Effort: Pulmonary effort is normal.   Abdominal:      General: Abdomen is flat. There is no distension.      Tenderness: There is no abdominal tenderness.   Musculoskeletal:         General: No swelling.      Right shoulder: Tenderness present. No swelling, deformity, effusion, laceration, bony tenderness or crepitus. Decreased range of motion. Normal strength. Normal pulse.      Left shoulder: Normal.      Cervical back: Normal range of motion and neck supple.      Comments: Neurovascular intact distally right upper extremity   Skin:     General: Skin is warm and dry.   Neurological:      General: No focal deficit present.      Mental Status: She is alert and oriented to person, place, and time.   Psychiatric:         Mood and Affect: Mood normal.         Procedures           ED Course  ED Course as of 05/06/23 1142   Sat May 06, 2023   1140 X-ray shoulder 2 view right:  IMPRESSION:  Chronic changes in the shoulder as above. No acute fracture or  dislocation. [BH]   1141 Discussed findings with patient specifically no acute abnormality noted on x-ray.  We will treat patient's blood pressure with some clonidine as well as pain medication in the emergency room for shoulder pain and patient be discharged home with follow-up as needed.  Patient also knows she can return the emergency room for new persistent or worsening symptoms.  Patient states he understands and agrees with plan. []      ED Course User Index  [] Jordan Danielle MD                                           Medical Decision  Making  My differential diagnosis of the upper extremity pain or injury includes but is not limited to contusions of the shoulder, forearm, arm, wrist, elbow or hand, dislocations of shoulder, elbow, wrist, digits, nursemaid's elbow (age-appropriate), shoulder sprain, elbow sprain, wrist sprain, digit sprain, shoulder strain, arm strain, forearm strain, elbow strain, wrist strain, hand sprain, digit strain, lacerations of the upper extremity, fractures both closed and open of clavicle, scapula, humerus, radius, ulna, bones of the wrist, hands and digits, cellulitis or abscess, cervical radiculopathy, radial nerve palsy, neurogenic upper extremity pain, angina equivalent, SVT, DVT, arterial occlusion, compartment syndrome.    Amount and/or Complexity of Data Reviewed  Radiology: ordered. Decision-making details documented in ED Course.          Final diagnoses:   Acute pain of right shoulder       ED Disposition  ED Disposition     ED Disposition   Discharge    Condition   Stable    Comment   --             Nicolette Rubio PA-C  [address] [facility] [address]  [phone]    Schedule an appointment as soon as possible for a visit   As needed    [facility]  [address] [address] [phone]  Go to   As needed         Medication List      Changed    docusate sodium 100 MG capsule  Take 100 mg by mouth 2 (Two) Times a Day.  What changed:   · when to take this  · reasons to take this             Jordan Danielle MD  05/06/23 8950

## 2023-05-08 ENCOUNTER — PATIENT OUTREACH (OUTPATIENT)
Dept: CASE MANAGEMENT | Facility: OTHER | Age: 88
End: 2023-05-08
Payer: MEDICARE

## 2023-05-08 RX ORDER — TRAZODONE HYDROCHLORIDE 50 MG/1
TABLET ORAL
Qty: 30 TABLET | Refills: 1 | Status: SHIPPED | OUTPATIENT
Start: 2023-05-08

## 2023-05-08 NOTE — OUTREACH NOTE
"Patient Outreach    AMBULATORY CASE MANAGEMENT NOTE    Name and Relationship of Patient/Support Person: Ashli León \"BECKY\" - Self    Call placed to patient to follow up recent ED visit. Introdcued self and role of ACM. Mrs León reports that she is in a lot of pain. She states she was told in the ED she would have this for a week or so. Discussed need to follow up with PCP and orthopedic doctor. She plans on doing this. He daughter is a nurse and will assist with this. Encouraged to return to the ED if the pain becomes more intense. ACM offered to call back later this week and she would like this in case coordination of appointments.transportation is needed.         Acacia MICHELLE  Ambulatory Case Management    5/8/2023, 12:30 EDT  "

## 2023-05-12 ENCOUNTER — PATIENT OUTREACH (OUTPATIENT)
Dept: CASE MANAGEMENT | Facility: OTHER | Age: 88
End: 2023-05-12
Payer: MEDICARE

## 2023-05-12 NOTE — OUTREACH NOTE
"Patient Outreach    AMBULATORY CASE MANAGEMENT NOTE    Name and Relationship of Patient/Support Person: Ashli León \"BECKY\" - Self    Call placed to patient to follow up pain. She states she is still hurting but it does seem to get a little bit better everyday. She does have someone staying with her at all times for now. She had not made an appointment with her PCP yet. She does not feel she could tolerate the trip/appointment. Denies any needs at this time. Instructed to keep ACM number in case needed in the future.         Acacia MICHELLE  Ambulatory Case Management    5/12/2023, 15:02 EDT  "

## 2023-05-23 ENCOUNTER — OFFICE VISIT (OUTPATIENT)
Dept: FAMILY MEDICINE CLINIC | Facility: CLINIC | Age: 88
End: 2023-05-23
Payer: MEDICARE

## 2023-05-23 VITALS
TEMPERATURE: 97.3 F | OXYGEN SATURATION: 98 % | WEIGHT: 119 LBS | HEART RATE: 67 BPM | DIASTOLIC BLOOD PRESSURE: 70 MMHG | BODY MASS INDEX: 20.32 KG/M2 | SYSTOLIC BLOOD PRESSURE: 116 MMHG | HEIGHT: 64 IN

## 2023-05-23 DIAGNOSIS — F32.1 CURRENT MODERATE EPISODE OF MAJOR DEPRESSIVE DISORDER, UNSPECIFIED WHETHER RECURRENT: ICD-10-CM

## 2023-05-23 DIAGNOSIS — M25.511 ACUTE PAIN OF RIGHT SHOULDER: Primary | ICD-10-CM

## 2023-05-23 DIAGNOSIS — F51.01 PRIMARY INSOMNIA: ICD-10-CM

## 2023-05-23 PROCEDURE — 99213 OFFICE O/P EST LOW 20 MIN: CPT | Performed by: NURSE PRACTITIONER

## 2023-05-23 PROCEDURE — 1160F RVW MEDS BY RX/DR IN RCRD: CPT | Performed by: NURSE PRACTITIONER

## 2023-05-23 PROCEDURE — 1159F MED LIST DOCD IN RCRD: CPT | Performed by: NURSE PRACTITIONER

## 2023-05-23 RX ORDER — ESCITALOPRAM OXALATE 10 MG/1
10 TABLET ORAL DAILY
Qty: 30 TABLET | Refills: 0 | Status: SHIPPED | OUTPATIENT
Start: 2023-05-23

## 2023-05-23 NOTE — PROGRESS NOTES
"Patient ID: Ashli León is a 87 y.o. female     Patient Care Team:  Nicolette Rubio PA-C as PCP - General (Family Medicine)    Subjective     Chief Complaint   Patient presents with   • Fall     S/P slip and near fall 3 week ago getting out of shower.  Went to ER due to possible RT shoulder injury.     • Shoulder Injury     Right shoulder when she slipped she felt a burning pain through shoulder down through fingers.  Problems with hand continue.       History of Present Illness    Ashli León presents to [facility] today for ER follow-up for right shoulder pain.  She was getting out of the shower, started to slip, reached for handle with her right arm to catch herself.  She did not fall however had sharp pain in shoulder. Felt like a \"hot pocker\" radiating down right arm. Xray was completed which showed chronic changes of the AC joint.  No acute findings. Multiple old right sided rib fractures were also noted.    Followed by pain management for chronic pain.  Prescribed buprenorphine which has also helped shoulder pain.    B/P was also elevated when evaluated in ER.  Treated with pain medication and clonidine.  B/P has improved.      ED Provider Notes by Jordan Danielle MD (05/06/2023 10:08)     Since being seen in ER. Condition has gradually improved.  Over the past 3 days, has had increased ROM in right arm and shoulder.  No numbness or tingling in fingers.      Also having issues of decreased appetite and losing weight unintentional.  Down 9 pounds since March.  Problems with constipation.  Took Senakot and had BM last night. Requesting something to help gain weight.    Also requesting medication to help with depression.  Started on trazodone in March per Nicolette to help with sleep and grief due to loss of son in December.   Previously treated with Lexapro.  D/C in 2018 however she unsure why.      She denies any complaints of fever, chills, cough, chest pain, " shortness of air, abdominal pain, nausea, or any other concerns.     The following portions of the patient's history were reviewed and updated as appropriate: allergies, current medications, past family history, past medical history, past social history, past surgical history and problem list.       ROS    Vitals:    05/23/23 1536   BP: 116/70   Pulse: 67   Temp: 97.3 °F (36.3 °C)   SpO2: 98%       Documented weights    05/23/23 1536   Weight: 54 kg (119 lb)     Body mass index is 20.43 kg/m².      XR Shoulder 2+ View Right (05/06/2023 11:21)     Objective     Physical Exam  Vitals reviewed.   Constitutional:       General: She is not in acute distress.  Cardiovascular:      Rate and Rhythm: Normal rate and regular rhythm.      Heart sounds: No murmur heard.  Pulmonary:      Effort: Pulmonary effort is normal.      Breath sounds: Normal breath sounds. No wheezing.   Musculoskeletal:      Right shoulder: Decreased range of motion. Normal pulse.      Right lower leg: No edema.      Left lower leg: No edema.      Comments: Ambulating with walker   Neurological:      Mental Status: She is alert.   Psychiatric:         Mood and Affect: Mood normal.         Behavior: Behavior normal.       Patient screened positive for depression based on a PHQ-9 score of 12 on 5/23/2023. Follow-up recommendations include: Prescribed antidepressant medication treatment.       Assessment & Plan     Assessment/Plan     Diagnoses and all orders for this visit:    1. Acute pain of right shoulder (Primary)   Condition gradually improving.  Continue to monitor.      2. Current moderate episode of major depressive disorder, unspecified whether recurrent  -     escitalopram (Lexapro) 10 MG tablet; Take 1 tablet by mouth Daily.  Dispense: 30 tablet; Refill: 0    3. Primary insomnia   Continue trazodone as directed.          Summary:  Ashli León presented to office today for ER follow-up concerning right shoulder injury.  She feels current  symptoms are gradually improving.  We will continue to monitor.  However she also wanted to address worsening depression, decreased appetite, unintentional weight loss, and problems with constipation.  She scored a 12 on depression screening.  Of review of chart looks like she took Lexapro in the past.  She is unsure why this was stopped.  Advised to resume taking Lexapro 10 mg daily.  We will see if this will help with her appetite which will prevent further weight loss.  She was taking Senokot as needed for constipation.  Continue same.  Make sure she drinks plenty of fluids.  Increase fiber in diet.  She will need to continue trazodone at night to help with sleep.  We will have her follow-up with Nicolette in about 4 weeks for recheck concerning her depression.    In the meantime, instructed to contact us sooner for any problems or concerns.    Follow Up:  Return in about 4 weeks (around 6/20/2023) for Recheck with Nicolette for depression  .    Patient was given instructions and counseling regarding condition or for health maintenance advice.  Please see specific information pulled into the AVS if appropriate.          Darline Gallagher, APRN  Family Medicine  [facility]  05/23/23  16:57 EDT

## 2023-06-02 ENCOUNTER — OFFICE VISIT (OUTPATIENT)
Dept: PAIN MEDICINE | Facility: CLINIC | Age: 88
End: 2023-06-02

## 2023-06-02 VITALS
RESPIRATION RATE: 12 BRPM | HEART RATE: 57 BPM | TEMPERATURE: 96.9 F | SYSTOLIC BLOOD PRESSURE: 151 MMHG | DIASTOLIC BLOOD PRESSURE: 95 MMHG | WEIGHT: 120 LBS | OXYGEN SATURATION: 97 % | HEIGHT: 64 IN | BODY MASS INDEX: 20.49 KG/M2

## 2023-06-02 DIAGNOSIS — M54.42 CHRONIC BILATERAL LOW BACK PAIN WITH BILATERAL SCIATICA: ICD-10-CM

## 2023-06-02 DIAGNOSIS — Z79.899 ENCOUNTER FOR LONG-TERM CURRENT USE OF HIGH RISK MEDICATION: ICD-10-CM

## 2023-06-02 DIAGNOSIS — G89.29 CHRONIC BILATERAL LOW BACK PAIN WITH BILATERAL SCIATICA: ICD-10-CM

## 2023-06-02 DIAGNOSIS — M25.511 ACUTE PAIN OF RIGHT SHOULDER: ICD-10-CM

## 2023-06-02 DIAGNOSIS — M54.41 CHRONIC BILATERAL LOW BACK PAIN WITH BILATERAL SCIATICA: ICD-10-CM

## 2023-06-02 DIAGNOSIS — G89.29 CHRONIC RIGHT HIP PAIN: ICD-10-CM

## 2023-06-02 DIAGNOSIS — M17.12 OSTEOARTHRITIS OF LEFT KNEE, UNSPECIFIED OSTEOARTHRITIS TYPE: ICD-10-CM

## 2023-06-02 DIAGNOSIS — M25.551 CHRONIC RIGHT HIP PAIN: ICD-10-CM

## 2023-06-02 DIAGNOSIS — G89.29 OTHER CHRONIC PAIN: Primary | ICD-10-CM

## 2023-06-02 LAB
POC AMPHETAMINES: NEGATIVE
POC BARBITURATES: NEGATIVE
POC BENZODIAZEPHINES: NEGATIVE
POC COCAINE: NEGATIVE
POC METHADONE: NEGATIVE
POC METHAMPHETAMINE SCREEN URINE: NEGATIVE
POC OPIATES: NEGATIVE
POC OXYCODONE: NEGATIVE
POC PHENCYCLIDINE: NEGATIVE
POC PROPOXYPHENE: NEGATIVE
POC THC: NEGATIVE
POC TRICYCLIC ANTIDEPRESSANTS: NEGATIVE

## 2023-06-02 PROCEDURE — 1125F AMNT PAIN NOTED PAIN PRSNT: CPT | Performed by: NURSE PRACTITIONER

## 2023-06-02 PROCEDURE — 99214 OFFICE O/P EST MOD 30 MIN: CPT | Performed by: NURSE PRACTITIONER

## 2023-06-02 PROCEDURE — 1160F RVW MEDS BY RX/DR IN RCRD: CPT | Performed by: NURSE PRACTITIONER

## 2023-06-02 PROCEDURE — 1159F MED LIST DOCD IN RCRD: CPT | Performed by: NURSE PRACTITIONER

## 2023-06-02 RX ORDER — BUPRENORPHINE 7.5 UG/H
1 PATCH TRANSDERMAL
Qty: 4 PATCH | Refills: 2 | Status: SHIPPED | OUTPATIENT
Start: 2023-06-02

## 2023-06-02 NOTE — PROGRESS NOTES
CHIEF COMPLAINT  Back pain    Urine poc: neg    Subjective   Ashli León is a 87 y.o. female  who presents to the office for routine f/u visit.  Today her pain is 5/10VAS.  She continues to utilize Butrans 7.5 mcg/hr patch which really helps. Denies adverse reactions.  No constipation or somnolence.  Some question of gait/balance issues at higher doses, but tolerates the current dose well.     Previously failed Belbuca.  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  She has trailed methadone as well without benefit.     Procedures:  Left knee injection (steroid) 2/22/2023 -- no improvement   Left knee gel injection 4/8/2022 -- relief x 1 month   Left knee steroid injection 2/7/2021 - no benefit.  9/15/2020 - L3-L4 LESI and left knee injection with fluoroscopic guidance - reported moderate relief x 1 to 2 weeks       Fell and hurt her right shoulder a month ago. No acute fracture found on xray. Still having pain and difficulty with ROM.      Back Pain  This is a chronic problem. The current episode started more than 1 year ago. The problem occurs daily. The problem has been gradually worsening since onset. The pain is present in the lumbar spine. The quality of the pain is described as aching (occasional sharp/shooting pain). The pain radiates to the left thigh and right thigh. The pain is at a severity of 5/10. The pain is moderate. The symptoms are aggravated by position, standing, sitting and bending. Pertinent negatives include no abdominal pain, chest pain, dysuria, fever, headaches, numbness or weakness. Risk factors include sedentary lifestyle. She has tried analgesics, NSAIDs, ice and heat (injections) for the symptoms. The treatment provided moderate relief.   Knee Pain   The pain is present in the left knee and right knee. The pain is at a severity of 5/10. The pain is moderate. The pain has been worsening since onset. Pertinent negatives include no numbness. The symptoms are aggravated by weight  "bearing and movement. She has tried NSAIDs and acetaminophen (injections) for the symptoms. The treatment provided moderate relief.      PEG Assessment   What number best describes your pain on average in the past week?4  What number best describes how, during the past week, pain has interfered with your enjoyment of life?9  What number best describes how, during the past week, pain has interfered with your general activity?  9    The following portions of the patient's history were reviewed and updated as appropriate: allergies, current medications, past family history, past medical history, past social history, past surgical history and problem list.    Review of Systems   Constitutional: Negative for activity change, fatigue and fever.   HENT: Negative for congestion.    Respiratory: Negative for cough and chest tightness.    Cardiovascular: Negative for chest pain.   Gastrointestinal: Negative for abdominal pain, constipation and diarrhea.   Genitourinary: Negative for difficulty urinating and dysuria.   Musculoskeletal: Positive for back pain.   Neurological: Negative for dizziness, seizures, weakness, light-headedness, numbness and headaches.   Psychiatric/Behavioral: Positive for agitation and sleep disturbance. Negative for suicidal ideas. The patient is not nervous/anxious.      I have reviewed and confirmed the accuracy of the ROS as documented by the MA/LPN/RN TANIA Tracy     Vitals:    06/02/23 1407   BP: 151/95   BP Location: Right arm   Patient Position: Sitting   Cuff Size: Adult   Pulse: 57   Resp: 12   Temp: 96.9 °F (36.1 °C)   TempSrc: Temporal   SpO2: 97%   Weight: 54.4 kg (120 lb)   Height: 162.6 cm (64\")   PainSc:   5     Objective   Physical Exam  Vitals and nursing note reviewed.   Constitutional:       General: She is not in acute distress.     Appearance: Normal appearance. She is not ill-appearing.   Pulmonary:      Effort: Pulmonary effort is normal. No respiratory distress. "   Musculoskeletal:      Lumbar back: Tenderness and bony tenderness present.      Right knee: Crepitus present. Tenderness present.      Left knee: Crepitus present. Decreased range of motion. Tenderness present.   Neurological:      Mental Status: She is alert and oriented to person, place, and time.      Gait: Gait abnormal (slowed).   Psychiatric:         Mood and Affect: Mood normal.         Behavior: Behavior normal.       Assessment & Plan   Diagnoses and all orders for this visit:    1. Other chronic pain (Primary)  -     Buprenorphine (BUTRANS) patch weekly transdermal patch; Place 1 patch on the skin as directed by provider Every 7 (Seven) Days.  Dispense: 4 patch; Refill: 2    2. Chronic right hip pain  -     Buprenorphine (BUTRANS) patch weekly transdermal patch; Place 1 patch on the skin as directed by provider Every 7 (Seven) Days.  Dispense: 4 patch; Refill: 2    3. Osteoarthritis of left knee, unspecified osteoarthritis type  -     Buprenorphine (BUTRANS) patch weekly transdermal patch; Place 1 patch on the skin as directed by provider Every 7 (Seven) Days.  Dispense: 4 patch; Refill: 2    4. Chronic bilateral low back pain with bilateral sciatica  -     Buprenorphine (BUTRANS) patch weekly transdermal patch; Place 1 patch on the skin as directed by provider Every 7 (Seven) Days.  Dispense: 4 patch; Refill: 2    5. Encounter for long-term current use of high risk medication  -     Buprenorphine (BUTRANS) patch weekly transdermal patch; Place 1 patch on the skin as directed by provider Every 7 (Seven) Days.  Dispense: 4 patch; Refill: 2    6. Acute pain of right shoulder  -     Ambulatory Referral to Physical Therapy Evaluate and treat      --- Refill Butrans 7.5 mcg patch. Patient appears stable with current regimen. No adverse effects. Regarding continuation of opioids, there is no evidence of aberrant behavior or any red flags.  The patient continues with appropriate response to opioid therapy.  ADL's remain intact by self.   --- Routine UDS in office today as part of monitoring requirements for controlled substances.  The specimen was viewed and the immunoassay result reviewed and is NEG.  This specimen will be sent to [email or web address] laboratory for confirmation.     --- The patient signed an updated copy of the controlled substance agreement on 12/14/2022  --- PT for right shoulder. May consider orthopedic eval if not improving.   --- Follow-up 3 months/sooner if needed                JUDY REPORT  As part of the patient's treatment plan, I am prescribing controlled substances. The patient has been made aware of appropriate use of such medications, including potential risk of somnolence, limited ability to drive and/or work safely, and the potential for dependence or overdose. It has also been made clear that these medications are for use by this patient only, without concomitant use of alcohol or other substances unless prescribed.     Patient has completed prescribing agreement detailing terms of continued prescribing of controlled substances, including monitoring JUDY reports, urine drug screening, and pill counts if necessary. The patient is aware that inappropriate use will results in cessation of prescribing such medications.    As the clinician, I personally reviewed the JUDY from 6/2/2023 while the patient was in the office today.    History and physical exam exhibit continued safe and appropriate use of controlled substances.       Dictated utilizing Dragon dictation.

## 2023-06-09 ENCOUNTER — APPOINTMENT (OUTPATIENT)
Dept: CT IMAGING | Facility: HOSPITAL | Age: 88
End: 2023-06-09
Payer: MEDICARE

## 2023-06-09 ENCOUNTER — HOSPITAL ENCOUNTER (EMERGENCY)
Facility: HOSPITAL | Age: 88
Discharge: HOME | End: 2023-06-09
Attending: EMERGENCY MEDICINE
Payer: MEDICARE

## 2023-06-09 VITALS
HEIGHT: 63 IN | WEIGHT: 117 LBS | RESPIRATION RATE: 16 BRPM | HEART RATE: 87 BPM | TEMPERATURE: 98.3 F | SYSTOLIC BLOOD PRESSURE: 133 MMHG | BODY MASS INDEX: 20.73 KG/M2 | DIASTOLIC BLOOD PRESSURE: 76 MMHG | OXYGEN SATURATION: 94 %

## 2023-06-09 DIAGNOSIS — S32.030A COMPRESSION FRACTURE OF L3 LUMBAR VERTEBRA, CLOSED, INITIAL ENCOUNTER: Primary | ICD-10-CM

## 2023-06-09 DIAGNOSIS — M54.50 ACUTE MIDLINE LOW BACK PAIN WITHOUT SCIATICA: ICD-10-CM

## 2023-06-09 PROCEDURE — 72192 CT PELVIS W/O DYE: CPT

## 2023-06-09 PROCEDURE — 72131 CT LUMBAR SPINE W/O DYE: CPT

## 2023-06-09 PROCEDURE — 99284 EMERGENCY DEPT VISIT MOD MDM: CPT

## 2023-06-09 PROCEDURE — 63710000001 ONDANSETRON ODT 4 MG TABLET DISPERSIBLE: Performed by: EMERGENCY MEDICINE

## 2023-06-09 RX ORDER — HYDROCODONE BITARTRATE AND ACETAMINOPHEN 5; 325 MG/1; MG/1
1 TABLET ORAL ONCE
Status: COMPLETED | OUTPATIENT
Start: 2023-06-09 | End: 2023-06-09

## 2023-06-09 RX ORDER — HYDROCODONE BITARTRATE AND ACETAMINOPHEN 7.5; 325 MG/1; MG/1
1 TABLET ORAL EVERY 6 HOURS PRN
Qty: 12 TABLET | Refills: 0 | Status: SHIPPED | OUTPATIENT
Start: 2023-06-09

## 2023-06-09 RX ORDER — AMOXICILLIN 250 MG
2 CAPSULE ORAL DAILY
Qty: 10 TABLET | Refills: 0 | Status: SHIPPED | OUTPATIENT
Start: 2023-06-09

## 2023-06-09 RX ORDER — ONDANSETRON 4 MG/1
4 TABLET, ORALLY DISINTEGRATING ORAL ONCE
Status: COMPLETED | OUTPATIENT
Start: 2023-06-09 | End: 2023-06-09

## 2023-06-09 RX ADMIN — ONDANSETRON 4 MG: 4 TABLET, ORALLY DISINTEGRATING ORAL at 11:21

## 2023-06-09 RX ADMIN — HYDROCODONE BITARTRATE AND ACETAMINOPHEN 1 TABLET: 5; 325 TABLET ORAL at 11:20

## 2023-06-09 NOTE — ED NOTES
CT STATES RADIOLOGIST NOW ABLE TO READ IMAGES AND IS WORKING ON GETTING CAUGHT UP AND OBTAINING RESULTS.

## 2023-06-09 NOTE — ED NOTES
CALL PLACED TO CT REGARDING IMAGES NOT HAVING BEEN RESULTED AND DELAY. CT STATES THE RADIOLOGIST HAS BEEN CAUGHT UP IN A LENGTHY PROCEDURE AND WILL READ THE IMAGES AS SOON AS THEY ARE ABLE. WALT ROBINS NOTIFIED. WALT ROBINS REQUESTING IMAGES TO BE SENT ELSE WHERE IF ABLE TO BE READ DUE TO THE DELAY. WILL ASK CT REGARDING THAT.

## 2023-06-09 NOTE — ED PROVIDER NOTES
Subjective   History of Present Illness  Patient presents complaining of some back pain.  Earlier this morning patient was walking and lost her balance and fell backwards into the wall and then she slid down to the floor.  Patient says the pain is from hitting the wall and its in her lower lumbar superior sacral region.  Patient denies any numbness, weakness, or sensory changes in her legs.  No loss of control of her bowels or bladder.  Patient is on Eliquis.  Patient denies hitting her head or any loss of consciousness.  Patient was able to eventually get back up and hit her health alarm.  EMS arrived and brought patient here.  No therapy taken prior to arrival.    Review of Systems   All other systems reviewed and are negative.    Past Medical History:   Diagnosis Date    Acid reflux     Back pain     Colitis     history of    Diarrhea     Disease of thyroid gland     Diverticular disease     Glaucoma     Gout     - right big toe    High blood pressure     High cholesterol     History of vertebral compression fracture     multiple    Hyponatremia     Knee pain     Low back pain     Macular degeneration     Mitral regurgitation 06/2016    Mild to moderate per 2-D echocardiogram    Peripheral neuropathy     Primary osteoarthritis of both knees 4/1/2016    Renal lesion     on mri    Rheumatoid arthritis        Allergies   Allergen Reactions    Neomycin Swelling    Statins Myalgia and Swelling     Not all statins  Not all statins       Past Surgical History:   Procedure Laterality Date    APPENDECTOMY  1951    CATARACT EXTRACTION Bilateral     FEMUR OPEN REDUCTION INTERNAL FIXATION Right 10/11/2020    Procedure: OPEN REDUCTION INTERNAL FIXATION OF RIGHT FEMUR FRACTURE;  Surgeon: Mac Frances Jr., MD;  Location: [facility];  Service: Orthopedics;  Laterality: Right;    FEMUR SURGERY Right     had fracture and has rajesh     HEMORRHOIDECTOMY      HIP HEMIARTHROPLASTY Right 1/3/2017    Procedure: HIP HEMIARTHROPLASTY  with IM rajesh and 2 screw hardware removal antibiotic cement;  Surgeon: Mirza Whittington MD;  Location:  [facility];  Service:     HIP SURGERY Right 1999    hx fracture    LEG SURGERY Right 12/1/2017    Procedure: GLUTEAL TENDON REPAIR-minimus and medius;  Surgeon: Mirza Whittington MD;  Location:  [facility];  Service:     TOTAL KNEE ARTHROPLASTY Right 6/22/2016    Procedure: RT TOTAL KNEE ARTHROPLASTY WITH FINN NAVIGATION;  Surgeon: Adrian Swenson MD;  Location:  [facility];  Service:        Family History   Problem Relation Age of Onset    Alzheimer's disease Mother     Heart attack Father 73    Alzheimer's disease Sister     Diabetes Son     Heart attack Son 58       Social History     Socioeconomic History    Marital status:    Tobacco Use    Smoking status: Never    Smokeless tobacco: Never    Tobacco comments:     Daily caffiene   Vaping Use    Vaping Use: Never used   Substance and Sexual Activity    Alcohol use: Yes     Alcohol/week: 3.0 standard drinks     Types: 3 Shots of liquor per week     Comment: social use 1 weekly    Drug use: No     Types: Oxycodone    Sexual activity: Defer           Objective   Physical Exam  Vitals and nursing note reviewed.   Constitutional:       Appearance: Normal appearance.      Comments: Patient sitting in bed comfortably.  Conversive, pleasant.   Eyes:      Conjunctiva/sclera: Conjunctivae normal.   Cardiovascular:      Rate and Rhythm: Normal rate and regular rhythm.      Heart sounds: Normal heart sounds.   Pulmonary:      Effort: Pulmonary effort is normal.      Breath sounds: Normal breath sounds.   Abdominal:      General: Abdomen is flat.      Palpations: Abdomen is soft.   Musculoskeletal:         General: Normal range of motion.      Cervical back: Normal range of motion and neck supple.        Back:    Skin:     General: Skin is warm and dry.      Capillary Refill: Capillary refill takes 2 to 3 seconds.   Neurological:      Mental Status: She is alert and  oriented to person, place, and time.   Psychiatric:         Mood and Affect: Mood normal.         Behavior: Behavior normal.       Procedures           ED Course                                           Medical Decision Making  Ddx sprain, strain, dislocation, fracture    CT Lumbar Spine Without Contrast    Result Date: 6/9/2023  1.  Acute superior endplate vertebral compression fracture at L3 resulting only slight loss of central and anterior vertebral body height (approximately 10%). This does not involve the posterior vertebral body. 2.  Chronic vertebral compression fracture deformities at T11, T12, L1 and L2 as detailed above. 3.  Severe multilevel degenerative disc disease and facet arthropathy throughout the lower lumbar spine and at L1-2. 4.  Significant demineralization suggesting osteoporosis.  This report was finalized on 6/9/2023 1:13 PM by Dr. Italo Hazel MD.      CT Pelvis Without Contrast    Result Date: 6/9/2023  1.  No visible acute fracture of the sacrum, pelvis or hips. 2.  Well-positioned right proximal femoral endoprosthesis. 3.  Old healed left inferior pubic ramus fracture. 4.  Profound demineralization suggesting osteoporosis. 5.  Degenerative arthropathy at the left hip.  This report was finalized on 6/9/2023 1:33 PM by Dr. Italo Hazel MD.      1400  Pt seen again prior to d/c.  Imaging reviewed and are remarkable for acute compression fx and multiple old compression fractures.  Discussed with patient and she has her TLSO brace still at home and can reapply that.  This was advised and to continue to wear it until she is able to follow back up with her spine physician.  Patient is in agreement.  Patient will be sent home with some pain medicine as well as stool softeners..  Symptoms improved and pt feels better; vitals stable and pt. in NAD. Non-toxic. Comfortable. Ambulating without difficulty.  Tolerating po.  Relaxed breathing.  All questions personally answered at the bedside  and all d/c instructions personally reviewed with pt.  Discussed the importance of close outpt. f/u and pt. understands this and agrees to do so.  Pt agrees to return to ED immediately for any new, persistent, or worsening symptoms.    EMR Dragon/Transcription disclaimer:  Much of this encounter note is an electronic transcription/translation of spoken language to printed text, aka voice recognition.  The electronic translation of spoken language may permit erroneous or at times nonsensical words or phrases to be inadvertently transcribed; although I have reviewed the note for such errors, some may still exist so please interpret based on surrounding text content.          Amount and/or Complexity of Data Reviewed  Radiology: ordered.    Risk  Prescription drug management.        Final diagnoses:   Compression fracture of L3 lumbar vertebra, closed, initial encounter   Acute midline low back pain without sciatica       ED Disposition  ED Disposition       ED Disposition   Discharge    Condition   Stable    Comment   --               spine surger    In 1 week      Peter Aiken MD  [address] [place], [place] [id number] [phone]    In 1 week           Medication List        New Prescriptions      HYDROcodone-acetaminophen 7.5-325 MG per tablet  Commonly known as: NORCO  Take 1 tablet by mouth Every 6 (Six) Hours As Needed for Moderate Pain.     sennosides-docusate 8.6-50 MG per tablet  Commonly known as: PERICOLACE  Take 2 tablets by mouth Daily. Take with 12oz of water            Changed      docusate sodium 100 MG capsule  Take 100 mg by mouth 2 (Two) Times a Day.  What changed:   when to take this  reasons to take this               Where to Get Your Medications        These medications were sent to [facility] [id number] - [place] - [address] [facility] [address] - [phone] [facility] [phone] [address] [address]      Phone: [phone]   HYDROcodone-acetaminophen 7.5-325 MG per  tablet  sennosides-docusate 8.6-50 MG per tablet            Mo Morataya MD  06/09/23 0926

## 2023-06-09 NOTE — ED NOTES
PATIENT DOES NOT WANT TO WEAR A GOWN. SHE STATES SHE WILL BE WEARING HER 'HOUSE COAT' AND DOES NOT WANT TO BE EXPOSED.

## 2023-06-14 ENCOUNTER — TELEPHONE (OUTPATIENT)
Dept: FAMILY MEDICINE CLINIC | Facility: CLINIC | Age: 88
End: 2023-06-14

## 2023-06-14 NOTE — TELEPHONE ENCOUNTER
"    Caller: Ashli León \"BECKY\"    Relationship: Self    Best call back number:     What is the medical concern/diagnosis: FRACTURED BACK    What specialty or service is being requested: BACK SPECIALIST    What is the provider, practice or medical service name: DR RIANA MAHONEY    What is the office location:     What is the office phone number: [phone]    Any additional details: PATIENT IS CALLING IN  TO SEE IF DR THOMSON WILL CALL IN A REFERRAL FOR A BACK SPECIALIST  AS SHE HAS FRACTURED HER BACK, SHE WENT TO [facility] ON 06/9/23   SHE WANTS TO BE CALLED TO DISCUSS THIS.   "

## 2023-06-15 DIAGNOSIS — S32.000A LUMBAR COMPRESSION FRACTURE, CLOSED, INITIAL ENCOUNTER: Primary | ICD-10-CM

## 2023-06-19 DIAGNOSIS — F32.1 CURRENT MODERATE EPISODE OF MAJOR DEPRESSIVE DISORDER, UNSPECIFIED WHETHER RECURRENT: ICD-10-CM

## 2023-06-19 RX ORDER — ESCITALOPRAM OXALATE 10 MG/1
10 TABLET ORAL DAILY
Qty: 30 TABLET | Refills: 0 | Status: SHIPPED | OUTPATIENT
Start: 2023-06-19
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
## 2023-06-22 PROBLEM — E44.0 MODERATE MALNUTRITION: Status: ACTIVE | Noted: 2023-06-22

## 2023-07-19 ENCOUNTER — TELEPHONE (OUTPATIENT)
Dept: PAIN MEDICINE | Facility: CLINIC | Age: 88
End: 2023-07-19

## 2023-07-19 NOTE — TELEPHONE ENCOUNTER
I spoke with Ms. León and she states that she was given hydro/apap 7.5-325 mg at discharge from rehab but she is out of the medication now. She was wanting our office to refill the hydro/apap 7.5-325 mg to take along with the Butrans patch that she already gets from our office. The patch isn't providing enough pain relief by itself. I suggested moving her appt up sooner to be evaluated and to discuss. She states that she is staying with a friend right now and can't come in any sooner because she not getting around to good. Please advise.

## 2023-07-19 NOTE — TELEPHONE ENCOUNTER
"    Caller: TERI RODRIGUEZ \"BECKY\"     Relationship: PATIENT     Best call back number: [phone]    Requested Prescriptions:   Requested Prescriptions      No prescriptions requested or ordered in this encounter       HYDROcodone-acetaminophen (NORCO) 7.5-325 MG per tablet 1 tablet Oral Every 4 Hours PRN 45 MME/Day       Pharmacy where request should be sent:      Last office visit with prescribing clinician: 6/2/2023   Last telemedicine visit with prescribing clinician: Visit date not found   Next office visit with prescribing clinician: 8/25/2023     Additional details provided by patient:   [facility]  [address] [place] [address]  [phone]  RX ORIGINALLY PRESCRIBED IN HOSPITAL.  PATIENT COMPLETELY OUT      Does the patient have less than a 3 day supply:  [x] Yes  [] No    Would you like a call back once the refill request has been completed: [x] Yes [] No    If the office needs to give you a call back, can they leave a voicemail: [x] Yes [] No    Jose Caraballo   07/19/23 08:07 EDT         "

## 2023-07-20 NOTE — TELEPHONE ENCOUNTER
Called Ms. León and didn't get an answer. Her voicemail isn't set-up so I couldn't leave a message.

## 2023-07-20 NOTE — TELEPHONE ENCOUNTER
This is a high risk medication. I would need to evaluate her.  She could see myself or any APC if needed. But I do recommend office evaluation.

## 2023-07-20 NOTE — TELEPHONE ENCOUNTER
UNABLE TO WT     Caller: TERI PENA'     Relationship to patient: PATIENT     Best call back number: [phone]    Patient is needing: PATIENT RETURNING CALL

## 2023-07-21 NOTE — TELEPHONE ENCOUNTER
Spoke with Ms. León and relayed the above message. She is going to come in for an appt on 7/25/2023 with TANIA Duran.

## 2023-09-05 ENCOUNTER — READMISSION MANAGEMENT (OUTPATIENT)
Dept: CALL CENTER | Facility: HOSPITAL | Age: 88
End: 2023-09-05
Payer: MEDICARE

## 2023-09-05 NOTE — OUTREACH NOTE
Prep Survey      Flowsheet Row Responses   [facility] facility patient discharged from? Non-BH   Is LACE score < 7 ? Non-BH Discharge   Eligibility [facility]   Date of Discharge 09/05/23   Discharge Disposition Home or Self Care   Discharge diagnosis Wedge compression fracture of T11-T12 vertebra, subsequent encounter for fracture with routine healing   Does the patient have one of the following disease processes/diagnoses(primary or secondary)? Other   Prep survey completed? Yes            Em JASON - Registered Nurse

## 2023-09-06 ENCOUNTER — TRANSITIONAL CARE MANAGEMENT TELEPHONE ENCOUNTER (OUTPATIENT)
Dept: CALL CENTER | Facility: HOSPITAL | Age: 88
End: 2023-09-06
Payer: MEDICARE

## 2023-09-06 NOTE — OUTREACH NOTE
Call Center TCM Note      Flowsheet Row Responses   [facility] patient discharged from? Non-   Does the patient have one of the following disease processes/diagnoses(primary or secondary)? Other   TCM attempt successful? Yes   Call start time 1550   Call end time 1552   Discharge diagnosis Wedge compression fracture of T11-T12 vertebra, subsequent encounter for fracture with routine healing   Meds reviewed with patient/caregiver? Yes   Is the patient having any side effects they believe may be caused by any medication additions or changes? No   Does the patient have all medications ordered at discharge? Yes   Is the patient taking all medications as directed (includes completed medication regime)? Yes   Does the patient have an appointment with their PCP within 7-14 days of discharge? Yes   Has home health visited the patient within 72 hours of discharge? Yes   Home health comments [facility] will be following pt per orders from [facility] at d/c.   Psychosocial issues? No   Did the patient receive a copy of their discharge instructions? Yes   Nursing interventions Reviewed instructions with patient   What is the patient's perception of their health status since discharge? Same   Is the patient/caregiver able to teach back signs and symptoms related to disease process for when to call PCP? Yes   Is the patient/caregiver able to teach back signs and symptoms related to disease process for when to call 911? Yes   Is the patient/caregiver able to teach back the hierarchy of who to call/visit for symptoms/problems? PCP, Specialist, Home health nurse, Urgent Care, ED, 911 Yes   If the patient is a current smoker, are they able to teach back resources for cessation? Not a smoker   TCM call completed? Yes   Wrap up additional comments D/C DX: Wedge compression fracture of T11-T12 vertebra, subsequent encounter for fracture with routine healing - Pt d/c from [facility] 06/23/2023, form [facility] 09/05/2023. Pt states she is stil in  alot of pain. All medications needed will be in place today. [facility] to follow pt. Pt is staying with a friend for few days but will then be home alone. TCM APPT with PCP DAINA Rubio is 09/14/2023   Call end time [phone]            Yoana De La Cruz MA    9/6/2023, 15:55 EDT

## 2023-09-06 NOTE — OUTREACH NOTE
Call Center TCM Note      Flowsheet Row Responses   [facility] patient discharged from? Non-BH   Does the patient have one of the following disease processes/diagnoses(primary or secondary)? Other   TCM attempt successful? No   Unsuccessful attempts Attempt 1            Yoana De La Cruz MA    9/6/2023, 09:21 EDT

## 2023-09-07 ENCOUNTER — TELEPHONE (OUTPATIENT)
Dept: FAMILY MEDICINE CLINIC | Facility: CLINIC | Age: 88
End: 2023-09-07
Payer: MEDICARE

## 2023-09-07 NOTE — TELEPHONE ENCOUNTER
Anastacio, PT with [facility] called to let you know patient is back in home health PT once a week for 8 weeks, also will have OT evaluation.

## 2023-09-08 DIAGNOSIS — E78.2 MIXED HYPERLIPIDEMIA: Primary | ICD-10-CM

## 2023-09-08 DIAGNOSIS — E03.9 HYPOTHYROIDISM (ACQUIRED): ICD-10-CM

## 2023-09-08 DIAGNOSIS — I10 HYPERTENSION, ESSENTIAL: ICD-10-CM

## 2023-09-08 DIAGNOSIS — R73.03 PREDIABETES: ICD-10-CM

## 2023-09-08 DIAGNOSIS — Z00.00 MEDICARE ANNUAL WELLNESS VISIT, SUBSEQUENT: ICD-10-CM

## 2023-09-11 ENCOUNTER — TELEPHONE (OUTPATIENT)
Dept: PAIN MEDICINE | Facility: CLINIC | Age: 88
End: 2023-09-11

## 2023-09-11 ENCOUNTER — TELEPHONE (OUTPATIENT)
Dept: FAMILY MEDICINE CLINIC | Facility: CLINIC | Age: 88
End: 2023-09-11
Payer: MEDICARE

## 2023-09-11 NOTE — TELEPHONE ENCOUNTER
Provider: DIMITRI  Caller: ZENAIDA SEARS  Relationship to Patient: FRIEND  Phone Number: [phone]  Reason for Call: ZENAIDA STATED THAT PATIENT HAS GONE INTO HOSPICE CARE, AND THAT THEY ARE GOING TO TAKE OVER ALL OF HER PAIN MEDS. PLEASE CALL HER WITH ANY QUESTIONS.

## 2023-09-11 NOTE — TELEPHONE ENCOUNTER
Ashli with [facility] called requesting orders for OT on Ashli [place].  Her number is [phone].    Ashli did not say for how many visits or for how long in her message.

## 2025-01-20 NOTE — ED NOTES
Pt c/o epigastric pain that started this afternoon. Pt reports drinking coke and being able to belch with no relief. Pt was given asa and nitro. Pt states that she was pain free before she received the asa/nitro.  Reports soa with onset. Episode lasted couple hours. Pt continues to be pain free    Pt had mask on when placed in room. RN wore goggles and surgical mask upon entering room.        Claudia Zamora, RN  05/25/21 0025     Pure Hypercholesteremia  Patient with known CAD  which is controlled Will continue Statin and monitor for S/Sx of angina/ACS. Continue to monitor on telemetry.

## 2025-06-08 NOTE — PROGRESS NOTES
"SERVICE: [facility] HOSPITALIST    CHIEF COMPLAINT: f/u HTN    SUBJECTIVE: The patient reports feeling well. Had BM yesterday, none today. Tolerating po. Ambulating with walker, pain well-controlled with medication. Wants to be admitted to Dr. Reis at [facility] tomorrow. Denies f/c/cough/soa/chest pain/n/v/d/abdominal pain or other new concerns.  OBJECTIVE:    /64 (BP Location: Left arm, Patient Position: Lying)  Pulse 71  Temp 97.9 °F (36.6 °C) (Oral)   Resp 18  Ht 160 cm (62.99\")  Wt 56 kg (123 lb 6.4 oz)  SpO2 95%  BMI 21.86 kg/m2    MEDS/LABS REVIEWED AND ORDERED    allopurinol 100 mg Oral Daily   aspirin 325 mg Oral Daily   atorvastatin 10 mg Oral Daily   cholecalciferol 2,000 Units Oral Daily   docusate sodium 100 mg Oral BID   escitalopram 10 mg Oral Daily   latanoprost 1 drop Both Eyes Nightly   levothyroxine 50 mcg Oral Daily   metoprolol tartrate 25 mg Oral Daily   pantoprazole 40 mg Oral QAM   zolpidem 10 mg Oral Nightly     Physical Exam   Constitutional: She is oriented to person, place, and time. She appears well-developed.   HENT:   Head: Atraumatic.   Eyes: EOM are normal. Pupils are equal, round, and reactive to light.   Cardiovascular: Normal rate, regular rhythm and normal heart sounds.    Pulmonary/Chest: Effort normal and breath sounds normal. No respiratory distress. She has no wheezes. She has no rales.   Abdominal: Soft. She exhibits no distension. There is no tenderness.   Musculoskeletal: She exhibits no edema.   Neurological: She is oriented to person, place, and time.   Skin: Skin is warm and dry. No erythema.   Right hip dressing clean, dry and intact   Psychiatric: She has a normal mood and affect. Her behavior is normal. Judgment and thought content normal.   Vitals reviewed.    LAB/DIAGNOSTICS:    Lab Results (last 24 hours)     Procedure Component Value Units Date/Time    Basic Metabolic Panel [[id number]]  (Abnormal) Collected:  12/05/17 0352    " Specimen:  Blood Updated:  12/05/17 0529     Glucose 101 (H) mg/dL      BUN 17 mg/dL      Creatinine 0.71 mg/dL      Sodium 136 mmol/L      Potassium 3.9 mmol/L      Chloride 96 (L) mmol/L      CO2 29.7 (H) mmol/L      Calcium 9.1 mg/dL      eGFR Non African Amer 79 mL/min/1.73      BUN/Creatinine Ratio 23.9     Anion Gap 10.3 mmol/L     Narrative:       The MDRD GFR formula is only valid for adults with stable renal function between ages 18 and 70.        ASSESSMENT/PLAN:  1. Hypertension: blood pressure at goal on metoprolol tartrate 25 mg daily    2. Hyperlipidemia: no acute issues on atorvastatin 10 mg daily    3. Gout: no acute issues on allopurinol 100 mg daily    4. GERD: no acute issues on Protonix 40 mg daily    5. Hypothyroidism: no acute issues on levothyroxine 50 µg daily    6. Depression: no acute issues on Lexapro 10 mg daily    7. Chronic T4, T5, T6, T8, T9, T11 fractures: control pain    8. Chronic anemia: hemoglobin stable at 10.1 without active blood loss noted    9. S/P right gluteus minimus and medius tendon revision: ortho managing pain/PT/OT/DVT prophy  To SNF tomorrow  Unable to aspirate fluid from fluid collection     [Telehealth (audio & video)] : This visit was provided via telehealth using real-time 2-way audio visual technology.

## (undated) DEVICE — 3M™ STERI-DRAPE™ INSTRUMENT POUCH 1018: Brand: STERI-DRAPE™

## (undated) DEVICE — COVER,MAYO STAND,STERILE: Brand: MEDLINE

## (undated) DEVICE — PREP SOL POVIDONE/IODINE BT 4OZ

## (undated) DEVICE — TAP LOCKING , AO: Brand: AXSOS

## (undated) DEVICE — SYS SKIN CLS DERMABOND PRINEO W/22CM MESH TP

## (undated) DEVICE — HOOD, PEEL-AWAY: Brand: FLYTE

## (undated) DEVICE — CONTAINER,SPECIMEN,OR STERILE,4OZ: Brand: MEDLINE

## (undated) DEVICE — TRY SKINPREP WET CHG 4PCT SPNG HIB

## (undated) DEVICE — GLV SURG NEOLON 2G PF LF 8 STRL

## (undated) DEVICE — TRANSPOSAL ULTRAFLEX DUO/QUAD ULTRA CART MANIFOLD

## (undated) DEVICE — 3M™ IOBAN™ 2 ANTIMICROBIAL INCISE DRAPE 6651EZ: Brand: IOBAN™ 2

## (undated) DEVICE — LOCKING SCREW
Type: IMPLANTABLE DEVICE | Status: NON-FUNCTIONAL
Brand: AXSOS
Removed: 2020-10-11

## (undated) DEVICE — ELECTRD BLD EZ CLN STD 6.5IN

## (undated) DEVICE — Device

## (undated) DEVICE — VAGINAL PACKING: Brand: DEROYAL

## (undated) DEVICE — DRSNG SURESITE WNDW 4X4.5

## (undated) DEVICE — CALIBRATED DRILL BIT , AO: Brand: AXSOS

## (undated) DEVICE — SPNG GZ WOVN 4X4IN 12PLY 10/BX STRL

## (undated) DEVICE — GLV SURG SENSICARE PI ORTHO SZ8.5 LF STRL

## (undated) DEVICE — TOWEL,OR,DSP,ST,BLUE,STD,4/PK,20PK/CS: Brand: MEDLINE

## (undated) DEVICE — PAD,ABDOMINAL,5"X9",STERILE,LF,1/PK: Brand: MEDLINE INDUSTRIES, INC.

## (undated) DEVICE — DRSNG WND GZ CURAD OIL EMULSION 3X8IN LF STRL 1PK

## (undated) DEVICE — PAD,ABDOMINAL,8"X10",ST,LF: Brand: MEDLINE

## (undated) DEVICE — CONN TBG Y 5 IN 1 LF STRL

## (undated) DEVICE — PLUS HANDPIECE: Brand: SURGILAV

## (undated) DEVICE — STPLR SKIN VISISTAT WD 35CT

## (undated) DEVICE — DRP Z/FRICTION 10X16IN

## (undated) DEVICE — GLV SURG SENSICARE ORTHO PF LF 8 STRL

## (undated) DEVICE — STCKNT IMPERV 14IN STRL

## (undated) DEVICE — DECANTER: Brand: UNBRANDED

## (undated) DEVICE — UNDYED BRAIDED (POLYGLACTIN 910), SYNTHETIC ABSORBABLE SUTURE: Brand: COATED VICRYL

## (undated) DEVICE — TP SILK DURAPORE 2 IN

## (undated) DEVICE — FRAZIER SUCTION INSTRUMENT 10 FR W/CONTROL VENT & OBTURATOR: Brand: FRAZIER

## (undated) DEVICE — DRAPE,U/ SHT,SPLIT,PLAS,STERIL: Brand: MEDLINE

## (undated) DEVICE — DECANT BG O JET

## (undated) DEVICE — GLV SURG SENSICARE PI ORTHO SZ8 LF STRL

## (undated) DEVICE — BOWL AND CEMENT CARTRIDGE: Brand: ACM

## (undated) DEVICE — SUT ETHIB 0 CT1 CR8 18IN CX21D

## (undated) DEVICE — DRSNG TELFA PAD NONADH STR 1S 3X8IN

## (undated) DEVICE — BOWL PLSTC LG 32OZ BLU STRL

## (undated) DEVICE — GLV SURG SENSICARE LT W/ALOE PF LF 6.5 STRL

## (undated) DEVICE — CVR UNIV C/ARM

## (undated) DEVICE — GAUZE SPONGES,8 PLY: Brand: CURITY

## (undated) DEVICE — DRSNG GZ CURAD XEROFORM NONADHR OVERWRAP 5X9IN

## (undated) DEVICE — INTENDED FOR TISSUE SEPARATION, AND OTHER PROCEDURES THAT REQUIRE A SHARP SURGICAL BLADE TO PUNCTURE OR CUT.: Brand: BARD-PARKER ® DISPOSABLE SCALPELS

## (undated) DEVICE — NDL SPINE 22G 31/2IN BLK

## (undated) DEVICE — 2108 SERIES SAGITTAL BLADE AGGRESSIVE  (25.0 X 0.89 X 89.5MM)

## (undated) DEVICE — CORTEX SCREW
Type: IMPLANTABLE DEVICE | Status: NON-FUNCTIONAL
Brand: AXSOS
Removed: 2020-10-11

## (undated) DEVICE — 3M™ IOBAN™ 2 ANTIMICROBIAL INCISE DRAPE 6640EZ: Brand: IOBAN™ 2

## (undated) DEVICE — SUT VIC TP1 SZ2 27IN J849G

## (undated) DEVICE — HOOD: Brand: FLYTE

## (undated) DEVICE — PILLW ABD SM

## (undated) DEVICE — GLV SURG BIOGEL LTX PF 8

## (undated) DEVICE — SYR CONTRL LUERLOK 10CC

## (undated) DEVICE — APPL CHLORAPREP HI/LITE 26ML ORNG

## (undated) DEVICE — DRAPE,REIN 53X77,STERILE: Brand: MEDLINE

## (undated) DEVICE — ANTIBACTERIAL UNDYED BRAIDED (POLYGLACTIN 910), SYNTHETIC ABSORBABLE SUTURE: Brand: COATED VICRYL

## (undated) DEVICE — IRRIGATOR BULB 60CC

## (undated) DEVICE — PENCL E/S ULTRAVAC TELESCP NOSE HOLSTR 10FT

## (undated) DEVICE — BREAKAWAY FEMORAL NOZZLE

## (undated) DEVICE — SPNG LAP PREWASH SFTPK 18X18IN 5PK STRL

## (undated) DEVICE — DRP C/ARMOR

## (undated) DEVICE — SUT NUROLON 1 OS8 CR3 18IN C595T

## (undated) DEVICE — DRAPE,HIP,W/POUCHES,STERILE: Brand: MEDLINE

## (undated) DEVICE — 3M™ MEDIPORE™ H SOFT CLOTH SURGICAL TAPE 2864, 4 INCH X 10 YARD (10CM X 9,14M), 12 ROLLS/CASE: Brand: 3M™ MEDIPORE™

## (undated) DEVICE — INTENDED USE FOR SURGICAL MARKING ON INTACT SKIN, ALSO PROVIDES A PERMANENT METHOD OF IDENTIFYING OBJECTS IN THE OPERATING ROOM: Brand: WRITESITE® REGULAR TIP SKIN MARKER

## (undated) DEVICE — BNDG ELAS ELITE V/CLOSE 4IN 5YD LF STRL

## (undated) DEVICE — PK TOTL HIP 50

## (undated) DEVICE — COVER,TABLE,HEAVY DUTY,79"X110",STRL: Brand: MEDLINE

## (undated) DEVICE — K-WIRE DRILL TIP
Type: IMPLANTABLE DEVICE | Status: NON-FUNCTIONAL
Brand: AXSOS
Removed: 2020-10-11

## (undated) DEVICE — APPL CHLORAPREP W/TINT 26ML ORNG

## (undated) DEVICE — SUT VIC 2/0 CT1 CR8 18IN J839D

## (undated) DEVICE — GLV SURG EUDERMIC PF LTX 8 STRL

## (undated) DEVICE — DRSNG PAD ABD 8X10IN STRL

## (undated) DEVICE — GLV SURG SENSICARE W/ALOE PF LF 6.5 STRL

## (undated) DEVICE — CUP SPECI 4OZ LF STRL

## (undated) DEVICE — PK BASIC ORTHO 90

## (undated) DEVICE — DRAPE SHEET ULTRAGARD: Brand: MEDLINE

## (undated) DEVICE — ELECTRD BLD EXT EDGE 1P COAT 6.5IN STRL

## (undated) DEVICE — MULTIPLE-ORIFICE TIP

## (undated) DEVICE — SPNG LAP 18X18IN LF STRL PK/5